# Patient Record
Sex: MALE | Race: BLACK OR AFRICAN AMERICAN | NOT HISPANIC OR LATINO | Employment: UNEMPLOYED | URBAN - NONMETROPOLITAN AREA
[De-identification: names, ages, dates, MRNs, and addresses within clinical notes are randomized per-mention and may not be internally consistent; named-entity substitution may affect disease eponyms.]

---

## 2020-03-12 PROBLEM — R45.851 SUICIDAL IDEATION: Status: ACTIVE | Noted: 2020-03-12

## 2020-03-12 PROBLEM — Z13.9 ENCOUNTER FOR MEDICAL SCREENING EXAMINATION: Status: ACTIVE | Noted: 2020-03-12

## 2020-03-12 PROBLEM — F29 PSYCHOSIS: Status: ACTIVE | Noted: 2020-03-12

## 2020-03-18 PROBLEM — F29 PSYCHOSIS: Status: RESOLVED | Noted: 2020-03-12 | Resolved: 2020-03-18

## 2020-03-18 PROBLEM — R45.851 SUICIDAL IDEATION: Status: RESOLVED | Noted: 2020-03-12 | Resolved: 2020-03-18

## 2020-06-15 PROBLEM — Z13.9 ENCOUNTER FOR MEDICAL SCREENING EXAMINATION: Status: RESOLVED | Noted: 2020-03-12 | Resolved: 2020-06-15

## 2020-09-05 ENCOUNTER — HOSPITAL ENCOUNTER (EMERGENCY)
Facility: HOSPITAL | Age: 20
Discharge: HOME | End: 2020-09-05
Attending: EMERGENCY MEDICINE
Payer: MEDICAID

## 2020-09-05 VITALS
HEART RATE: 53 BPM | WEIGHT: 166 LBS | TEMPERATURE: 98 F | BODY MASS INDEX: 20.64 KG/M2 | OXYGEN SATURATION: 100 % | DIASTOLIC BLOOD PRESSURE: 64 MMHG | RESPIRATION RATE: 14 BRPM | HEIGHT: 75 IN | SYSTOLIC BLOOD PRESSURE: 113 MMHG

## 2020-09-05 DIAGNOSIS — Z71.1 CONCERN ABOUT STD IN MALE WITHOUT DIAGNOSIS: Primary | ICD-10-CM

## 2020-09-05 PROCEDURE — 99283 EMERGENCY DEPT VISIT LOW MDM: CPT

## 2020-09-05 PROCEDURE — 87491 CHLMYD TRACH DNA AMP PROBE: CPT

## 2020-09-05 NOTE — ED PROVIDER NOTES
Encounter Date: 9/5/2020       History     Chief Complaint   Patient presents with    Exposure to STD     denies exposure but wants to be tested for std to be on safe side.     20-year-old male who comes to the ER today patient is a has filed smelling odor coming from his anus, patient denies any known contact with anyone with the STD.  He does admit to having anal sex.  Denies any drainage pain or burning on urination no nausea no vomiting.        Review of patient's allergies indicates:  No Known Allergies  History reviewed. No pertinent past medical history.  History reviewed. No pertinent surgical history.  History reviewed. No pertinent family history.  Social History     Tobacco Use    Smoking status: Never Smoker   Substance Use Topics    Alcohol use: Not on file    Drug use: Not on file     Review of Systems   Constitutional: Negative for fever.   HENT: Negative for sore throat.    Respiratory: Negative for shortness of breath.    Cardiovascular: Negative for chest pain.   Gastrointestinal: Negative for nausea.   Genitourinary: Negative for difficulty urinating, discharge, dysuria and genital sores.   Musculoskeletal: Negative for back pain.   Skin: Negative for rash.   Neurological: Negative for weakness.   Hematological: Does not bruise/bleed easily.       Physical Exam     Initial Vitals [09/05/20 1506]   BP Pulse Resp Temp SpO2   113/64 (!) 53 14 98.1 °F (36.7 °C) 100 %      MAP       --         Physical Exam    Constitutional: Vital signs are normal. He appears well-developed and well-nourished.   HENT:   Head: Normocephalic and atraumatic.   Right Ear: Hearing and external ear normal.   Left Ear: Hearing and external ear normal.   Nose: Nose normal.   Mouth/Throat: Uvula is midline, oropharynx is clear and moist and mucous membranes are normal.   Eyes: Conjunctivae, EOM and lids are normal. Pupils are equal, round, and reactive to light.   Neck: Trachea normal. Neck supple.   Cardiovascular: Normal  rate, regular rhythm, normal heart sounds and normal pulses.   Pulmonary/Chest: Effort normal and breath sounds normal.   Abdominal: Soft. Bowel sounds are normal.   Genitourinary:    Testes and penis normal.      Genitourinary Comments: Perianal area without discharge, trauma, bleeding, or signs of hemorrhoids.     Neurological: He is alert.         ED Course   Procedures  Labs Reviewed - No data to display       Imaging Results    None          Medical Decision Making:   Differential Diagnosis:   Anal Tear  STD    Clinical Tests:   Lab Tests: Ordered                                 Clinical Impression:       ICD-10-CM ICD-9-CM   1. Concern about STD in male without diagnosis  Z71.1 V65.5                                Yogesh Wasserman III, NP  09/05/20 1634

## 2020-10-06 LAB
C TRACH DNA SPEC QL NAA+PROBE: NOT DETECTED
N GONORRHOEA DNA SPEC QL NAA+PROBE: NOT DETECTED

## 2020-10-31 ENCOUNTER — HOSPITAL ENCOUNTER (EMERGENCY)
Facility: HOSPITAL | Age: 20
Discharge: HOME | End: 2020-10-31
Attending: EMERGENCY MEDICINE
Payer: MEDICAID

## 2020-10-31 VITALS
OXYGEN SATURATION: 100 % | DIASTOLIC BLOOD PRESSURE: 73 MMHG | SYSTOLIC BLOOD PRESSURE: 118 MMHG | TEMPERATURE: 98 F | RESPIRATION RATE: 17 BRPM | HEART RATE: 51 BPM

## 2020-10-31 DIAGNOSIS — S93.503A: ICD-10-CM

## 2020-10-31 PROCEDURE — 99283 EMERGENCY DEPT VISIT LOW MDM: CPT | Mod: 25

## 2020-10-31 RX ORDER — IBUPROFEN 600 MG/1
600 TABLET ORAL EVERY 8 HOURS
Qty: 20 TABLET | Refills: 0 | Status: SHIPPED | OUTPATIENT
Start: 2020-10-31 | End: 2020-11-05

## 2020-10-31 NOTE — DISCHARGE INSTRUCTIONS
Rest, ice for 20 min every 2 hr while awake for the next 48 hr.  Take ibuprofen every 8 hr with food for 5 days for anti inflammation.   hard sole shoe and crutches for rest as needed.  Follow-up with podiatrist if not resolving this week.

## 2020-10-31 NOTE — Clinical Note
"Bishop Nixsam Soria was seen and treated in our emergency department on 10/31/2020.  He may return to work on 10/31/2020.       If you have any questions or concerns, please don't hesitate to call.      Chrissy GRIMM    "

## 2020-10-31 NOTE — ED PROVIDER NOTES
Encounter Date: 10/31/2020       History     Chief Complaint   Patient presents with    Toe Pain     Pt c/o pain to base of right great toe.  Pt states @ a dance class and had pain after.     20-year-old male presents complaining of right MTP pain and swelling that started 2 days ago.  He noticed the pain after finishing a dance class.  Was hip popped dancing.  Worse yesterday and again today.  No known specific injury.  No erythema warmth or fever. Denies any known arthritis or STI.  Denies any renal dysfunction or history of gout.        Review of patient's allergies indicates:  No Known Allergies  No past medical history on file.  No past surgical history on file.  No family history on file.  Social History     Tobacco Use    Smoking status: Never Smoker   Substance Use Topics    Alcohol use: Not on file    Drug use: Not on file     Review of Systems   Constitutional: Negative for fever.   HENT: Negative for sore throat.    Respiratory: Negative for shortness of breath.    Cardiovascular: Negative for chest pain.   Gastrointestinal: Negative for nausea.   Genitourinary: Negative for dysuria.   Musculoskeletal: Positive for arthralgias. Negative for back pain.   Skin: Negative for rash.   Neurological: Negative for weakness.   Hematological: Does not bruise/bleed easily.       Physical Exam     Initial Vitals [10/31/20 0848]   BP Pulse Resp Temp SpO2   114/67 60 17 98.1 °F (36.7 °C) 97 %      MAP       --         Physical Exam    Nursing note and vitals reviewed.  Constitutional: He appears well-developed and well-nourished.   HENT:   Head: Atraumatic.   Neck: No JVD present.   Cardiovascular: Normal rate, regular rhythm and normal heart sounds.   Pulmonary/Chest: Breath sounds normal.   Musculoskeletal:      Comments: Right MTP mild swelling no erythema joint is tender.  Normal cap refill.  Normal sensation.  Midfoot and ankle and plantar surface of the foot is normal.   Lymphadenopathy:     He has no  cervical adenopathy.   Neurological: He is alert and oriented to person, place, and time.         ED Course   Procedures  Labs Reviewed - No data to display       Imaging Results          X-Ray Foot Complete Right (In process)               X-Rays:   Independently Interpreted Readings:   Other Readings:  X-ray of the right foot shows no bony abnormalities.    Patient states having difficulty bearing weight.  No walking boots available.  Will place in postop shoe and crutches.  Anti-inflammatories around the clock for 5 days.  Follow-up with podiatry next week if not improved.                              Clinical Impression:       ICD-10-CM ICD-9-CM   1. Sprain of great toe  S93.503A 845.19                          ED Disposition Condition    Discharge Stable        ED Prescriptions     Medication Sig Dispense Start Date End Date Auth. Provider    ibuprofen (ADVIL,MOTRIN) 600 MG tablet Take 1 tablet (600 mg total) by mouth every 8 (eight) hours. for 5 days 20 tablet 10/31/2020 11/5/2020 Elder Cadena MD        Follow-up Information     Follow up With Specialties Details Why Contact Info Additional Information    Ruddy Lawson DPM Podiatry Schedule an appointment as soon as possible for a visit  With podiatry for recheck this week. [address]  [address]  [place] [address] [phone]       [facility] Emergency Medicine  As needed, If symptoms worsen and erythema or concern for infection develops [address] [place] [address] [id number] [phone] Floor [address]                                       Elder Cadena MD  10/31/20 7159

## 2020-12-03 ENCOUNTER — HOSPITAL ENCOUNTER (INPATIENT)
Facility: HOSPITAL | Age: 20
LOS: 8 days | Discharge: HOME | DRG: 885 | End: 2020-12-11
Attending: EMERGENCY MEDICINE | Admitting: EMERGENCY MEDICINE
Payer: MEDICAID

## 2020-12-03 DIAGNOSIS — F23 ACUTE PSYCHOSIS: Primary | ICD-10-CM

## 2020-12-03 LAB
ALBUMIN SERPL BCP-MCNC: 4.3 G/DL (ref 3.5–5.2)
ALP SERPL-CCNC: 66 U/L (ref 55–135)
ALT SERPL W/O P-5'-P-CCNC: 22 U/L (ref 10–44)
AMPHET+METHAMPHET UR QL: NEGATIVE
ANION GAP SERPL CALC-SCNC: 8 MMOL/L (ref 8–16)
AST SERPL-CCNC: 17 U/L (ref 10–40)
BACTERIA #/AREA URNS HPF: NEGATIVE /HPF
BARBITURATES UR QL SCN>200 NG/ML: NEGATIVE
BASOPHILS # BLD AUTO: 0.01 K/UL (ref 0–0.2)
BASOPHILS NFR BLD: 0.1 % (ref 0–1.9)
BENZODIAZ UR QL SCN>200 NG/ML: NEGATIVE
BILIRUB SERPL-MCNC: 0.8 MG/DL (ref 0.1–1)
BILIRUB UR QL STRIP: NEGATIVE
BUN SERPL-MCNC: 22 MG/DL (ref 6–20)
BZE UR QL SCN: NEGATIVE
CALCIUM SERPL-MCNC: 9 MG/DL (ref 8.7–10.5)
CANNABINOIDS UR QL SCN: ABNORMAL
CHLORIDE SERPL-SCNC: 109 MMOL/L (ref 95–110)
CLARITY UR: CLEAR
CO2 SERPL-SCNC: 25 MMOL/L (ref 23–29)
COLOR UR: YELLOW
CREAT SERPL-MCNC: 1.3 MG/DL (ref 0.5–1.4)
CREAT UR-MCNC: 526 MG/DL (ref 23–375)
CTP QC/QA: YES
DIFFERENTIAL METHOD: ABNORMAL
EOSINOPHIL # BLD AUTO: 0 K/UL (ref 0–0.5)
EOSINOPHIL NFR BLD: 0 % (ref 0–8)
ERYTHROCYTE [DISTWIDTH] IN BLOOD BY AUTOMATED COUNT: 12 % (ref 11.5–14.5)
EST. GFR  (AFRICAN AMERICAN): >60 ML/MIN/1.73 M^2
EST. GFR  (NON AFRICAN AMERICAN): >60 ML/MIN/1.73 M^2
ETHANOL SERPL-MCNC: <3 MG/DL
GLUCOSE SERPL-MCNC: 106 MG/DL (ref 70–110)
GLUCOSE UR QL STRIP: NEGATIVE
HCT VFR BLD AUTO: 38.5 % (ref 40–54)
HGB BLD-MCNC: 13 G/DL (ref 14–18)
HGB UR QL STRIP: NEGATIVE
HYALINE CASTS #/AREA URNS LPF: 16 /LPF
IMM GRANULOCYTES # BLD AUTO: 0.01 K/UL (ref 0–0.04)
IMM GRANULOCYTES NFR BLD AUTO: 0.1 % (ref 0–0.5)
KETONES UR QL STRIP: ABNORMAL
LEUKOCYTE ESTERASE UR QL STRIP: ABNORMAL
LYMPHOCYTES # BLD AUTO: 1 K/UL (ref 1–4.8)
LYMPHOCYTES NFR BLD: 14.7 % (ref 18–48)
MCH RBC QN AUTO: 30.8 PG (ref 27–31)
MCHC RBC AUTO-ENTMCNC: 33.8 G/DL (ref 32–36)
MCV RBC AUTO: 91 FL (ref 82–98)
METHADONE UR QL SCN>300 NG/ML: NEGATIVE
MICROSCOPIC COMMENT: ABNORMAL
MONOCYTES # BLD AUTO: 0.6 K/UL (ref 0.3–1)
MONOCYTES NFR BLD: 7.8 % (ref 4–15)
NEUTROPHILS # BLD AUTO: 5.5 K/UL (ref 1.8–7.7)
NEUTROPHILS NFR BLD: 77.3 % (ref 38–73)
NITRITE UR QL STRIP: NEGATIVE
NRBC BLD-RTO: 0 /100 WBC
OPIATES UR QL SCN: NEGATIVE
PCP UR QL SCN>25 NG/ML: NEGATIVE
PH UR STRIP: 8 [PH] (ref 5–8)
PLATELET # BLD AUTO: 255 K/UL (ref 150–350)
PMV BLD AUTO: 9.9 FL (ref 9.2–12.9)
POTASSIUM SERPL-SCNC: 3.5 MMOL/L (ref 3.5–5.1)
PROT SERPL-MCNC: 7.8 G/DL (ref 6–8.4)
PROT UR QL STRIP: ABNORMAL
RBC # BLD AUTO: 4.22 M/UL (ref 4.6–6.2)
RBC #/AREA URNS HPF: 7 /HPF (ref 0–4)
SARS-COV-2 RDRP RESP QL NAA+PROBE: NEGATIVE
SODIUM SERPL-SCNC: 142 MMOL/L (ref 136–145)
SP GR UR STRIP: >=1.03 (ref 1–1.03)
SQUAMOUS #/AREA URNS HPF: 2 /HPF
TOXICOLOGY INFORMATION: ABNORMAL
URN SPEC COLLECT METH UR: ABNORMAL
UROBILINOGEN UR STRIP-ACNC: ABNORMAL EU/DL
WBC # BLD AUTO: 7.07 K/UL (ref 3.9–12.7)
WBC #/AREA URNS HPF: 3 /HPF (ref 0–5)

## 2020-12-03 PROCEDURE — 81000 URINALYSIS NONAUTO W/SCOPE: CPT | Mod: 59

## 2020-12-03 PROCEDURE — 80320 DRUG SCREEN QUANTALCOHOLS: CPT

## 2020-12-03 PROCEDURE — 25000003 PHARM REV CODE 250: Performed by: EMERGENCY MEDICINE

## 2020-12-03 PROCEDURE — 36415 COLL VENOUS BLD VENIPUNCTURE: CPT

## 2020-12-03 PROCEDURE — U0002 COVID-19 LAB TEST NON-CDC: HCPCS | Performed by: EMERGENCY MEDICINE

## 2020-12-03 PROCEDURE — 85025 COMPLETE CBC W/AUTO DIFF WBC: CPT

## 2020-12-03 PROCEDURE — 99285 EMERGENCY DEPT VISIT HI MDM: CPT

## 2020-12-03 PROCEDURE — 80307 DRUG TEST PRSMV CHEM ANLYZR: CPT

## 2020-12-03 PROCEDURE — 11400000 HC PSYCH PRIVATE ROOM

## 2020-12-03 PROCEDURE — S0166 INJ OLANZAPINE 2.5MG: HCPCS | Performed by: EMERGENCY MEDICINE

## 2020-12-03 PROCEDURE — 80053 COMPREHEN METABOLIC PANEL: CPT

## 2020-12-03 RX ORDER — OLANZAPINE 10 MG/2ML
10 INJECTION, POWDER, FOR SOLUTION INTRAMUSCULAR ONCE AS NEEDED
Status: DISCONTINUED | OUTPATIENT
Start: 2020-12-03 | End: 2020-12-03

## 2020-12-03 RX ORDER — OLANZAPINE 10 MG/2ML
10 INJECTION, POWDER, FOR SOLUTION INTRAMUSCULAR ONCE
Status: COMPLETED | OUTPATIENT
Start: 2020-12-03 | End: 2020-12-03

## 2020-12-03 RX ADMIN — OLANZAPINE 10 MG: 10 INJECTION, POWDER, FOR SOLUTION INTRAMUSCULAR at 07:12

## 2020-12-04 PROBLEM — F12.10 MARIJUANA ABUSE: Status: ACTIVE | Noted: 2020-12-04

## 2020-12-04 LAB
ESTIMATED AVG GLUCOSE: 103 MG/DL (ref 68–131)
HBA1C MFR BLD HPLC: 5.2 % (ref 4–5.6)

## 2020-12-04 PROCEDURE — 90792 PSYCH DIAG EVAL W/MED SRVCS: CPT | Mod: AF,HB,, | Performed by: PSYCHIATRY & NEUROLOGY

## 2020-12-04 PROCEDURE — 90792 PR PSYCHIATRIC DIAGNOSTIC EVALUATION W/MEDICAL SERVICES: ICD-10-PCS | Mod: AF,HB,, | Performed by: PSYCHIATRY & NEUROLOGY

## 2020-12-04 PROCEDURE — 36415 COLL VENOUS BLD VENIPUNCTURE: CPT

## 2020-12-04 PROCEDURE — 11400000 HC PSYCH PRIVATE ROOM

## 2020-12-04 PROCEDURE — 25000003 PHARM REV CODE 250: Performed by: PSYCHIATRY & NEUROLOGY

## 2020-12-04 PROCEDURE — 83036 HEMOGLOBIN GLYCOSYLATED A1C: CPT

## 2020-12-04 RX ORDER — MAG HYDROX/ALUMINUM HYD/SIMETH 200-200-20
30 SUSPENSION, ORAL (FINAL DOSE FORM) ORAL EVERY 6 HOURS PRN
Status: DISCONTINUED | OUTPATIENT
Start: 2020-12-04 | End: 2020-12-11 | Stop reason: HOSPADM

## 2020-12-04 RX ORDER — FOLIC ACID 1 MG/1
1 TABLET ORAL DAILY
Status: DISCONTINUED | OUTPATIENT
Start: 2020-12-04 | End: 2020-12-11 | Stop reason: HOSPADM

## 2020-12-04 RX ORDER — DIPHENHYDRAMINE HCL 50 MG
50 CAPSULE ORAL NIGHTLY PRN
Status: DISCONTINUED | OUTPATIENT
Start: 2020-12-04 | End: 2020-12-06

## 2020-12-04 RX ORDER — HALOPERIDOL 5 MG/ML
5 INJECTION INTRAMUSCULAR EVERY 4 HOURS PRN
Status: DISCONTINUED | OUTPATIENT
Start: 2020-12-04 | End: 2020-12-11 | Stop reason: HOSPADM

## 2020-12-04 RX ORDER — ACETAMINOPHEN 325 MG/1
650 TABLET ORAL EVERY 6 HOURS PRN
Status: DISCONTINUED | OUTPATIENT
Start: 2020-12-04 | End: 2020-12-11 | Stop reason: HOSPADM

## 2020-12-04 RX ORDER — DIPHENHYDRAMINE HCL 50 MG
50 CAPSULE ORAL EVERY 4 HOURS PRN
Status: DISCONTINUED | OUTPATIENT
Start: 2020-12-04 | End: 2020-12-11 | Stop reason: HOSPADM

## 2020-12-04 RX ORDER — LORAZEPAM 1 MG/1
2 TABLET ORAL EVERY 4 HOURS PRN
Status: DISCONTINUED | OUTPATIENT
Start: 2020-12-04 | End: 2020-12-11 | Stop reason: HOSPADM

## 2020-12-04 RX ORDER — HALOPERIDOL 5 MG/1
5 TABLET ORAL EVERY 4 HOURS PRN
Status: DISCONTINUED | OUTPATIENT
Start: 2020-12-04 | End: 2020-12-11 | Stop reason: HOSPADM

## 2020-12-04 RX ORDER — DIPHENHYDRAMINE HYDROCHLORIDE 50 MG/ML
50 INJECTION INTRAMUSCULAR; INTRAVENOUS EVERY 4 HOURS PRN
Status: DISCONTINUED | OUTPATIENT
Start: 2020-12-04 | End: 2020-12-11 | Stop reason: HOSPADM

## 2020-12-04 RX ORDER — LORAZEPAM 2 MG/ML
2 INJECTION INTRAMUSCULAR EVERY 4 HOURS PRN
Status: DISCONTINUED | OUTPATIENT
Start: 2020-12-04 | End: 2020-12-11 | Stop reason: HOSPADM

## 2020-12-04 RX ORDER — ARIPIPRAZOLE 10 MG/1
10 TABLET ORAL DAILY
Status: DISCONTINUED | OUTPATIENT
Start: 2020-12-04 | End: 2020-12-08

## 2020-12-04 RX ORDER — ADHESIVE BANDAGE
30 BANDAGE TOPICAL DAILY PRN
Status: DISCONTINUED | OUTPATIENT
Start: 2020-12-04 | End: 2020-12-11 | Stop reason: HOSPADM

## 2020-12-04 RX ADMIN — FOLIC ACID 1 MG: 1 TABLET ORAL at 02:12

## 2020-12-04 RX ADMIN — THERA TABS 1 TABLET: TAB at 02:12

## 2020-12-04 RX ADMIN — DIPHENHYDRAMINE HYDROCHLORIDE 50 MG: 50 CAPSULE ORAL at 08:12

## 2020-12-04 RX ADMIN — ARIPIPRAZOLE 10 MG: 10 TABLET ORAL at 02:12

## 2020-12-04 NOTE — SUBJECTIVE & OBJECTIVE
Past Medical History:   Diagnosis Date    Schizophrenia        History reviewed. No pertinent surgical history.    Review of patient's allergies indicates:  No Known Allergies    No current facility-administered medications on file prior to encounter.      Current Outpatient Medications on File Prior to Encounter   Medication Sig    divalproex ER (DEPAKOTE) 500 MG Tb24 Take 1 tablet (500 mg total) by mouth every evening.    paliperidone palmitate (INVEGA SUSTENNA) 156 mg/mL Syrg injection Inject 1 mL (156 mg total) into the muscle every 28 days. Next dose due on 4/13/20     Family History     None        Tobacco Use    Smoking status: Never Smoker   Substance and Sexual Activity    Alcohol use: Not on file    Drug use: Yes     Types: Marijuana     Comment: or synthetic    Sexual activity: Yes     Review of Systems   Constitutional: Negative for chills and fever.   HENT: Negative for rhinorrhea, sneezing and sore throat.    Eyes: Negative for pain and visual disturbance.   Respiratory: Negative for cough and shortness of breath.    Cardiovascular: Negative for chest pain.   Gastrointestinal: Negative for abdominal pain, constipation and diarrhea.   Endocrine: Negative for cold intolerance and heat intolerance.   Genitourinary: Negative for difficulty urinating.   Musculoskeletal: Negative for arthralgias and joint swelling.   Skin: Negative for rash.   Allergic/Immunologic: Negative for environmental allergies.   Neurological: Negative for dizziness, syncope and weakness.   Hematological: Does not bruise/bleed easily.   Psychiatric/Behavioral: Positive for behavioral problems. Negative for dysphoric mood. The patient is not nervous/anxious.      Objective:     Vital Signs (Most Recent):  Temp: 97.9 °F (36.6 °C) (12/03/20 2023)  Pulse: (!) 57 (12/03/20 2023)  Resp: 16 (12/03/20 2023)  BP: 105/73 (12/03/20 2023)  SpO2: 98 % (12/03/20 2023) Vital Signs (24h Range):  Temp:  [97.6 °F (36.4 °C)-98.5 °F (36.9 °C)]  97.9 °F (36.6 °C)  Pulse:  [] 57  Resp:  [16-20] 16  SpO2:  [96 %-100 %] 98 %  BP: (105-139)/(57-73) 105/73     Weight: 75.3 kg (166 lb 0.1 oz)  Body mass index is 20.75 kg/m².    Physical Exam  Vitals signs and nursing note reviewed.   Constitutional:       Appearance: Normal appearance.   HENT:      Head: Normocephalic and atraumatic.      Nose: Nose normal.   Eyes:      Extraocular Movements: Extraocular movements intact.      Pupils: Pupils are equal, round, and reactive to light.   Neck:      Musculoskeletal: Normal range of motion and neck supple.   Cardiovascular:      Rate and Rhythm: Normal rate and regular rhythm.      Heart sounds: No murmur. No friction rub. No gallop.    Pulmonary:      Effort: Pulmonary effort is normal.      Breath sounds: Normal breath sounds.   Abdominal:      General: Abdomen is flat. Bowel sounds are normal.      Palpations: Abdomen is soft.   Musculoskeletal:         General: No swelling or deformity.   Skin:     General: Skin is warm and dry.      Capillary Refill: Capillary refill takes less than 2 seconds.   Neurological:      General: No focal deficit present.      Mental Status: He is alert and oriented to person, place, and time.   Psychiatric:         Mood and Affect: Mood normal.         Behavior: Behavior normal.           CRANIAL NERVES     CN III, IV, VI   Pupils are equal, round, and reactive to light.       Significant Labs: All pertinent labs within the past 24 hours have been reviewed.    Significant Imaging: I have reviewed and interpreted all pertinent imaging results/findings within the past 24 hours.

## 2020-12-04 NOTE — H&P
[facility] Hospital Medicine  History & Physical    Patient Name: Bishop Soria  MRN: [id number]  Admission Date: 12/3/2020  Attending Physician: Yonathan Garrett Jr., MD   Primary Care Provider: Jose Martin Selby MD         Patient information was obtained from ER records.     Subjective:     Principal Problem:<principal problem not specified>    Chief Complaint:   Chief Complaint   Patient presents with    Psychiatric Evaluation     Pt to ER via [facility], states pt having sudden outburts @ home with bizarre behavior.        HPI:   Chief Complaint   Patient presents with    Psychiatric Evaluation       Pt to ER via [facility], states pt having sudden outburts @ home with bizarre behavior.     This is a 20 year old white male brought by EMS due to bizarre behavior, EMS reports possible synthetic use.  History of schizophrenia, supposed to be on Depakote and Invega, but has been off his medications for a while per mother         Past Medical History:   Diagnosis Date    Schizophrenia        History reviewed. No pertinent surgical history.    Review of patient's allergies indicates:  No Known Allergies    No current facility-administered medications on file prior to encounter.      Current Outpatient Medications on File Prior to Encounter   Medication Sig    divalproex ER (DEPAKOTE) 500 MG Tb24 Take 1 tablet (500 mg total) by mouth every evening.    paliperidone palmitate (INVEGA SUSTENNA) 156 mg/mL Syrg injection Inject 1 mL (156 mg total) into the muscle every 28 days. Next dose due on 4/13/20     Family History     None        Tobacco Use    Smoking status: Never Smoker   Substance and Sexual Activity    Alcohol use: Not on file    Drug use: Yes     Types: Marijuana     Comment: or synthetic    Sexual activity: Yes     Review of Systems   Constitutional: Negative for chills and fever.   HENT: Negative for rhinorrhea, sneezing and sore throat.    Eyes: Negative for pain and visual  disturbance.   Respiratory: Negative for cough and shortness of breath.    Cardiovascular: Negative for chest pain.   Gastrointestinal: Negative for abdominal pain, constipation and diarrhea.   Endocrine: Negative for cold intolerance and heat intolerance.   Genitourinary: Negative for difficulty urinating.   Musculoskeletal: Negative for arthralgias and joint swelling.   Skin: Negative for rash.   Allergic/Immunologic: Negative for environmental allergies.   Neurological: Negative for dizziness, syncope and weakness.   Hematological: Does not bruise/bleed easily.   Psychiatric/Behavioral: Positive for behavioral problems. Negative for dysphoric mood. The patient is not nervous/anxious.      Objective:     Vital Signs (Most Recent):  Temp: 97.9 °F (36.6 °C) (12/03/20 2023)  Pulse: (!) 57 (12/03/20 2023)  Resp: 16 (12/03/20 2023)  BP: 105/73 (12/03/20 2023)  SpO2: 98 % (12/03/20 2023) Vital Signs (24h Range):  Temp:  [97.6 °F (36.4 °C)-98.5 °F (36.9 °C)] 97.9 °F (36.6 °C)  Pulse:  [] 57  Resp:  [16-20] 16  SpO2:  [96 %-100 %] 98 %  BP: (105-139)/(57-73) 105/73     Weight: 75.3 kg (166 lb 0.1 oz)  Body mass index is 20.75 kg/m².    Physical Exam  Vitals signs and nursing note reviewed.   Constitutional:       Appearance: Normal appearance.   HENT:      Head: Normocephalic and atraumatic.      Nose: Nose normal.   Eyes:      Extraocular Movements: Extraocular movements intact.      Pupils: Pupils are equal, round, and reactive to light.   Neck:      Musculoskeletal: Normal range of motion and neck supple.   Cardiovascular:      Rate and Rhythm: Normal rate and regular rhythm.      Heart sounds: No murmur. No friction rub. No gallop.    Pulmonary:      Effort: Pulmonary effort is normal.      Breath sounds: Normal breath sounds.   Abdominal:      General: Abdomen is flat. Bowel sounds are normal.      Palpations: Abdomen is soft.   Musculoskeletal:         General: No swelling or deformity.   Skin:     General:  Skin is warm and dry.      Capillary Refill: Capillary refill takes less than 2 seconds.   Neurological:      General: No focal deficit present.      Mental Status: He is alert and oriented to person, place, and time.   Psychiatric:         Mood and Affect: Mood normal.         Behavior: Behavior normal.           CRANIAL NERVES     CN III, IV, VI   Pupils are equal, round, and reactive to light.       Significant Labs: All pertinent labs within the past 24 hours have been reviewed.    Significant Imaging: I have reviewed and interpreted all pertinent imaging results/findings within the past 24 hours.    Assessment/Plan:     Marijuana abuse  Patient will need counseling moving forward regarding hazards of such use.      Acute psychosis  To be admitted to our inpatient psychiatric unit for further evaluation and management.    Potentially related to synthetic use.         VTE Risk Mitigation (From admission, onward)    None             Regis King Jr, MD  Department of Hospital Medicine   [facility]

## 2020-12-04 NOTE — PLAN OF CARE
PATIENT IS EATING SUPPER MEAL AT HIS TIME. APPETITE GOOD.  PATIENT IS DEPRESSED AND COOPERATIVE.  ATTENDED AND PARTICIPATED IN GROUPS OFFERED TODAY  INTERACTED WITH SELECTED PEERS  DENIES S/I, H/I. DENIES A/V HALLUCINATIONS  DR. SALAS VISITED PER TELEMED WITH NEW ORDERS AND FIRST DOSES ADMINISTERED.  CLOSE OBSERVATIONS CONTINUED

## 2020-12-04 NOTE — PSYCH
"[facility]                                                                     Initial Psychiatric Evaluation      12/4/2020 12:00 PM   [facility] Initial Psychiatric Evaluation      12/4/2020 12:00 PM   B[facility] Soria   2000   [id number]           Date of Admission: 12/3/2020  5:36 PM    Current Legal Status:Physician's Emergency Certificate (PEC)    Chief Complaint/Reason for consult: "let the anger get the best of me"     SUBJECTIVE:   History of Present Illness:   Chief Complaint   Patient presents with    Psychiatric Evaluation       Pt to ER via [facility], states pt having sudden outburts @ home with bizarre behavior.      This is a 20 year old white male brought by EMS due to bizarre behavior, EMS reports possible synthetic use.  History of schizophrenia, supposed to be on Depakote and Invega, but has been off his medications for a while per mother     nsg note:  21 YO BM CAME TO [facility]   FOR BIZARRE BEHAVIOR AND OUTBURST. MOM STATES HE IS NON COMPLIANT WITH DEPAKOTE AND INVEGA. PT ASKED THIS WAY WHEN HE IS OFF HIS MEDS. HX SCHIZO.  PT GOT IM ZYPREXA IN ED. COOPERATES. UDS + THC, ETOH <3. COVID 19 NEG.           met with pt, discussed with staff, reviewed chart. Pt was calm and cooperative. He states he and mother had an argument and "she lost it" and called EMS. He does admit to chronic anger issues and depression going back to death of his father in 4th grade. He reports that mood "fluctuates" and sleep and appetite and energy are low. Denies any real problems. Denies SI/HI/AH/VH. Somewhat grandiose.pt exhibited thought blocking. States he has not been in outpt tx but has been in inpt tx. He states he has been on sustenna in past but doesn't take bc he did not like the se's. He states will not take in future. He was poor historian, and minimized issues leading to his admission.          Collateral: mother    Review of Systems   Constitutional: Negative for chills, diaphoresis, fever, " malaise/fatigue and weight loss.   HENT: Negative for congestion, ear discharge, ear pain, hearing loss, nosebleeds, sinus pain, sore throat and tinnitus.    Eyes: Negative for blurred vision, double vision, photophobia, pain, discharge and redness.   Respiratory: Negative for cough, hemoptysis, sputum production, shortness of breath, wheezing and stridor.    Cardiovascular: Negative for chest pain, palpitations, orthopnea, claudication, leg swelling and PND.   Gastrointestinal: Negative for abdominal pain, blood in stool, constipation, diarrhea, heartburn, melena, nausea and vomiting.   Genitourinary: Negative for dysuria, flank pain, frequency, hematuria and urgency.   Musculoskeletal: Negative for back pain, falls, joint pain, myalgias and neck pain.   Skin: Negative for itching and rash.   Neurological: Negative for dizziness, tingling, tremors, sensory change, speech change, focal weakness, seizures, loss of consciousness, weakness and headaches.   Endo/Heme/Allergies: Negative for environmental allergies and polydipsia. Does not bruise/bleed easily.   Psychiatric/Behavioral: Positive for hallucinations. Negative for depression, memory loss, substance abuse and suicidal ideas. The patient is not nervous/anxious and does not have insomnia.          Pain: 0/10    Past Psychiatric History:   Previous Psychiatric Hospitalizations: YES:      Previous Medication Trials: YES: invega sustena depakote zoloft History of psychotherapy:  NO per pt  Previous Suicide Attempts: NO  History of Violence:  NO  History of physical/sexual abuse: NO  Outpatient psychiatrist (current & past): NO per pt    Substance Abuse History:   Tobacco: NO  Alcohol: NO  Illicit Substances: YES: MJ     Detox/Rehab: NO      Past Medical/Surgical History:   Past Medical History:   Diagnosis Date    Schizophrenia      History reviewed. No pertinent surgical history.      Current Medications:   MEDICATIONS: See list below      Allergies:   Review of  patient's allergies indicates:  No Known Allergies      Neurological History:   Seizures: NO  Head trauma: NO    Family Psychiatric History: No  Social History:  Developmental/Childhood:Achieved all developmental milestones timely  *Education:some college  Employment Status/Finances:employed   Relationship Status/Sexual Orientation: Single:    Children: 0  Housing Status: Home    history:  NO  Access to gun: NO  Latter-day:Non-practicing  Recreational activities:Time with family      Legal History:   Past Charges/Incarcerations:  No      OBJECTIVE:       Vitals   Vitals:    12/04/20 0752   BP: 114/67   Pulse: (!) 55   Resp: 20   Temp: 97.8 °F (36.6 °C)        Labs/Imaging/Studies:   Recent Results (from the past 48 hour(s))   CBC auto differential    Collection Time: 12/03/20  6:33 PM   Result Value Ref Range    WBC 7.07 3.90 - 12.70 K/uL    RBC 4.22 (L) 4.60 - 6.20 M/uL    Hemoglobin 13.0 (L) 14.0 - 18.0 g/dL    Hematocrit 38.5 (L) 40.0 - 54.0 %    MCV 91 82 - 98 fL    MCH 30.8 27.0 - 31.0 pg    MCHC 33.8 32.0 - 36.0 g/dL    RDW 12.0 11.5 - 14.5 %    Platelets 255 150 - 350 K/uL    MPV 9.9 9.2 - 12.9 fL    Immature Granulocytes 0.1 0.0 - 0.5 %    Gran # (ANC) 5.5 1.8 - 7.7 K/uL    Immature Grans (Abs) 0.01 0.00 - 0.04 K/uL    Lymph # 1.0 1.0 - 4.8 K/uL    Mono # 0.6 0.3 - 1.0 K/uL    Eos # 0.0 0.0 - 0.5 K/uL    Baso # 0.01 0.00 - 0.20 K/uL    nRBC 0 0 /100 WBC    Gran % 77.3 (H) 38.0 - 73.0 %    Lymph % 14.7 (L) 18.0 - 48.0 %    Mono % 7.8 4.0 - 15.0 %    Eosinophil % 0.0 0.0 - 8.0 %    Basophil % 0.1 0.0 - 1.9 %    Differential Method Automated    Comprehensive metabolic panel    Collection Time: 12/03/20  6:33 PM   Result Value Ref Range    Sodium 142 136 - 145 mmol/L    Potassium 3.5 3.5 - 5.1 mmol/L    Chloride 109 95 - 110 mmol/L    CO2 25 23 - 29 mmol/L    Glucose 106 70 - 110 mg/dL    BUN 22 (H) 6 - 20 mg/dL    Creatinine 1.3 0.5 - 1.4 mg/dL    Calcium 9.0 8.7 - 10.5 mg/dL    Total Protein 7.8 6.0 -  8.4 g/dL    Albumin 4.3 3.5 - 5.2 g/dL    Total Bilirubin 0.8 0.1 - 1.0 mg/dL    Alkaline Phosphatase 66 55 - 135 U/L    AST 17 10 - 40 U/L    ALT 22 10 - 44 U/L    Anion Gap 8 8 - 16 mmol/L    eGFR if African American >60.0 >60 mL/min/1.73 m^2    eGFR if non African American >60.0 >60 mL/min/1.73 m^2   Ethanol    Collection Time: 12/03/20  6:33 PM   Result Value Ref Range    Alcohol, Serum <3 <10 mg/dL   Drug screen panel, emergency    Collection Time: 12/03/20  6:58 PM   Result Value Ref Range    Benzodiazepines Negative     Methadone metabolites Negative     Cocaine (Metab.) Negative     Opiate Scrn, Ur Negative     Barbiturate Screen, Ur Negative     Amphetamine Screen, Ur Negative     THC Presumptive Positive     Phencyclidine Negative     Creatinine, Urine 526.0 (H) 23.0 - 375.0 mg/dL    Toxicology Information SEE COMMENT    Urinalysis, Reflex to Urine Culture Urine, Clean Catch    Collection Time: 12/03/20  6:58 PM    Specimen: Urine, Clean Catch   Result Value Ref Range    Specimen UA Urine, Clean Catch     Color, UA Yellow Yellow, Straw, Ingrid    Appearance, UA Clear Clear    pH, UA 8.0 5.0 - 8.0    Specific Gravity, UA >=1.030 (A) 1.005 - 1.030    Protein, UA 1+ (A) Negative    Glucose, UA Negative Negative    Ketones, UA 2+ (A) Negative    Bilirubin (UA) Negative Negative    Occult Blood UA Negative Negative    Nitrite, UA Negative Negative    Urobilinogen, UA 2.0-3.0 (A) <2.0 EU/dL    Leukocytes, UA 1+ (A) Negative   Urinalysis Microscopic    Collection Time: 12/03/20  6:58 PM   Result Value Ref Range    RBC, UA 7 (H) 0 - 4 /hpf    WBC, UA 3 0 - 5 /hpf    Bacteria Negative None-Occ /hpf    Squam Epithel, UA 2 /hpf    Hyaline Casts, UA 16 (A) 0-1/lpf /lpf    Microscopic Comment SEE COMMENT    POCT COVID-19 Rapid Screening    Collection Time: 12/03/20  7:23 PM   Result Value Ref Range    POC Rapid COVID Negative Negative     Acceptable Yes       No results found for: PHENYTOIN, PHENOBARB,  "VALPROATE, CBMZ      Musculoskeletal Exam:  Abnormal Involuntary Movements: NO  Gait: normal  Strength: Greater than antigravity (3+/5) in all extremities   Muscle tone: No impairment   Station: Grossly normal       General/Constitutional Exam:  Appearance: no effort/attention to appearance    Strengths AND Liabilities  Strength: Patient is intelligent., Strength: Patient is physically healthy., Liability: Patient is impulsive., Liability: Patient lacks coping skills.    Psychiatric Mental Status Exam:  Arousal: alert  Sensorium/Orientation: oriented to grossly intact  Behavior/Cooperation: normal, cooperative   Speech: normal tone, normal rate, normal pitch, normal volume  Language: grossly intact  Mood: " ok "   Affect: blunted  Thought Process: blocked  Thought Content: no si/hi  Auditory hallucinations: NO  Visual hallucinations: NO  Paranoia: NO  Delusions:  NO  Suicidal ideation: NO  Homicidal ideation: NO  Attention/Concentration:  intact  Memory:    Recent: [facility] Recent Memory: WNL   Remote: [facility] Remote Memory: WNL , past events, as relates history  Fund of Knowledge: Aware of current events and Vocabulary appropriate    Abstract reasoning: proverbs were concrete, similarities were concrete  Intelligence: [facility] Intelligence: Average, based on history, based on vocabulary, syntax, grammar and content  Insight: {[facility] insight: Fair per diagnosis  Judgment: [facility] Judgement: Fair, per patient's behavior/history of present illness         ASSESSMENT/PLAN:   Diagnosis:  Unspecified schizophrenia spectrum and other psychotic disorder    Patient Active Problem List    Diagnosis Date Noted    Marijuana abuse 12/04/2020    Acute psychosis 12/03/2020          ASSESSMENT AND TREATMENT PLAN:    Medical decision making/Formulation:  Disc risks, benefits, se's to meds of abilify and  Will be started at 10mg po qam with possible use of maintena in future.        LAB ORDERS       ENCOURAGE CURRAN MILIEU    CONTINUE INPATIENT " HOSPITALIZATION FOR STABILIZATION ON MEDICATION     PROGNOSIS: GUARDED    ESTIMATED LENGTH OF STAY: 4-7 DAYS    TIME SPENT WITH PATIENT:  45 MINUTES 1015-11    More than 50% of that time spent on chart review, formulation of healthcare plan, examination and discussion with patient and healthcare team.     Willian Avery MD

## 2020-12-04 NOTE — ED NOTES
NEUROLOGICAL:   Patient is awake , alert  and oriented x 4 . Pupils are PERRL. Gait is steady.   Moves all extremities without difficulty.   Patient reports no neuro complaints..  GCS 15    HEENT:   Head appears normocephalic  and symmetric .   Eyes appear WNL to both eyes. Patient reports no complaints  to both eyes .   Ears appear WNL. Patient reports no complaints  to both ears.   Nares appear patent . Patient reports no nose complaints .  Mouth appears moist, pink and teeth intact. Patient reports no mouth complaints.   Throat appears pink and moist . Patient reports no throat complaints.    CARDIOVASCULAR:   S1 and S2 present, no murmurs, gallops, or rubs, rate regular  and pulses palpable (2+)    On palpation no edema noted , noted to none.   Patient reports no CV complaints.  .   Patient vitals are WNL.    RESPIRATORY:   Airway Clear, Open, and Patent.  Respirations are even and unlabored.   Breath sounds clear  to all lung fields.   Patient reports no respiratory complaints.     GASTROINTESTINAL:   Abdomen is soft  and non-tender x 4 quadrants. Bowel sounds are normoactive to all quadrants .   Patient reports no GI complaints .     GENITOURINARY:   Patient reports no  complaints.     MUSCULOSKELETAL:   full range of motion to all extremities, no swelling noted , no tenderness noted and no weakness noted.   Patient reports no musculoskeletal complaints     SKIN:   Skin appears warm , dry , good turgor, color normal for race and intact. Patient reports no skin complaints.

## 2020-12-04 NOTE — ED NOTES
STEPHANIE ASSESSMENT:     Patient mood: Happy, Sad and Crying  Patient affect: Incongruent  Appearance: Well Kept  and Dressed Appropriately  Behavior: Calm, Cooperative and Distrustful  Suicidal Ideations: Denies  Homicidal Ideations: Denies  Self Mutilation: Denies  Hallucinations: None  Substance abuse: Pt reports Denies    Security notified of psych patient.   Patient in paper scrubs.   Security at bedside.  Belonging list completed.   Room hazards removed.

## 2020-12-04 NOTE — ED PROVIDER NOTES
Encounter Date: 12/3/2020       History     Chief Complaint   Patient presents with    Psychiatric Evaluation     Pt to ER via [facility], states pt having sudden outburts @ home with bizarre behavior.     This is a 20 year old white male brought by EMS due to bizarre behavior, EMS reports possible synthetic use.  History of schizophrenia, supposed to be on Depakote and Invega, but has been off his medications for a while per mother        Review of patient's allergies indicates:  No Known Allergies  Past Medical History:   Diagnosis Date    Schizophrenia      History reviewed. No pertinent surgical history.  History reviewed. No pertinent family history.  Social History     Tobacco Use    Smoking status: Never Smoker   Substance Use Topics    Alcohol use: Not on file    Drug use: Yes     Types: Marijuana     Comment: or synthetic     Review of Systems   Unable to perform ROS: Psychiatric disorder   All other systems reviewed and are negative.      Physical Exam     Initial Vitals   BP Pulse Resp Temp SpO2   12/03/20 1801 12/03/20 1801 12/03/20 1801 12/03/20 1945 12/03/20 1801   (!) 116/57 108 18 98.5 °F (36.9 °C) 96 %      MAP       --                Physical Exam    Nursing note and vitals reviewed.  Constitutional: He appears well-developed and well-nourished. He is not diaphoretic. No distress.   HENT:   Head: Normocephalic and atraumatic.   Eyes: Conjunctivae and EOM are normal. Pupils are equal, round, and reactive to light. Right eye exhibits no discharge. Left eye exhibits no discharge. No scleral icterus.   Neck: Normal range of motion. Neck supple. No JVD present.   Cardiovascular: Normal rate, regular rhythm, normal heart sounds and intact distal pulses.   No murmur heard.  Pulmonary/Chest: Breath sounds normal. No stridor. No respiratory distress. He has no wheezes. He has no rhonchi. He has no rales. He exhibits no tenderness.   Abdominal: Soft. Bowel sounds are normal. He exhibits no distension and no  mass. There is no abdominal tenderness. There is no rebound and no guarding.   Musculoskeletal: Normal range of motion. No tenderness or edema.   Neurological: He is alert and oriented to person, place, and time. He has normal strength and normal reflexes. GCS score is 15. GCS eye subscore is 4. GCS verbal subscore is 5. GCS motor subscore is 6.   Skin: Skin is warm and dry. Capillary refill takes less than 2 seconds.   Psychiatric:   Patient not speaking, staring at me, will answer question         ED Course   Procedures  Labs Reviewed   CBC W/ AUTO DIFFERENTIAL - Abnormal; Notable for the following components:       Result Value    RBC 4.22 (*)     Hemoglobin 13.0 (*)     Hematocrit 38.5 (*)     Gran % 77.3 (*)     Lymph % 14.7 (*)     All other components within normal limits   COMPREHENSIVE METABOLIC PANEL - Abnormal; Notable for the following components:    BUN 22 (*)     All other components within normal limits   DRUG SCREEN PANEL, URINE EMERGENCY - Abnormal; Notable for the following components:    Creatinine, Urine 526.0 (*)     All other components within normal limits    Narrative:     Specimen Source->Urine   URINALYSIS, REFLEX TO URINE CULTURE - Abnormal; Notable for the following components:    Specific Gravity, UA >=1.030 (*)     Protein, UA 1+ (*)     Ketones, UA 2+ (*)     Urobilinogen, UA 2.0-3.0 (*)     Leukocytes, UA 1+ (*)     All other components within normal limits    Narrative:     Specimen Source->Urine   URINALYSIS MICROSCOPIC - Abnormal; Notable for the following components:    RBC, UA 7 (*)     Hyaline Casts, UA 16 (*)     All other components within normal limits    Narrative:     Specimen Source->Urine   ALCOHOL,MEDICAL (ETHANOL)   SARS-COV-2 RDRP GENE    Narrative:     This test utilizes isothermal nucleic acid amplification   technology to detect the SARS-CoV-2 RdRp nucleic acid segment.   The analytical sensitivity (limit of detection) is 125 genome   equivalents/mL.   A POSITIVE  result implies infection with the SARS-CoV-2 virus;   the patient is presumed to be contagious.     A NEGATIVE result means that SARS-CoV-2 nucleic acids are not   present above the limit of detection. A NEGATIVE result should be   treated as presumptive. It does not rule out the possibility of   COVID-19 and should not be the sole basis for treatment decisions.   If COVID-19 is strongly suspected based on clinical and exposure   history, re-testing using an alternate molecular assay should be   considered.   This test is only for use under the Food and Drug   Administration s Emergency Use Authorization (EUA).   Commercial kits are provided by [employer].   Performance characteristics of the EUA have been independently   verified by [facility] Department of   Pathology and Laboratory Medicine.   _________________________________________________________________   The authorized Fact Sheet for Healthcare Providers and the authorized Fact   Sheet for Patients of the ID NOW COVID-19 are available on the FDA   website:     https://www.fda.gov/media/047045/download  https://www.fda.gov/media/777222/download              Imaging Results    None          Medical Decision Making:   Differential Diagnosis:   Psychiatric disorder, substance abuse                   ED Course as of Dec 03 1948   Thu Dec 03, 2020   1945 Discussed with Dr. Garrett.  Will admit for further treatment    [SD]      ED Course User Index  [SD] Fan Chao MD            Clinical Impression:     ICD-10-CM ICD-9-CM   1. Acute psychosis  F23 298.9                          ED Disposition Condition    Admit                             Fan Chao MD  12/03/20 1930       Fan Chao MD  12/03/20 1945       Fan Chao MD  12/03/20 1945       Fan Chao MD  12/03/20 1948

## 2020-12-04 NOTE — PLAN OF CARE
21 YO BM CAME [facility]   FOR BIZARRE BEHAVIOR AND OUTBURST. MOM STATES HE IS NON COMPLIANT WITH DEPAKOTE AND INVEGA. PT ASKED THIS WAY WHEN HE IS OFF HIS MEDS. HX SCHIZO.  PT GOT IM ZYPREXA IN ED. COOPERATES. UDS + THC, ETOH <3. COVID 19 NEG.

## 2020-12-04 NOTE — ASSESSMENT & PLAN NOTE
To be admitted to our inpatient psychiatric unit for further evaluation and management.    Potentially related to synthetic use.

## 2020-12-04 NOTE — HPI
Chief Complaint   Patient presents with    Psychiatric Evaluation       Pt to ER via [facility], states pt having sudden outburts @ home with bizarre behavior.     This is a 20 year old white male brought by EMS due to bizarre behavior, EMS reports possible synthetic use.  History of schizophrenia, supposed to be on Depakote and Invega, but has been off his medications for a while per mother

## 2020-12-05 LAB
CHOLEST SERPL-MCNC: 141 MG/DL (ref 120–199)
CHOLEST/HDLC SERPL: 2.6 {RATIO} (ref 2–5)
HDLC SERPL-MCNC: 55 MG/DL (ref 40–75)
HDLC SERPL: 39 % (ref 20–50)
LDLC SERPL CALC-MCNC: 76 MG/DL (ref 63–159)
NONHDLC SERPL-MCNC: 86 MG/DL
TRIGL SERPL-MCNC: 50 MG/DL (ref 30–150)

## 2020-12-05 PROCEDURE — 11400000 HC PSYCH PRIVATE ROOM

## 2020-12-05 PROCEDURE — 99232 PR SUBSEQUENT HOSPITAL CARE,LEVL II: ICD-10-PCS | Mod: AF,HB,, | Performed by: PSYCHIATRY & NEUROLOGY

## 2020-12-05 PROCEDURE — 25000003 PHARM REV CODE 250: Performed by: PSYCHIATRY & NEUROLOGY

## 2020-12-05 PROCEDURE — 36415 COLL VENOUS BLD VENIPUNCTURE: CPT

## 2020-12-05 PROCEDURE — 80061 LIPID PANEL: CPT

## 2020-12-05 PROCEDURE — 99232 SBSQ HOSP IP/OBS MODERATE 35: CPT | Mod: AF,HB,, | Performed by: PSYCHIATRY & NEUROLOGY

## 2020-12-05 RX ADMIN — THERA TABS 1 TABLET: TAB at 08:12

## 2020-12-05 RX ADMIN — DIPHENHYDRAMINE HYDROCHLORIDE 50 MG: 50 CAPSULE ORAL at 08:12

## 2020-12-05 RX ADMIN — FOLIC ACID 1 MG: 1 TABLET ORAL at 08:12

## 2020-12-05 RX ADMIN — ARIPIPRAZOLE 10 MG: 10 TABLET ORAL at 08:12

## 2020-12-05 NOTE — PLAN OF CARE
PATIENT IS VISITING WITH UNCLE IN DINING AREA AT THIS TIME. TOLERATING WELL. PATIENT TOOK SHOWER TODAY AND ATTENDED AND PARTICIPATED IN GROUP AND ACTIVITY. MEDICATION COMPLIANT. NO PRNS ADMINISTERED. DR. PALACIO VISITED PER TELEMED. NO CHANGES NOTED. PATIENT IS  CALM AND COOPERATIVE. PATIENT ENJOYS MUSIC, SINGING AND DANCE. DENIES S/I,H/I. DENIES A/V HALLUCINATIONS. NO NEGATIVE BEHAVIORS. CLOSE OBSERVATIONS CONTINUED.

## 2020-12-05 NOTE — NURSING
PT INTERACTIVE WITH [employer].  INTERACTIVE WITH STAFF WHEN PROMPTED.  ENDORSES BECOMING INCREASINGLY DEPRESSED OF LATE.   LAUGHING AND TALKING WITH A PEER AS THEY ARE ENGAGED IN COLORING AND WATCHING TV.  PRN BENADRYL 50MG GIVEN FOR C/O INSOMNIA.  WILL CONTINUE CLOSE OBS.-REGULO KESSLER LPN

## 2020-12-05 NOTE — PROGRESS NOTES
"[facility]  Progress Note  Psychiatry    Admit Date: 12/3/2020   LOS: 2 days     Start Time: 0925  Stop Time: 0941    LEGAL STATUS: CEC    SUBJECTIVE:     Interim Events:     Met with pt, discussed with staff, reviewed chart. Pt was calm and cooperative. Pt reports that he is feeling good. Feels like the medication is helping, but feels that dancing and singing is more helpful for him. Reports that he is taking the medication because this is what his mom wants and he wants her to be helpful. Reports that the doctor he spoke with on yesterday told him he does not really need the medication. Denies medication side effects. Reports that the reason he is scared of medication is that he had a reaction to invega in the past when he drank alcohol while taking it. Reports he is tolerating the Abilify without issue. Not interested in CARRASCO at this time. Denies KESHIA or AVH. Denies paranoia. No physical complaints.     Pt is sleeping well. Reports appetite is normal. No physical complaints today. Denies side effects from medication. Denies SI/HI/AVH.     Per Nursing:  nsg note:  PT INTERACTIVE WITH [employer].  INTERACTIVE WITH STAFF WHEN PROMPTED.  ENDORSES BECOMING INCREASINGLY DEPRESSED OF LATE.   LAUGHING AND TALKING WITH A PEER AS THEY ARE ENGAGED IN COLORING AND WATCHING TV.  PRN BENADRYL 50MG GIVEN FOR C/O INSOMNIA.  WILL CONTINUE CLOSE OBS.-REGULO KESSLER LPN       HPI:   met with pt, discussed with staff, reviewed chart. Pt was calm and cooperative. He states he and mother had an argument and "she lost it" and called EMS. He does admit to chronic anger issues and depression going back to death of his father in 4th grade. He reports that mood "fluctuates" and sleep and appetite and energy are low. Denies any real problems. Denies SI/HI/AH/VH. Somewhat grandiose.pt exhibited thought blocking. States he has not been in outpt tx but has been in inpt tx. He states he has been on sustenna in past but " doesn't take bc he did not like the se's. He states will not take in future. He was poor historian, and minimized issues leading to his admission.      REVIEW OF SYSTEMS  Review of Systems   Constitutional: Negative for chills and fever.   HENT: Negative for congestion, ear pain, nosebleeds and sore throat.    Eyes: Negative for blurred vision and pain.   Respiratory: Negative for cough, hemoptysis, sputum production and shortness of breath.    Cardiovascular: Negative for chest pain and palpitations.   Gastrointestinal: Negative for abdominal pain, constipation, diarrhea, heartburn and nausea.   Genitourinary: Negative for flank pain and hematuria.   Musculoskeletal: Negative for back pain, falls, myalgias and neck pain.   Skin: Negative for rash.   Neurological: Negative for dizziness, speech change, seizures, loss of consciousness, weakness and headaches.   Psychiatric/Behavioral: Negative for depression, hallucinations, memory loss and suicidal ideas. The patient is not nervous/anxious and does not have insomnia.          Scheduled Meds:   ARIPiprazole  10 mg Oral Daily    folic acid  1 mg Oral Daily    multivitamin  1 tablet Oral Daily     PRN Meds:acetaminophen, aluminum-magnesium hydroxide-simethicone, haloperidoL **AND** diphenhydrAMINE **AND** LORazepam **AND** haloperidol lactate **AND** diphenhydrAMINE **AND** lorazepam, diphenhydrAMINE, magnesium hydroxide 400 mg/5 ml      Review of patient's allergies indicates:  No Known Allergies          OBJECTIVE:     Vital Signs (Most Recent)  Temp: 97.9 °F (36.6 °C) (12/05/20 0814)  Pulse: 68 (12/05/20 0814)  Resp: 18 (12/05/20 0814)  BP: 126/81 (12/05/20 0814)  Weight: 75.3 kg (166 lb 0.1 oz) (12/03/20 2008)  BMI (Calculated): 20.7 (12/03/20 2008)      General/Constituitional Exam:  Appearance: Disheveled and Hospital garb    Musculoskeletal Exam:  Abnormal Involuntary Movements: no  Gait: normal    Psychiatric Mental Status Exam:  Arousal:  "alert  Sensorium/Orientation: oriented to grossly intact  Behavior/Cooperation: friendly and cooperative   Speech: pressured, spontaneous  Language: grossly intact  Mood: " good "   Affect: expansive  Thought Process: loose associations  Thought Content:   Auditory hallucinations: NO  Visual hallucinations: NO  Paranoia: NO  Delusions: NO  Suicidal ideation: NO  Homicidal ideation: NO  Attention/Concentration:  intact  Memory:    Recent: [facility] Recent Memory: WNL , 3 out of 3 in 3 minutes  Remote: [facility] Remote Memory: WNL , past events, as relates history  Fund of Knowledge: Intact and Vocabulary appropriate    Abstract reasoning: proverbs were abstract, similarities were abstract  Intelligence: [facility] Intelligence: Average, based on history, based on vocabulary, syntax, grammar and content  Insight: {[facility] insight: Poor, understanding severity of illness/history of present illness  Judgment: [facility] Judgement: Poor, per patient's behavior/history of present illness      LABS/IMAGING  Recent Results (from the past 48 hour(s))   CBC auto differential    Collection Time: 12/03/20  6:33 PM   Result Value Ref Range    WBC 7.07 3.90 - 12.70 K/uL    RBC 4.22 (L) 4.60 - 6.20 M/uL    Hemoglobin 13.0 (L) 14.0 - 18.0 g/dL    Hematocrit 38.5 (L) 40.0 - 54.0 %    MCV 91 82 - 98 fL    MCH 30.8 27.0 - 31.0 pg    MCHC 33.8 32.0 - 36.0 g/dL    RDW 12.0 11.5 - 14.5 %    Platelets 255 150 - 350 K/uL    MPV 9.9 9.2 - 12.9 fL    Immature Granulocytes 0.1 0.0 - 0.5 %    Gran # (ANC) 5.5 1.8 - 7.7 K/uL    Immature Grans (Abs) 0.01 0.00 - 0.04 K/uL    Lymph # 1.0 1.0 - 4.8 K/uL    Mono # 0.6 0.3 - 1.0 K/uL    Eos # 0.0 0.0 - 0.5 K/uL    Baso # 0.01 0.00 - 0.20 K/uL    nRBC 0 0 /100 WBC    Gran % 77.3 (H) 38.0 - 73.0 %    Lymph % 14.7 (L) 18.0 - 48.0 %    Mono % 7.8 4.0 - 15.0 %    Eosinophil % 0.0 0.0 - 8.0 %    Basophil % 0.1 0.0 - 1.9 %    Differential Method Automated    Comprehensive metabolic panel    Collection Time: 12/03/20  6:33 PM "   Result Value Ref Range    Sodium 142 136 - 145 mmol/L    Potassium 3.5 3.5 - 5.1 mmol/L    Chloride 109 95 - 110 mmol/L    CO2 25 23 - 29 mmol/L    Glucose 106 70 - 110 mg/dL    BUN 22 (H) 6 - 20 mg/dL    Creatinine 1.3 0.5 - 1.4 mg/dL    Calcium 9.0 8.7 - 10.5 mg/dL    Total Protein 7.8 6.0 - 8.4 g/dL    Albumin 4.3 3.5 - 5.2 g/dL    Total Bilirubin 0.8 0.1 - 1.0 mg/dL    Alkaline Phosphatase 66 55 - 135 U/L    AST 17 10 - 40 U/L    ALT 22 10 - 44 U/L    Anion Gap 8 8 - 16 mmol/L    eGFR if African American >60.0 >60 mL/min/1.73 m^2    eGFR if non African American >60.0 >60 mL/min/1.73 m^2   Ethanol    Collection Time: 12/03/20  6:33 PM   Result Value Ref Range    Alcohol, Serum <3 <10 mg/dL   Drug screen panel, emergency    Collection Time: 12/03/20  6:58 PM   Result Value Ref Range    Benzodiazepines Negative     Methadone metabolites Negative     Cocaine (Metab.) Negative     Opiate Scrn, Ur Negative     Barbiturate Screen, Ur Negative     Amphetamine Screen, Ur Negative     THC Presumptive Positive     Phencyclidine Negative     Creatinine, Urine 526.0 (H) 23.0 - 375.0 mg/dL    Toxicology Information SEE COMMENT    Urinalysis, Reflex to Urine Culture Urine, Clean Catch    Collection Time: 12/03/20  6:58 PM    Specimen: Urine, Clean Catch   Result Value Ref Range    Specimen UA Urine, Clean Catch     Color, UA Yellow Yellow, Straw, Ingrid    Appearance, UA Clear Clear    pH, UA 8.0 5.0 - 8.0    Specific Gravity, UA >=1.030 (A) 1.005 - 1.030    Protein, UA 1+ (A) Negative    Glucose, UA Negative Negative    Ketones, UA 2+ (A) Negative    Bilirubin (UA) Negative Negative    Occult Blood UA Negative Negative    Nitrite, UA Negative Negative    Urobilinogen, UA 2.0-3.0 (A) <2.0 EU/dL    Leukocytes, UA 1+ (A) Negative   Urinalysis Microscopic    Collection Time: 12/03/20  6:58 PM   Result Value Ref Range    RBC, UA 7 (H) 0 - 4 /hpf    WBC, UA 3 0 - 5 /hpf    Bacteria Negative None-Occ /hpf    Squam Epithel, UA 2  /hpf    Hyaline Casts, UA 16 (A) 0-1/lpf /lpf    Microscopic Comment SEE COMMENT    POCT COVID-19 Rapid Screening    Collection Time: 12/03/20  7:23 PM   Result Value Ref Range    POC Rapid COVID Negative Negative     Acceptable Yes    Hemoglobin A1c    Collection Time: 12/04/20 12:20 PM   Result Value Ref Range    Hemoglobin A1C 5.2 4.0 - 5.6 %    Estimated Avg Glucose 103 68 - 131 mg/dL   Lipid panel    Collection Time: 12/05/20  6:29 AM   Result Value Ref Range    Cholesterol 141 120 - 199 mg/dL    Triglycerides 50 30 - 150 mg/dL    HDL 55 40 - 75 mg/dL    LDL Cholesterol 76.0 63.0 - 159.0 mg/dL    HDL/Cholesterol Ratio 39.0 20.0 - 50.0 %    Total Cholesterol/HDL Ratio 2.6 2.0 - 5.0    Non-HDL Cholesterol 86 mg/dL      No results found for: PHENYTOIN, PHENOBARB, VALPROATE, CBMZ        ASSESSMENT/PLAN:     Patient is showing Patient is showing no improvement on the Unit to date    Diagnosis:  SCHIZOPHRENIA AND OTHER PSYCHOTIC DISORDERS; Psychotic Disorder NOS      Patient Active Problem List    Diagnosis Date Noted    Marijuana abuse 12/04/2020    Acute psychosis 12/03/2020          ASSESSMENT AND TREATMENT PLAN:  - Continue Abilify 10mg PO daily. Conside titrating tomorrow  - Social Work will obtain follow up appointments for patient.     HEALTH SCREENING  Hemoglobin A1c  Lipid Panel    ENCOURAGE CURRAN MILIEU      Need for continued hospitalization:  continue inpatient psychiatric hospitalization for further stabilization    Target Disposition:  home    ESTIMATED 3-5 DAYS TO DISCHARGE    PROGNOSIS: GUARDED    More than 50% of that time spent on chart review, formulation of healthcare plan, examination and discussion with patient and healthcare team.          Blas Hardwick MD      This note may have been partially created with Dragon natural speaking word recognition program.  There may be word recognition mistakes that are occasionally missed on review.  Please interpret accordingly.

## 2020-12-06 PROCEDURE — 25000003 PHARM REV CODE 250: Performed by: PSYCHIATRY & NEUROLOGY

## 2020-12-06 PROCEDURE — 99232 PR SUBSEQUENT HOSPITAL CARE,LEVL II: ICD-10-PCS | Mod: AF,HB,, | Performed by: PSYCHIATRY & NEUROLOGY

## 2020-12-06 PROCEDURE — 11400000 HC PSYCH PRIVATE ROOM

## 2020-12-06 PROCEDURE — 99232 SBSQ HOSP IP/OBS MODERATE 35: CPT | Mod: AF,HB,, | Performed by: PSYCHIATRY & NEUROLOGY

## 2020-12-06 RX ORDER — HYDROXYZINE PAMOATE 25 MG/1
25 CAPSULE ORAL EVERY 6 HOURS PRN
Status: DISCONTINUED | OUTPATIENT
Start: 2020-12-06 | End: 2020-12-11 | Stop reason: HOSPADM

## 2020-12-06 RX ORDER — HYDROXYZINE PAMOATE 50 MG/1
50 CAPSULE ORAL NIGHTLY PRN
Status: DISCONTINUED | OUTPATIENT
Start: 2020-12-06 | End: 2020-12-11 | Stop reason: HOSPADM

## 2020-12-06 RX ADMIN — THERA TABS 1 TABLET: TAB at 08:12

## 2020-12-06 RX ADMIN — FOLIC ACID 1 MG: 1 TABLET ORAL at 08:12

## 2020-12-06 RX ADMIN — DIPHENHYDRAMINE HYDROCHLORIDE 50 MG: 50 CAPSULE ORAL at 08:12

## 2020-12-06 RX ADMIN — ARIPIPRAZOLE 10 MG: 10 TABLET ORAL at 08:12

## 2020-12-06 NOTE — PROGRESS NOTES
"[facility]  Progress Note  Psychiatry    Admit Date: 12/3/2020   LOS: 3 days     Start Time: 0930  Stop Time: 0945    LEGAL STATUS: CEC    SUBJECTIVE:     Interim Events:     Met with pt, discussed with staff, reviewed chart. Pt was calm and cooperative. Pt reports that he is feeling good this morning. Pt says that he took medication to sleep last night and found this helpful. States that overall he is feeling better which he attributes this to the medication. States that he has not noticed any side effects from his medication. Has been going to groups and finds them to be helpful. Compliant with medication. Finds his current dose helpful and is not interested in increasing the dose at this time. Pt has noticed that he has been feeling tense/anxious and is asking for help with this.     Pt is sleeping well. Reports appetite is normal. No physical complaints today. Denies side effects from medication. Denies SI/HI/AVH.     Per Nursing:  nsg note:  PT INTERACTIVE WITH [employer].  INTERACTIVE WITH STAFF WHEN PROMPTED.  ENDORSES BECOMING INCREASINGLY DEPRESSED OF LATE.   LAUGHING AND TALKING WITH A PEER AS THEY ARE ENGAGED IN COLORING AND WATCHING TV.  PRN BENADRYL 50MG GIVEN FOR C/O INSOMNIA.  WILL CONTINUE CLOSE OBS.-REGULO KESSLER LPN       HPI:   met with pt, discussed with staff, reviewed chart. Pt was calm and cooperative. He states he and mother had an argument and "she lost it" and called EMS. He does admit to chronic anger issues and depression going back to death of his father in 4th grade. He reports that mood "fluctuates" and sleep and appetite and energy are low. Denies any real problems. Denies SI/HI/AH/VH. Somewhat grandiose.pt exhibited thought blocking. States he has not been in outpt tx but has been in inpt tx. He states he has been on sustenna in past but doesn't take bc he did not like the se's. He states will not take in future. He was poor historian, and minimized issues " leading to his admission.      REVIEW OF SYSTEMS  Review of Systems   Constitutional: Negative for chills and fever.   HENT: Negative for congestion, ear pain, nosebleeds and sore throat.    Eyes: Negative for blurred vision and pain.   Respiratory: Negative for cough, hemoptysis, sputum production and shortness of breath.    Cardiovascular: Negative for chest pain and palpitations.   Gastrointestinal: Negative for abdominal pain, constipation, diarrhea, heartburn and nausea.   Genitourinary: Negative for flank pain and hematuria.   Musculoskeletal: Negative for back pain, falls, myalgias and neck pain.   Skin: Negative for rash.   Neurological: Negative for dizziness, speech change, seizures, loss of consciousness, weakness and headaches.   Psychiatric/Behavioral: Negative for depression, hallucinations, memory loss and suicidal ideas. The patient is not nervous/anxious and does not have insomnia.          Scheduled Meds:   ARIPiprazole  10 mg Oral Daily    folic acid  1 mg Oral Daily    multivitamin  1 tablet Oral Daily     PRN Meds:acetaminophen, aluminum-magnesium hydroxide-simethicone, haloperidoL **AND** diphenhydrAMINE **AND** LORazepam **AND** haloperidol lactate **AND** diphenhydrAMINE **AND** lorazepam, diphenhydrAMINE, magnesium hydroxide 400 mg/5 ml      Review of patient's allergies indicates:  No Known Allergies          OBJECTIVE:     Vital Signs (Most Recent)  Temp: 97.7 °F (36.5 °C) (12/06/20 0747)  Pulse: 74 (12/06/20 0747)  Resp: 20 (12/06/20 0747)  BP: 127/77 (12/06/20 0747)  Weight: 75.3 kg (166 lb 0.1 oz) (12/03/20 2008)  BMI (Calculated): 20.7 (12/03/20 2008)      General/Constituitional Exam:  Appearance: casually dressed    Musculoskeletal Exam:  Abnormal Involuntary Movements: no  Gait: normal    Psychiatric Mental Status Exam:  Arousal: alert  Sensorium/Orientation: oriented to grossly intact  Behavior/Cooperation: friendly and cooperative   Speech: normal tone, normal rate, normal  "pitch, normal volume, spontaneous  Language: grossly intact  Mood: " good "   Affect: expansive  Thought Process: loose associations  Thought Content:   Auditory hallucinations: NO  Visual hallucinations: NO  Paranoia: NO  Delusions: NO  Suicidal ideation: NO  Homicidal ideation: NO  Attention/Concentration:  intact  Memory:    Recent: [facility] Recent Memory: WNL , 3 out of 3 in 3 minutes  Remote: [facility] Remote Memory: WNL , past events, as relates history  Fund of Knowledge: Intact and Vocabulary appropriate    Abstract reasoning: proverbs were abstract, similarities were abstract  Intelligence: [facility] Intelligence: Average, based on history, based on vocabulary, syntax, grammar and content  Insight: {[facility] insight: Poor, understanding severity of illness/history of present illness  Judgment: [facility] Judgement: Poor, per patient's behavior/history of present illness      LABS/IMAGING  Recent Results (from the past 48 hour(s))   Hemoglobin A1c    Collection Time: 12/04/20 12:20 PM   Result Value Ref Range    Hemoglobin A1C 5.2 4.0 - 5.6 %    Estimated Avg Glucose 103 68 - 131 mg/dL   Lipid panel    Collection Time: 12/05/20  6:29 AM   Result Value Ref Range    Cholesterol 141 120 - 199 mg/dL    Triglycerides 50 30 - 150 mg/dL    HDL 55 40 - 75 mg/dL    LDL Cholesterol 76.0 63.0 - 159.0 mg/dL    HDL/Cholesterol Ratio 39.0 20.0 - 50.0 %    Total Cholesterol/HDL Ratio 2.6 2.0 - 5.0    Non-HDL Cholesterol 86 mg/dL      No results found for: PHENYTOIN, PHENOBARB, VALPROATE, CBMZ        ASSESSMENT/PLAN:     Patient is showing Patient is showing no improvement on the Unit to date    Diagnosis:  SCHIZOPHRENIA AND OTHER PSYCHOTIC DISORDERS; Psychotic Disorder NOS      Patient Active Problem List    Diagnosis Date Noted    Marijuana abuse 12/04/2020    Acute psychosis 12/03/2020          ASSESSMENT AND TREATMENT PLAN:  - Continue Abilify 10mg PO daily. Conside titrating tomorrow  - Vistaril 25mg PO q6h PRN  - Social Work will obtain " follow up appointments for patient.     HEALTH SCREENING  Hemoglobin A1c  Lipid Panel    ENCOURAGE [facility]RAN MILIEU      Need for continued hospitalization:  continue inpatient psychiatric hospitalization for further stabilization    Target Disposition:  home    ESTIMATED 3-5 DAYS TO DISCHARGE    PROGNOSIS: GUARDED    More than 50% of that time spent on chart review, formulation of healthcare plan, examination and discussion with patient and healthcare team.          Blas Hardwick MD      This note may have been partially created with Dragon natural speaking word recognition program.  There may be word recognition mistakes that are occasionally missed on review.  Please interpret accordingly.

## 2020-12-06 NOTE — NURSING
Pt. Attended and participated in wrap up group. He stated that he needs to do the following three things to take care of himself in his life:  1.) He stated that he needs to talk to people better.  2.) Better eating habits.  3.) Works towards a routine life.

## 2020-12-06 NOTE — PLAN OF CARE
"Pt in activity room at present interacting with other peers.  Pt accepting meds without difficulty. No side effects noted.  Pt interacting appropriately with staff and peers.  Pt denies si, hi or a v hallucinations.  Pt eating 100 percent of meals and snacks.  New orders noted today for as needed vistaril for anxiety.  Dr rowell asked pt if he thinks he can tolerate a increase in his abilify at this time but pt declined at this time stating he does not want to feel "overdosed" on meds like he did when he was on invega.  Instructed pt to let staff know if he feels like he is having side effects from his meds.  Verbalized understanding.  Will cont to monitor for changes in status.  "

## 2020-12-07 PROCEDURE — 99232 SBSQ HOSP IP/OBS MODERATE 35: CPT | Mod: AF,HB,, | Performed by: PSYCHIATRY & NEUROLOGY

## 2020-12-07 PROCEDURE — 25000003 PHARM REV CODE 250: Performed by: PSYCHIATRY & NEUROLOGY

## 2020-12-07 PROCEDURE — 11400000 HC PSYCH PRIVATE ROOM

## 2020-12-07 PROCEDURE — 99232 PR SUBSEQUENT HOSPITAL CARE,LEVL II: ICD-10-PCS | Mod: AF,HB,, | Performed by: PSYCHIATRY & NEUROLOGY

## 2020-12-07 RX ADMIN — HYDROXYZINE PAMOATE 50 MG: 25 CAPSULE ORAL at 08:12

## 2020-12-07 RX ADMIN — THERA TABS 1 TABLET: TAB at 08:12

## 2020-12-07 RX ADMIN — FOLIC ACID 1 MG: 1 TABLET ORAL at 08:12

## 2020-12-07 RX ADMIN — ARIPIPRAZOLE 10 MG: 10 TABLET ORAL at 08:12

## 2020-12-07 NOTE — PROGRESS NOTES
"[facility]  Progress Note  Psychiatry    Admit Date: 12/3/2020   LOS: 4 days     Start Time: 0930  Stop Time: 0945    LEGAL STATUS: CEC    SUBJECTIVE:     Interim Events:     Met with pt, discussed with staff, reviewed chart. Pt was calm and cooperative. He states the weekend went well and staff agrees. Yesterday, pt reported some ongoing anxiety and mild agitation and Dr Hardwick discussed increasing abilify with pt, but pt preferred to wait and today states he feels better. Pt reports that he is feeling "good" this morning, and his affect was appropriate.  States that overall he is feeling better which he attributes this to the medication. States that he has not noticed any side effects from his medication. Has been going to groups and finds them to be helpful.     Pt is sleeping well, with prn benadryl. Reports appetite is normal. No physical complaints today. Denies side effects from medication. Denies SI/HI/AVH.     Per Nursing:  PT HAS BEEN MED COMPLIANT.  TOLERATING ALL MEALS AND SNACKS.  INTERACTIVE WITH PEERS AND STAFF.  DENIES S/HI, OR A/VH.  C/O INSOMNIA.  PRN BENADRYL 50MG GIVEN PO.  MEDICATION EFFECTIVE.  PARTICIPATED IN A DANCE ROUTINE WITH HIS PEERS FOR THE EVENING SHIFT.  CLOSE OBS ONGOING.-REGULO KESSLER LPN            HPI:   met with pt, discussed with staff, reviewed chart. Pt was calm and cooperative. He states he and mother had an argument and "she lost it" and called EMS. He does admit to chronic anger issues and depression going back to death of his father in 4th grade. He reports that mood "fluctuates" and sleep and appetite and energy are low. Denies any real problems. Denies SI/HI/AH/VH. Somewhat grandiose.pt exhibited thought blocking. States he has not been in outpt tx but has been in inpt tx. He states he has been on sustenna in past but doesn't take bc he did not like the se's. He states will not take in future. He was poor historian, and minimized issues leading to " his admission.      REVIEW OF SYSTEMS  Review of Systems   Constitutional: Negative for chills and fever.   HENT: Negative for congestion, ear pain, nosebleeds and sore throat.    Eyes: Negative for blurred vision and pain.   Respiratory: Negative for cough, hemoptysis, sputum production and shortness of breath.    Cardiovascular: Negative for chest pain and palpitations.   Gastrointestinal: Negative for abdominal pain, constipation, diarrhea, heartburn and nausea.   Genitourinary: Negative for flank pain and hematuria.   Musculoskeletal: Negative for back pain, falls, myalgias and neck pain.   Skin: Negative for rash.   Neurological: Negative for dizziness, speech change, seizures, loss of consciousness, weakness and headaches.   Psychiatric/Behavioral: Negative for depression, hallucinations, memory loss and suicidal ideas. The patient is not nervous/anxious and does not have insomnia.          Scheduled Meds:   ARIPiprazole  10 mg Oral Daily    folic acid  1 mg Oral Daily    multivitamin  1 tablet Oral Daily     PRN Meds:acetaminophen, aluminum-magnesium hydroxide-simethicone, haloperidoL **AND** diphenhydrAMINE **AND** LORazepam **AND** haloperidol lactate **AND** diphenhydrAMINE **AND** lorazepam, hydrOXYzine pamoate, hydrOXYzine pamoate, magnesium hydroxide 400 mg/5 ml      Review of patient's allergies indicates:  No Known Allergies          OBJECTIVE:     Vital Signs (Most Recent)  Temp: 98.3 °F (36.8 °C) (12/07/20 0759)  Pulse: 98 (12/07/20 0759)  Resp: 20 (12/07/20 0759)  BP: 133/71 (12/07/20 0759)  Weight: 75.3 kg (166 lb 0.1 oz) (12/03/20 2008)  BMI (Calculated): 20.7 (12/03/20 2008)      General/Constituitional Exam:  Appearance: casually dressed    Musculoskeletal Exam:  Abnormal Involuntary Movements: no  Gait: normal    Psychiatric Mental Status Exam:  Arousal: alert  Sensorium/Orientation: oriented to grossly intact  Behavior/Cooperation: friendly and cooperative   Speech: normal tone, normal  "rate, normal pitch, normal volume, spontaneous  Language: grossly intact  Mood: " good "   Affect: expansive  Thought Process: loose associations  Thought Content:   Auditory hallucinations: NO  Visual hallucinations: NO  Paranoia: NO  Delusions: NO  Suicidal ideation: NO  Homicidal ideation: NO  Attention/Concentration:  intact  Memory:    Recent: [facility] Recent Memory: WNL , 3 out of 3 in 3 minutes  Remote: [facility] Remote Memory: WNL , past events, as relates history  Fund of Knowledge: Intact and Vocabulary appropriate    Abstract reasoning: proverbs were abstract, similarities were abstract  Intelligence: [facility] Intelligence: Average, based on history, based on vocabulary, syntax, grammar and content  Insight: {[facility] insight: Poor, understanding severity of illness/history of present illness  Judgment: [facility] Judgement: Poor, per patient's behavior/history of present illness      LABS/IMAGING  No results found for this or any previous visit (from the past 48 hour(s)).   No results found for: PHENYTOIN, PHENOBARB, VALPROATE, CBMZ        ASSESSMENT/PLAN:     Patient is showing moderate improvement on the unit.    Diagnosis:  SCHIZOPHRENIA AND OTHER PSYCHOTIC DISORDERS; Psychotic Disorder NOS      Patient Active Problem List    Diagnosis Date Noted    Marijuana abuse 12/04/2020    Acute psychosis 12/03/2020          ASSESSMENT AND TREATMENT PLAN:  - Continue Abilify 10mg PO daily. Conside titrating as needed.  - Vistaril 25mg PO q6h PRN  - Social Work will obtain follow up appointments for patient.     HEALTH SCREENING  Hemoglobin A1c  Lipid Panel    ENCOURAGE [facility]IE[facility]      Need for continued hospitalization:  continue inpatient psychiatric hospitalization for further stabilization    Target Disposition:  home    ESTIMATED 3-5 DAYS TO DISCHARGE    PROGNOSIS: GUARDED    More than 50% of that time spent on chart review, formulation of healthcare plan, examination and discussion with patient and healthcare team.        "   Willian Avery MD      This note may have been partially created with Dragon natural speaking word recognition program.  There may be word recognition mistakes that are occasionally missed on review.  Please interpret accordingly.

## 2020-12-07 NOTE — NURSING
PT HAS BEEN MED COMPLIANT.  TOLERATING ALL MEALS AND SNACKS.  INTERACTIVE WITH PEERS AND STAFF.  DENIES S/HI, OR A/VH.  C/O INSOMNIA.  PRN BENADRYL 50MG GIVEN PO.  MEDICATION EFFECTIVE.  PARTICIPATED IN A DANCE ROUTINE WITH HIS PEERS FOR THE EVENING SHIFT.  CLOSE OBS ONGOING.-REGULO KESSLER LPN

## 2020-12-07 NOTE — PLAN OF CARE
PRAKASH contacted pt's mom for collateral information.   Pt's mom stated that pt has been acting bizarre which led up to his hospital stay.  Mom reported pt smokes marijuana and feels that some behavior may be induced by this.  Mom stated pt is not violent and is very bright/talented.  Pt is working at [employer].  Pt's father  when he was young which has been a challenge for Bishop.  Mom also reported she feels pt's sexuality has been challenging for him.  Mom stated that pt coming to terms with this has been difficult.  Mom does feel that pt sounds much better when they speak and feels pt is nearing his baseline.  Mom will visit tonight and f/up with PRAKASH tomorrow to discuss their visit.

## 2020-12-07 NOTE — PSYCH
Behavioral Health Unit  Psychosocial History and Assessment  Progress Note      Patient Name: Bishop Soria YOB: 2000 SW: Karma PINONJac Pack, [facility] Date: 2020    Chief Complaint: interpersonal relationships and communication    Consent:     Did the patient consent for an interview with the ? Yes    Did the patient consent for the  to contact family/friend/caregiver?   Yes, Mom.  Nabil Soria    Did the patient give consent for the  to inform family/friend/caregiver of his/her whereabouts or to discuss discharge planning? Yes    Source of Information: Face to face with patient    Is information obtained from interviews considered reliable?   yes    Reason for Admission:     Active Hospital Problems    Diagnosis  POA    *Acute psychosis [F23]  Yes    Marijuana abuse [F12.10]  Unknown      Resolved Hospital Problems   No resolved problems to display.       History of Present Illness - (Patient Perception):   Argument with mom, patient stated he left home, and police were called since he refused to go with his mom.     Present biopsychosocial functioning: Pt currently taking a break from college.  Staying with mom.  Working at [employer].  Indep with ADLs.  Mom provides transportation.     Past biopsychosocial functioning: Pt reported being less focused in the past.  Unable to complete tasks.     Family and Marital/Relationship History:     Significant Other/Partner Relationships:  No significant other.     Family Relationships:  Strong bond with mom.  Father  when pt was young.  Siblings are older and close to pt as well.     Childhood History:     Where was patient raised? [place]    Who raised the patient? Parents      How does patient describe their childhood? Great      Who is patient's primary support person? Mom    Culture and Judaism:     Judaism: Restoration    How strong of a role does Lutheran and spirituality play in patient's life? Very  strong relationship with God.    Faith or spiritual concerns regarding treatment: n/a    History of Abuse:   History of Abuse: Denies      Outcome: n/a    Psychiatric and Medical History:     History of psychiatric illness or treatment:  2020, 2019  (depression)    Medical history:   Past Medical History:   Diagnosis Date    Schizophrenia        Substance Abuse History:     Alcohol - (Patient Perspective): Denies  Social History     Substance and Sexual Activity   Alcohol Use None           Drugs - (Patient Perspective):  THC only  Social History     Substance and Sexual Activity   Drug Use Yes    Types: Marijuana    Comment: or synthetic           Education:     Currently Enrolled? Attended college in 2016, but stopped for a break.     Special Education? Yes    Interested in Completing Education/GED: N/A    Employment and Financial:     Currently employed? Employed: Current Occupation: [employer]    Source of Income:  salary    Able to afford basic needs (food, shelter, utilities)? Yes    Who manages finances/personal affairs? self      Service:     [place]? No    Combat Service? No     Community Resources:     Describe present use of community resources: N/A    Identify previously used community resources   (Include previous mental health treatment - outpatient and inpatient): Outpatient mental health at [facility] [place].  Pt was receiving counseling.     Environmental:     Current living situation:Lives with family    Social Evaluation:     Patient Assets: work skills, communication skills.    Patient Limitations: impulse control    High risk psychosocial issues that may impact discharge planning:   n/a    Recommendations:     Anticipated discharge plan:   home    High risk issues requiring early treatment planning and immediate intervention: n/a    Community resources needed for discharge planning:  aftercare treatment sources    Anticipated social work role(s) in treatment and discharge planning:  psycho social assessment, family meeting, MI planning, treatment team.

## 2020-12-07 NOTE — PLAN OF CARE
POC reviewed this shift and is ongoing. Patient withdrawn, depressed, anxious, showing pressured speech, and manic.  Denies Suicidal Ideation, Homicidal Ideation, Auditory Hallucinations, Visual Hallucinations, Tactile Hallucinations, and Gustatory Hallucinations. Poor sleep, interacts well with peers,NAD.       Regis Yeboah RN

## 2020-12-08 PROCEDURE — 11400000 HC PSYCH PRIVATE ROOM

## 2020-12-08 PROCEDURE — 25000003 PHARM REV CODE 250: Performed by: PSYCHIATRY & NEUROLOGY

## 2020-12-08 RX ADMIN — ARIPIPRAZOLE 10 MG: 10 TABLET ORAL at 08:12

## 2020-12-08 RX ADMIN — THERA TABS 1 TABLET: TAB at 08:12

## 2020-12-08 RX ADMIN — DIPHENHYDRAMINE HYDROCHLORIDE 50 MG: 50 CAPSULE ORAL at 08:12

## 2020-12-08 RX ADMIN — FOLIC ACID 1 MG: 1 TABLET ORAL at 08:12

## 2020-12-08 NOTE — PSYCH
Community Group  Short Term Goals: Mood Management  Patient Response: Maintain good mood throughout the day.

## 2020-12-08 NOTE — NURSING
Received a call from pts mother Zenaida, # [phone]. Reports that her son stated that he hates her. Home meds: Trazadone 50mg at bedtime, Norvasc 5mg daily and celexa 10mg daily.

## 2020-12-08 NOTE — PLAN OF CARE
Mood labile,thoughts loose at time,pt.is having difficulty defining death,have been displaced causing this pt. To struggle ,becomes very oppositional with his surviving parent,discussed ways to displace his stressors,remain med.compliant,is denying h/s ideations,no a/v hallucinations,being monitored as per protocol.

## 2020-12-08 NOTE — PLAN OF CARE
POC REVIEWED THIS SHIFT. PT COOPERATIVE AND AMB ON UNIT TOOK. MEDS WITH NO ADVERSE REACTIONS. WILL CONTINUE TO MONITOR FOR CANGES.

## 2020-12-09 PROCEDURE — 25000003 PHARM REV CODE 250: Performed by: PSYCHIATRY & NEUROLOGY

## 2020-12-09 PROCEDURE — 11400000 HC PSYCH PRIVATE ROOM

## 2020-12-09 PROCEDURE — 99232 SBSQ HOSP IP/OBS MODERATE 35: CPT | Mod: AF,HB,, | Performed by: PSYCHIATRY & NEUROLOGY

## 2020-12-09 PROCEDURE — 99232 PR SUBSEQUENT HOSPITAL CARE,LEVL II: ICD-10-PCS | Mod: AF,HB,, | Performed by: PSYCHIATRY & NEUROLOGY

## 2020-12-09 RX ADMIN — DIPHENHYDRAMINE HYDROCHLORIDE 50 MG: 50 CAPSULE ORAL at 08:12

## 2020-12-09 RX ADMIN — THERA TABS 1 TABLET: TAB at 08:12

## 2020-12-09 RX ADMIN — HYDROXYZINE PAMOATE 50 MG: 25 CAPSULE ORAL at 08:12

## 2020-12-09 RX ADMIN — FOLIC ACID 1 MG: 1 TABLET ORAL at 08:12

## 2020-12-09 RX ADMIN — ARIPIPRAZOLE 15 MG: 5 TABLET ORAL at 08:12

## 2020-12-09 NOTE — PROGRESS NOTES
Progress Note  Psychiatry    Admit Date: 12/3/2020   LOS: 6 days     SUBJECTIVE:   Progress note from 12/08:  Patient is a 20-year-old  male who had been originally brought into the hospital for bizarre behavior which had been possibly complicated by synthetic marijuana usage as well as medication noncompliance.  Prior to speaking with the patient nursing staff reports no behavior issues but there was some mention of resentment towards various family members secondary to various reasons and this included ongoing issues with the patient's mother.  From the onset of today's interview the patient appeared to be well spoken and intelligent but Dr. Garrett felt that he was minimizing his symptomatology in order to gain discharge.  When asked direct questions about previous psychosis the patient is reluctant to answer but does go on to admit that he has been experiencing hallucinations of the auditory type since childhood shortly after the death of his father in the 4th grade.  He also goes on to admit that his psychosis can be worsened by his mood but is often independent.  He also goes on to mention that the auditory hallucinations often occur outside of his head does for the suggesting the possibility that they are valid in nature.  It is also important to note that the patient does tend to be manipulative as evident by his frequent acting to be discharged because of positive conversations he has been having with his mother but then was later reported that at 4:00 p.m. the patient told his mother that he hated her.  Due to the patient's symptomatology as well as Dr. Garrett's concerns that he may be minimizing the severity of his psychosis he will have Abilify increased to 15 mg on tomorrow 12/09/2020 and then later 20 mg on 12/10/2020.    nsg note:  Patient alert and oriented. Cooperative and polite. Able to make needs known.Engages well with peers. He ate a snack. Denies SI/HI. Denies A/V  "hallucinations. Reports some anxiety and request benadryl for sleep. Compliant with medications. Vstaril offered for anxiety, he refused stating benadryl works better for him. Benadryl 50 mg given and effective. NAD. Close observation continued.    Interim note:  Met with pt, discussed with staff, reviewed chart. Pt was calm and cooperative in session. Reviewed above with progress note with staff, who report pt was tearful when discussing his AH with Dr. Garrett, and appeared distraught over these hallucinations. Today, pt denies AH/VH. States never has had them and then states he has only had voices "inside my head, I think it is just me thinking." Considering level of emotion described yesterday, it does appear that pt is minimizing his symptoms. Pt is on a titrating dose of abilify with an increase this am and one tomorrow. He reports that his mood is "pretty good" and affect was appropriate. Reports fair sleep and appetite. C/w meds w/o se's.       OBJECTIVE:     Mental Status Exam:  Appearance: Casually dressed,   Behavior:  cooperative and manipulative             Psychomotor: Within Normal Limits                 Speech:  normal rate, rhythm and volume,   Mood:  Pretty good,   Affect:  appropriate,   Thought Process:  goal directed,   Associations: easy derailment,   Thought Content:  Denies auditory hallucinations        Memory:  Intact,   Attention Span and Concentration:  Normal,   Fund of Knowledge:  Aware of current events,   Estimate of Intelligence:  Above average,   Language: no abnormalities,   Insight/Judgement:  Poor,   Cognition:  grossly intact,   Orientation:  person, place and time    Vital Signs (Most Recent)  Temp: 97.7 °F (36.5 °C) (12/09/20 0800)  Pulse: 66 (12/09/20 0800)  Resp: 18 (12/09/20 0800)  BP: 129/62 (12/09/20 0800)  Weight: 75.3 kg (166 lb 0.1 oz) (12/03/20 2008)  BMI (Calculated): 20.7 (12/03/20 2008)    Scheduled Meds:   ARIPiprazole  15 mg Oral Daily    folic acid  1 mg Oral " Daily    multivitamin  1 tablet Oral Daily     PRN Meds:acetaminophen, aluminum-magnesium hydroxide-simethicone, haloperidoL **AND** diphenhydrAMINE **AND** LORazepam **AND** haloperidol lactate **AND** diphenhydrAMINE **AND** lorazepam, hydrOXYzine pamoate, hydrOXYzine pamoate, magnesium hydroxide 400 mg/5 ml  Psychotherapeutics (From admission, onward)    Start     Stop Route Frequency Ordered    12/09/20 0900  ARIPiprazole tablet 15 mg      -- Oral Daily 12/08/20 1349    12/04/20 1258  haloperidoL tablet 5 mg  (Med - Acute  Behavioral Management)      -- Oral Every 4 hours PRN 12/04/20 1159    12/04/20 1258  LORazepam tablet 2 mg  (Med - Acute  Behavioral Management)      -- Oral Every 4 hours PRN 12/04/20 1159    12/04/20 1258  haloperidol lactate injection 5 mg  (Med - Acute  Behavioral Management)      -- IM Every 4 hours PRN 12/04/20 1159    12/04/20 1258  lorazepam injection 2 mg  (Med - Acute  Behavioral Management)      -- IM Every 4 hours PRN 12/04/20 1159          Review of patient's allergies indicates:  No Known Allergies      Laboratory:   No results found for this or any previous visit (from the past 24 hour(s)).      ASSESSMENT/PLAN:     Patient is a 20 y.o. old, male, admitted with principal problem of:         Diagnosis:    Axis I: SCHIZOPHRENIA AND OTHER PSYCHOTIC DISORDERS; Schziophrenia: Paranoid Type    Axis II: PERSONLITY DISORDERS: Personality Disorder NOS (F60.9)   Axis III: Past Medical History:   Diagnosis Date    Schizophrenia       Axis IV: other psychosocial or environmental problems   Axis V: 31-40 Some impairment in reality testing or communication (e.g. speech is at times illogical, obscure, or irrelevant) OR major impairment in several areas, such as work or school, family relations, judgment, thinking or mood (e.g. depressed man avoids friends, neglects family, and is unable to work; child frequently beats up younger children, is defiant at home, and is failing in school)      Cont planned abilify titration.       Patient is showing mild improvement     Need for continued hospitalization:  continue inpatient psychiatric hospitalization for further stabilization    Target Disposition:  home      Complexity Level:  Moderate 20min

## 2020-12-09 NOTE — PROGRESS NOTES
Progress Note  Psychiatry    Admit Date: 12/3/2020   LOS: 5 days     SUBJECTIVE:   Patient is a 20-year-old  male who had been originally brought into the hospital for bizarre behavior which had been possibly complicated by synthetic marijuana usage as well as medication noncompliance.  Prior to speaking with the patient nursing staff reports no behavior issues but there was some mention of resentment towards various family members secondary to various reasons and this included ongoing issues with the patient's mother.  From the onset of today's interview the patient appeared to be well spoken and intelligent but Dr. Garrett felt that he was minimizing his symptomatology in order to gain discharge.  When asked direct questions about previous psychosis the patient is reluctant to answer but does go on to admit that he has been experiencing hallucinations of the auditory type since childhood shortly after the death of his father in the 4th grade.  He also goes on to admit that his psychosis can be worsened by his mood but is often independent.  He also goes on to mention that the auditory hallucinations often occur outside of his head does for the suggesting the possibility that they are valid in nature.  It is also important to note that the patient does tend to be manipulative as evident by his frequent acting to be discharged because of positive conversations he has been having with his mother but then was later reported that at 4:00 p.m. the patient told his mother that he hated her.  Due to the patient's symptomatology as well as Dr. Garrett's concerns that he may be minimizing the severity of his psychosis he will have Abilify increased to 15 mg on tomorrow 12/09/2020 and then later 20 mg on 12/10/2020.          OBJECTIVE:     Mental Status Exam:  Appearance: Casually dressed,   Behavior:  cooperative and manipulative             Psychomotor: Within Normal Limits                 Speech:  normal  rate, rhythm and volume,   Mood:  anxious and sad,   Affect:  labile,   Thought Process:  goal directed,   Associations: easy derailment,   Thought Content:  auditory hallucinations        Memory:  Intact,   Attention Span and Concentration:  Normal,   Fund of Knowledge:  Aware of current events,   Estimate of Intelligence:  Above average,   Language: no abnormalities,   Insight/Judgement:  Poor,   Cognition:  grossly intact,   Orientation:  person, place and time    Vital Signs (Most Recent)  Temp: 98.2 °F (36.8 °C) (12/08/20 1913)  Pulse: 91 (12/08/20 1913)  Resp: 16 (12/08/20 1913)  BP: 120/72 (12/08/20 1913)  Weight: 75.3 kg (166 lb 0.1 oz) (12/03/20 2008)  BMI (Calculated): 20.7 (12/03/20 2008)    Scheduled Meds:   [START ON 12/9/2020] ARIPiprazole  15 mg Oral Daily    folic acid  1 mg Oral Daily    multivitamin  1 tablet Oral Daily     PRN Meds:acetaminophen, aluminum-magnesium hydroxide-simethicone, haloperidoL **AND** diphenhydrAMINE **AND** LORazepam **AND** haloperidol lactate **AND** diphenhydrAMINE **AND** lorazepam, hydrOXYzine pamoate, hydrOXYzine pamoate, magnesium hydroxide 400 mg/5 ml  Psychotherapeutics (From admission, onward)    Start     Stop Route Frequency Ordered    12/09/20 0900  ARIPiprazole tablet 15 mg      -- Oral Daily 12/08/20 1349    12/04/20 1258  haloperidoL tablet 5 mg  (Med - Acute  Behavioral Management)      -- Oral Every 4 hours PRN 12/04/20 1159    12/04/20 1258  LORazepam tablet 2 mg  (Med - Acute  Behavioral Management)      -- Oral Every 4 hours PRN 12/04/20 1159    12/04/20 1258  haloperidol lactate injection 5 mg  (Med - Acute  Behavioral Management)      -- IM Every 4 hours PRN 12/04/20 1159    12/04/20 1258  lorazepam injection 2 mg  (Med - Acute  Behavioral Management)      -- IM Every 4 hours PRN 12/04/20 1159          Review of patient's allergies indicates:  No Known Allergies      Laboratory:   No results found for this or any previous visit (from the past 24  hour(s)).      ASSESSMENT/PLAN:     Patient is a 20 y.o. old, male, admitted with principal problem of:         Diagnosis:    Axis I: SCHIZOPHRENIA AND OTHER PSYCHOTIC DISORDERS; Schziophrenia: Paranoid Type    Axis II: PERSONLITY DISORDERS: Personality Disorder NOS (F60.9)   Axis III: Past Medical History:   Diagnosis Date    Schizophrenia       Axis IV: other psychosocial or environmental problems   Axis V: 31-40 Some impairment in reality testing or communication (e.g. speech is at times illogical, obscure, or irrelevant) OR major impairment in several areas, such as work or school, family relations, judgment, thinking or mood (e.g. depressed man avoids friends, neglects family, and is unable to work; child frequently beats up younger children, is defiant at home, and is failing in school)            Patient is showing mild improvement     Need for continued hospitalization:  continue inpatient psychiatric hospitalization for further stabilization    Target Disposition:  home      Complexity Level:  Moderate

## 2020-12-09 NOTE — PSYCH
Community Group  Short Term Goals: Depression  Patient Response: Work through the cob webs of depression.

## 2020-12-09 NOTE — PLAN OF CARE
PATIENT IS ALERT, COOPERATIVE, AND FRIENDLY. PATIENT STATED FEELING LESS DEPRESSED AND ANXIOUS ON DOSAGE INCREASE OF ABILIFY. DR. SALAS VISITED PER TELEMED. NO CHANGES NOTED. DENIES S/I, H/I. DENIES A/V HALLUCINATIONS. INTERACTS WELL WITH [employer]. NO NEGATIVE BEHAVIORS. CLOSE OBSERVATIONS CONTINUED.

## 2020-12-10 PROBLEM — F23 ACUTE PSYCHOSIS: Status: RESOLVED | Noted: 2020-12-03 | Resolved: 2020-12-10

## 2020-12-10 PROBLEM — F12.10 MARIJUANA ABUSE: Status: RESOLVED | Noted: 2020-12-04 | Resolved: 2020-12-10

## 2020-12-10 PROCEDURE — 25000003 PHARM REV CODE 250: Performed by: PSYCHIATRY & NEUROLOGY

## 2020-12-10 PROCEDURE — 11400000 HC PSYCH PRIVATE ROOM

## 2020-12-10 RX ORDER — ARIPIPRAZOLE 15 MG/1
15 TABLET ORAL DAILY
Qty: 30 TABLET | Refills: 0 | Status: ON HOLD | OUTPATIENT
Start: 2020-12-11 | End: 2021-02-18 | Stop reason: HOSPADM

## 2020-12-10 RX ADMIN — HYDROXYZINE PAMOATE 50 MG: 25 CAPSULE ORAL at 09:12

## 2020-12-10 RX ADMIN — DIPHENHYDRAMINE HYDROCHLORIDE 50 MG: 50 CAPSULE ORAL at 09:12

## 2020-12-10 RX ADMIN — THERA TABS 1 TABLET: TAB at 08:12

## 2020-12-10 RX ADMIN — ARIPIPRAZOLE 15 MG: 5 TABLET ORAL at 08:12

## 2020-12-10 RX ADMIN — FOLIC ACID 1 MG: 1 TABLET ORAL at 08:12

## 2020-12-10 NOTE — PROGRESS NOTES
Progress Note  Psychiatry    Admit Date: 12/3/2020   LOS: 7 days     SUBJECTIVE:   Patient is a 20-year-old  male who originally was brought to the hospital for acute stabilization observation secondary to bizarre behavior which could have been complicated by questionable synthetic marijuana usage.  With meeting with the patient just as during our 1st encounter he does tend to minimize many of his symptoms but nonetheless is adamantly denying any suicidal or homicidal ideations while also denying any current active hallucinations or psychosis.  Based on reports given by nursing staff as well as the patient's mother it appears that he is at or approaching his baseline; therefore, will be a likely candidate for discharge on tomorrow 12/11/2020.          OBJECTIVE:     Mental Status Exam:  Appearance: Casually dressed,   Behavior:  cooperative             Psychomotor: Within Normal Limits                 Speech:  normal rate, rhythm and volume,   Mood:   euthymic   Affect:   full  Thought Process:  goal directed,   Associations: easy derailment,   Thought Content:  auditory hallucinations        Memory:  Intact,   Attention Span and Concentration:  Normal,   Fund of Knowledge:  Aware of current events,   Estimate of Intelligence:  Above average,   Language: no abnormalities,   Insight/Judgement:   fair   Cognition:  grossly intact,   Orientation:  person, place and time    Vital Signs (Most Recent)  Temp: 97.6 °F (36.4 °C) (12/10/20 0716)  Pulse: 81 (12/10/20 0716)  Resp: 18 (12/10/20 0716)  BP: 122/72 (12/10/20 0716)  Weight: 75.3 kg (166 lb 0.1 oz) (12/03/20 2008)  BMI (Calculated): 20.7 (12/03/20 2008)    Scheduled Meds:   ARIPiprazole  15 mg Oral Daily    folic acid  1 mg Oral Daily    multivitamin  1 tablet Oral Daily     PRN Meds:acetaminophen, aluminum-magnesium hydroxide-simethicone, haloperidoL **AND** diphenhydrAMINE **AND** LORazepam **AND** haloperidol lactate **AND** diphenhydrAMINE  **AND** lorazepam, hydrOXYzine pamoate, hydrOXYzine pamoate, magnesium hydroxide 400 mg/5 ml  Psychotherapeutics (From admission, onward)    Start     Stop Route Frequency Ordered    12/09/20 0900  ARIPiprazole tablet 15 mg      -- Oral Daily 12/08/20 1349    12/04/20 1258  haloperidoL tablet 5 mg  (Med - Acute  Behavioral Management)      -- Oral Every 4 hours PRN 12/04/20 1159    12/04/20 1258  LORazepam tablet 2 mg  (Med - Acute  Behavioral Management)      -- Oral Every 4 hours PRN 12/04/20 1159    12/04/20 1258  haloperidol lactate injection 5 mg  (Med - Acute  Behavioral Management)      -- IM Every 4 hours PRN 12/04/20 1159    12/04/20 1258  lorazepam injection 2 mg  (Med - Acute  Behavioral Management)      -- IM Every 4 hours PRN 12/04/20 1159          Review of patient's allergies indicates:  No Known Allergies      Laboratory:   No results found for this or any previous visit (from the past 24 hour(s)).      ASSESSMENT/PLAN:     Patient is a 20 y.o. old, male, admitted with principal problem of:         Diagnosis:    Axis I: MOOD DISORDERS; Major Depressive Disorder, Recurrent: Unspecified (F33.9); rule out schizophrenia, paranoid type; rule out schizoaffective disorder   Axis II: PERSONLITY DISORDERS: Personality Disorder NOS (F60.9)   Axis III: Past Medical History:   Diagnosis Date    Schizophrenia       Axis IV: other psychosocial or environmental problems   Axis V: 61-70 Some mild symptoms (e.g. depressed mood and mild insomnia) OR some difficulty in social, occupational, or school functioning (e.g. occasional truancy, or theft within the household), but generally functioning pretty well, has some meaningful interpersonal relationships            Patient is showing significant improvement    Need for continued hospitalization:  Patient will be scheduled for discharge tomorrow on 12/11/2020    Target Disposition:  home      Complexity Level:  Low

## 2020-12-10 NOTE — PLAN OF CARE
PATIENT IS CALM AND COOPERATIVE. ENGAGES IN ACTIVITIES WITH [employer]. JUST FINISHED A CARD GAME. SITTING IN THE DAY ROOM AT THIS TIME WATCHING TV. PATIENT LOOKING FORWARD TO DISCHARGE HOME TOMORROW. DR. RIVERO VISITED PER TELEMED WITH ORDER OF DISCHARGE 12-. NO BIZARRE BEHAVIOR OR AGITATION. DENIES S/I. H/I. DENIES A/V HALLUCINATIONS. CLOSE OBSERVATIONS CONTINUED.

## 2020-12-10 NOTE — PLAN OF CARE
Patient compliant with medication administration.  Patient slept through the night  with no concerns.  Continued monitoring to assure patient's safety and health.

## 2020-12-10 NOTE — PLAN OF CARE
SW spoke with pt's mom (Nabil) regarding planned dc tomorrow.  Pt's mom voiced understanding and agreement to dc plan.  Mom expressed that she feel is back to baseline and ready for dc.

## 2020-12-10 NOTE — DISCHARGE SUMMARY
Discharge Summary  PSYCHIATRY        Admit Date: 12/3/2020    Discharge Date and Time:  12/11/20 9:00 AM    Discharge Attending Physician: Yonathan Garrett Jr., MD     History of Present Illness:  The patient is a 20-year-old  male who was originally brought to the hospital for acute stabilization observation secondary to bizarre and possibly psychotic symptoms secondary to synthetic marijuana usage.      Diagnoses:  Axis I: Patient Active Problem List   Diagnosis    Acute psychosis    Marijuana abuse      Axis II: PERSONLITY DISORDERS: Personality Disorder NOS (F60.9)   Axis III:    Axis IV: other psychosocial or environmental problems   Axis V: 61-70 Some mild symptoms (e.g. depressed mood and mild insomnia) OR some difficulty in social, occupational, or school functioning (e.g. occasional truancy, or theft within the household), but generally functioning pretty well, has some meaningful interpersonal relationships       Discharged Condition: Stable/Improved    Hospital Course: Pt was admitted and started on medications.    Disposition: discharged to home on 12/10/2020    Activity: As tolerated    F/U: With [facility]    Patient Instructions:   Current Discharge Medication List      CONTINUE these medications which have NOT CHANGED    Details   divalproex ER (DEPAKOTE) 500 MG Tb24 Take 1 tablet (500 mg total) by mouth every evening.  Qty: 30 tablet, Refills: 0      paliperidone palmitate (INVEGA SUSTENNA) 156 mg/mL Syrg injection Inject 1 mL (156 mg total) into the muscle every 28 days. Next dose due on 4/13/20  Qty: 1 mL, Refills: 0             No discharge procedures on file.    Total time spent discharging patient: 30+minutes

## 2020-12-11 VITALS
RESPIRATION RATE: 20 BRPM | OXYGEN SATURATION: 98 % | DIASTOLIC BLOOD PRESSURE: 81 MMHG | WEIGHT: 166 LBS | TEMPERATURE: 98 F | SYSTOLIC BLOOD PRESSURE: 132 MMHG | HEART RATE: 66 BPM | BODY MASS INDEX: 20.64 KG/M2 | HEIGHT: 75 IN

## 2020-12-11 PROCEDURE — 25000003 PHARM REV CODE 250: Performed by: PSYCHIATRY & NEUROLOGY

## 2020-12-11 RX ADMIN — FOLIC ACID 1 MG: 1 TABLET ORAL at 08:12

## 2020-12-11 RX ADMIN — ARIPIPRAZOLE 15 MG: 5 TABLET ORAL at 08:12

## 2020-12-11 RX ADMIN — THERA TABS 1 TABLET: TAB at 08:12

## 2020-12-11 NOTE — PLAN OF CARE
Is alert and orientated,does not endorse h/s ideations nor a/v halloucinations,awaiting possible d/c ,have maintained medication,able to sleep 6-8hrts uninterupted ,discussed possible d/c,and the pros and cons,favorable would be to cont. Aftercare with [facility] when d/c,to cont.his mental health wellness.

## 2020-12-11 NOTE — NURSING
Patient d/nasra to home in stable condition,denies h/s ideations ,no a/v hallucinations,did review d/c plans,[facility],personal belongings returned,acknowledged understanding to d/c instructions,escorted per staff in stable condition,without incident.

## 2020-12-11 NOTE — NURSING
PT HAD BEEN SOCIALIZING WITH PEERS AND STAFF.  ENGAGED IN PLAYING CARDS, WATCHING TV, AND LAUGHING AND TALKING.  PRN BENADRYL 50MG AND VISTARIL 50MG GIVEN PO FOR SLEEP.  EXPRESSED THAT HE IS LOOKING FORWARD TO D/C HOME.  WILL CONTINUE CLOSE OBS.-REGULO KESSLER LPN

## 2020-12-27 ENCOUNTER — HOSPITAL ENCOUNTER (EMERGENCY)
Facility: HOSPITAL | Age: 20
Discharge: HOME | End: 2020-12-27
Attending: EMERGENCY MEDICINE
Payer: MEDICAID

## 2020-12-27 VITALS
HEIGHT: 75 IN | HEART RATE: 85 BPM | BODY MASS INDEX: 20.64 KG/M2 | RESPIRATION RATE: 18 BRPM | OXYGEN SATURATION: 98 % | TEMPERATURE: 98 F | DIASTOLIC BLOOD PRESSURE: 80 MMHG | SYSTOLIC BLOOD PRESSURE: 138 MMHG | WEIGHT: 166 LBS

## 2020-12-27 DIAGNOSIS — N50.811 PAIN IN RIGHT TESTICLE: Primary | ICD-10-CM

## 2020-12-27 PROCEDURE — 25000003 PHARM REV CODE 250: Performed by: EMERGENCY MEDICINE

## 2020-12-27 PROCEDURE — 99283 EMERGENCY DEPT VISIT LOW MDM: CPT

## 2020-12-27 RX ORDER — IBUPROFEN 600 MG/1
600 TABLET ORAL
Status: COMPLETED | OUTPATIENT
Start: 2020-12-27 | End: 2020-12-27

## 2020-12-27 RX ADMIN — IBUPROFEN 600 MG: 600 TABLET, FILM COATED ORAL at 01:12

## 2020-12-27 NOTE — ED PROVIDER NOTES
"Encounter Date: 12/27/2020       History     Chief Complaint   Patient presents with    Groin Swelling     pt c/o right testicular pain, onset yesterday     HPI     20-year-old male with past medical history schizophrenia presents with right testicular pain.  Patient reports he has had intermittent testicular pain for over a month since he tucked" his genitals for a fashion show, and reports a long history of crossing his legs very tightly.  He reports the pain is not always, denies any swelling, currently denies any pain.  He reports no discharge, no pain with urination, denies any further complaints.    Review of patient's allergies indicates:  No Known Allergies  Past Medical History:   Diagnosis Date    Schizophrenia      History reviewed. No pertinent surgical history.  History reviewed. No pertinent family history.  Social History     Tobacco Use    Smoking status: Never Smoker   Substance Use Topics    Alcohol use: Not on file    Drug use: Yes     Types: Marijuana     Comment: or synthetic     Review of Systems   Constitutional: Negative.    HENT: Negative.    Eyes: Negative.    Respiratory: Negative.    Cardiovascular: Negative.    Gastrointestinal: Negative.    Genitourinary: Positive for testicular pain.   Musculoskeletal: Negative.    Skin: Negative.    Neurological: Negative.        Physical Exam     Initial Vitals [12/27/20 1242]   BP Pulse Resp Temp SpO2   (!) 143/82 88 12 97.6 °F (36.4 °C) 98 %      MAP       --         Physical Exam    Nursing note and vitals reviewed.  Constitutional: He appears well-developed and well-nourished. He is not diaphoretic. No distress.   HENT:   Head: Normocephalic and atraumatic.   Nose: Nose normal.   Mouth/Throat: No oropharyngeal exudate.   Eyes: EOM are normal. Pupils are equal, round, and reactive to light.   Neck: Normal range of motion. Neck supple. No tracheal deviation present. No JVD present.   Cardiovascular: Normal rate, regular rhythm and normal " heart sounds.   No murmur heard.  Pulmonary/Chest: Breath sounds normal. No respiratory distress. He has no wheezes. He has no rhonchi. He has no rales.   Abdominal: Soft. Bowel sounds are normal. He exhibits no distension. There is no abdominal tenderness. There is no rebound and no guarding.   Genitourinary:    Genitourinary Comments:  exam unremarkable, testicular exam unremarkable, no inguinal hernia noted, no discharge noted, no overlying skin changes noted, normal lie of testicles, no pain with elevation, no tenderness at all, normal cremasteric reflex     Musculoskeletal: Normal range of motion. No tenderness or edema.   Neurological: He is alert and oriented to person, place, and time. He has normal strength. No cranial nerve deficit.   Skin: Skin is warm and dry. Capillary refill takes less than 2 seconds. No rash noted. No erythema.         ED Course   Procedures  Labs Reviewed - No data to display       Imaging Results    None                         MDM:    20-year-old male with past medical history schizophrenia presents with right testicular pain.  Physical exam showing well-appearing male, conversing with ease,  exam unremarkable, testicular exam unremarkable, no inguinal hernia noted, no discharge noted, no overlying skin changes noted, normal lie of testicles, no pain with elevation, no tenderness at all, normal cremasteric reflex.  At this point time based on physical exam evaluation do not suspect testicular torsion or torsion/de-torsion, epididymitis, orchitis, STD/UTI, inguinal hernia, or any further medical or surgical emergency.  Discussed with patient follow-up with Urology, diagnosis today, and ongoing management.  Return precautions discussed, all questions answered, patient discharged home improved stable.                      Clinical Impression:     ICD-10-CM ICD-9-CM   1. Pain in right testicle  N50.811 608.9                          ED Disposition Condition    Discharge Stable         ED Prescriptions     None        Follow-up Information     Follow up With Specialties Details Why Contact Info Additional Information    Chi Egan Jr., MD Urology Schedule an appointment as soon as possible for a visit   [address] [place] [address]  [phone]       [facility] Department Emergency Medicine Go to  If symptoms worsen [address] [place] [id number]  [phone] Floor 1                                       Ren Luo MD  12/28/20 0742

## 2020-12-27 NOTE — ED NOTES
NEUROLOGICAL:   Patient is awake , alert  and oriented x 4 . Pupils are PERRL. Gait is steady.   Moves all extremities without difficulty.   Patient reports no neuro complaints..  GCS 15    HEENT:   Head appears normocephalic  and symmetric .   Eyes appear WNL to both eye(s). Patient reports no complaints  to both eye(s) .   Ears appear WNL. Patient reports no complaints  to both ear(s).   Nares appear patent . Patient reports no nose complaints .  Mouth appears moist, pink and teeth intact. Patient reports no mouth complaints.   Throat appears pink and moist . Patient reports no throat complaints.    CARDIOVASCULAR:   S1 and S2 present, no murmurs, gallops, or rubs, rate regular  and pulses palpable (2+)    On palpation no edema noted , noted to none.   Patient reports no CV complaints.  .   Patient vitals are WNL.    RESPIRATORY:   Airway Clear, Open, and Patent.  Respirations are even and unlabored.   Breath sounds clear  to all lung fields.   Patient reports no respiratory complaints.     GASTROINTESTINAL:   Abdomen is soft  and non-tender x 4 quadrants. Bowel sounds are normoactive to all quadrants .   Patient reports no GI complaints .     GENITOURINARY:   Patient denies urinary symptoms. Patient reports right testicle pain. Assessment deferred to Dr. Luo.    MUSCULOSKELETAL:   full range of motion to all extremities, no swelling noted , no tenderness noted and no weakness noted.   Patient reports no musculoskeletal complaints     SKIN:   Skin appears warm , dry , good turgor, color normal for race and intact. Patient reports no skin complaints.

## 2021-02-07 ENCOUNTER — HOSPITAL ENCOUNTER (INPATIENT)
Facility: HOSPITAL | Age: 21
LOS: 11 days | Discharge: HOME | DRG: 885 | End: 2021-02-18
Attending: EMERGENCY MEDICINE | Admitting: EMERGENCY MEDICINE
Payer: MEDICAID

## 2021-02-07 DIAGNOSIS — F20.89 OTHER SCHIZOPHRENIA: Primary | ICD-10-CM

## 2021-02-07 LAB
ALBUMIN SERPL BCP-MCNC: 4.1 G/DL (ref 3.5–5.2)
ALP SERPL-CCNC: 69 U/L (ref 55–135)
ALT SERPL W/O P-5'-P-CCNC: 24 U/L (ref 10–44)
AMPHET+METHAMPHET UR QL: NEGATIVE
ANION GAP SERPL CALC-SCNC: 4 MMOL/L (ref 8–16)
APAP SERPL-MCNC: <10 UG/ML (ref 10–20)
AST SERPL-CCNC: 15 U/L (ref 10–40)
BACTERIA #/AREA URNS HPF: NEGATIVE /HPF
BARBITURATES UR QL SCN>200 NG/ML: NEGATIVE
BASOPHILS # BLD AUTO: 0.01 K/UL (ref 0–0.2)
BASOPHILS NFR BLD: 0.2 % (ref 0–1.9)
BENZODIAZ UR QL SCN>200 NG/ML: NEGATIVE
BILIRUB SERPL-MCNC: 0.6 MG/DL (ref 0.1–1)
BILIRUB UR QL STRIP: NEGATIVE
BUN SERPL-MCNC: 11 MG/DL (ref 6–20)
BZE UR QL SCN: NEGATIVE
CALCIUM SERPL-MCNC: 8.8 MG/DL (ref 8.7–10.5)
CANNABINOIDS UR QL SCN: NORMAL
CHLORIDE SERPL-SCNC: 109 MMOL/L (ref 95–110)
CLARITY UR: CLEAR
CO2 SERPL-SCNC: 27 MMOL/L (ref 23–29)
COLOR UR: YELLOW
CREAT SERPL-MCNC: 1.1 MG/DL (ref 0.5–1.4)
CREAT UR-MCNC: 295 MG/DL (ref 23–375)
CTP QC/QA: YES
DIFFERENTIAL METHOD: NORMAL
EOSINOPHIL # BLD AUTO: 0.1 K/UL (ref 0–0.5)
EOSINOPHIL NFR BLD: 1.1 % (ref 0–8)
ERYTHROCYTE [DISTWIDTH] IN BLOOD BY AUTOMATED COUNT: 11.9 % (ref 11.5–14.5)
EST. GFR  (AFRICAN AMERICAN): >60 ML/MIN/1.73 M^2
EST. GFR  (NON AFRICAN AMERICAN): >60 ML/MIN/1.73 M^2
ETHANOL SERPL-MCNC: <3 MG/DL
GLUCOSE SERPL-MCNC: 90 MG/DL (ref 70–110)
GLUCOSE UR QL STRIP: NEGATIVE
HCT VFR BLD AUTO: 44.5 % (ref 40–54)
HGB BLD-MCNC: 14.3 G/DL (ref 14–18)
HGB UR QL STRIP: NEGATIVE
HYALINE CASTS #/AREA URNS LPF: 7 /LPF
IMM GRANULOCYTES # BLD AUTO: 0.01 K/UL (ref 0–0.04)
IMM GRANULOCYTES NFR BLD AUTO: 0.2 % (ref 0–0.5)
KETONES UR QL STRIP: ABNORMAL
LEUKOCYTE ESTERASE UR QL STRIP: NEGATIVE
LYMPHOCYTES # BLD AUTO: 1.7 K/UL (ref 1–4.8)
LYMPHOCYTES NFR BLD: 32.8 % (ref 18–48)
MCH RBC QN AUTO: 30.6 PG (ref 27–31)
MCHC RBC AUTO-ENTMCNC: 32.1 G/DL (ref 32–36)
MCV RBC AUTO: 95 FL (ref 82–98)
METHADONE UR QL SCN>300 NG/ML: NEGATIVE
MICROSCOPIC COMMENT: ABNORMAL
MONOCYTES # BLD AUTO: 0.4 K/UL (ref 0.3–1)
MONOCYTES NFR BLD: 7.6 % (ref 4–15)
NEUTROPHILS # BLD AUTO: 3 K/UL (ref 1.8–7.7)
NEUTROPHILS NFR BLD: 58.1 % (ref 38–73)
NITRITE UR QL STRIP: NEGATIVE
NRBC BLD-RTO: 0 /100 WBC
OPIATES UR QL SCN: NEGATIVE
PCP UR QL SCN>25 NG/ML: NEGATIVE
PH UR STRIP: 7 [PH] (ref 5–8)
PLATELET # BLD AUTO: 261 K/UL (ref 150–350)
PMV BLD AUTO: 9.7 FL (ref 9.2–12.9)
POTASSIUM SERPL-SCNC: 4.1 MMOL/L (ref 3.5–5.1)
PROT SERPL-MCNC: 7.9 G/DL (ref 6–8.4)
PROT UR QL STRIP: ABNORMAL
RBC # BLD AUTO: 4.67 M/UL (ref 4.6–6.2)
RBC #/AREA URNS HPF: 1 /HPF (ref 0–4)
SARS-COV-2 RDRP RESP QL NAA+PROBE: NEGATIVE
SODIUM SERPL-SCNC: 140 MMOL/L (ref 136–145)
SP GR UR STRIP: 1.02 (ref 1–1.03)
SQUAMOUS #/AREA URNS HPF: 1 /HPF
TOXICOLOGY INFORMATION: NORMAL
TSH SERPL DL<=0.005 MIU/L-ACNC: 1.27 UIU/ML (ref 0.4–4)
URN SPEC COLLECT METH UR: ABNORMAL
UROBILINOGEN UR STRIP-ACNC: 1 EU/DL
WBC # BLD AUTO: 5.24 K/UL (ref 3.9–12.7)
WBC #/AREA URNS HPF: 1 /HPF (ref 0–5)

## 2021-02-07 PROCEDURE — 11400000 HC PSYCH PRIVATE ROOM

## 2021-02-07 PROCEDURE — 99285 EMERGENCY DEPT VISIT HI MDM: CPT

## 2021-02-07 PROCEDURE — 80053 COMPREHEN METABOLIC PANEL: CPT

## 2021-02-07 PROCEDURE — 85025 COMPLETE CBC W/AUTO DIFF WBC: CPT

## 2021-02-07 PROCEDURE — 81000 URINALYSIS NONAUTO W/SCOPE: CPT | Mod: 59

## 2021-02-07 PROCEDURE — U0002 COVID-19 LAB TEST NON-CDC: HCPCS | Performed by: EMERGENCY MEDICINE

## 2021-02-07 PROCEDURE — 80143 DRUG ASSAY ACETAMINOPHEN: CPT

## 2021-02-07 PROCEDURE — 36415 COLL VENOUS BLD VENIPUNCTURE: CPT

## 2021-02-07 PROCEDURE — 84443 ASSAY THYROID STIM HORMONE: CPT

## 2021-02-07 PROCEDURE — 80307 DRUG TEST PRSMV CHEM ANLYZR: CPT

## 2021-02-07 PROCEDURE — 82077 ASSAY SPEC XCP UR&BREATH IA: CPT

## 2021-02-07 RX ORDER — LORAZEPAM 2 MG/ML
2 INJECTION INTRAMUSCULAR EVERY 4 HOURS PRN
Status: DISCONTINUED | OUTPATIENT
Start: 2021-02-07 | End: 2021-02-18 | Stop reason: HOSPADM

## 2021-02-07 RX ORDER — ESCITALOPRAM OXALATE 10 MG/1
10 TABLET ORAL DAILY
COMMUNITY
End: 2021-07-27 | Stop reason: SDUPTHER

## 2021-02-07 RX ORDER — OXCARBAZEPINE 300 MG/1
150 TABLET, FILM COATED ORAL 2 TIMES DAILY
Status: ON HOLD | COMMUNITY
End: 2021-02-18 | Stop reason: HOSPADM

## 2021-02-07 RX ORDER — TRAZODONE HYDROCHLORIDE 50 MG/1
50 TABLET ORAL NIGHTLY PRN
Status: DISCONTINUED | OUTPATIENT
Start: 2021-02-07 | End: 2021-02-16

## 2021-02-07 RX ORDER — IBUPROFEN 200 MG
16 TABLET ORAL
Status: DISCONTINUED | OUTPATIENT
Start: 2021-02-07 | End: 2021-02-18 | Stop reason: HOSPADM

## 2021-02-07 RX ORDER — DIPHENHYDRAMINE HYDROCHLORIDE 50 MG/ML
50 INJECTION INTRAMUSCULAR; INTRAVENOUS EVERY 4 HOURS PRN
Status: DISCONTINUED | OUTPATIENT
Start: 2021-02-07 | End: 2021-02-18 | Stop reason: HOSPADM

## 2021-02-07 RX ORDER — HALOPERIDOL 5 MG/1
5 TABLET ORAL EVERY 4 HOURS PRN
Status: DISCONTINUED | OUTPATIENT
Start: 2021-02-07 | End: 2021-02-18 | Stop reason: HOSPADM

## 2021-02-07 RX ORDER — GLUCAGON 1 MG
1 KIT INJECTION
Status: DISCONTINUED | OUTPATIENT
Start: 2021-02-07 | End: 2021-02-18 | Stop reason: HOSPADM

## 2021-02-07 RX ORDER — HALOPERIDOL 5 MG/ML
5 INJECTION INTRAMUSCULAR EVERY 4 HOURS PRN
Status: DISCONTINUED | OUTPATIENT
Start: 2021-02-07 | End: 2021-02-18 | Stop reason: HOSPADM

## 2021-02-07 RX ORDER — LORAZEPAM 1 MG/1
2 TABLET ORAL EVERY 4 HOURS PRN
Status: DISCONTINUED | OUTPATIENT
Start: 2021-02-07 | End: 2021-02-18 | Stop reason: HOSPADM

## 2021-02-07 RX ORDER — IBUPROFEN 200 MG
24 TABLET ORAL
Status: DISCONTINUED | OUTPATIENT
Start: 2021-02-07 | End: 2021-02-18 | Stop reason: HOSPADM

## 2021-02-07 RX ORDER — DIPHENHYDRAMINE HCL 50 MG
50 CAPSULE ORAL EVERY 4 HOURS PRN
Status: DISCONTINUED | OUTPATIENT
Start: 2021-02-07 | End: 2021-02-18 | Stop reason: HOSPADM

## 2021-02-08 PROCEDURE — 90792 PR PSYCHIATRIC DIAGNOSTIC EVALUATION W/MEDICAL SERVICES: ICD-10-PCS | Mod: AF,HB,, | Performed by: PSYCHIATRY & NEUROLOGY

## 2021-02-08 PROCEDURE — 90792 PSYCH DIAG EVAL W/MED SRVCS: CPT | Mod: AF,HB,, | Performed by: PSYCHIATRY & NEUROLOGY

## 2021-02-08 PROCEDURE — 25000003 PHARM REV CODE 250: Performed by: INTERNAL MEDICINE

## 2021-02-08 PROCEDURE — 11400000 HC PSYCH PRIVATE ROOM

## 2021-02-08 PROCEDURE — 25000003 PHARM REV CODE 250: Performed by: EMERGENCY MEDICINE

## 2021-02-08 RX ORDER — OXCARBAZEPINE 150 MG/1
150 TABLET, FILM COATED ORAL 2 TIMES DAILY
Status: DISCONTINUED | OUTPATIENT
Start: 2021-02-08 | End: 2021-02-13

## 2021-02-08 RX ORDER — ESCITALOPRAM OXALATE 10 MG/1
10 TABLET ORAL DAILY
Status: DISCONTINUED | OUTPATIENT
Start: 2021-02-08 | End: 2021-02-18 | Stop reason: HOSPADM

## 2021-02-08 RX ADMIN — TRAZODONE HYDROCHLORIDE 50 MG: 50 TABLET ORAL at 08:02

## 2021-02-08 RX ADMIN — ESCITALOPRAM OXALATE 10 MG: 10 TABLET ORAL at 08:02

## 2021-02-08 RX ADMIN — OXCARBAZEPINE 150 MG: 150 TABLET, FILM COATED ORAL at 08:02

## 2021-02-08 RX ADMIN — ARIPIPRAZOLE 15 MG: 10 TABLET ORAL at 08:02

## 2021-02-09 PROCEDURE — 25000003 PHARM REV CODE 250: Performed by: EMERGENCY MEDICINE

## 2021-02-09 PROCEDURE — 25000003 PHARM REV CODE 250: Performed by: INTERNAL MEDICINE

## 2021-02-09 PROCEDURE — 11400000 HC PSYCH PRIVATE ROOM

## 2021-02-09 RX ORDER — ARIPIPRAZOLE 10 MG/1
20 TABLET ORAL DAILY
Status: DISCONTINUED | OUTPATIENT
Start: 2021-02-10 | End: 2021-02-18 | Stop reason: HOSPADM

## 2021-02-09 RX ADMIN — OXCARBAZEPINE 150 MG: 150 TABLET, FILM COATED ORAL at 08:02

## 2021-02-09 RX ADMIN — TRAZODONE HYDROCHLORIDE 50 MG: 50 TABLET ORAL at 07:02

## 2021-02-10 PROCEDURE — 99232 PR SUBSEQUENT HOSPITAL CARE,LEVL II: ICD-10-PCS | Mod: AF,HB,, | Performed by: PSYCHIATRY & NEUROLOGY

## 2021-02-10 PROCEDURE — 25000003 PHARM REV CODE 250: Performed by: EMERGENCY MEDICINE

## 2021-02-10 PROCEDURE — 25000003 PHARM REV CODE 250: Performed by: PSYCHIATRY & NEUROLOGY

## 2021-02-10 PROCEDURE — 99232 SBSQ HOSP IP/OBS MODERATE 35: CPT | Mod: AF,HB,, | Performed by: PSYCHIATRY & NEUROLOGY

## 2021-02-10 PROCEDURE — 11400000 HC PSYCH PRIVATE ROOM

## 2021-02-10 PROCEDURE — 25000003 PHARM REV CODE 250: Performed by: INTERNAL MEDICINE

## 2021-02-10 RX ADMIN — ESCITALOPRAM OXALATE 10 MG: 10 TABLET ORAL at 08:02

## 2021-02-10 RX ADMIN — TRAZODONE HYDROCHLORIDE 50 MG: 50 TABLET ORAL at 08:02

## 2021-02-10 RX ADMIN — ARIPIPRAZOLE 20 MG: 10 TABLET ORAL at 08:02

## 2021-02-10 RX ADMIN — OXCARBAZEPINE 150 MG: 150 TABLET, FILM COATED ORAL at 08:02

## 2021-02-11 PROCEDURE — 25000003 PHARM REV CODE 250: Performed by: EMERGENCY MEDICINE

## 2021-02-11 PROCEDURE — 25000003 PHARM REV CODE 250: Performed by: PSYCHIATRY & NEUROLOGY

## 2021-02-11 PROCEDURE — 11400000 HC PSYCH PRIVATE ROOM

## 2021-02-11 PROCEDURE — 25000003 PHARM REV CODE 250: Performed by: INTERNAL MEDICINE

## 2021-02-11 RX ADMIN — ESCITALOPRAM OXALATE 10 MG: 10 TABLET ORAL at 08:02

## 2021-02-11 RX ADMIN — OXCARBAZEPINE 150 MG: 150 TABLET, FILM COATED ORAL at 08:02

## 2021-02-11 RX ADMIN — TRAZODONE HYDROCHLORIDE 50 MG: 50 TABLET ORAL at 08:02

## 2021-02-11 RX ADMIN — ARIPIPRAZOLE 20 MG: 10 TABLET ORAL at 08:02

## 2021-02-12 PROCEDURE — 25000003 PHARM REV CODE 250: Performed by: PSYCHIATRY & NEUROLOGY

## 2021-02-12 PROCEDURE — 11400000 HC PSYCH PRIVATE ROOM

## 2021-02-12 PROCEDURE — 25000003 PHARM REV CODE 250: Performed by: INTERNAL MEDICINE

## 2021-02-12 PROCEDURE — 99232 PR SUBSEQUENT HOSPITAL CARE,LEVL II: ICD-10-PCS | Mod: AF,HB,, | Performed by: PSYCHIATRY & NEUROLOGY

## 2021-02-12 PROCEDURE — 25000003 PHARM REV CODE 250: Performed by: EMERGENCY MEDICINE

## 2021-02-12 PROCEDURE — 99232 SBSQ HOSP IP/OBS MODERATE 35: CPT | Mod: AF,HB,, | Performed by: PSYCHIATRY & NEUROLOGY

## 2021-02-12 RX ADMIN — ESCITALOPRAM OXALATE 10 MG: 10 TABLET ORAL at 08:02

## 2021-02-12 RX ADMIN — OXCARBAZEPINE 150 MG: 150 TABLET, FILM COATED ORAL at 08:02

## 2021-02-12 RX ADMIN — TRAZODONE HYDROCHLORIDE 50 MG: 50 TABLET ORAL at 08:02

## 2021-02-12 RX ADMIN — ARIPIPRAZOLE 20 MG: 10 TABLET ORAL at 08:02

## 2021-02-13 PROCEDURE — 25000003 PHARM REV CODE 250: Performed by: INTERNAL MEDICINE

## 2021-02-13 PROCEDURE — 25000003 PHARM REV CODE 250: Performed by: EMERGENCY MEDICINE

## 2021-02-13 PROCEDURE — 25000003 PHARM REV CODE 250: Performed by: PSYCHIATRY & NEUROLOGY

## 2021-02-13 PROCEDURE — 11400000 HC PSYCH PRIVATE ROOM

## 2021-02-13 RX ORDER — ACETAMINOPHEN 325 MG/1
650 TABLET ORAL EVERY 4 HOURS PRN
Status: DISCONTINUED | OUTPATIENT
Start: 2021-02-13 | End: 2021-02-13

## 2021-02-13 RX ORDER — ACETAMINOPHEN 325 MG/1
650 TABLET ORAL EVERY 6 HOURS PRN
Status: DISCONTINUED | OUTPATIENT
Start: 2021-02-13 | End: 2021-02-18 | Stop reason: HOSPADM

## 2021-02-13 RX ADMIN — ARIPIPRAZOLE 20 MG: 10 TABLET ORAL at 08:02

## 2021-02-13 RX ADMIN — TRAZODONE HYDROCHLORIDE 50 MG: 50 TABLET ORAL at 09:02

## 2021-02-13 RX ADMIN — ACETAMINOPHEN 650 MG: 325 TABLET ORAL at 05:02

## 2021-02-13 RX ADMIN — ESCITALOPRAM OXALATE 10 MG: 10 TABLET ORAL at 08:02

## 2021-02-13 RX ADMIN — OXCARBAZEPINE 150 MG: 150 TABLET, FILM COATED ORAL at 08:02

## 2021-02-14 PROCEDURE — 25000003 PHARM REV CODE 250: Performed by: EMERGENCY MEDICINE

## 2021-02-14 PROCEDURE — 11400000 HC PSYCH PRIVATE ROOM

## 2021-02-14 PROCEDURE — 25000003 PHARM REV CODE 250: Performed by: INTERNAL MEDICINE

## 2021-02-14 PROCEDURE — 25000003 PHARM REV CODE 250: Performed by: PSYCHIATRY & NEUROLOGY

## 2021-02-14 RX ORDER — AMOXICILLIN AND CLAVULANATE POTASSIUM 875; 125 MG/1; MG/1
1 TABLET, FILM COATED ORAL EVERY 12 HOURS
Status: DISCONTINUED | OUTPATIENT
Start: 2021-02-14 | End: 2021-02-18 | Stop reason: HOSPADM

## 2021-02-14 RX ORDER — MUPIROCIN 20 MG/G
OINTMENT TOPICAL 2 TIMES DAILY
Status: DISCONTINUED | OUTPATIENT
Start: 2021-02-14 | End: 2021-02-18 | Stop reason: HOSPADM

## 2021-02-14 RX ORDER — IBUPROFEN 400 MG/1
800 TABLET ORAL 3 TIMES DAILY
Status: DISCONTINUED | OUTPATIENT
Start: 2021-02-14 | End: 2021-02-18 | Stop reason: HOSPADM

## 2021-02-14 RX ADMIN — ARIPIPRAZOLE 20 MG: 10 TABLET ORAL at 08:02

## 2021-02-14 RX ADMIN — IBUPROFEN 800 MG: 400 TABLET, FILM COATED ORAL at 08:02

## 2021-02-14 RX ADMIN — IBUPROFEN 800 MG: 400 TABLET, FILM COATED ORAL at 11:02

## 2021-02-14 RX ADMIN — AMOXICILLIN AND CLAVULANATE POTASSIUM 1 TABLET: 875; 125 TABLET, FILM COATED ORAL at 08:02

## 2021-02-14 RX ADMIN — ESCITALOPRAM OXALATE 10 MG: 10 TABLET ORAL at 08:02

## 2021-02-14 RX ADMIN — TRAZODONE HYDROCHLORIDE 50 MG: 50 TABLET ORAL at 08:02

## 2021-02-14 RX ADMIN — MUPIROCIN: 20 OINTMENT TOPICAL at 05:02

## 2021-02-14 RX ADMIN — IBUPROFEN 800 MG: 400 TABLET, FILM COATED ORAL at 03:02

## 2021-02-15 PROCEDURE — 25000003 PHARM REV CODE 250: Performed by: EMERGENCY MEDICINE

## 2021-02-15 PROCEDURE — 25000003 PHARM REV CODE 250: Performed by: INTERNAL MEDICINE

## 2021-02-15 PROCEDURE — 99232 SBSQ HOSP IP/OBS MODERATE 35: CPT | Mod: AF,HB,, | Performed by: PSYCHIATRY & NEUROLOGY

## 2021-02-15 PROCEDURE — 25000003 PHARM REV CODE 250: Performed by: PSYCHIATRY & NEUROLOGY

## 2021-02-15 PROCEDURE — 11400000 HC PSYCH PRIVATE ROOM

## 2021-02-15 PROCEDURE — 99232 PR SUBSEQUENT HOSPITAL CARE,LEVL II: ICD-10-PCS | Mod: AF,HB,, | Performed by: PSYCHIATRY & NEUROLOGY

## 2021-02-15 RX ADMIN — AMOXICILLIN AND CLAVULANATE POTASSIUM 1 TABLET: 875; 125 TABLET, FILM COATED ORAL at 08:02

## 2021-02-15 RX ADMIN — TRAZODONE HYDROCHLORIDE 50 MG: 50 TABLET ORAL at 08:02

## 2021-02-15 RX ADMIN — MUPIROCIN 1 TUBE: 20 OINTMENT TOPICAL at 08:02

## 2021-02-15 RX ADMIN — IBUPROFEN 800 MG: 400 TABLET, FILM COATED ORAL at 03:02

## 2021-02-15 RX ADMIN — MUPIROCIN: 20 OINTMENT TOPICAL at 09:02

## 2021-02-15 RX ADMIN — IBUPROFEN 800 MG: 400 TABLET, FILM COATED ORAL at 08:02

## 2021-02-15 RX ADMIN — ARIPIPRAZOLE 20 MG: 10 TABLET ORAL at 08:02

## 2021-02-15 RX ADMIN — ESCITALOPRAM OXALATE 10 MG: 10 TABLET ORAL at 08:02

## 2021-02-16 PROCEDURE — 25000003 PHARM REV CODE 250: Performed by: EMERGENCY MEDICINE

## 2021-02-16 PROCEDURE — 25000003 PHARM REV CODE 250: Performed by: PSYCHIATRY & NEUROLOGY

## 2021-02-16 PROCEDURE — 25000003 PHARM REV CODE 250: Performed by: INTERNAL MEDICINE

## 2021-02-16 PROCEDURE — 11400000 HC PSYCH PRIVATE ROOM

## 2021-02-16 RX ORDER — QUETIAPINE FUMARATE 50 MG/1
50 TABLET, FILM COATED ORAL NIGHTLY PRN
Status: DISCONTINUED | OUTPATIENT
Start: 2021-02-16 | End: 2021-02-18 | Stop reason: HOSPADM

## 2021-02-16 RX ADMIN — ESCITALOPRAM OXALATE 10 MG: 10 TABLET ORAL at 08:02

## 2021-02-16 RX ADMIN — ARIPIPRAZOLE 20 MG: 10 TABLET ORAL at 08:02

## 2021-02-16 RX ADMIN — IBUPROFEN 800 MG: 400 TABLET, FILM COATED ORAL at 08:02

## 2021-02-16 RX ADMIN — AMOXICILLIN AND CLAVULANATE POTASSIUM 1 TABLET: 875; 125 TABLET, FILM COATED ORAL at 08:02

## 2021-02-16 RX ADMIN — QUETIAPINE FUMARATE 50 MG: 50 TABLET ORAL at 08:02

## 2021-02-16 RX ADMIN — MUPIROCIN: 20 OINTMENT TOPICAL at 11:02

## 2021-02-17 PROCEDURE — 99232 PR SUBSEQUENT HOSPITAL CARE,LEVL II: ICD-10-PCS | Mod: AF,HB,, | Performed by: PSYCHIATRY & NEUROLOGY

## 2021-02-17 PROCEDURE — 11400000 HC PSYCH PRIVATE ROOM

## 2021-02-17 PROCEDURE — 99232 SBSQ HOSP IP/OBS MODERATE 35: CPT | Mod: AF,HB,, | Performed by: PSYCHIATRY & NEUROLOGY

## 2021-02-17 PROCEDURE — 25000003 PHARM REV CODE 250: Performed by: EMERGENCY MEDICINE

## 2021-02-17 PROCEDURE — 25000003 PHARM REV CODE 250: Performed by: PSYCHIATRY & NEUROLOGY

## 2021-02-17 PROCEDURE — 25000003 PHARM REV CODE 250: Performed by: INTERNAL MEDICINE

## 2021-02-17 RX ADMIN — IBUPROFEN 800 MG: 400 TABLET, FILM COATED ORAL at 08:02

## 2021-02-17 RX ADMIN — QUETIAPINE FUMARATE 50 MG: 50 TABLET ORAL at 08:02

## 2021-02-17 RX ADMIN — ARIPIPRAZOLE 20 MG: 10 TABLET ORAL at 08:02

## 2021-02-17 RX ADMIN — ESCITALOPRAM OXALATE 10 MG: 10 TABLET ORAL at 08:02

## 2021-02-17 RX ADMIN — AMOXICILLIN AND CLAVULANATE POTASSIUM 1 TABLET: 875; 125 TABLET, FILM COATED ORAL at 08:02

## 2021-02-17 RX ADMIN — IBUPROFEN 800 MG: 400 TABLET, FILM COATED ORAL at 02:02

## 2021-02-17 RX ADMIN — MUPIROCIN: 20 OINTMENT TOPICAL at 08:02

## 2021-02-18 VITALS
SYSTOLIC BLOOD PRESSURE: 107 MMHG | DIASTOLIC BLOOD PRESSURE: 66 MMHG | RESPIRATION RATE: 18 BRPM | OXYGEN SATURATION: 98 % | HEIGHT: 75 IN | BODY MASS INDEX: 19.89 KG/M2 | WEIGHT: 160 LBS | TEMPERATURE: 98 F | HEART RATE: 61 BPM

## 2021-02-18 PROCEDURE — 25000003 PHARM REV CODE 250: Performed by: PSYCHIATRY & NEUROLOGY

## 2021-02-18 PROCEDURE — 25000003 PHARM REV CODE 250: Performed by: INTERNAL MEDICINE

## 2021-02-18 PROCEDURE — 25000003 PHARM REV CODE 250: Performed by: EMERGENCY MEDICINE

## 2021-02-18 RX ORDER — ARIPIPRAZOLE 20 MG/1
20 TABLET ORAL DAILY
Qty: 30 TABLET | Refills: 0 | Status: ON HOLD | OUTPATIENT
Start: 2021-02-19 | End: 2021-05-28 | Stop reason: HOSPADM

## 2021-02-18 RX ADMIN — AMOXICILLIN AND CLAVULANATE POTASSIUM 1 TABLET: 875; 125 TABLET, FILM COATED ORAL at 08:02

## 2021-02-18 RX ADMIN — IBUPROFEN 800 MG: 400 TABLET, FILM COATED ORAL at 08:02

## 2021-02-18 RX ADMIN — MUPIROCIN: 20 OINTMENT TOPICAL at 11:02

## 2021-02-18 RX ADMIN — ARIPIPRAZOLE 20 MG: 10 TABLET ORAL at 08:02

## 2021-02-18 RX ADMIN — ESCITALOPRAM OXALATE 10 MG: 10 TABLET ORAL at 08:02

## 2021-05-22 ENCOUNTER — HOSPITAL ENCOUNTER (INPATIENT)
Facility: HOSPITAL | Age: 21
LOS: 6 days | Discharge: HOME | DRG: 885 | End: 2021-05-28
Attending: EMERGENCY MEDICINE | Admitting: EMERGENCY MEDICINE
Payer: MEDICAID

## 2021-05-22 DIAGNOSIS — F23 ACUTE PSYCHOSIS: Primary | ICD-10-CM

## 2021-05-22 LAB
ALBUMIN SERPL BCP-MCNC: 4.8 G/DL (ref 3.5–5.2)
ALP SERPL-CCNC: 75 U/L (ref 55–135)
ALT SERPL W/O P-5'-P-CCNC: 31 U/L (ref 10–44)
AMPHET+METHAMPHET UR QL: NEGATIVE
ANION GAP SERPL CALC-SCNC: 14 MMOL/L (ref 8–16)
APAP SERPL-MCNC: <10 UG/ML (ref 10–20)
AST SERPL-CCNC: 49 U/L (ref 10–40)
BACTERIA #/AREA URNS HPF: NEGATIVE /HPF
BARBITURATES UR QL SCN>200 NG/ML: NEGATIVE
BASOPHILS # BLD AUTO: 0.01 K/UL (ref 0–0.2)
BASOPHILS NFR BLD: 0.1 % (ref 0–1.9)
BENZODIAZ UR QL SCN>200 NG/ML: NEGATIVE
BILIRUB SERPL-MCNC: 1.7 MG/DL (ref 0.1–1)
BILIRUB UR QL STRIP: ABNORMAL
BUN SERPL-MCNC: 27 MG/DL (ref 6–20)
BZE UR QL SCN: NEGATIVE
CALCIUM SERPL-MCNC: 9.4 MG/DL (ref 8.7–10.5)
CANNABINOIDS UR QL SCN: ABNORMAL
CHLORIDE SERPL-SCNC: 107 MMOL/L (ref 95–110)
CLARITY UR: CLEAR
CO2 SERPL-SCNC: 18 MMOL/L (ref 23–29)
COLOR UR: YELLOW
CREAT SERPL-MCNC: 1.6 MG/DL (ref 0.5–1.4)
CREAT UR-MCNC: 688 MG/DL (ref 23–375)
CTP QC/QA: YES
DIFFERENTIAL METHOD: ABNORMAL
EOSINOPHIL # BLD AUTO: 0 K/UL (ref 0–0.5)
EOSINOPHIL NFR BLD: 0.1 % (ref 0–8)
ERYTHROCYTE [DISTWIDTH] IN BLOOD BY AUTOMATED COUNT: 11.9 % (ref 11.5–14.5)
EST. GFR  (AFRICAN AMERICAN): >60 ML/MIN/1.73 M^2
EST. GFR  (NON AFRICAN AMERICAN): >60 ML/MIN/1.73 M^2
ETHANOL SERPL-MCNC: <3 MG/DL
GLUCOSE SERPL-MCNC: 109 MG/DL (ref 70–110)
GLUCOSE UR QL STRIP: NEGATIVE
HCT VFR BLD AUTO: 40.4 % (ref 40–54)
HGB BLD-MCNC: 13.9 G/DL (ref 14–18)
HGB UR QL STRIP: NEGATIVE
HYALINE CASTS #/AREA URNS LPF: 0 /LPF
IMM GRANULOCYTES # BLD AUTO: 0.01 K/UL (ref 0–0.04)
IMM GRANULOCYTES NFR BLD AUTO: 0.1 % (ref 0–0.5)
KETONES UR QL STRIP: ABNORMAL
LEUKOCYTE ESTERASE UR QL STRIP: NEGATIVE
LYMPHOCYTES # BLD AUTO: 1.7 K/UL (ref 1–4.8)
LYMPHOCYTES NFR BLD: 23.8 % (ref 18–48)
MCH RBC QN AUTO: 30.5 PG (ref 27–31)
MCHC RBC AUTO-ENTMCNC: 34.4 G/DL (ref 32–36)
MCV RBC AUTO: 89 FL (ref 82–98)
METHADONE UR QL SCN>300 NG/ML: NEGATIVE
MICROSCOPIC COMMENT: NORMAL
MONOCYTES # BLD AUTO: 0.7 K/UL (ref 0.3–1)
MONOCYTES NFR BLD: 10.4 % (ref 4–15)
NEUTROPHILS # BLD AUTO: 4.6 K/UL (ref 1.8–7.7)
NEUTROPHILS NFR BLD: 65.5 % (ref 38–73)
NITRITE UR QL STRIP: NEGATIVE
NRBC BLD-RTO: 0 /100 WBC
OPIATES UR QL SCN: NEGATIVE
PCP UR QL SCN>25 NG/ML: NEGATIVE
PH UR STRIP: 6 [PH] (ref 5–8)
PLATELET # BLD AUTO: 260 K/UL (ref 150–450)
PMV BLD AUTO: 10 FL (ref 9.2–12.9)
POTASSIUM SERPL-SCNC: 3.5 MMOL/L (ref 3.5–5.1)
PROT SERPL-MCNC: 8.8 G/DL (ref 6–8.4)
PROT UR QL STRIP: ABNORMAL
RBC # BLD AUTO: 4.55 M/UL (ref 4.6–6.2)
RBC #/AREA URNS HPF: 4 /HPF (ref 0–4)
SARS-COV-2 RDRP RESP QL NAA+PROBE: NEGATIVE
SODIUM SERPL-SCNC: 139 MMOL/L (ref 136–145)
SP GR UR STRIP: >=1.03 (ref 1–1.03)
SQUAMOUS #/AREA URNS HPF: 1 /HPF
TOXICOLOGY INFORMATION: ABNORMAL
TSH SERPL DL<=0.005 MIU/L-ACNC: 0.96 UIU/ML (ref 0.4–4)
URN SPEC COLLECT METH UR: ABNORMAL
UROBILINOGEN UR STRIP-ACNC: 1 EU/DL
WBC # BLD AUTO: 7.02 K/UL (ref 3.9–12.7)
WBC #/AREA URNS HPF: 2 /HPF (ref 0–5)

## 2021-05-22 PROCEDURE — 36415 COLL VENOUS BLD VENIPUNCTURE: CPT | Performed by: EMERGENCY MEDICINE

## 2021-05-22 PROCEDURE — 80053 COMPREHEN METABOLIC PANEL: CPT | Performed by: EMERGENCY MEDICINE

## 2021-05-22 PROCEDURE — 85025 COMPLETE CBC W/AUTO DIFF WBC: CPT | Performed by: EMERGENCY MEDICINE

## 2021-05-22 PROCEDURE — 63600175 PHARM REV CODE 636 W HCPCS: Performed by: EMERGENCY MEDICINE

## 2021-05-22 PROCEDURE — 81000 URINALYSIS NONAUTO W/SCOPE: CPT | Mod: 59 | Performed by: EMERGENCY MEDICINE

## 2021-05-22 PROCEDURE — 11400000 HC PSYCH PRIVATE ROOM

## 2021-05-22 PROCEDURE — U0002 COVID-19 LAB TEST NON-CDC: HCPCS | Performed by: EMERGENCY MEDICINE

## 2021-05-22 PROCEDURE — 84443 ASSAY THYROID STIM HORMONE: CPT | Performed by: EMERGENCY MEDICINE

## 2021-05-22 PROCEDURE — 80143 DRUG ASSAY ACETAMINOPHEN: CPT | Performed by: EMERGENCY MEDICINE

## 2021-05-22 PROCEDURE — 99285 EMERGENCY DEPT VISIT HI MDM: CPT | Mod: 25

## 2021-05-22 PROCEDURE — 96372 THER/PROPH/DIAG INJ SC/IM: CPT

## 2021-05-22 PROCEDURE — 82077 ASSAY SPEC XCP UR&BREATH IA: CPT | Performed by: EMERGENCY MEDICINE

## 2021-05-22 PROCEDURE — 80307 DRUG TEST PRSMV CHEM ANLYZR: CPT | Performed by: EMERGENCY MEDICINE

## 2021-05-22 RX ORDER — FOLIC ACID 1 MG/1
1 TABLET ORAL DAILY
Status: DISCONTINUED | OUTPATIENT
Start: 2021-05-23 | End: 2021-05-28 | Stop reason: HOSPADM

## 2021-05-22 RX ORDER — IBUPROFEN 200 MG
1 TABLET ORAL DAILY PRN
Status: DISCONTINUED | OUTPATIENT
Start: 2021-05-22 | End: 2021-05-28 | Stop reason: HOSPADM

## 2021-05-22 RX ORDER — BENZTROPINE MESYLATE 1 MG/ML
1 INJECTION, SOLUTION INTRAMUSCULAR; INTRAVENOUS EVERY 12 HOURS PRN
Status: DISCONTINUED | OUTPATIENT
Start: 2021-05-22 | End: 2021-05-28 | Stop reason: HOSPADM

## 2021-05-22 RX ORDER — ACETAMINOPHEN 325 MG/1
650 TABLET ORAL EVERY 6 HOURS PRN
Status: DISCONTINUED | OUTPATIENT
Start: 2021-05-22 | End: 2021-05-28 | Stop reason: HOSPADM

## 2021-05-22 RX ORDER — DIPHENHYDRAMINE HYDROCHLORIDE 50 MG/ML
50 INJECTION INTRAMUSCULAR; INTRAVENOUS EVERY 6 HOURS PRN
Status: DISCONTINUED | OUTPATIENT
Start: 2021-05-22 | End: 2021-05-28 | Stop reason: HOSPADM

## 2021-05-22 RX ORDER — HALOPERIDOL 5 MG/ML
5 INJECTION INTRAMUSCULAR EVERY 6 HOURS PRN
Status: DISCONTINUED | OUTPATIENT
Start: 2021-05-22 | End: 2021-05-28 | Stop reason: HOSPADM

## 2021-05-22 RX ORDER — HALOPERIDOL 5 MG/1
5 TABLET ORAL EVERY 6 HOURS PRN
Status: DISCONTINUED | OUTPATIENT
Start: 2021-05-22 | End: 2021-05-28 | Stop reason: HOSPADM

## 2021-05-22 RX ORDER — TRAZODONE HYDROCHLORIDE 50 MG/1
50 TABLET ORAL NIGHTLY PRN
Status: DISCONTINUED | OUTPATIENT
Start: 2021-05-22 | End: 2021-05-28 | Stop reason: HOSPADM

## 2021-05-22 RX ORDER — MAG HYDROX/ALUMINUM HYD/SIMETH 200-200-20
30 SUSPENSION, ORAL (FINAL DOSE FORM) ORAL EVERY 6 HOURS PRN
Status: DISCONTINUED | OUTPATIENT
Start: 2021-05-22 | End: 2021-05-28 | Stop reason: HOSPADM

## 2021-05-22 RX ORDER — LORAZEPAM 1 MG/1
2 TABLET ORAL EVERY 6 HOURS PRN
Status: DISCONTINUED | OUTPATIENT
Start: 2021-05-22 | End: 2021-05-28 | Stop reason: HOSPADM

## 2021-05-22 RX ORDER — IBUPROFEN 800 MG/1
800 TABLET ORAL EVERY 8 HOURS PRN
Status: DISCONTINUED | OUTPATIENT
Start: 2021-05-22 | End: 2021-05-28 | Stop reason: HOSPADM

## 2021-05-22 RX ORDER — DIPHENHYDRAMINE HYDROCHLORIDE 50 MG/ML
50 INJECTION INTRAMUSCULAR; INTRAVENOUS
Status: COMPLETED | OUTPATIENT
Start: 2021-05-22 | End: 2021-05-22

## 2021-05-22 RX ORDER — HALOPERIDOL 5 MG/ML
10 INJECTION INTRAMUSCULAR
Status: COMPLETED | OUTPATIENT
Start: 2021-05-22 | End: 2021-05-22

## 2021-05-22 RX ORDER — ADHESIVE BANDAGE
30 BANDAGE TOPICAL DAILY PRN
Status: DISCONTINUED | OUTPATIENT
Start: 2021-05-22 | End: 2021-05-28 | Stop reason: HOSPADM

## 2021-05-22 RX ORDER — DIPHENHYDRAMINE HCL 50 MG
50 CAPSULE ORAL EVERY 6 HOURS PRN
Status: DISCONTINUED | OUTPATIENT
Start: 2021-05-22 | End: 2021-05-28 | Stop reason: HOSPADM

## 2021-05-22 RX ORDER — LORAZEPAM 2 MG/ML
2 INJECTION INTRAMUSCULAR EVERY 6 HOURS PRN
Status: DISCONTINUED | OUTPATIENT
Start: 2021-05-22 | End: 2021-05-28 | Stop reason: HOSPADM

## 2021-05-22 RX ADMIN — HALOPERIDOL LACTATE 10 MG: 5 INJECTION, SOLUTION INTRAMUSCULAR at 02:05

## 2021-05-22 RX ADMIN — DIPHENHYDRAMINE HYDROCHLORIDE 50 MG: 50 INJECTION INTRAMUSCULAR; INTRAVENOUS at 02:05

## 2021-05-23 LAB
CHOLEST SERPL-MCNC: 175 MG/DL (ref 120–199)
CHOLEST/HDLC SERPL: 4.2 {RATIO} (ref 2–5)
HDLC SERPL-MCNC: 42 MG/DL (ref 40–75)
HDLC SERPL: 24 % (ref 20–50)
LDLC SERPL CALC-MCNC: 118 MG/DL (ref 63–159)
NONHDLC SERPL-MCNC: 133 MG/DL
TRIGL SERPL-MCNC: 75 MG/DL (ref 30–150)

## 2021-05-23 PROCEDURE — 36415 COLL VENOUS BLD VENIPUNCTURE: CPT | Performed by: PSYCHIATRY & NEUROLOGY

## 2021-05-23 PROCEDURE — 80061 LIPID PANEL: CPT | Performed by: PSYCHIATRY & NEUROLOGY

## 2021-05-23 PROCEDURE — 11400000 HC PSYCH PRIVATE ROOM

## 2021-05-23 PROCEDURE — 25000003 PHARM REV CODE 250: Performed by: PSYCHIATRY & NEUROLOGY

## 2021-05-23 RX ORDER — ARIPIPRAZOLE 10 MG/1
20 TABLET ORAL DAILY
Status: DISCONTINUED | OUTPATIENT
Start: 2021-05-23 | End: 2021-05-28 | Stop reason: HOSPADM

## 2021-05-23 RX ADMIN — THERA TABS 1 TABLET: TAB at 08:05

## 2021-05-23 RX ADMIN — FOLIC ACID 1 MG: 1 TABLET ORAL at 08:05

## 2021-05-23 RX ADMIN — TRAZODONE HYDROCHLORIDE 50 MG: 50 TABLET ORAL at 08:05

## 2021-05-23 RX ADMIN — ARIPIPRAZOLE 20 MG: 10 TABLET ORAL at 10:05

## 2021-05-24 PROCEDURE — 25000003 PHARM REV CODE 250: Performed by: PSYCHIATRY & NEUROLOGY

## 2021-05-24 PROCEDURE — 11400000 HC PSYCH PRIVATE ROOM

## 2021-05-24 PROCEDURE — 63600175 PHARM REV CODE 636 W HCPCS: Performed by: PSYCHIATRY & NEUROLOGY

## 2021-05-24 RX ADMIN — ARIPIPRAZOLE LAUROXIL 675 MG: 675 INJECTION, SUSPENSION, EXTENDED RELEASE INTRAMUSCULAR at 10:05

## 2021-05-24 RX ADMIN — THERA TABS 1 TABLET: TAB at 08:05

## 2021-05-24 RX ADMIN — ARIPIPRAZOLE LAUROXIL 882 MG: 882 INJECTION, SUSPENSION, EXTENDED RELEASE INTRAMUSCULAR at 10:05

## 2021-05-24 RX ADMIN — TRAZODONE HYDROCHLORIDE 50 MG: 50 TABLET ORAL at 08:05

## 2021-05-24 RX ADMIN — ARIPIPRAZOLE 20 MG: 10 TABLET ORAL at 08:05

## 2021-05-24 RX ADMIN — FOLIC ACID 1 MG: 1 TABLET ORAL at 08:05

## 2021-05-24 RX ADMIN — ACETAMINOPHEN 650 MG: 325 TABLET ORAL at 08:05

## 2021-05-25 PROCEDURE — 25000003 PHARM REV CODE 250: Performed by: PSYCHIATRY & NEUROLOGY

## 2021-05-25 PROCEDURE — 11400000 HC PSYCH PRIVATE ROOM

## 2021-05-25 RX ADMIN — FOLIC ACID 1 MG: 1 TABLET ORAL at 09:05

## 2021-05-25 RX ADMIN — ARIPIPRAZOLE 20 MG: 10 TABLET ORAL at 09:05

## 2021-05-25 RX ADMIN — ACETAMINOPHEN 650 MG: 325 TABLET ORAL at 08:05

## 2021-05-25 RX ADMIN — THERA TABS 1 TABLET: TAB at 09:05

## 2021-05-25 RX ADMIN — TRAZODONE HYDROCHLORIDE 50 MG: 50 TABLET ORAL at 08:05

## 2021-05-26 PROCEDURE — 25000003 PHARM REV CODE 250: Performed by: PSYCHIATRY & NEUROLOGY

## 2021-05-26 PROCEDURE — 11400000 HC PSYCH PRIVATE ROOM

## 2021-05-26 RX ADMIN — ARIPIPRAZOLE 20 MG: 10 TABLET ORAL at 08:05

## 2021-05-26 RX ADMIN — FOLIC ACID 1 MG: 1 TABLET ORAL at 08:05

## 2021-05-26 RX ADMIN — THERA TABS 1 TABLET: TAB at 08:05

## 2021-05-26 RX ADMIN — TRAZODONE HYDROCHLORIDE 50 MG: 50 TABLET ORAL at 08:05

## 2021-05-27 PROCEDURE — 11400000 HC PSYCH PRIVATE ROOM

## 2021-05-27 PROCEDURE — 25000003 PHARM REV CODE 250: Performed by: PSYCHIATRY & NEUROLOGY

## 2021-05-27 RX ADMIN — THERA TABS 1 TABLET: TAB at 08:05

## 2021-05-27 RX ADMIN — FOLIC ACID 1 MG: 1 TABLET ORAL at 08:05

## 2021-05-27 RX ADMIN — TRAZODONE HYDROCHLORIDE 50 MG: 50 TABLET ORAL at 08:05

## 2021-05-27 RX ADMIN — ARIPIPRAZOLE 20 MG: 10 TABLET ORAL at 08:05

## 2021-05-28 VITALS
HEART RATE: 78 BPM | TEMPERATURE: 98 F | SYSTOLIC BLOOD PRESSURE: 124 MMHG | OXYGEN SATURATION: 95 % | WEIGHT: 149.31 LBS | BODY MASS INDEX: 18.57 KG/M2 | RESPIRATION RATE: 20 BRPM | HEIGHT: 75 IN | DIASTOLIC BLOOD PRESSURE: 62 MMHG

## 2021-05-28 PROBLEM — F23 ACUTE PSYCHOSIS: Status: RESOLVED | Noted: 2021-05-22 | Resolved: 2021-05-28

## 2021-05-28 PROCEDURE — 25000003 PHARM REV CODE 250: Performed by: PSYCHIATRY & NEUROLOGY

## 2021-05-28 RX ORDER — ARIPIPRAZOLE LAUROXIL 882 MG/3.2ML
882 INJECTION, SUSPENSION, EXTENDED RELEASE INTRAMUSCULAR
Qty: 1 SYRINGE | Refills: 0 | Status: SHIPPED | OUTPATIENT
Start: 2021-06-24 | End: 2021-05-28

## 2021-05-28 RX ADMIN — FOLIC ACID 1 MG: 1 TABLET ORAL at 08:05

## 2021-05-28 RX ADMIN — ARIPIPRAZOLE 20 MG: 10 TABLET ORAL at 08:05

## 2021-05-28 RX ADMIN — THERA TABS 1 TABLET: TAB at 08:05

## 2021-06-01 ENCOUNTER — PATIENT MESSAGE (OUTPATIENT)
Dept: ADMINISTRATIVE | Facility: CLINIC | Age: 21
End: 2021-06-01

## 2021-06-01 ENCOUNTER — PATIENT OUTREACH (OUTPATIENT)
Dept: ADMINISTRATIVE | Facility: CLINIC | Age: 21
End: 2021-06-01

## 2021-10-09 ENCOUNTER — HOSPITAL ENCOUNTER (EMERGENCY)
Facility: HOSPITAL | Age: 21
Discharge: HOME | End: 2021-10-09
Attending: EMERGENCY MEDICINE
Payer: MEDICAID

## 2021-10-09 VITALS
RESPIRATION RATE: 18 BRPM | HEART RATE: 68 BPM | HEIGHT: 75 IN | TEMPERATURE: 98 F | SYSTOLIC BLOOD PRESSURE: 101 MMHG | OXYGEN SATURATION: 100 % | WEIGHT: 170.81 LBS | BODY MASS INDEX: 21.24 KG/M2 | DIASTOLIC BLOOD PRESSURE: 59 MMHG

## 2021-10-09 DIAGNOSIS — Z20.822 COVID-19 RULED OUT BY LABORATORY TESTING: Primary | ICD-10-CM

## 2021-10-09 LAB
CTP QC/QA: YES
SARS-COV-2 RDRP RESP QL NAA+PROBE: NEGATIVE

## 2021-10-09 PROCEDURE — U0002 COVID-19 LAB TEST NON-CDC: HCPCS | Performed by: EMERGENCY MEDICINE

## 2021-10-09 PROCEDURE — 99282 EMERGENCY DEPT VISIT SF MDM: CPT

## 2022-02-11 NOTE — DISCHARGE INSTRUCTIONS
CXR nonconcerning for pleural effusion/pulmonary edema, but concerns for CHF, given patient had recent increasing SOB, BNP elevation, and NSTEMI. TTE notable for Grade II left ventricular diastolic dysfunction.    Transthoracic echo (TTE) complete[02/11/22]  Summary  · The estimated PA systolic pressure is 37 mmHg.  · The left ventricle is normal in size with normal systolic function.  · The estimated ejection fraction is 60%.  · Grade II left ventricular diastolic dysfunction.  · Normal right ventricular size with normal right ventricular systolic function.  · Severe left atrial enlargement.  · Moderate right atrial enlargement.  · Mild mitral regurgitation.  · Mild tricuspid regurgitation.  · Normal central venous pressure (3 mmHg).    Recent Labs   Lab 02/10/22  1806 02/11/22  1518   TROPONINI 0.278* 3.829*   *  --        Plan:  - Cardiac diet with Fluid restriction at 1.5L with strict I/Os and daily STANDING weights  - Maintain on telemetry  - Check Electrolytes, keep Mag >2 & K+ >4  - Ambulate as tolerated . PT/OT consulted   Cont.being med compliant and to cont.his mental health wellness.

## 2022-04-18 ENCOUNTER — HOSPITAL ENCOUNTER (EMERGENCY)
Facility: HOSPITAL | Age: 22
Discharge: PSYCHIATRIC OR SUBSTANCE USE FACILITY | End: 2022-04-19
Attending: EMERGENCY MEDICINE
Payer: MEDICAID

## 2022-04-18 DIAGNOSIS — F23 ACUTE PSYCHOSIS: Primary | ICD-10-CM

## 2022-04-18 LAB
ALBUMIN SERPL BCP-MCNC: 4.4 G/DL (ref 3.5–5.2)
ALP SERPL-CCNC: 69 U/L (ref 55–135)
ALT SERPL W/O P-5'-P-CCNC: 33 U/L (ref 10–44)
AMPHET+METHAMPHET UR QL: NEGATIVE
ANION GAP SERPL CALC-SCNC: 11 MMOL/L (ref 8–16)
APAP SERPL-MCNC: <3 UG/ML (ref 10–20)
AST SERPL-CCNC: 41 U/L (ref 10–40)
BARBITURATES UR QL SCN>200 NG/ML: NEGATIVE
BASOPHILS # BLD AUTO: 0.02 K/UL (ref 0–0.2)
BASOPHILS NFR BLD: 0.3 % (ref 0–1.9)
BENZODIAZ UR QL SCN>200 NG/ML: NEGATIVE
BILIRUB SERPL-MCNC: 1.3 MG/DL (ref 0.1–1)
BILIRUB UR QL STRIP: NEGATIVE
BUN SERPL-MCNC: 22 MG/DL (ref 6–20)
BZE UR QL SCN: NEGATIVE
CALCIUM SERPL-MCNC: 9.6 MG/DL (ref 8.7–10.5)
CANNABINOIDS UR QL SCN: ABNORMAL
CHLORIDE SERPL-SCNC: 103 MMOL/L (ref 95–110)
CLARITY UR REFRACT.AUTO: CLEAR
CO2 SERPL-SCNC: 24 MMOL/L (ref 23–29)
COLOR UR AUTO: YELLOW
CREAT SERPL-MCNC: 1.3 MG/DL (ref 0.5–1.4)
CREAT UR-MCNC: 343 MG/DL (ref 23–375)
CTP QC/QA: YES
DIFFERENTIAL METHOD: ABNORMAL
EOSINOPHIL # BLD AUTO: 0.1 K/UL (ref 0–0.5)
EOSINOPHIL NFR BLD: 0.8 % (ref 0–8)
ERYTHROCYTE [DISTWIDTH] IN BLOOD BY AUTOMATED COUNT: 12 % (ref 11.5–14.5)
EST. GFR  (AFRICAN AMERICAN): >60 ML/MIN/1.73 M^2
EST. GFR  (NON AFRICAN AMERICAN): >60 ML/MIN/1.73 M^2
ETHANOL SERPL-MCNC: <10 MG/DL
GLUCOSE SERPL-MCNC: 84 MG/DL (ref 70–110)
GLUCOSE UR QL STRIP: NEGATIVE
HCT VFR BLD AUTO: 41.8 % (ref 40–54)
HGB BLD-MCNC: 13.5 G/DL (ref 14–18)
HGB UR QL STRIP: NEGATIVE
IMM GRANULOCYTES # BLD AUTO: 0.02 K/UL (ref 0–0.04)
IMM GRANULOCYTES NFR BLD AUTO: 0.3 % (ref 0–0.5)
KETONES UR QL STRIP: ABNORMAL
LEUKOCYTE ESTERASE UR QL STRIP: NEGATIVE
LYMPHOCYTES # BLD AUTO: 2.2 K/UL (ref 1–4.8)
LYMPHOCYTES NFR BLD: 33.1 % (ref 18–48)
MCH RBC QN AUTO: 31 PG (ref 27–31)
MCHC RBC AUTO-ENTMCNC: 32.3 G/DL (ref 32–36)
MCV RBC AUTO: 96 FL (ref 82–98)
METHADONE UR QL SCN>300 NG/ML: NEGATIVE
MICROSCOPIC COMMENT: NORMAL
MONOCYTES # BLD AUTO: 0.8 K/UL (ref 0.3–1)
MONOCYTES NFR BLD: 12 % (ref 4–15)
NEUTROPHILS # BLD AUTO: 3.5 K/UL (ref 1.8–7.7)
NEUTROPHILS NFR BLD: 53.5 % (ref 38–73)
NITRITE UR QL STRIP: NEGATIVE
NRBC BLD-RTO: 0 /100 WBC
OPIATES UR QL SCN: NEGATIVE
PCP UR QL SCN>25 NG/ML: NEGATIVE
PH UR STRIP: 6 [PH] (ref 5–8)
PLATELET # BLD AUTO: 249 K/UL (ref 150–450)
PMV BLD AUTO: 10 FL (ref 9.2–12.9)
POTASSIUM SERPL-SCNC: 3.9 MMOL/L (ref 3.5–5.1)
PROT SERPL-MCNC: 8.2 G/DL (ref 6–8.4)
PROT UR QL STRIP: NEGATIVE
RBC # BLD AUTO: 4.35 M/UL (ref 4.6–6.2)
RBC #/AREA URNS AUTO: 1 /HPF (ref 0–4)
SARS-COV-2 RDRP RESP QL NAA+PROBE: NEGATIVE
SODIUM SERPL-SCNC: 138 MMOL/L (ref 136–145)
SP GR UR STRIP: >=1.03 (ref 1–1.03)
SQUAMOUS #/AREA URNS AUTO: 0 /HPF
TOXICOLOGY INFORMATION: ABNORMAL
TSH SERPL DL<=0.005 MIU/L-ACNC: 1.06 UIU/ML (ref 0.4–4)
URN SPEC COLLECT METH UR: ABNORMAL
WBC # BLD AUTO: 6.56 K/UL (ref 3.9–12.7)
WBC #/AREA URNS AUTO: 3 /HPF (ref 0–5)

## 2022-04-18 PROCEDURE — 82077 ASSAY SPEC XCP UR&BREATH IA: CPT | Performed by: EMERGENCY MEDICINE

## 2022-04-18 PROCEDURE — 84443 ASSAY THYROID STIM HORMONE: CPT | Performed by: EMERGENCY MEDICINE

## 2022-04-18 PROCEDURE — 87389 HIV-1 AG W/HIV-1&-2 AB AG IA: CPT | Performed by: EMERGENCY MEDICINE

## 2022-04-18 PROCEDURE — 99285 EMERGENCY DEPT VISIT HI MDM: CPT | Mod: CS,,, | Performed by: EMERGENCY MEDICINE

## 2022-04-18 PROCEDURE — U0002 COVID-19 LAB TEST NON-CDC: HCPCS | Performed by: EMERGENCY MEDICINE

## 2022-04-18 PROCEDURE — 81001 URINALYSIS AUTO W/SCOPE: CPT | Performed by: EMERGENCY MEDICINE

## 2022-04-18 PROCEDURE — 80053 COMPREHEN METABOLIC PANEL: CPT | Performed by: EMERGENCY MEDICINE

## 2022-04-18 PROCEDURE — 85025 COMPLETE CBC W/AUTO DIFF WBC: CPT | Performed by: EMERGENCY MEDICINE

## 2022-04-18 PROCEDURE — 99285 EMERGENCY DEPT VISIT HI MDM: CPT | Mod: 25

## 2022-04-18 PROCEDURE — 80143 DRUG ASSAY ACETAMINOPHEN: CPT | Performed by: EMERGENCY MEDICINE

## 2022-04-18 PROCEDURE — 99285 PR EMERGENCY DEPT VISIT,LEVEL V: ICD-10-PCS | Mod: CS,,, | Performed by: EMERGENCY MEDICINE

## 2022-04-18 PROCEDURE — 80307 DRUG TEST PRSMV CHEM ANLYZR: CPT | Performed by: EMERGENCY MEDICINE

## 2022-04-18 PROCEDURE — 86803 HEPATITIS C AB TEST: CPT | Performed by: EMERGENCY MEDICINE
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
## 2022-04-18 NOTE — ED NOTES
Patient has belongings in closet:    -1 pair of pants  -1 pair of socks  -1 pair of shoes  -1 shirt

## 2022-04-18 NOTE — ED NOTES
"Pt ripped IV out of his arm. Pt is now crying and when asked why stated, "I'm going through a lot with my mom."  " Subjective    Patient is doing well, no complaints  Feels comfortable with discharge, awaiting home arrangements      Objective     I/O's    Intake/Output Summary (Last 24 hours) at 9/2/2021 1114  Last data filed at 9/2/2021 0844  Gross per 24 hour   Intake --   Output 800 ml   Net -800 ml       Last Recorded Vitals  Blood pressure 108/55, pulse 64, temperature 97.7 °F (36.5 °C), temperature source Oral, resp. rate 16, height 5' 10\" (1.778 m), weight 88.6 kg (195 lb 5.2 oz), SpO2 99 %.  Body mass index is 28.03 kg/m².    Physical exam  General: NAD  Head: normocephalic  Eyes- b/l pupils equal in size  Neck: wearing neck collar. +jvd.  Chest: respirations non labored, bs diminished b/l  Cardiac: RRR  Abdomen: soft, not distended, nttp, +BS.  Musculoskeletal: right hip dressing in place. Bandage without drainage. Right hip bruise  Neurologic: a/o x3  Vascular: BLE 1-2+ pitting edema (much better compared to 1 week ago). B/l calves nttp. lue +edema, nttp.  Skin: warm, dry, less pale  Psychiatric: calm    Labs     Recent Results (from the past 24 hour(s))   Prothrombin Time    Collection Time: 09/02/21  3:54 AM   Result Value Ref Range    Prothrombin Time 22.6 (H) 9.7 - 11.8 sec    INR 2.2     Rapid SARS-CoV-2 by PCR    Collection Time: 09/02/21  8:25 AM    Specimen: Nasopharyngeal; Swab   Result Value Ref Range    Rapid SARS-COV-2 by PCR Not Detected Not Detected / Detected / Presumptive Positive / Inhibitors present    Isolation Guidelines      Procedural Comment         Imaging  No results found.     Active Medications  Current Facility-Administered Medications   Medication Dose Route Frequency Provider Last Rate Last Admin   • warfarin (COUMADIN) tablet 3 mg  3 mg Oral Once Ramez Tavarez MD       • docusate sodium (ENEMEEZ MINI) 283 MG rectal enema 1,132 mg  1,132 mg Rectal Once Sunni Rae MD       • acetaminophen (TYLENOL) tablet 1,000 mg  1,000 mg Oral 3 times per day Julianna Gastelum MD   1,000 mg at  09/02/21 0614   • bumetanide (BUMEX) tablet 2 mg  2 mg Oral Daily Julianna Gastelum MD   2 mg at 09/02/21 0822   • traMADol (ULTRAM) tablet 50 mg  50 mg Oral BID PRN Julianna Gastelum MD   50 mg at 09/01/21 1300   • sodium chloride 0.9% infusion   Intravenous Continuous PRN Kade Fontaine CNP       • polyethylene glycol (MIRALAX) packet 17 g  17 g Oral Daily Raymundo Lopez MD   17 g at 09/02/21 0823   • sodium chloride 0.9% infusion   Intravenous Continuous PRN Raymundo Lopez MD       • docusate sodium-sennosides (SENOKOT S) 50-8.6 MG 1 tablet  1 tablet Oral BID Ramez Tavarez MD   1 tablet at 09/02/21 0823   • bisacodyl (DULCOLAX) suppository 10 mg  10 mg Rectal Daily PRN Ramez Tavarez MD       • WARFARIN - PHARMACIST MONITORED Misc   Does not apply See Admin Instructions Ramez Tavarez MD       • ondansetron (ZOFRAN ODT) disintegrating tablet 4 mg  4 mg Oral Q12H PRN Ramez Tavarez MD        Or   • ondansetron (ZOFRAN) injection 4 mg  4 mg Intravenous Q12H PRN Ramez Tavarez MD       • cholecalciferol (VITAMIN D) tablet 100 mcg  100 mcg Oral Daily Ramez Tavarez MD   100 mcg at 09/02/21 0823   • atorvastatin (LIPITOR) tablet 20 mg  20 mg Oral Daily Nell Wilkinson MD   20 mg at 09/02/21 0822   • doxazosin (CARDURA) tablet 2 mg  2 mg Oral Nightly Nell Wilkinson MD   2 mg at 09/01/21 2021   • eplerenone (INSPRA) tablet 25 mg  25 mg Oral Daily Nell Wilkinson MD   25 mg at 09/02/21 0822   • pantoprazole (PROTONIX) EC tablet 40 mg  40 mg Oral Daily Nell Wilkinson MD   40 mg at 09/02/21 0823   • morphine injection 2 mg  2 mg Intravenous Q4H PRN Ramez Tavarez MD   2 mg at 08/18/21 0923     Assessment and Plan  Closed intertrochanteric fracture of right femur (CMS/HCC)  -S/p surgical repair 8/19  -main post op management per ortho team including wound care, activity  -con't pain control- tylenol scheduled, tramadol prn  -pt/ot    Constipation and fecal  impaction-improved  having bm's and tolerating diet without abd pain/n/v  -con't bowel regimen      Acute blood loss anemia- expect due to recent surgery  Chronic anemia expect due to ckd  -hb stable. S/p prbc 8-22-21  -monitor hb closely  -will need further w/u w/ pcp/gi for microcytic anemia     Closed T2 fracture (CMS/HCC)  -No acute orthopedic intervention indicated for spinous process fx per ortho spine team  -con't soft collar per orthospine -Follow-up with Dr. Hill in 2 weeks      Acute kidney injury on stage 3 chronic kidney disease- resolved  -monitor renal fxn, avoid nephrotoxic agents     Atrial fibrillation  -pharm dosing coumadin    Heart failure preserved ejection fraction  Severe mitral regurgitation s/p MitraClip  History of CAD with CABG  Persistent atrial fibrillation  Hypertension- bp controlled  Pulmonary hypertension  -con't statin, eplerenone  -con't low dose bumex 2 mg daily, titrate back to 2 mg po bid as bp can tolerate/when oral intake improves.      Dysphagia- seen by speech, con't modified diet (soft for dentition, chopped and thin liquids)    lue edema- doppler neg for dvt    dispo-home tomorrow morning  Will need to f/u w/ ortho and ortho spine.    Face to Face  Face to face evaluation with Chaz Glaser to determine medical necessity for a semi electric hospital bed. Patient has a history of Closed intertrochanteric fracture of right femur  S/p surgical repair 8/19 and Closed T2 fracture limiting patient's mobility.The patient requires positioning of the body in ways not feasible with an ordinary bed in order to alleviate pain. The patient also requires frequent and immediate changes in body position and therefore a semi-electric bed is medically necessary.    DVT Prophylaxis  Warfarin dosing per pharm. Monitor inr daily.    PCP  MD Nell Hayden MD

## 2022-04-19 VITALS
OXYGEN SATURATION: 98 % | HEIGHT: 75 IN | RESPIRATION RATE: 16 BRPM | BODY MASS INDEX: 19.35 KG/M2 | SYSTOLIC BLOOD PRESSURE: 124 MMHG | WEIGHT: 155.63 LBS | TEMPERATURE: 98 F | HEART RATE: 90 BPM | DIASTOLIC BLOOD PRESSURE: 69 MMHG

## 2022-04-19 LAB
HCV AB SERPL QL IA: NEGATIVE
HIV 1+2 AB+HIV1 P24 AG SERPL QL IA: NEGATIVE

## 2022-04-19 NOTE — ED NOTES
Pt remains in paper scrubs, resting comfortably in stretcher. No signs of distress noted. Pt aware of plan of care. PEC complete and in patient's chart. Pt belongings are removed from room, labeled, and locked away. No unnecessary equipment, cords, or wires left in room. Sitter at bedside recording Q 15 minute checks. Sitter belongings are out of room.

## 2022-04-19 NOTE — ED NOTES
SPD arrived to transport pt. Facesheet, Original PEC, original OPC, and Transfer Form, and pt's belongings bag given to transport personnel. Pt escorted to transport vehicle via wheelchair by ED staff, 2 transport personnel, and [facility] security.

## 2022-04-19 NOTE — ED NOTES
Pt resting quietly on stretch. No acute distress or discomfort reported or observed.  Pt denies restroom needs at this time; is able to reposition self on stretcher. Bed locked in lowest position; side rails up and locked x 2. Pt instructed to alert nurse for assistance and before attempting to get out of bed; verbalizes understanding. Sitter at bedside. Pt denies needs or complaints at this time; will continue to monitor.

## 2022-04-19 NOTE — ED NOTES
Pt remains in paper scrubs, sleeping comfortably in stretcher, rise and fall of chest noted. NAD. Pt aware of plan of care. PEC complete and in patient's chart. Pt belongings are removed from room, labeled, and locked away. No unnecessary equipment, cords, or wires left in room. Sitter at bedside recording Q 15 minute checks. Sitter belongings are out of room.

## 2022-04-19 NOTE — ED NOTES
Assumed care of pt from SIRISHA Shafer. Informed pt has placement at [facility], report has been called, and transfer form is completed and signed. SPD ETA 3:15am.    Pt remains in paper scrubs, sleeping comfortably in stretcher, rise and fall of chest noted. NAD. Pt aware of plan of care. PEC complete and in patient's chart. Pt belongings are removed from room, labeled, and locked away. No unnecessary equipment, cords, or wires left in room. Sitter at bedside recording Q 15 minute checks. Sitter belongings are out of room.

## 2022-05-22 ENCOUNTER — HOSPITAL ENCOUNTER (INPATIENT)
Facility: HOSPITAL | Age: 22
LOS: 9 days | Discharge: HOME | DRG: 885 | End: 2022-05-31
Attending: FAMILY MEDICINE | Admitting: STUDENT IN AN ORGANIZED HEALTH CARE EDUCATION/TRAINING PROGRAM
Payer: MEDICAID

## 2022-05-22 DIAGNOSIS — F29 PSYCHOSIS, UNSPECIFIED PSYCHOSIS TYPE: ICD-10-CM

## 2022-05-22 DIAGNOSIS — F20.9 SCHIZOPHRENIA, UNSPECIFIED TYPE: Primary | ICD-10-CM

## 2022-05-22 LAB
ALBUMIN SERPL BCP-MCNC: 4.1 G/DL (ref 3.5–5.2)
ALP SERPL-CCNC: 69 U/L (ref 55–135)
ALT SERPL W/O P-5'-P-CCNC: 31 U/L (ref 10–44)
AMPHET+METHAMPHET UR QL: NEGATIVE
ANION GAP SERPL CALC-SCNC: 5 MMOL/L (ref 8–16)
APAP SERPL-MCNC: <2 UG/ML (ref 10–20)
AST SERPL-CCNC: 34 U/L (ref 10–40)
BARBITURATES UR QL SCN>200 NG/ML: NEGATIVE
BASOPHILS # BLD AUTO: 0.01 K/UL (ref 0–0.2)
BASOPHILS NFR BLD: 0.2 % (ref 0–1.9)
BENZODIAZ UR QL SCN>200 NG/ML: NEGATIVE
BILIRUB SERPL-MCNC: 0.9 MG/DL (ref 0.1–1)
BILIRUB UR QL STRIP: NEGATIVE
BUN SERPL-MCNC: 16 MG/DL (ref 6–20)
BZE UR QL SCN: NEGATIVE
CALCIUM SERPL-MCNC: 8.6 MG/DL (ref 8.7–10.5)
CANNABINOIDS UR QL SCN: ABNORMAL
CHLORIDE SERPL-SCNC: 110 MMOL/L (ref 95–110)
CLARITY UR: CLEAR
CO2 SERPL-SCNC: 25 MMOL/L (ref 23–29)
COLOR UR: YELLOW
CREAT SERPL-MCNC: 1.4 MG/DL (ref 0.5–1.4)
CREAT UR-MCNC: 336 MG/DL (ref 23–375)
CTP QC/QA: YES
DIFFERENTIAL METHOD: ABNORMAL
EOSINOPHIL # BLD AUTO: 0.1 K/UL (ref 0–0.5)
EOSINOPHIL NFR BLD: 1.9 % (ref 0–8)
ERYTHROCYTE [DISTWIDTH] IN BLOOD BY AUTOMATED COUNT: 12 % (ref 11.5–14.5)
EST. GFR  (AFRICAN AMERICAN): >60 ML/MIN/1.73 M^2
EST. GFR  (NON AFRICAN AMERICAN): >60 ML/MIN/1.73 M^2
ETHANOL SERPL-MCNC: <3 MG/DL
GLUCOSE SERPL-MCNC: 90 MG/DL (ref 70–110)
GLUCOSE UR QL STRIP: NEGATIVE
HCT VFR BLD AUTO: 36.7 % (ref 40–54)
HGB BLD-MCNC: 12.6 G/DL (ref 14–18)
HGB UR QL STRIP: NEGATIVE
IMM GRANULOCYTES # BLD AUTO: 0.01 K/UL (ref 0–0.04)
IMM GRANULOCYTES NFR BLD AUTO: 0.2 % (ref 0–0.5)
KETONES UR QL STRIP: ABNORMAL
LEUKOCYTE ESTERASE UR QL STRIP: NEGATIVE
LYMPHOCYTES # BLD AUTO: 2.4 K/UL (ref 1–4.8)
LYMPHOCYTES NFR BLD: 36.6 % (ref 18–48)
MCH RBC QN AUTO: 31.1 PG (ref 27–31)
MCHC RBC AUTO-ENTMCNC: 34.3 G/DL (ref 32–36)
MCV RBC AUTO: 91 FL (ref 82–98)
METHADONE UR QL SCN>300 NG/ML: NEGATIVE
MONOCYTES # BLD AUTO: 0.7 K/UL (ref 0.3–1)
MONOCYTES NFR BLD: 10.3 % (ref 4–15)
NEUTROPHILS # BLD AUTO: 3.3 K/UL (ref 1.8–7.7)
NEUTROPHILS NFR BLD: 50.8 % (ref 38–73)
NITRITE UR QL STRIP: NEGATIVE
NRBC BLD-RTO: 0 /100 WBC
OPIATES UR QL SCN: NEGATIVE
PCP UR QL SCN>25 NG/ML: NEGATIVE
PH UR STRIP: 6 [PH] (ref 5–8)
PLATELET # BLD AUTO: 250 K/UL (ref 150–450)
PMV BLD AUTO: 9.6 FL (ref 9.2–12.9)
POTASSIUM SERPL-SCNC: 3.8 MMOL/L (ref 3.5–5.1)
PROT SERPL-MCNC: 8.4 G/DL (ref 6–8.4)
PROT UR QL STRIP: NEGATIVE
RBC # BLD AUTO: 4.05 M/UL (ref 4.6–6.2)
SARS-COV-2 RDRP RESP QL NAA+PROBE: NEGATIVE
SODIUM SERPL-SCNC: 140 MMOL/L (ref 136–145)
SP GR UR STRIP: 1.02 (ref 1–1.03)
TOXICOLOGY INFORMATION: ABNORMAL
TSH SERPL DL<=0.005 MIU/L-ACNC: 1.39 UIU/ML (ref 0.4–4)
URN SPEC COLLECT METH UR: ABNORMAL
UROBILINOGEN UR STRIP-ACNC: 1 EU/DL
WBC # BLD AUTO: 6.42 K/UL (ref 3.9–12.7)

## 2022-05-22 PROCEDURE — 36415 COLL VENOUS BLD VENIPUNCTURE: CPT | Performed by: FAMILY MEDICINE

## 2022-05-22 PROCEDURE — 25000003 PHARM REV CODE 250: Performed by: FAMILY MEDICINE

## 2022-05-22 PROCEDURE — 99291 CRITICAL CARE FIRST HOUR: CPT | Mod: 25

## 2022-05-22 PROCEDURE — U0002 COVID-19 LAB TEST NON-CDC: HCPCS | Performed by: FAMILY MEDICINE

## 2022-05-22 PROCEDURE — 80307 DRUG TEST PRSMV CHEM ANLYZR: CPT | Performed by: FAMILY MEDICINE

## 2022-05-22 PROCEDURE — 83036 HEMOGLOBIN GLYCOSYLATED A1C: CPT | Performed by: STUDENT IN AN ORGANIZED HEALTH CARE EDUCATION/TRAINING PROGRAM

## 2022-05-22 PROCEDURE — 11400000 HC PSYCH PRIVATE ROOM

## 2022-05-22 PROCEDURE — 84443 ASSAY THYROID STIM HORMONE: CPT | Performed by: FAMILY MEDICINE

## 2022-05-22 PROCEDURE — 85025 COMPLETE CBC W/AUTO DIFF WBC: CPT | Performed by: FAMILY MEDICINE

## 2022-05-22 PROCEDURE — 80053 COMPREHEN METABOLIC PANEL: CPT | Performed by: FAMILY MEDICINE

## 2022-05-22 PROCEDURE — 82077 ASSAY SPEC XCP UR&BREATH IA: CPT | Performed by: FAMILY MEDICINE

## 2022-05-22 PROCEDURE — 80143 DRUG ASSAY ACETAMINOPHEN: CPT | Performed by: FAMILY MEDICINE

## 2022-05-22 PROCEDURE — 81003 URINALYSIS AUTO W/O SCOPE: CPT | Mod: 59 | Performed by: FAMILY MEDICINE

## 2022-05-22 RX ORDER — LITHIUM CARBONATE 300 MG
300 TABLET ORAL 2 TIMES DAILY
COMMUNITY
Start: 2022-04-26 | End: 2022-05-31

## 2022-05-22 RX ORDER — QUETIAPINE FUMARATE 100 MG/1
100 TABLET, FILM COATED ORAL ONCE
Status: DISCONTINUED | OUTPATIENT
Start: 2022-05-22 | End: 2022-05-22

## 2022-05-22 RX ORDER — TALC
6 POWDER (GRAM) TOPICAL NIGHTLY PRN
Status: DISCONTINUED | OUTPATIENT
Start: 2022-05-23 | End: 2022-05-23

## 2022-05-22 RX ORDER — OLANZAPINE 10 MG/1
10 TABLET, ORALLY DISINTEGRATING ORAL NIGHTLY
Status: DISCONTINUED | OUTPATIENT
Start: 2022-05-22 | End: 2022-05-23

## 2022-05-22 RX ORDER — ACETAMINOPHEN 325 MG/1
650 TABLET ORAL EVERY 8 HOURS PRN
Status: DISCONTINUED | OUTPATIENT
Start: 2022-05-23 | End: 2022-05-23

## 2022-05-22 RX ORDER — ONDANSETRON 4 MG/1
8 TABLET, ORALLY DISINTEGRATING ORAL EVERY 8 HOURS PRN
Status: DISCONTINUED | OUTPATIENT
Start: 2022-05-23 | End: 2022-05-23

## 2022-05-22 RX ADMIN — OLANZAPINE 10 MG: 10 TABLET, ORALLY DISINTEGRATING ORAL at 10:05

## 2022-05-23 PROBLEM — F20.9 SCHIZOPHRENIA: Status: ACTIVE | Noted: 2021-02-07

## 2022-05-23 LAB
ESTIMATED AVG GLUCOSE: 103 MG/DL (ref 68–131)
HBA1C MFR BLD: 5.2 % (ref 4–5.6)

## 2022-05-23 PROCEDURE — 11400000 HC PSYCH PRIVATE ROOM

## 2022-05-23 PROCEDURE — 99223 1ST HOSP IP/OBS HIGH 75: CPT | Mod: AF,HB,, | Performed by: PSYCHIATRY & NEUROLOGY

## 2022-05-23 PROCEDURE — 25000003 PHARM REV CODE 250: Performed by: INTERNAL MEDICINE

## 2022-05-23 PROCEDURE — 25000003 PHARM REV CODE 250: Performed by: STUDENT IN AN ORGANIZED HEALTH CARE EDUCATION/TRAINING PROGRAM

## 2022-05-23 PROCEDURE — 99223 PR INITIAL HOSPITAL CARE,LEVL III: ICD-10-PCS | Mod: AF,HB,, | Performed by: PSYCHIATRY & NEUROLOGY

## 2022-05-23 PROCEDURE — 25000003 PHARM REV CODE 250: Performed by: PSYCHIATRY & NEUROLOGY

## 2022-05-23 PROCEDURE — 90833 PSYTX W PT W E/M 30 MIN: CPT | Mod: AF,HB,, | Performed by: PSYCHIATRY & NEUROLOGY

## 2022-05-23 PROCEDURE — 90833 PR PSYCHOTHERAPY W/PATIENT W/E&M, 30 MIN (ADD ON): ICD-10-PCS | Mod: AF,HB,, | Performed by: PSYCHIATRY & NEUROLOGY

## 2022-05-23 PROCEDURE — 36415 COLL VENOUS BLD VENIPUNCTURE: CPT | Performed by: STUDENT IN AN ORGANIZED HEALTH CARE EDUCATION/TRAINING PROGRAM

## 2022-05-23 RX ORDER — DOCUSATE SODIUM 100 MG/1
100 CAPSULE, LIQUID FILLED ORAL DAILY PRN
Status: DISCONTINUED | OUTPATIENT
Start: 2022-05-23 | End: 2022-05-31 | Stop reason: HOSPADM

## 2022-05-23 RX ORDER — HYDROXYZINE PAMOATE 50 MG/1
50 CAPSULE ORAL NIGHTLY PRN
Status: DISCONTINUED | OUTPATIENT
Start: 2022-05-23 | End: 2022-05-31 | Stop reason: HOSPADM

## 2022-05-23 RX ORDER — FOLIC ACID 1 MG/1
1 TABLET ORAL DAILY
Status: DISCONTINUED | OUTPATIENT
Start: 2022-05-23 | End: 2022-05-31 | Stop reason: HOSPADM

## 2022-05-23 RX ORDER — LOPERAMIDE HYDROCHLORIDE 2 MG/1
2 CAPSULE ORAL 4 TIMES DAILY PRN
Status: DISCONTINUED | OUTPATIENT
Start: 2022-05-23 | End: 2022-05-31 | Stop reason: HOSPADM

## 2022-05-23 RX ORDER — IBUPROFEN 200 MG
1 TABLET ORAL DAILY PRN
Status: DISCONTINUED | OUTPATIENT
Start: 2022-05-23 | End: 2022-05-31 | Stop reason: HOSPADM

## 2022-05-23 RX ORDER — ADHESIVE BANDAGE
30 BANDAGE TOPICAL DAILY PRN
Status: DISCONTINUED | OUTPATIENT
Start: 2022-05-23 | End: 2022-05-23

## 2022-05-23 RX ORDER — ARIPIPRAZOLE 5 MG/1
5 TABLET ORAL DAILY
Status: DISCONTINUED | OUTPATIENT
Start: 2022-05-23 | End: 2022-05-24

## 2022-05-23 RX ORDER — OLANZAPINE 10 MG/2ML
10 INJECTION, POWDER, FOR SOLUTION INTRAMUSCULAR EVERY 8 HOURS PRN
Status: DISCONTINUED | OUTPATIENT
Start: 2022-05-23 | End: 2022-05-25

## 2022-05-23 RX ORDER — MAG HYDROX/ALUMINUM HYD/SIMETH 200-200-20
30 SUSPENSION, ORAL (FINAL DOSE FORM) ORAL EVERY 6 HOURS PRN
Status: DISCONTINUED | OUTPATIENT
Start: 2022-05-23 | End: 2022-05-31 | Stop reason: HOSPADM

## 2022-05-23 RX ORDER — DOCUSATE SODIUM 100 MG/1
100 CAPSULE, LIQUID FILLED ORAL DAILY
Status: DISCONTINUED | OUTPATIENT
Start: 2022-05-23 | End: 2022-05-23

## 2022-05-23 RX ORDER — ACETAMINOPHEN 325 MG/1
650 TABLET ORAL EVERY 6 HOURS PRN
Status: DISCONTINUED | OUTPATIENT
Start: 2022-05-23 | End: 2022-05-31 | Stop reason: HOSPADM

## 2022-05-23 RX ORDER — OLANZAPINE 10 MG/1
10 TABLET ORAL EVERY 8 HOURS PRN
Status: DISCONTINUED | OUTPATIENT
Start: 2022-05-23 | End: 2022-05-25

## 2022-05-23 RX ADMIN — OLANZAPINE 10 MG: 10 TABLET, FILM COATED ORAL at 08:05

## 2022-05-23 RX ADMIN — ARIPIPRAZOLE 5 MG: 5 TABLET ORAL at 10:05

## 2022-05-23 RX ADMIN — FOLIC ACID 1 MG: 1 TABLET ORAL at 09:05

## 2022-05-23 RX ADMIN — THERA TABS 1 TABLET: TAB at 09:05

## 2022-05-23 NOTE — PLAN OF CARE
POC reviewed this shift and is on going. Patient is anxious, pacing, manic, and paranoid. Endorses Auditory Hallucinations and Visual Hallucinations. Denies Suicidal Ideation, Homicidal Ideation, Tactile Hallucinations, Gustatory Hallucinations, and Delusions. Patient is reacting to internal stimuli witness by staff. Patient was yelling and jumping on the chairs in the activity room. Patient was talking out of his mind during treatment. Patient was running in the halls. Patient was touching other peers this morning and had to be re-directed several times. Patient did take his medication when asked. Dr Cagle ordered abilify 5mg daily. The medication has seemed to calm him down a bit. Pt continues to be medication compliant and staff will continue to monitor pt closely while on the unit.

## 2022-05-23 NOTE — HPI
Chief Complaint   Patient presents with    Mental Health Problem       Per ems pt family called 911 because pt is having another psyche episode, family told ems he is hearing voices, suicidal and homicidal and smoked mojo today         Mental Health Problem  The primary symptoms include bizarre behavior, delusions, disorganized thinking, dysphoric mood, suicidal ideas and suicidal threats. The primary symptoms do not include aggression, agitation, depressed mood, disorganized speech, homicidal ideas, negative symptoms, paranoia, self-injury, somatic symptoms or suicide attempt. The current episode started just prior to arrival. This is a new problem.   The degree of incapacity that he is experiencing as a consequence of his illness is severe. Additional symptoms of the illness include euphoric mood and inflated self-esteem. Additional symptoms of the illness do not include anhedonia, insomnia, hypersomnia, appetite change, unexpected weight change, fatigue, agitation, psychomotor retardation, feelings of worthlessness, attention impairment, increased goal-directed activity, flight of ideas, decreased need for sleep, distractible, poor judgment, visual change, headaches, abdominal pain or seizures. He admits to suicidal ideas. He does not have a plan to attempt suicide. He contemplates harming himself. He has not already injured self. He does not contemplate injuring another person. He has not already  injured another person. Risk factors that are present for mental illness include a history of mental illness.

## 2022-05-23 NOTE — H&P
PSYCHIATRY INPATIENT ADMISSION NOTE - H & P      5/23/2022 8:33 AM   Bishop Soria   2000   [id number]         DATE OF ADMISSION: 5/22/2022  9:53 PM    SITE: [facility]    CURRENT LEGAL STATUS: PEC and/or CEC      HISTORY    CHIEF COMPLAINT   Bishop Soria is a 21 y.o. male with a past psychiatric history of psychosis and substance use currently admitted to the inpatient unit with the following chief complaint: psychosis- pt did not answer    HPI   The patient was seen and examined. The chart was reviewed.    The patient presented to the ER on 5/22/2022 with complaints of psychosis. Per staff notes:  -Per ems pt family called 911 because pt is having another psyche episode, family told ems he is hearing voices, suicidal and homicidal and smoked mojo today  -The primary symptoms include bizarre behavior, delusions, disorganized thinking, dysphoric mood, suicidal ideas and suicidal threats. The primary symptoms do not include aggression, agitation, depressed mood, disorganized speech, homicidal ideas, negative symptoms, paranoia, self-injury, somatic symptoms or suicide attempt. The current episode started just prior to arrival. This is a new problem.   The degree of incapacity that he is experiencing as a consequence of his illness is severe. Additional symptoms of the illness include euphoric mood and inflated self-esteem. Additional symptoms of the illness do not include anhedonia, insomnia, hypersomnia, appetite change, unexpected weight change, fatigue, agitation, psychomotor retardation, feelings of worthlessness, attention impairment, increased goal-directed activity, flight of ideas, decreased need for sleep, distractible, poor judgment, visual change, headaches, abdominal pain or seizures. He admits to suicidal ideas. He does not have a plan to attempt suicide. He contemplates harming himself. He has not already injured self. He does not contemplate injuring another person. He has not already   injured another person. Risk factors that are present for mental illness include a history of mental illness.   -21-year old male with history of Schizophrenia and psychiatric hospitalizations arrived by EMS after family called 911.  As per family patient was having auditory hallucinations, suicidal and homicidal ideations, and also smoked mojo.  Patient very bizarre and expressing to nursing staff that he is God.  Patient given Zyorexa 10mg, PO in ED.  Patient PEC'D.  Upon arrival, patient had no orientation to person, place, or situation. Denied pain. Patient extremely euphoric and just laughing at everything. Unable and reluctant to answer most questions.    Pt last hospitalized in 5/2021 for psychosis/mood disturbance. He was stabilized on Aristada and lexapro during a 6 day stay.    The patient was medically cleared and admitted to the U.    The patient presents acutely psychosis with some symptoms of carley which occurred in the context of substance use (synthetic cannabinoids). There is some suspicion and evidence for psychotic symptoms occurring independent of substance use.    He was very disorganized and only limitedly able to participate with the interview.       Symptoms of Depression: diminished mood - No, loss of interest/anhedonia - No;  recurrent - No, >14 days - No, diminished energy - No, change in sleep - No, change in appetite - No, diminished concentration or cognition or indecisiveness - No, PMA/R -  No, excessive guilt or hopelessness or worthlessness - No, suicidal ideations - No    Changes in Sleep: trouble with initiation- Yes, maintenance, - Yes early morning awakening with inability to return to sleep - No, hypersomnolence - No    Suicidal- active/passive ideations - No, organized plans, future intentions - No    Homicidal ideations: active/passive ideations - No, organized plans, future intentions - No    Symptoms of psychosis: hallucinations - Yes, delusions - Yes, disorganized speech  "- Yes, disorganized behavior or abnormal motor behavior - Yes, or negative symptoms (diminshed emotional expression, avolition, anhedonia, alogia, asociality) - No, active phase symptoms >1 month - unknown, continuous signs of illness > 6 months - uknown, since onset of illness decreased level of functioning present - Yes    Symptoms of carley or hypomania: elevated, expansive, or irritable mood with increased energy or activity - Yes; > 4 days - unknown,  >7 days - unknown; with inflated self-esteem or grandiosity - No, decreased need for sleep - Yes, increased rate of speech - Yes, FOI or racing thoughts - Yes, distractibility - Yes, increased goal directed activity or PMA - Yes, risky/disinhibited behavior - Yes    Symptoms of KANU: excessive anxiety/worry/fear, more days than not, about numerous issues - Yes, ongoing for >6 months - unknown, difficult to control - Yes, with restlessness - Yes, fatigue - No, poor concentration - Yes, irritability - Yes, muscle tension - Yes, sleep disturbance - Yes; causes functionally impairing distress - Yes    Symptoms of Panic Disorder: recurrent panic attacks (palpitations/heart racing, sweating, shakiness, dyspnea, choking, chest pain/discomfort, Gi symptoms, dizzy/lightheadedness, hot/col flashes, paresthesias, derealization, fear of losing control or fear of dying or fear of "going crazy") - No, precipitated - No, un-precipitated - No, source of worry and/or behavioral changes secondary for 1 month or longer- No, agoraphobia - No    Symptoms of PTSD: h/o trauma exposure - No; re-experiencing/intrusive symptoms - No, avoidant behavior - No, 2 or more negative alterations in cognition or mood - No, 2 or more hyperarousal symptoms - No; with dissociative symptoms - No, ongoing for 1 or more  months - No    Symptoms of OCD: obsessions (recurrent thoughts/urges/images; intrusive and/or unwanted; uses other thoughts/actions to suppress) - No; compulsions (repetitive behaviors " used to lower distress/anxiety/obsessions) - No, time-consuming (over 1 hour per day) or cause significant distress/impairment - - No    Symptoms of Anorexia: restriction of caloric intake leading to significantly low body weight - No, intense fear of gaining weight or persistent behavior that interferes with weight gain even thought at a significantly low weight - No, disturbance in the way in which one's body weight or shape is experienced, undue influence of body weight or shape on self evaluation, or persistent lack of recognition of the seriousness of the current low body weight - No    Symptoms of Bulimia: recurrent episodes of binge eating (definitely larger amount  than what others would eat and lack of a sense of control over eating during episode) - No, recurrent inappropriate compensatory behaviors in order to prevent weight gain (fasting, medications, exercise, vomiting) - No, binges and compensatory behaviors both occur on average at least once a week for 3 months - No, self evaluations is unduly influenced by body shape/weight- - No    Symptoms of Binge eating: recurrent episodes of binge eating (definitely larger amount than what others would eat and lack of a sense of control over eating during episode) - No, 3 or more of following (eating much more rapidly, eating until uncomfortably full, large amounts when not hungry, eating alone because of embarrassed by how much,  feeling disgusted with oneself, depressed or very guilty afterward) - No, distress regarding binges - No, binges occur on average at least once a week for 3 months - No      Substance/s:  Taken in larger amounts or over longer periods than intended: No,  Persistent desire or unsuccessful attempts to cut down or stop: No,  Great deal of time spent seeking, using or recovering from: Yes,  Craving or strong desire to use: No,  Recurrent use despite failure to meet major role obligation: Yes,  Continued use despite persistent or recurrent  social/interparsonal issues due to use: Yes,  Important social/work/recreational activities given up due to use: Yes,  Recurrent use in physically hazardous situations: No,  Continued use despite knowledge of persistent physical or psychological problem: Yes,  Tolerance (either increased need or diminished effect): No,      Psychotherapy:  · Target symptoms: mood disorder, psychosis  · Why chosen therapy is appropriate versus another modality: relevant to diagnosis, patient responds to this modality, evidence based practice  · Outcome monitoring methods: self-report, observation  · Therapeutic intervention type: insight oriented psychotherapy, behavior modifying psychotherapy, supportive psychotherapy, interactive psychotherapy  · Topics discussed/themes: building skills sets for symptom management, symptom recognition, substance abuse  · The patient's response to the intervention is reluctant. The patient's progress toward treatment goals is limited.   · Duration of intervention: 16 minutes.      PAST PSYCHIATRIC HISTORY  Previous Psychiatric Hospitalizations: Yes, several, twice in 2021  Previous SI/HI: Yes,  Previous Suicide Attempts: No,   Previous Medication Trials: Yes,  Psychiatric Care (current & past): No, non-compliant  History of Psychotherapy: No,  History of Violence: possibly,  History of sexual/physical abuse: No,    PAST MEDICAL & SURGICAL HISTORY   Past Medical History:   Diagnosis Date    History of psychiatric hospitalization     Hx of psychiatric care     Psychiatric problem     Schizophrenia      No past surgical history on file.      CURRENT PSYCH MEDICATION REGIMEN   zyprexa ?  Current Medication side effects:  uncertain  Current Medication compliance:  uncertain    Previous psych meds trials  Yes, many, pt unable to name    Home Meds:   Prior to Admission medications    Medication Sig Start Date End Date Taking? Authorizing Provider   lithium (LITHOTAB) 300 mg tablet Take 300 mg by mouth  "2 (two) times daily. 4/26/22  Yes Historical Provider   ARISTADA 882 mg/3.2 mL injection INJECT 3.2 MLS (882 MG TOTAL) INTO THE MUSCLE EVERY 28 DAYS. 5/28/21   Jl Aguilar NP   EScitalopram oxalate (LEXAPRO) 10 MG tablet TAKE 1 TABLET BY MOUTH EVERY DAY 7/27/21   Jl Aguilar NP         OTC Meds: none    Scheduled Meds:    folic acid  1 mg Oral Daily    multivitamin  1 tablet Oral Daily      PRN Meds: acetaminophen, aluminum-magnesium hydroxide-simethicone, docusate sodium, hydrOXYzine pamoate, loperamide, nicotine, OLANZapine **AND** OLANZapine   Psychotherapeutics (From admission, onward)            Start     Stop Route Frequency Ordered    05/23/22 0217  OLANZapine injection 10 mg  (Olanzapine)        Question:  Is the patient competent?  Answer:  Yes   "And" Linked Group Details    -- IM Every 8 hours PRN 05/23/22 0118    05/23/22 0216  OLANZapine tablet 10 mg  (Olanzapine)        Question:  Is the patient competent?  Answer:  Yes   "And" Linked Group Details    -- Oral Every 8 hours PRN 05/23/22 0118          ALLERGIES   Review of patient's allergies indicates:  No Known Allergies    NEUROLOGIC HISTORY  Seizures: No  Head trauma: No    SOCIAL HISTORY:  Developmental/Childhood:Achieved all developmental milestones timely  Education:High School Diploma  Employment Status/Finances:unknown   Relationship Status/Sexual Orientation: unknown  Children: unknown  Housing Status: unknown    history:  NO  Access to Firearms: NO;  Locked up? n/a  Taoism:unknown  Recreational activities:unknown    SUBSTANCE ABUSE HISTORY   Recreational Drugs: marijuana   Use of Alcohol: denied  Rehab History:unknown   Tobacco Use:yes    LEGAL HISTORY:   Past charges/incarcerations: unknown   Pending charges:unknown     FAMILY PSYCHIATRIC HISTORY   No family history on file.    unknown       ROS  General ROS: negative  Ophthalmic ROS: negative  ENT ROS: negative  Allergy and Immunology ROS: negative  Hematological and " Lymphatic ROS: negative  Endocrine ROS: negative  Respiratory ROS: no cough, shortness of breath, or wheezing  Cardiovascular ROS: no chest pain or dyspnea on exertion  Gastrointestinal ROS: no abdominal pain, change in bowel habits, or black or bloody stools  Genito-Urinary ROS: no dysuria, trouble voiding, or hematuria  Musculoskeletal ROS: negative  Neurological ROS: no TIA or stroke symptoms  Dermatological ROS: negative      EXAMINATION    PHYSICAL EXAM  Reviewed note/exam by Dr. Goldman from 5/22/22 at 10:09 PM    VITALS   Vitals:    05/23/22 0807   BP: 124/77   Pulse: 82   Resp: 20   Temp: 97.4 °F (36.3 °C)        Body mass index is 20.62 kg/m².        PAIN  0/10  Subjective report of pain matches objective signs and symptoms: Yes    LABORATORY DATA   Recent Results (from the past 72 hour(s))   CBC auto differential    Collection Time: 05/22/22 10:02 PM   Result Value Ref Range    WBC 6.42 3.90 - 12.70 K/uL    RBC 4.05 (L) 4.60 - 6.20 M/uL    Hemoglobin 12.6 (L) 14.0 - 18.0 g/dL    Hematocrit 36.7 (L) 40.0 - 54.0 %    MCV 91 82 - 98 fL    MCH 31.1 (H) 27.0 - 31.0 pg    MCHC 34.3 32.0 - 36.0 g/dL    RDW 12.0 11.5 - 14.5 %    Platelets 250 150 - 450 K/uL    MPV 9.6 9.2 - 12.9 fL    Immature Granulocytes 0.2 0.0 - 0.5 %    Gran # (ANC) 3.3 1.8 - 7.7 K/uL    Immature Grans (Abs) 0.01 0.00 - 0.04 K/uL    Lymph # 2.4 1.0 - 4.8 K/uL    Mono # 0.7 0.3 - 1.0 K/uL    Eos # 0.1 0.0 - 0.5 K/uL    Baso # 0.01 0.00 - 0.20 K/uL    nRBC 0 0 /100 WBC    Gran % 50.8 38.0 - 73.0 %    Lymph % 36.6 18.0 - 48.0 %    Mono % 10.3 4.0 - 15.0 %    Eosinophil % 1.9 0.0 - 8.0 %    Basophil % 0.2 0.0 - 1.9 %    Differential Method Automated    Comprehensive metabolic panel    Collection Time: 05/22/22 10:02 PM   Result Value Ref Range    Sodium 140 136 - 145 mmol/L    Potassium 3.8 3.5 - 5.1 mmol/L    Chloride 110 95 - 110 mmol/L    CO2 25 23 - 29 mmol/L    Glucose 90 70 - 110 mg/dL    BUN 16 6 - 20 mg/dL    Creatinine 1.4 0.5 - 1.4  mg/dL    Calcium 8.6 (L) 8.7 - 10.5 mg/dL    Total Protein 8.4 6.0 - 8.4 g/dL    Albumin 4.1 3.5 - 5.2 g/dL    Total Bilirubin 0.9 0.1 - 1.0 mg/dL    Alkaline Phosphatase 69 55 - 135 U/L    AST 34 10 - 40 U/L    ALT 31 10 - 44 U/L    Anion Gap 5 (L) 8 - 16 mmol/L    eGFR if African American >60.0 >60 mL/min/1.73 m^2    eGFR if non African American >60.0 >60 mL/min/1.73 m^2   TSH    Collection Time: 05/22/22 10:02 PM   Result Value Ref Range    TSH 1.390 0.400 - 4.000 uIU/mL   Ethanol    Collection Time: 05/22/22 10:02 PM   Result Value Ref Range    Alcohol, Serum <3 <10 mg/dL   Acetaminophen level    Collection Time: 05/22/22 10:02 PM   Result Value Ref Range    Acetaminophen (Tylenol), Serum <2.0 (L) 10.0 - 20.0 ug/mL   Hemoglobin A1c    Collection Time: 05/22/22 10:02 PM   Result Value Ref Range    Hemoglobin A1C 5.2 4.0 - 5.6 %    Estimated Avg Glucose 103 68 - 131 mg/dL   Urinalysis, Reflex to Urine Culture Urine, Clean Catch    Collection Time: 05/22/22 10:11 PM    Specimen: Urine, Clean Catch   Result Value Ref Range    Specimen UA Urine, Clean Catch     Color, UA Yellow Yellow, Straw, Ingrid    Appearance, UA Clear Clear    pH, UA 6.0 5.0 - 8.0    Specific Gravity, UA 1.020 1.005 - 1.030    Protein, UA Negative Negative    Glucose, UA Negative Negative    Ketones, UA Trace (A) Negative    Bilirubin (UA) Negative Negative    Occult Blood UA Negative Negative    Nitrite, UA Negative Negative    Urobilinogen, UA 1.0 <2.0 EU/dL    Leukocytes, UA Negative Negative   Drug screen panel, emergency    Collection Time: 05/22/22 10:11 PM   Result Value Ref Range    Benzodiazepines Negative Negative    Methadone metabolites Negative Negative    Cocaine (Metab.) Negative Negative    Opiate Scrn, Ur Negative Negative    Barbiturate Screen, Ur Negative Negative    Amphetamine Screen, Ur Negative Negative    THC Presumptive Positive (A) Negative    Phencyclidine Negative Negative    Creatinine, Urine 336.0 23.0 - 375.0 mg/dL     Toxicology Information SEE COMMENT    POCT COVID-19 Rapid Screening    Collection Time: 05/22/22 11:04 PM   Result Value Ref Range    POC Rapid COVID Negative Negative     Acceptable Yes       No results found for: PHENYTOIN, PHENOBARB, VALPROATE, CBMZ        CONSTITUTIONAL  General Appearance: unremarkable, age appropriate, well nourished, disheveled    MUSCULOSKELETAL  Muscle Strength and Tone:no dyskinesia, no tremor, no tic  Abnormal Involuntary Movements: No  Gait and Station: non-ataxic    PSYCHIATRIC   Level of Consciousness: awake and alert   Orientation: unable to assess  Grooming: Disheveled and Hospital garb  Psychomotor Behavior: psychomotor agitation, restless and fidgety , eye contact normal  Speech: variable r/t/v/s  Language: grossly intact, able to name, able to repeat  Mood: labile  Affect: Labile  Thought Process: derailed  Associations: loose   Thought Content: +delusions, denies SI and denies HI  Perceptions: +AH and denies  VH  Memory: Remote impaired and Recent impaired  Attention:Decreased and Easily distracted  Fund of Knowledge: unable to assess  Estimate if Intelligence:  Unable to determine based on work/education history, vocabulary and mental status exam  Insight: poor awareness of illness  Judgment: impaired due to psychosis      PSYCHOSOCIAL    PSYCHOSOCIAL STRESSORS   unknown    FUNCTIONING RELATIONSHIPS   good support system    STRENGTHS AND LIABILITIES   Strength: Patient is physically healthy., Strength: Patient has positive support network., Liability: Patient is unstable., Liability: Patient lacks coping skills.    Is the patient aware of the biomedical complications associated with substance abuse and mental illness? yes    Does the patient have an Advance Directive for Mental Health treatment? no  (If yes, inform patient to bring copy.)        MEDICAL DECISION MAKING        ASSESSMENT       Schizophrenia, chronic with acute exacerbation  Mood Disorder  NOS  R/o SAD, bipolar type; s/o SIMD, SIPD  Unspecified Anxiety Disorder    Psychosocial stressors    Cannabis abuse        PROBLEM LIST AND MANAGEMENT PLANS    Psychosis: pt counseled  -start re-trial of Abilify at 5 mg po q day    Mood: pt counseled  -abilify as above  -consider trial of depakote    Anxiety: pt counseled  -vistaril prn    Psychosocial stressors: pt counseled  -SW consulted to assist with resources    Cannabis abuse: pt counseled      PRESCRIPTION DRUG MANAGEMENT  Compliance: yes  Side Effects: no  Regimen Adjustments: see above    Discussed diagnosis, risks and benefits of proposed treatment vs alternative treatments vs no treatment, potential side effects of these treatments and the inherent unpredictability of treatment. The patient expresses understanding of the above and displays the capacity to agree with this treatment given said understanding. Patient also agrees that, currently, the benefits outweigh the risks and would like to pursue/continue treatment at this time.      DIAGNOSTIC TESTING  Labs reviewed with patient; follow up pending labs    Disposition:  -Will attempt to obtain outside psychiatric records if available  -SW to assist with aftercare planning and collateral  -Once stable discharge home with outpatient follow up care and/or rehab  -Continue inpatient treatment under a PEC and/or CEC for danger to self/ danger to others/grave disability as evident by significant psychotic thought disorder, aggressive neuroleptic titration, hallucinations, danger to self, danger to others, aggressive behavior and gravely disabled        Frantz Cagle MD  Psychiatry

## 2022-05-23 NOTE — PLAN OF CARE
"  Problem: Adult Behavioral Health Plan of Care  Goal: Optimized Coping Skills in Response to Life Stressors  Intervention: Promote Effective Coping Strategies  Flowsheets (Taken 5/23/2022 1134)  Supportive Measures:   active listening utilized   verbalization of feelings encouraged   self-reflection promoted    Behavior: Pt was RIS and stating he was hearing voices.   Pt was speaking tangentially and at one point was following another pt around the room trying to touch them and had to be escorted out of the room by charge nurse.             Intervention: In this CBT-focused group LMSW facilitated group discussion on coping skills. Each patient was asked to identify and discuss healthy coping skills to help them meet their treatment goals.            Response: Pt appeared to be in active psychosis. When asked to give his name at the beginning of group he stated "raquel Ferro" and then stated he was talking to his boyfriend.  When as ked to share about skills that he uses he stated "why did I jump in front of that car? I was told to."  He was noted clapping and belching multiple times times.  At one point he started singing [employer] music.           Plan: Continue to encourage pt to participate in process groups to verbalize feelings and develop healthy coping skills.                   "

## 2022-05-23 NOTE — H&P
[facility] Hospital Medicine  History & Physical    Patient Name: Bishop Soria  MRN: [id number]  Patient Class: IP- Psych  Admission Date: 5/22/2022  Attending Physician: Cristhian Grijalva III, MD   Primary Care Provider: Jose Martin Selby MD         Patient information was obtained from ER records.     Subjective:     Principal Problem:Schizophrenia    Chief Complaint:   Chief Complaint   Patient presents with    Mental Health Problem     Per ems pt family called 911 because pt is having another psyche episode, family told ems he is hearing voices, suicidal and homicidal and smoked mojo today        HPI:   Chief Complaint   Patient presents with    Mental Health Problem       Per ems pt family called 911 because pt is having another psyche episode, family told ems he is hearing voices, suicidal and homicidal and smoked mojo today         Mental Health Problem  The primary symptoms include bizarre behavior, delusions, disorganized thinking, dysphoric mood, suicidal ideas and suicidal threats. The primary symptoms do not include aggression, agitation, depressed mood, disorganized speech, homicidal ideas, negative symptoms, paranoia, self-injury, somatic symptoms or suicide attempt. The current episode started just prior to arrival. This is a new problem.   The degree of incapacity that he is experiencing as a consequence of his illness is severe. Additional symptoms of the illness include euphoric mood and inflated self-esteem. Additional symptoms of the illness do not include anhedonia, insomnia, hypersomnia, appetite change, unexpected weight change, fatigue, agitation, psychomotor retardation, feelings of worthlessness, attention impairment, increased goal-directed activity, flight of ideas, decreased need for sleep, distractible, poor judgment, visual change, headaches, abdominal pain or seizures. He admits to suicidal ideas. He does not have a plan to attempt suicide. He contemplates  harming himself. He has not already injured self. He does not contemplate injuring another person. He has not already  injured another person. Risk factors that are present for mental illness include a history of mental illness.       Past Medical History:   Diagnosis Date    History of psychiatric hospitalization     Hx of psychiatric care     Psychiatric problem     Schizophrenia        No past surgical history on file.    Review of patient's allergies indicates:  No Known Allergies    No current facility-administered medications on file prior to encounter.     Current Outpatient Medications on File Prior to Encounter   Medication Sig    ARISTADA 882 mg/3.2 mL injection INJECT 3.2 MLS (882 MG TOTAL) INTO THE MUSCLE EVERY 28 DAYS.    lithium (LITHOTAB) 300 mg tablet Take 300 mg by mouth 2 (two) times daily.    [DISCONTINUED] EScitalopram oxalate (LEXAPRO) 10 MG tablet TAKE 1 TABLET BY MOUTH EVERY DAY     Family History    None       Tobacco Use    Smoking status: Never Smoker    Smokeless tobacco: Not on file   Substance and Sexual Activity    Alcohol use: Not Currently    Drug use: Yes     Types: Marijuana     Comment: or synthetic    Sexual activity: Yes     Review of Systems   Constitutional:  Negative for fatigue and fever.   HENT:  Negative for congestion, ear pain and sore throat.    Eyes:  Negative for pain and discharge.   Respiratory:  Negative for cough, shortness of breath and wheezing.    Gastrointestinal:  Negative for abdominal pain, constipation, diarrhea, nausea and vomiting.   Endocrine: Negative for cold intolerance and heat intolerance.   Genitourinary:  Negative for difficulty urinating, dysuria and frequency.   Musculoskeletal:  Negative for arthralgias.   Allergic/Immunologic: Negative for environmental allergies.   Neurological:  Negative for dizziness, tremors and seizures.   Psychiatric/Behavioral:  Positive for behavioral problems and dysphoric mood.    All other systems  reviewed and are negative.  Objective:     Vital Signs (Most Recent):  Temp: 97.4 °F (36.3 °C) (05/23/22 0807)  Pulse: 82 (05/23/22 0807)  Resp: 20 (05/23/22 0807)  BP: 124/77 (05/23/22 0807)  SpO2: 100 % (05/23/22 0807) Vital Signs (24h Range):  Temp:  [97.4 °F (36.3 °C)-98.5 °F (36.9 °C)] 97.4 °F (36.3 °C)  Pulse:  [61-82] 82  Resp:  [16-20] 20  SpO2:  [100 %] 100 %  BP: (116-130)/(67-82) 124/77     Weight: 74.8 kg (165 lb)  Body mass index is 20.62 kg/m².    Physical Exam  Vitals and nursing note reviewed.   Constitutional:       Appearance: Normal appearance.   HENT:      Head: Normocephalic and atraumatic.      Nose: Nose normal.      Mouth/Throat:      Mouth: Mucous membranes are moist.      Pharynx: Oropharynx is clear.   Eyes:      Extraocular Movements: Extraocular movements intact.      Conjunctiva/sclera: Conjunctivae normal.      Pupils: Pupils are equal, round, and reactive to light.   Cardiovascular:      Rate and Rhythm: Normal rate and regular rhythm.      Pulses: Normal pulses.      Heart sounds: Normal heart sounds.   Pulmonary:      Effort: Pulmonary effort is normal.      Breath sounds: Normal breath sounds.   Abdominal:      General: Abdomen is flat. Bowel sounds are normal.      Palpations: Abdomen is soft.   Musculoskeletal:         General: Normal range of motion.      Cervical back: Normal range of motion and neck supple.   Skin:     General: Skin is warm and dry.      Capillary Refill: Capillary refill takes less than 2 seconds.      Comments: No rashes on limited skin exam.   Neurological:      General: No focal deficit present.      Mental Status: He is alert and oriented to person, place, and time.      Cranial Nerves: No cranial nerve deficit.      Comments: I Olfactory:  Sense of smell intact    II Optic:  Pupils equal round react to light.  Vision intact.    III, IV, VI, Ocular motor, Trochlear, Abducens:  Extraocular movements intact    V Trigeminal:  Facial sensation intact facial  sensation intact,, muscles of mastication intact muscles of mastication intact, corneal reflex intact, corneal reflex intact    VII Facial:  Muscles of facial expression intact     VIII Vestibular cochlear: Hearing intact vestibular cochlear: Hearing intact    IX Glossopharyngeal:  Gag reflex intact.  Tasting intact.     X Vagus:  Gag reflex intact.    XI Spinal Accessory:  Shoulder shrug intact.  Head rotation intact.    XII Hypoglossal:  Tongue movements intact.     Psychiatric:      Comments: Patient appears depressed.  SI and delusional.          CRANIAL NERVES     CN III, IV, VI   Pupils are equal, round, and reactive to light.     Significant Labs: All pertinent labs within the past 24 hours have been reviewed.    Significant Imaging: I have reviewed all pertinent imaging results/findings within the past 24 hours.    Assessment/Plan:     * Schizophrenia  To be admitted to our inpatient psychiatric unit for further evaluation and management.        Marijuana abuse  Strongly recommend cessation.         VTE Risk Mitigation (From admission, onward)         Ordered     IP VTE LOW RISK PATIENT  Once         05/22/22 4083                   Regis King Jr, MD  Department of Hospital Medicine   [facility]

## 2022-05-23 NOTE — SUBJECTIVE & OBJECTIVE
Past Medical History:   Diagnosis Date    History of psychiatric hospitalization     Hx of psychiatric care     Psychiatric problem     Schizophrenia        No past surgical history on file.    Review of patient's allergies indicates:  No Known Allergies    No current facility-administered medications on file prior to encounter.     Current Outpatient Medications on File Prior to Encounter   Medication Sig    ARISTADA 882 mg/3.2 mL injection INJECT 3.2 MLS (882 MG TOTAL) INTO THE MUSCLE EVERY 28 DAYS.    lithium (LITHOTAB) 300 mg tablet Take 300 mg by mouth 2 (two) times daily.    [DISCONTINUED] EScitalopram oxalate (LEXAPRO) 10 MG tablet TAKE 1 TABLET BY MOUTH EVERY DAY     Family History    None       Tobacco Use    Smoking status: Never Smoker    Smokeless tobacco: Not on file   Substance and Sexual Activity    Alcohol use: Not Currently    Drug use: Yes     Types: Marijuana     Comment: or synthetic    Sexual activity: Yes     Review of Systems   Constitutional:  Negative for fatigue and fever.   HENT:  Negative for congestion, ear pain and sore throat.    Eyes:  Negative for pain and discharge.   Respiratory:  Negative for cough, shortness of breath and wheezing.    Gastrointestinal:  Negative for abdominal pain, constipation, diarrhea, nausea and vomiting.   Endocrine: Negative for cold intolerance and heat intolerance.   Genitourinary:  Negative for difficulty urinating, dysuria and frequency.   Musculoskeletal:  Negative for arthralgias.   Allergic/Immunologic: Negative for environmental allergies.   Neurological:  Negative for dizziness, tremors and seizures.   Psychiatric/Behavioral:  Positive for behavioral problems and dysphoric mood.    All other systems reviewed and are negative.  Objective:     Vital Signs (Most Recent):  Temp: 97.4 °F (36.3 °C) (05/23/22 0807)  Pulse: 82 (05/23/22 0807)  Resp: 20 (05/23/22 0807)  BP: 124/77 (05/23/22 0807)  SpO2: 100 % (05/23/22 0807) Vital Signs (24h Range):  Temp:   [97.4 °F (36.3 °C)-98.5 °F (36.9 °C)] 97.4 °F (36.3 °C)  Pulse:  [61-82] 82  Resp:  [16-20] 20  SpO2:  [100 %] 100 %  BP: (116-130)/(67-82) 124/77     Weight: 74.8 kg (165 lb)  Body mass index is 20.62 kg/m².    Physical Exam  Vitals and nursing note reviewed.   Constitutional:       Appearance: Normal appearance.   HENT:      Head: Normocephalic and atraumatic.      Nose: Nose normal.      Mouth/Throat:      Mouth: Mucous membranes are moist.      Pharynx: Oropharynx is clear.   Eyes:      Extraocular Movements: Extraocular movements intact.      Conjunctiva/sclera: Conjunctivae normal.      Pupils: Pupils are equal, round, and reactive to light.   Cardiovascular:      Rate and Rhythm: Normal rate and regular rhythm.      Pulses: Normal pulses.      Heart sounds: Normal heart sounds.   Pulmonary:      Effort: Pulmonary effort is normal.      Breath sounds: Normal breath sounds.   Abdominal:      General: Abdomen is flat. Bowel sounds are normal.      Palpations: Abdomen is soft.   Musculoskeletal:         General: Normal range of motion.      Cervical back: Normal range of motion and neck supple.   Skin:     General: Skin is warm and dry.      Capillary Refill: Capillary refill takes less than 2 seconds.      Comments: No rashes on limited skin exam.   Neurological:      General: No focal deficit present.      Mental Status: He is alert and oriented to person, place, and time.      Cranial Nerves: No cranial nerve deficit.      Comments: I Olfactory:  Sense of smell intact    II Optic:  Pupils equal round react to light.  Vision intact.    III, IV, VI, Ocular motor, Trochlear, Abducens:  Extraocular movements intact    V Trigeminal:  Facial sensation intact facial sensation intact,, muscles of mastication intact muscles of mastication intact, corneal reflex intact, corneal reflex intact    VII Facial:  Muscles of facial expression intact     VIII Vestibular cochlear: Hearing intact vestibular cochlear: Hearing  intact    IX Glossopharyngeal:  Gag reflex intact.  Tasting intact.     X Vagus:  Gag reflex intact.    XI Spinal Accessory:  Shoulder shrug intact.  Head rotation intact.    XII Hypoglossal:  Tongue movements intact.     Psychiatric:      Comments: Patient appears depressed.  SI and delusional.          CRANIAL NERVES     CN III, IV, VI   Pupils are equal, round, and reactive to light.     Significant Labs: All pertinent labs within the past 24 hours have been reviewed.    Significant Imaging: I have reviewed all pertinent imaging results/findings within the past 24 hours.

## 2022-05-23 NOTE — ED PROVIDER NOTES
Encounter Date: 5/22/2022       History     Chief Complaint   Patient presents with    Mental Health Problem     Per ems pt family called 911 because pt is having another psyche episode, family told ems he is hearing voices, suicidal and homicidal and smoked mojo today       Mental Health Problem  The primary symptoms include bizarre behavior, delusions, disorganized thinking, dysphoric mood, suicidal ideas and suicidal threats. The primary symptoms do not include aggression, agitation, depressed mood, disorganized speech, homicidal ideas, negative symptoms, paranoia, self-injury, somatic symptoms or suicide attempt. The current episode started just prior to arrival. This is a new problem.   The degree of incapacity that he is experiencing as a consequence of his illness is severe. Additional symptoms of the illness include euphoric mood and inflated self-esteem. Additional symptoms of the illness do not include anhedonia, insomnia, hypersomnia, appetite change, unexpected weight change, fatigue, agitation, psychomotor retardation, feelings of worthlessness, attention impairment, increased goal-directed activity, flight of ideas, decreased need for sleep, distractible, poor judgment, visual change, headaches, abdominal pain or seizures. He admits to suicidal ideas. He does not have a plan to attempt suicide. He contemplates harming himself. He has not already injured self. He does not contemplate injuring another person. He has not already  injured another person. Risk factors that are present for mental illness include a history of mental illness.     Review of patient's allergies indicates:  No Known Allergies  Past Medical History:   Diagnosis Date    History of psychiatric hospitalization     Hx of psychiatric care     Psychiatric problem     Schizophrenia      No past surgical history on file.  No family history on file.  Social History     Tobacco Use    Smoking status: Never Smoker   Substance Use  Topics    Alcohol use: Not Currently    Drug use: Yes     Types: Marijuana     Comment: or synthetic     Review of Systems   Constitutional: Negative for appetite change, fatigue and unexpected weight change.   Gastrointestinal: Negative for abdominal pain.   Neurological: Negative for seizures and headaches.   Psychiatric/Behavioral: Positive for dysphoric mood and suicidal ideas. Negative for agitation, homicidal ideas, paranoia and self-injury. The patient does not have insomnia.    All other systems reviewed and are negative.      Physical Exam     Initial Vitals [05/22/22 2154]   BP Pulse Resp Temp SpO2   130/82 65 18 98.5 °F (36.9 °C) 100 %      MAP       --         Physical Exam    Nursing note and vitals reviewed.  Constitutional: Vital signs are normal. He appears well-developed and well-nourished.   HENT:   Head: Normocephalic and atraumatic.   Eyes: Conjunctivae, EOM and lids are normal. Pupils are equal, round, and reactive to light. Lids are everted and swept, no foreign bodies found.   Neck: Trachea normal and phonation normal. Neck supple.   Normal range of motion.   Full passive range of motion without pain.     Cardiovascular: Normal rate, regular rhythm, normal heart sounds, intact distal pulses and normal pulses.   Pulmonary/Chest: Breath sounds normal. No respiratory distress. He has no wheezes. He has no rhonchi. He has no rales. He exhibits no tenderness.   Abdominal: Abdomen is soft. Bowel sounds are normal. He exhibits no distension. There is no abdominal tenderness.   Musculoskeletal:      Cervical back: Full passive range of motion without pain, normal range of motion and neck supple.     Neurological: He is alert. He has normal strength. He displays normal reflexes. No cranial nerve deficit or sensory deficit. GCS score is 15. GCS eye subscore is 4. GCS verbal subscore is 5. GCS motor subscore is 6.   Skin: Capillary refill takes less than 2 seconds.   Psychiatric: His mood appears not  anxious. His affect is not angry, not blunt, not labile and not inappropriate. His speech is not rapid and/or pressured, not delayed, not tangential and not slurred. He is not agitated, not aggressive, not hyperactive, not slowed, not withdrawn and not combative. Thought content is delusional. Thought content is not paranoid. Cognition and memory are not impaired. He expresses inappropriate judgment. He does not express impulsivity. He does not exhibit a depressed mood. He expresses suicidal ideation. He expresses no homicidal ideation. He expresses no suicidal plans and no homicidal plans. He is communicative. He exhibits normal recent memory and normal remote memory.   Patient reports he is God on earth.  He reports he wants to kill himself to go to heaven but does not have a plan         ED Course   Critical Care    Date/Time: 5/22/2022 11:09 PM  Performed by: Rolo Goldman Jr., MD  Authorized by: Rolo Goldman Jr., MD   Direct patient critical care time: 5 minutes  Additional history critical care time: 15 minutes  Ordering / reviewing critical care time: 5 minutes  Documentation critical care time: 5 minutes  Consulting other physicians critical care time: 1 minutes  Total critical care time (exclusive of procedural time) : 31 minutes  Critical care was necessary to treat or prevent imminent or life-threatening deterioration of the following conditions: cardiac failure, circulatory failure, metabolic crisis, shock, toxidrome, hepatic failure, renal failure, CNS failure or compromise, respiratory failure, sepsis, endocrine crisis, dehydration and trauma.  Critical care was time spent personally by me on the following activities: discussions with consultants, evaluation of patient's response to treatment, obtaining history from patient or surrogate, ordering and review of laboratory studies, pulse oximetry, discussions with primary provider, ordering and performing treatments and interventions, ordering and  review of radiographic studies and re-evaluation of patient's condition.        Labs Reviewed   CBC W/ AUTO DIFFERENTIAL - Abnormal; Notable for the following components:       Result Value    RBC 4.05 (*)     Hemoglobin 12.6 (*)     Hematocrit 36.7 (*)     MCH 31.1 (*)     All other components within normal limits   COMPREHENSIVE METABOLIC PANEL - Abnormal; Notable for the following components:    Calcium 8.6 (*)     Anion Gap 5 (*)     All other components within normal limits   URINALYSIS, REFLEX TO URINE CULTURE - Abnormal; Notable for the following components:    Ketones, UA Trace (*)     All other components within normal limits    Narrative:     Preferred Collection Type->Urine, Clean Catch  Specimen Source->Urine   DRUG SCREEN PANEL, URINE EMERGENCY - Abnormal; Notable for the following components:    THC Presumptive Positive (*)     All other components within normal limits    Narrative:     Preferred Collection Type->Urine, Clean Catch  Specimen Source->Urine   ACETAMINOPHEN LEVEL - Abnormal; Notable for the following components:    Acetaminophen (Tylenol), Serum <2.0 (*)     All other components within normal limits   TSH   ALCOHOL,MEDICAL (ETHANOL)   SARS-COV-2 RDRP GENE          Imaging Results    None          Medications   olanzapine zydis disintegrating tablet 10 mg (10 mg Oral Given 5/22/22 2238)     Medical Decision Making:   Clinical Tests:   Lab Tests: Ordered and Reviewed  The following lab test(s) were unremarkable: CBC and CMP  ED Management:  Repeat exam.  Patient is calm and cooperative.  He 8 tender in ED without any difficulty.  He is medically cleared and will be transferred to psychiatric unit  Other:   I have discussed this case with another health care provider.       <> Summary of the Discussion: Discussed patient's presentation with .  He accepted admission to psychiatric unit             ED Course as of 05/22/22 2310   Sun May 22, 2022   2241 Marijuana (THC) Metabolite(!):  Presumptive Positive [BM]   2308 Patient is medically cleared for transfer to psychiatric unit [BM]      ED Course User Index  [BM] Rolo Goldman Jr., MD             Clinical Impression:   Final diagnoses:  [F29] Psychosis, unspecified psychosis type (Primary)  [F20.9] Schizophrenia, unspecified type          ED Disposition Condition    Admit               Rolo Goldman Jr., MD  05/22/22 9995

## 2022-05-23 NOTE — NURSING
21-year old male with history of Schizophrenia and psychiatric hospitalizations arrived by EMS after family called 911.  As per family patient was having auditory hallucinations, suicidal and homicidal ideations, and also smoked mojo.  Patient very bizarre and expressing to nursing staff that he is God.  Patient given Zyorexa 10mg, PO in ED.  Patient PEC'D and accepted to [facility] by Dr. Grijalva.  UDS positive for THC.      Upon arrival, patient had no orientation to person, place, or situation. Denied pain. Patient extremely euphoric and just laughing at everything. Unable and reluctant to answer most questions. Given a snack in which he consumed with water with no problems.   Patient wanted to get in bed and had no problems sleeping.  No further concerns.

## 2022-05-24 PROCEDURE — 25000003 PHARM REV CODE 250: Performed by: INTERNAL MEDICINE

## 2022-05-24 PROCEDURE — 99233 SBSQ HOSP IP/OBS HIGH 50: CPT | Mod: AF,HB,, | Performed by: PSYCHIATRY & NEUROLOGY

## 2022-05-24 PROCEDURE — 90833 PSYTX W PT W E/M 30 MIN: CPT | Mod: AF,HB,, | Performed by: PSYCHIATRY & NEUROLOGY

## 2022-05-24 PROCEDURE — 99233 PR SUBSEQUENT HOSPITAL CARE,LEVL III: ICD-10-PCS | Mod: AF,HB,, | Performed by: PSYCHIATRY & NEUROLOGY

## 2022-05-24 PROCEDURE — 90833 PR PSYCHOTHERAPY W/PATIENT W/E&M, 30 MIN (ADD ON): ICD-10-PCS | Mod: AF,HB,, | Performed by: PSYCHIATRY & NEUROLOGY

## 2022-05-24 PROCEDURE — 25000003 PHARM REV CODE 250: Performed by: PSYCHIATRY & NEUROLOGY

## 2022-05-24 PROCEDURE — 11400000 HC PSYCH PRIVATE ROOM

## 2022-05-24 RX ORDER — DIVALPROEX SODIUM 250 MG/1
500 TABLET, DELAYED RELEASE ORAL 2 TIMES DAILY
Status: DISCONTINUED | OUTPATIENT
Start: 2022-05-24 | End: 2022-05-27

## 2022-05-24 RX ADMIN — DIVALPROEX SODIUM 500 MG: 250 TABLET, DELAYED RELEASE ORAL at 09:05

## 2022-05-24 RX ADMIN — THERA TABS 1 TABLET: TAB at 08:05

## 2022-05-24 RX ADMIN — DIVALPROEX SODIUM 500 MG: 250 TABLET, DELAYED RELEASE ORAL at 08:05

## 2022-05-24 RX ADMIN — FOLIC ACID 1 MG: 1 TABLET ORAL at 08:05

## 2022-05-24 RX ADMIN — HYDROXYZINE PAMOATE 50 MG: 50 CAPSULE ORAL at 08:05

## 2022-05-24 RX ADMIN — ARIPIPRAZOLE 5 MG: 5 TABLET ORAL at 08:05

## 2022-05-24 NOTE — PLAN OF CARE
Pt not clinically appropriate for PSA today due to pt still RIS and in active psychosis.  Will re-attempt PSA when pt is more clinically appropriate.

## 2022-05-24 NOTE — PLAN OF CARE
POC reviewed this shift and is on going.  Pt on unit for snacks and meds with no adverse reaction. Pt interacts with peers. Cooperate with staff. He is hyperactive, laughing out loud. Took shower. With drawn to his room. Denies SI/HI, A/V hallucination. Will continue to monitor for changes and safety.

## 2022-05-24 NOTE — PROGRESS NOTES
"PSYCHIATRY DAILY INPATIENT PROGRESS NOTE  SUBSEQUENT HOSPITAL VISIT    ENCOUNTER DATE: 5/24/2022  SITE: [facility]    DATE OF ADMISSION: 5/22/2022  9:53 PM  LENGTH OF STAY: 2 days      CHIEF COMPLAINT   Bishop Soria is a 21 y.o. 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 The chart was reviewed.     Reviewed notes from Rns, MD and SW and labs from the last 24 hours.    The patient's case was discussed with the treatment team/care providers today including Rns, CTRS and SW    Staff reports severe (requiring PRN medications for non redirectable, agitated behavior in order to prevent harm to self or others) behavioral or management issues.     The patient has been compliant with treatment.      Subjective 05/24/2022       Today the patient reports that he is doing good. His though process remains derailed. He discussed how he is "a girl, but not a girl.. I just merged with the right people." He would not answer most basic background questions, or his answers were not associated with the asked questions.    He reported that he plans to take a shot (medication) then go home, drink, and smoke some pot.    He had several bouts of disruptive/agitated behavior associated with psychosis.       The patient denies any side effects to medications.          Psychiatric ROS (observed, reported, or endorsed/denied):  Depressed mood - denies  Interest/pleasure/anhedonia: denies  Guilt/hopelessness/worthlessness - denies  Changes in Sleep - denies  Changes in Appetite - denies  Changes in Concentration - denies  Changes in Energy - denies  PMA/R- denies  Suicidal- active/passive ideations - denies  Homicidal ideations: active/passive ideations - denies    Hallucinations - Continuing  Delusions - Continuing  Disorganized behavior - Continuing  Disorganized speech - Continuing  Negative " symptoms - denies    Elevated mood - variable  Decreased need for sleep - variable  Grandiosity - variable  Racing thoughts - variable  Impulsivity - Continuing  Irritability- variable  Increased energy - variable  Distractibility - Continuing  Increase in goal-directed activity or PMA- Continuing    Symptoms of KANU - variable  Symptoms of Panic Disorder- denies  Symptoms of PTSD - denies        Overall progress: Patient is showing no improvement on the Unit to date        Psychotherapy:  · Target symptoms: mood disorder, psychosis  · Why chosen therapy is appropriate versus another modality: relevant to diagnosis, patient responds to this modality, evidence based practice  · Outcome monitoring methods: self-report, observation  · Therapeutic intervention type: insight oriented psychotherapy, behavior modifying psychotherapy, supportive psychotherapy, interactive psychotherapy  · Topics discussed/themes: building skills sets for symptom management, symptom recognition  · The patient's response to the intervention is reluctant. The patient's progress toward treatment goals is limited, poor.   · Duration of intervention: 16 minutes.        Medical ROS  General ROS: negative  Ophthalmic ROS: negative  ENT ROS: negative  Allergy and Immunology ROS: negative  Hematological and Lymphatic ROS: negative  Endocrine ROS: negative  Respiratory ROS: no cough, shortness of breath, or wheezing  Cardiovascular ROS: no chest pain or dyspnea on exertion  Gastrointestinal ROS: no abdominal pain, change in bowel habits, or black or bloody stools  Genito-Urinary ROS: no dysuria, trouble voiding, or hematuria  Musculoskeletal ROS: negative  Neurological ROS: no TIA or stroke symptoms  Dermatological ROS: negative    PAST MEDICAL HISTORY   Past Medical History:   Diagnosis Date    History of psychiatric hospitalization     Hx of psychiatric care     Psychiatric problem     Schizophrenia            PSYCHOTROPIC MEDICATIONS   Scheduled  "Meds:   ARIPiprazole  5 mg Oral Daily    folic acid  1 mg Oral Daily    multivitamin  1 tablet Oral Daily     Continuous Infusions:  PRN Meds:.acetaminophen, aluminum-magnesium hydroxide-simethicone, docusate sodium, hydrOXYzine pamoate, loperamide, nicotine, OLANZapine **AND** OLANZapine        EXAMINATION    VITALS   Vitals:    05/23/22 0009 05/23/22 0807 05/23/22 1913 05/24/22 0730   BP: 116/67 124/77 131/77 124/69   BP Location: Left arm Left arm Left arm Left arm   Patient Position: Sitting Sitting Sitting Sitting   Pulse: 61 82 (!) 55 (!) 50   Resp: 16 20 20 20   Temp: 97.5 °F (36.4 °C) 97.4 °F (36.3 °C) 97.6 °F (36.4 °C) 98.1 °F (36.7 °C)   TempSrc: Oral Oral Oral Oral   SpO2: 100% 100% 99% 99%   Weight: 74.8 kg (165 lb)      Height: 6' 3" (1.905 m)          Body mass index is 20.62 kg/m².      CONSTITUTIONAL  General Appearance: unremarkable, age appropriate, well nourished,normal weight     MUSCULOSKELETAL  Muscle Strength and Tone:no dyskinesia, no tremor, no tic  Abnormal Involuntary Movements: None  Gait and Station: non-ataxic     PSYCHIATRIC   Level of Consciousness: awake and alert   Orientation: person, place, time, and situation  Grooming: less Disheveled and Hospital garb  Psychomotor Behavior: less psychomotor agitation, restless and fidgety , eye contact normal  Speech: variable r/t/v/s  Language: grossly intact, able to name, able to repeat  Mood: labile, "great"  Affect: Labile, anxious  Thought Process: derailed  Associations: loose   Thought Content: +delusions, denies SI and denies HI  Perceptions: +AH and denies  VH  Memory: Remote impaired and Recent impaired  Attention:Decreased and Easily distracted  Fund of Knowledge: age and education and apporiate  Estimate if Intelligence:  Unable to determine based on work/education history, vocabulary and mental status exam  Insight: poor awareness of illness  Judgment: impaired due to psychosis- poor    DIAGNOSTIC TESTING   Laboratory " Results  No results found for this or any previous visit (from the past 24 hour(s)).          MEDICAL DECISION MAKING      ASSESSMENT:   Schizophrenia, chronic with acute exacerbation  Mood Disorder NOS  R/o SAD, bipolar type; s/o SIMD, SIPD  Unspecified Anxiety Disorder     Psychosocial stressors     Cannabis abuse           PROBLEM LIST AND MANAGEMENT PLANS     Psychosis: pt counseled  -started re-trial of Abilify at 5 mg po q day- increase to 7 mg po q day  *pt was possibly given a CARRASCO about  1 month ago- will seek records and resume*     Mood: pt counseled  -abilify as above  -start trial of depakote 500 mg po BID- check level in 4 days     Anxiety: pt counseled  -vistaril prn     Psychosocial stressors: pt counseled  -SW consulted and assisting with resources     Cannabis abuse: pt counseled       PRESCRIPTION DRUG MANAGEMENT  Compliance: yes  Side Effects: no  Regimen Adjustments: see above     Discussed diagnosis, risks and benefits of proposed treatment vs alternative treatments vs no treatment, potential side effects of these treatments and the inherent unpredictability of treatment. The patient expresses understanding of the above and displays the capacity to agree with this treatment given said understanding. Patient also agrees that, currently, the benefits outweigh the risks and would like to pursue/continue treatment at this time.        DIAGNOSTIC TESTING  Labs reviewed with patient; follow up pending labs     Disposition:  -Will attempt to obtain outside psychiatric records if available  -SW to assist with aftercare planning and collateral  -Once stable discharge home with outpatient follow up care and/or rehab  -Continue inpatient treatment under a PEC and/or CEC for danger to self/ danger to others/grave disability as evident by significant psychotic thought disorder, aggressive neuroleptic titration, hallucinations, danger to self, danger to others, aggressive behavior and gravely  disabled     DISCHARGE PLANNING  Expected Disposition Plan: Home or Self Care      NEED FOR CONTINUED HOSPITALIZATION  Psychiatric illness continues to pose a potential threat to life or bodily function, of self or others, thereby requiring the need for continued inpatient psychiatric hospitalization: Yes, due to: significant psychotic thought disorder, aggressive neuroleptic titration, aggressive behavior and gravely disabled, as evidenced by:  Ongoing concerns with grave disability with patient unable to perform basic feeding, hygiene and dressing activities without significant constant support. and Ongoing concerns with perceptual aberrancy and paranoid persecutory delusions leading to potential harm of self or others.    Protective inpatient pyschiatric hospitalization required while a safe disposition plan is enacted: Yes    Patient stabilized and ready for discharge from inpatient psychiatric unit: No        STAFF:   Frantz Cagle MD  Psychiatry

## 2022-05-24 NOTE — PLAN OF CARE
POC reviewed this shift and is on going. Patient is anxious, irritable, pacing, showing pressured speech, manic, and paranoid. Endorses Auditory Hallucinations, Visual Hallucinations, and Delusions. Denies Suicidal Ideation, Homicidal Ideation, Tactile Hallucinations, and Gustatory Hallucinations. Continues to RIS as evidenced by talking to himself. Less intrusive today. Pt continues to be medication compliant and staff will continue to monitor pt closely while on the unit.

## 2022-05-24 NOTE — PLAN OF CARE
"  Problem: Adult Behavioral Health Plan of Care  Goal: Optimized Coping Skills in Response to Life Stressors  Intervention: Promote Effective Coping Strategies  Flowsheets (Taken 2022 1043)  Supportive Measures:   active listening utilized   self-reflection promoted   verbalization of feelings encouraged    Behavior: Pt was alert and cooperative, and was participating when called upon.  He was seated and still the entire group.  Pt was noted to still be RIS, frequently talking to people who were not there and speaking tangentially at times.       Intervention: In this CBT-focused process group LMSW facilitated a group discussion on motivation.  Each patient was asked to identify and discuss who or motivates them, and how that motivation can help them meet their treatment goals.        Response: Pt shared that he is feeling "fine" today and did not answer why he was feeling this way.  When asked what motivates him he said "God, no my daddy."  When asked why his daddy he said "because he  and I'm going to die and I'm going to talk to him."  He said that no one understands that but him.  He also shared "I learned that I never want to kill myself," but could not articulate why.  He also stated "if Raul Murphy's mom doesn't message me back I don't know what I'm going to do."       Plan: Continue to encourage pt to participate in process groups to verbalize feelings and develop healthy coping skills.                   "

## 2022-05-24 NOTE — PLAN OF CARE
"Collateral Contact: Nabil Soria, pt's mother, [phone]    SW called pt's mother, Nabil.  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)    Around [facility] he was acting strange, teammate went to apartment and he was smoking mojo - mom had committed in [place], had injection while there which was the last one he had    Pt was out of hospital for a month and starting smoking mojo again and it triggered him again - he was acting strange, not eating, not sleeping for 4 days, walking all around at night, saying crazy things, talking in 3rd person    This past Sunday he wanted to mom's grandson because he want him to find him marijuana; Sunday evening he started "talking to me crazy."  Mom called EMS - he went voluntarily and didn't fight it    Fascinated with Raul Murphy (scott), and says that is his name sometimes; his favorite thing to call people is "bitch," pt's mother said to redirect him to say it's not appropriate     Pt was doing good in his own apartment, bought a car, got $3,000 tax refund and mom told him to put it aside for his rent, but he texted mom saying the money was gone he sent the money to Raul Murphy    Pt has diagnosis of schizophrenia and bipolar disorder since 2019; Paternal grandmother is schizophrenic, 2 aunts and 2 cousins are schizophrenic    [place]     Pt's mother states he needs CARRASCO because he won't take his medication    No other drugs besides mojo or marijuana; no alcohol    No legal issues    Pt will always says "my mom doesn't can't accept that I'm fernandez" but she says he is the one who can't accept it    Pt will go live with mother upon d/c    Baseline:  He likes to have fun, loves to sing and dance, everyone in his class loved, all RIS is gone                      "

## 2022-05-25 PROCEDURE — 99233 PR SUBSEQUENT HOSPITAL CARE,LEVL III: ICD-10-PCS | Mod: AF,HB,, | Performed by: PSYCHIATRY & NEUROLOGY

## 2022-05-25 PROCEDURE — 99233 SBSQ HOSP IP/OBS HIGH 50: CPT | Mod: AF,HB,, | Performed by: PSYCHIATRY & NEUROLOGY

## 2022-05-25 PROCEDURE — 25000003 PHARM REV CODE 250: Performed by: INTERNAL MEDICINE

## 2022-05-25 PROCEDURE — 25000003 PHARM REV CODE 250: Performed by: PSYCHIATRY & NEUROLOGY

## 2022-05-25 PROCEDURE — 90833 PR PSYCHOTHERAPY W/PATIENT W/E&M, 30 MIN (ADD ON): ICD-10-PCS | Mod: AF,HB,, | Performed by: PSYCHIATRY & NEUROLOGY

## 2022-05-25 PROCEDURE — 11400000 HC PSYCH PRIVATE ROOM

## 2022-05-25 PROCEDURE — 90833 PSYTX W PT W E/M 30 MIN: CPT | Mod: AF,HB,, | Performed by: PSYCHIATRY & NEUROLOGY

## 2022-05-25 RX ORDER — LORAZEPAM 2 MG/ML
2 INJECTION INTRAMUSCULAR EVERY 4 HOURS PRN
Status: DISCONTINUED | OUTPATIENT
Start: 2022-05-25 | End: 2022-05-31 | Stop reason: HOSPADM

## 2022-05-25 RX ORDER — ARIPIPRAZOLE 10 MG/1
10 TABLET ORAL DAILY
Status: DISCONTINUED | OUTPATIENT
Start: 2022-05-26 | End: 2022-05-30

## 2022-05-25 RX ORDER — HALOPERIDOL 5 MG/1
5 TABLET ORAL EVERY 4 HOURS PRN
Status: DISCONTINUED | OUTPATIENT
Start: 2022-05-25 | End: 2022-05-31 | Stop reason: HOSPADM

## 2022-05-25 RX ORDER — LORAZEPAM 1 MG/1
2 TABLET ORAL EVERY 4 HOURS PRN
Status: DISCONTINUED | OUTPATIENT
Start: 2022-05-25 | End: 2022-05-31 | Stop reason: HOSPADM

## 2022-05-25 RX ORDER — DIPHENHYDRAMINE HCL 50 MG
50 CAPSULE ORAL EVERY 4 HOURS PRN
Status: DISCONTINUED | OUTPATIENT
Start: 2022-05-25 | End: 2022-05-31 | Stop reason: HOSPADM

## 2022-05-25 RX ORDER — DIPHENHYDRAMINE HYDROCHLORIDE 50 MG/ML
50 INJECTION INTRAMUSCULAR; INTRAVENOUS EVERY 4 HOURS PRN
Status: DISCONTINUED | OUTPATIENT
Start: 2022-05-25 | End: 2022-05-31 | Stop reason: HOSPADM

## 2022-05-25 RX ORDER — HALOPERIDOL 5 MG/ML
5 INJECTION INTRAMUSCULAR EVERY 4 HOURS PRN
Status: DISCONTINUED | OUTPATIENT
Start: 2022-05-25 | End: 2022-05-31 | Stop reason: HOSPADM

## 2022-05-25 RX ADMIN — FOLIC ACID 1 MG: 1 TABLET ORAL at 08:05

## 2022-05-25 RX ADMIN — ARIPIPRAZOLE 7 MG: 5 TABLET ORAL at 08:05

## 2022-05-25 RX ADMIN — OLANZAPINE 10 MG: 10 TABLET, FILM COATED ORAL at 10:05

## 2022-05-25 RX ADMIN — LORAZEPAM 2 MG: 1 TABLET ORAL at 08:05

## 2022-05-25 RX ADMIN — THERA TABS 1 TABLET: TAB at 08:05

## 2022-05-25 RX ADMIN — HYDROXYZINE PAMOATE 50 MG: 50 CAPSULE ORAL at 08:05

## 2022-05-25 RX ADMIN — DIVALPROEX SODIUM 500 MG: 250 TABLET, DELAYED RELEASE ORAL at 08:05

## 2022-05-25 RX ADMIN — DIPHENHYDRAMINE HCL 50 MG: 50 CAPSULE ORAL at 08:05

## 2022-05-25 RX ADMIN — HALOPERIDOL 5 MG: 5 TABLET ORAL at 08:05

## 2022-05-25 NOTE — PLAN OF CARE
Patient anxious, pacing at intervals, engaging with staff and peers, dancing at intervals in the marcial. Appetite good and medication compliant without side effects noted. Patient endorses A/H and observed RIS. Denies depression, S/HI, VH. Dr. Cagle visited per telemed with new orders  to increase Abilify 10 mg po daily. Approximately 1050 patient becoming agitated began banging on his head with his hands and  banging on stomach forcefully with his hands, clapping hands forcefully and continuously , running in and out of dayroom, delusional, argumentative, appears angry. PRN Zyprexa 10 mg po given and monitored and was effective an hour later. Dr. Cagle was notified with new order to D/C Zyprexa and new orders for Haldol, Ativan,and Benadryl po or IM q 4 hrs PRN. Patient took a nap after lunch and woke up happy and pleasant. No further  agitation. Patient just finished supper meal and was playing hang man on activity board with selected peer. Continue to monitor.

## 2022-05-25 NOTE — PLAN OF CARE
POC reviewed this shift and is on going.  Pt cooperative with staff and interacts with peers. Pt took shower this p.m. and was compliant with meds. More calmer this shift. Responding to internal stimuli. Able to make needs known. No ss of distress. Will continue to monitor for changes and safety.

## 2022-05-25 NOTE — PLAN OF CARE
"  Problem: Adult Behavioral Health Plan of Care  Goal: Optimized Coping Skills in Response to Life Stressors  Intervention: Promote Effective Coping Strategies  Flowsheets (Taken 5/25/2022 1109)  Supportive Measures:   active listening utilized   verbalization of feelings encouraged   self-reflection promoted   self-responsibility promoted     Behavior: Pt was noted RIS throughout group, and noted talking in 3rd person, laughing at inappropriate times, and getting up and dancing.  He volunteered to share several times but his remarks were tangential in nature.      Intervention: In this CBT-focused group LMSW facilitated a group discussion on self-control.  Each patient was asked to share areas in their life where they need to use more self-control, and how they could do so in order to help meet their treatment goals.       Response: Pt shared he is feeling like "Reji Contreras" today.  He stated "I'm behind the scenes with voices," I can read tarot cards, I can read books, I can read songs, I can read music, I can read anything," I can grow into an adult and and adults after that and adult after that..." He also said "I don't like Raul Murphy."  At one point he did state "I just need the medicine for the voice" and "I want to do better for my mom and my house but I'll still smoke weed because it calms me down."      Plan: Continue to encourage pt to participate in process groups to verbalize feelings and develop healthy coping skills.                   "

## 2022-05-25 NOTE — PLAN OF CARE
"Behavioral Health Unit  Psychosocial History and Assessment  Progress Note      Patient Name: Bishop Soria YOB: 2000 SW: Kelly Ren [facility] Date: 5/25/2022    Chief Complaint: psychosis    Consent:     Did the patient consent for an interview with the ? Pt still in active psychosis and unable to interview at present    Did the patient consent for the  to contact family/friend/caregiver?   Yes  Name: Nabil Soria, Relationship: mother, and Contact: [phone]    Did the patient give consent for the  to inform family/friend/caregiver of his/her whereabouts or to discuss discharge planning? Yes    Source of Information: Telephone interview with family/friend/caregiver and Chart review    Is information obtained from interviews considered reliable?   yes    Reason for Admission:     Active Hospital Problems    Diagnosis  POA    *Schizophrenia [F20.9]  Yes    Marijuana abuse [F12.10]  Yes      Resolved Hospital Problems   No resolved problems to display.       History of Present Illness - (Patient Perception):   Patient is unable to participate in giving data pertinent to treatment and discharge - pt is still in active psychosis    History of Present Illness - (Perception of Others): Pt has been "acting strange, not eating, not sleeping, saying crazy things, talking crazy, talking in 3rd person," pt has been smoking mojo according to pt's mother    Present biopsychosocial functioning: Pt is a 22 y/o male who presented to ED for psychosis with some symptoms of carley which occurred in the context of substance abuse (mojo), SI, and HI.  Pt continues to exhibit bizarre behavior, delusions, RIS, disorganized thinking, euphoric mood, and inflated self-esteem.  Pt does not reportedly use alcohol.  Pt is positive for THC upon admission and reportedly uses marijuana frequently.  Pt currently lives in an apartment in [place] and works [employer] and is a member of " "[employer].  Pt's mother is a support to him.       Past biopsychosocial functioning: Pt has history of schizophrenia, marijuana abuse, and multiple psychiatric hospitalizations    Family and Marital/Relationship History:     Significant Other/Partner Relationships:  Single:      Family Relationships: Intact      Childhood History:     Where was patient raised? [place]    Who raised the patient? Mother       How does patient describe their childhood? "great"      Who is patient's primary support person? Mother      Culture and Quaker:     Quaker: Gnosticist    How strong of a role does Episcopal and spirituality play in patient's life? Per chart review pt has previously stated he has a strong relationship with God    Adventism or spiritual concerns regarding treatment: not applicable     History of Abuse:   History of Abuse: Denies      Outcome:     Psychiatric and Medical History:     History of psychiatric illness or treatment: prior inpatient treatment and currently under psychiatric care    Medical history:   Past Medical History:   Diagnosis Date    History of psychiatric hospitalization     Hx of psychiatric care     Psychiatric problem     Schizophrenia        Substance Abuse History:     Alcohol - (Patient Perspective):   Social History     Substance and Sexual Activity   Alcohol Use Not Currently       Alcohol - (Collateral Perspective): none according to pt's mother    Drugs - (Patient Perspective):   Social History     Substance and Sexual Activity   Drug Use Yes    Types: Marijuana    Comment: or synthetic       Drugs - (Collateral Perspective): Pt smokes mojo according to pt's mother    Additional Comments: Pt's mother states pt get psychotic when he smokes mojo    Education:     Currently Enrolled? No - completed some college    Special Education? No    Interested in Completing Education/GED: n/a    Employment and Financial:     Currently employed? Employed: Current " Occupation: [employer]    Source of Income: salary    Able to afford basic needs (food, shelter, utilities)? Yes    Who manages finances/personal affairs? self      Service:     Boles? no    Combat Service? No     Community Resources:     Describe present use of community resources: ACT team - [place]     Identify previously used community resources   (Include previous mental health treatment - outpatient and inpatient): ACT team; [facility]    Environmental:     Current living situation:Lives alone    Social Evaluation:     Patient Assets: Supportive family/friends, Work skills and Physical health    Patient Limitations: mental illness, substance abuse    High risk psychosocial issues that may impact discharge planning:   None noted    Recommendations:     Anticipated discharge plan:   Home with mother    High risk issues requiring early treatment planning and immediate intervention: psychosis, substance abuse    Community resources needed for discharge planning:  aftercare treatment sources    Anticipated social work role(s) in treatment and discharge planning: LPC/LMSW will facilitate process groups to assist pt develop healthy coping skills; CM will assist in d/c planning for follow-up resources
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
## 2022-05-25 NOTE — PLAN OF CARE
Pt is still not clinically appropriate for full PSA due to continued psychosis.  Will reassess pt's clinical appropriateness tomorrow for PSA.

## 2022-05-26 PROCEDURE — 99233 PR SUBSEQUENT HOSPITAL CARE,LEVL III: ICD-10-PCS | Mod: AF,HB,, | Performed by: PSYCHIATRY & NEUROLOGY

## 2022-05-26 PROCEDURE — 25000003 PHARM REV CODE 250: Performed by: PSYCHIATRY & NEUROLOGY

## 2022-05-26 PROCEDURE — 11400000 HC PSYCH PRIVATE ROOM

## 2022-05-26 PROCEDURE — 63600175 PHARM REV CODE 636 W HCPCS: Performed by: PSYCHIATRY & NEUROLOGY

## 2022-05-26 PROCEDURE — 90833 PSYTX W PT W E/M 30 MIN: CPT | Mod: AF,HB,, | Performed by: PSYCHIATRY & NEUROLOGY

## 2022-05-26 PROCEDURE — 90833 PR PSYCHOTHERAPY W/PATIENT W/E&M, 30 MIN (ADD ON): ICD-10-PCS | Mod: AF,HB,, | Performed by: PSYCHIATRY & NEUROLOGY

## 2022-05-26 PROCEDURE — 99233 SBSQ HOSP IP/OBS HIGH 50: CPT | Mod: AF,HB,, | Performed by: PSYCHIATRY & NEUROLOGY

## 2022-05-26 PROCEDURE — 25000003 PHARM REV CODE 250: Performed by: INTERNAL MEDICINE

## 2022-05-26 RX ADMIN — ARIPIPRAZOLE 10 MG: 10 TABLET ORAL at 08:05

## 2022-05-26 RX ADMIN — DIVALPROEX SODIUM 500 MG: 250 TABLET, DELAYED RELEASE ORAL at 08:05

## 2022-05-26 RX ADMIN — PALIPERIDONE PALMITATE 234 MG: 234 INJECTION INTRAMUSCULAR at 10:05

## 2022-05-26 RX ADMIN — HALOPERIDOL 5 MG: 5 TABLET ORAL at 08:05

## 2022-05-26 RX ADMIN — LORAZEPAM 2 MG: 1 TABLET ORAL at 08:05

## 2022-05-26 RX ADMIN — THERA TABS 1 TABLET: TAB at 08:05

## 2022-05-26 RX ADMIN — DIPHENHYDRAMINE HCL 50 MG: 50 CAPSULE ORAL at 08:05

## 2022-05-26 RX ADMIN — FOLIC ACID 1 MG: 1 TABLET ORAL at 08:05

## 2022-05-26 NOTE — PLAN OF CARE
"  Problem: Adult Behavioral Health Plan of Care  Goal: Optimized Coping Skills in Response to Life Stressors  Intervention: Promote Effective Coping Strategies  Flowsheets (Taken 5/26/2022 1147)  Supportive Measures:   active listening utilized   verbalization of feelings encouraged   self-reflection promoted   self-responsibility promoted    Behavior: Pt was alert, cooperative, pleasant and much more focused than in previous days.  He gave appropriate answers when asked to share and did not appear to be RIS.       Intervention: In this CBT-focused group LMSW facilitated discussion on the importance of boundaries and different types of boundaries.  Each patient was asked to discuss what boundaries they need to better establish in order to meet their treatment goals.        Response: Pt shared he is feeling "pretty good today" because "the voices are clearing up and I just got my shot."  He shared that he is starting to learn his triggers and learning to speak his mind.  He stated "I need to get my shot, take my meds, and smoke weed and I'll be okay."  Risks of smoking weed were discussed but pt still states he wants to smoke it when he gets home because it helps him calm down.  Pt shared he struggles with emotional boundaries and letting others get to him, but he shared he can work on this by staying true to himself.  He also shared that when he had his last shot, his mother gave him wine in an attempt to get him not to smoke weed, and he said it had an adverse affect and he won't drink alcohol again, but wants to smoke weed.       Plan: Continue to encourage pt to participate in process groups to verbalize feelings and develop healthy coping skills.                   "

## 2022-05-26 NOTE — NURSING
"While writer was preparing pt's medications for administration, pt spontaneously endorsed, "I am a goddess.  Do you believe me?  I believe in myself.  An' I'm getting drunk as fuck when I get outta here."  He endorsed AH which he describes as, "low key" when he was asked if he could describe them.  PRN Vistaril 50mg given po.  Also given PRN Lorazepam 2mg/Benadryl 50mg/Haldol 5mg po.  Pt took his medication without incident.  He then walked away from the medicine window.  Will maintain close observation.  "
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
## 2022-05-26 NOTE — PLAN OF CARE
Patient having a better day today, no agitation, or bizarre behaviors. Patient thoughts are more clearer able to answer some questions appropriately however delusions continue, and patient endorses that auditory hallucinations not as bad today. Patient did attend group session with , and attended nursing group. Dr. Cagle visited this am per telemed with new order to administer Invega Sustenna 234 mg IM x 1 today, Valproic Acid on 5- early morning draw, Double portions at mealtime per patient request. Patient remains medication complaint without side effects noted. Invega Sustenna IM administered as ordered. Patient is in his room at this time napping and resting comfortably. Close observations continued and safe environment maintained.

## 2022-05-26 NOTE — PROGRESS NOTES
"PSYCHIATRY DAILY INPATIENT PROGRESS NOTE  SUBSEQUENT HOSPITAL VISIT    ENCOUNTER DATE: 5/26/2022  SITE: 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 The chart was reviewed.     Reviewed notes from Rns, MD and SW and labs from the last 24 hours.    The patient's case was discussed with the treatment team/care providers today including Rns, CTRS and SW    Staff reports severe (requiring PRN medications for non redirectable, agitated behavior in order to prevent harm to self or others) behavioral or management issues.     The patient has been compliant with treatment.      Subjective 05/26/2022       Yesterday the patient reported that he is doing good. "I feel like a messenger of God.. I'm trying to tell people what to do and no one will listen." He reports that a women is putting her thoughts in his mind to harm him. He needs team up with Raul Murphy. His thought process remains derailed but is somewhat more linear. He discussed how he is "a love goddess." He answered most  Questions today and his answers were more appropriate    Today, the patient reports that he is tired and "may not act the way you want." HE continues with FOI and a derailed thought process. However, he is still making incremental improvements. He discussed that people are using Shinto on him. He reports that he plans to use weed post-discharge and then make music with Raul Murphy. He denied any HI towards his mother.     He reports the desire to resume his CARRASCO- it was confirmed that he received Invega CARRASCO 234 mg IM on 4/24/22.       He had several bouts of disruptive/agitated behavior associated with psychosis which required prns for management.    The patient denies any side " effects to medications.          Psychiatric ROS (observed, reported, or endorsed/denied):  Depressed mood - denies  Interest/pleasure/anhedonia: denies  Guilt/hopelessness/worthlessness - denies  Changes in Sleep - denies  Changes in Appetite - denies  Changes in Concentration - denies  Changes in Energy - denies  PMA/R- denies  Suicidal- active/passive ideations - denies  Homicidal ideations: active/passive ideations - denies    Hallucinations - Continuing  Delusions - Continuing  Disorganized behavior - Continuing  Disorganized speech - Continuing  Negative symptoms - denies    Elevated mood - variable  Decreased need for sleep - variable  Grandiosity - variable  Racing thoughts - variable  Impulsivity - Continuing  Irritability- variable  Increased energy - variable  Distractibility - Continuing  Increase in goal-directed activity or PMA- Continuing    Symptoms of KANU - variable  Symptoms of Panic Disorder- denies  Symptoms of PTSD - denies        Overall progress: Patient is showing mild improvement         Psychotherapy:  · Target symptoms: mood disorder, psychosis  · Why chosen therapy is appropriate versus another modality: relevant to diagnosis, patient responds to this modality, evidence based practice  · Outcome monitoring methods: self-report, observation  · Therapeutic intervention type: insight oriented psychotherapy, behavior modifying psychotherapy, supportive psychotherapy, interactive psychotherapy  · Topics discussed/themes: building skills sets for symptom management, symptom recognition  · The patient's response to the intervention is accepting. The patient's progress toward treatment goals is limited, poor.   · Duration of intervention: 16 minutes.        Medical ROS  General ROS: negative  Ophthalmic ROS: negative  ENT ROS: negative  Allergy and Immunology ROS: negative  Hematological and Lymphatic ROS: negative  Endocrine ROS: negative  Respiratory ROS: no cough, shortness of breath, or  wheezing  Cardiovascular ROS: no chest pain or dyspnea on exertion  Gastrointestinal ROS: no abdominal pain, change in bowel habits, or black or bloody stools  Genito-Urinary ROS: no dysuria, trouble voiding, or hematuria  Musculoskeletal ROS: negative  Neurological ROS: no TIA or stroke symptoms  Dermatological ROS: negative    PAST MEDICAL HISTORY   Past Medical History:   Diagnosis Date    History of psychiatric hospitalization     Hx of psychiatric care     Psychiatric problem     Schizophrenia            PSYCHOTROPIC MEDICATIONS   Scheduled Meds:   ARIPiprazole  10 mg Oral Daily    divalproex  500 mg Oral BID    folic acid  1 mg Oral Daily    multivitamin  1 tablet Oral Daily     Continuous Infusions:  PRN Meds:.acetaminophen, aluminum-magnesium hydroxide-simethicone, haloperidoL **AND** diphenhydrAMINE **AND** LORazepam **AND** haloperidol lactate **AND** diphenhydrAMINE **AND** lorazepam, docusate sodium, hydrOXYzine pamoate, loperamide, nicotine        EXAMINATION    VITALS   Vitals:    05/24/22 2000 05/25/22 0749 05/25/22 1927 05/26/22 0756   BP: 137/76 129/68 106/64 112/76   BP Location: Right arm   Left arm   Patient Position: Sitting  Sitting Sitting   Pulse: 77 (!) 59 90 72   Resp: 20 20 20 18   Temp: 98.2 °F (36.8 °C) 97.9 °F (36.6 °C) 97.7 °F (36.5 °C) 96.6 °F (35.9 °C)   TempSrc: Oral  Oral Oral   SpO2: 98% 100% 96% 100%   Weight:       Height:           Body mass index is 20.62 kg/m².      CONSTITUTIONAL  General Appearance: unremarkable, age appropriate, well nourished,normal weight     MUSCULOSKELETAL  Muscle Strength and Tone:no dyskinesia, no tremor, no tic  Abnormal Involuntary Movements: None  Gait and Station: non-ataxic     PSYCHIATRIC   Level of Consciousness: awake and alert   Orientation: person, place, time, and situation  Grooming: less Disheveled and Hospital garb  Psychomotor Behavior: less psychomotor agitation, less restless and fidgety , eye contact normal  Speech: variable  "r/t/v/s  Language: grossly intact, able to name, able to repeat  Mood: labile, "good"  Affect: Labile, anxious  Thought Process: derailed but somewhat more linear  Associations: less loose   Thought Content: +delusions, denies SI and denies HI  Perceptions: +AH and denies  VH  Memory: Remote impaired and Recent impaired  Attention:Decreased and Easily distracted  Fund of Knowledge: age and education and apporiate  Estimate if Intelligence:  Unable to determine based on work/education history, vocabulary and mental status exam  Insight: poor awareness of illness  Judgment: impaired due to psychosis- poor    DIAGNOSTIC TESTING   Laboratory Results  No results found for this or any previous visit (from the past 24 hour(s)).          MEDICAL DECISION MAKING      ASSESSMENT:   Schizophrenia, chronic with acute exacerbation  Mood Disorder NOS  R/o SAD, bipolar type; s/o SIMD, SIPD  Unspecified Anxiety Disorder     Psychosocial stressors     Cannabis abuse           PROBLEM LIST AND MANAGEMENT PLANS     Psychosis: pt counseled  -started re-trial of Abilify at 5 mg po q day- increase to 7 mg po q day today- increase to 10 mg po q day starting today  *verified that the patient was  given a CARRASCO (Invega 234 mg IM on 4/24/22)     Mood: pt counseled  -abilify as above  -start trial of depakote 500 mg po BID- check level tomorrow     Anxiety: pt counseled  -vistaril prn     Psychosocial stressors: pt counseled  -SW consulted and assisting with resources     Cannabis abuse: pt counseled       PRESCRIPTION DRUG MANAGEMENT  Compliance: yes  Side Effects: no  Regimen Adjustments: see above     Discussed diagnosis, risks and benefits of proposed treatment vs alternative treatments vs no treatment, potential side effects of these treatments and the inherent unpredictability of treatment. The patient expresses understanding of the above and displays the capacity to agree with this treatment given said understanding. Patient also agrees " that, currently, the benefits outweigh the risks and would like to pursue/continue treatment at this time.        DIAGNOSTIC TESTING  Labs reviewed with patient; follow up pending labs     Disposition:  -Will attempt to obtain outside psychiatric records if available  -SW to assist with aftercare planning and collateral  -Once stable discharge home with outpatient follow up care and/or rehab  -Continue inpatient treatment under a PEC and/or CEC for danger to self/ danger to others/grave disability as evident by significant psychotic thought disorder, aggressive neuroleptic titration, hallucinations, danger to self, danger to others, aggressive behavior and gravely disabled     DISCHARGE PLANNING  Expected Disposition Plan: Home or Self Care      NEED FOR CONTINUED HOSPITALIZATION  Psychiatric illness continues to pose a potential threat to life or bodily function, of self or others, thereby requiring the need for continued inpatient psychiatric hospitalization: Yes, due to: significant psychotic thought disorder, aggressive neuroleptic titration, aggressive behavior and gravely disabled, as evidenced by:  Ongoing concerns with grave disability with patient unable to perform basic feeding, hygiene and dressing activities without significant constant support. and Ongoing concerns with perceptual aberrancy and paranoid persecutory delusions leading to potential harm of self or others.    Protective inpatient pyschiatric hospitalization required while a safe disposition plan is enacted: Yes    Patient stabilized and ready for discharge from inpatient psychiatric unit: No        STAFF:   Frantz Cagle MD  Psychiatry

## 2022-05-27 LAB — VALPROATE SERPL-MCNC: 66 UG/ML (ref 50–100)

## 2022-05-27 PROCEDURE — 11400000 HC PSYCH PRIVATE ROOM

## 2022-05-27 PROCEDURE — 99233 PR SUBSEQUENT HOSPITAL CARE,LEVL III: ICD-10-PCS | Mod: AF,HB,, | Performed by: PSYCHIATRY & NEUROLOGY

## 2022-05-27 PROCEDURE — 90833 PR PSYCHOTHERAPY W/PATIENT W/E&M, 30 MIN (ADD ON): ICD-10-PCS | Mod: AF,HB,, | Performed by: PSYCHIATRY & NEUROLOGY

## 2022-05-27 PROCEDURE — 25000003 PHARM REV CODE 250: Performed by: INTERNAL MEDICINE

## 2022-05-27 PROCEDURE — 90833 PSYTX W PT W E/M 30 MIN: CPT | Mod: AF,HB,, | Performed by: PSYCHIATRY & NEUROLOGY

## 2022-05-27 PROCEDURE — 25000003 PHARM REV CODE 250: Performed by: PSYCHIATRY & NEUROLOGY

## 2022-05-27 PROCEDURE — 36415 COLL VENOUS BLD VENIPUNCTURE: CPT | Performed by: PSYCHIATRY & NEUROLOGY

## 2022-05-27 PROCEDURE — 80164 ASSAY DIPROPYLACETIC ACD TOT: CPT | Performed by: PSYCHIATRY & NEUROLOGY

## 2022-05-27 PROCEDURE — 99233 SBSQ HOSP IP/OBS HIGH 50: CPT | Mod: AF,HB,, | Performed by: PSYCHIATRY & NEUROLOGY

## 2022-05-27 RX ORDER — DIVALPROEX SODIUM 250 MG/1
750 TABLET, DELAYED RELEASE ORAL 2 TIMES DAILY
Status: DISCONTINUED | OUTPATIENT
Start: 2022-05-27 | End: 2022-05-31 | Stop reason: HOSPADM

## 2022-05-27 RX ADMIN — FOLIC ACID 1 MG: 1 TABLET ORAL at 08:05

## 2022-05-27 RX ADMIN — DIVALPROEX SODIUM 500 MG: 250 TABLET, DELAYED RELEASE ORAL at 08:05

## 2022-05-27 RX ADMIN — ARIPIPRAZOLE 10 MG: 10 TABLET ORAL at 08:05

## 2022-05-27 RX ADMIN — THERA TABS 1 TABLET: TAB at 08:05

## 2022-05-27 RX ADMIN — DIVALPROEX SODIUM 750 MG: 250 TABLET, DELAYED RELEASE ORAL at 08:05

## 2022-05-27 RX ADMIN — HYDROXYZINE PAMOATE 50 MG: 50 CAPSULE ORAL at 08:05

## 2022-05-27 NOTE — PLAN OF CARE
POC reviewed this shift and is on going. Patient is withdrawn, depressed, anxious, manic, and paranoid. Endorses Auditory Hallucinations, Visual Hallucinations, and Delusions. Denies Suicidal Ideation, Homicidal Ideation, Tactile Hallucinations, and Gustatory Hallucinations. Up in dining area watching TV. Observed RIS,on going. Minimal interaction with peers. Pt continues to be medication compliant and staff will continue to monitor pt closely while on the unit.

## 2022-05-27 NOTE — PLAN OF CARE
Pt still not clinically appropriate for completion of PSA.  Will re-attempt PSA once pt is more clinically appropriate.

## 2022-05-27 NOTE — PSYCH
Community Group  Time: 2649-2517 AM  Short Term Goal:  Hallucination  Patient Response: Manage voices by adopting coping skills such as listening to music, reading are simply ignoring the voices.

## 2022-05-27 NOTE — PLAN OF CARE
POC reviewed.    Pt socializes with select peer.  Will engage with staff when prompted.  Endorses AH.  Denies VH, S/HI.  PRN Lorazepam 2mg/Benadryl 50mg/Haldol 5mg given po.  Effectiveness noted.  Tolerating meals/snacks.  Close observation ongoing.

## 2022-05-27 NOTE — PROGRESS NOTES
"PSYCHIATRY DAILY INPATIENT PROGRESS NOTE  SUBSEQUENT HOSPITAL VISIT    ENCOUNTER DATE: 5/27/2022  SITE: 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 The chart was reviewed.     Reviewed notes from Rns, MD and SW and labs from the last 24 hours.    The patient's case was discussed with the treatment team/care providers today including Rns, CTRS and SW    Staff reports severe (requiring PRN medications for non redirectable, agitated behavior in order to prevent harm to self or others) behavioral or management issues.     The patient has been compliant with treatment.      Subjective 05/27/2022       Today, the patient reports that he is "good today.. the voices helped me remember things.. they helped him read other people's thoughts.  - HE continues with FOI and a derailed thought process. Kinza remains severe and fx/bx impairing  - he is making slow, incremental improvements.     He [id number] the desire to resume his CARRASCO- it was confirmed that he received Invega CARRASCO 234 mg IM on 5/26/22 without incident.     He had several bouts of disruptive/agitated behavior associated with psychosis which again required prns for management.    The patient denies any side effects to medications.      Psychiatric ROS (observed, reported, or endorsed/denied):  Depressed mood - denies  Interest/pleasure/anhedonia: denies  Guilt/hopelessness/worthlessness - denies  Changes in Sleep - denies  Changes in Appetite - denies  Changes in Concentration - denies  Changes in Energy - denies  PMA/R- denies  Suicidal- active/passive ideations - denies  Homicidal ideations: active/passive ideations - denies    Hallucinations - Continuing  Delusions - Continuing  Disorganized behavior " - improving steadily  Disorganized speech - improving steadily  Negative symptoms - denies    Elevated mood - variable  Decreased need for sleep - variable  Grandiosity - variable  Racing thoughts - variable  Impulsivity - Continuing  Irritability- variable  Increased energy - variable  Distractibility - Continuing  Increase in goal-directed activity or PMA- Continuing    Symptoms of KANU - variable  Symptoms of Panic Disorder- denies  Symptoms of PTSD - denies        Overall progress: Patient is showing mild improvement         Psychotherapy:  · Target symptoms: mood disorder, psychosis  · Why chosen therapy is appropriate versus another modality: relevant to diagnosis, patient responds to this modality, evidence based practice  · Outcome monitoring methods: self-report, observation  · Therapeutic intervention type: insight oriented psychotherapy, behavior modifying psychotherapy, supportive psychotherapy, interactive psychotherapy  · Topics discussed/themes: building skills sets for symptom management, symptom recognition  · The patient's response to the intervention is accepting. The patient's progress toward treatment goals is limited, poor.   · Duration of intervention: 16 minutes.        Medical ROS  General ROS: negative  Ophthalmic ROS: negative  ENT ROS: negative  Allergy and Immunology ROS: negative  Hematological and Lymphatic ROS: negative  Endocrine ROS: negative  Respiratory ROS: no cough, shortness of breath, or wheezing  Cardiovascular ROS: no chest pain or dyspnea on exertion  Gastrointestinal ROS: no abdominal pain, change in bowel habits, or black or bloody stools  Genito-Urinary ROS: no dysuria, trouble voiding, or hematuria  Musculoskeletal ROS: negative  Neurological ROS: no TIA or stroke symptoms  Dermatological ROS: negative    PAST MEDICAL HISTORY   Past Medical History:   Diagnosis Date    History of psychiatric hospitalization     Hx of psychiatric care     Psychiatric problem      "Schizophrenia     Suicide attempt            PSYCHOTROPIC MEDICATIONS   Scheduled Meds:   ARIPiprazole  10 mg Oral Daily    divalproex  500 mg Oral BID    folic acid  1 mg Oral Daily    multivitamin  1 tablet Oral Daily     Continuous Infusions:  PRN Meds:.acetaminophen, aluminum-magnesium hydroxide-simethicone, haloperidoL **AND** diphenhydrAMINE **AND** LORazepam **AND** haloperidol lactate **AND** diphenhydrAMINE **AND** lorazepam, docusate sodium, hydrOXYzine pamoate, loperamide, nicotine        EXAMINATION    VITALS   Vitals:    05/25/22 1927 05/26/22 0756 05/26/22 1921 05/27/22 0745   BP: 106/64 112/76 108/63 (!) 101/58   BP Location:  Left arm Left arm Right arm   Patient Position: Sitting Sitting Sitting Lying   Pulse: 90 72 91 61   Resp: 20 18 16 20   Temp: 97.7 °F (36.5 °C) 96.6 °F (35.9 °C) 98.3 °F (36.8 °C) 97 °F (36.1 °C)   TempSrc: Oral Oral Oral Oral   SpO2: 96% 100% 98% 100%   Weight:       Height:           Body mass index is 20.62 kg/m².      CONSTITUTIONAL  General Appearance: unremarkable, age appropriate, well nourished,normal weight     MUSCULOSKELETAL  Muscle Strength and Tone:no dyskinesia, no tremor, no tic  Abnormal Involuntary Movements: None  Gait and Station: non-ataxic     PSYCHIATRIC   Level of Consciousness: awake and alert   Orientation: person, place, time, and situation  Grooming: less Disheveled and Hospital garb  Psychomotor Behavior: less psychomotor agitation, less restless and fidgety , eye contact normal  Speech: variable r/t/v/s  Language: grossly intact, able to name, able to repeat  Mood: labile, "good"  Affect: Labile, anxious  Thought Process: derailed but somewhat more linear  Associations: less loose   Thought Content: +delusions, denies SI and denies HI  Perceptions: +AH and denies  VH  Memory: Remote impaired and Recent impaired  Attention:Decreased and Easily distracted  Fund of Knowledge: age and education and apporiate  Estimate if Intelligence:  Unable to " determine based on work/education history, vocabulary and mental status exam  Insight: poor awareness of illness  Judgment: impaired due to psychosis- poor    DIAGNOSTIC TESTING   Laboratory Results  Recent Results (from the past 24 hour(s))   Valproic Acid    Collection Time: 05/27/22  5:54 AM   Result Value Ref Range    Valproic Acid Level 66 50 - 100 ug/mL             MEDICAL DECISION MAKING      ASSESSMENT:   Schizophrenia, chronic with acute exacerbation  Mood Disorder NOS  R/o SAD, bipolar type; s/o SIMD, SIPD  Unspecified Anxiety Disorder     Psychosocial stressors     Cannabis abuse           PROBLEM LIST AND MANAGEMENT PLANS     Psychosis: pt counseled  -started re-trial of Abilify at 5 mg po q day- increase to 7 mg po q day today- increase to 10 mg po q day; will plan to taper off as CARRASCO reaches therapeutic effect  -verified that the patient was  given a CARRASCO (Invega 234 mg IM on 4/24/22)   Gave Invega CARRASCO 234 mg IM x 1 on 5/26/22; continue q monthly     Mood: pt counseled  -abilify as above  -started trial of depakote 500 mg po BID-  level 66- increase to 750 mg po BID; check level Monday AM     Anxiety: pt counseled  -vistaril prn     Psychosocial stressors: pt counseled  -SW consulted and assisting with resources     Cannabis abuse: pt counseled       PRESCRIPTION DRUG MANAGEMENT  Compliance: yes  Side Effects: no  Regimen Adjustments: see above     Discussed diagnosis, risks and benefits of proposed treatment vs alternative treatments vs no treatment, potential side effects of these treatments and the inherent unpredictability of treatment. The patient expresses understanding of the above and displays the capacity to agree with this treatment given said understanding. Patient also agrees that, currently, the benefits outweigh the risks and would like to pursue/continue treatment at this time.        DIAGNOSTIC TESTING  Labs reviewed with patient; follow up pending labs     Disposition:  -Will attempt to  obtain outside psychiatric records if available  -SW to assist with aftercare planning and collateral  -Once stable discharge home with outpatient follow up care and/or rehab  -Continue inpatient treatment under a PEC and/or CEC for danger to self/ danger to others/grave disability as evident by significant psychotic thought disorder, aggressive neuroleptic titration, hallucinations, danger to self, danger to others, aggressive behavior and gravely disabled     DISCHARGE PLANNING  Expected Disposition Plan: Home or Self Care      NEED FOR CONTINUED HOSPITALIZATION  Psychiatric illness continues to pose a potential threat to life or bodily function, of self or others, thereby requiring the need for continued inpatient psychiatric hospitalization: Yes, due to: significant psychotic thought disorder, aggressive neuroleptic titration, aggressive behavior and gravely disabled, as evidenced by:  Ongoing concerns with grave disability with patient unable to perform basic feeding, hygiene and dressing activities without significant constant support. and Ongoing concerns with perceptual aberrancy and paranoid persecutory delusions leading to potential harm of self or others.    Protective inpatient pyschiatric hospitalization required while a safe disposition plan is enacted: Yes    Patient stabilized and ready for discharge from inpatient psychiatric unit: No        STAFF:   Frantz Cagle MD  Psychiatry

## 2022-05-27 NOTE — PLAN OF CARE
"  Problem: Adult Behavioral Health Plan of Care  Goal: Optimized Coping Skills in Response to Life Stressors  Intervention: Promote Effective Coping Strategies  Flowsheets (Taken 5/27/2022 1204)  Supportive Measures:   active listening utilized   verbalization of feelings encouraged   self-reflection promoted   self-responsibility promoted    Behavior: Pt sat in back of room with meliza over his head and seemed more withdrawn than in previous days.  He shared when it was his turn.     Intervention: In this CBT-focused process group LMSW facilitated a group discussion on different types of communication, including assertive, non-assertive, and aggressive.  Each patient was asked to identify their primary way of communicating and discuss ways in which that type of communication is/isn't helpful, and ways to communicate more appropriately, if applicable.      Response: Pt shared he is feeling "much better" today because "my medication has calmed the voices down and I'm getting used to my own thoughts again."  He shared that he uses assertive communication and gave the example of telling his mom she needs mental help and needs to go to a hospital because she is a narcissist.  Pt was resistant to talk about more appropriate ways to communicate with her.  Pt is still upset with his mom and says that she doesn't allow him to express his feelings or understand his coping skills and that she just thinks he is crazy.     Plan: Continue to encourage pt to participate in process groups to verbalize feelings and develop healthy coping skills.                   "

## 2022-05-28 PROCEDURE — 99232 PR SUBSEQUENT HOSPITAL CARE,LEVL II: ICD-10-PCS | Mod: AF,HB,, | Performed by: PSYCHIATRY & NEUROLOGY

## 2022-05-28 PROCEDURE — 11400000 HC PSYCH PRIVATE ROOM

## 2022-05-28 PROCEDURE — 25000003 PHARM REV CODE 250: Performed by: INTERNAL MEDICINE

## 2022-05-28 PROCEDURE — 25000003 PHARM REV CODE 250: Performed by: PSYCHIATRY & NEUROLOGY

## 2022-05-28 PROCEDURE — 99232 SBSQ HOSP IP/OBS MODERATE 35: CPT | Mod: AF,HB,, | Performed by: PSYCHIATRY & NEUROLOGY

## 2022-05-28 RX ADMIN — THERA TABS 1 TABLET: TAB at 08:05

## 2022-05-28 RX ADMIN — ARIPIPRAZOLE 10 MG: 10 TABLET ORAL at 08:05

## 2022-05-28 RX ADMIN — DIVALPROEX SODIUM 750 MG: 250 TABLET, DELAYED RELEASE ORAL at 08:05

## 2022-05-28 RX ADMIN — HYDROXYZINE PAMOATE 50 MG: 50 CAPSULE ORAL at 08:05

## 2022-05-28 RX ADMIN — FOLIC ACID 1 MG: 1 TABLET ORAL at 08:05

## 2022-05-28 NOTE — PLAN OF CARE
POC reviewed this shift and is on going. Patient is depressed, anxious, showing pressured speech, and labile.  Denies Suicidal Ideation, Homicidal Ideation, Auditory Hallucinations, Visual Hallucinations, Tactile Hallucinations, Gustatory Hallucinations, and Delusions. States he feels much better now. Pt continues to be medication compliant and staff will continue to monitor pt closely while on the unit.

## 2022-05-28 NOTE — PLAN OF CARE
POC reviewed this shift and is on going.  Pt cooperative with staff and limited interaction with peers. Pt continues to dance in hallway to his room and is easily redirected. Pt took meds without adverse reaction. Will continue to monitor for changes and safety.

## 2022-05-28 NOTE — PROGRESS NOTES
"PSYCHIATRY DAILY INPATIENT PROGRESS NOTE  SUBSEQUENT HOSPITAL VISIT    ENCOUNTER DATE: 5/28/2022  SITE: 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 The chart was reviewed.     Reviewed notes from Rns, MD and SW and labs from the last 24 hours.    The patient's case was discussed with the treatment team/care providers today including Rns    Staff reports no behavioral or management issues.     The patient has been compliant with treatment.      Subjective 05/28/2022    Met with pt, discussed with staff, reviewed chart.  Today, the patient reports that his mood is "pretty good" and affect was appropriate. He states he feels "a lot better" today and is doing better on the unit. He has had no major issues on the unit in over 24 hours. He denies AH for "about 2 days now" and denies VH/SI/HI.  - he is making slow, incremental improvements. He reports good sleep and appetite. He is eager to cont his invega sustenna.  Staff report yesterday he was observed RIS and has had FOI at times.    The patient denies any side effects to medications.      Psychiatric ROS (observed, reported, or endorsed/denied):  Depressed mood - denies  Interest/pleasure/anhedonia: denies  Guilt/hopelessness/worthlessness - denies  Changes in Sleep - denies  Changes in Appetite - denies  Changes in Concentration - denies  Changes in Energy - denies  PMA/R- denies  Suicidal- active/passive ideations - denies  Homicidal ideations: active/passive ideations - denies    Hallucinations - decreasing steadily  Delusions - decreasing steadily  Disorganized " behavior - improving steadily  Disorganized speech - improving steadily  Negative symptoms - denies    Elevated mood - variable  Decreased need for sleep - variable  Grandiosity - variable  Racing thoughts - variable  Impulsivity - Continuing  Irritability- variable  Increased energy - variable  Distractibility - Continuing  Increase in goal-directed activity or PMA- Continuing    Symptoms of KANU - variable  Symptoms of Panic Disorder- denies  Symptoms of PTSD - denies        Overall progress: Patient is showing mild improvement         Medical ROS  General ROS: negative  Ophthalmic ROS: negative  ENT ROS: negative  Allergy and Immunology ROS: negative  Hematological and Lymphatic ROS: negative  Endocrine ROS: negative  Respiratory ROS: no cough, shortness of breath, or wheezing  Cardiovascular ROS: no chest pain or dyspnea on exertion  Gastrointestinal ROS: no abdominal pain, change in bowel habits, or black or bloody stools  Genito-Urinary ROS: no dysuria, trouble voiding, or hematuria  Musculoskeletal ROS: negative  Neurological ROS: no TIA or stroke symptoms  Dermatological ROS: negative    PAST MEDICAL HISTORY   Past Medical History:   Diagnosis Date    History of psychiatric hospitalization     Hx of psychiatric care     Psychiatric problem     Schizophrenia     Suicide attempt            PSYCHOTROPIC MEDICATIONS   Scheduled Meds:   ARIPiprazole  10 mg Oral Daily    divalproex  750 mg Oral BID    folic acid  1 mg Oral Daily    multivitamin  1 tablet Oral Daily     Continuous Infusions:  PRN Meds:.acetaminophen, aluminum-magnesium hydroxide-simethicone, haloperidoL **AND** diphenhydrAMINE **AND** LORazepam **AND** haloperidol lactate **AND** diphenhydrAMINE **AND** lorazepam, docusate sodium, hydrOXYzine pamoate, loperamide, nicotine        EXAMINATION    VITALS   Vitals:    05/27/22 0745 05/27/22 1904 05/28/22 0737 05/28/22 1700   BP: (!) 101/58 (!) 101/59 (!) 125/56    BP Location: Right arm Left arm  "Left arm    Patient Position: Lying Sitting Sitting    Pulse: 61 95 86    Resp: 20 16 20    Temp: 97 °F (36.1 °C) 97.8 °F (36.6 °C) 98.2 °F (36.8 °C)    TempSrc: Oral Oral Oral    SpO2: 100% 96% 99%    Weight:    72.1 kg (159 lb)   Height:           Body mass index is 19.87 kg/m².      CONSTITUTIONAL  General Appearance: unremarkable, age appropriate, well nourished,normal weight     MUSCULOSKELETAL  Muscle Strength and Tone:no dyskinesia, no tremor, no tic  Abnormal Involuntary Movements: None  Gait and Station: non-ataxic     PSYCHIATRIC   Level of Consciousness: awake and alert   Orientation: person, place, time, and situation  Grooming: less Disheveled and Hospital garb  Psychomotor Behavior: WNL, eye contact normal  Speech: normal rate, volume, tone  Language: grossly intact, able to name, able to repeat  Mood:  "pretty good"  Affect: appropriate  Thought Process: concrete  Associations: less loose   Thought Content: denies SI and denies HI  Perceptions: +AH per staff yesterday, pt denies, and denies  VH  Memory: Remote impaired and Recent impaired  Attention:Decreased and Easily distracted  Fund of Knowledge: age and education and apporiate  Estimate if Intelligence:  average based on vocabulary and communication skills  Insight: poor awareness of illness  Judgment: impaired due to psychosis- poor    DIAGNOSTIC TESTING   Laboratory Results  No results found for this or any previous visit (from the past 24 hour(s)).          MEDICAL DECISION MAKING      ASSESSMENT:   Schizophrenia, chronic with acute exacerbation  Mood Disorder NOS  R/o SAD, bipolar type; s/o SIMD, SIPD  Unspecified Anxiety Disorder     Psychosocial stressors     Cannabis abuse           PROBLEM LIST AND MANAGEMENT PLANS     Psychosis: pt counseled  Cont abilify 10 mg po qam, will plan to taper off as CARRASCO reaches therapeutic effect  -verified that the patient was  given a CARRASCO (Invega 234 mg IM on 4/24/22)   Gave Invega CARRASCO 234 mg IM x 1 on " 5/26/22; continue q monthly     Mood: pt counseled  -abilify as above  -started trial of depakote 500 mg po BID-  level 66- increase to 750 mg po BID; check level Monday AM     Anxiety: pt counseled  -vistaril prn     Psychosocial stressors: pt counseled  -SW consulted and assisting with resources     Cannabis abuse: pt counseled       PRESCRIPTION DRUG MANAGEMENT  Compliance: yes  Side Effects: no  Regimen Adjustments: see above     Discussed diagnosis, risks and benefits of proposed treatment vs alternative treatments vs no treatment, potential side effects of these treatments and the inherent unpredictability of treatment. The patient expresses understanding of the above and displays the capacity to agree with this treatment given said understanding. Patient also agrees that, currently, the benefits outweigh the risks and would like to pursue/continue treatment at this time.        DIAGNOSTIC TESTING  Labs reviewed with patient; follow up pending labs     Disposition:  -Will attempt to obtain outside psychiatric records if available  -SW to assist with aftercare planning and collateral  -Once stable discharge home with outpatient follow up care and/or rehab  -Continue inpatient treatment under a PEC and/or CEC for danger to self/ danger to others/grave disability as evident by significant psychotic thought disorder, aggressive neuroleptic titration, hallucinations, danger to self, danger to others, aggressive behavior and gravely disabled     DISCHARGE PLANNING  Expected Disposition Plan: Home or Self Care      NEED FOR CONTINUED HOSPITALIZATION  Psychiatric illness continues to pose a potential threat to life or bodily function, of self or others, thereby requiring the need for continued inpatient psychiatric hospitalization: Yes, due to: significant psychotic thought disorder, aggressive neuroleptic titration, aggressive behavior and gravely disabled, as evidenced by:  Ongoing concerns with grave disability  with patient unable to perform basic feeding, hygiene and dressing activities without significant constant support. and Ongoing concerns with perceptual aberrancy and paranoid persecutory delusions leading to potential harm of self or others.    Protective inpatient pyschiatric hospitalization required while a safe disposition plan is enacted: Yes    Patient stabilized and ready for discharge from inpatient psychiatric unit: No  Time 25min      STAFF:   Willian Avery MD  Psychiatry

## 2022-05-29 PROCEDURE — 99231 SBSQ HOSP IP/OBS SF/LOW 25: CPT | Mod: AF,HB,, | Performed by: PSYCHIATRY & NEUROLOGY

## 2022-05-29 PROCEDURE — 11400000 HC PSYCH PRIVATE ROOM

## 2022-05-29 PROCEDURE — 25000003 PHARM REV CODE 250: Performed by: PSYCHIATRY & NEUROLOGY

## 2022-05-29 PROCEDURE — 25000003 PHARM REV CODE 250: Performed by: INTERNAL MEDICINE

## 2022-05-29 PROCEDURE — 99231 PR SUBSEQUENT HOSPITAL CARE,LEVL I: ICD-10-PCS | Mod: AF,HB,, | Performed by: PSYCHIATRY & NEUROLOGY

## 2022-05-29 RX ADMIN — DIVALPROEX SODIUM 750 MG: 250 TABLET, DELAYED RELEASE ORAL at 08:05

## 2022-05-29 RX ADMIN — ARIPIPRAZOLE 10 MG: 10 TABLET ORAL at 08:05

## 2022-05-29 RX ADMIN — THERA TABS 1 TABLET: TAB at 08:05

## 2022-05-29 RX ADMIN — FOLIC ACID 1 MG: 1 TABLET ORAL at 08:05

## 2022-05-29 RX ADMIN — HYDROXYZINE PAMOATE 50 MG: 50 CAPSULE ORAL at 08:05

## 2022-05-29 NOTE — PLAN OF CARE
POC reviewed this shift and is on going  Pt interacts with peers at a minimal and is cooperative with staff. Denies SI/HI, A/V hallucinations. Pt continues to respond to internal stimuli. Took meds with out adverse reactions. Will continue to monitor for changes and safety.

## 2022-05-29 NOTE — PLAN OF CARE
POC reviewed this shift and is on going. Patient is depressed, calm, cooperative, anxious, pacing, and paranoid. Denies Suicidal Ideation, Homicidal Ideation, Auditory Hallucinations, Visual Hallucinations, Tactile Hallucinations, Gustatory Hallucinations, and Delusions. Patient continues to be intrusive and at the nurses station a lot during the day. Patient has been hyper-verbal especially during treatment team.  Pt continues to be medication compliant and staff will continue to monitor pt closely while on the unit.

## 2022-05-30 LAB — VALPROATE SERPL-MCNC: 90 UG/ML (ref 50–100)

## 2022-05-30 PROCEDURE — 99233 SBSQ HOSP IP/OBS HIGH 50: CPT | Mod: AF,HB,, | Performed by: STUDENT IN AN ORGANIZED HEALTH CARE EDUCATION/TRAINING PROGRAM

## 2022-05-30 PROCEDURE — 25000003 PHARM REV CODE 250: Performed by: PSYCHIATRY & NEUROLOGY

## 2022-05-30 PROCEDURE — 90833 PSYTX W PT W E/M 30 MIN: CPT | Mod: AF,HB,, | Performed by: STUDENT IN AN ORGANIZED HEALTH CARE EDUCATION/TRAINING PROGRAM

## 2022-05-30 PROCEDURE — 90833 PR PSYCHOTHERAPY W/PATIENT W/E&M, 30 MIN (ADD ON): ICD-10-PCS | Mod: AF,HB,, | Performed by: STUDENT IN AN ORGANIZED HEALTH CARE EDUCATION/TRAINING PROGRAM

## 2022-05-30 PROCEDURE — 25000003 PHARM REV CODE 250: Performed by: INTERNAL MEDICINE

## 2022-05-30 PROCEDURE — 80164 ASSAY DIPROPYLACETIC ACD TOT: CPT | Performed by: PSYCHIATRY & NEUROLOGY

## 2022-05-30 PROCEDURE — 36415 COLL VENOUS BLD VENIPUNCTURE: CPT | Performed by: PSYCHIATRY & NEUROLOGY

## 2022-05-30 PROCEDURE — 99233 PR SUBSEQUENT HOSPITAL CARE,LEVL III: ICD-10-PCS | Mod: AF,HB,, | Performed by: STUDENT IN AN ORGANIZED HEALTH CARE EDUCATION/TRAINING PROGRAM

## 2022-05-30 PROCEDURE — 11400000 HC PSYCH PRIVATE ROOM

## 2022-05-30 RX ADMIN — DIVALPROEX SODIUM 750 MG: 250 TABLET, DELAYED RELEASE ORAL at 09:05

## 2022-05-30 RX ADMIN — HYDROXYZINE PAMOATE 50 MG: 50 CAPSULE ORAL at 09:05

## 2022-05-30 RX ADMIN — THERA TABS 1 TABLET: TAB at 08:05

## 2022-05-30 RX ADMIN — DIVALPROEX SODIUM 750 MG: 250 TABLET, DELAYED RELEASE ORAL at 08:05

## 2022-05-30 RX ADMIN — FOLIC ACID 1 MG: 1 TABLET ORAL at 08:05

## 2022-05-30 RX ADMIN — ARIPIPRAZOLE 10 MG: 10 TABLET ORAL at 08:05

## 2022-05-30 NOTE — PROGRESS NOTES
"PSYCHIATRY DAILY INPATIENT PROGRESS NOTE  SUBSEQUENT HOSPITAL VISIT    ENCOUNTER DATE: 5/29/2022  SITE: 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 The chart was reviewed.     Reviewed notes from Rns and labs from the last 24 hours.    The patient's case was discussed with the treatment team/care providers today including Rns    Staff reports no behavioral or management issues.     The patient has been compliant with treatment.      Subjective 05/29/2022    Met with pt, discussed with staff, reviewed chart.  Today, the patient reports that his mood is "good" and affect was appropriate. He states he feels "better" today and is doing better on the unit. Staff reports he is still intrusive at times and is still RIS. He denies AH for "about 2 days now" and denies VH/SI/HI.  - he is making slow, incremental improvements. He reports good sleep and appetite. He is eager to cont his invega sustenna.  Staff report yesterday he was observed RIS and has had FOI at times.    The patient denies any side effects to medications.      Psychiatric ROS (observed, reported, or endorsed/denied):  Depressed mood - denies  Interest/pleasure/anhedonia: denies  Guilt/hopelessness/worthlessness - denies  Changes in Sleep - denies  Changes in Appetite - denies  Changes in Concentration - denies  Changes in Energy - denies  PMA/R- denies  Suicidal- active/passive ideations - denies  Homicidal ideations: active/passive ideations - denies    Hallucinations - decreasing steadily  Delusions - decreasing steadily  Disorganized behavior - " improving steadily  Disorganized speech - improving steadily  Negative symptoms - denies    Elevated mood - variable  Decreased need for sleep - variable  Grandiosity - variable  Racing thoughts - variable  Impulsivity - Continuing  Irritability- variable  Increased energy - variable  Distractibility - Continuing  Increase in goal-directed activity or PMA- Continuing    Symptoms of KANU - variable  Symptoms of Panic Disorder- denies  Symptoms of PTSD - denies        Overall progress: Patient is showing mild improvement         Medical ROS  General ROS: negative  Ophthalmic ROS: negative  ENT ROS: negative  Allergy and Immunology ROS: negative  Hematological and Lymphatic ROS: negative  Endocrine ROS: negative  Respiratory ROS: no cough, shortness of breath, or wheezing  Cardiovascular ROS: no chest pain or dyspnea on exertion  Gastrointestinal ROS: no abdominal pain, change in bowel habits, or black or bloody stools  Genito-Urinary ROS: no dysuria, trouble voiding, or hematuria  Musculoskeletal ROS: negative  Neurological ROS: no TIA or stroke symptoms  Dermatological ROS: negative    PAST MEDICAL HISTORY   Past Medical History:   Diagnosis Date    History of psychiatric hospitalization     Hx of psychiatric care     Psychiatric problem     Schizophrenia     Suicide attempt            PSYCHOTROPIC MEDICATIONS   Scheduled Meds:   ARIPiprazole  10 mg Oral Daily    divalproex  750 mg Oral BID    folic acid  1 mg Oral Daily    multivitamin  1 tablet Oral Daily     Continuous Infusions:  PRN Meds:.acetaminophen, aluminum-magnesium hydroxide-simethicone, haloperidoL **AND** diphenhydrAMINE **AND** LORazepam **AND** haloperidol lactate **AND** diphenhydrAMINE **AND** lorazepam, docusate sodium, hydrOXYzine pamoate, loperamide, nicotine        EXAMINATION    VITALS   Vitals:    05/28/22 1700 05/28/22 1905 05/29/22 0724 05/29/22 1902   BP:  109/61 105/72 104/63   BP Location:  Left arm Left arm Left arm   Patient  "Position:  Sitting Sitting Sitting   Pulse:  94 64 88   Resp:  20 20 16   Temp:  98.7 °F (37.1 °C) 98.3 °F (36.8 °C) 98.1 °F (36.7 °C)   TempSrc:  Oral Oral Oral   SpO2:  97% 98% 98%   Weight: 72.1 kg (159 lb)      Height:           Body mass index is 19.87 kg/m².      CONSTITUTIONAL  General Appearance: unremarkable, age appropriate, well nourished,normal weight     MUSCULOSKELETAL  Muscle Strength and Tone:no dyskinesia, no tremor, no tic  Abnormal Involuntary Movements: None  Gait and Station: non-ataxic     PSYCHIATRIC   Level of Consciousness: awake and alert   Orientation: person, place, time, and situation  Grooming: less Disheveled and Hospital garb  Psychomotor Behavior: WNL, eye contact normal  Speech: normal rate, volume, tone  Language: grossly intact, able to name, able to repeat  Mood:  "good"  Affect: appropriate  Thought Process: concrete  Associations: less loose   Thought Content: denies SI and denies HI  Perceptions: +AH per staff yesterday, pt denies, and denies  VH  Memory: Remote impaired and Recent impaired  Attention:Decreased and Easily distracted  Fund of Knowledge: age and education and apporiate  Estimate if Intelligence:  average based on vocabulary and communication skills  Insight: poor awareness of illness  Judgment: impaired due to psychosis- poor    DIAGNOSTIC TESTING   Laboratory Results  No results found for this or any previous visit (from the past 24 hour(s)).          MEDICAL DECISION MAKING      ASSESSMENT:   Schizophrenia, chronic with acute exacerbation  Mood Disorder NOS  R/o SAD, bipolar type; s/o SIMD, SIPD  Unspecified Anxiety Disorder     Psychosocial stressors     Cannabis abuse           PROBLEM LIST AND MANAGEMENT PLANS     Psychosis: pt counseled  Cont abilify 10 mg po qam, will plan to taper off as CARRASCO reaches therapeutic effect  -verified that the patient was  given a CARRASCO (Invega 234 mg IM on 4/24/22)   Gave Invega CARRASCO 234 mg IM x 1 on 5/26/22; continue q " monthly     Mood: pt counseled  -abilify as above  -started trial of depakote 500 mg po BID-  level 66- increase to 750 mg po BID; check level Monday AM     Anxiety: pt counseled  -vistaril prn     Psychosocial stressors: pt counseled  -SW consulted and assisting with resources     Cannabis abuse: pt counseled       PRESCRIPTION DRUG MANAGEMENT  Compliance: yes  Side Effects: no  Regimen Adjustments: see above     Discussed diagnosis, risks and benefits of proposed treatment vs alternative treatments vs no treatment, potential side effects of these treatments and the inherent unpredictability of treatment. The patient expresses understanding of the above and displays the capacity to agree with this treatment given said understanding. Patient also agrees that, currently, the benefits outweigh the risks and would like to pursue/continue treatment at this time.        DIAGNOSTIC TESTING  Labs reviewed with patient; follow up pending labs     Disposition:  -Will attempt to obtain outside psychiatric records if available  -SW to assist with aftercare planning and collateral  -Once stable discharge home with outpatient follow up care and/or rehab  -Continue inpatient treatment under a PEC and/or CEC for danger to self/ danger to others/grave disability as evident by significant psychotic thought disorder, aggressive neuroleptic titration, hallucinations, danger to self, danger to others, aggressive behavior and gravely disabled     DISCHARGE PLANNING  Expected Disposition Plan: Home or Self Care      NEED FOR CONTINUED HOSPITALIZATION  Psychiatric illness continues to pose a potential threat to life or bodily function, of self or others, thereby requiring the need for continued inpatient psychiatric hospitalization: Yes, due to: significant psychotic thought disorder, aggressive neuroleptic titration, aggressive behavior and gravely disabled, as evidenced by:  Ongoing concerns with grave disability with patient unable to  perform basic feeding, hygiene and dressing activities without significant constant support. and Ongoing concerns with perceptual aberrancy and paranoid persecutory delusions leading to potential harm of self or others.    Protective inpatient pyschiatric hospitalization required while a safe disposition plan is enacted: Yes    Patient stabilized and ready for discharge from inpatient psychiatric unit: No  Time 15min      STAFF:   Willian Avery MD  Psychiatry

## 2022-05-30 NOTE — PLAN OF CARE
Problem: Adult Behavioral Health Plan of Care  Goal: Optimized Coping Skills in Response to Life Stressors  Intervention: Promote Effective Coping Strategies  Flowsheets (Taken 5/30/2022 1034)  Supportive Measures:   active listening utilized   self-reflection promoted   self-responsibility promoted   verbalization of feelings encouraged    Behavior: Pt was calm and cooperative and actively engaged in group process.  Pt was alert, oriented, and not noted to be RIS.       Intervention: In this CBT-focused group LMSW facilitated discussion on triggers and problems those triggers are contributing to.  Each patient was asked to identify and discuss their triggers (emotional, people, places, things, thoughts, activities, situations) and the problem these triggers create.  Each patient was then asked to discuss ways to either avoid or reduce their exposure to the trigger, or ways in which to face the trigger head on.       Response: Pt shared one of his biggest triggers is when he is misunderstood, which results in tempter tantrums.  He shared he can learn to prepare himself for this trigger and choose to use a different tone of voice or explain things in a different way.      Plan: Continue to encourage pt to participate in process groups to verbalize feelings and develop healthy coping skills.

## 2022-05-30 NOTE — PLAN OF CARE
"Patient is alert and oriented, calm, pleasant, cooperative, and focused on discharge. Stayed out of room all day, interacting with staff and peers, and  attended all group sessions. Appetite is good and medication compliant. Patient endorses "I am feeling great and ready to leave". Denies S/HI, denies A/VH, no observations of RIS. No negative behaviors noted. Dr. Grijalva visited per telemed with no new orders. Patient is candidate for discharge tomorrow. Patient is sitting in the dining room at this time. Close observations continued and safe environment maintained.  "

## 2022-05-30 NOTE — PROGRESS NOTES
"PSYCHIATRY DAILY INPATIENT PROGRESS NOTE  SUBSEQUENT HOSPITAL VISIT      The patient location is: [facility]    Visit type: audiovisual    Face to Face time with patient: 30  40 minutes of total time spent on the encounter, which includes face to face time and non-face to face time preparing to see the patient (eg, review of tests), Obtaining and/or reviewing separately obtained history, Documenting clinical information in the electronic or other health record, Independently interpreting results (not separately reported) and communicating results to the patient/family/caregiver, or Care coordination (not separately reported).     Each patient to whom he or she provides medical services by telemedicine is:  (1) informed of the relationship between the physician and patient and the respective role of any other health care provider with respect to management of the patient; and (2) notified that he or she may decline to receive medical services by telemedicine and may withdraw from such care at any time.        ENCOUNTER DATE: 5/30/2022  SITE: [facility]                                                                   Pt agreeable with Audiovisual telehealth visit  Provider location [facility]      DATE OF ADMISSION: 5/22/2022  9:53 PM  LENGTH OF STAY: 8 days      CHIEF COMPLAINT   Bishop Soria is a 21 y.o. 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 The chart was reviewed.     Reviewed notes from Rns and SW and labs from the last 24 hours.    The patient's case was discussed with the treatment team/care providers today including Rns    Staff reports no behavioral or management issues.     The patient has been compliant with treatment.          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 for vacation to clear my " "mind."    Regarding MJ usage, "it's the environment that I'm in... me and my mom fell out, she makes me feel like I want to smoke, I've been getting off weed and smoking more cigarettes, I'm weaning myself off, I only smoke 2 blunts a week." Reports "I don't want to use it as a crutch... I want to get better for my future... I'll wind up back in the hospital... I don't want to smoke weed... I know I can last without it."    The patient denies any side effects to medications.          Psychiatric ROS (observed, reported, or endorsed/denied):  Depressed mood - denies  Interest/pleasure/anhedonia: denies  Guilt/hopelessness/worthlessness - denies  Changes in Sleep - denies  Changes in Appetite - denies  Changes in Concentration - denies  Changes in Energy - denies  PMA/R- denies  Suicidal- active/passive ideations - denies  Homicidal ideations: active/passive ideations - denies    Hallucinations - decreasing steadily  Delusions - decreasing steadily  Disorganized behavior - improving steadily  Disorganized speech - improving steadily  Negative symptoms - denies    Elevated mood - less  Decreased need for sleep - less  Grandiosity - fluctuating  Racing thoughts - less  Impulsivity - less  Irritability- less  Increased energy - fluctuating  Distractibility - less  Increase in goal-directed activity or PMA- less    Symptoms of KANU - less  Symptoms of Panic Disorder- denies  Symptoms of PTSD - denies        Psychotherapy:  · Target symptoms: substance abuse, mood disorder, psychosis  · Why chosen therapy is appropriate versus another modality: relevant to diagnosis  · Outcome monitoring methods: self-report  · Therapeutic intervention type: supportive psychotherapy  · Topics discussed/themes: building skills sets for symptom management, symptom recognition, substance abuse  · The patient's response to the intervention is accepting. The patient's progress toward treatment goals is fair.   · Duration of intervention: 16 " minutes.          Overall progress: Patient is showing mild improvement         Medical ROS  General ROS: negative  Ophthalmic ROS: negative  ENT ROS: negative  Allergy and Immunology ROS: negative  Hematological and Lymphatic ROS: negative  Endocrine ROS: negative  Respiratory ROS: no cough, shortness of breath, or wheezing  Cardiovascular ROS: no chest pain or dyspnea on exertion  Gastrointestinal ROS: no abdominal pain, change in bowel habits, or black or bloody stools  Genito-Urinary ROS: no dysuria, trouble voiding, or hematuria  Musculoskeletal ROS: negative  Neurological ROS: no TIA or stroke symptoms  Dermatological ROS: negative        PAST MEDICAL HISTORY   Past Medical History:   Diagnosis Date    History of psychiatric hospitalization     Hx of psychiatric care     Psychiatric problem     Schizophrenia     Suicide attempt            PSYCHOTROPIC MEDICATIONS   Scheduled Meds:   ARIPiprazole  10 mg Oral Daily    divalproex  750 mg Oral BID    folic acid  1 mg Oral Daily    multivitamin  1 tablet Oral Daily     Continuous Infusions:  PRN Meds:.acetaminophen, aluminum-magnesium hydroxide-simethicone, haloperidoL **AND** diphenhydrAMINE **AND** LORazepam **AND** haloperidol lactate **AND** diphenhydrAMINE **AND** lorazepam, docusate sodium, hydrOXYzine pamoate, loperamide, nicotine        EXAMINATION    VITALS   Vitals:    05/28/22 1905 05/29/22 0724 05/29/22 1902 05/30/22 0800   BP: 109/61 105/72 104/63 118/64   BP Location: Left arm Left arm Left arm Right arm   Patient Position: Sitting Sitting Sitting Sitting   Pulse: 94 64 88 60   Resp: 20 20 16 18   Temp: 98.7 °F (37.1 °C) 98.3 °F (36.8 °C) 98.1 °F (36.7 °C) 97 °F (36.1 °C)   TempSrc: Oral Oral Oral Oral   SpO2: 97% 98% 98% 99%   Weight:       Height:           Body mass index is 19.87 kg/m².        CONSTITUTIONAL  General Appearance: unremarkable, age appropriate, well nourished, normal weight     MUSCULOSKELETAL  Muscle Strength and Tone: no  "dyskinesia, no tremor, no tic  Abnormal Involuntary Movements: None  Gait and Station: non-ataxic     PSYCHIATRIC   Level of Consciousness: awake and alert   Orientation: person, place, time, and situation  Grooming: well groomed, casual dress  Psychomotor Behavior: WNL, eye contact normal  Speech: normal rate, volume, tone  Language: grossly intact, able to name, able to repeat  Mood:  "good"  Affect: appropriate   Thought Process: linear    Associations: less loose    Thought Content: denies SI and denies HI  Perceptions: less AH, no VH  Memory: Remote impaired and Recent impaired   Attention: intact   Fund of Knowledge: appropriate for age and education  Estimate if Intelligence:  average based on vocabulary and communication skills  Insight: has awareness of illness  Judgment: appropriate to circumstance        DIAGNOSTIC TESTING   Laboratory Results  Recent Results (from the past 24 hour(s))   Valproic Acid    Collection Time: 05/30/22  6:43 AM   Result Value Ref Range    Valproic Acid Level 90 50 - 100 ug/mL           MEDICAL DECISION MAKING        ASSESSMENT:   Schizophrenia, chronic with acute exacerbation, severe  Mood Disorder NOS  R/o SAD, bipolar type; s/o SIMD, SIPD  Unspecified Anxiety Disorder     Psychosocial stressors     Cannabis abuse           PROBLEM LIST AND MANAGEMENT PLANS         Psychosis: pt counseled  - Cont abilify 10 mg po qam, will plan to taper off as CARRASCO reaches therapeutic effect  -stop tomorrow  - verified that the patient was  given a CARRASCO (Invega 234 mg IM on 4/24/22)   Gave Invega CARRASCO 234 mg IM x 1 on 5/26/22; continue q monthly       Mood: pt counseled  - abilify as above  - started trial of depakote 500 mg po BID-  level 66  -increase to 750 mg po BID; recheck level level 90        Anxiety: pt counseled  -vistaril prn       Psychosocial stressors: pt counseled  -SW consulted and assisting with resources       Cannabis abuse: pt counseled           PRESCRIPTION DRUG " MANAGEMENT  Compliance: yes  Side Effects: no  Regimen Adjustments: see above         Discussed diagnosis, risks and benefits of proposed treatment vs alternative treatments vs no treatment, potential side effects of these treatments and the inherent unpredictability of treatment. The patient expresses understanding of the above and displays the capacity to agree with this treatment given said understanding. Patient also agrees that, currently, the benefits outweigh the risks and would like to pursue/continue treatment at this time.        DIAGNOSTIC TESTING  Labs reviewed with patient; follow up pending labs     Disposition:  -Will attempt to obtain outside psychiatric records if available  -SW to assist with aftercare planning and collateral  -Once stable discharge home with outpatient follow up care and/or rehab  -Continue inpatient treatment under a PEC and/or CEC for danger to self/ danger to others/grave disability as evident by significant psychotic thought disorder, aggressive neuroleptic titration, hallucinations, danger to self, danger to others, aggressive behavior and gravely disabled       DISCHARGE PLANNING  Expected Disposition Plan: Home or Self Care      NEED FOR CONTINUED HOSPITALIZATION  Psychiatric illness continues to pose a potential threat to life or bodily function, of self or others, thereby requiring the need for continued inpatient psychiatric hospitalization: Yes, due to: significant psychotic thought disorder, aggressive neuroleptic titration, aggressive behavior and gravely disabled, as evidenced by:  Ongoing concerns with grave disability with patient unable to perform basic feeding, hygiene and dressing activities without significant constant support. and Ongoing concerns with perceptual aberrancy and paranoid persecutory delusions leading to potential harm of self or others.    Protective inpatient pyschiatric hospitalization required while a safe disposition plan is enacted:  Yes    Patient stabilized and ready for discharge from inpatient psychiatric unit: No        STAFF:   Cristhian Grijalva III, MD  Psychiatry

## 2022-05-30 NOTE — PLAN OF CARE
SAFETY PLAN   05/30/22 0829   Step 1: Warning Signs   Warning Sign 1 being misunderstood by others   Warning Sign 2 not being in communication with loved ones   Warning Sign 3 not knowing who to call   Step 2: Internal coping strategies - Things I can do to take my mind off my problems without contacting another person:   Coping Strategy 1 meditate   Coping Strategy 2 dance   Coping Strategy 3 smoke cigarettes   Step 3: People and social settings that provide distraction:   1. Name Sukhiedilson Soria       Phone [phone]   2. Name SORAYA Yang   3. Place outside   4. Place park    Step 4: People whom I can ask for help:   1. Person Irvin Morel       Phone [phone]   2. Person Brooke Hardwick   3. Person Nabil Soria       Phone [phone]   Step 5: Professionals or agencies I can contact during a crisis:   1. Clinician Name [facility]       Phone [phone]   3. Suicide Prevention Lifeline: 1-650-085-TALK (1688) Suicide Prevention Lifeline: 8-461-022-TALK (9988)   Step 6: Making the environment safe:   Safe environment 1 keep a clean and drama free environment   Safe environment 2 no knives

## 2022-05-31 VITALS
SYSTOLIC BLOOD PRESSURE: 106 MMHG | DIASTOLIC BLOOD PRESSURE: 62 MMHG | OXYGEN SATURATION: 99 % | RESPIRATION RATE: 18 BRPM | BODY MASS INDEX: 19.77 KG/M2 | HEIGHT: 75 IN | TEMPERATURE: 98 F | WEIGHT: 159 LBS | HEART RATE: 60 BPM

## 2022-05-31 PROCEDURE — 25000003 PHARM REV CODE 250: Performed by: INTERNAL MEDICINE

## 2022-05-31 PROCEDURE — 25000003 PHARM REV CODE 250: Performed by: PSYCHIATRY & NEUROLOGY

## 2022-05-31 PROCEDURE — 90833 PR PSYCHOTHERAPY W/PATIENT W/E&M, 30 MIN (ADD ON): ICD-10-PCS | Mod: AF,HB,, | Performed by: STUDENT IN AN ORGANIZED HEALTH CARE EDUCATION/TRAINING PROGRAM

## 2022-05-31 PROCEDURE — 99239 PR HOSPITAL DISCHARGE DAY,>30 MIN: ICD-10-PCS | Mod: AF,HB,, | Performed by: STUDENT IN AN ORGANIZED HEALTH CARE EDUCATION/TRAINING PROGRAM

## 2022-05-31 PROCEDURE — 99239 HOSP IP/OBS DSCHRG MGMT >30: CPT | Mod: AF,HB,, | Performed by: STUDENT IN AN ORGANIZED HEALTH CARE EDUCATION/TRAINING PROGRAM

## 2022-05-31 PROCEDURE — 90833 PSYTX W PT W E/M 30 MIN: CPT | Mod: AF,HB,, | Performed by: STUDENT IN AN ORGANIZED HEALTH CARE EDUCATION/TRAINING PROGRAM

## 2022-05-31 RX ORDER — DIVALPROEX SODIUM 250 MG/1
750 TABLET, DELAYED RELEASE ORAL 2 TIMES DAILY
Qty: 180 TABLET | Refills: 0 | Status: ON HOLD | OUTPATIENT
Start: 2022-05-31 | End: 2022-12-22 | Stop reason: HOSPADM

## 2022-05-31 RX ADMIN — FOLIC ACID 1 MG: 1 TABLET ORAL at 08:05

## 2022-05-31 RX ADMIN — THERA TABS 1 TABLET: TAB at 08:05

## 2022-05-31 RX ADMIN — DIVALPROEX SODIUM 750 MG: 250 TABLET, DELAYED RELEASE ORAL at 08:05

## 2022-05-31 NOTE — PLAN OF CARE
"  Problem: Adult Behavioral Health Plan of Care  Goal: Optimized Coping Skills in Response to Life Stressors  Intervention: Promote Effective Coping Strategies  Flowsheets (Taken 5/31/2022 1136)  Supportive Measures:   active listening utilized   goal-setting facilitated   self-reflection promoted   verbalization of feelings encouraged   self-responsibility promoted    Behavior: Pt was calm and cooperative and actively engaged in group process.            Intervention: In this CBT-focused group, patients discussed the importance of setting goals and each patient was asked to identify specific goals to aid in their future treatment.           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  He shared he can ask his mom and his cousins to help hold him accountable, and also can make fun containers for his meds.            Plan: Continue to encourage pt to participate in process groups to verbalize feelings and develop healthy coping skills.                 "

## 2022-05-31 NOTE — DISCHARGE INSTRUCTIONS
Discharge instructions and AVS reviewed with patient. Education given on Schizophrenia and Marjuana Abuse with discharge paperwork.

## 2022-05-31 NOTE — PROGRESS NOTES
Psychotherapy:  · Target symptoms: substance abuse, psychosis  · Why chosen therapy is appropriate versus another modality: relevant to diagnosis  · Outcome monitoring methods: self-report  · Therapeutic intervention type: supportive psychotherapy  · Topics discussed/themes: motivational for medication compliance, safety plan, building skills sets for symptom management, symptom recognition, substance abuse  · The patient's response to the intervention is accepting. The patient's progress toward treatment goals is good.   · Duration of intervention: 16 minutes.

## 2022-05-31 NOTE — DISCHARGE SUMMARY
Discharge Summary  Psychiatry    Admit Date: 5/22/2022    Discharge Date and Time:  05/31/2022 9:40 AM    Attending Physician: Cristhian Grijalva III, MD     Discharge Provider: Cristhian Grijalva III    Reason for Admission:  CHIEF COMPLAINT   Bishop Soria is a 21 y.o. male with a past psychiatric history of psychosis and substance use currently admitted to the inpatient unit with the following chief complaint: psychosis- pt did not answer    HPI   The patient was seen and examined. The chart was reviewed.     The patient presented to the ER on 5/22/2022 with complaints of psychosis. Per staff notes:  -Per ems pt family called 911 because pt is having another psyche episode, family told ems he is hearing voices, suicidal and homicidal and smoked mojo today  -The primary symptoms include bizarre behavior, delusions, disorganized thinking, dysphoric mood, suicidal ideas and suicidal threats. The primary symptoms do not include aggression, agitation, depressed mood, disorganized speech, homicidal ideas, negative symptoms, paranoia, self-injury, somatic symptoms or suicide attempt. The current episode started just prior to arrival. This is a new problem.   The degree of incapacity that he is experiencing as a consequence of his illness is severe. Additional symptoms of the illness include euphoric mood and inflated self-esteem. Additional symptoms of the illness do not include anhedonia, insomnia, hypersomnia, appetite change, unexpected weight change, fatigue, agitation, psychomotor retardation, feelings of worthlessness, attention impairment, increased goal-directed activity, flight of ideas, decreased need for sleep, distractible, poor judgment, visual change, headaches, abdominal pain or seizures. He admits to suicidal ideas. He does not have a plan to attempt suicide. He contemplates harming himself. He has not already injured self. He does not contemplate injuring another person. He has not already  injured another  person. Risk factors that are present for mental illness include a history of mental illness.   -21-year old male with history of Schizophrenia and psychiatric hospitalizations arrived by EMS after family called 911.  As per family patient was having auditory hallucinations, suicidal and homicidal ideations, and also smoked mojo.  Patient very bizarre and expressing to nursing staff that he is God.  Patient given Zyorexa 10mg, PO in ED.  Patient PEC'D.  Upon arrival, patient had no orientation to person, place, or situation. Denied pain. Patient extremely euphoric and just laughing at everything. Unable and reluctant to answer most questions.     Pt last hospitalized in 5/2021 for psychosis/mood disturbance. He was stabilized on Aristada and lexapro during a 6 day stay.     The patient was medically cleared and admitted to the [facility].     The patient presents acutely psychosis with some symptoms of carley which occurred in the context of substance use (synthetic cannabinoids). There is some suspicion and evidence for psychotic symptoms occurring independent of substance use.     He was very disorganized and only limitedly able to participate with the interview.        Procedures Performed: * No surgery found *    Hospital Course:    Patient was admitted to the inpatient psychiatry unit after being medically cleared in the ED. Chart and labs were reviewed. The patient was stabilized as follows:         Psychosis: pt counseled  - Cont abilify 10 mg po qam, will plan to taper off as CARRASCO reaches therapeutic effect  -stop tomorrow  - verified that the patient was  given a CARRASCO (Invega 234 mg IM on 4/24/22)              Gave Invega CARRASCO 234 mg IM x 1 on 5/26/22; continue q monthly        Mood: pt counseled  - abilify as above  - started trial of depakote 500 mg po BID-  level 66  -increase to 750 mg po BID; recheck level level 90         Anxiety: pt counseled  -vistaril prn        Psychosocial stressors: pt counseled  -PRAKASH  consulted and assisting with resources        Cannabis abuse: pt counseled           During hospitalization, the patient was encouraged to go to both groups and individual counseling. Patient was monitored for any side effects. A meeting was held with multidisciplinary team prior to discharge and pt's diagnosis, current medications, and follow up were discussed. The patient has been compliant with treatment and can adequately attend to activities of daily living in an independent manner. The patient denies any side effects. The patient denies SI, HI, plan or intent for self harm or harm to others. The patient is no longer a danger to self or others nor gravely disabled disabled. Patient discharged  in stable condition with scheduled outpatient follow up.      Discussed diagnosis, risks and benefits of proposed treatment vs alternative treatments vs no treatment, and potential side effects of these treatments.  The patient expresses understanding of the above and displays the capacity to agree with this treatment given said understanding.  Patient also agrees that, currently, the benefits outweigh the risks and would like to pursue treatment at this time.      Discharge MSE: stated age, casually dressed, well groomed.  No psychomotor agitation or retardation.  No abnormal involuntary movements.  Gait normal.  Speech normal, conversational.  Language fluent English. Mood fine.  Affect normal range, pleasant, euthymic.  Thought process linear.  Associations intact.  Denies suicidal or homicidal ideation.  Denies auditory hallucinations, paranoid ideation, ideas of reference.  Memory intact.  Attention intact.  Fund of knowledge intact.  Insight intact.  Judgment intact.  Alert and oriented to person, place, time.      Tobacco Usage:  Is patient a smoker? No  Does patient want prescription for Tobacco Cessation? No  Does patient want counseling for Tobacco Cessation? No    If patient would like to quit, then over the  counter nicotine patch could be used. The patient could also follow up with his PCP or psychiatric provider for other alternatives.     Final Diagnoses:    Principal Problem:Schizophrenia, chronic with acute exacerbation, severe   Secondary Diagnoses:     Mood Disorder NOS  R/o SAD, bipolar type; s/o SIMD, SIPD  Unspecified Anxiety Disorder     Psychosocial stressors     Cannabis abuse    Labs:  Admission on 05/22/2022   Component Date Value Ref Range Status    WBC 05/22/2022 6.42  3.90 - 12.70 K/uL Final    RBC 05/22/2022 4.05 (A) 4.60 - 6.20 M/uL Final    Hemoglobin 05/22/2022 12.6 (A) 14.0 - 18.0 g/dL Final    Hematocrit 05/22/2022 36.7 (A) 40.0 - 54.0 % Final    MCV 05/22/2022 91  82 - 98 fL Final    MCH 05/22/2022 31.1 (A) 27.0 - 31.0 pg Final    MCHC 05/22/2022 34.3  32.0 - 36.0 g/dL Final    RDW 05/22/2022 12.0  11.5 - 14.5 % Final    Platelets 05/22/2022 250  150 - 450 K/uL Final    MPV 05/22/2022 9.6  9.2 - 12.9 fL Final    Immature Granulocytes 05/22/2022 0.2  0.0 - 0.5 % Final    Gran # (ANC) 05/22/2022 3.3  1.8 - 7.7 K/uL Final    Immature Grans (Abs) 05/22/2022 0.01  0.00 - 0.04 K/uL Final    Lymph # 05/22/2022 2.4  1.0 - 4.8 K/uL Final    Mono # 05/22/2022 0.7  0.3 - 1.0 K/uL Final    Eos # 05/22/2022 0.1  0.0 - 0.5 K/uL Final    Baso # 05/22/2022 0.01  0.00 - 0.20 K/uL Final    nRBC 05/22/2022 0  0 /100 WBC Final    Gran % 05/22/2022 50.8  38.0 - 73.0 % Final    Lymph % 05/22/2022 36.6  18.0 - 48.0 % Final    Mono % 05/22/2022 10.3  4.0 - 15.0 % Final    Eosinophil % 05/22/2022 1.9  0.0 - 8.0 % Final    Basophil % 05/22/2022 0.2  0.0 - 1.9 % Final    Differential Method 05/22/2022 Automated   Final    Sodium 05/22/2022 140  136 - 145 mmol/L Final    Potassium 05/22/2022 3.8  3.5 - 5.1 mmol/L Final    Chloride 05/22/2022 110  95 - 110 mmol/L Final    CO2 05/22/2022 25  23 - 29 mmol/L Final    Glucose 05/22/2022 90  70 - 110 mg/dL Final    BUN 05/22/2022 16  6 - 20 mg/dL  Final    Creatinine 05/22/2022 1.4  0.5 - 1.4 mg/dL Final    Calcium 05/22/2022 8.6 (A) 8.7 - 10.5 mg/dL Final    Total Protein 05/22/2022 8.4  6.0 - 8.4 g/dL Final    Albumin 05/22/2022 4.1  3.5 - 5.2 g/dL Final    Total Bilirubin 05/22/2022 0.9  0.1 - 1.0 mg/dL Final    Alkaline Phosphatase 05/22/2022 69  55 - 135 U/L Final    AST 05/22/2022 34  10 - 40 U/L Final    ALT 05/22/2022 31  10 - 44 U/L Final    Anion Gap 05/22/2022 5 (A) 8 - 16 mmol/L Final    eGFR if African American 05/22/2022 >60.0  >60 mL/min/1.73 m^2 Final    eGFR if non African American 05/22/2022 >60.0  >60 mL/min/1.73 m^2 Final    TSH 05/22/2022 1.390  0.400 - 4.000 uIU/mL Final    Specimen UA 05/22/2022 Urine, Clean Catch   Final    Color, UA 05/22/2022 Yellow  Yellow, Straw, Ingrid Final    Appearance, UA 05/22/2022 Clear  Clear Final    pH, UA 05/22/2022 6.0  5.0 - 8.0 Final    Specific Gravity, UA 05/22/2022 1.020  1.005 - 1.030 Final    Protein, UA 05/22/2022 Negative  Negative Final    Glucose, UA 05/22/2022 Negative  Negative Final    Ketones, UA 05/22/2022 Trace (A) Negative Final    Bilirubin (UA) 05/22/2022 Negative  Negative Final    Occult Blood UA 05/22/2022 Negative  Negative Final    Nitrite, UA 05/22/2022 Negative  Negative Final    Urobilinogen, UA 05/22/2022 1.0  <2.0 EU/dL Final    Leukocytes, UA 05/22/2022 Negative  Negative Final    Benzodiazepines 05/22/2022 Negative  Negative Final    Methadone metabolites 05/22/2022 Negative  Negative Final    Cocaine (Metab.) 05/22/2022 Negative  Negative Final    Opiate Scrn, Ur 05/22/2022 Negative  Negative Final    Barbiturate Screen, Ur 05/22/2022 Negative  Negative Final    Amphetamine Screen, Ur 05/22/2022 Negative  Negative Final    THC 05/22/2022 Presumptive Positive (A) Negative Final    Phencyclidine 05/22/2022 Negative  Negative Final    Creatinine, Urine 05/22/2022 336.0  23.0 - 375.0 mg/dL Final    Toxicology Information 05/22/2022 SEE  COMMENT   Final    Alcohol, Serum 05/22/2022 <3  <10 mg/dL Final    Acetaminophen (Tylenol), Serum 05/22/2022 <2.0 (A) 10.0 - 20.0 ug/mL Final    POC Rapid COVID 05/22/2022 Negative  Negative Final     Acceptable 05/22/2022 Yes   Final    Hemoglobin A1C 05/22/2022 5.2  4.0 - 5.6 % Final    Estimated Avg Glucose 05/22/2022 103  68 - 131 mg/dL Final    Valproic Acid Level 05/27/2022 66  50 - 100 ug/mL Final    Valproic Acid Level 05/30/2022 90  50 - 100 ug/mL Final         Discharged Condition: stable and improved; not currently a danger to self/others or gravely disabled    Disposition: Home or Self Care    Is patient being discharged on multiple neuroleptics? No    Follow Up/Patient Instructions:     · Take all medications as prescribed.  · Attend all psychiatric and medical follow up appointments.   · Abstain from all drugs and alcohol.  · Call the crisis line at: [phone] for help in a crisis and emergent situations or call 911 and Return to ED for any acute worsening of your condition including suicidal or homicidal ideations      Discharge Procedure Orders   Diet Adult Regular     Notify your health care provider if you experience any of the following:  temperature >100.4     Notify your health care provider if you experience any of the following:  persistent nausea and vomiting or diarrhea     Notify your health care provider if you experience any of the following:   Order Comments: Suicidal thoughts, homicidal thoughts, or any other changes in mental status  If you would like immediate help/crisis counseling, please call [phone] (TALK). Through this toll-free phone number for a network of crisis centers across the country. These centers staff their lines with people who are trained to listen and offer support to people in emotional crisis. If you are in an emergency, please call 911.     Notify your health care provider if you experience any of the following:  increased  confusion or weakness     Notify your health care provider if you experience any of the following:  persistent dizziness, light-headedness, or visual disturbances     Activity as tolerated           Follow up apt: ACT      Medications:  Reconciled Home Medications:      Medication List      START taking these medications    divalproex 250 MG EC tablet  Commonly known as: DEPAKOTE  Take 3 tablets (750 mg total) by mouth 2 (two) times a day.        STOP taking these medications    ARISTADA 882 mg/3.2 mL injection  Generic drug: ARIPiprazole lauroxil     EScitalopram oxalate 10 MG tablet  Commonly known as: LEXAPRO     lithium 300 mg tablet  Commonly known as: LITHOTAB           Gave Invega CARRASCO 234 mg IM x 1 on 5/26/22; continue q monthly        Diet: regular     Activity as tolerated    Total time spent discharging patient: 34 minutes    Cristhian Grijalva III, MD  Psychiatry

## 2022-05-31 NOTE — PLAN OF CARE
"Patient up early this am, alert and oriented, smiling, interacting with peers, and focused on discharge. Accepting meals double portions as requested with good appetite and is compliant with medication without side effects noted. Patient is looking forward to " opening a new chapter in his life". Denies depression, denies anxiety,denies hallucinations and ideations. No negative behaviors. Dr. Grijalva visited with new order for discharge today. Patient arranged transportation home with mother. Discharge instructions and AVS reviewed with patient. Information on RX to bring to local pharmacy, and follow up mental health appointment. Patient verbalized understanding. Education information provided. Discharge paperwork given to patient . Patient left unit ambulatory with personal belongings accompanied by staff member downstairs to meet mother. Left no distress noted.  "

## 2022-05-31 NOTE — PLAN OF CARE
Pt. Denied any c/o auditory or visual hallucinations at this time. Pt. Also denied any c/o suicidal or homicidal ideations. Pt. Attended and participated in group therapy, he verbalized three ways to better take care of themselves:  1.) Meditate more.  2.) to be more creative.  3.) Be more socialable.   Encouraged Pt. To verbalize feelings.

## 2022-11-07 ENCOUNTER — HOSPITAL ENCOUNTER (EMERGENCY)
Facility: HOSPITAL | Age: 22
Discharge: HOME | End: 2022-11-07
Attending: EMERGENCY MEDICINE
Payer: MEDICAID

## 2022-11-07 VITALS
RESPIRATION RATE: 16 BRPM | SYSTOLIC BLOOD PRESSURE: 132 MMHG | BODY MASS INDEX: 22.38 KG/M2 | OXYGEN SATURATION: 98 % | DIASTOLIC BLOOD PRESSURE: 56 MMHG | HEIGHT: 75 IN | WEIGHT: 180 LBS | HEART RATE: 89 BPM | TEMPERATURE: 98 F

## 2022-11-07 DIAGNOSIS — J02.9 VIRAL PHARYNGITIS: Primary | ICD-10-CM

## 2022-11-07 DIAGNOSIS — B34.9 VIRAL SYNDROME: ICD-10-CM

## 2022-11-07 LAB
CTP QC/QA: YES
CTP QC/QA: YES
POC MOLECULAR INFLUENZA A AGN: NEGATIVE
POC MOLECULAR INFLUENZA B AGN: NEGATIVE
SARS-COV-2 RDRP RESP QL NAA+PROBE: NEGATIVE

## 2022-11-07 PROCEDURE — 99282 EMERGENCY DEPT VISIT SF MDM: CPT

## 2022-11-07 PROCEDURE — 87635 SARS-COV-2 COVID-19 AMP PRB: CPT

## 2022-11-07 PROCEDURE — 87502 INFLUENZA DNA AMP PROBE: CPT

## 2022-11-07 NOTE — ED PROVIDER NOTES
Encounter Date: 11/7/2022       History     Chief Complaint   Patient presents with    Sore Throat     Sore throat, body aches, chills x 2 days.      This note is dictated on M*Modal word recognition program.  There are word recognition mistakes and grammatical errors that are occasionally missed on review.     Bishop Soria is a 22 y.o. male presents to er with c/o of sore throat, chills, body aches sine yesterday. Pt rates overall pain 1/10. Pt denies cough at this time.     The history is provided by the patient.   Review of patient's allergies indicates:  No Known Allergies  Past Medical History:   Diagnosis Date    History of psychiatric hospitalization     Hx of psychiatric care     Psychiatric problem     Schizophrenia     Suicide attempt      No past surgical history on file.  No family history on file.  Social History     Tobacco Use    Smoking status: Light Smoker     Types: Vaping with nicotine    Smokeless tobacco: Never   Substance Use Topics    Alcohol use: Not Currently     Comment: pt used to 2 beers occassinally    Drug use: Yes     Types: Marijuana     Comment: Pt denies smoking anything synthetic     Review of Systems   Constitutional:  Positive for chills and fatigue.   HENT:  Positive for sore throat.    Eyes: Negative.    Respiratory: Negative.     Cardiovascular: Negative.    Gastrointestinal: Negative.    Endocrine: Negative.    Genitourinary: Negative.    Musculoskeletal:  Positive for myalgias.   Skin: Negative.    Allergic/Immunologic: Negative.    Hematological: Negative.    Psychiatric/Behavioral: Negative.       Physical Exam     Initial Vitals [11/07/22 1603]   BP Pulse Resp Temp SpO2   (!) 132/56 89 16 97.8 °F (36.6 °C) 98 %      MAP       --         Physical Exam    HENT:   Right Ear: External ear normal.   Left Ear: External ear normal.   Oropharynx and tonsils are slightly erythemic no exudate noted   Eyes: EOM are normal. Pupils are equal, round, and reactive to light. Right eye  exhibits no discharge. Left eye exhibits no discharge. No scleral icterus.   Neck: Neck supple.   Normal range of motion.  Cardiovascular:  Normal rate and regular rhythm.     Exam reveals no gallop and no friction rub.       No murmur heard.  Pulmonary/Chest: Breath sounds normal. No respiratory distress. He has no wheezes. He has no rhonchi. He has no rales.   Abdominal: Abdomen is soft.   Musculoskeletal:         General: No tenderness or edema. Normal range of motion.      Cervical back: Normal range of motion and neck supple.     Neurological: He is alert and oriented to person, place, and time. He displays normal reflexes. No cranial nerve deficit or sensory deficit. GCS score is 15. GCS eye subscore is 4. GCS verbal subscore is 5. GCS motor subscore is 6.   Skin: Skin is warm. Capillary refill takes less than 2 seconds. No rash noted. No erythema. No pallor.   Psychiatric: He has a normal mood and affect. His behavior is normal. Judgment and thought content normal.       ED Course   Procedures  Labs Reviewed   POCT INFLUENZA A/B MOLECULAR   SARS-COV-2 RDRP GENE    Narrative:     This test utilizes isothermal nucleic acid amplification   technology to detect the SARS-CoV-2 RdRp nucleic acid segment.   The analytical sensitivity (limit of detection) is 125 genome   equivalents/mL.   A POSITIVE result implies infection with the SARS-CoV-2 virus;   the patient is presumed to be contagious.     A NEGATIVE result means that SARS-CoV-2 nucleic acids are not   present above the limit of detection. A NEGATIVE result should be   treated as presumptive. It does not rule out the possibility of   COVID-19 and should not be the sole basis for treatment decisions.   If COVID-19 is strongly suspected based on clinical and exposure   history, re-testing using an alternate molecular assay should be   considered.   This test is only for use under the Food and Drug   Administration s Emergency Use Authorization (EUA).    Commercial kits are provided by [employer].   Performance characteristics of the EUA have been independently   verified by [facility] Department of   Pathology and Laboratory Medicine.   _________________________________________________________________   The authorized Fact Sheet for Healthcare Providers and the authorized Fact   Sheet for Patients of the ID NOW COVID-19 are available on the FDA   website:     https://www.fda.gov/media/747076/download  https://www.fda.gov/media/450889/download                  Imaging Results    None          Medications - No data to display  Medical Decision Making:   Differential Diagnosis:   Strep throat, mono, viral upper respiratory infection, influenza, COVID-19  Clinical Tests:   Lab Tests: Ordered and Reviewed  ED Management:  Patient tested negative for COVID-19 and influenza today in ER.  Patient instructed to use Tylenol & Motrin at home to control sore throat, fevers, and body aches  Patient stable at time of discharge acute distress.  The patient acknowledges that close follow up with medical provider is required. Instructed to follow up with PCP within 2 days. Patient was given specific return precautions. The patient agrees to comply with all instruction and directions given in the ER.                          Clinical Impression:   Final diagnoses:  [J02.9] Viral pharyngitis (Primary)  [B34.9] Viral syndrome      ED Disposition Condition    Discharge Stable          ED Prescriptions    None       Follow-up Information       Follow up With Specialties Details Why Contact Info    Jose Martin Selby MD Family [address] [address]  [phone]               Jason Hager NP  11/07/22 8726

## 2022-11-07 NOTE — Clinical Note
"Bishop"Mauri Soria was seen and treated in our emergency department on 11/7/2022.  He may return to work on 11/09/2022.       If you have any questions or concerns, please don't hesitate to call.      Jason Hager NP"

## 2022-12-04 ENCOUNTER — HOSPITAL ENCOUNTER (EMERGENCY)
Facility: HOSPITAL | Age: 22
Discharge: HOME | End: 2022-12-04
Attending: EMERGENCY MEDICINE
Payer: MEDICAID

## 2022-12-04 VITALS
RESPIRATION RATE: 18 BRPM | WEIGHT: 181 LBS | DIASTOLIC BLOOD PRESSURE: 56 MMHG | HEART RATE: 74 BPM | TEMPERATURE: 98 F | SYSTOLIC BLOOD PRESSURE: 113 MMHG | HEIGHT: 75 IN | OXYGEN SATURATION: 98 % | BODY MASS INDEX: 22.5 KG/M2

## 2022-12-04 DIAGNOSIS — J02.0 STREP PHARYNGITIS: Primary | ICD-10-CM

## 2022-12-04 LAB — GROUP A STREP, MOLECULAR: NEGATIVE

## 2022-12-04 PROCEDURE — 87651 STREP A DNA AMP PROBE: CPT | Performed by: NURSE PRACTITIONER

## 2022-12-04 PROCEDURE — 99283 EMERGENCY DEPT VISIT LOW MDM: CPT

## 2022-12-04 RX ORDER — AMOXICILLIN AND CLAVULANATE POTASSIUM 875; 125 MG/1; MG/1
1 TABLET, FILM COATED ORAL 2 TIMES DAILY
Qty: 14 TABLET | Refills: 0 | Status: ON HOLD | OUTPATIENT
Start: 2022-12-04 | End: 2022-12-22 | Stop reason: HOSPADM

## 2022-12-05 NOTE — ED PROVIDER NOTES
"Encounter Date: 12/4/2022       History     Chief Complaint   Patient presents with    Sore Throat     Pt stated that for the past week he has felt that he has a "mass in his throat" accompanied with nausea/vomiting. Denied cough / cold symptoms.      22 year old male with complaints of sore throat x 7 days. Denies fever, cough, runny nose, ear pain, nausea.  States he vomits after he eats at times.    Review of patient's allergies indicates:  No Known Allergies  Past Medical History:   Diagnosis Date    History of psychiatric hospitalization     Hx of psychiatric care     Psychiatric problem     Schizophrenia     Suicide attempt      No past surgical history on file.  No family history on file.  Social History     Tobacco Use    Smoking status: Light Smoker     Types: Vaping with nicotine    Smokeless tobacco: Never   Substance Use Topics    Alcohol use: Not Currently     Comment: pt used to 2 beers occassinally    Drug use: Yes     Types: Marijuana     Comment: Pt denies smoking anything synthetic     Review of Systems   Constitutional:  Negative for fever.   HENT:  Positive for sore throat.    Respiratory:  Negative for shortness of breath.    Cardiovascular:  Negative for chest pain.   Gastrointestinal:  Negative for nausea.   Genitourinary:  Negative for dysuria.   Musculoskeletal:  Negative for back pain.   Skin:  Negative for rash.   Neurological:  Negative for weakness.   Hematological:  Does not bruise/bleed easily.     Physical Exam     Initial Vitals [12/04/22 1829]   BP Pulse Resp Temp SpO2   (!) 113/56 74 18 98 °F (36.7 °C) 98 %      MAP       --         Physical Exam    Constitutional: Vital signs are normal. He appears well-developed and well-nourished.   HENT:   Head: Normocephalic and atraumatic.   Right Ear: Hearing and external ear normal.   Left Ear: Hearing and external ear normal.   Nose: Nose normal.   Mouth/Throat: Uvula is midline and mucous membranes are normal. Posterior oropharyngeal " edema and posterior oropharyngeal erythema present. No oropharyngeal exudate.   Eyes: Conjunctivae, EOM and lids are normal. Pupils are equal, round, and reactive to light.   Neck: Trachea normal. Neck supple.   Cardiovascular:  Normal rate, regular rhythm, normal heart sounds and normal pulses.           Pulmonary/Chest: Effort normal and breath sounds normal.   Musculoskeletal:      Cervical back: Neck supple.     Neurological: He is alert.       ED Course   Procedures  Labs Reviewed   GROUP A STREP, MOLECULAR          Imaging Results    None          Medications - No data to display  Medical Decision Making:   Differential Diagnosis:   Strep pharyngitis, viral pharyngitis,   ED Management:  After history and physical exam discussed with patient that I believe he has a strep throat. Will DC home with antinibtics                        Clinical Impression:   Final diagnoses:  [J02.0] Strep pharyngitis (Primary)      ED Disposition Condition    Discharge Stable          ED Prescriptions       Medication Sig Dispense Start Date End Date Auth. Provider    amoxicillin-clavulanate 875-125mg (AUGMENTIN) 875-125 mg per tablet Take 1 tablet by mouth 2 (two) times daily. 14 tablet 12/4/2022 -- Yogesh Wasserman III, NP          Follow-up Information       Follow up With Specialties Details Why Contact Info    Jose Martni Selby MD Family Medicine In 3 days If symptoms worsen [address] [facility] [address]  [phone]               Yogesh Wasserman III, NP  12/04/22 4703

## 2022-12-17 ENCOUNTER — HOSPITAL ENCOUNTER (INPATIENT)
Facility: HOSPITAL | Age: 22
LOS: 6 days | Discharge: HOME | DRG: 885 | End: 2022-12-23
Attending: EMERGENCY MEDICINE | Admitting: EMERGENCY MEDICINE
Payer: MEDICAID

## 2022-12-17 DIAGNOSIS — F23 ACUTE PSYCHOSIS: Primary | ICD-10-CM

## 2022-12-17 LAB
ALBUMIN SERPL BCP-MCNC: 4.4 G/DL (ref 3.5–5.2)
ALP SERPL-CCNC: 69 U/L (ref 55–135)
ALT SERPL W/O P-5'-P-CCNC: 33 U/L (ref 10–44)
AMPHET+METHAMPHET UR QL: NEGATIVE
ANION GAP SERPL CALC-SCNC: 10 MMOL/L (ref 8–16)
APAP SERPL-MCNC: <2 UG/ML (ref 10–20)
AST SERPL-CCNC: 17 U/L (ref 10–40)
BACTERIA #/AREA URNS HPF: ABNORMAL /HPF
BARBITURATES UR QL SCN>200 NG/ML: NEGATIVE
BASOPHILS # BLD AUTO: 0.02 K/UL (ref 0–0.2)
BASOPHILS NFR BLD: 0.2 % (ref 0–1.9)
BENZODIAZ UR QL SCN>200 NG/ML: NEGATIVE
BILIRUB SERPL-MCNC: 1.3 MG/DL (ref 0.1–1)
BILIRUB UR QL STRIP: ABNORMAL
BUN SERPL-MCNC: 18 MG/DL (ref 6–20)
BZE UR QL SCN: NEGATIVE
CALCIUM SERPL-MCNC: 8.6 MG/DL (ref 8.7–10.5)
CANNABINOIDS UR QL SCN: ABNORMAL
CHLORIDE SERPL-SCNC: 107 MMOL/L (ref 95–110)
CHOLEST SERPL-MCNC: 187 MG/DL (ref 120–199)
CHOLEST/HDLC SERPL: 2.5 {RATIO} (ref 2–5)
CLARITY UR: CLEAR
CO2 SERPL-SCNC: 22 MMOL/L (ref 23–29)
COLOR UR: YELLOW
CREAT SERPL-MCNC: 1.3 MG/DL (ref 0.5–1.4)
CREAT UR-MCNC: 626 MG/DL (ref 23–375)
CTP QC/QA: YES
DIFFERENTIAL METHOD: ABNORMAL
EOSINOPHIL # BLD AUTO: 0.1 K/UL (ref 0–0.5)
EOSINOPHIL NFR BLD: 0.9 % (ref 0–8)
ERYTHROCYTE [DISTWIDTH] IN BLOOD BY AUTOMATED COUNT: 13.3 % (ref 11.5–14.5)
EST. GFR  (NO RACE VARIABLE): >60 ML/MIN/1.73 M^2
ESTIMATED AVG GLUCOSE: 94 MG/DL (ref 68–131)
ETHANOL SERPL-MCNC: <3 MG/DL
GLUCOSE SERPL-MCNC: 109 MG/DL (ref 70–110)
GLUCOSE UR QL STRIP: NEGATIVE
HBA1C MFR BLD: 4.9 % (ref 4–5.6)
HCT VFR BLD AUTO: 41.9 % (ref 40–54)
HDLC SERPL-MCNC: 74 MG/DL (ref 40–75)
HDLC SERPL: 39.6 % (ref 20–50)
HGB BLD-MCNC: 14.4 G/DL (ref 14–18)
HGB UR QL STRIP: NEGATIVE
HYALINE CASTS #/AREA URNS LPF: 0 /LPF
IMM GRANULOCYTES # BLD AUTO: 0.02 K/UL (ref 0–0.04)
IMM GRANULOCYTES NFR BLD AUTO: 0.2 % (ref 0–0.5)
KETONES UR QL STRIP: ABNORMAL
LDLC SERPL CALC-MCNC: 97 MG/DL (ref 63–159)
LEUKOCYTE ESTERASE UR QL STRIP: ABNORMAL
LYMPHOCYTES # BLD AUTO: 2.2 K/UL (ref 1–4.8)
LYMPHOCYTES NFR BLD: 21.7 % (ref 18–48)
MCH RBC QN AUTO: 31.5 PG (ref 27–31)
MCHC RBC AUTO-ENTMCNC: 34.4 G/DL (ref 32–36)
MCV RBC AUTO: 92 FL (ref 82–98)
METHADONE UR QL SCN>300 NG/ML: NEGATIVE
MICROSCOPIC COMMENT: ABNORMAL
MONOCYTES # BLD AUTO: 1.3 K/UL (ref 0.3–1)
MONOCYTES NFR BLD: 13.1 % (ref 4–15)
NEUTROPHILS # BLD AUTO: 6.4 K/UL (ref 1.8–7.7)
NEUTROPHILS NFR BLD: 63.9 % (ref 38–73)
NITRITE UR QL STRIP: NEGATIVE
NONHDLC SERPL-MCNC: 113 MG/DL
NRBC BLD-RTO: 0 /100 WBC
OPIATES UR QL SCN: NEGATIVE
PCP UR QL SCN>25 NG/ML: NEGATIVE
PH UR STRIP: 6 [PH] (ref 5–8)
PLATELET # BLD AUTO: 375 K/UL (ref 150–450)
PMV BLD AUTO: 9.1 FL (ref 9.2–12.9)
POTASSIUM SERPL-SCNC: 3 MMOL/L (ref 3.5–5.1)
PROT SERPL-MCNC: 8.6 G/DL (ref 6–8.4)
PROT UR QL STRIP: ABNORMAL
RBC # BLD AUTO: 4.57 M/UL (ref 4.6–6.2)
RBC #/AREA URNS HPF: 3 /HPF (ref 0–4)
SARS-COV-2 RDRP RESP QL NAA+PROBE: NEGATIVE
SODIUM SERPL-SCNC: 139 MMOL/L (ref 136–145)
SP GR UR STRIP: >=1.03 (ref 1–1.03)
SQUAMOUS #/AREA URNS HPF: 4 /HPF
TOXICOLOGY INFORMATION: ABNORMAL
TRIGL SERPL-MCNC: 80 MG/DL (ref 30–150)
TSH SERPL DL<=0.005 MIU/L-ACNC: 0.72 UIU/ML (ref 0.4–4)
URN SPEC COLLECT METH UR: ABNORMAL
UROBILINOGEN UR STRIP-ACNC: 1 EU/DL
VALPROATE SERPL-MCNC: <3 UG/ML (ref 50–100)
WBC # BLD AUTO: 10.05 K/UL (ref 3.9–12.7)
WBC #/AREA URNS HPF: 6 /HPF (ref 0–5)

## 2022-12-17 PROCEDURE — 96372 THER/PROPH/DIAG INJ SC/IM: CPT | Performed by: EMERGENCY MEDICINE

## 2022-12-17 PROCEDURE — 84443 ASSAY THYROID STIM HORMONE: CPT | Performed by: EMERGENCY MEDICINE

## 2022-12-17 PROCEDURE — 80164 ASSAY DIPROPYLACETIC ACD TOT: CPT | Performed by: EMERGENCY MEDICINE

## 2022-12-17 PROCEDURE — 80307 DRUG TEST PRSMV CHEM ANLYZR: CPT | Performed by: EMERGENCY MEDICINE

## 2022-12-17 PROCEDURE — 87635 SARS-COV-2 COVID-19 AMP PRB: CPT | Performed by: EMERGENCY MEDICINE

## 2022-12-17 PROCEDURE — 80061 LIPID PANEL: CPT | Performed by: STUDENT IN AN ORGANIZED HEALTH CARE EDUCATION/TRAINING PROGRAM

## 2022-12-17 PROCEDURE — 85025 COMPLETE CBC W/AUTO DIFF WBC: CPT | Performed by: EMERGENCY MEDICINE

## 2022-12-17 PROCEDURE — 99285 EMERGENCY DEPT VISIT HI MDM: CPT | Mod: 25

## 2022-12-17 PROCEDURE — 80053 COMPREHEN METABOLIC PANEL: CPT | Performed by: EMERGENCY MEDICINE

## 2022-12-17 PROCEDURE — 36415 COLL VENOUS BLD VENIPUNCTURE: CPT | Performed by: STUDENT IN AN ORGANIZED HEALTH CARE EDUCATION/TRAINING PROGRAM

## 2022-12-17 PROCEDURE — 11400000 HC PSYCH PRIVATE ROOM

## 2022-12-17 PROCEDURE — 81000 URINALYSIS NONAUTO W/SCOPE: CPT | Performed by: EMERGENCY MEDICINE

## 2022-12-17 PROCEDURE — 36415 COLL VENOUS BLD VENIPUNCTURE: CPT | Performed by: EMERGENCY MEDICINE

## 2022-12-17 PROCEDURE — 83036 HEMOGLOBIN GLYCOSYLATED A1C: CPT | Performed by: STUDENT IN AN ORGANIZED HEALTH CARE EDUCATION/TRAINING PROGRAM

## 2022-12-17 PROCEDURE — 80143 DRUG ASSAY ACETAMINOPHEN: CPT | Performed by: EMERGENCY MEDICINE

## 2022-12-17 PROCEDURE — 63600175 PHARM REV CODE 636 W HCPCS: Performed by: EMERGENCY MEDICINE

## 2022-12-17 PROCEDURE — 82077 ASSAY SPEC XCP UR&BREATH IA: CPT | Performed by: EMERGENCY MEDICINE

## 2022-12-17 RX ORDER — DIPHENHYDRAMINE HYDROCHLORIDE 50 MG/ML
50 INJECTION INTRAMUSCULAR; INTRAVENOUS
Status: COMPLETED | OUTPATIENT
Start: 2022-12-17 | End: 2022-12-17

## 2022-12-17 RX ORDER — OLANZAPINE 10 MG/1
10 TABLET ORAL EVERY 4 HOURS PRN
Status: DISCONTINUED | OUTPATIENT
Start: 2022-12-17 | End: 2022-12-23 | Stop reason: HOSPADM

## 2022-12-17 RX ORDER — OLANZAPINE 10 MG/2ML
10 INJECTION, POWDER, FOR SOLUTION INTRAMUSCULAR EVERY 8 HOURS PRN
Status: DISCONTINUED | OUTPATIENT
Start: 2022-12-17 | End: 2022-12-23 | Stop reason: HOSPADM

## 2022-12-17 RX ORDER — IBUPROFEN 200 MG
1 TABLET ORAL DAILY PRN
Status: DISCONTINUED | OUTPATIENT
Start: 2022-12-17 | End: 2022-12-23 | Stop reason: HOSPADM

## 2022-12-17 RX ORDER — MAG HYDROX/ALUMINUM HYD/SIMETH 200-200-20
30 SUSPENSION, ORAL (FINAL DOSE FORM) ORAL EVERY 6 HOURS PRN
Status: DISCONTINUED | OUTPATIENT
Start: 2022-12-17 | End: 2022-12-23 | Stop reason: HOSPADM

## 2022-12-17 RX ORDER — LOPERAMIDE HYDROCHLORIDE 2 MG/1
4 CAPSULE ORAL 4 TIMES DAILY PRN
Status: DISCONTINUED | OUTPATIENT
Start: 2022-12-17 | End: 2022-12-23 | Stop reason: HOSPADM

## 2022-12-17 RX ORDER — HALOPERIDOL 5 MG/ML
10 INJECTION INTRAMUSCULAR
Status: COMPLETED | OUTPATIENT
Start: 2022-12-17 | End: 2022-12-17

## 2022-12-17 RX ORDER — ACETAMINOPHEN 325 MG/1
650 TABLET ORAL EVERY 6 HOURS PRN
Status: DISCONTINUED | OUTPATIENT
Start: 2022-12-17 | End: 2022-12-23 | Stop reason: HOSPADM

## 2022-12-17 RX ORDER — FOLIC ACID 1 MG/1
1 TABLET ORAL DAILY
Status: DISCONTINUED | OUTPATIENT
Start: 2022-12-18 | End: 2022-12-23 | Stop reason: HOSPADM

## 2022-12-17 RX ORDER — HYDROXYZINE PAMOATE 50 MG/1
50 CAPSULE ORAL EVERY 8 HOURS PRN
Status: DISCONTINUED | OUTPATIENT
Start: 2022-12-17 | End: 2022-12-23 | Stop reason: HOSPADM

## 2022-12-17 RX ORDER — DOCUSATE SODIUM 100 MG/1
100 CAPSULE, LIQUID FILLED ORAL DAILY PRN
Status: DISCONTINUED | OUTPATIENT
Start: 2022-12-17 | End: 2022-12-23 | Stop reason: HOSPADM

## 2022-12-17 RX ADMIN — HALOPERIDOL LACTATE 10 MG: 5 INJECTION, SOLUTION INTRAMUSCULAR at 05:12

## 2022-12-17 RX ADMIN — DIPHENHYDRAMINE HYDROCHLORIDE 50 MG: 50 INJECTION, SOLUTION INTRAMUSCULAR; INTRAVENOUS at 05:12

## 2022-12-17 NOTE — ED PROVIDER NOTES
Encounter Date: 12/17/2022       History     Chief Complaint   Patient presents with    Mental Health Problem     EMS reports pt from home with c/o not taking his Psych Meds for 2 months per his Mother. Upon arrival pt was talking to God.     22-year-old male with history of schizophrenia, suicide attempt in the past presents to the emergency department with hallucinations.  History of Mojo abuse as well.  Patient appears to be 15 to internal stimuli at present.  He has not been taking his medicines for 2 months, having hallucinations, family reports he is homicidal but patient denies SI and HI.  History of multiple psychiatric admissions in the past    Review of patient's allergies indicates:  No Known Allergies  Past Medical History:   Diagnosis Date    History of psychiatric hospitalization     Hx of psychiatric care     Psychiatric problem     Schizophrenia     Suicide attempt      No past surgical history on file.  No family history on file.  Social History     Tobacco Use    Smoking status: Light Smoker     Types: Vaping with nicotine    Smokeless tobacco: Never   Substance Use Topics    Alcohol use: Not Currently     Comment: pt used to 2 beers occassinally    Drug use: Yes     Types: Marijuana     Comment: Pt denies smoking anything synthetic     Review of Systems   Constitutional:  Negative for fever.   HENT:  Negative for sore throat.    Respiratory:  Negative for shortness of breath.    Cardiovascular:  Negative for chest pain.   Gastrointestinal:  Negative for nausea.   Genitourinary:  Negative for dysuria.   Musculoskeletal:  Negative for back pain.   Skin:  Negative for rash.   Neurological:  Negative for weakness.   Hematological:  Does not bruise/bleed easily.   Psychiatric/Behavioral:  Positive for hallucinations. Negative for suicidal ideas.    All other systems reviewed and are negative.    Physical Exam     Initial Vitals [12/17/22 1648]   BP Pulse Resp Temp SpO2   138/76 92 18 98.4 °F (36.9 °C)  100 %      MAP       --         Physical Exam    Nursing note and vitals reviewed.  Constitutional: He appears well-developed and well-nourished. He is not diaphoretic. No distress.   HENT:   Head: Normocephalic and atraumatic.   Eyes: Conjunctivae and EOM are normal. Pupils are equal, round, and reactive to light. Right eye exhibits no discharge. Left eye exhibits no discharge. No scleral icterus.   Neck: Neck supple. No JVD present.   Normal range of motion.  Cardiovascular:  Normal rate, regular rhythm, normal heart sounds and intact distal pulses.           No murmur heard.  Pulmonary/Chest: Breath sounds normal. No stridor. No respiratory distress. He has no wheezes. He has no rhonchi. He has no rales. He exhibits no tenderness.   Abdominal: Abdomen is soft. Bowel sounds are normal. He exhibits no distension and no mass. There is no abdominal tenderness. There is no rebound and no guarding.   Musculoskeletal:         General: No tenderness or edema. Normal range of motion.      Cervical back: Normal range of motion and neck supple.     Neurological: He is alert and oriented to person, place, and time. He has normal strength. GCS score is 15. GCS eye subscore is 4. GCS verbal subscore is 5. GCS motor subscore is 6.   Skin: Skin is warm and dry. Capillary refill takes less than 2 seconds.   Psychiatric: His affect is inappropriate. His speech is delayed. He is slowed and actively hallucinating. Thought content is paranoid. He expresses impulsivity and inappropriate judgment. He expresses no homicidal and no suicidal ideation. He expresses no suicidal plans and no homicidal plans. He is inattentive.       ED Course   Critical Care    Date/Time: 12/17/2022 5:47 PM  Performed by: Fan Chao MD  Authorized by: Cristhian Grijalva III, MD   Direct patient critical care time: 10 minutes  Additional history critical care time: 10 minutes  Ordering / reviewing critical care time: 10 minutes  Documentation critical care  time: 10 minutes  Consulting other physicians critical care time: 10 minutes  Consult with family critical care time: 10 minutes  Total critical care time (exclusive of procedural time) : 60 minutes  Critical care was necessary to treat or prevent imminent or life-threatening deterioration of the following conditions: Acute psychosis with need for Haldol and Benadryl.  Critical care was time spent personally by me on the following activities: review of old charts, re-evaluation of patient's condition, pulse oximetry, ordering and review of radiographic studies, ordering and review of laboratory studies, ordering and performing treatments and interventions, obtaining history from patient or surrogate, examination of patient, evaluation of patient's response to treatment, discussions with consultants and development of treatment plan with patient or surrogate.      Labs Reviewed   CBC W/ AUTO DIFFERENTIAL - Abnormal; Notable for the following components:       Result Value    RBC 4.57 (*)     MCH 31.5 (*)     MPV 9.1 (*)     Mono # 1.3 (*)     All other components within normal limits   URINALYSIS, REFLEX TO URINE CULTURE - Abnormal; Notable for the following components:    Specific Gravity, UA >=1.030 (*)     Protein, UA 1+ (*)     Ketones, UA 2+ (*)     Bilirubin (UA) 1+ (*)     Leukocytes, UA Trace (*)     All other components within normal limits    Narrative:     Preferred Collection Type->Urine, Clean Catch  Specimen Source->Urine   DRUG SCREEN PANEL, URINE EMERGENCY - Abnormal; Notable for the following components:    THC Presumptive Positive (*)     Creatinine, Urine 626.0 (*)     All other components within normal limits    Narrative:     Preferred Collection Type->Urine, Clean Catch  Specimen Source->Urine   URINALYSIS MICROSCOPIC - Abnormal; Notable for the following components:    WBC, UA 6 (*)     All other components within normal limits    Narrative:     Preferred Collection Type->Urine, Clean  Catch  Specimen Source->Urine   COMPREHENSIVE METABOLIC PANEL   TSH   ALCOHOL,MEDICAL (ETHANOL)   ACETAMINOPHEN LEVEL   VALPROIC ACID   SARS-COV-2 RDRP GENE          Imaging Results    None          Medications   haloperidol lactate injection 10 mg (10 mg Intramuscular Given 12/17/22 1707)   diphenhydrAMINE injection 50 mg (50 mg Intramuscular Given 12/17/22 1707)     Medical Decision Making:   Differential Diagnosis:   Acute psychosis, acute exacerbation of schizophrenia           ED Course as of 12/17/22 1748   Sat Dec 17, 2022   1721 Discussed case with  - accepts for admission to the acute psychiatric unit [SD]   1728 Marijuana (THC) Metabolite(!): Presumptive Positive [SD]      ED Course User Index  [SD] Fan Chao MD                 Clinical Impression:   Final diagnoses:  [F23] Acute psychosis (Primary)        ED Disposition Condition    Admit Stable                Fan Chao MD  12/17/22 2095

## 2022-12-17 NOTE — ED NOTES
Nabil Robinson called into ER to check on her son, she reports that her son has refused his monthly injection for the past 2 months and has been in steadily decline. I updated Nabil that pt is PEC'd and will be admitted to our [facility]. Mom's # is [phone]

## 2022-12-17 NOTE — ED NOTES
NEUROLOGICAL:   Patient is awake , alert , and oriented x 4 . Pupils are PERRL. Gait is steady.   Moves all extremities without difficulty.   Patient reports no neuro complaints..  GCS 15 Pt has not had his Mental Health injections for 2 months now and has been refusing them. Pt has hx of Schizophrenia.     HEENT:   Head appears normocephalic  and symmetric .   Eyes appear WNL to both eyes. Patient reports no complaints  to both eyes .   Ears appear WNL. Patient reports no complaints  to both ears.   Nares appear patent . Patient reports no nose complaints .  Mouth appears moist, pink, and teeth intact. Patient reports no mouth complaints.   Throat appears pink and moist . Patient reports no throat complaints.    CARDIOVASCULAR:   On palpation no edema noted , noted to none.   Patient reports no CV complaints.  .   Patient vitals are WNL.    RESPIRATORY:   Airway Clear, Open, and Patent.  Respirations are even and unlabored.   Breath sounds clear  to all lung fields.   Patient reports no respiratory complaints.     GASTROINTESTINAL:   Abdomen is soft  and non-tender x 4 quadrants.    Patient reports no GI complaints .     GENITOURINARY:   Patient reports no  complaints.     MUSCULOSKELETAL:   full range of motion to all extremities, no swelling noted , no tenderness noted, and no weakness noted.   Patient reports no musculoskeletal complaints     SKIN:   Skin appears warm , dry , good turgor, color normal for race, and intact. Patient reports no skin complaints.

## 2022-12-18 LAB
CHOLEST SERPL-MCNC: 165 MG/DL (ref 120–199)
CHOLEST/HDLC SERPL: 2.3 {RATIO} (ref 2–5)
ESTIMATED AVG GLUCOSE: 91 MG/DL (ref 68–131)
HBA1C MFR BLD: 4.8 % (ref 4–5.6)
HDLC SERPL-MCNC: 71 MG/DL (ref 40–75)
HDLC SERPL: 43 % (ref 20–50)
LDLC SERPL CALC-MCNC: 79 MG/DL (ref 63–159)
NONHDLC SERPL-MCNC: 94 MG/DL
TRIGL SERPL-MCNC: 75 MG/DL (ref 30–150)

## 2022-12-18 PROCEDURE — 80061 LIPID PANEL: CPT | Performed by: STUDENT IN AN ORGANIZED HEALTH CARE EDUCATION/TRAINING PROGRAM

## 2022-12-18 PROCEDURE — 99223 1ST HOSP IP/OBS HIGH 75: CPT | Mod: AF,HB,, | Performed by: STUDENT IN AN ORGANIZED HEALTH CARE EDUCATION/TRAINING PROGRAM

## 2022-12-18 PROCEDURE — 99223 PR INITIAL HOSPITAL CARE,LEVL III: ICD-10-PCS | Mod: AF,HB,, | Performed by: STUDENT IN AN ORGANIZED HEALTH CARE EDUCATION/TRAINING PROGRAM

## 2022-12-18 PROCEDURE — 99232 SBSQ HOSP IP/OBS MODERATE 35: CPT | Mod: ,,, | Performed by: STUDENT IN AN ORGANIZED HEALTH CARE EDUCATION/TRAINING PROGRAM

## 2022-12-18 PROCEDURE — 11400000 HC PSYCH PRIVATE ROOM

## 2022-12-18 PROCEDURE — 25000003 PHARM REV CODE 250: Performed by: STUDENT IN AN ORGANIZED HEALTH CARE EDUCATION/TRAINING PROGRAM

## 2022-12-18 PROCEDURE — 99232 PR SUBSEQUENT HOSPITAL CARE,LEVL II: ICD-10-PCS | Mod: ,,, | Performed by: STUDENT IN AN ORGANIZED HEALTH CARE EDUCATION/TRAINING PROGRAM

## 2022-12-18 PROCEDURE — 36415 COLL VENOUS BLD VENIPUNCTURE: CPT | Performed by: STUDENT IN AN ORGANIZED HEALTH CARE EDUCATION/TRAINING PROGRAM

## 2022-12-18 PROCEDURE — 83036 HEMOGLOBIN GLYCOSYLATED A1C: CPT | Performed by: STUDENT IN AN ORGANIZED HEALTH CARE EDUCATION/TRAINING PROGRAM

## 2022-12-18 RX ORDER — ARIPIPRAZOLE 10 MG/1
10 TABLET ORAL DAILY
Status: DISCONTINUED | OUTPATIENT
Start: 2022-12-18 | End: 2022-12-19

## 2022-12-18 RX ADMIN — ARIPIPRAZOLE 10 MG: 10 TABLET ORAL at 03:12

## 2022-12-18 RX ADMIN — FOLIC ACID 1 MG: 1 TABLET ORAL at 08:12

## 2022-12-18 RX ADMIN — HYDROXYZINE PAMOATE 50 MG: 50 CAPSULE ORAL at 08:12

## 2022-12-18 RX ADMIN — THERA TABS 1 TABLET: TAB at 08:12

## 2022-12-18 NOTE — NURSING
22-year-old male with history of schizophrenia, suicide attempt in the past presents to the emergency department with hallucinations.  History of Mojo abuse as well.  Patient appears to be responding to internal stimuli in ED.  He has not been taking his medicines for 2 months, having hallucinations, family reports he is homicidal but patient denies SI and HI.  History of multiple psychiatric admissions in the past.  Patient positive for THC.  As per report,patient RIS upon arrival to [facility] and fixated with God.  Patient spent a significant amount of time on his knees in room.  No signs of injury.

## 2022-12-18 NOTE — PLAN OF CARE
Problem: Adult Behavioral Health Plan of Care  Goal: Plan of Care Review  Outcome: Ongoing, Progressing     Problem: Cognitive Impairment (Anxiety Signs/Symptoms)  Goal: Optimized Cognitive Function (Anxiety Signs/Symptoms)  Outcome: Ongoing, Progressing     Problem: Mood Impairment (Anxiety Signs/Symptoms)  Goal: Improved Mood Symptoms (Anxiety Signs/Symptoms)  Outcome: Ongoing, Progressing

## 2022-12-18 NOTE — H&P
"PSYCHIATRY INPATIENT ADMISSION NOTE - H & P      12/18/2022 3:07 PM   Bishop Soria   2000   [id number]         DATE OF ADMISSION: 12/17/2022  4:44 PM    SITE: [facility]    CURRENT LEGAL STATUS: PEC and/or CEC      HISTORY    CHIEF COMPLAINT   Bishop Soria is a 22 y.o. male with a past psychiatric history of  psychosis, mood disorder, anxiety, substance abuse  currently admitted to the inpatient unit with the following chief complaint:  psychosis    HPI   The patient was seen and examined. The chart was reviewed.    The patient presented to the ER on 12/17/2022 .    The patient was medically cleared and admitted to the BHU.      Per ED MD:  EMS reports pt from home with c/o not taking his Psych Meds for 2 months per his Mother. Upon arrival pt was talking to God.      22-year-old male with history of schizophrenia, suicide attempt in the past presents to the emergency department with hallucinations.  History of Mojo abuse as well.  Patient appears to be 15 to internal stimuli at present.  He has not been taking his medicines for 2 months, having hallucinations, family reports he is homicidal but patient denies SI and HI.  History of multiple psychiatric admissions in the past    Per RN:  Nabil Robinson called into ER to check on her son, she reports that her son has refused his monthly injection for the past 2 months and has been in steadily decline. I updated Nabil that pt is PEC'd and will be admitted to our BHU. Mom's # is [phone]          Per RN:  22-year-old male with history of schizophrenia, suicide attempt in the past presents to the emergency department with hallucinations.  History of Mojo abuse as well.  Patient appears to be responding to internal stimuli in ED.       Psychiatric interview:  "I been thinking about my twin flame, Raul Murphy, I'm a special person... my mom kicked me out and I went for a amy ride so she called the ." He admits to not taking his medications, "it " "wasn't affecting me the right way, my mom gave me a glass of wine, and ever since the then my left hemisphere of my brain is shot."        Symptoms of Depression: diminished mood - No, loss of interest/anhedonia - No;  recurrent - No, >14 days - No, diminished energy - No, change in sleep - No, change in appetite - No, diminished concentration or cognition or indecisiveness - No, PMA/R -  No, excessive guilt or hopelessness or worthlessness - No, suicidal ideations - No    Changes in Sleep: trouble with initiation- No, maintenance, - No early morning awakening with inability to return to sleep - No, hypersomnolence - No    Suicidal- active/passive ideations - No, organized plans, future intentions - No    Homicidal ideations: active/passive ideations - No, organized plans, future intentions - No    Symptoms of psychosis: hallucinations - Yes, delusions - Yes, disorganized speech - No, disorganized behavior or abnormal motor behavior - No, or negative symptoms (diminshed emotional expression, avolition, anhedonia, alogia, asociality) - No, active phase symptoms >1 month - No, continuous signs of illness > 6 months - No, since onset of illness decreased level of functioning present - No    Symptoms of carley or hypomania: elevated, expansive, or irritable mood with increased energy or activity - Yes; > 4 days - Yes,  >7 days - Yes; with inflated self-esteem or grandiosity - Yes, decreased need for sleep - No, increased rate of speech - No, FOI or racing thoughts - No, distractibility - Yes, increased goal directed activity or PMA - Yes, risky/disinhibited behavior - Yes    Symptoms of KANU: excessive anxiety/worry/fear, more days than not, about numerous issues - No, ongoing for >6 months - No, difficult to control - No, with restlessness - No, fatigue - No, poor concentration - No, irritability - No, muscle tension - No, sleep disturbance - No; causes functionally impairing distress - No    Symptoms of Panic Disorder: " "recurrent panic attacks (palpitations/heart racing, sweating, shakiness, dyspnea, choking, chest pain/discomfort, Gi symptoms, dizzy/lightheadedness, hot/col flashes, paresthesias, derealization, fear of losing control or fear of dying or fear of "going crazy") - No, precipitated - No, un-precipitated - No, source of worry and/or behavioral changes secondary for 1 month or longer- No, agoraphobia - No    Symptoms of PTSD: h/o trauma exposure - No; re-experiencing/intrusive symptoms - No, avoidant behavior - No, 2 or more negative alterations in cognition or mood - No, 2 or more hyperarousal symptoms - No; with dissociative symptoms - No, ongoing for 1 or more  months - No    Symptoms of OCD: obsessions (recurrent thoughts/urges/images; intrusive and/or unwanted; uses other thoughts/actions to suppress) - No; compulsions (repetitive behaviors used to lower distress/anxiety/obsessions) - No, time-consuming (over 1 hour per day) or cause significant distress/impairment - - No    Symptoms of Anorexia: restriction of caloric intake leading to significantly low body weight - No, intense fear of gaining weight or persistent behavior that interferes with weight gain even thought at a significantly low weight - No, disturbance in the way in which one's body weight or shape is experienced, undue influence of body weight or shape on self evaluation, or persistent lack of recognition of the seriousness of the current low body weight - No    Symptoms of Bulimia: recurrent episodes of binge eating (definitely larger amount  than what others would eat and lack of a sense of control over eating during episode) - No, recurrent inappropriate compensatory behaviors in order to prevent weight gain (fasting, medications, exercise, vomiting) - No, binges and compensatory behaviors both occur on average at least once a week for 3 months - No, self evaluations is unduly influenced by body shape/weight- - No    Symptoms of Binge eating: " recurrent episodes of binge eating (definitely larger amount than what others would eat and lack of a sense of control over eating during episode) - No, 3 or more of following (eating much more rapidly, eating until uncomfortably full, large amounts when not hungry, eating alone because of embarrassed by how much,  feeling disgusted with oneself, depressed or very guilty afterward) - No, distress regarding binges - No, binges occur on average at least once a week for 3 months - No      PAST PSYCHIATRIC HISTORY  Previous Psychiatric Hospitalizations: Yes  Previous SI/HI: Yes,  Previous Suicide Attempts: Yes,   Previous Medication Trials: Yes,  Psychiatric Care (current & past): Yes,  History of Psychotherapy: Yes,  History of Violence: Yes,  History of sexual/physical abuse: No,    PAST MEDICAL & SURGICAL HISTORY   Past Medical History:   Diagnosis Date    History of psychiatric hospitalization     Hx of psychiatric care     Psychiatric problem     Schizophrenia     Suicide attempt      No past surgical history on file.      Home Meds:   Prior to Admission medications    Medication Sig Start Date End Date Taking? Authorizing Provider   amoxicillin-clavulanate 875-125mg (AUGMENTIN) 875-125 mg per tablet Take 1 tablet by mouth 2 (two) times daily. 12/4/22   Yogesh Wasserman III, NP   divalproex (DEPAKOTE) 250 MG EC tablet Take 3 tablets (750 mg total) by mouth 2 (two) times a day. 5/31/22 5/31/23  Cristhian Grijalva III, MD   EScitalopram oxalate (LEXAPRO) 10 MG tablet TAKE 1 TABLET BY MOUTH EVERY DAY 7/27/21 5/23/22  Jl Aguilar NP   lithium (LITHOTAB) 300 mg tablet Take 300 mg by mouth 2 (two) times daily. 4/26/22 5/31/22  Historical Provider         Scheduled Meds:    folic acid  1 mg Oral Daily    multivitamin  1 tablet Oral Daily      PRN Meds: acetaminophen, aluminum-magnesium hydroxide-simethicone, docusate sodium, hydrOXYzine pamoate, loperamide, nicotine, OLANZapine **AND** OLANZapine   Psychotherapeutics (From  "admission, onward)      Start     Stop Route Frequency Ordered    12/17/22 2259  OLANZapine injection 10 mg  (Olanzapine PRN (</= 64 yo))        Question:  Is the patient competent?  Answer:  Yes   See Hyperspace for full Linked Orders Report.    -- IM Every 8 hours PRN 12/17/22 2200 12/17/22 2259  OLANZapine tablet 10 mg  (Olanzapine PRN (</= 64 yo))        See Hyperspace for full Linked Orders Report.    -- Oral Every 4 hours PRN 12/17/22 2200            ALLERGIES   Review of patient's allergies indicates:  No Known Allergies    NEUROLOGIC HISTORY  Seizures: No  Head trauma: No    SOCIAL HISTORY:  Developmental/Childhood:Achieved all developmental milestones timely  Education: some college  Employment Status/Finances:Employed   Relationship Status/Sexual Orientation: single  Children: 0  Housing Status: Home    history:  NO   Access to Firearms: NO ;  Locked up? n/a  Hindu:Actively participates in organized Jain  Recreational activities:Exercise    SUBSTANCE ABUSE HISTORY   Recreational Drugs: marijuana   Use of Alcohol: occasional, social use  Rehab History:no   Tobacco Use:no    LEGAL HISTORY:   Past charges/incarcerations: NO  Pending charges:NO    FAMILY PSYCHIATRIC HISTORY   Depression - "dad's side"      ROS  Review of Systems   Constitutional:  Negative for chills and fever.   HENT:  Negative for hearing loss.    Eyes:  Negative for blurred vision and double vision.   Respiratory:  Negative for shortness of breath.    Cardiovascular:  Negative for chest pain and palpitations.   Gastrointestinal:  Negative for constipation, diarrhea, nausea and vomiting.   Genitourinary:  Negative for dysuria.   Musculoskeletal:  Negative for back pain and neck pain.   Skin:  Negative for rash.   Neurological:  Negative for dizziness and headaches.   Endo/Heme/Allergies:  Negative for environmental allergies.       EXAMINATION    PHYSICAL EXAM  Reviewed note/exam by Dr. Fan Chao MD  12/17/22 " 1748    VITALS   Vitals:    12/18/22 0800   BP: 126/79   Pulse: 75   Resp: 20   Temp: 97.9 °F (36.6 °C)        Body mass index is 20.37 kg/m².        PAIN  0/10  Subjective report of pain matches objective signs and symptoms: Yes    LABORATORY DATA   Recent Results (from the past 72 hour(s))   Urinalysis, Reflex to Urine Culture Urine, Clean Catch    Collection Time: 12/17/22  4:55 PM    Specimen: Urine, Clean Catch   Result Value Ref Range    Specimen UA Urine, Clean Catch     Color, UA Yellow Yellow, Straw, Ingrid    Appearance, UA Clear Clear    pH, UA 6.0 5.0 - 8.0    Specific Gravity, UA >=1.030 (A) 1.005 - 1.030    Protein, UA 1+ (A) Negative    Glucose, UA Negative Negative    Ketones, UA 2+ (A) Negative    Bilirubin (UA) 1+ (A) Negative    Occult Blood UA Negative Negative    Nitrite, UA Negative Negative    Urobilinogen, UA 1.0 <2.0 EU/dL    Leukocytes, UA Trace (A) Negative   Drug screen panel, emergency    Collection Time: 12/17/22  4:55 PM   Result Value Ref Range    Benzodiazepines Negative Negative    Methadone metabolites Negative Negative    Cocaine (Metab.) Negative Negative    Opiate Scrn, Ur Negative Negative    Barbiturate Screen, Ur Negative Negative    Amphetamine Screen, Ur Negative Negative    THC Presumptive Positive (A) Negative    Phencyclidine Negative Negative    Creatinine, Urine 626.0 (H) 23.0 - 375.0 mg/dL    Toxicology Information SEE COMMENT    Urinalysis Microscopic    Collection Time: 12/17/22  4:55 PM   Result Value Ref Range    RBC, UA 3 0 - 4 /hpf    WBC, UA 6 (H) 0 - 5 /hpf    Bacteria Rare None-Occ /hpf    Squam Epithel, UA 4 /hpf    Hyaline Casts, UA 0 0-1/lpf /lpf    Microscopic Comment SEE COMMENT    POCT COVID-19 Rapid Screening    Collection Time: 12/17/22  5:08 PM   Result Value Ref Range    POC Rapid COVID Negative Negative     Acceptable Yes    CBC auto differential    Collection Time: 12/17/22  5:16 PM   Result Value Ref Range    WBC 10.05 3.90 - 12.70  K/uL    RBC 4.57 (L) 4.60 - 6.20 M/uL    Hemoglobin 14.4 14.0 - 18.0 g/dL    Hematocrit 41.9 40.0 - 54.0 %    MCV 92 82 - 98 fL    MCH 31.5 (H) 27.0 - 31.0 pg    MCHC 34.4 32.0 - 36.0 g/dL    RDW 13.3 11.5 - 14.5 %    Platelets 375 150 - 450 K/uL    MPV 9.1 (L) 9.2 - 12.9 fL    Immature Granulocytes 0.2 0.0 - 0.5 %    Gran # (ANC) 6.4 1.8 - 7.7 K/uL    Immature Grans (Abs) 0.02 0.00 - 0.04 K/uL    Lymph # 2.2 1.0 - 4.8 K/uL    Mono # 1.3 (H) 0.3 - 1.0 K/uL    Eos # 0.1 0.0 - 0.5 K/uL    Baso # 0.02 0.00 - 0.20 K/uL    nRBC 0 0 /100 WBC    Gran % 63.9 38.0 - 73.0 %    Lymph % 21.7 18.0 - 48.0 %    Mono % 13.1 4.0 - 15.0 %    Eosinophil % 0.9 0.0 - 8.0 %    Basophil % 0.2 0.0 - 1.9 %    Differential Method Automated    Comprehensive metabolic panel    Collection Time: 12/17/22  5:16 PM   Result Value Ref Range    Sodium 139 136 - 145 mmol/L    Potassium 3.0 (L) 3.5 - 5.1 mmol/L    Chloride 107 95 - 110 mmol/L    CO2 22 (L) 23 - 29 mmol/L    Glucose 109 70 - 110 mg/dL    BUN 18 6 - 20 mg/dL    Creatinine 1.3 0.5 - 1.4 mg/dL    Calcium 8.6 (L) 8.7 - 10.5 mg/dL    Total Protein 8.6 (H) 6.0 - 8.4 g/dL    Albumin 4.4 3.5 - 5.2 g/dL    Total Bilirubin 1.3 (H) 0.1 - 1.0 mg/dL    Alkaline Phosphatase 69 55 - 135 U/L    AST 17 10 - 40 U/L    ALT 33 10 - 44 U/L    Anion Gap 10 8 - 16 mmol/L    eGFR >60.0 >60 mL/min/1.73 m^2   TSH    Collection Time: 12/17/22  5:16 PM   Result Value Ref Range    TSH 0.716 0.400 - 4.000 uIU/mL   Ethanol    Collection Time: 12/17/22  5:16 PM   Result Value Ref Range    Alcohol, Serum <3 <10 mg/dL   Acetaminophen level    Collection Time: 12/17/22  5:16 PM   Result Value Ref Range    Acetaminophen (Tylenol), Serum <2.0 (L) 10.0 - 20.0 ug/mL   Valproic Acid    Collection Time: 12/17/22  5:16 PM   Result Value Ref Range    Valproic Acid Level <3 (L) 50 - 100 ug/mL   Hemoglobin A1C    Collection Time: 12/17/22  5:16 PM   Result Value Ref Range    Hemoglobin A1C 4.9 4.0 - 5.6 %    Estimated Avg Glucose  94 68 - 131 mg/dL   Lipid Panel    Collection Time: 12/17/22  5:16 PM   Result Value Ref Range    Cholesterol 187 120 - 199 mg/dL    Triglycerides 80 30 - 150 mg/dL    HDL 74 40 - 75 mg/dL    LDL Cholesterol 97.0 63.0 - 159.0 mg/dL    HDL/Cholesterol Ratio 39.6 20.0 - 50.0 %    Total Cholesterol/HDL Ratio 2.5 2.0 - 5.0    Non-HDL Cholesterol 113 mg/dL   Hemoglobin A1c    Collection Time: 12/18/22  5:27 AM   Result Value Ref Range    Hemoglobin A1C 4.8 4.0 - 5.6 %    Estimated Avg Glucose 91 68 - 131 mg/dL   Lipid panel    Collection Time: 12/18/22  5:28 AM   Result Value Ref Range    Cholesterol 165 120 - 199 mg/dL    Triglycerides 75 30 - 150 mg/dL    HDL 71 40 - 75 mg/dL    LDL Cholesterol 79.0 63.0 - 159.0 mg/dL    HDL/Cholesterol Ratio 43.0 20.0 - 50.0 %    Total Cholesterol/HDL Ratio 2.3 2.0 - 5.0    Non-HDL Cholesterol 94 mg/dL      Lab Results   Component Value Date    VALPROATE <3 (L) 12/17/2022           CONSTITUTIONAL  General Appearance: unremarkable, age appropriate    MUSCULOSKELETAL  Muscle Strength and Tone:no tremor, no tic  Abnormal Involuntary Movements: No  Gait and Station: non-ataxic    PSYCHIATRIC   Level of Consciousness: awake and alert   Orientation: person, place, and situation  Grooming: Hospital garb  Psychomotor Behavior: normal, cooperative  Speech: normal tone, normal rate, normal pitch, normal volume  Language: grossly intact  Mood: great  Affect: Intense  Thought Process: circumstantial  Associations: intact   Thought Content: +delusions, denies SI, and denies HI  Perceptions: +observed RIS   Memory: Able to recall past events, Remote intact, and Recent intact  Attention:Attends to interview without distraction  Fund of Knowledge: Aware of current events and Vocabulary appropriate   Estimate if Intelligence:  Average based on work/education history, vocabulary and mental status exam  Insight: has awareness of illness  Judgment: behavior is adequate to  circumstances      PSYCHOSOCIAL    PSYCHOSOCIAL STRESSORS   occupational    FUNCTIONING RELATIONSHIPS   good support system    STRENGTHS AND LIABILITIES   Strength: Patient accepts guidance/feedback, Strength: Patient is expressive/articulate., Liability: Patient is impulsive., Liability: Patient lacks coping skills.    Is the patient aware of the biomedical complications associated with substance abuse and mental illness? yes    Does the patient have an Advance Directive for Mental Health treatment? no  (If yes, inform patient to bring copy.)        MEDICAL DECISION MAKING        ASSESSMENT       Bipolar disorder, type I, MRE manic, severe with psychotic features  Cannabis abuse  Unspecified anxiety disorder  Psychosocial stressors        PROBLEM LIST AND MANAGEMENT PLANS      Bipolar disorder, type I, MRE manic, severe with psychotic features  - start abilify 10 mg PO qd  - pt counseled  - follow up with outpatient mental health providers after discharge for medication management and psychotherapy      Cannabis abuse  - SW consulted for assistance with additional resources     Unspecified anxiety disorder  - start hydroxyzine 50 mg PO q 6 hours PRN      Psychosocial stressors  - pt counseled  - SW consulted for assistance with additional resources     Hypokalemia  - FM consulted  - monitor/recheck tomorrow, replete as needed        PRESCRIPTION DRUG MANAGEMENT  Compliance: no  Side Effects: unknown  Regimen Adjustments: see above    Discussed diagnosis, risks and benefits of proposed treatment vs alternative treatments vs no treatment, potential side effects of these treatments and the inherent unpredictability of treatment. The patient expresses understanding of the above and displays the capacity to agree with this treatment given said understanding. Patient also agrees that, currently, the benefits outweigh the risks and would like to pursue/continue treatment at this time.    Any medications being used  off-label were discussed with the patient inclusive of the evidence base for the use of the medications and consent was obtained for the off-label use of the medication.         DIAGNOSTIC TESTING  Labs reviewed with patient; follow up pending labs    Disposition:  -Will attempt to obtain outside psychiatric records if available  -SW to assist with aftercare planning and collateral  -Once stable discharge home with outpatient follow up care and/or rehab  -Continue inpatient treatment under a PEC and/or CEC for danger to self/ danger to others/grave disability as evident by gravely disabled        Cristhian Grijalva III, MD  Psychiatry

## 2022-12-18 NOTE — NURSING
"Pt in activity room at present eating supper but has spent most of the day in his bedroom resting.  Pt out of room for meals and snacks.  Pt did take a shower today without prompting.  Pt continues to be delusional, psychotic, stating he he here because of  his "twin flame" Raul Murphy.  When asked to elaborate, pt just smiles and state "never mind."  Pt noted to be talking to himself at times.  Denies si, hi or a v hallucinations.  Gravely disabled.  Pt eating 100 percent of meals and snacks.  Has been noncompliant with his meds.  States his meds don't work the same since his mom gave him a glass of wine and it messed up his cerebral cortex.  Pt in to see Dr Grijalva for psych eval with new orders noted.  See emar.  Pt instructed to call for any needs or concerns at all.  Verbalized understanding.  Will monitor for safety.   "

## 2022-12-19 LAB
ALBUMIN SERPL BCP-MCNC: 3.9 G/DL (ref 3.5–5.2)
ALP SERPL-CCNC: 67 U/L (ref 55–135)
ALT SERPL W/O P-5'-P-CCNC: 33 U/L (ref 10–44)
ANION GAP SERPL CALC-SCNC: 6 MMOL/L (ref 8–16)
AST SERPL-CCNC: 34 U/L (ref 10–40)
BILIRUB SERPL-MCNC: 0.9 MG/DL (ref 0.1–1)
BUN SERPL-MCNC: 17 MG/DL (ref 6–20)
CALCIUM SERPL-MCNC: 8.5 MG/DL (ref 8.7–10.5)
CHLORIDE SERPL-SCNC: 107 MMOL/L (ref 95–110)
CO2 SERPL-SCNC: 26 MMOL/L (ref 23–29)
CREAT SERPL-MCNC: 1.3 MG/DL (ref 0.5–1.4)
EST. GFR  (NO RACE VARIABLE): >60 ML/MIN/1.73 M^2
GLUCOSE SERPL-MCNC: 82 MG/DL (ref 70–110)
POTASSIUM SERPL-SCNC: 3.7 MMOL/L (ref 3.5–5.1)
PROT SERPL-MCNC: 7.8 G/DL (ref 6–8.4)
SODIUM SERPL-SCNC: 139 MMOL/L (ref 136–145)

## 2022-12-19 PROCEDURE — 25000003 PHARM REV CODE 250: Performed by: STUDENT IN AN ORGANIZED HEALTH CARE EDUCATION/TRAINING PROGRAM

## 2022-12-19 PROCEDURE — 36415 COLL VENOUS BLD VENIPUNCTURE: CPT | Performed by: STUDENT IN AN ORGANIZED HEALTH CARE EDUCATION/TRAINING PROGRAM

## 2022-12-19 PROCEDURE — 90833 PR PSYCHOTHERAPY W/PATIENT W/E&M, 30 MIN (ADD ON): ICD-10-PCS | Mod: SA,HB,, | Performed by: PSYCHIATRY & NEUROLOGY

## 2022-12-19 PROCEDURE — 11400000 HC PSYCH PRIVATE ROOM

## 2022-12-19 PROCEDURE — 99232 PR SUBSEQUENT HOSPITAL CARE,LEVL II: ICD-10-PCS | Mod: SA,HB,, | Performed by: PSYCHIATRY & NEUROLOGY

## 2022-12-19 PROCEDURE — 90833 PSYTX W PT W E/M 30 MIN: CPT | Mod: SA,HB,, | Performed by: PSYCHIATRY & NEUROLOGY

## 2022-12-19 PROCEDURE — 99232 SBSQ HOSP IP/OBS MODERATE 35: CPT | Mod: SA,HB,, | Performed by: PSYCHIATRY & NEUROLOGY

## 2022-12-19 PROCEDURE — 25000003 PHARM REV CODE 250: Performed by: EMERGENCY MEDICINE

## 2022-12-19 PROCEDURE — 25000003 PHARM REV CODE 250: Performed by: INTERNAL MEDICINE

## 2022-12-19 PROCEDURE — 80053 COMPREHEN METABOLIC PANEL: CPT | Performed by: STUDENT IN AN ORGANIZED HEALTH CARE EDUCATION/TRAINING PROGRAM

## 2022-12-19 RX ORDER — DIVALPROEX SODIUM 250 MG/1
750 TABLET, DELAYED RELEASE ORAL 2 TIMES DAILY
Status: DISCONTINUED | OUTPATIENT
Start: 2022-12-19 | End: 2022-12-20

## 2022-12-19 RX ORDER — TALC
6 POWDER (GRAM) TOPICAL NIGHTLY PRN
Status: DISCONTINUED | OUTPATIENT
Start: 2022-12-19 | End: 2022-12-23 | Stop reason: HOSPADM

## 2022-12-19 RX ORDER — ACETAMINOPHEN 325 MG/1
650 TABLET ORAL EVERY 8 HOURS PRN
Status: DISCONTINUED | OUTPATIENT
Start: 2022-12-19 | End: 2022-12-23 | Stop reason: HOSPADM

## 2022-12-19 RX ORDER — ONDANSETRON 4 MG/1
8 TABLET, ORALLY DISINTEGRATING ORAL EVERY 8 HOURS PRN
Status: DISCONTINUED | OUTPATIENT
Start: 2022-12-19 | End: 2022-12-23 | Stop reason: HOSPADM

## 2022-12-19 RX ORDER — FAMOTIDINE 20 MG/1
20 TABLET, FILM COATED ORAL 2 TIMES DAILY
Status: DISCONTINUED | OUTPATIENT
Start: 2022-12-19 | End: 2022-12-23 | Stop reason: HOSPADM

## 2022-12-19 RX ADMIN — FOLIC ACID 1 MG: 1 TABLET ORAL at 09:12

## 2022-12-19 RX ADMIN — DIVALPROEX SODIUM 750 MG: 250 TABLET, DELAYED RELEASE ORAL at 01:12

## 2022-12-19 RX ADMIN — DIVALPROEX SODIUM 750 MG: 250 TABLET, DELAYED RELEASE ORAL at 08:12

## 2022-12-19 RX ADMIN — HYDROXYZINE PAMOATE 50 MG: 50 CAPSULE ORAL at 08:12

## 2022-12-19 RX ADMIN — ARIPIPRAZOLE 10 MG: 10 TABLET ORAL at 09:12

## 2022-12-19 RX ADMIN — THERA TABS 1 TABLET: TAB at 09:12

## 2022-12-19 RX ADMIN — FAMOTIDINE 20 MG: 20 TABLET ORAL at 08:12

## 2022-12-19 NOTE — PLAN OF CARE
Collateral:   Nabil Soria, mother, [phone]    Collateral Perception of Problem:   Within the past 2 weeks he started acting strange not sleeping not eating, staying up all night  Didn't come home Friday night, said he was going catch a plane to meet massiel kowalski, eventually returned home on Saturday Saturday, he was making all kind of noises and beating on things in the bathroom, ask mom did she hear all the noise she said yea I heard you he said that wasn't me, he came towards me like he was about to attack me I ran outside and called the police    Previous Psych History/Hospitalizations:   2 previous hospitalization within this past year     Suicide Attempts (how/severity):  None     How long has pt had problems (childhood dx?):  Dx with schizophrenia and bipolar since January 2019     Impulse issues:  Yes when non compliant with medication     History of violence:   None     Drug Use:  Smokes marijuana, daily     Alcohol Use:  Occasionally     Legal Issues:  None     Other Pertinent Info:   He works at [employer]   Has stopped taking medication within the past 2 months   He was with the [facility] but he stopped them from coming, its been about 2 months since he has had their service  Was on the shot and pills, he never took pills that he was given       Baseline:  Friendly, manner able, goes to work, hang out with friends  Talks to himself all the time     Discharge Plan:   Return home, Return to work

## 2022-12-19 NOTE — CONSULTS
History & Physical      SUBJECTIVE:     History of Present Illness:  Patient is a 22 y.o. male presents with  schizophrenia and manic depression . Admitted to [facility].    No past medical history other than psych. No complaints today.    PTA Medications   Medication Sig    amoxicillin-clavulanate 875-125mg (AUGMENTIN) 875-125 mg per tablet Take 1 tablet by mouth 2 (two) times daily.    divalproex (DEPAKOTE) 250 MG EC tablet Take 3 tablets (750 mg total) by mouth 2 (two) times a day.     Review of patient's allergies indicates:  No Known Allergies    Past Medical History:   Diagnosis Date    History of psychiatric hospitalization     Hx of psychiatric care     Psychiatric problem     Schizophrenia     Suicide attempt      No past surgical history on file.  No family history on file.  Social History     Tobacco Use    Smoking status: Light Smoker     Types: Vaping with nicotine    Smokeless tobacco: Never   Substance Use Topics    Alcohol use: Not Currently     Comment: pt used to 2 beers occassinally    Drug use: Yes     Types: Marijuana     Comment: Pt denies smoking anything synthetic        Review of Systems:  Constitutional: no fever or chills  Respiratory: no cough or shorness of breath  Cardiovascular: no chest pain or palpitations  Gastrointestinal: no nausea or vomiting, no abdominal pain or change in bowel habits  Musculoskeletal: no arthralgias or myalgias    OBJECTIVE:     Vital Signs (Most Recent)  Temp: 97.9 °F (36.6 °C) (12/18/22 1941)  Pulse: 91 (12/18/22 1941)  Resp: 16 (12/18/22 1941)  BP: 115/75 (12/18/22 1941)  SpO2: 95 % (12/18/22 1941)    Physical Exam:  General: well developed, well nourished  Lungs:  clear to auscultation bilaterally and normal respiratory effort  Cardiovascular: Heart: regular rate and rhythm, S1, S2 normal, no murmur, click, rub or gallop. Chest Wall: no tenderness. Extremities: no cyanosis or edema, or clubbing. Pulses: 2+ and symmetric.  Abdomen/Rectal: Abdomen: soft, non-tender  non-distented; bowel sounds normal; no masses,  no organomegaly. Rectal: not examined  Skin: Skin color, texture, turgor normal. No rashes or lesions  Musculoskeletal:normal gait  Psych:   Neuro: Cranial nerves:  CN II Visual fields full to confrontation.   CN III, IV, VI Pupils are equal, round, and reactive to light.  CN III: no palsy  Nystagmus: none   Diplopia: none  Ophthalmoparesis: none  CN V Facial sensation intact.   CN VII Facial expression full, symmetric.   CN VIII normal.   CN IX normal.   CN X normal.   CN XI normal.   CN XII normal.      Laboratory  CBC:   Recent Labs   Lab 12/17/22  1716   WBC 10.05   RBC 4.57*   HGB 14.4   HCT 41.9      MCV 92   MCH 31.5*   MCHC 34.4     CMP:   Recent Labs   Lab 12/17/22  1716      CALCIUM 8.6*   ALBUMIN 4.4   PROT 8.6*      K 3.0*   CO2 22*      BUN 18   CREATININE 1.3   ALKPHOS 69   ALT 33   AST 17   BILITOT 1.3*     Recent Labs   Lab 12/17/22  1655   COLORU Yellow   SPECGRAV >=1.030*   PHUR 6.0   PROTEINUA 1+*   BACTERIA Rare   NITRITE Negative   LEUKOCYTESUR Trace*   UROBILINOGEN 1.0   HYALINECASTS 0     TSH   Date Value Ref Range Status   12/17/2022 0.716 0.400 - 4.000 uIU/mL Final     Comment:     ATTENTION: The use of Biotin Supplements may interfere with this   assay.       No results found for this or any previous visit.  Alcohol, Serum   Date Value Ref Range Status   12/17/2022 <3 <10 mg/dL Final     Acetaminophen (Tylenol), Serum   Date Value Ref Range Status   12/17/2022 <2.0 (L) 10.0 - 20.0 ug/mL Final     Comment:     Toxic Levels:  Adults (4 hr post-ingestion).........>150 ug/mL  Adults (12 hr post-ingestion)........>40 ug/mL  Peds (2 hr post-ingestion, liquid)...>225 ug/mL       No results found for this or any previous visit.  Results for orders placed or performed during the hospital encounter of 12/17/22   Drug screen panel, emergency   Result Value Ref Range    Benzodiazepines Negative Negative    Methadone metabolites  Negative Negative    Cocaine (Metab.) Negative Negative    Opiate Scrn, Ur Negative Negative    Barbiturate Screen, Ur Negative Negative    Amphetamine Screen, Ur Negative Negative    THC Presumptive Positive (A) Negative    Phencyclidine Negative Negative    Creatinine, Urine 626.0 (H) 23.0 - 375.0 mg/dL    Toxicology Information SEE COMMENT      No results found for: PREGTESTUR    ASSESSMENT/PLAN:     There are no hospital problems to display for this patient.      Plan: Further orders for psych

## 2022-12-19 NOTE — HPI
Chief Complaint   Patient presents with    Mental Health Problem       EMS reports pt from home with c/o not taking his Psych Meds for 2 months per his Mother. Upon arrival pt was talking to God.      22-year-old male with history of schizophrenia, suicide attempt in the past presents to the emergency department with hallucinations.  History of Mojo abuse as well.  Patient appears to be 13 to internal stimuli at present.  He has not been taking his medicines for 2 months, having hallucinations, family reports he is homicidal but patient denies SI and HI.  History of multiple psychiatric admissions in the past

## 2022-12-19 NOTE — SUBJECTIVE & OBJECTIVE
Past Medical History:   Diagnosis Date    History of psychiatric hospitalization     Hx of psychiatric care     Psychiatric problem     Schizophrenia     Suicide attempt        No past surgical history on file.    Review of patient's allergies indicates:  No Known Allergies    No current facility-administered medications on file prior to encounter.     Current Outpatient Medications on File Prior to Encounter   Medication Sig    amoxicillin-clavulanate 875-125mg (AUGMENTIN) 875-125 mg per tablet Take 1 tablet by mouth 2 (two) times daily.    divalproex (DEPAKOTE) 250 MG EC tablet Take 3 tablets (750 mg total) by mouth 2 (two) times a day.    [DISCONTINUED] EScitalopram oxalate (LEXAPRO) 10 MG tablet TAKE 1 TABLET BY MOUTH EVERY DAY    [DISCONTINUED] lithium (LITHOTAB) 300 mg tablet Take 300 mg by mouth 2 (two) times daily.     Family History    None       Tobacco Use    Smoking status: Light Smoker     Types: Vaping with nicotine    Smokeless tobacco: Never   Substance and Sexual Activity    Alcohol use: Not Currently     Comment: pt used to 2 beers occassinally    Drug use: Yes     Types: Marijuana     Comment: Pt denies smoking anything synthetic    Sexual activity: Not Currently     Review of Systems   Constitutional:  Negative for fatigue and fever.   HENT:  Negative for congestion, ear pain and sore throat.    Eyes:  Negative for pain and discharge.   Respiratory:  Negative for cough, shortness of breath and wheezing.    Gastrointestinal:  Negative for abdominal pain, constipation, diarrhea, nausea and vomiting.   Endocrine: Negative for cold intolerance and heat intolerance.   Genitourinary:  Negative for difficulty urinating, dysuria and frequency.   Musculoskeletal:  Negative for arthralgias.   Allergic/Immunologic: Negative for environmental allergies.   Neurological:  Negative for dizziness, tremors and seizures.   Psychiatric/Behavioral:  Positive for confusion, hallucinations and sleep disturbance.  Negative for agitation.    All other systems reviewed and are negative.  Objective:     Vital Signs (Most Recent):  Temp: 98.7 °F (37.1 °C) (12/19/22 0731)  Pulse: 89 (12/19/22 0731)  Resp: 20 (12/19/22 0731)  BP: 126/68 (12/19/22 0731)  SpO2: 99 % (12/19/22 0731) Vital Signs (24h Range):  Temp:  [97.9 °F (36.6 °C)-98.7 °F (37.1 °C)] 98.7 °F (37.1 °C)  Pulse:  [89-91] 89  Resp:  [16-20] 20  SpO2:  [95 %-99 %] 99 %  BP: (115-126)/(68-75) 126/68     Weight: 73.9 kg (163 lb)  Body mass index is 20.37 kg/m².    Physical Exam  Vitals and nursing note reviewed.   Constitutional:       Appearance: Normal appearance.   HENT:      Head: Normocephalic and atraumatic.      Nose: Nose normal.      Mouth/Throat:      Mouth: Mucous membranes are moist.      Pharynx: Oropharynx is clear.   Eyes:      Extraocular Movements: Extraocular movements intact.      Conjunctiva/sclera: Conjunctivae normal.      Pupils: Pupils are equal, round, and reactive to light.   Cardiovascular:      Rate and Rhythm: Normal rate and regular rhythm.      Pulses: Normal pulses.      Heart sounds: Normal heart sounds.   Pulmonary:      Effort: Pulmonary effort is normal.      Breath sounds: Normal breath sounds.   Abdominal:      General: Abdomen is flat. Bowel sounds are normal.      Palpations: Abdomen is soft.   Musculoskeletal:         General: Normal range of motion.      Cervical back: Normal range of motion and neck supple.   Skin:     General: Skin is warm and dry.      Capillary Refill: Capillary refill takes less than 2 seconds.      Comments: No rashes on limited skin exam.   Neurological:      General: No focal deficit present.      Mental Status: He is alert.      Cranial Nerves: No cranial nerve deficit.      Comments: I Olfactory:  Sense of smell intact    II Optic:  Pupils equal round react to light.  Vision intact.    III, IV, VI, Ocular motor, Trochlear, Abducens:  Extraocular movements intact    V Trigeminal:  Facial sensation intact  facial sensation intact,, muscles of mastication intact muscles of mastication intact, corneal reflex intact, corneal reflex intact    VII Facial:  Muscles of facial expression intact     VIII Vestibular cochlear: Hearing intact vestibular cochlear: Hearing intact    IX Glossopharyngeal:  Gag reflex intact.  Tasting intact.     X Vagus:  Gag reflex intact.    XI Spinal Accessory:  Shoulder shrug intact.  Head rotation intact.    XII Hypoglossal:  Tongue movements intact.     Psychiatric:      Comments: Delayed, paranoid, inappropriate judgement.  Hallucination.          CRANIAL NERVES     CN III, IV, VI   Pupils are equal, round, and reactive to light.     Significant Labs: All pertinent labs within the past 24 hours have been reviewed.    Significant Imaging: I have reviewed all pertinent imaging results/findings within the past 24 hours.

## 2022-12-19 NOTE — PLAN OF CARE
POC reviewed this shift and is on going. Patient is withdrawn, depressed, calm, cooperative, quiet, and sleeping. Endorses Auditory Hallucinations and Visual Hallucinations. Denies Suicidal Ideation, Homicidal Ideation, Tactile Hallucinations, Gustatory Hallucinations, and Delusions. Patient is isolating in his room. Patient did participate in the morning group session conducted today. Pt continues to be medication compliant and staff will continue to monitor pt closely while on the unit.

## 2022-12-19 NOTE — PLAN OF CARE
Problem: Adult Behavioral Health Plan of Care  Goal: Plan of Care Review  Outcome: Ongoing, Progressing  Goal: Patient-Specific Goal (Individualization)  Outcome: Ongoing, Progressing  Goal: Adheres to Safety Considerations for Self and Others  Outcome: Ongoing, Progressing  Goal: Absence of New-Onset Illness or Injury  Outcome: Ongoing, Progressing  Goal: Optimized Coping Skills in Response to Life Stressors  Outcome: Ongoing, Progressing  Goal: Develops/Participates in Therapeutic [place] to Support Successful Transition  Outcome: Ongoing, Progressing  Goal: Rounds/Family Conference  Outcome: Ongoing, Progressing     Problem: Activity and Energy Impairment (Anxiety Signs/Symptoms)  Goal: Optimized Energy Level (Anxiety Signs/Symptoms)  Outcome: Ongoing, Progressing     Problem: Cognitive Impairment (Anxiety Signs/Symptoms)  Goal: Optimized Cognitive Function (Anxiety Signs/Symptoms)  Outcome: Ongoing, Progressing     Problem: Mood Impairment (Anxiety Signs/Symptoms)  Goal: Improved Mood Symptoms (Anxiety Signs/Symptoms)  Outcome: Ongoing, Progressing     Problem: Sleep Impairment (Anxiety Signs/Symptoms)  Goal: Improved Sleep (Anxiety Signs/Symptoms)  Outcome: Ongoing, Progressing     Problem: Social, Occupational or Functional Impairment (Anxiety Signs/Symptoms)  Goal: Enhanced Social, Occupational or Functional Skills (Anxiety Signs/Symptoms)  Outcome: Ongoing, Progressing     Problem: Somatic Disturbance (Anxiety Signs/Symptoms)  Goal: Improved Somatic Symptoms (Anxiety Signs/Symptoms)  Outcome: Ongoing, Progressing     Problem: Activity and Energy Impairment (Excessive Substance Use)  Goal: Optimized Energy Level (Excessive Substance Use)  Outcome: Ongoing, Progressing     Problem: Behavior Regulation Impairment (Excessive Substance Use)  Goal: Improved Behavioral Control (Excessive Substance Use)  Outcome: Ongoing, Progressing     Problem: Decreased Participation and Engagement (Excessive Substance  Use)  Goal: Increased Participation and Engagement (Excessive Substance Use)  Outcome: Ongoing, Progressing     Problem: Physiologic Impairment (Excessive Substance Use)  Goal: Improved Physiologic Symptoms (Excessive Substance Use)  Outcome: Ongoing, Progressing     Problem: Social, Occupational or Functional Impairment (Excessive Substance Use)  Goal: Enhanced Social, Occupational or Functional Skills (Excessive Substance Use)  Outcome: Ongoing, Progressing     Problem: Behavior Regulation Impairment (Disruptive Behavior)  Goal: Improved Impulse and Aggression Control (Disruptive Behavior)  Outcome: Ongoing, Progressing     Problem: Mood Impairment (Disruptive Behavior)  Goal: Improved Mood Symptoms (Disruptive Behavior)  Outcome: Ongoing, Progressing     Problem: Sleep Disturbance (Disruptive Behavior)  Goal: Improved Sleep (Disruptive Behavior)  Outcome: Ongoing, Progressing     Problem: Social, Occupational or Functional Impairment (Disruptive Behavior)  Goal: Enhanced Social, Occupational or Functional Skills (Disruptive Behavior)  Outcome: Ongoing, Progressing     Problem: Behavior Regulation Impairment (Psychotic Signs/Symptoms)  Goal: Improved Behavioral Control (Psychotic Signs/Symptoms)  Outcome: Ongoing, Progressing     Problem: Cognitive Impairment (Psychotic Signs/Symptoms)  Goal: Optimal Cognitive Function (Psychotic Signs/Symptoms)  Outcome: Ongoing, Progressing     Problem: Decreased Participation and Engagement (Psychotic Signs/Symptoms)  Goal: Increased Participation and Engagement (Psychotic Signs/Symptoms)  Outcome: Ongoing, Progressing     Problem: Mood Impairment (Psychotic Signs/Symptoms)  Goal: Improved Mood Symptoms (Psychotic Signs/Symptoms)  Outcome: Ongoing, Progressing     Problem: Psychomotor Impairment (Psychotic Signs/Symptoms)  Goal: Improved Psychomotor Symptoms (Psychotic Signs/Symptoms)  Outcome: Ongoing, Progressing     Problem: Sensory Perception Impairment (Psychotic  Signs/Symptoms)  Goal: Decreased Sensory Symptoms (Psychotic Signs/Symptoms)  Outcome: Ongoing, Progressing     Problem: Sleep Disturbance (Psychotic Signs/Symptoms)  Goal: Improved Sleep (Psychotic Signs/Symptoms)  Outcome: Ongoing, Progressing     Problem: Social, Occupational or Functional Impairment (Psychotic Signs/Symptoms)  Goal: Enhanced Social, Occupational or Functional Skills (Psychotic Signs/Symptoms)  Outcome: Ongoing, Progressing     Problem: Violence Risk or Actual  Goal: Anger and Impulse Control  Outcome: Ongoing, Progressing

## 2022-12-19 NOTE — PROGRESS NOTES
"PSYCHIATRY DAILY INPATIENT PROGRESS NOTE  SUBSEQUENT HOSPITAL VISIT    ENCOUNTER DATE: 12/19/2022  SITE: [facility]    DATE OF ADMISSION: 12/17/2022  4:44 PM  LENGTH OF STAY: 2 days    CHIEF COMPLAINT      Bishop Soria is a 22 y.o. male with a past psychiatric history of  psychosis, mood disorder, anxiety, substance abuse  currently admitted to the inpatient unit with the following chief complaint:  psychosis    The patient was seen and examined. The chart was reviewed.     Reviewed notes from Rns and MD and labs from the last 24 hours.    The patient's case was discussed with the treatment team/care providers today including Rns and MD    Staff reports no behavioral or management issues.     The patient has been compliant with treatment.    Subjective 12/19/2022     Today the patient reports reason for hospitalization is "I've been off my medicine for a couple of weeks." Reports that he was discharged from [facility] due to inability to make meetings secondary to him starting a job. Reports that home medication included depakote, which he has not taken in over two weeks due to [facility] not providing refills. He does endorse auditory hallucinations prior to arrival as well as not eating or sleeping for over three days.     During today's assessment, patient is appropriate, speech wnl, denies auditory/visual hallucinations. Patient started on abilify over the weekend which he is tolerating well thus far. Discussed prospective CARRASCO and patient is amenable, states, "I think that would be best for me."    The patient denies any side effects to medications.    Psychiatric ROS (observed, reported, or endorsed/denied):  Depressed mood - No  Interest/pleasure/anhedonia: No  Guilt/hopelessness/worthlessness - No  Changes in Sleep - No  Changes in Appetite - No  Changes in Concentration - No  Changes in Energy - No  PMA/R- No  Suicidal- active/passive ideations - No  Homicidal ideations: active/passive ideations - " less    Hallucinations - less  Delusions - less  Disorganized behavior - No  Disorganized speech - No  Negative symptoms - No    Elevated mood - less  Decreased need for sleep - less  Grandiosity - No  Racing thoughts - less  Impulsivity - less  Irritability- less  Increased energy - less  Distractibility - less  Increase in goal-directed activity or PMA- No    Symptoms of KANU - less  Symptoms of Panic Disorder- No  Symptoms of PTSD - No    Overall progress: Patient is showing mild improvement     Psychotherapy:  Target symptoms: mood disorder  Why chosen therapy is appropriate versus another modality: relevant to diagnosis, evidence based practice  Outcome monitoring methods: self-report, observation  Therapeutic intervention type: insight oriented psychotherapy, supportive psychotherapy  Topics discussed/themes: building skills sets for symptom management, symptom recognition  The patient's response to the intervention is accepting. The patient's progress toward treatment goals is fair.   Duration of intervention: 16 minutes.    Medical ROS  Review of Systems   Constitutional: Negative.    HENT: Negative.     Eyes: Negative.    Respiratory: Negative.     Cardiovascular: Negative.    Gastrointestinal: Negative.    Genitourinary: Negative.    Musculoskeletal: Negative.    Skin: Negative.    Neurological: Negative.    Endo/Heme/Allergies: Negative.    Psychiatric/Behavioral:          As noted     PAST MEDICAL HISTORY   Past Medical History:   Diagnosis Date    History of psychiatric hospitalization     Hx of psychiatric care     Psychiatric problem     Schizophrenia     Suicide attempt        PSYCHOTROPIC MEDICATIONS   Scheduled Meds:   ARIPiprazole  10 mg Oral Daily    folic acid  1 mg Oral Daily    multivitamin  1 tablet Oral Daily     Continuous Infusions:  PRN Meds:.acetaminophen, aluminum-magnesium hydroxide-simethicone, docusate sodium, hydrOXYzine pamoate, loperamide, nicotine, OLANZapine **AND**  "OLANZapine    EXAMINATION    VITALS   Vitals:    12/17/22 1757 12/18/22 0800 12/18/22 1941 12/19/22 0731   BP: 130/72 126/79 115/75 126/68   BP Location: Right arm Right arm Right arm Right arm   Patient Position: Sitting Sitting Sitting Sitting   Pulse: 88 75 91 89   Resp: 18 20 16 20   Temp: 98.2 °F (36.8 °C) 97.9 °F (36.6 °C) 97.9 °F (36.6 °C) 98.7 °F (37.1 °C)   TempSrc: Oral Oral Oral Oral   SpO2: 100% 97% 95% 99%   Weight:       Height:           Body mass index is 20.37 kg/m².    CONSTITUTIONAL  General Appearance: unremarkable, age appropriate    MUSCULOSKELETAL  Muscle Strength and Tone:no tremor, no tic  Abnormal Involuntary Movements: No  Gait and Station: non-ataxic    PSYCHIATRIC   Level of Consciousness: awake, alert , and oriented  Orientation: person, place, time, and situation  Grooming: Casually dressed and Well groomed  Psychomotor Behavior: normal, cooperative, friendly and cooperative  Speech: normal tone, normal rate, normal pitch, normal volume  Language: grossly intact  Mood: "Good"  Affect: Consistent with mood  Thought Process: linear, logical  Associations: intact   Thought Content: DENIES suicidal ideation, DENIES homicidal ideation, and no delusions  Perceptions: denies hallucinations  Memory: Able to recall past events, Remote intact, and Recent intact  Attention:Attends to interview without distraction  Fund of Knowledge: Aware of current events and Vocabulary appropriate   Estimate if Intelligence:  Average based on work/education history, vocabulary and mental status exam  Insight: limited awareness of illness  Judgment: behavior is adequate to circumstances    DIAGNOSTIC TESTING   Laboratory Results  Recent Results (from the past 24 hour(s))   Comprehensive Metabolic Panel    Collection Time: 12/19/22  5:55 AM   Result Value Ref Range    Sodium 139 136 - 145 mmol/L    Potassium 3.7 3.5 - 5.1 mmol/L    Chloride 107 95 - 110 mmol/L    CO2 26 23 - 29 mmol/L    Glucose 82 70 - 110 mg/dL "    BUN 17 6 - 20 mg/dL    Creatinine 1.3 0.5 - 1.4 mg/dL    Calcium 8.5 (L) 8.7 - 10.5 mg/dL    Total Protein 7.8 6.0 - 8.4 g/dL    Albumin 3.9 3.5 - 5.2 g/dL    Total Bilirubin 0.9 0.1 - 1.0 mg/dL    Alkaline Phosphatase 67 55 - 135 U/L    AST 34 10 - 40 U/L    ALT 33 10 - 44 U/L    Anion Gap 6 (L) 8 - 16 mmol/L    eGFR >60.0 >60 mL/min/1.73 m^2       MEDICAL DECISION MAKING          ASSESSMENT         Bipolar disorder, type I, MRE manic, severe with psychotic features  Cannabis abuse  Unspecified anxiety disorder  Psychosocial stressors           PROBLEM LIST AND MANAGEMENT PLANS        Bipolar disorder, type I, MRE manic, severe with psychotic features  - start abilify 10 mg PO qd-Increase to 15 mg/day tomorrow, plan for CARRASCO  - pt counseled  - follow up with outpatient mental health providers after discharge for medication management and psychotherapy        Cannabis abuse  - SW consulted for assistance with additional resources      Unspecified anxiety disorder  - Continue hydroxyzine 50 mg PO q 6 hours PRN        Psychosocial stressors  - pt counseled  - SW consulted for assistance with additional resources     Hypokalemia  - FM consulted  - monitor/recheck tomorrow, replete as needed-Level 3.7 on 12/19     Discussed diagnosis, risks and benefits of proposed treatment vs alternative treatments vs no treatment, potential side effects of these treatments and the inherent unpredictability of treatment. The patient expresses understanding of the above and displays the capacity to agree with this treatment given said understanding. Patient also agrees that, currently, the benefits outweigh the risks and would like to pursue/continue treatment at this time.    DISCHARGE PLANNING  Expected Disposition Plan: Home or Self Care    NEED FOR CONTINUED HOSPITALIZATION  Psychiatric illness continues to pose a potential threat to life or bodily function, of self or others, thereby requiring the need for continued inpatient  psychiatric hospitalization: Yes, due to: significant psychotic thought disorder, as evidenced by:  Ongoing concerns with perceptual aberrancy and paranoid persecutory delusions leading to potential harm of self or others.    Protective inpatient pyschiatric hospitalization required while a safe disposition plan is enacted: Yes    Patient stabilized and ready for discharge from inpatient psychiatric unit: No      STAFF:   Jl Aguilar NP  Psychiatry

## 2022-12-19 NOTE — H&P
[facility] Hospital Medicine  History & Physical    Patient Name: Bishop Soria  MRN: [id number]  Patient Class: IP- Psych  Admission Date: 12/17/2022  Attending Physician: Cristhian Grijalva III, MD   Primary Care Provider: Jose Martin Selby MD         Patient information was obtained from ER records.     Subjective:     Principal Problem:<principal problem not specified>    Chief Complaint:   Chief Complaint   Patient presents with    Mental Health Problem     EMS reports pt from home with c/o not taking his Psych Meds for 2 months per his Mother. Upon arrival pt was talking to God.        HPI:   Chief Complaint   Patient presents with    Mental Health Problem       EMS reports pt from home with c/o not taking his Psych Meds for 2 months per his Mother. Upon arrival pt was talking to God.      22-year-old male with history of schizophrenia, suicide attempt in the past presents to the emergency department with hallucinations.  History of Mojo abuse as well.  Patient appears to be 15 to internal stimuli at present.  He has not been taking his medicines for 2 months, having hallucinations, family reports he is homicidal but patient denies SI and HI.  History of multiple psychiatric admissions in the past         Past Medical History:   Diagnosis Date    History of psychiatric hospitalization     Hx of psychiatric care     Psychiatric problem     Schizophrenia     Suicide attempt        No past surgical history on file.    Review of patient's allergies indicates:  No Known Allergies    No current facility-administered medications on file prior to encounter.     Current Outpatient Medications on File Prior to Encounter   Medication Sig    amoxicillin-clavulanate 875-125mg (AUGMENTIN) 875-125 mg per tablet Take 1 tablet by mouth 2 (two) times daily.    divalproex (DEPAKOTE) 250 MG EC tablet Take 3 tablets (750 mg total) by mouth 2 (two) times a day.    [DISCONTINUED] EScitalopram  oxalate (LEXAPRO) 10 MG tablet TAKE 1 TABLET BY MOUTH EVERY DAY    [DISCONTINUED] lithium (LITHOTAB) 300 mg tablet Take 300 mg by mouth 2 (two) times daily.     Family History    None       Tobacco Use    Smoking status: Light Smoker     Types: Vaping with nicotine    Smokeless tobacco: Never   Substance and Sexual Activity    Alcohol use: Not Currently     Comment: pt used to 2 beers occassinally    Drug use: Yes     Types: Marijuana     Comment: Pt denies smoking anything synthetic    Sexual activity: Not Currently     Review of Systems   Constitutional:  Negative for fatigue and fever.   HENT:  Negative for congestion, ear pain and sore throat.    Eyes:  Negative for pain and discharge.   Respiratory:  Negative for cough, shortness of breath and wheezing.    Gastrointestinal:  Negative for abdominal pain, constipation, diarrhea, nausea and vomiting.   Endocrine: Negative for cold intolerance and heat intolerance.   Genitourinary:  Negative for difficulty urinating, dysuria and frequency.   Musculoskeletal:  Negative for arthralgias.   Allergic/Immunologic: Negative for environmental allergies.   Neurological:  Negative for dizziness, tremors and seizures.   Psychiatric/Behavioral:  Positive for confusion, hallucinations and sleep disturbance. Negative for agitation.    All other systems reviewed and are negative.  Objective:     Vital Signs (Most Recent):  Temp: 98.7 °F (37.1 °C) (12/19/22 0731)  Pulse: 89 (12/19/22 0731)  Resp: 20 (12/19/22 0731)  BP: 126/68 (12/19/22 0731)  SpO2: 99 % (12/19/22 0731) Vital Signs (24h Range):  Temp:  [97.9 °F (36.6 °C)-98.7 °F (37.1 °C)] 98.7 °F (37.1 °C)  Pulse:  [89-91] 89  Resp:  [16-20] 20  SpO2:  [95 %-99 %] 99 %  BP: (115-126)/(68-75) 126/68     Weight: 73.9 kg (163 lb)  Body mass index is 20.37 kg/m².    Physical Exam  Vitals and nursing note reviewed.   Constitutional:       Appearance: Normal appearance.   HENT:      Head: Normocephalic and atraumatic.      Nose:  Nose normal.      Mouth/Throat:      Mouth: Mucous membranes are moist.      Pharynx: Oropharynx is clear.   Eyes:      Extraocular Movements: Extraocular movements intact.      Conjunctiva/sclera: Conjunctivae normal.      Pupils: Pupils are equal, round, and reactive to light.   Cardiovascular:      Rate and Rhythm: Normal rate and regular rhythm.      Pulses: Normal pulses.      Heart sounds: Normal heart sounds.   Pulmonary:      Effort: Pulmonary effort is normal.      Breath sounds: Normal breath sounds.   Abdominal:      General: Abdomen is flat. Bowel sounds are normal.      Palpations: Abdomen is soft.   Musculoskeletal:         General: Normal range of motion.      Cervical back: Normal range of motion and neck supple.   Skin:     General: Skin is warm and dry.      Capillary Refill: Capillary refill takes less than 2 seconds.      Comments: No rashes on limited skin exam.   Neurological:      General: No focal deficit present.      Mental Status: He is alert.      Cranial Nerves: No cranial nerve deficit.      Comments: I Olfactory:  Sense of smell intact    II Optic:  Pupils equal round react to light.  Vision intact.    III, IV, VI, Ocular motor, Trochlear, Abducens:  Extraocular movements intact    V Trigeminal:  Facial sensation intact facial sensation intact,, muscles of mastication intact muscles of mastication intact, corneal reflex intact, corneal reflex intact    VII Facial:  Muscles of facial expression intact     VIII Vestibular cochlear: Hearing intact vestibular cochlear: Hearing intact    IX Glossopharyngeal:  Gag reflex intact.  Tasting intact.     X Vagus:  Gag reflex intact.    XI Spinal Accessory:  Shoulder shrug intact.  Head rotation intact.    XII Hypoglossal:  Tongue movements intact.     Psychiatric:      Comments: Delayed, paranoid, inappropriate judgement.  Hallucination.          CRANIAL NERVES     CN III, IV, VI   Pupils are equal, round, and reactive to light.     Significant  Labs: All pertinent labs within the past 24 hours have been reviewed.    Significant Imaging: I have reviewed all pertinent imaging results/findings within the past 24 hours.    Assessment/Plan:     Schizophrenia  To be admitted to our inpatient psychiatric unit for further evaluation and management.        Marijuana abuse  Cessation strongly advised.         VTE Risk Mitigation (From admission, onward)    None             Regis King Jr, MD  Department of Hospital Medicine   [facility] ([facility])

## 2022-12-19 NOTE — NURSING
Pt. A/A.  No C/O pain.  Denies SI/HI/AVH currently.  Does have some anxiety and depression  Flat affect.  Medications compliant and follows instructions.  Will continue to observe.

## 2022-12-20 PROCEDURE — 11400000 HC PSYCH PRIVATE ROOM

## 2022-12-20 PROCEDURE — 99232 PR SUBSEQUENT HOSPITAL CARE,LEVL II: ICD-10-PCS | Mod: SA,HB,, | Performed by: PSYCHIATRY & NEUROLOGY

## 2022-12-20 PROCEDURE — 99232 SBSQ HOSP IP/OBS MODERATE 35: CPT | Mod: SA,HB,, | Performed by: PSYCHIATRY & NEUROLOGY

## 2022-12-20 PROCEDURE — 25000003 PHARM REV CODE 250: Performed by: INTERNAL MEDICINE

## 2022-12-20 PROCEDURE — 25000003 PHARM REV CODE 250: Performed by: EMERGENCY MEDICINE

## 2022-12-20 PROCEDURE — 90833 PR PSYCHOTHERAPY W/PATIENT W/E&M, 30 MIN (ADD ON): ICD-10-PCS | Mod: SA,HB,, | Performed by: PSYCHIATRY & NEUROLOGY

## 2022-12-20 PROCEDURE — 90833 PSYTX W PT W E/M 30 MIN: CPT | Mod: SA,HB,, | Performed by: PSYCHIATRY & NEUROLOGY

## 2022-12-20 RX ORDER — ARIPIPRAZOLE 10 MG/1
30 TABLET ORAL ONCE
Status: COMPLETED | OUTPATIENT
Start: 2022-12-21 | End: 2022-12-21

## 2022-12-20 RX ADMIN — Medication 6 MG: at 08:12

## 2022-12-20 RX ADMIN — FAMOTIDINE 20 MG: 20 TABLET ORAL at 08:12

## 2022-12-20 RX ADMIN — FOLIC ACID 1 MG: 1 TABLET ORAL at 08:12

## 2022-12-20 RX ADMIN — HYDROXYZINE PAMOATE 50 MG: 50 CAPSULE ORAL at 11:12

## 2022-12-20 RX ADMIN — DIVALPROEX SODIUM 750 MG: 250 TABLET, DELAYED RELEASE ORAL at 08:12

## 2022-12-20 RX ADMIN — THERA TABS 1 TABLET: TAB at 08:12

## 2022-12-20 RX ADMIN — ARIPIPRAZOLE 15 MG: 10 TABLET ORAL at 08:12

## 2022-12-20 NOTE — PLAN OF CARE
POC reviewed this shift and is on going. Patient is withdrawn, depressed, cooperative, anxious, sleeping, and showing pressured speech.does not Endorse Suicidal Ideation and Homicidal Ideation. Denies a/v hallucinations, Pt continues to be medication care as per treatment plan.

## 2022-12-20 NOTE — PROGRESS NOTES
"PSYCHIATRY DAILY INPATIENT PROGRESS NOTE  SUBSEQUENT HOSPITAL VISIT    ENCOUNTER DATE: 12/20/2022  SITE: [facility]    DATE OF ADMISSION: 12/17/2022  4:44 PM  LENGTH OF STAY: 3 days    CHIEF COMPLAINT      Bishop Soria is a 22 y.o. male with a past psychiatric history of  psychosis, mood disorder, anxiety, substance abuse  currently admitted to the inpatient unit with the following chief complaint:  psychosis     The patient was seen and examined. The chart was reviewed.      Reviewed notes from Rns and MD and labs from the last 24 hours.     The patient's case was discussed with the treatment team/care providers today including Rns and MD     Staff reports no behavioral or management issues.      The patient has been compliant with treatment.    Subjective 12/20/2022    Patient reports mood is "pretty good." States that he has been reading printouts about bipolar disorder and feels "this describes me pretty well." Patient is mildly hyperverbal but able to be redirected. He denies any current auditory or visual hallucinations.     Expressed some concern that depakote was re-started due to past side effects of weight gain and sedation. NP checked orders, it appears this medication was ordered in the emergency department and started yesterday. As patient states he does not plan to take this medication outpatient, will discontinue. He remains amenable to CARRASCO. Discussed options, and patient would like aristada 1064 if available.     Psychiatric ROS (observed, reported, or endorsed/denied):  Depressed mood - No  Interest/pleasure/anhedonia: No  Guilt/hopelessness/worthlessness - No  Changes in Sleep - No  Changes in Appetite - No  Changes in Concentration - No  Changes in Energy - No  PMA/R- No  Suicidal- active/passive ideations - No  Homicidal ideations: active/passive ideations - less     Hallucinations - less  Delusions - less  Disorganized behavior - No  Disorganized speech - No  Negative symptoms - No   "   Elevated mood - less  Decreased need for sleep - less  Grandiosity - No  Racing thoughts - less  Impulsivity - less  Irritability- less  Increased energy - less  Distractibility - less  Increase in goal-directed activity or PMA- No     Symptoms of KANU - less  Symptoms of Panic Disorder- No  Symptoms of PTSD - No     Overall progress: Patient is showing mild improvement      Psychotherapy:  Target symptoms: mood disorder, cannabis abuse  Why chosen therapy is appropriate versus another modality: relevant to diagnosis, evidence based practice  Outcome monitoring methods: self-report, observation  Therapeutic intervention type: insight oriented psychotherapy, supportive psychotherapy  Topics discussed/themes: building skills sets for symptom management, symptom recognition  The patient's response to the intervention is accepting. The patient's progress toward treatment goals is fair.   Duration of intervention: 16 minutes.     Medical ROS  Review of Systems   Constitutional: Negative.    HENT: Negative.     Eyes: Negative.    Respiratory: Negative.     Cardiovascular: Negative.    Gastrointestinal: Negative.    Genitourinary: Negative.    Musculoskeletal: Negative.    Skin: Negative.    Neurological: Negative.    Endo/Heme/Allergies: Negative.    Psychiatric/Behavioral:          As noted     PAST MEDICAL HISTORY   Past Medical History:   Diagnosis Date    History of psychiatric hospitalization     Hx of psychiatric care     Psychiatric problem     Schizophrenia     Suicide attempt        PSYCHOTROPIC MEDICATIONS   Scheduled Meds:   ARIPiprazole  15 mg Oral Daily    divalproex  750 mg Oral BID    famotidine  20 mg Oral BID    folic acid  1 mg Oral Daily    multivitamin  1 tablet Oral Daily     Continuous Infusions:  PRN Meds:.acetaminophen, acetaminophen, aluminum-magnesium hydroxide-simethicone, docusate sodium, hydrOXYzine pamoate, loperamide, melatonin, nicotine, OLANZapine **AND** OLANZapine,  "ondansetron    EXAMINATION    VITALS   Vitals:    12/18/22 1941 12/19/22 0731 12/19/22 1913 12/20/22 0743   BP: 115/75 126/68 116/74 126/72   BP Location: Right arm Right arm Left arm Right arm   Patient Position: Sitting Sitting Sitting Sitting   Pulse: 91 89 66 84   Resp: 16 20 16 20   Temp: 97.9 °F (36.6 °C) 98.7 °F (37.1 °C) 99.2 °F (37.3 °C) 98 °F (36.7 °C)   TempSrc: Oral Oral Oral Oral   SpO2: 95% 99% 96% 97%   Weight:       Height:           Body mass index is 20.37 kg/m².    CONSTITUTIONAL  General Appearance: unremarkable, age appropriate     MUSCULOSKELETAL  Muscle Strength and Tone:no tremor, no tic  Abnormal Involuntary Movements: No  Gait and Station: non-ataxic     PSYCHIATRIC   Level of Consciousness: awake, alert , and oriented  Orientation: person, place, time, and situation  Grooming: Casually dressed and Well groomed  Psychomotor Behavior: normal, cooperative, friendly and cooperative  Speech: normal tone, normal rate, normal pitch, normal volume  Language: grossly intact  Mood: "Good"  Affect: Consistent with mood  Thought Process: linear, logical  Associations: intact   Thought Content: DENIES suicidal ideation, DENIES homicidal ideation, and no delusions  Perceptions: denies hallucinations  Memory: Able to recall past events, Remote intact, and Recent intact  Attention:Attends to interview without distraction  Fund of Knowledge: Aware of current events and Vocabulary appropriate   Estimate if Intelligence:  Average based on work/education history, vocabulary and mental status exam  Insight: limited awareness of illness  Judgment: behavior is adequate to circumstances     DIAGNOSTIC TESTING   Laboratory Results  Recent Results         Recent Results (from the past 24 hour(s))   Comprehensive Metabolic Panel     Collection Time: 12/19/22  5:55 AM   Result Value Ref Range     Sodium 139 136 - 145 mmol/L     Potassium 3.7 3.5 - 5.1 mmol/L     Chloride 107 95 - 110 mmol/L     CO2 26 23 - 29 mmol/L "     Glucose 82 70 - 110 mg/dL     BUN 17 6 - 20 mg/dL     Creatinine 1.3 0.5 - 1.4 mg/dL     Calcium 8.5 (L) 8.7 - 10.5 mg/dL     Total Protein 7.8 6.0 - 8.4 g/dL     Albumin 3.9 3.5 - 5.2 g/dL     Total Bilirubin 0.9 0.1 - 1.0 mg/dL     Alkaline Phosphatase 67 55 - 135 U/L     AST 34 10 - 40 U/L     ALT 33 10 - 44 U/L     Anion Gap 6 (L) 8 - 16 mmol/L     eGFR >60.0 >60 mL/min/1.73 m^2            MEDICAL DECISION MAKING           ASSESSMENT         Bipolar disorder, type I, MRE manic, severe with psychotic features  Cannabis abuse  Unspecified anxiety disorder  Psychosocial stressors           PROBLEM LIST AND MANAGEMENT PLANS        Bipolar disorder, type I, MRE manic, severe with psychotic features  - start abilify 10 mg PO qd-Increase to 15 mg/day tomorrow, plan for CARRASCO  -Continue abilify 15 mg PO/day. Plan to administer aristada intio and bimonthly 1064 dose with single dose of aristada 30 mg PO tomorrow.   - pt counseled  - follow up with outpatient mental health providers after discharge for medication management and psychotherapy        Cannabis abuse  - SW consulted for assistance with additional resources      Unspecified anxiety disorder  - Continue hydroxyzine 50 mg PO q 6 hours PRN        Psychosocial stressors  - pt counseled  - SW consulted for assistance with additional resources     Hypokalemia  - FM consulted  - monitor/recheck tomorrow, replete as needed-Level 3.7 on 12/19     Discussed diagnosis, risks and benefits of proposed treatment vs alternative treatments vs no treatment, potential side effects of these treatments and the inherent unpredictability of treatment. The patient expresses understanding of the above and displays the capacity to agree with this treatment given said understanding. Patient also agrees that, currently, the benefits outweigh the risks and would like to pursue/continue treatment at this time.     DISCHARGE PLANNING  Expected Disposition Plan: Home or Self Care     NEED  FOR CONTINUED HOSPITALIZATION  Psychiatric illness continues to pose a potential threat to life or bodily function, of self or others, thereby requiring the need for continued inpatient psychiatric hospitalization: Yes, due to: significant psychotic thought disorder, as evidenced by:  Ongoing concerns with perceptual aberrancy and paranoid persecutory delusions leading to potential harm of self or others.     Protective inpatient pyschiatric hospitalization required while a safe disposition plan is enacted: Yes     Patient stabilized and ready for discharge from inpatient psychiatric unit: No      STAFF:   Jl Aguilar NP  Psychiatry
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
## 2022-12-20 NOTE — PLAN OF CARE
Problem: Adult Behavioral Health Plan of Care  Goal: Optimized Coping Skills in Response to Life Stressors  Intervention: Promote Effective Coping Strategies  Flowsheets (Taken 12/19/2022 1230)  Supportive Measures:   active listening utilized   self-responsibility promoted   verbalization of feelings encouraged   self-reflection promoted     Behavior: pt was engaged and cooperative     Intervention: In this CBT-focused process group CSW facilitated a group discussion on different types of communication, including assertive, non-assertive, and aggressive.  Each patient was asked to identify their primary way of communicating and discuss ways in which that type of communication is/isn't helpful, and ways to communicate more appropriately, if applicable.      Response: pt identified self as non assertive and assertive. Pt shared that he is wise, calm, but can be a pushover at times. Pt shared he will lose friends if he is really honest at times he can be aggressive and rude but he tends to consider others feelings too much.     Plan: Continue to encourage pt to participate in process groups to verbalize feelings and develop healthy coping skills.

## 2022-12-20 NOTE — PLAN OF CARE
Pt. Is awake, alert and oriented x 4. Pt. Denied any c/o auditory or visual hallucinations at this time. Pt. Also denied any c/o suicidal or homicidal ideations. He took his medications without diff. Encouraged Pt. To verbalize his feelings. Will cont. To monitor Pt.

## 2022-12-21 PROCEDURE — 90833 PR PSYCHOTHERAPY W/PATIENT W/E&M, 30 MIN (ADD ON): ICD-10-PCS | Mod: SA,HB,, | Performed by: PSYCHIATRY & NEUROLOGY

## 2022-12-21 PROCEDURE — 99232 PR SUBSEQUENT HOSPITAL CARE,LEVL II: ICD-10-PCS | Mod: SA,HB,, | Performed by: PSYCHIATRY & NEUROLOGY

## 2022-12-21 PROCEDURE — 11400000 HC PSYCH PRIVATE ROOM

## 2022-12-21 PROCEDURE — 25000003 PHARM REV CODE 250: Performed by: INTERNAL MEDICINE

## 2022-12-21 PROCEDURE — 99232 SBSQ HOSP IP/OBS MODERATE 35: CPT | Mod: SA,HB,, | Performed by: PSYCHIATRY & NEUROLOGY

## 2022-12-21 PROCEDURE — 25000003 PHARM REV CODE 250: Performed by: EMERGENCY MEDICINE

## 2022-12-21 PROCEDURE — 25000003 PHARM REV CODE 250: Performed by: PSYCHIATRY & NEUROLOGY

## 2022-12-21 PROCEDURE — 63600175 PHARM REV CODE 636 W HCPCS: Performed by: PSYCHIATRY & NEUROLOGY

## 2022-12-21 PROCEDURE — 90833 PSYTX W PT W E/M 30 MIN: CPT | Mod: SA,HB,, | Performed by: PSYCHIATRY & NEUROLOGY

## 2022-12-21 RX ADMIN — ARIPIPRAZOLE LAUROXIL 675 MG: 675 INJECTION, SUSPENSION, EXTENDED RELEASE INTRAMUSCULAR at 11:12

## 2022-12-21 RX ADMIN — ACETAMINOPHEN 650 MG: 325 TABLET ORAL at 06:12

## 2022-12-21 RX ADMIN — THERA TABS 1 TABLET: TAB at 09:12

## 2022-12-21 RX ADMIN — ARIPIPRAZOLE LAUROXIL 1064 MG: 1064 INJECTION, SUSPENSION, EXTENDED RELEASE INTRAMUSCULAR at 11:12

## 2022-12-21 RX ADMIN — FAMOTIDINE 20 MG: 20 TABLET ORAL at 09:12

## 2022-12-21 RX ADMIN — FOLIC ACID 1 MG: 1 TABLET ORAL at 09:12

## 2022-12-21 RX ADMIN — FAMOTIDINE 20 MG: 20 TABLET ORAL at 08:12

## 2022-12-21 RX ADMIN — ARIPIPRAZOLE 30 MG: 10 TABLET ORAL at 09:12

## 2022-12-21 NOTE — PLAN OF CARE
Patient is participating in activity group with music at this time and noted smiling, laughing, and dancing. Appetite is good for meals and snacks and is medication compliant. Patient received Aristada Injection 1,064 mg IM x 1, and Aristada Injection 675 mg IM x1 today as ordered (See Emar). Jl Aguilar NP visited this am with no new orders noted. Patient calm and having a good day. Denies S/HI, denies A/VH. No negative behaviors noted. Close observations continued and safe environment maintained.

## 2022-12-21 NOTE — PLAN OF CARE
12/21/22 1245   Step 1: Warning Signs   Warning Sign 1 not being able to fully express self   Warning Sign 2 friends being unavailable when needed   Warning Sign 3 being talked about   Step 2: Internal coping strategies - Things I can do to take my mind off my problems without contacting another person:   Coping Strategy 1 singing   Coping Strategy 2 dancing   Coping Strategy 3 listening to music   Step 3: People and social settings that provide distraction:   1. Name Cande Callejas   2. Name Angel Morel   3. Place [employer]   4. Place [employer]    Step 4: People whom I can ask for help:   1. Person Cande Callejas   2. Person Ingrid Hardwick   3. Person Raul Ward   Step 5: Professionals or agencies I can contact during a crisis:   1. Clinician Name [facility]       Phone [phone]   3. Suicide Prevention Lifeline: 4-041-770-TALK (9032) Suicide Prevention Lifeline 90202218189   4. [facility] Emergency Service       911   Step 6: Making the environment safe:   Safe environment 1 stay away from harmful objects   Safe environment 2 pragigi

## 2022-12-21 NOTE — PROGRESS NOTES
"PSYCHIATRY DAILY INPATIENT PROGRESS NOTE  SUBSEQUENT HOSPITAL VISIT    ENCOUNTER DATE: 12/21/2022  SITE: [facility]    DATE OF ADMISSION: 12/17/2022  4:44 PM  LENGTH OF STAY: 4 days    CHIEF COMPLAINT      Bishop Soria is a 22 y.o. male with a past psychiatric history of  psychosis, mood disorder, anxiety, substance abuse  currently admitted to the inpatient unit with the following chief complaint:  psychosis     The patient was seen and examined. The chart was reviewed.      Reviewed notes from Rns and MD and labs from the last 24 hours.     The patient's case was discussed with the treatment team/care providers today including Rns and MD     Staff reports no behavioral or management issues.      The patient has been compliant with treatment.    Subjective 12/21/2022     Patient calm, pleasant during today's assessment. Continues to report good sleep, denies SI and HI, denies auditory/visual hallucination. Patient reports speaking to his mother on the phone yesterday adding that the interaction was "pretty good."    During assessment, patient asked several questions about the use of psilocybin for psychiatric conditions. Patient counseled, advised that data remains limited and that current legal status renders  impossible. Advised against use. Pt verbalized understanding.     Psychiatric ROS (observed, reported, or endorsed/denied):  Depressed mood - No  Interest/pleasure/anhedonia: No  Guilt/hopelessness/worthlessness - No  Changes in Sleep - No  Changes in Appetite - No  Changes in Concentration - No  Changes in Energy - No  PMA/R- No  Suicidal- active/passive ideations - No  Homicidal ideations: active/passive ideations - less     Hallucinations - less  Delusions - less  Disorganized behavior - No  Disorganized speech - No  Negative symptoms - No     Elevated mood - less  Decreased need for sleep - less  Grandiosity - No  Racing thoughts - less  Impulsivity - less  Irritability- " less  Increased energy - less  Distractibility - less  Increase in goal-directed activity or PMA- No     Symptoms of KANU - less  Symptoms of Panic Disorder- No  Symptoms of PTSD - No     Overall progress: Patient is showing mild improvement      Psychotherapy:  Target symptoms: mood disorder, cannabis abuse  Why chosen therapy is appropriate versus another modality: relevant to diagnosis, evidence based practice  Outcome monitoring methods: self-report, observation  Therapeutic intervention type: insight oriented psychotherapy, supportive psychotherapy  Topics discussed/themes: building skills sets for symptom management, symptom recognition  The patient's response to the intervention is accepting. The patient's progress toward treatment goals is fair.   Duration of intervention: 16 minutes.     Medical ROS  Review of Systems   Constitutional: Negative.    HENT: Negative.     Eyes: Negative.    Respiratory: Negative.     Cardiovascular: Negative.    Gastrointestinal: Negative.    Genitourinary: Negative.    Musculoskeletal: Negative.    Skin: Negative.    Neurological: Negative.    Endo/Heme/Allergies: Negative.    Psychiatric/Behavioral:          As noted     PAST MEDICAL HISTORY   Past Medical History:   Diagnosis Date    History of psychiatric hospitalization     Hx of psychiatric care     Psychiatric problem     Schizophrenia     Suicide attempt        PSYCHOTROPIC MEDICATIONS   Scheduled Meds:   ARIPiprazole lauroxil  1,064 mg Intramuscular 1 time in Clinic/HOD    ARIPiprazole lauroxil,submicr.  675 mg Intramuscular Once    famotidine  20 mg Oral BID    folic acid  1 mg Oral Daily    multivitamin  1 tablet Oral Daily     Continuous Infusions:  PRN Meds:.acetaminophen, acetaminophen, aluminum-magnesium hydroxide-simethicone, docusate sodium, hydrOXYzine pamoate, loperamide, melatonin, nicotine, OLANZapine **AND** OLANZapine, ondansetron    EXAMINATION    VITALS   Vitals:    12/19/22 1913 12/20/22 0743 12/20/22  "1926 12/21/22 0747   BP: 116/74 126/72 118/67 124/66   BP Location: Left arm Right arm Left arm Left arm   Patient Position: Sitting Sitting Sitting Sitting   Pulse: 66 84 79 66   Resp: 16 20 18 16   Temp: 99.2 °F (37.3 °C) 98 °F (36.7 °C) 98.7 °F (37.1 °C) 98.6 °F (37 °C)   TempSrc: Oral Oral Oral Oral   SpO2: 96% 97% 98% 97%   Weight:       Height:           Body mass index is 20.37 kg/m².    CONSTITUTIONAL  General Appearance: unremarkable, age appropriate     MUSCULOSKELETAL  Muscle Strength and Tone:no tremor, no tic  Abnormal Involuntary Movements: No  Gait and Station: non-ataxic     PSYCHIATRIC   Level of Consciousness: awake, alert , and oriented  Orientation: person, place, time, and situation  Grooming: Casually dressed and Well groomed  Psychomotor Behavior: normal, cooperative, friendly and cooperative  Speech: normal tone, normal rate, normal pitch, normal volume  Language: grossly intact  Mood: "Good"  Affect: Consistent with mood  Thought Process: linear, logical  Associations: intact   Thought Content: DENIES suicidal ideation, DENIES homicidal ideation, and no delusions  Perceptions: denies hallucinations  Memory: Able to recall past events, Remote intact, and Recent intact  Attention:Attends to interview without distraction  Fund of Knowledge: Aware of current events and Vocabulary appropriate   Estimate if Intelligence:  Average based on work/education history, vocabulary and mental status exam  Insight: limited awareness of illness  Judgment: behavior is adequate to circumstances    DIAGNOSTIC TESTING   Laboratory Results  No results found for this or any previous visit (from the past 24 hour(s)).    MEDICAL DECISION MAKING      ASSESSMENT         Bipolar disorder, type I, MRE manic, severe with psychotic features  Cannabis abuse  Unspecified anxiety disorder  Psychosocial stressors           PROBLEM LIST AND MANAGEMENT PLANS        Bipolar disorder, type I, MRE manic, severe with psychotic " features  - start abilify 10 mg PO qd-Increase to 15 mg/day tomorrow, plan for CARRASCO  -Continue abilify 15 mg PO/day. Plan to administer aristada intio and bimonthly 1064 dose with single dose of aristada 30 mg PO tomorrow. -Administer aristada injections as noted, administer single dose of abilify 30 mg then DC po abilify.   - pt counseled  - follow up with outpatient mental health providers after discharge for medication management and psychotherapy        Cannabis abuse  - SW consulted for assistance with additional resources      Unspecified anxiety disorder  - Continue hydroxyzine 50 mg PO q 6 hours PRN        Psychosocial stressors  - pt counseled  - SW consulted for assistance with additional resources     Hypokalemia  - FM consulted  - monitor/recheck tomorrow, replete as needed-Level 3.7 on 12/19     Discussed diagnosis, risks and benefits of proposed treatment vs alternative treatments vs no treatment, potential side effects of these treatments and the inherent unpredictability of treatment. The patient expresses understanding of the above and displays the capacity to agree with this treatment given said understanding. Patient also agrees that, currently, the benefits outweigh the risks and would like to pursue/continue treatment at this time.     DISCHARGE PLANNING  Expected Disposition Plan: Home or Self Care     NEED FOR CONTINUED HOSPITALIZATION  Psychiatric illness continues to pose a potential threat to life or bodily function, of self or others, thereby requiring the need for continued inpatient psychiatric hospitalization: Yes, due to: significant psychotic thought disorder, as evidenced by:  Ongoing concerns with perceptual aberrancy and paranoid persecutory delusions leading to potential harm of self or others.     Protective inpatient pyschiatric hospitalization required while a safe disposition plan is enacted: Yes     Patient stabilized and ready for discharge from inpatient psychiatric unit:  No         STAFF:   Jl Aguilar NP  Psychiatry

## 2022-12-21 NOTE — PLAN OF CARE
POC reviewed this shift and is on going. Patient is withdrawn, depressed, anxious, pacing, labile, and paranoid. Endorses Homicidal Ideation, Auditory Hallucinations, Visual Hallucinations, and Delusions. Denies Suicidal Ideation, Tactile Hallucinations, and Gustatory Hallucinations. Vistaril effective. Resting quietly in bed with eyes closed,resp even and unlabored,NAD. Pt continues to be medication compliant and staff will continue to monitor pt closely while on the unit.

## 2022-12-22 PROCEDURE — 25000003 PHARM REV CODE 250: Performed by: EMERGENCY MEDICINE

## 2022-12-22 PROCEDURE — 99232 SBSQ HOSP IP/OBS MODERATE 35: CPT | Mod: SA,HB,, | Performed by: PSYCHIATRY & NEUROLOGY

## 2022-12-22 PROCEDURE — 99232 PR SUBSEQUENT HOSPITAL CARE,LEVL II: ICD-10-PCS | Mod: SA,HB,, | Performed by: PSYCHIATRY & NEUROLOGY

## 2022-12-22 PROCEDURE — 11400000 HC PSYCH PRIVATE ROOM

## 2022-12-22 PROCEDURE — 25000003 PHARM REV CODE 250: Performed by: INTERNAL MEDICINE

## 2022-12-22 PROCEDURE — 90833 PSYTX W PT W E/M 30 MIN: CPT | Mod: SA,HB,, | Performed by: PSYCHIATRY & NEUROLOGY

## 2022-12-22 PROCEDURE — 90833 PR PSYCHOTHERAPY W/PATIENT W/E&M, 30 MIN (ADD ON): ICD-10-PCS | Mod: SA,HB,, | Performed by: PSYCHIATRY & NEUROLOGY

## 2022-12-22 RX ORDER — IBUPROFEN 200 MG
1 TABLET ORAL DAILY PRN
Qty: 28 PATCH | Refills: 0 | Status: ON HOLD | OUTPATIENT
Start: 2022-12-22 | End: 2023-01-09 | Stop reason: HOSPADM

## 2022-12-22 RX ADMIN — FOLIC ACID 1 MG: 1 TABLET ORAL at 08:12

## 2022-12-22 RX ADMIN — FAMOTIDINE 20 MG: 20 TABLET ORAL at 08:12

## 2022-12-22 RX ADMIN — THERA TABS 1 TABLET: TAB at 08:12

## 2022-12-22 RX ADMIN — ACETAMINOPHEN 650 MG: 325 TABLET ORAL at 10:12

## 2022-12-22 RX ADMIN — FAMOTIDINE 20 MG: 20 TABLET ORAL at 09:12

## 2022-12-22 RX ADMIN — ACETAMINOPHEN 650 MG: 325 TABLET ORAL at 12:12

## 2022-12-22 NOTE — PLAN OF CARE
Problem: Adult Behavioral Health Plan of Care  Goal: Plan of Care Review  Outcome: Ongoing, Progressing  Goal: Patient-Specific Goal (Individualization)  Outcome: Ongoing, Progressing  Goal: Adheres to Safety Considerations for Self and Others  Outcome: Ongoing, Progressing  Goal: Absence of New-Onset Illness or Injury  Outcome: Ongoing, Progressing  Goal: Optimized Coping Skills in Response to Life Stressors  Outcome: Ongoing, Progressing  Goal: Develops/Participates in Therapeutic [place] to Support Successful Transition  Outcome: Ongoing, Progressing  Goal: Rounds/Family Conference  Outcome: Ongoing, Progressing     Problem: Activity and Energy Impairment (Anxiety Signs/Symptoms)  Goal: Optimized Energy Level (Anxiety Signs/Symptoms)  Outcome: Ongoing, Progressing     Problem: Cognitive Impairment (Anxiety Signs/Symptoms)  Goal: Optimized Cognitive Function (Anxiety Signs/Symptoms)  Outcome: Ongoing, Progressing     Problem: Mood Impairment (Anxiety Signs/Symptoms)  Goal: Improved Mood Symptoms (Anxiety Signs/Symptoms)  Outcome: Ongoing, Progressing     Problem: Sleep Impairment (Anxiety Signs/Symptoms)  Goal: Improved Sleep (Anxiety Signs/Symptoms)  Outcome: Ongoing, Progressing     Problem: Somatic Disturbance (Anxiety Signs/Symptoms)  Goal: Improved Somatic Symptoms (Anxiety Signs/Symptoms)  Outcome: Ongoing, Progressing     Problem: Activity and Energy Impairment (Excessive Substance Use)  Goal: Optimized Energy Level (Excessive Substance Use)  Outcome: Ongoing, Progressing     Problem: Behavior Regulation Impairment (Excessive Substance Use)  Goal: Improved Behavioral Control (Excessive Substance Use)  Outcome: Ongoing, Progressing     Problem: Decreased Participation and Engagement (Excessive Substance Use)  Goal: Increased Participation and Engagement (Excessive Substance Use)  Outcome: Ongoing, Progressing     Problem: Physiologic Impairment (Excessive Substance Use)  Goal: Improved Physiologic  Symptoms (Excessive Substance Use)  Outcome: Ongoing, Progressing     Problem: Social, Occupational or Functional Impairment (Excessive Substance Use)  Goal: Enhanced Social, Occupational or Functional Skills (Excessive Substance Use)  Outcome: Ongoing, Progressing     Problem: Behavior Regulation Impairment (Disruptive Behavior)  Goal: Improved Impulse and Aggression Control (Disruptive Behavior)  Outcome: Ongoing, Progressing     Problem: Mood Impairment (Disruptive Behavior)  Goal: Improved Mood Symptoms (Disruptive Behavior)  Outcome: Ongoing, Progressing     Problem: Sleep Disturbance (Disruptive Behavior)  Goal: Improved Sleep (Disruptive Behavior)  Outcome: Ongoing, Progressing     Problem: Social, Occupational or Functional Impairment (Disruptive Behavior)  Goal: Enhanced Social, Occupational or Functional Skills (Disruptive Behavior)  Outcome: Ongoing, Progressing     Problem: Behavior Regulation Impairment (Psychotic Signs/Symptoms)  Goal: Improved Behavioral Control (Psychotic Signs/Symptoms)  Outcome: Ongoing, Progressing     Problem: Cognitive Impairment (Psychotic Signs/Symptoms)  Goal: Optimal Cognitive Function (Psychotic Signs/Symptoms)  Outcome: Ongoing, Progressing     Problem: Decreased Participation and Engagement (Psychotic Signs/Symptoms)  Goal: Increased Participation and Engagement (Psychotic Signs/Symptoms)  Outcome: Ongoing, Progressing     Problem: Mood Impairment (Psychotic Signs/Symptoms)  Goal: Improved Mood Symptoms (Psychotic Signs/Symptoms)  Outcome: Ongoing, Progressing     Problem: Psychomotor Impairment (Psychotic Signs/Symptoms)  Goal: Improved Psychomotor Symptoms (Psychotic Signs/Symptoms)  Outcome: Ongoing, Progressing     Problem: Sensory Perception Impairment (Psychotic Signs/Symptoms)  Goal: Decreased Sensory Symptoms (Psychotic Signs/Symptoms)  Outcome: Ongoing, Progressing     Problem: Sleep Disturbance (Psychotic Signs/Symptoms)  Goal: Improved Sleep (Psychotic  Signs/Symptoms)  Outcome: Ongoing, Progressing     Problem: Social, Occupational or Functional Impairment (Psychotic Signs/Symptoms)  Goal: Enhanced Social, Occupational or Functional Skills (Psychotic Signs/Symptoms)  Outcome: Ongoing, Progressing     Problem: Violence Risk or Actual  Goal: Anger and Impulse Control  Outcome: Ongoing, Progressing

## 2022-12-22 NOTE — NURSING
Pt. A/A.  No C/O pain.  Denies SI/HI/AVH currently.  Does have some anxiety and depression.  Flat affect.  Compliant with medications and follows instructions.  Will continue to observe.

## 2022-12-22 NOTE — PROGRESS NOTES
"Discharge Summary  Psychiatry    Admit Date: 12/17/2022    Discharge Date and Time: 12/23/22    Attending Physician: Cristhian Grijalva III, MD     Discharge Provider: Frantz Cagle MD    Reason for Admission:  Manic episode    History of Present Illness:     Patient presented to ED on 12/17/22  Patient was medically cleared and transferred to [facility].     Per ED MD:   Mental Health Problem       EMS reports pt from home with c/o not taking his Psych Meds for 2 months per his Mother. Upon arrival pt was talking to God.      22-year-old male with history of schizophrenia, suicide attempt in the past presents to the emergency department with hallucinations.  History of Mojo abuse as well.  Patient appears to be 13 to internal stimuli at present.  He has not been taking his medicines for 2 months, having hallucinations, family reports he is homicidal but patient denies SI and HI.  History of multiple psychiatric admissions in the past       Psychiatric interview:  "I been thinking about my twin flame, Raul Murphy, I'm a special person... my mom kicked me out and I went for a amy ride so she called the ." He admits to not taking his medications, "it wasn't affecting me the right way, my mom gave me a glass of wine, and ever since the then my left hemisphere of my brain is shot."           Symptoms of Depression: diminished mood - No, loss of interest/anhedonia - No;  recurrent - No, >14 days - No, diminished energy - No, change in sleep - No, change in appetite - No, diminished concentration or cognition or indecisiveness - No, PMA/R -  No, excessive guilt or hopelessness or worthlessness - No, suicidal ideations - No     Changes in Sleep: trouble with initiation- No, maintenance, - No early morning awakening with inability to return to sleep - No, hypersomnolence - No     Suicidal- active/passive ideations - No, organized plans, future intentions - No     Homicidal ideations: active/passive ideations - No, organized " "plans, future intentions - No     Symptoms of psychosis: hallucinations - Yes, delusions - Yes, disorganized speech - No, disorganized behavior or abnormal motor behavior - No, or negative symptoms (diminshed emotional expression, avolition, anhedonia, alogia, asociality) - No, active phase symptoms >1 month - No, continuous signs of illness > 6 months - No, since onset of illness decreased level of functioning present - No     Symptoms of carley or hypomania: elevated, expansive, or irritable mood with increased energy or activity - Yes; > 4 days - Yes,  >7 days - Yes; with inflated self-esteem or grandiosity - Yes, decreased need for sleep - No, increased rate of speech - No, FOI or racing thoughts - No, distractibility - Yes, increased goal directed activity or PMA - Yes, risky/disinhibited behavior - Yes     Symptoms of KANU: excessive anxiety/worry/fear, more days than not, about numerous issues - No, ongoing for >6 months - No, difficult to control - No, with restlessness - No, fatigue - No, poor concentration - No, irritability - No, muscle tension - No, sleep disturbance - No; causes functionally impairing distress - No     Symptoms of Panic Disorder: recurrent panic attacks (palpitations/heart racing, sweating, shakiness, dyspnea, choking, chest pain/discomfort, Gi symptoms, dizzy/lightheadedness, hot/col flashes, paresthesias, derealization, fear of losing control or fear of dying or fear of "going crazy") - No, precipitated - No, un-precipitated - No, source of worry and/or behavioral changes secondary for 1 month or longer- No, agoraphobia - No     Symptoms of PTSD: h/o trauma exposure - No; re-experiencing/intrusive symptoms - No, avoidant behavior - No, 2 or more negative alterations in cognition or mood - No, 2 or more hyperarousal symptoms - No; with dissociative symptoms - No, ongoing for 1 or more  months - No     Symptoms of OCD: obsessions (recurrent thoughts/urges/images; intrusive and/or " unwanted; uses other thoughts/actions to suppress) - No; compulsions (repetitive behaviors used to lower distress/anxiety/obsessions) - No, time-consuming (over 1 hour per day) or cause significant distress/impairment - - No     Symptoms of Anorexia: restriction of caloric intake leading to significantly low body weight - No, intense fear of gaining weight or persistent behavior that interferes with weight gain even thought at a significantly low weight - No, disturbance in the way in which one's body weight or shape is experienced, undue influence of body weight or shape on self evaluation, or persistent lack of recognition of the seriousness of the current low body weight - No     Symptoms of Bulimia: recurrent episodes of binge eating (definitely larger amount  than what others would eat and lack of a sense of control over eating during episode) - No, recurrent inappropriate compensatory behaviors in order to prevent weight gain (fasting, medications, exercise, vomiting) - No, binges and compensatory behaviors both occur on average at least once a week for 3 months - No, self evaluations is unduly influenced by body shape/weight- - No     Symptoms of Binge eating: recurrent episodes of binge eating (definitely larger amount than what others would eat and lack of a sense of control over eating during episode) - No, 3 or more of following (eating much more rapidly, eating until uncomfortably full, large amounts when not hungry, eating alone because of embarrassed by how much,  feeling disgusted with oneself, depressed or very guilty afterward) - No, distress regarding binges - No, binges occur on average at least once a week for 3 months - No    Procedures Performed: * No surgery found *    Hospital Course   Patient was admitted to the inpatient psychiatry unit after being medically cleared in the ED. Chart and labs were reviewed. The patient was stabilized as follows:    Bipolar disorder, type I, MRE manic, severe  with psychotic features    - start abilify 10 mg PO qd-Increase to 15 mg/day tomorrow, plan for CARRASCO  -Continue abilify 15 mg PO/day. Plan to administer aristada intio and bimonthly 1064 dose with single dose of aristada 30 mg PO tomorrow. -Administer aristada injections as noted, administer single dose of abilify 30 mg then DC po abilify.   - pt counseled  - follow up with outpatient mental health providers after discharge for medication management and psychotherapy  -Next aristada 1064 IM due on/around February 20, 2023        Cannabis abuse  - SW consulted for assistance with additional resources      Unspecified anxiety disorder  - Continue hydroxyzine 50 mg PO q 6 hours PRN        Psychosocial stressors  - pt counseled  - SW consulted for assistance with additional resources     Hypokalemia  - FM consulted  - monitor/recheck tomorrow, replete as needed-Level 3.7 on 12/19    During hospitalization, the patient was encouraged to go to both groups and individual counseling. Patient was monitored for any side effects. A meeting was held with multidisciplinary team prior to discharge and pt's diagnosis, current medications, and follow up were discussed. The patient has been compliant with treatment and can adequately attend to activities of daily living in an independent manner. The patient denies any side effects. The patient denies SI, HI, plan or intent for self harm or harm to others. The patient is no longer a danger to self or others nor gravely disabled disabled. Patient discharged  in stable condition with scheduled outpatient follow up.     Psychiatric ROS (observed, reported, or endorsed/denied):  Depressed mood - Denies  Interest/pleasure/anhedonia:Denies  Guilt/hopelessness/worthlessness - Denies  Changes in Sleep - Denies  Changes in Appetite - Denies  Changes in Concentration - improved  Changes in Energy - improved  PMA/R- improved  Suicidal- active/passive ideations - improved/resolved  Homicidal  ideations: active/passive ideations - No     Hallucinations - No  Delusions - No  Disorganized behavior - No  Disorganized speech - No  Negative symptoms - No     Elevated mood - No  Decreased need for sleep - No  Grandiosity - No  Racing thoughts - No  Impulsivity - No  Irritability- Yes  Increased energy - No  Distractibility - No  Increase in goal-directed activity or PMA- No     Symptoms of KANU - No  Symptoms of Panic Disorder- No  Symptoms of PTSD - No    MSE: stated age, casually dressed, well groomed.  No psychomotor agitation or retardation.  No abnormal involuntary movements.  Gait normal.  Speech normal, conversational.  Language fluent English. Mood fine.  Affect normal range, pleasant, euthymic.  Thought process linear.  Associations intact.  Denies suicidal or homicidal ideation.  Denies auditory hallucinations, paranoid ideation, ideas of reference.  Memory intact.  Attention intact.  Fund of knowledge intact.  Insight intact.  Judgment intact.  Alert and oriented to person, place, time.      Tobacco Usage:  Is patient a smoker? Yes  Does patient want prescription for Tobacco Cessation? Yes  Does patient want counseling for Tobacco Cessation? Yes    If patient would like to quit, then over the counter nicotine patch could be used. The patient could also follow up with his PCP or psychiatric provider for other alternatives.     Tobacco Use Treatment Practical Counseling     Were the following discussed with the patient:     Recognizing danger situations:               A. Alcohol use during the first month after quitting - Yes              B. Being around smoke and/or smokers or time/situations when the patient routinely smoked - Yes              C. Triggers and/or roadblocks are the same as danger situations - Yes     2.   Developing coping skills              A. Learning new ways to manage stress - Yes              B. Exercising and/or relaxation breathing - Yes              C. Changing routines -  Yes              D. Distraction techniques to prevent tobacco use - Yes     3.   Basic information about quitting:              A. Benefits of quitting tobacco - Yes              B. How to quit techniques - Yes              C. Available resources to support quitting - Yes       Final Diagnoses:    Principal Problem: Bipolar disorder, type I, MRE manic, severe with psychotic features   Secondary Diagnoses:   Cannabis abuse  Unspecified anxiety disorder  Hypokalemia    Labs:  Admission on 12/17/2022   Component Date Value Ref Range Status    WBC 12/17/2022 10.05  3.90 - 12.70 K/uL Final    RBC 12/17/2022 4.57 (L)  4.60 - 6.20 M/uL Final    Hemoglobin 12/17/2022 14.4  14.0 - 18.0 g/dL Final    Hematocrit 12/17/2022 41.9  40.0 - 54.0 % Final    MCV 12/17/2022 92  82 - 98 fL Final    MCH 12/17/2022 31.5 (H)  27.0 - 31.0 pg Final    MCHC 12/17/2022 34.4  32.0 - 36.0 g/dL Final    RDW 12/17/2022 13.3  11.5 - 14.5 % Final    Platelets 12/17/2022 375  150 - 450 K/uL Final    MPV 12/17/2022 9.1 (L)  9.2 - 12.9 fL Final    Immature Granulocytes 12/17/2022 0.2  0.0 - 0.5 % Final    Gran # (ANC) 12/17/2022 6.4  1.8 - 7.7 K/uL Final    Immature Grans (Abs) 12/17/2022 0.02  0.00 - 0.04 K/uL Final    Lymph # 12/17/2022 2.2  1.0 - 4.8 K/uL Final    Mono # 12/17/2022 1.3 (H)  0.3 - 1.0 K/uL Final    Eos # 12/17/2022 0.1  0.0 - 0.5 K/uL Final    Baso # 12/17/2022 0.02  0.00 - 0.20 K/uL Final    nRBC 12/17/2022 0  0 /100 WBC Final    Gran % 12/17/2022 63.9  38.0 - 73.0 % Final    Lymph % 12/17/2022 21.7  18.0 - 48.0 % Final    Mono % 12/17/2022 13.1  4.0 - 15.0 % Final    Eosinophil % 12/17/2022 0.9  0.0 - 8.0 % Final    Basophil % 12/17/2022 0.2  0.0 - 1.9 % Final    Differential Method 12/17/2022 Automated   Final    Sodium 12/17/2022 139  136 - 145 mmol/L Final    Potassium 12/17/2022 3.0 (L)  3.5 - 5.1 mmol/L Final    Chloride 12/17/2022 107  95 - 110 mmol/L Final    CO2 12/17/2022 22 (L)  23 - 29 mmol/L Final    Glucose 12/17/2022  109  70 - 110 mg/dL Final    BUN 12/17/2022 18  6 - 20 mg/dL Final    Creatinine 12/17/2022 1.3  0.5 - 1.4 mg/dL Final    Calcium 12/17/2022 8.6 (L)  8.7 - 10.5 mg/dL Final    Total Protein 12/17/2022 8.6 (H)  6.0 - 8.4 g/dL Final    Albumin 12/17/2022 4.4  3.5 - 5.2 g/dL Final    Total Bilirubin 12/17/2022 1.3 (H)  0.1 - 1.0 mg/dL Final    Alkaline Phosphatase 12/17/2022 69  55 - 135 U/L Final    AST 12/17/2022 17  10 - 40 U/L Final    ALT 12/17/2022 33  10 - 44 U/L Final    Anion Gap 12/17/2022 10  8 - 16 mmol/L Final    eGFR 12/17/2022 >60.0  >60 mL/min/1.73 m^2 Final    TSH 12/17/2022 0.716  0.400 - 4.000 uIU/mL Final    Specimen UA 12/17/2022 Urine, Clean Catch   Final    Color, UA 12/17/2022 Yellow  Yellow, Straw, Ingrid Final    Appearance, UA 12/17/2022 Clear  Clear Final    pH, UA 12/17/2022 6.0  5.0 - 8.0 Final    Specific Gravity, UA 12/17/2022 >=1.030 (A)  1.005 - 1.030 Final    Protein, UA 12/17/2022 1+ (A)  Negative Final    Glucose, UA 12/17/2022 Negative  Negative Final    Ketones, UA 12/17/2022 2+ (A)  Negative Final    Bilirubin (UA) 12/17/2022 1+ (A)  Negative Final    Occult Blood UA 12/17/2022 Negative  Negative Final    Nitrite, UA 12/17/2022 Negative  Negative Final    Urobilinogen, UA 12/17/2022 1.0  <2.0 EU/dL Final    Leukocytes, UA 12/17/2022 Trace (A)  Negative Final    Benzodiazepines 12/17/2022 Negative  Negative Final    Methadone metabolites 12/17/2022 Negative  Negative Final    Cocaine (Metab.) 12/17/2022 Negative  Negative Final    Opiate Scrn, Ur 12/17/2022 Negative  Negative Final    Barbiturate Screen, Ur 12/17/2022 Negative  Negative Final    Amphetamine Screen, Ur 12/17/2022 Negative  Negative Final    THC 12/17/2022 Presumptive Positive (A)  Negative Final    Phencyclidine 12/17/2022 Negative  Negative Final    Creatinine, Urine 12/17/2022 626.0 (H)  23.0 - 375.0 mg/dL Final    Toxicology Information 12/17/2022 SEE COMMENT   Final    Alcohol, Serum 12/17/2022 <3  <10 mg/dL  Final    Acetaminophen (Tylenol), Serum 12/17/2022 <2.0 (L)  10.0 - 20.0 ug/mL Final    POC Rapid COVID 12/17/2022 Negative  Negative Final     Acceptable 12/17/2022 Yes   Final    Valproic Acid Level 12/17/2022 <3 (L)  50 - 100 ug/mL Final    RBC, UA 12/17/2022 3  0 - 4 /hpf Final    WBC, UA 12/17/2022 6 (H)  0 - 5 /hpf Final    Bacteria 12/17/2022 Rare  None-Occ /hpf Final    Squam Epithel, UA 12/17/2022 4  /hpf Final    Hyaline Casts, UA 12/17/2022 0  0-1/lpf /lpf Final    Microscopic Comment 12/17/2022 SEE COMMENT   Final    Hemoglobin A1C 12/17/2022 4.9  4.0 - 5.6 % Final    Estimated Avg Glucose 12/17/2022 94  68 - 131 mg/dL Final    Cholesterol 12/17/2022 187  120 - 199 mg/dL Final    Triglycerides 12/17/2022 80  30 - 150 mg/dL Final    HDL 12/17/2022 74  40 - 75 mg/dL Final    LDL Cholesterol 12/17/2022 97.0  63.0 - 159.0 mg/dL Final    HDL/Cholesterol Ratio 12/17/2022 39.6  20.0 - 50.0 % Final    Total Cholesterol/HDL Ratio 12/17/2022 2.5  2.0 - 5.0 Final    Non-HDL Cholesterol 12/17/2022 113  mg/dL Final    Cholesterol 12/18/2022 165  120 - 199 mg/dL Final    Triglycerides 12/18/2022 75  30 - 150 mg/dL Final    HDL 12/18/2022 71  40 - 75 mg/dL Final    LDL Cholesterol 12/18/2022 79.0  63.0 - 159.0 mg/dL Final    HDL/Cholesterol Ratio 12/18/2022 43.0  20.0 - 50.0 % Final    Total Cholesterol/HDL Ratio 12/18/2022 2.3  2.0 - 5.0 Final    Non-HDL Cholesterol 12/18/2022 94  mg/dL Final    Hemoglobin A1C 12/18/2022 4.8  4.0 - 5.6 % Final    Estimated Avg Glucose 12/18/2022 91  68 - 131 mg/dL Final    Sodium 12/19/2022 139  136 - 145 mmol/L Final    Potassium 12/19/2022 3.7  3.5 - 5.1 mmol/L Final    Chloride 12/19/2022 107  95 - 110 mmol/L Final    CO2 12/19/2022 26  23 - 29 mmol/L Final    Glucose 12/19/2022 82  70 - 110 mg/dL Final    BUN 12/19/2022 17  6 - 20 mg/dL Final    Creatinine 12/19/2022 1.3  0.5 - 1.4 mg/dL Final    Calcium 12/19/2022 8.5 (L)  8.7 - 10.5 mg/dL Final    Total Protein  12/19/2022 7.8  6.0 - 8.4 g/dL Final    Albumin 12/19/2022 3.9  3.5 - 5.2 g/dL Final    Total Bilirubin 12/19/2022 0.9  0.1 - 1.0 mg/dL Final    Alkaline Phosphatase 12/19/2022 67  55 - 135 U/L Final    AST 12/19/2022 34  10 - 40 U/L Final    ALT 12/19/2022 33  10 - 44 U/L Final    Anion Gap 12/19/2022 6 (L)  8 - 16 mmol/L Final    eGFR 12/19/2022 >60.0  >60 mL/min/1.73 m^2 Final   Admission on 12/04/2022, Discharged on 12/04/2022   Component Date Value Ref Range Status    Group A Strep, Molecular 12/04/2022 Negative  Negative Final         Discharged Condition: stable and improved; not currently a danger to self/others or gravely disabled    Disposition: Home or Self Care    Is patient being discharged on multiple neuroleptics? No    Follow Up/Patient Instructions:     Medications:  Reconciled Home Medications:      Medication List        START taking these medications      nicotine 14 mg/24 hr  Commonly known as: NICODERM CQ  Place 1 patch onto the skin daily as needed.            STOP taking these medications      amoxicillin-clavulanate 875-125mg 875-125 mg per tablet  Commonly known as: AUGMENTIN     divalproex 250 MG EC tablet  Commonly known as: DEPAKOTE            No discharge procedures on file.        Diet: regular      Activity as tolerated    Total time spent discharging patient: 32 minutes    Frantz Cagle MD  Psychiatry

## 2022-12-22 NOTE — PROGRESS NOTES
"PSYCHIATRY DAILY INPATIENT PROGRESS NOTE  SUBSEQUENT HOSPITAL VISIT    ENCOUNTER DATE: 12/22/2022  SITE: [facility]    DATE OF ADMISSION: 12/17/2022  4:44 PM  LENGTH OF STAY: 5 days    CHIEF COMPLAINT      Bishop Soria is a 22 y.o. male with a past psychiatric history of  psychosis, mood disorder, anxiety, substance abuse  currently admitted to the inpatient unit with the following chief complaint:  psychosis     The patient was seen and examined. The chart was reviewed.      Reviewed notes from Rns and MD and labs from the last 24 hours.     The patient's case was discussed with the treatment team/care providers today including Rns and MD     Staff reports no behavioral or management issues.      The patient has been compliant with treatment.    Subjective 12/22/2022    Patient reports good mood again today, affect is appropriate. Continues to deny auditory/visual hallucinations. Denies SI and HI. Patient received Aristada injections yesterday. Aside from injection site soreness, patient denies side effects. Reports sleep was "ok", though he did wake up several times throughout the night.     Patient likely a candidate for discharge tomorrow. When asked what steps could be taken to prevent future hospitalizations, patient responded "Basically just making sure I make my appointments and getting my shot."     Psychiatric ROS (observed, reported, or endorsed/denied):  Depressed mood - No  Interest/pleasure/anhedonia: No  Guilt/hopelessness/worthlessness - No  Changes in Sleep - No  Changes in Appetite - No  Changes in Concentration - No  Changes in Energy - No  PMA/R- No  Suicidal- active/passive ideations - No  Homicidal ideations: active/passive ideations - less     Hallucinations - less  Delusions - less  Disorganized behavior - No  Disorganized speech - No  Negative symptoms - No     Elevated mood - less  Decreased need for sleep - less  Grandiosity - No  Racing thoughts - less  Impulsivity - " less  Irritability- less  Increased energy - less  Distractibility - less  Increase in goal-directed activity or PMA- No     Symptoms of KANU - less  Symptoms of Panic Disorder- No  Symptoms of PTSD - No     Overall progress: Patient is showing mild improvement      Psychotherapy:  Target symptoms: mood disorder, cannabis abuse  Why chosen therapy is appropriate versus another modality: relevant to diagnosis, evidence based practice  Outcome monitoring methods: self-report, observation  Therapeutic intervention type: insight oriented psychotherapy, supportive psychotherapy  Topics discussed/themes: building skills sets for symptom management, symptom recognition  The patient's response to the intervention is accepting. The patient's progress toward treatment goals is fair.   Duration of intervention: 16 minutes.     Medical ROS  Review of Systems   Constitutional: Negative.    HENT: Negative.     Eyes: Negative.    Respiratory: Negative.     Cardiovascular: Negative.    Gastrointestinal: Negative.    Genitourinary: Negative.    Musculoskeletal: Negative.    Skin: Negative.    Neurological: Negative.    Endo/Heme/Allergies: Negative.    Psychiatric/Behavioral:          As noted     PAST MEDICAL HISTORY   Past Medical History:   Diagnosis Date    History of psychiatric hospitalization     Hx of psychiatric care     Psychiatric problem     Schizophrenia     Suicide attempt        PSYCHOTROPIC MEDICATIONS   Scheduled Meds:   famotidine  20 mg Oral BID    folic acid  1 mg Oral Daily    multivitamin  1 tablet Oral Daily     Continuous Infusions:  PRN Meds:.acetaminophen, acetaminophen, aluminum-magnesium hydroxide-simethicone, docusate sodium, hydrOXYzine pamoate, loperamide, melatonin, nicotine, OLANZapine **AND** OLANZapine, ondansetron    EXAMINATION    VITALS   Vitals:    12/20/22 1926 12/21/22 0747 12/21/22 1932 12/22/22 0754   BP: 118/67 124/66 117/60 125/64   BP Location: Left arm Left arm Right arm Right arm  "  Patient Position: Sitting Sitting Sitting Sitting   Pulse: 79 66 79 83   Resp: 18 16 16 18   Temp: 98.7 °F (37.1 °C) 98.6 °F (37 °C) 98 °F (36.7 °C) 98.4 °F (36.9 °C)   TempSrc: Oral Oral Oral Oral   SpO2: 98% 97% 98% 98%   Weight:       Height:           Body mass index is 20.37 kg/m².       CONSTITUTIONAL  General Appearance: unremarkable, age appropriate     MUSCULOSKELETAL  Muscle Strength and Tone:no tremor, no tic  Abnormal Involuntary Movements: No  Gait and Station: non-ataxic     PSYCHIATRIC   Level of Consciousness: awake, alert , and oriented  Orientation: person, place, time, and situation  Grooming: Casually dressed and Well groomed  Psychomotor Behavior: normal, cooperative, friendly and cooperative  Speech: normal tone, normal rate, normal pitch, normal volume  Language: grossly intact  Mood: "Good"  Affect: Consistent with mood  Thought Process: linear, logical  Associations: intact   Thought Content: DENIES suicidal ideation, DENIES homicidal ideation, and no delusions  Perceptions: denies hallucinations  Memory: Able to recall past events, Remote intact, and Recent intact  Attention:Attends to interview without distraction  Fund of Knowledge: Aware of current events and Vocabulary appropriate   Estimate if Intelligence:  Average based on work/education history, vocabulary and mental status exam  Insight: limited awareness of illness  Judgment: behavior is adequate to circumstances    DIAGNOSTIC TESTING   Laboratory Results  No results found for this or any previous visit (from the past 24 hour(s)).       MEDICAL DECISION MAKING      ASSESSMENT         Bipolar disorder, type I, MRE manic, severe with psychotic features  Cannabis abuse  Unspecified anxiety disorder  Psychosocial stressors           PROBLEM LIST AND MANAGEMENT PLANS        Bipolar disorder, type I, MRE manic, severe with psychotic features  - start abilify 10 mg PO qd-Increase to 15 mg/day tomorrow, plan for CARRASCO  -Continue abilify " 15 mg PO/day. Plan to administer aristada intio and bimonthly 1064 dose with single dose of aristada 30 mg PO tomorrow. --Received Aristada initio and maintenance (1064 mg) IM yesterday with no major adverse effects. Also received single 30 mg dose of PO abilify per aristada dosing instructions.   -Daily dose of abilify PO (15 mg) discontinued   - pt counseled  - follow up with outpatient mental health providers after discharge for medication management and psychotherapy        Cannabis abuse  - SW consulted for assistance with additional resources      Unspecified anxiety disorder  - Continue hydroxyzine 50 mg PO q 6 hours PRN        Psychosocial stressors  - pt counseled  - SW consulted for assistance with additional resources     Hypokalemia  - FM consulted  - monitor/recheck tomorrow, replete as needed-Level 3.7 on 12/19     Discussed diagnosis, risks and benefits of proposed treatment vs alternative treatments vs no treatment, potential side effects of these treatments and the inherent unpredictability of treatment. The patient expresses understanding of the above and displays the capacity to agree with this treatment given said understanding. Patient also agrees that, currently, the benefits outweigh the risks and would like to pursue/continue treatment at this time.     DISCHARGE PLANNING  Expected Disposition Plan: Home or Self Care     NEED FOR CONTINUED HOSPITALIZATION  Psychiatric illness continues to pose a potential threat to life or bodily function, of self or others, thereby requiring the need for continued inpatient psychiatric hospitalization: Yes, due to: significant psychotic thought disorder, as evidenced by:  Ongoing concerns with perceptual aberrancy and paranoid persecutory delusions leading to potential harm of self or others.     Protective inpatient pyschiatric hospitalization required while a safe disposition plan is enacted: Yes     Patient stabilized and ready for discharge from  inpatient psychiatric unit: No         STAFF:   Jl Aguilar NP  Psychiatry

## 2022-12-22 NOTE — PLAN OF CARE
Patient is eating supper meal at this time. Alert and oriented, friendly, cooperative, and focused on discharge. Appetite is good and is medication compliant without side effects noted. Denies depression, anxiety, endorses sleeping well. Denies S/HI, denies A/VH. No delusions present. No negative behaviors noted. Jl Aguilar NP visited with order to discharge home tomorrow. Continue to monitor.

## 2022-12-23 VITALS
RESPIRATION RATE: 18 BRPM | DIASTOLIC BLOOD PRESSURE: 63 MMHG | HEART RATE: 76 BPM | SYSTOLIC BLOOD PRESSURE: 127 MMHG | HEIGHT: 75 IN | BODY MASS INDEX: 20.27 KG/M2 | OXYGEN SATURATION: 98 % | WEIGHT: 163 LBS | TEMPERATURE: 98 F

## 2022-12-23 PROCEDURE — 99239 PR HOSPITAL DISCHARGE DAY,>30 MIN: ICD-10-PCS | Mod: AF,HB,, | Performed by: PSYCHIATRY & NEUROLOGY

## 2022-12-23 PROCEDURE — 25000003 PHARM REV CODE 250: Performed by: EMERGENCY MEDICINE

## 2022-12-23 PROCEDURE — 99239 HOSP IP/OBS DSCHRG MGMT >30: CPT | Mod: AF,HB,, | Performed by: PSYCHIATRY & NEUROLOGY

## 2022-12-23 PROCEDURE — 25000003 PHARM REV CODE 250: Performed by: INTERNAL MEDICINE

## 2022-12-23 PROCEDURE — 90833 PR PSYCHOTHERAPY W/PATIENT W/E&M, 30 MIN (ADD ON): ICD-10-PCS | Mod: AF,HB,, | Performed by: PSYCHIATRY & NEUROLOGY

## 2022-12-23 PROCEDURE — 90833 PSYTX W PT W E/M 30 MIN: CPT | Mod: AF,HB,, | Performed by: PSYCHIATRY & NEUROLOGY

## 2022-12-23 RX ADMIN — FOLIC ACID 1 MG: 1 TABLET ORAL at 08:12

## 2022-12-23 RX ADMIN — FAMOTIDINE 20 MG: 20 TABLET ORAL at 08:12

## 2022-12-23 RX ADMIN — THERA TABS 1 TABLET: TAB at 08:12

## 2022-12-23 NOTE — DISCHARGE INSTRUCTIONS
Pt meets discharge criteria. AVS given and explained to patient. AVS faxed to follow up provider upon discharge. Outpatient smoking cession set up. Addiction counseling will be discussed at follow up appointment. (Smoking/ Substance abuse medication ordered for patient upon discharge.) Pt stable upon discharge.

## 2022-12-23 NOTE — DISCHARGE SUMMARY
"Discharge Summary  Psychiatry    Admit Date: 12/17/2022    Discharge Date and Time:  12/23/2022 8:47 AM    Attending Physician: Cristhian Grijalva III, MD     Discharge Provider: Frantz Cagle MD    Reason for Admission:  psychosis    History of Present Illness:   The patient presented to the ER on 12/17/2022 .     The patient was medically cleared and admitted to the [facility].        Per ED MD:  EMS reports pt from home with c/o not taking his Psych Meds for 2 months per his Mother. Upon arrival pt was talking to God.      22-year-old male with history of schizophrenia, suicide attempt in the past presents to the emergency department with hallucinations.  History of Mojo abuse as well.  Patient appears to be 15 to internal stimuli at present.  He has not been taking his medicines for 2 months, having hallucinations, family reports he is homicidal but patient denies SI and HI.  History of multiple psychiatric admissions in the past     Per RN:  Nabil Robinson called into ER to check on her son, she reports that her son has refused his monthly injection for the past 2 months and has been in steadily decline. I updated Nabil that pt is PEC'd and will be admitted to our U. Mom's # is [phone]            Per RN:  22-year-old male with history of schizophrenia, suicide attempt in the past presents to the emergency department with hallucinations.  History of Mojo abuse as well.  Patient appears to be responding to internal stimuli in ED.         Psychiatric interview:  "I been thinking about my twin flame, Raul Murphy, I'm a special person... my mom kicked me out and I went for a amy ride so she called the ." He admits to not taking his medications, "it wasn't affecting me the right way, my mom gave me a glass of wine, and ever since the then my left hemisphere of my brain is shot."           Symptoms of Depression: diminished mood - No, loss of interest/anhedonia - No;  recurrent - No, >14 days - No, " diminished energy - No, change in sleep - No, change in appetite - No, diminished concentration or cognition or indecisiveness - No, PMA/R -  No, excessive guilt or hopelessness or worthlessness - No, suicidal ideations - No     Changes in Sleep: trouble with initiation- No, maintenance, - No early morning awakening with inability to return to sleep - No, hypersomnolence - No     Suicidal- active/passive ideations - No, organized plans, future intentions - No     Homicidal ideations: active/passive ideations - No, organized plans, future intentions - No     Symptoms of psychosis: hallucinations - Yes, delusions - Yes, disorganized speech - No, disorganized behavior or abnormal motor behavior - No, or negative symptoms (diminshed emotional expression, avolition, anhedonia, alogia, asociality) - No, active phase symptoms >1 month - No, continuous signs of illness > 6 months - No, since onset of illness decreased level of functioning present - No     Symptoms of carley or hypomania: elevated, expansive, or irritable mood with increased energy or activity - Yes; > 4 days - Yes,  >7 days - Yes; with inflated self-esteem or grandiosity - Yes, decreased need for sleep - No, increased rate of speech - No, FOI or racing thoughts - No, distractibility - Yes, increased goal directed activity or PMA - Yes, risky/disinhibited behavior - Yes     Symptoms of KANU: excessive anxiety/worry/fear, more days than not, about numerous issues - No, ongoing for >6 months - No, difficult to control - No, with restlessness - No, fatigue - No, poor concentration - No, irritability - No, muscle tension - No, sleep disturbance - No; causes functionally impairing distress - No     Symptoms of Panic Disorder: recurrent panic attacks (palpitations/heart racing, sweating, shakiness, dyspnea, choking, chest pain/discomfort, Gi symptoms, dizzy/lightheadedness, hot/col flashes, paresthesias, derealization, fear of losing control or fear of dying or fear  "of "going crazy") - No, precipitated - No, un-precipitated - No, source of worry and/or behavioral changes secondary for 1 month or longer- No, agoraphobia - No     Symptoms of PTSD: h/o trauma exposure - No; re-experiencing/intrusive symptoms - No, avoidant behavior - No, 2 or more negative alterations in cognition or mood - No, 2 or more hyperarousal symptoms - No; with dissociative symptoms - No, ongoing for 1 or more  months - No     Symptoms of OCD: obsessions (recurrent thoughts/urges/images; intrusive and/or unwanted; uses other thoughts/actions to suppress) - No; compulsions (repetitive behaviors used to lower distress/anxiety/obsessions) - No, time-consuming (over 1 hour per day) or cause significant distress/impairment - - No     Symptoms of Anorexia: restriction of caloric intake leading to significantly low body weight - No, intense fear of gaining weight or persistent behavior that interferes with weight gain even thought at a significantly low weight - No, disturbance in the way in which one's body weight or shape is experienced, undue influence of body weight or shape on self evaluation, or persistent lack of recognition of the seriousness of the current low body weight - No     Symptoms of Bulimia: recurrent episodes of binge eating (definitely larger amount  than what others would eat and lack of a sense of control over eating during episode) - No, recurrent inappropriate compensatory behaviors in order to prevent weight gain (fasting, medications, exercise, vomiting) - No, binges and compensatory behaviors both occur on average at least once a week for 3 months - No, self evaluations is unduly influenced by body shape/weight- - No     Symptoms of Binge eating: recurrent episodes of binge eating (definitely larger amount than what others would eat and lack of a sense of control over eating during episode) - No, 3 or more of following (eating much more rapidly, eating until uncomfortably full, large " amounts when not hungry, eating alone because of embarrassed by how much,  feeling disgusted with oneself, depressed or very guilty afterward) - No, distress regarding binges - No, binges occur on average at least once a week for 3 months - No      Procedures Performed: * No surgery found *    Hospital Course:    Patient was admitted to the inpatient psychiatry unit after being medically cleared in the ED. Chart and labs were reviewed. The patient was stabilized as follows:      Bipolar disorder, type I, MRE manic, severe with psychotic features     - start abilify 10 mg PO qd-Increase to 15 mg/day - then stopped  - Plan to administer aristada intio and bimonthly 1064 dose with single dose of aristada 30 mg PO tomorrow.   -Administer aristada injections as noted  - pt counseled  - follow up with outpatient mental health providers after discharge for medication management and psychotherapy  -Next aristada 1064 IM due on/around February 20, 2023        Cannabis abuse  - SW consulted for assistance with additional resources      Unspecified anxiety disorder  - Continue hydroxyzine 50 mg PO q 6 hours PRN        Psychosocial stressors  - pt counseled  - SW consulted for assistance with additional resources     Hypokalemia  -  consulted  - monitor/recheck tomorrow, replete as needed-Level 3.7 on 12/19        During hospitalization, the patient was encouraged to go to both groups and individual counseling. Patient was monitored for any side effects. A meeting was held with multidisciplinary team prior to discharge and pt's diagnosis, current medications, and follow up were discussed. The patient has been compliant with treatment and can adequately attend to activities of daily living in an independent manner. The patient denies any side effects. The patient denies SI, HI, plan or intent for self harm or harm to others. The patient is no longer a danger to self or others nor gravely disabled disabled. Patient discharged   in stable condition with scheduled outpatient follow up.    The patient reports improved symptoms as documented below. The patient is requesting discharge. The patient is currently stable for discharge home and is able/willing to attend outpatient care. The patient is hopeful, future oriented and goal directed. The patient readily discusses both short and long term goals. The patient can identify positive coping skills and social support.     AIMs score was 0    Psychiatric ROS (observed, reported, or endorsed/denied):  Depressed mood - Denies  Interest/pleasure/anhedonia:Denies  Guilt/hopelessness/worthlessness - Denies  Changes in Sleep - Denies  Changes in Appetite - Denies  Changes in Concentration - improved  Changes in Energy - improved  PMA/R- improved  Suicidal- active/passive ideations - improved/resolved  Homicidal ideations: active/passive ideations - No     Hallucinations - No  Delusions - No  Disorganized behavior - No  Disorganized speech - No  Negative symptoms - No     Elevated mood - No  Decreased need for sleep - No  Grandiosity - No  Racing thoughts - No  Impulsivity - No  Irritability- Yes  Increased energy - No  Distractibility - No  Increase in goal-directed activity or PMA- No     Symptoms of KANU - No  Symptoms of Panic Disorder- No  Symptoms of PTSD - No      Discussed diagnosis, risks and benefits of proposed treatment vs alternative treatments vs no treatment, and potential side effects of these treatments.  The patient expresses understanding of the above and displays the capacity to agree with this treatment given said understanding.  Patient also agrees that, currently, the benefits outweigh the risks and would like to pursue treatment at this time.      Discharge MSE: stated age, casually dressed, well groomed.  No psychomotor agitation or retardation.  No abnormal involuntary movements.  Gait normal.  Speech normal, conversational.  Language fluent English. Mood fine.  Affect normal  range, pleasant, euthymic.  Thought process linear.  Associations intact.  Denies suicidal or homicidal ideation.  Denies auditory hallucinations, paranoid ideation, ideas of reference.  Memory intact.  Attention intact.  Fund of knowledge intact.  Insight intact.  Judgment intact.  Alert and oriented to person, place, time.      Tobacco Usage:  Is patient a smoker? Yes  Does patient want prescription for Tobacco Cessation? No  Does patient want counseling for Tobacco Cessation? No    If patient would like to quit, then over the counter nicotine patch could be used. The patient could also follow up with his PCP or psychiatric provider for other alternatives.     Tobacco Use Treatment Practical Counseling     Were the following discussed with the patient:     Recognizing danger situations:               A. Alcohol use during the first month after quitting - Yes              B. Being around smoke and/or smokers or time/situations when the patient routinely smoked - Yes              C. Triggers and/or roadblocks are the same as danger situations - Yes     2.   Developing coping skills              A. Learning new ways to manage stress - Yes              B. Exercising and/or relaxation breathing - Yes              C. Changing routines - Yes              D. Distraction techniques to prevent tobacco use - Yes     3.   Basic information about quitting:              A. Benefits of quitting tobacco - Yes              B. How to quit techniques - Yes              C. Available resources to support quitting - Yes        Final Diagnoses:               Principal Problem: Bipolar disorder, type I, MRE manic, severe with psychotic features              Secondary Diagnoses:   Cannabis abuse  Unspecified anxiety disorder  Hypokalemia    Labs:  Admission on 12/17/2022   Component Date Value Ref Range Status    WBC 12/17/2022 10.05  3.90 - 12.70 K/uL Final    RBC 12/17/2022 4.57 (L)  4.60 - 6.20 M/uL Final    Hemoglobin 12/17/2022 14.4   14.0 - 18.0 g/dL Final    Hematocrit 12/17/2022 41.9  40.0 - 54.0 % Final    MCV 12/17/2022 92  82 - 98 fL Final    MCH 12/17/2022 31.5 (H)  27.0 - 31.0 pg Final    MCHC 12/17/2022 34.4  32.0 - 36.0 g/dL Final    RDW 12/17/2022 13.3  11.5 - 14.5 % Final    Platelets 12/17/2022 375  150 - 450 K/uL Final    MPV 12/17/2022 9.1 (L)  9.2 - 12.9 fL Final    Immature Granulocytes 12/17/2022 0.2  0.0 - 0.5 % Final    Gran # (ANC) 12/17/2022 6.4  1.8 - 7.7 K/uL Final    Immature Grans (Abs) 12/17/2022 0.02  0.00 - 0.04 K/uL Final    Lymph # 12/17/2022 2.2  1.0 - 4.8 K/uL Final    Mono # 12/17/2022 1.3 (H)  0.3 - 1.0 K/uL Final    Eos # 12/17/2022 0.1  0.0 - 0.5 K/uL Final    Baso # 12/17/2022 0.02  0.00 - 0.20 K/uL Final    nRBC 12/17/2022 0  0 /100 WBC Final    Gran % 12/17/2022 63.9  38.0 - 73.0 % Final    Lymph % 12/17/2022 21.7  18.0 - 48.0 % Final    Mono % 12/17/2022 13.1  4.0 - 15.0 % Final    Eosinophil % 12/17/2022 0.9  0.0 - 8.0 % Final    Basophil % 12/17/2022 0.2  0.0 - 1.9 % Final    Differential Method 12/17/2022 Automated   Final    Sodium 12/17/2022 139  136 - 145 mmol/L Final    Potassium 12/17/2022 3.0 (L)  3.5 - 5.1 mmol/L Final    Chloride 12/17/2022 107  95 - 110 mmol/L Final    CO2 12/17/2022 22 (L)  23 - 29 mmol/L Final    Glucose 12/17/2022 109  70 - 110 mg/dL Final    BUN 12/17/2022 18  6 - 20 mg/dL Final    Creatinine 12/17/2022 1.3  0.5 - 1.4 mg/dL Final    Calcium 12/17/2022 8.6 (L)  8.7 - 10.5 mg/dL Final    Total Protein 12/17/2022 8.6 (H)  6.0 - 8.4 g/dL Final    Albumin 12/17/2022 4.4  3.5 - 5.2 g/dL Final    Total Bilirubin 12/17/2022 1.3 (H)  0.1 - 1.0 mg/dL Final    Alkaline Phosphatase 12/17/2022 69  55 - 135 U/L Final    AST 12/17/2022 17  10 - 40 U/L Final    ALT 12/17/2022 33  10 - 44 U/L Final    Anion Gap 12/17/2022 10  8 - 16 mmol/L Final    eGFR 12/17/2022 >60.0  >60 mL/min/1.73 m^2 Final    TSH 12/17/2022 0.716  0.400 - 4.000 uIU/mL Final    Specimen UA 12/17/2022 Urine, Clean  Catch   Final    Color, UA 12/17/2022 Yellow  Yellow, Straw, Ingrid Final    Appearance, UA 12/17/2022 Clear  Clear Final    pH, UA 12/17/2022 6.0  5.0 - 8.0 Final    Specific Gravity, UA 12/17/2022 >=1.030 (A)  1.005 - 1.030 Final    Protein, UA 12/17/2022 1+ (A)  Negative Final    Glucose, UA 12/17/2022 Negative  Negative Final    Ketones, UA 12/17/2022 2+ (A)  Negative Final    Bilirubin (UA) 12/17/2022 1+ (A)  Negative Final    Occult Blood UA 12/17/2022 Negative  Negative Final    Nitrite, UA 12/17/2022 Negative  Negative Final    Urobilinogen, UA 12/17/2022 1.0  <2.0 EU/dL Final    Leukocytes, UA 12/17/2022 Trace (A)  Negative Final    Benzodiazepines 12/17/2022 Negative  Negative Final    Methadone metabolites 12/17/2022 Negative  Negative Final    Cocaine (Metab.) 12/17/2022 Negative  Negative Final    Opiate Scrn, Ur 12/17/2022 Negative  Negative Final    Barbiturate Screen, Ur 12/17/2022 Negative  Negative Final    Amphetamine Screen, Ur 12/17/2022 Negative  Negative Final    THC 12/17/2022 Presumptive Positive (A)  Negative Final    Phencyclidine 12/17/2022 Negative  Negative Final    Creatinine, Urine 12/17/2022 626.0 (H)  23.0 - 375.0 mg/dL Final    Toxicology Information 12/17/2022 SEE COMMENT   Final    Alcohol, Serum 12/17/2022 <3  <10 mg/dL Final    Acetaminophen (Tylenol), Serum 12/17/2022 <2.0 (L)  10.0 - 20.0 ug/mL Final    POC Rapid COVID 12/17/2022 Negative  Negative Final     Acceptable 12/17/2022 Yes   Final    Valproic Acid Level 12/17/2022 <3 (L)  50 - 100 ug/mL Final    RBC, UA 12/17/2022 3  0 - 4 /hpf Final    WBC, UA 12/17/2022 6 (H)  0 - 5 /hpf Final    Bacteria 12/17/2022 Rare  None-Occ /hpf Final    Squam Epithel, UA 12/17/2022 4  /hpf Final    Hyaline Casts, UA 12/17/2022 0  0-1/lpf /lpf Final    Microscopic Comment 12/17/2022 SEE COMMENT   Final    Hemoglobin A1C 12/17/2022 4.9  4.0 - 5.6 % Final    Estimated Avg Glucose 12/17/2022 94  68 - 131 mg/dL Final     Cholesterol 12/17/2022 187  120 - 199 mg/dL Final    Triglycerides 12/17/2022 80  30 - 150 mg/dL Final    HDL 12/17/2022 74  40 - 75 mg/dL Final    LDL Cholesterol 12/17/2022 97.0  63.0 - 159.0 mg/dL Final    HDL/Cholesterol Ratio 12/17/2022 39.6  20.0 - 50.0 % Final    Total Cholesterol/HDL Ratio 12/17/2022 2.5  2.0 - 5.0 Final    Non-HDL Cholesterol 12/17/2022 113  mg/dL Final    Cholesterol 12/18/2022 165  120 - 199 mg/dL Final    Triglycerides 12/18/2022 75  30 - 150 mg/dL Final    HDL 12/18/2022 71  40 - 75 mg/dL Final    LDL Cholesterol 12/18/2022 79.0  63.0 - 159.0 mg/dL Final    HDL/Cholesterol Ratio 12/18/2022 43.0  20.0 - 50.0 % Final    Total Cholesterol/HDL Ratio 12/18/2022 2.3  2.0 - 5.0 Final    Non-HDL Cholesterol 12/18/2022 94  mg/dL Final    Hemoglobin A1C 12/18/2022 4.8  4.0 - 5.6 % Final    Estimated Avg Glucose 12/18/2022 91  68 - 131 mg/dL Final    Sodium 12/19/2022 139  136 - 145 mmol/L Final    Potassium 12/19/2022 3.7  3.5 - 5.1 mmol/L Final    Chloride 12/19/2022 107  95 - 110 mmol/L Final    CO2 12/19/2022 26  23 - 29 mmol/L Final    Glucose 12/19/2022 82  70 - 110 mg/dL Final    BUN 12/19/2022 17  6 - 20 mg/dL Final    Creatinine 12/19/2022 1.3  0.5 - 1.4 mg/dL Final    Calcium 12/19/2022 8.5 (L)  8.7 - 10.5 mg/dL Final    Total Protein 12/19/2022 7.8  6.0 - 8.4 g/dL Final    Albumin 12/19/2022 3.9  3.5 - 5.2 g/dL Final    Total Bilirubin 12/19/2022 0.9  0.1 - 1.0 mg/dL Final    Alkaline Phosphatase 12/19/2022 67  55 - 135 U/L Final    AST 12/19/2022 34  10 - 40 U/L Final    ALT 12/19/2022 33  10 - 44 U/L Final    Anion Gap 12/19/2022 6 (L)  8 - 16 mmol/L Final    eGFR 12/19/2022 >60.0  >60 mL/min/1.73 m^2 Final   Admission on 12/04/2022, Discharged on 12/04/2022   Component Date Value Ref Range Status    Group A Strep, Molecular 12/04/2022 Negative  Negative Final         Discharged Condition: stable and improved; not currently a danger to self/others or gravely disabled    Disposition:  Home or Self Care    Is patient being discharged on multiple neuroleptics? No    Follow Up/Patient Instructions:     Take all medications as prescribed.  Attend all psychiatric and medical follow up appointments.   Abstain from all drugs and alcohol.  Call the crisis line at: [phone] for help in a crisis and emergent situations or call 911 and Return to ED for any acute worsening of your condition including suicidal or homicidal ideations      No discharge procedures on file.   Follow-up Information       [facility] Follow up on 12/29/2022.    Why: In person appointment at 9:00am for, mental health follow up, addiction screening and follow up, smoking cession appointment  Contact information:  [facility] [id number]  [phone]                             Follow up apt: as above      Medications:  Reconciled Home Medications:      Medication List        START taking these medications      nicotine 14 mg/24 hr  Commonly known as: NICODERM CQ  Place 1 patch onto the skin daily as needed.            STOP taking these medications      amoxicillin-clavulanate 875-125mg 875-125 mg per tablet  Commonly known as: AUGMENTIN     divalproex 250 MG EC tablet  Commonly known as: DEPAKOTE                Diet: regular     Activity as tolerated    Total time spent discharging patient: 35 minutes    Frantz Cagle MD  Psychiatry

## 2022-12-23 NOTE — NURSING
Pt. A/A.  No C/O Pain.  Denies SI/HI/AVH currently.  Does have some Anxiety.  Flat affect.  Pt. Sitting in dinning room watching T.V. and talking to peers.  Compliant with medications and follows instructions.  Will continue to observe.

## 2022-12-23 NOTE — PLAN OF CARE
Problem: Adult Behavioral Health Plan of Care  Goal: Optimized Coping Skills in Response to Life Stressors  Intervention: Promote Effective Coping Strategies  Flowsheets (Taken 12/21/2022 1130)  Supportive Measures:   active listening utilized   self-responsibility promoted   verbalization of feelings encouraged   self-reflection promoted   relaxation techniques promoted   problem-solving facilitated   self-care encouraged       Behavior: pt was engaged and cooperative during group      Intervention: n this CBT-focused processing group CSW facilitated group process discussion on stressors (home, work/school, social and other) and how to deal with those stressors.  Each pt was asked to share the major stressors in the life and how they can work to cope with those stressors.          Response: pt identified stressors as not being able to fully express himself/identity, not being able to talk about things (Congregational, spiritual, science), feeling of being overwhelmed with career. Pt identified ways to cope with stressors as mediate before work, listen to positive music on break, being consistent with building career, avoid discussing certain topics.       Plan: Continue to encourage pt to participate in process groups to verbalize feelings and develop healthy coping skills.                     7am/clears

## 2022-12-23 NOTE — PROGRESS NOTES
Psychotherapy:  Target symptoms: mood disorder, cannabis abuse  Why chosen therapy is appropriate versus another modality: relevant to diagnosis, evidence based practice  Outcome monitoring methods: self-report, observation  Therapeutic intervention type: insight oriented psychotherapy, supportive psychotherapy  Topics discussed/themes: building skills sets for symptom management, symptom recognition  -safety planning and wrap up session  The patient's response to the intervention is accepting. The patient's progress toward treatment goals is good.   Duration of intervention: 16 minutes.  Frantz Cagle MD  Psychiatry

## 2022-12-23 NOTE — PLAN OF CARE
Problem: Adult Behavioral Health Plan of Care  Goal: Plan of Care Review  Outcome: Ongoing, Progressing  Goal: Patient-Specific Goal (Individualization)  Outcome: Ongoing, Progressing  Goal: Adheres to Safety Considerations for Self and Others  Outcome: Ongoing, Progressing  Goal: Absence of New-Onset Illness or Injury  Outcome: Ongoing, Progressing  Goal: Optimized Coping Skills in Response to Life Stressors  Outcome: Ongoing, Progressing  Goal: Develops/Participates in Therapeutic [place] to Support Successful Transition  Outcome: Ongoing, Progressing  Goal: Rounds/Family Conference  Outcome: Ongoing, Progressing     Problem: Activity and Energy Impairment (Anxiety Signs/Symptoms)  Goal: Optimized Energy Level (Anxiety Signs/Symptoms)  Outcome: Ongoing, Progressing     Problem: Cognitive Impairment (Anxiety Signs/Symptoms)  Goal: Optimized Cognitive Function (Anxiety Signs/Symptoms)  Outcome: Ongoing, Progressing     Problem: Mood Impairment (Anxiety Signs/Symptoms)  Goal: Improved Mood Symptoms (Anxiety Signs/Symptoms)  Outcome: Ongoing, Progressing     Problem: Sleep Impairment (Anxiety Signs/Symptoms)  Goal: Improved Sleep (Anxiety Signs/Symptoms)  Outcome: Ongoing, Progressing     Problem: Social, Occupational or Functional Impairment (Anxiety Signs/Symptoms)  Goal: Enhanced Social, Occupational or Functional Skills (Anxiety Signs/Symptoms)  Outcome: Ongoing, Progressing     Problem: Somatic Disturbance (Anxiety Signs/Symptoms)  Goal: Improved Somatic Symptoms (Anxiety Signs/Symptoms)  Outcome: Ongoing, Progressing     Problem: Activity and Energy Impairment (Excessive Substance Use)  Goal: Optimized Energy Level (Excessive Substance Use)  Outcome: Ongoing, Progressing     Problem: Behavior Regulation Impairment (Excessive Substance Use)  Goal: Improved Behavioral Control (Excessive Substance Use)  Outcome: Ongoing, Progressing     Problem: Decreased Participation and Engagement (Excessive Substance  Use)  Goal: Increased Participation and Engagement (Excessive Substance Use)  Outcome: Ongoing, Progressing     Problem: Physiologic Impairment (Excessive Substance Use)  Goal: Improved Physiologic Symptoms (Excessive Substance Use)  Outcome: Ongoing, Progressing     Problem: Social, Occupational or Functional Impairment (Excessive Substance Use)  Goal: Enhanced Social, Occupational or Functional Skills (Excessive Substance Use)  Outcome: Ongoing, Progressing     Problem: Behavior Regulation Impairment (Disruptive Behavior)  Goal: Improved Impulse and Aggression Control (Disruptive Behavior)  Outcome: Ongoing, Progressing     Problem: Mood Impairment (Disruptive Behavior)  Goal: Improved Mood Symptoms (Disruptive Behavior)  Outcome: Ongoing, Progressing     Problem: Sleep Disturbance (Disruptive Behavior)  Goal: Improved Sleep (Disruptive Behavior)  Outcome: Ongoing, Progressing     Problem: Social, Occupational or Functional Impairment (Disruptive Behavior)  Goal: Enhanced Social, Occupational or Functional Skills (Disruptive Behavior)  Outcome: Ongoing, Progressing     Problem: Behavior Regulation Impairment (Psychotic Signs/Symptoms)  Goal: Improved Behavioral Control (Psychotic Signs/Symptoms)  Outcome: Ongoing, Progressing     Problem: Cognitive Impairment (Psychotic Signs/Symptoms)  Goal: Optimal Cognitive Function (Psychotic Signs/Symptoms)  Outcome: Ongoing, Progressing     Problem: Decreased Participation and Engagement (Psychotic Signs/Symptoms)  Goal: Increased Participation and Engagement (Psychotic Signs/Symptoms)  Outcome: Ongoing, Progressing     Problem: Mood Impairment (Psychotic Signs/Symptoms)  Goal: Improved Mood Symptoms (Psychotic Signs/Symptoms)  Outcome: Ongoing, Progressing     Problem: Psychomotor Impairment (Psychotic Signs/Symptoms)  Goal: Improved Psychomotor Symptoms (Psychotic Signs/Symptoms)  Outcome: Ongoing, Progressing     Problem: Sensory Perception Impairment (Psychotic  Signs/Symptoms)  Goal: Decreased Sensory Symptoms (Psychotic Signs/Symptoms)  Outcome: Ongoing, Progressing     Problem: Sleep Disturbance (Psychotic Signs/Symptoms)  Goal: Improved Sleep (Psychotic Signs/Symptoms)  Outcome: Ongoing, Progressing     Problem: Social, Occupational or Functional Impairment (Psychotic Signs/Symptoms)  Goal: Enhanced Social, Occupational or Functional Skills (Psychotic Signs/Symptoms)  Outcome: Ongoing, Progressing     Problem: Violence Risk or Actual  Goal: Anger and Impulse Control  Outcome: Ongoing, Progressing

## 2022-12-29 ENCOUNTER — HOSPITAL ENCOUNTER (EMERGENCY)
Facility: HOSPITAL | Age: 22
Discharge: HOME | End: 2022-12-29
Attending: EMERGENCY MEDICINE
Payer: MEDICAID

## 2022-12-29 VITALS
HEART RATE: 110 BPM | BODY MASS INDEX: 20.17 KG/M2 | SYSTOLIC BLOOD PRESSURE: 150 MMHG | TEMPERATURE: 98 F | DIASTOLIC BLOOD PRESSURE: 76 MMHG | RESPIRATION RATE: 18 BRPM | HEIGHT: 75 IN | WEIGHT: 162.19 LBS | OXYGEN SATURATION: 99 %

## 2022-12-29 DIAGNOSIS — F31.9 BIPOLAR 1 DISORDER: Primary | ICD-10-CM

## 2022-12-29 PROCEDURE — 99281 EMR DPT VST MAYX REQ PHY/QHP: CPT
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
## 2022-12-31 ENCOUNTER — HOSPITAL ENCOUNTER (INPATIENT)
Facility: HOSPITAL | Age: 22
LOS: 9 days | Discharge: HOME | DRG: 885 | End: 2023-01-09
Attending: EMERGENCY MEDICINE | Admitting: PSYCHIATRY & NEUROLOGY
Payer: MEDICAID

## 2022-12-31 DIAGNOSIS — F19.10 SUBSTANCE ABUSE: Primary | ICD-10-CM

## 2022-12-31 DIAGNOSIS — F25.0 SCHIZOAFFECTIVE DISORDER, BIPOLAR TYPE: ICD-10-CM

## 2022-12-31 DIAGNOSIS — F29 PSYCHOSIS: ICD-10-CM

## 2022-12-31 LAB
ALBUMIN SERPL BCP-MCNC: 4.3 G/DL (ref 3.5–5.2)
ALP SERPL-CCNC: 65 U/L (ref 55–135)
ALT SERPL W/O P-5'-P-CCNC: 25 U/L (ref 10–44)
AMPHET+METHAMPHET UR QL: NEGATIVE
ANION GAP SERPL CALC-SCNC: 6 MMOL/L (ref 8–16)
APAP SERPL-MCNC: <2 UG/ML (ref 10–20)
AST SERPL-CCNC: 17 U/L (ref 10–40)
BARBITURATES UR QL SCN>200 NG/ML: NEGATIVE
BASOPHILS # BLD AUTO: 0.02 K/UL (ref 0–0.2)
BASOPHILS NFR BLD: 0.3 % (ref 0–1.9)
BENZODIAZ UR QL SCN>200 NG/ML: NEGATIVE
BILIRUB SERPL-MCNC: 0.6 MG/DL (ref 0.1–1)
BILIRUB UR QL STRIP: NEGATIVE
BUN SERPL-MCNC: 17 MG/DL (ref 6–20)
BZE UR QL SCN: NEGATIVE
CALCIUM SERPL-MCNC: 8.5 MG/DL (ref 8.7–10.5)
CANNABINOIDS UR QL SCN: ABNORMAL
CHLORIDE SERPL-SCNC: 106 MMOL/L (ref 95–110)
CLARITY UR: CLEAR
CO2 SERPL-SCNC: 26 MMOL/L (ref 23–29)
COLOR UR: YELLOW
CREAT SERPL-MCNC: 1.4 MG/DL (ref 0.5–1.4)
CREAT UR-MCNC: 231 MG/DL (ref 23–375)
DIFFERENTIAL METHOD: ABNORMAL
EOSINOPHIL # BLD AUTO: 0.1 K/UL (ref 0–0.5)
EOSINOPHIL NFR BLD: 1.5 % (ref 0–8)
ERYTHROCYTE [DISTWIDTH] IN BLOOD BY AUTOMATED COUNT: 12.4 % (ref 11.5–14.5)
EST. GFR  (NO RACE VARIABLE): >60 ML/MIN/1.73 M^2
ETHANOL SERPL-MCNC: <3 MG/DL
GLUCOSE SERPL-MCNC: 105 MG/DL (ref 70–110)
GLUCOSE UR QL STRIP: NEGATIVE
HCT VFR BLD AUTO: 40.4 % (ref 40–54)
HGB BLD-MCNC: 13.8 G/DL (ref 14–18)
HGB UR QL STRIP: NEGATIVE
IMM GRANULOCYTES # BLD AUTO: 0.02 K/UL (ref 0–0.04)
IMM GRANULOCYTES NFR BLD AUTO: 0.3 % (ref 0–0.5)
KETONES UR QL STRIP: ABNORMAL
LEUKOCYTE ESTERASE UR QL STRIP: NEGATIVE
LYMPHOCYTES # BLD AUTO: 2.4 K/UL (ref 1–4.8)
LYMPHOCYTES NFR BLD: 30.5 % (ref 18–48)
MCH RBC QN AUTO: 31.5 PG (ref 27–31)
MCHC RBC AUTO-ENTMCNC: 34.2 G/DL (ref 32–36)
MCV RBC AUTO: 92 FL (ref 82–98)
METHADONE UR QL SCN>300 NG/ML: NEGATIVE
MONOCYTES # BLD AUTO: 1.1 K/UL (ref 0.3–1)
MONOCYTES NFR BLD: 13.9 % (ref 4–15)
NEUTROPHILS # BLD AUTO: 4.3 K/UL (ref 1.8–7.7)
NEUTROPHILS NFR BLD: 53.5 % (ref 38–73)
NITRITE UR QL STRIP: NEGATIVE
NRBC BLD-RTO: 0 /100 WBC
OPIATES UR QL SCN: NEGATIVE
PCP UR QL SCN>25 NG/ML: NEGATIVE
PH UR STRIP: 7 [PH] (ref 5–8)
PLATELET # BLD AUTO: 378 K/UL (ref 150–450)
PMV BLD AUTO: 8.8 FL (ref 9.2–12.9)
POTASSIUM SERPL-SCNC: 3.8 MMOL/L (ref 3.5–5.1)
PROT SERPL-MCNC: 8.2 G/DL (ref 6–8.4)
PROT UR QL STRIP: NEGATIVE
RBC # BLD AUTO: 4.38 M/UL (ref 4.6–6.2)
SODIUM SERPL-SCNC: 138 MMOL/L (ref 136–145)
SP GR UR STRIP: 1.01 (ref 1–1.03)
TOXICOLOGY INFORMATION: ABNORMAL
TSH SERPL DL<=0.005 MIU/L-ACNC: 1.25 UIU/ML (ref 0.4–4)
URN SPEC COLLECT METH UR: ABNORMAL
UROBILINOGEN UR STRIP-ACNC: ABNORMAL EU/DL
WBC # BLD AUTO: 7.99 K/UL (ref 3.9–12.7)

## 2022-12-31 PROCEDURE — 25000003 PHARM REV CODE 250: Performed by: PSYCHIATRY & NEUROLOGY

## 2022-12-31 PROCEDURE — 84443 ASSAY THYROID STIM HORMONE: CPT | Performed by: EMERGENCY MEDICINE

## 2022-12-31 PROCEDURE — 83036 HEMOGLOBIN GLYCOSYLATED A1C: CPT | Performed by: PSYCHIATRY & NEUROLOGY

## 2022-12-31 PROCEDURE — 99285 EMERGENCY DEPT VISIT HI MDM: CPT

## 2022-12-31 PROCEDURE — 36415 COLL VENOUS BLD VENIPUNCTURE: CPT | Performed by: EMERGENCY MEDICINE

## 2022-12-31 PROCEDURE — 11400000 HC PSYCH PRIVATE ROOM

## 2022-12-31 PROCEDURE — 85025 COMPLETE CBC W/AUTO DIFF WBC: CPT | Performed by: EMERGENCY MEDICINE

## 2022-12-31 PROCEDURE — 80053 COMPREHEN METABOLIC PANEL: CPT | Performed by: EMERGENCY MEDICINE

## 2022-12-31 PROCEDURE — 80307 DRUG TEST PRSMV CHEM ANLYZR: CPT | Performed by: EMERGENCY MEDICINE

## 2022-12-31 PROCEDURE — 82077 ASSAY SPEC XCP UR&BREATH IA: CPT | Performed by: EMERGENCY MEDICINE

## 2022-12-31 PROCEDURE — 81003 URINALYSIS AUTO W/O SCOPE: CPT | Mod: 59 | Performed by: EMERGENCY MEDICINE

## 2022-12-31 PROCEDURE — 80143 DRUG ASSAY ACETAMINOPHEN: CPT | Performed by: EMERGENCY MEDICINE

## 2022-12-31 RX ORDER — LORAZEPAM 2 MG/ML
2 INJECTION INTRAMUSCULAR EVERY 6 HOURS PRN
Status: DISCONTINUED | OUTPATIENT
Start: 2022-12-31 | End: 2023-01-09 | Stop reason: HOSPADM

## 2022-12-31 RX ORDER — DIPHENHYDRAMINE HYDROCHLORIDE 50 MG/ML
50 INJECTION INTRAMUSCULAR; INTRAVENOUS EVERY 6 HOURS PRN
Status: DISCONTINUED | OUTPATIENT
Start: 2022-12-31 | End: 2023-01-09 | Stop reason: HOSPADM

## 2022-12-31 RX ORDER — IBUPROFEN 400 MG/1
400 TABLET ORAL EVERY 6 HOURS PRN
Status: DISCONTINUED | OUTPATIENT
Start: 2022-12-31 | End: 2023-01-09 | Stop reason: HOSPADM

## 2022-12-31 RX ORDER — HYDROXYZINE PAMOATE 25 MG/1
25 CAPSULE ORAL EVERY 6 HOURS PRN
Status: DISCONTINUED | OUTPATIENT
Start: 2022-12-31 | End: 2023-01-09 | Stop reason: HOSPADM

## 2022-12-31 RX ORDER — HALOPERIDOL 5 MG/1
5 TABLET ORAL EVERY 6 HOURS PRN
Status: DISCONTINUED | OUTPATIENT
Start: 2022-12-31 | End: 2023-01-09 | Stop reason: HOSPADM

## 2022-12-31 RX ORDER — FOLIC ACID 1 MG/1
1 TABLET ORAL DAILY
Status: DISCONTINUED | OUTPATIENT
Start: 2023-01-01 | End: 2023-01-09 | Stop reason: HOSPADM

## 2022-12-31 RX ORDER — LORAZEPAM 1 MG/1
2 TABLET ORAL EVERY 6 HOURS PRN
Status: DISCONTINUED | OUTPATIENT
Start: 2022-12-31 | End: 2023-01-09 | Stop reason: HOSPADM

## 2022-12-31 RX ORDER — ONDANSETRON 4 MG/1
4 TABLET, ORALLY DISINTEGRATING ORAL EVERY 8 HOURS PRN
Status: DISCONTINUED | OUTPATIENT
Start: 2022-12-31 | End: 2023-01-09 | Stop reason: HOSPADM

## 2022-12-31 RX ORDER — HALOPERIDOL 5 MG/ML
5 INJECTION INTRAMUSCULAR EVERY 6 HOURS PRN
Status: DISCONTINUED | OUTPATIENT
Start: 2022-12-31 | End: 2023-01-09 | Stop reason: HOSPADM

## 2022-12-31 RX ORDER — PROMETHAZINE HYDROCHLORIDE 12.5 MG/1
25 TABLET ORAL EVERY 6 HOURS PRN
Status: DISCONTINUED | OUTPATIENT
Start: 2022-12-31 | End: 2023-01-09 | Stop reason: HOSPADM

## 2022-12-31 RX ORDER — IBUPROFEN 200 MG
1 TABLET ORAL DAILY PRN
Status: DISCONTINUED | OUTPATIENT
Start: 2022-12-31 | End: 2023-01-09 | Stop reason: HOSPADM

## 2022-12-31 RX ORDER — DIPHENHYDRAMINE HCL 50 MG
50 CAPSULE ORAL EVERY 6 HOURS PRN
Status: DISCONTINUED | OUTPATIENT
Start: 2022-12-31 | End: 2023-01-09 | Stop reason: HOSPADM

## 2022-12-31 RX ADMIN — HYDROXYZINE PAMOATE 25 MG: 25 CAPSULE ORAL at 11:12

## 2022-12-31 RX ADMIN — HALOPERIDOL 5 MG: 5 TABLET ORAL at 11:12

## 2022-12-31 NOTE — NURSING
Patient arrived to unit with ED staff and security. PEC and OPC in hand. Patient is calm. Paranoid, not wanting to sign all admission paperwork at this time. Reports + visual hallucinations. Denies suicidal ideation. Denies homicidal ideation. Dressed in blue paper scrubs. No contraband found. Belongings taken for inventory. Patient is illogical, poverty of content, paranoid and bizarre behavior. Patient reports he just wants to go to sleep. VSS. Dr Hardwick aware of patient admission to floor. Assisted patient to room 737.
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
## 2022-12-31 NOTE — Clinical Note
Diagnosis: Psychosis [580134]   Admitting Provider:: SAURAV PALACIO [[id number]]   Future Attending Provider: SAURAV PALACIO [[id number]]   Reason for IP Medical Treatment  (Clinical interventions that can only be accomplished in the IP setting? ) :: psychosis   Estimated Length of Stay:: 5+ midnights   I certify that Inpatient services for greater than or equal to 2 midnights are medically necessary:: Yes   Plans for Post-Acute care--if anticipated (pick the single best option):: A. No post acute care anticipated at this time   Special Needs:: No Special Needs [1]

## 2023-01-01 LAB
ESTIMATED AVG GLUCOSE: 94 MG/DL (ref 68–131)
HBA1C MFR BLD: 4.9 % (ref 4–5.6)

## 2023-01-01 PROCEDURE — 99223 1ST HOSP IP/OBS HIGH 75: CPT | Mod: AF,HB,, | Performed by: PSYCHIATRY & NEUROLOGY

## 2023-01-01 PROCEDURE — 11400000 HC PSYCH PRIVATE ROOM

## 2023-01-01 PROCEDURE — 25000003 PHARM REV CODE 250: Performed by: PSYCHIATRY & NEUROLOGY

## 2023-01-01 PROCEDURE — 36415 COLL VENOUS BLD VENIPUNCTURE: CPT | Performed by: PSYCHIATRY & NEUROLOGY

## 2023-01-01 PROCEDURE — 99223 PR INITIAL HOSPITAL CARE,LEVL III: ICD-10-PCS | Mod: AF,HB,, | Performed by: PSYCHIATRY & NEUROLOGY

## 2023-01-01 RX ORDER — ARIPIPRAZOLE 5 MG/1
5 TABLET ORAL DAILY
Status: DISCONTINUED | OUTPATIENT
Start: 2023-01-01 | End: 2023-01-04

## 2023-01-01 RX ADMIN — HYDROXYZINE PAMOATE 25 MG: 25 CAPSULE ORAL at 08:01

## 2023-01-01 RX ADMIN — FOLIC ACID 1 MG: 1 TABLET ORAL at 08:01

## 2023-01-01 RX ADMIN — ARIPIPRAZOLE 5 MG: 5 TABLET ORAL at 10:01

## 2023-01-01 RX ADMIN — THERA TABS 1 TABLET: TAB at 08:01

## 2023-01-01 RX ADMIN — HALOPERIDOL 5 MG: 5 TABLET ORAL at 08:01

## 2023-01-01 NOTE — NURSING
Pt. A/A.  No C/O pain.  Admits he has visual hallucinations.  Does have anxiety and depression.  Pt. Walks at a increased pace.  Speech pressured.  Flat affect.  Somewhat paranoid.  Can be redirected.  Will continue to observe.

## 2023-01-01 NOTE — H&P
"[facility]                                                                       Initial Psychiatric Evaluation      1/1/2023 9:18 AM   Bishop Soria Initial Psychiatric Evaluation      1/1/2023 9:18 AM   Bishop Soria   2000   [id number]         Date of Admission: 12/31/2022  1:59 PM    Current Legal Status: [facility]    Chief Complaint: "I have been trying to find myself"     SUBJECTIVE:     Per ER Note:          Chief Complaint   Patient presents with    Psychiatric Evaluation       Pt sent with MCPD with OPC written by mother.  It states he is schizophrenic and hearing voices.  He has been talking to himself and hit mother.  He left Thursday to [place] and didn't know where he was.  Pt has been here yesterday and 12/29 also and discharged.  Pt denies having SI and HI; reports was gifted weed and smoked some today.  Also reports took is schizophrenia shot 3-4 days ago.         1400   Bishop Soria is a 22 y.o. male with PMHX of schizophrenia, bipolar who presents to the emergency department C/O psychiatric disease.     Patient arrives under OPC filed by his mother for bizarre behavior.  OPC states that the patient has not been himself and that he wants to hurt his mother.        Patient has been emergency department for the past 2 days.  In ED today patient is acting bizarrely.  Reports he used shrooms.  Yesterday patient reported he smoked marijuana. Denies SI.  States he is here today because he was just trying to express myself.  He states that him and his mom have been fussin on a spiritual level      Per Chart Review:  Pt with multiple recent IP hospitalizations. Most recently was admitted to Dr. Grijalva team at [facility] from 12/17/22 - 12/23/22 for psychosis. He was diagnosed with Bipolar disorder, type I, MRE manic, severe with psychotic features. He was discharged on Aristada 1064mg IM, next dose due February 20, 2023.    Per Initial Interview:  Bishop Soria is a 22 y.o. male with " "past psychiatric history of Bipolar 1 d/o, psychosis.    Pt reports he has been trying to find himself, what he wants to be in life, and what he wants to do. Reports he has been travelling, exploring his options trying to discover new things. Reports he drove to [place] the other day to get a wig (pt is wearing a wig). "I just came in for the next three days so I can get my life together." Pt reports he does not feel that he needs additional medication. Later states he "could try a mood stabilizer but I don't really feel any effects when I take it." Reports he has been on depakote before.     Although pt is tangential, animated, and reportedly "entertaining himself" on the unit, speech is not pressured. He is talking to himself, though he denies AVH.     Pt reports he has been sleeping "pretty good." "I woke up frantic looking for stuff." Claims he is getting 8 hours per night of sleep presently. States "I just feel uncomfortable right now. I just want to go to my Auntie house." Reports this is where he plans to go upon discharge.     Pt reports he hears spirits and sees apparitions but denies AVH. States he tells us this every time he comes here "but I just get put on the shot again." "You can telepathically talk to people now on the spirit line."          Psych ROS:   Symptoms of Depression: diminished mood - No, loss of interest/anhedonia - No;  recurrent - No, >14 days - No, diminished energy - No, change in sleep - No, change in appetite - No, diminished concentration or cognition or indecisiveness - No, PMA/R -  No, excessive guilt or hopelessness or worthlessness - No, suicidal ideations - No     Changes in Sleep: trouble with initiation- No, maintenance, - No early morning awakening with inability to return to sleep - No, hypersomnolence - No     Suicidal- active/passive ideations - No, organized plans, future intentions - No     Homicidal ideations: active/passive ideations - No, organized plans, future " "intentions - No     Symptoms of psychosis: hallucinations - Yes, delusions - Yes, disorganized speech - No, disorganized behavior or abnormal motor behavior - No, or negative symptoms (diminshed emotional expression, avolition, anhedonia, alogia, asociality) - No, active phase symptoms >1 month - No, continuous signs of illness > 6 months - No, since onset of illness decreased level of functioning present - No     Symptoms of carley or hypomania: elevated, expansive, or irritable mood with increased energy or activity - Yes; > 4 days - Yes,  >7 days - Yes; with inflated self-esteem or grandiosity - Yes, decreased need for sleep - No, increased rate of speech - No, FOI or racing thoughts - No, distractibility - Yes, increased goal directed activity or PMA - Yes, risky/disinhibited behavior - Yes     Symptoms of KANU: excessive anxiety/worry/fear, more days than not, about numerous issues - No, ongoing for >6 months - No, difficult to control - No, with restlessness - No, fatigue - No, poor concentration - No, irritability - No, muscle tension - No, sleep disturbance - No; causes functionally impairing distress - No     Symptoms of Panic Disorder: recurrent panic attacks (palpitations/heart racing, sweating, shakiness, dyspnea, choking, chest pain/discomfort, Gi symptoms, dizzy/lightheadedness, hot/col flashes, paresthesias, derealization, fear of losing control or fear of dying or fear of "going crazy") - No, precipitated - No, un-precipitated - No, source of worry and/or behavioral changes secondary for 1 month or longer- No, agoraphobia - No     Symptoms of PTSD: h/o trauma exposure - No; re-experiencing/intrusive symptoms - No, avoidant behavior - No, 2 or more negative alterations in cognition or mood - No, 2 or more hyperarousal symptoms - No; with dissociative symptoms - No, ongoing for 1 or more  months - No     Symptoms of OCD: obsessions (recurrent thoughts/urges/images; intrusive and/or unwanted; uses other " thoughts/actions to suppress) - No; compulsions (repetitive behaviors used to lower distress/anxiety/obsessions) - No, time-consuming (over 1 hour per day) or cause significant distress/impairment - - No     Symptoms of Anorexia: restriction of caloric intake leading to significantly low body weight - No, intense fear of gaining weight or persistent behavior that interferes with weight gain even thought at a significantly low weight - No, disturbance in the way in which one's body weight or shape is experienced, undue influence of body weight or shape on self evaluation, or persistent lack of recognition of the seriousness of the current low body weight - No     Symptoms of Bulimia: recurrent episodes of binge eating (definitely larger amount  than what others would eat and lack of a sense of control over eating during episode) - No, recurrent inappropriate compensatory behaviors in order to prevent weight gain (fasting, medications, exercise, vomiting) - No, binges and compensatory behaviors both occur on average at least once a week for 3 months - No, self evaluations is unduly influenced by body shape/weight- - No     Symptoms of Binge eating: recurrent episodes of binge eating (definitely larger amount than what others would eat and lack of a sense of control over eating during episode) - No, 3 or more of following (eating much more rapidly, eating until uncomfortably full, large amounts when not hungry, eating alone because of embarrassed by how much,  feeling disgusted with oneself, depressed or very guilty afterward) - No, distress regarding binges - No, binges occur on average at least once a week for 3 months - No        Collateral: Pending    Review of Systems   Constitutional:  Negative for chills and fever.   HENT:  Negative for congestion, hearing loss, sore throat and tinnitus.    Eyes:  Negative for blurred vision, double vision, photophobia and pain.   Respiratory:  Negative for cough, hemoptysis,  "sputum production, shortness of breath and wheezing.    Cardiovascular:  Negative for chest pain, palpitations and leg swelling.   Gastrointestinal:  Negative for abdominal pain, blood in stool, constipation, diarrhea, nausea and vomiting.   Genitourinary:  Negative for dysuria, frequency, hematuria and urgency.   Musculoskeletal:  Negative for back pain, joint pain and myalgias.   Skin:  Negative for rash.   Neurological:  Negative for dizziness, tremors, seizures, weakness and headaches.     Past Psychiatric History:   Previous Psychiatric Hospitalizations: Yes  Previous SI/HI: Yes,  Previous Suicide Attempts: Yes,   Previous Medication Trials: Yes,  Psychiatric Care (current & past): Yes,  History of Psychotherapy: Yes,  History of Violence: Yes,  History of sexual/physical abuse: No,    Substance Abuse History:   Recreational Drugs: marijuana   Use of Alcohol: occasional, social use  Rehab History:no   Tobacco Use:no    Neurological History:   Seizures: No  Head trauma: No    Family Psychiatric History: Depression - "dad's side"    Social History:  Developmental/Childhood:Achieved all developmental milestones timely  Education: some college  Employment Status/Finances:Employed   Relationship Status/Sexual Orientation: single  Children: 0  Housing Status: Home    history:  NO   Access to Firearms: NO ;  Locked up? n/a  Episcopal:Actively participates in organized Restorationism  Recreational activities:Exercise    Legal History:   Past charges/incarcerations: NO  Pending charges:NO      Past Medical/Surgical History:   Past Medical History:   Diagnosis Date    History of psychiatric hospitalization     Hx of psychiatric care     Psychiatric problem     Schizophrenia     Suicide attempt      No past surgical history on file.      Current Medications:   MEDICATIONS: See list below      Allergies:   Review of patient's allergies indicates:  No Known Allergies      OBJECTIVE:       Vitals   Vitals:    01/01/23 0804 "   BP: 123/72   Pulse: 85   Resp: 20   Temp: 98 °F (36.7 °C)        Labs/Imaging/Studies:   Recent Results (from the past 48 hour(s))   Urinalysis, Reflex to Urine Culture Urine, Clean Catch    Collection Time: 12/31/22  2:15 PM    Specimen: Urine   Result Value Ref Range    Specimen UA Urine, Unspecified     Color, UA Yellow Yellow, Straw, Ingrid    Appearance, UA Clear Clear    pH, UA 7.0 5.0 - 8.0    Specific Gravity, UA 1.015 1.005 - 1.030    Protein, UA Negative Negative    Glucose, UA Negative Negative    Ketones, UA Trace (A) Negative    Bilirubin (UA) Negative Negative    Occult Blood UA Negative Negative    Nitrite, UA Negative Negative    Urobilinogen, UA 2.0-3.0 (A) <2.0 EU/dL    Leukocytes, UA Negative Negative   Drug screen panel, emergency    Collection Time: 12/31/22  2:15 PM   Result Value Ref Range    Benzodiazepines Negative Negative    Methadone metabolites Negative Negative    Cocaine (Metab.) Negative Negative    Opiate Scrn, Ur Negative Negative    Barbiturate Screen, Ur Negative Negative    Amphetamine Screen, Ur Negative Negative    THC Presumptive Positive (A) Negative    Phencyclidine Negative Negative    Creatinine, Urine 231.0 23.0 - 375.0 mg/dL    Toxicology Information SEE COMMENT    CBC auto differential    Collection Time: 12/31/22  2:18 PM   Result Value Ref Range    WBC 7.99 3.90 - 12.70 K/uL    RBC 4.38 (L) 4.60 - 6.20 M/uL    Hemoglobin 13.8 (L) 14.0 - 18.0 g/dL    Hematocrit 40.4 40.0 - 54.0 %    MCV 92 82 - 98 fL    MCH 31.5 (H) 27.0 - 31.0 pg    MCHC 34.2 32.0 - 36.0 g/dL    RDW 12.4 11.5 - 14.5 %    Platelets 378 150 - 450 K/uL    MPV 8.8 (L) 9.2 - 12.9 fL    Immature Granulocytes 0.3 0.0 - 0.5 %    Gran # (ANC) 4.3 1.8 - 7.7 K/uL    Immature Grans (Abs) 0.02 0.00 - 0.04 K/uL    Lymph # 2.4 1.0 - 4.8 K/uL    Mono # 1.1 (H) 0.3 - 1.0 K/uL    Eos # 0.1 0.0 - 0.5 K/uL    Baso # 0.02 0.00 - 0.20 K/uL    nRBC 0 0 /100 WBC    Gran % 53.5 38.0 - 73.0 %    Lymph % 30.5 18.0 - 48.0 %     Mono % 13.9 4.0 - 15.0 %    Eosinophil % 1.5 0.0 - 8.0 %    Basophil % 0.3 0.0 - 1.9 %    Differential Method Automated    Comprehensive metabolic panel    Collection Time: 12/31/22  2:18 PM   Result Value Ref Range    Sodium 138 136 - 145 mmol/L    Potassium 3.8 3.5 - 5.1 mmol/L    Chloride 106 95 - 110 mmol/L    CO2 26 23 - 29 mmol/L    Glucose 105 70 - 110 mg/dL    BUN 17 6 - 20 mg/dL    Creatinine 1.4 0.5 - 1.4 mg/dL    Calcium 8.5 (L) 8.7 - 10.5 mg/dL    Total Protein 8.2 6.0 - 8.4 g/dL    Albumin 4.3 3.5 - 5.2 g/dL    Total Bilirubin 0.6 0.1 - 1.0 mg/dL    Alkaline Phosphatase 65 55 - 135 U/L    AST 17 10 - 40 U/L    ALT 25 10 - 44 U/L    Anion Gap 6 (L) 8 - 16 mmol/L    eGFR >60.0 >60 mL/min/1.73 m^2   TSH    Collection Time: 12/31/22  2:18 PM   Result Value Ref Range    TSH 1.250 0.400 - 4.000 uIU/mL   Ethanol    Collection Time: 12/31/22  2:18 PM   Result Value Ref Range    Alcohol, Serum <3 <10 mg/dL   Acetaminophen level    Collection Time: 12/31/22  2:18 PM   Result Value Ref Range    Acetaminophen (Tylenol), Serum <2.0 (L) 10.0 - 20.0 ug/mL   Hemoglobin A1c    Collection Time: 12/31/22  2:30 PM   Result Value Ref Range    Hemoglobin A1C 4.9 4.0 - 5.6 %    Estimated Avg Glucose 94 68 - 131 mg/dL      Lab Results   Component Value Date    VALPROATE <3 (L) 12/17/2022         Musculoskeletal Exam:  Abnormal Involuntary Movements: NO  Gait: normal  Strength: Greater than antigravity (3+/5) in all extremities   Muscle tone: No impairment   Station: Grossly normal       General/Constitutional Exam:  Appearance: overdressed, elaborate    Strengths AND Liabilities  Strength: Patient accepts guidance/feedback, Strength: Patient has positive support network., Liability: Patient is defensive.    Psychiatric Mental Status Exam:  Arousal: alert  Sensorium/Orientation: oriented to grossly intact, person, place  Behavior/Cooperation: normal, cooperative   Speech: normal tone, normal rate, normal pitch, normal  "volume  Language: grossly intact  Mood: " im great "   Affect: expansive  Thought Process: tangential  Thought Content:   Auditory hallucinations: YES: RIS throughout interview     Visual hallucinations: NO  Paranoia: NO     Delusions:  NO  Suicidal ideation: NO  Homicidal ideation: NO  Attention/Concentration:  unable to spell "WORLD" backwards  Memory:    Recent: [facility] Recent Memory: WNL , 2 out of 3 in 3 minutes  Remote: [facility] Remote Memory: WNL , past events, as relates history  Fund of Knowledge: Intact   Abstract reasoning: proverbs were abstract, similarities were abstract  Intelligence: [facility] Intelligence: Average, based on history, based on vocabulary, syntax, grammar and content  Insight: {[facility] insight: Poor, understanding severity of illness/history of present illness  Judgment: [facility] Judgement: Fair, per patient's behavior/history of present illness         ASSESSMENT/PLAN:   Diagnosis:  Bipolar 1 D/o MRE Manic with psychotic features   R/o SAD    THC abuse      Patient Active Problem List    Diagnosis Date Noted    Schizophrenia 02/07/2021    Marijuana abuse 12/04/2020          ASSESSMENT AND TREATMENT PLAN:    Medical decision making/Formulation:  Bipolar disorder, type I, MRE manic, severe with psychotic features  - Aristada 1064mg due Feb 20, 2023  - Start Abilify 5mg PO daily   - pt counseled  - follow up with outpatient mental health providers after discharge for medication management and psychotherapy        Cannabis abuse  - SW consulted for assistance with additional resources      Unspecified anxiety disorder  - start hydroxyzine 50 mg PO q 6 hours PRN        Psychosocial stressors  - pt counseled  - SW consulted for assistance with additional resources     Pt was informed of all the side effects, alternatives, risks and benefits of taking this medicine.  Pt made an informed decision to take these medications.  Pt was able to weigh the risks and benefits and stated that the benefits out weigh the " risks at this time.     - Will have pt sign EVANGELISTA to obtain outside medical records, if possible    - Social work will obtain collateral and work towards discharge plan including follow up care.    LAB ORDERS  A1c  Lipid     ENCOURAGE CURRAN MILIEU    CONTINUE INPATIENT HOSPITALIZATION FOR STABILIZATION ON MEDICATION     PROGNOSIS: GUARDED    ESTIMATED LENGTH OF STAY: 4-7 DAYS        TOTAL TIME SPENT: 70 minutes     More than 50% of that time spent on chart review, formulation of healthcare plan, examination and discussion with patient and healthcare team.     Blas Hardwick MD

## 2023-01-01 NOTE — HPI
22-year-old male history of bipolar presents for psychiatric evaluation.  Patient said that he smoked pot prior coming in and feels like he needs blood.  Patient denies any suicidal ideation, homicidal ideation, auditory hallucination.  Patient was evaluated earlier today but was ultimately discharged due to no concerns for needs admission for psychiatric evaluation

## 2023-01-01 NOTE — H&P
[facility] ([facility]) Hospital Medicine  History & Physical    Patient Name: Bishop Soria  MRN: [id number]  Patient Class: IP- Psych  Admission Date: 12/31/2022  Attending Physician: Vinh Hardwick MD   Primary Care Provider: Jose Martin Selby MD         Patient information was obtained from ER records.     Subjective:     Principal Problem:Schizophrenia    Chief Complaint:   Chief Complaint   Patient presents with    Psychiatric Evaluation     Pt sent with MCPD with OPC written by mother.  It states he is schizophrenic and hearing voices.  He has been talking to himself and hit mother.  He left Thursday to [place] and didn't know where he was.  Pt has been here yesterday and 12/29 also and discharged.  Pt denies having SI and HI; reports was gifted weed and smoked some today.  Also reports took is schizophrenia shot 3-4 days ago.        HPI: 22-year-old male history of bipolar presents for psychiatric evaluation.  Patient said that he smoked pot prior coming in and feels like he needs blood.  Patient denies any suicidal ideation, homicidal ideation, auditory hallucination.  Patient was evaluated earlier today but was ultimately discharged due to no concerns for needs admission for psychiatric evaluation         Past Medical History:   Diagnosis Date    History of psychiatric hospitalization     Hx of psychiatric care     Psychiatric problem     Schizophrenia     Suicide attempt        No past surgical history on file.    Review of patient's allergies indicates:  No Known Allergies    No current facility-administered medications on file prior to encounter.     Current Outpatient Medications on File Prior to Encounter   Medication Sig    aripiprazole (ABILIFY ORAL) Take by mouth.    INVEGA SUSTENNA 234 mg/1.5 mL Syrg injection Inject into the muscle.    nicotine (NICODERM CQ) 14 mg/24 hr Place 1 patch onto the skin daily as needed.    [DISCONTINUED] EScitalopram oxalate (LEXAPRO)  10 MG tablet TAKE 1 TABLET BY MOUTH EVERY DAY    [DISCONTINUED] lithium (LITHOTAB) 300 mg tablet Take 300 mg by mouth 2 (two) times daily.     Family History       Family history is unknown by patient.          Tobacco Use    Smoking status: Some Days     Types: Vaping with nicotine     Start date: 12/20/2021     Passive exposure: Past    Smokeless tobacco: Never   Substance and Sexual Activity    Alcohol use: Yes     Comment: pt reports drinking wine and tequila, two-four times out of the month    Drug use: Yes     Types: Marijuana     Comment: Pt denies smoking anything synthetic    Sexual activity: Not Currently     Partners: Male     Review of Systems   Unable to perform ROS: Mental status change   Objective:     Vital Signs (Most Recent):  Temp: 98 °F (36.7 °C) (01/01/23 0804)  Pulse: 85 (01/01/23 0804)  Resp: 20 (01/01/23 0804)  BP: 123/72 (01/01/23 0804)  SpO2: 99 % (01/01/23 0804) Vital Signs (24h Range):  Temp:  [97 °F (36.1 °C)-98 °F (36.7 °C)] 98 °F (36.7 °C)  Pulse:  [] 85  Resp:  [18-20] 20  SpO2:  [97 %-99 %] 99 %  BP: (123-129)/(72-79) 123/72     Weight: 74.1 kg (163 lb 6.4 oz)  Body mass index is 20.42 kg/m².    Physical Exam  Constitutional:       Appearance: Normal appearance.   HENT:      Head: Normocephalic and atraumatic.      Nose: Nose normal.   Eyes:      Extraocular Movements: Extraocular movements intact.      Pupils: Pupils are equal, round, and reactive to light.   Cardiovascular:      Rate and Rhythm: Normal rate and regular rhythm.      Pulses: Normal pulses.      Heart sounds: Normal heart sounds.   Pulmonary:      Effort: Pulmonary effort is normal.      Breath sounds: Normal breath sounds.   Abdominal:      General: Abdomen is flat.      Palpations: Abdomen is soft.   Musculoskeletal:         General: Normal range of motion.      Cervical back: Normal range of motion.   Skin:     General: Skin is warm and dry.   Neurological:      General: No focal deficit present.       Mental Status: He is alert and oriented to person, place, and time.   Psychiatric:         Attention and Perception: He is inattentive.         Mood and Affect: Mood is anxious.         Thought Content: Thought content is delusional.         CRANIAL NERVES     CN III, IV, VI   Pupils are equal, round, and reactive to light.     Significant Labs: All pertinent labs within the past 24 hours have been reviewed.    Significant Imaging: I have reviewed all pertinent imaging results/findings within the past 24 hours.    Assessment/Plan:     * Schizophrenia  Will be admitted for inpatient mental health care.         VTE Risk Mitigation (From admission, onward)    None             En Avery DO  Department of Hospital Medicine   [facility] ([facility])

## 2023-01-01 NOTE — NURSING
"Patient out in dining room to eat supper. Patient reported that he was having a hard time expressing what he needs. He then stated "Can I have blood to drink?" He then proceeded to start sucking on his arm and talking to self in dining room area. Redirected patient and he remained calm and cooperative at this time. Continuing to monitor.   "

## 2023-01-01 NOTE — PLAN OF CARE
Problem: Violence Risk or Actual  Goal: Anger and Impulse Control  Outcome: Ongoing, Progressing     Problem: Adult Inpatient Plan of Care  Goal: Plan of Care Review  Outcome: Ongoing, Progressing  Goal: Patient-Specific Goal (Individualized)  Outcome: Ongoing, Progressing  Goal: Absence of Hospital-Acquired Illness or Injury  Outcome: Ongoing, Progressing  Goal: Optimal Comfort and Wellbeing  Outcome: Ongoing, Progressing  Goal: Readiness for Transition of Care  Outcome: Ongoing, Progressing     Problem: Adult Behavioral Health Plan of Care  Goal: Plan of Care Review  Outcome: Ongoing, Progressing  Goal: Patient-Specific Goal (Individualization)  Outcome: Ongoing, Progressing  Goal: Adheres to Safety Considerations for Self and Others  Outcome: Ongoing, Progressing  Goal: Absence of New-Onset Illness or Injury  Outcome: Ongoing, Progressing  Goal: Optimized Coping Skills in Response to Life Stressors  Outcome: Ongoing, Progressing  Goal: Develops/Participates in Therapeutic [place] to Support Successful Transition  Outcome: Ongoing, Progressing  Goal: Rounds/Family Conference  Outcome: Ongoing, Progressing

## 2023-01-01 NOTE — SUBJECTIVE & OBJECTIVE
Past Medical History:   Diagnosis Date    History of psychiatric hospitalization     Hx of psychiatric care     Psychiatric problem     Schizophrenia     Suicide attempt        No past surgical history on file.    Review of patient's allergies indicates:  No Known Allergies    No current facility-administered medications on file prior to encounter.     Current Outpatient Medications on File Prior to Encounter   Medication Sig    aripiprazole (ABILIFY ORAL) Take by mouth.    INVEGA SUSTENNA 234 mg/1.5 mL Syrg injection Inject into the muscle.    nicotine (NICODERM CQ) 14 mg/24 hr Place 1 patch onto the skin daily as needed.    [DISCONTINUED] EScitalopram oxalate (LEXAPRO) 10 MG tablet TAKE 1 TABLET BY MOUTH EVERY DAY    [DISCONTINUED] lithium (LITHOTAB) 300 mg tablet Take 300 mg by mouth 2 (two) times daily.     Family History       Family history is unknown by patient.          Tobacco Use    Smoking status: Some Days     Types: Vaping with nicotine     Start date: 12/20/2021     Passive exposure: Past    Smokeless tobacco: Never   Substance and Sexual Activity    Alcohol use: Yes     Comment: pt reports drinking wine and tequila, two-four times out of the month    Drug use: Yes     Types: Marijuana     Comment: Pt denies smoking anything synthetic    Sexual activity: Not Currently     Partners: Male     Review of Systems   Unable to perform ROS: Mental status change   Objective:     Vital Signs (Most Recent):  Temp: 98 °F (36.7 °C) (01/01/23 0804)  Pulse: 85 (01/01/23 0804)  Resp: 20 (01/01/23 0804)  BP: 123/72 (01/01/23 0804)  SpO2: 99 % (01/01/23 0804) Vital Signs (24h Range):  Temp:  [97 °F (36.1 °C)-98 °F (36.7 °C)] 98 °F (36.7 °C)  Pulse:  [] 85  Resp:  [18-20] 20  SpO2:  [97 %-99 %] 99 %  BP: (123-129)/(72-79) 123/72     Weight: 74.1 kg (163 lb 6.4 oz)  Body mass index is 20.42 kg/m².    Physical Exam  Constitutional:       Appearance: Normal appearance.   HENT:      Head: Normocephalic and atraumatic.       Nose: Nose normal.   Eyes:      Extraocular Movements: Extraocular movements intact.      Pupils: Pupils are equal, round, and reactive to light.   Cardiovascular:      Rate and Rhythm: Normal rate and regular rhythm.      Pulses: Normal pulses.      Heart sounds: Normal heart sounds.   Pulmonary:      Effort: Pulmonary effort is normal.      Breath sounds: Normal breath sounds.   Abdominal:      General: Abdomen is flat.      Palpations: Abdomen is soft.   Musculoskeletal:         General: Normal range of motion.      Cervical back: Normal range of motion.   Skin:     General: Skin is warm and dry.   Neurological:      General: No focal deficit present.      Mental Status: He is alert and oriented to person, place, and time.   Psychiatric:         Attention and Perception: He is inattentive.         Mood and Affect: Mood is anxious.         Thought Content: Thought content is delusional.         CRANIAL NERVES     CN III, IV, VI   Pupils are equal, round, and reactive to light.     Significant Labs: All pertinent labs within the past 24 hours have been reviewed.    Significant Imaging: I have reviewed all pertinent imaging results/findings within the past 24 hours.

## 2023-01-02 PROCEDURE — 90833 PSYTX W PT W E/M 30 MIN: CPT | Mod: AF,HB,, | Performed by: PSYCHIATRY & NEUROLOGY

## 2023-01-02 PROCEDURE — 11400000 HC PSYCH PRIVATE ROOM

## 2023-01-02 PROCEDURE — 99233 SBSQ HOSP IP/OBS HIGH 50: CPT | Mod: AF,HB,, | Performed by: PSYCHIATRY & NEUROLOGY

## 2023-01-02 PROCEDURE — 99233 PR SUBSEQUENT HOSPITAL CARE,LEVL III: ICD-10-PCS | Mod: AF,HB,, | Performed by: PSYCHIATRY & NEUROLOGY

## 2023-01-02 PROCEDURE — 90833 PR PSYCHOTHERAPY W/PATIENT W/E&M, 30 MIN (ADD ON): ICD-10-PCS | Mod: AF,HB,, | Performed by: PSYCHIATRY & NEUROLOGY

## 2023-01-02 PROCEDURE — 25000003 PHARM REV CODE 250: Performed by: PSYCHIATRY & NEUROLOGY

## 2023-01-02 RX ADMIN — ARIPIPRAZOLE 5 MG: 5 TABLET ORAL at 08:01

## 2023-01-02 RX ADMIN — FOLIC ACID 1 MG: 1 TABLET ORAL at 08:01

## 2023-01-02 RX ADMIN — HYDROXYZINE PAMOATE 25 MG: 25 CAPSULE ORAL at 08:01

## 2023-01-02 RX ADMIN — THERA TABS 1 TABLET: TAB at 08:01

## 2023-01-02 NOTE — PLAN OF CARE
"POC reviewed this shift and is on going. Patient is withdrawn, depressed, calm, cooperative, quiet, and sleeping. Denies Suicidal Ideation, Homicidal Ideation, Auditory Hallucinations, Visual Hallucinations, Tactile Hallucinations, Gustatory Hallucinations, and Delusions. Patient isolates in his room at times. Patient out for meals, snacks, and making phone calls. Patient identifies being in the hospital as his "safe place". Patient not acting bizarre. Pt continues to be medication compliant and staff will continue to monitor pt closely while on the unit.    "

## 2023-01-02 NOTE — PLAN OF CARE
Problem: Violence Risk or Actual  Goal: Anger and Impulse Control  Outcome: Ongoing, Progressing     Problem: Adult Inpatient Plan of Care  Goal: Plan of Care Review  Outcome: Ongoing, Progressing  Goal: Patient-Specific Goal (Individualized)  Outcome: Ongoing, Progressing  Goal: Absence of Hospital-Acquired Illness or Injury  Outcome: Ongoing, Progressing  Goal: Optimal Comfort and Wellbeing  Outcome: Ongoing, Progressing  Goal: Readiness for Transition of Care  Outcome: Ongoing, Progressing     Problem: Adult Behavioral Health Plan of Care  Goal: Plan of Care Review  Outcome: Ongoing, Progressing  Goal: Patient-Specific Goal (Individualization)  Outcome: Ongoing, Progressing  Goal: Adheres to Safety Considerations for Self and Others  Outcome: Ongoing, Progressing  Goal: Absence of New-Onset Illness or Injury  Outcome: Ongoing, Progressing  Goal: Optimized Coping Skills in Response to Life Stressors  Outcome: Ongoing, Progressing  Goal: Develops/Participates in Therapeutic [place] to Support Successful Transition  Outcome: Ongoing, Progressing  Goal: Rounds/Family Conference  Outcome: Ongoing, Progressing

## 2023-01-02 NOTE — PROGRESS NOTES
"PSYCHIATRY DAILY INPATIENT PROGRESS NOTE  SUBSEQUENT HOSPITAL VISIT    ENCOUNTER DATE: 1/2/2023  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 The chart was reviewed.     Reviewed notes from Rns, MD, and LPN and labs from the last 24 hours.    The patient's case was discussed with the treatment team/care providers today including Rns    Staff reports no behavioral or management issues.     The patient has been compliant with treatment.      Subjective 01/02/2023       Today the patient reports that he is here to "be in my safe space.. I need some time before I go into the world with my new personality or persona."   -the patient was derailed in thoughts; psychosis persist and remains severely impairing and gravely disabling.   -the patient was focused on going home  -the patient identifies as having no specific gender then later stated that he wants to identity as a feminine male      The patient denies any side effects to medications.          Psychiatric ROS (observed, reported, or endorsed/denied):  Depressed mood - No  Interest/pleasure/anhedonia: No  Guilt/hopelessness/worthlessness - No  Changes in Sleep - No  Changes in Appetite - No  Changes in Concentration - No  Changes in Energy - No  PMA/R- No  Suicidal- active/passive ideations - No  Homicidal ideations: active/passive ideations - No    Hallucinations - decreasing slowly  Delusions - Continuing  Disorganized behavior - Continuing  Disorganized speech - Continuing  Negative symptoms - No    Elevated mood - No  Decreased need for sleep - No  Grandiosity - No  Racing thoughts - No  Impulsivity - No  Irritability- No  Increased energy - No  Distractibility - " No  Increase in goal-directed activity or PMA- No    Symptoms of KANU - No  Symptoms of Panic Disorder- No  Symptoms of PTSD - No        Overall progress: Patient is showing no improvement on the Unit to date        Psychotherapy:  Target symptoms: mood disorder, psychosis  Why chosen therapy is appropriate versus another modality: relevant to diagnosis, patient responds to this modality, evidence based practice  Outcome monitoring methods: self-report, observation  Therapeutic intervention type: insight oriented psychotherapy, behavior modifying psychotherapy, supportive psychotherapy, interactive psychotherapy  Topics discussed/themes: building skills sets for symptom management, symptom recognition  The patient's response to the intervention is accepting. The patient's progress toward treatment goals is limited, poor.   Duration of intervention: 16 minutes.        Medical ROS  General ROS: negative  Ophthalmic ROS: negative  ENT ROS: negative  Allergy and Immunology ROS: negative  Hematological and Lymphatic ROS: negative  Endocrine ROS: negative  Respiratory ROS: no cough, shortness of breath, or wheezing  Cardiovascular ROS: no chest pain or dyspnea on exertion  Gastrointestinal ROS: no abdominal pain, change in bowel habits, or black or bloody stools  Genito-Urinary ROS: no dysuria, trouble voiding, or hematuria  Musculoskeletal ROS: negative  Neurological ROS: no TIA or stroke symptoms  Dermatological ROS: negative        PAST MEDICAL HISTORY   Past Medical History:   Diagnosis Date    History of psychiatric hospitalization     Hx of psychiatric care     Psychiatric problem     Schizophrenia     Suicide attempt            PSYCHOTROPIC MEDICATIONS   Scheduled Meds:   ARIPiprazole  5 mg Oral Daily    folic acid  1 mg Oral Daily    multivitamin  1 tablet Oral Daily     Continuous Infusions:  PRN Meds:.haloperidoL **AND** diphenhydrAMINE **AND** LORazepam **AND** haloperidol lactate **AND** diphenhydrAMINE **AND**  lorazepam, hydrOXYzine pamoate, ibuprofen, nicotine, ondansetron, promethazine        EXAMINATION    VITALS   Vitals:    12/31/22 1912 01/01/23 0804 01/01/23 2005 01/02/23 0759   BP: 129/79 123/72 116/62 130/74   BP Location: Left arm Right arm  Left arm   Patient Position: Sitting Sitting  Sitting   Pulse: 110 85 70 80   Resp: 18 20 20 20   Temp: 97 °F (36.1 °C) 98 °F (36.7 °C) 98.6 °F (37 °C) 98.5 °F (36.9 °C)   TempSrc: Oral Oral  Oral   SpO2: 97% 99% 98% 100%   Weight:       Height:           Body mass index is 20.42 kg/m².        CONSTITUTIONAL  General Appearance: unremarkable, age appropriate, normal weight, well nourished; waering a wig    MUSCULOSKELETAL  Muscle Strength and Tone:no dyskinesia, no tremor, no tic  Abnormal Involuntary Movements: No  Gait and Station: non-ataxic    PSYCHIATRIC   Level of Consciousness: awake and alert   Orientation: person, place, time, and situation  Grooming: Casually dressed and Well groomed  Psychomotor Behavior: normal, cooperative, friendly and cooperative, eye contact normal  Speech: normal tone, normal pitch, normal volume, spontaneous, rapid  Language: grossly intact, able to name, able to repeat  Mood: anxious and neutral  Affect: Anxious and Consistent with mood  Thought Process: circumstantial and tangential  Associations: intact   Thought Content: +delusions, denies SI, and denies HI  Perceptions: denies AH and denies  VH  Memory: Able to recall past events, Remote intact, and Recent intact  Attention:Decreased  Fund of Knowledge: Aware of current events and Vocabulary appropriate   Estimate if Intelligence:  Average based on work/education history, vocabulary and mental status exam  Insight: intact, has awareness of illness- fair  Judgment: behavior is adequate to circumstances- fair        DIAGNOSTIC TESTING   Laboratory Results  No results found for this or any previous visit (from the past 24 hour(s)).          MEDICAL DECISION MAKING      ASSESSMENT:    Bipolar 1 D/o MRE Manic with psychotic features              R/o SAD  Unspecified Anxiety Disorder     Psychosocial stressors     THC abuse        PROBLEM LIST AND MANAGEMENT PLANS    Bipolar disorder, type I, MRE manic, severe with psychotic features  - Aristada 1064mg due Feb 20, 2023  - Started/continue Abilify 5mg PO daily   - pt counseled  - follow up with outpatient mental health providers after discharge for medication management and psychotherapy     Cannabis abuse  - SW consulted for assistance with additional resources      Unspecified anxiety disorder  - started/conitnue hydroxyzine 50 mg PO q 6 hours PRN        Psychosocial stressors  - pt counseled  - SW consulted for assistance with additional resources                Discussed diagnosis, risks and benefits of proposed treatment vs alternative treatments vs no treatment, potential side effects of these treatments and the inherent unpredictability of treatment. The patient expresses understanding of the above and displays the capacity to agree with this treatment given said understanding. Patient also agrees that, currently, the benefits outweigh the risks and would like to pursue/continue treatment at this time.    Any medications being used off-label were discussed with the patient inclusive of the evidence base for the use of the medications and consent was obtained for the off-label use of the medication.       DISCHARGE PLANNING  Expected Disposition Plan: Home or Self Care      NEED FOR CONTINUED HOSPITALIZATION  Psychiatric illness continues to pose a potential threat to life or bodily function, of self or others, thereby requiring the need for continued inpatient psychiatric hospitalization: Yes, due to: significant psychotic thought disorder and gravely disabled, as evidenced by:  Ongoing concerns with grave disability with patient unable to perform basic feeding, hygiene and dressing activities without significant constant support. and  Ongoing concerns with perceptual aberrancy and paranoid persecutory delusions leading to potential harm of self or others.    Protective inpatient pyschiatric hospitalization required while a safe disposition plan is enacted: Yes    Patient stabilized and ready for discharge from inpatient psychiatric unit: No        STAFF:   Frantz Cagle MD  Psychiatry

## 2023-01-02 NOTE — NURSING
Pt. A/A.  Sitting in dinning room talking to peer.  Denies SI/HI/AVH currently but has been seen by staff responding to internal stimuli.  Flat affect.  Does have depression and anxiety.  Talking on phone at time.  Compliant with medications and take redirection.  Seem paranoid at time.  Will continue to observe.

## 2023-01-02 NOTE — PLAN OF CARE
Patient delusions and paranoia improving compared to yesterday. Patient is calmer, less anxious, observed RIS (talking to self). Patient has been wearing his long hair wig today, styling in front of mirror and looking at self of his shadows while passing windows, requesting hair to be braided. Patient ambulating on halls tip toeing, and swaying hips at intervals with dancing movements of hands and arms in the air. Patient had several telephone conversations today and tolerating well. Patient has been cooperative. Dr. Raul Avery visited this am medical H&P completed. Dr. Hardwick visited this am with new orders noted. Close observations continued and safe environment maintained.

## 2023-01-03 PROCEDURE — 25000003 PHARM REV CODE 250: Performed by: PSYCHIATRY & NEUROLOGY

## 2023-01-03 PROCEDURE — 90833 PR PSYCHOTHERAPY W/PATIENT W/E&M, 30 MIN (ADD ON): ICD-10-PCS | Mod: SA,HB,, | Performed by: PSYCHIATRY & NEUROLOGY

## 2023-01-03 PROCEDURE — 90833 PSYTX W PT W E/M 30 MIN: CPT | Mod: SA,HB,, | Performed by: PSYCHIATRY & NEUROLOGY

## 2023-01-03 PROCEDURE — 99232 SBSQ HOSP IP/OBS MODERATE 35: CPT | Mod: SA,HB,, | Performed by: PSYCHIATRY & NEUROLOGY

## 2023-01-03 PROCEDURE — 11400000 HC PSYCH PRIVATE ROOM

## 2023-01-03 PROCEDURE — 99232 PR SUBSEQUENT HOSPITAL CARE,LEVL II: ICD-10-PCS | Mod: SA,HB,, | Performed by: PSYCHIATRY & NEUROLOGY

## 2023-01-03 RX ORDER — DIVALPROEX SODIUM 500 MG/1
1500 TABLET, FILM COATED, EXTENDED RELEASE ORAL NIGHTLY
Status: DISCONTINUED | OUTPATIENT
Start: 2023-01-03 | End: 2023-01-08

## 2023-01-03 RX ADMIN — FOLIC ACID 1 MG: 1 TABLET ORAL at 08:01

## 2023-01-03 RX ADMIN — ARIPIPRAZOLE 5 MG: 5 TABLET ORAL at 08:01

## 2023-01-03 RX ADMIN — HYDROXYZINE PAMOATE 25 MG: 25 CAPSULE ORAL at 08:01

## 2023-01-03 RX ADMIN — HYDROXYZINE PAMOATE 25 MG: 25 CAPSULE ORAL at 12:01

## 2023-01-03 RX ADMIN — DIVALPROEX SODIUM 1500 MG: 500 TABLET, EXTENDED RELEASE ORAL at 08:01

## 2023-01-03 RX ADMIN — THERA TABS 1 TABLET: TAB at 08:01
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
## 2023-01-03 NOTE — NURSING
Patient medicated with 25mg  po of vistaril for anxiety @1216 pm,@1600hrs awake with no further complaints.medicated effective.

## 2023-01-03 NOTE — PLAN OF CARE
POC reviewed this shift and is ongoing.  Pt cooperative with staff. Withdrawn into his room. Took shower and went to bed. Got up later to comb his hair.  Denies SI, A/V hallucination. Will continue to monitor for changes and safety.

## 2023-01-03 NOTE — PROGRESS NOTES
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 The chart was reviewed.     Reviewed notes from Rns, MD, and LPN and labs from the last 24 hours.    The patient's case was discussed with the treatment team/care providers today including Rns    Staff reports no behavioral or management issues.     The patient has been compliant with treatment.      Subjective 01/03/2023       Patient is calm, pleasant. Continues to exhibit tangentiality and some gradiosity. He is unable to give a clear account as to what led him to return to the hospital. ER note indicates that patient was placed under OPC, patient denies this-initially states he "brought himself here", but also states he was brought by his uncle, and later by his cousin. 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     Initially, patient denied any recent cannabis use. When informed that UDS was positive for THC, patient states "Oh yeah, I guess I did."    He is amenable to trial of ER depakote.       The patient denies any side effects to medications.          Psychiatric ROS (observed, reported, or " endorsed/denied):  Depressed mood - Fluctuating  Interest/pleasure/anhedonia: No  Guilt/hopelessness/worthlessness - No  Changes in Sleep - No  Changes in Appetite - No  Changes in Concentration - No  Changes in Energy - No  PMA/R- No  Suicidal- active/passive ideations - No  Homicidal ideations: active/passive ideations - No    Hallucinations - decreasing slowly  Delusions - Continuing  Disorganized behavior - Continuing  Disorganized speech - Continuing  Negative symptoms - No    Elevated mood - Fluctuating  Decreased need for sleep - No  Grandiosity - Yes  Racing thoughts - Yes  Impulsivity - No  Irritability- No  Increased energy - No  Distractibility - No  Increase in goal-directed activity or PMA- No    Symptoms of KANU - Fluctuating  Symptoms of Panic Disorder- No  Symptoms of PTSD - No        Overall progress: Patient is showing no improvement on the Unit to date        Psychotherapy:  Target symptoms: mood disorder, psychosis  Why chosen therapy is appropriate versus another modality: relevant to diagnosis, patient responds to this modality, evidence based practice  Outcome monitoring methods: self-report, observation  Therapeutic intervention type: insight oriented psychotherapy, behavior modifying psychotherapy, supportive psychotherapy, interactive psychotherapy  Topics discussed/themes: building skills sets for symptom management, symptom recognition  The patient's response to the intervention is accepting. The patient's progress toward treatment goals is limited.   Duration of intervention: 16 minutes.        Medical ROS  General ROS: negative  Ophthalmic ROS: negative  ENT ROS: negative  Allergy and Immunology ROS: negative  Hematological and Lymphatic ROS: negative  Endocrine ROS: negative  Respiratory ROS: no cough, shortness of breath, or wheezing  Cardiovascular ROS: no chest pain or dyspnea on exertion  Gastrointestinal ROS: no abdominal pain, change in bowel habits, or black or bloody  "stools  Genito-Urinary ROS: no dysuria, trouble voiding, or hematuria  Musculoskeletal ROS: negative  Neurological ROS: no TIA or stroke symptoms  Dermatological ROS: negative        PAST MEDICAL HISTORY   Past Medical History:   Diagnosis Date    History of psychiatric hospitalization     Hx of psychiatric care     Psychiatric problem     Schizophrenia     Suicide attempt            PSYCHOTROPIC MEDICATIONS   Scheduled Meds:   ARIPiprazole  5 mg Oral Daily    folic acid  1 mg Oral Daily    multivitamin  1 tablet Oral Daily     Continuous Infusions:  PRN Meds:.haloperidoL **AND** diphenhydrAMINE **AND** LORazepam **AND** haloperidol lactate **AND** diphenhydrAMINE **AND** lorazepam, hydrOXYzine pamoate, ibuprofen, nicotine, ondansetron, promethazine        EXAMINATION    VITALS   Vitals:    01/01/23 2005 01/02/23 0759 01/02/23 1919 01/03/23 0732   BP: 116/62 130/74 121/74 112/73   BP Location:  Left arm Left arm Left arm   Patient Position:  Sitting Sitting Sitting   Pulse: 70 80 81 73   Resp: 20 20 18 20   Temp: 98.6 °F (37 °C) 98.5 °F (36.9 °C) 98.5 °F (36.9 °C) 98.7 °F (37.1 °C)   TempSrc:  Oral Oral Oral   SpO2: 98% 100% 99% 97%   Weight:       Height:           Body mass index is 20.42 kg/m².        CONSTITUTIONAL  General Appearance: unremarkable, age appropriate, normal weight, well nourished; waering a wig    MUSCULOSKELETAL  Muscle Strength and Tone:no dyskinesia, no tremor, no tic  Abnormal Involuntary Movements: No  Gait and Station: non-ataxic    PSYCHIATRIC   Level of Consciousness: awake and alert   Orientation: person, place, time, and situation  Grooming: Casually dressed and Well groomed  Psychomotor Behavior: normal, cooperative, friendly and cooperative, eye contact normal  Speech: normal tone, normal pitch, normal volume, spontaneous, rapid  Language: grossly intact, able to name, able to repeat  Mood: "Fine"  Affect: Consistent with mood and Congruent with thought  Thought Process: " circumstantial and tangential  Associations: intact   Thought Content: +delusions, denies SI, and denies HI  Perceptions: denies AH and denies  VH  Memory: Able to recall past events, Remote intact, and Recent intact  Attention:Decreased  Fund of Knowledge: Aware of current events and Vocabulary appropriate   Estimate if Intelligence:  Average based on work/education history, vocabulary and mental status exam  Insight: intact, has awareness of illness- fair  Judgment: behavior is adequate to circumstances- fair        DIAGNOSTIC TESTING   Laboratory Results  No results found for this or any previous visit (from the past 24 hour(s)).        MEDICAL DECISION MAKING      ASSESSMENT:   Bipolar 1 D/o MRE Manic with psychotic features              R/o SAD  Unspecified Anxiety Disorder     Psychosocial stressors     THC abuse        PROBLEM LIST AND MANAGEMENT PLANS    Bipolar disorder, type I, MRE manic, severe with psychotic features  - Aristada 1064mg due Feb 20, 2023  - Continue Abilify 5mg PO daily   - pt counseled  - follow up with outpatient mental health providers after discharge for medication management and psychotherapy  -Initiate Depakote ER 1500 mg qhs  -Check level 1/7/23     Cannabis abuse  - SW consulted for assistance with additional resources      Unspecified anxiety disorder  - started/conitnue hydroxyzine 50 mg PO q 6 hours PRN        Psychosocial stressors  - pt counseled  - SW consulted for assistance with additional resources      Discussed diagnosis, risks and benefits of proposed treatment vs alternative treatments vs no treatment, potential side effects of these treatments and the inherent unpredictability of treatment. The patient expresses understanding of the above and displays the capacity to agree with this treatment given said understanding. Patient also agrees that, currently, the benefits outweigh the risks and would like to pursue/continue treatment at this time.    Any medications being  used off-label were discussed with the patient inclusive of the evidence base for the use of the medications and consent was obtained for the off-label use of the medication.       DISCHARGE PLANNING  Expected Disposition Plan: Home or Self Care      NEED FOR CONTINUED HOSPITALIZATION  Psychiatric illness continues to pose a potential threat to life or bodily function, of self or others, thereby requiring the need for continued inpatient psychiatric hospitalization: Yes, due to: significant psychotic thought disorder and gravely disabled, as evidenced by:  Ongoing concerns with grave disability with patient unable to perform basic feeding, hygiene and dressing activities without significant constant support. and Ongoing concerns with perceptual aberrancy and paranoid persecutory delusions leading to potential harm of self or others.    Protective inpatient pyschiatric hospitalization required while a safe disposition plan is enacted: Yes    Patient stabilized and ready for discharge from inpatient psychiatric unit: No        STAFF:   Jl Aguilar NP  Psychiatry
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
## 2023-01-03 NOTE — PLAN OF CARE
POC reviewed this shift and is on going. Patient is withdrawn, depressed, quiet, pacing, and showing pressured speech.does not  Endorse Suicidal Ideation, Homicidal Ideation, and Visual Hallucinations.however does respond to internal stimuli as evidence by lip and head movement, Pt continues to be medication compliant and staff will continue to monitor pt closely while on the unit. Care as per treatment plan.

## 2023-01-04 PROBLEM — F25.0 SCHIZOAFFECTIVE DISORDER, BIPOLAR TYPE: Status: ACTIVE | Noted: 2021-02-07

## 2023-01-04 PROCEDURE — 25000003 PHARM REV CODE 250: Performed by: PSYCHIATRY & NEUROLOGY

## 2023-01-04 PROCEDURE — 90833 PR PSYCHOTHERAPY W/PATIENT W/E&M, 30 MIN (ADD ON): ICD-10-PCS | Mod: SA,HB,, | Performed by: PSYCHIATRY & NEUROLOGY

## 2023-01-04 PROCEDURE — 90833 PSYTX W PT W E/M 30 MIN: CPT | Mod: SA,HB,, | Performed by: PSYCHIATRY & NEUROLOGY

## 2023-01-04 PROCEDURE — 11400000 HC PSYCH PRIVATE ROOM

## 2023-01-04 PROCEDURE — 99232 SBSQ HOSP IP/OBS MODERATE 35: CPT | Mod: SA,HB,, | Performed by: PSYCHIATRY & NEUROLOGY

## 2023-01-04 PROCEDURE — 99232 PR SUBSEQUENT HOSPITAL CARE,LEVL II: ICD-10-PCS | Mod: SA,HB,, | Performed by: PSYCHIATRY & NEUROLOGY

## 2023-01-04 RX ADMIN — HYDROXYZINE PAMOATE 25 MG: 25 CAPSULE ORAL at 08:01

## 2023-01-04 RX ADMIN — THERA TABS 1 TABLET: TAB at 08:01

## 2023-01-04 RX ADMIN — FOLIC ACID 1 MG: 1 TABLET ORAL at 08:01

## 2023-01-04 RX ADMIN — DIVALPROEX SODIUM 1500 MG: 500 TABLET, EXTENDED RELEASE ORAL at 08:01

## 2023-01-04 RX ADMIN — ARIPIPRAZOLE 5 MG: 5 TABLET ORAL at 08:01

## 2023-01-04 NOTE — PROGRESS NOTES
"PSYCHIATRY DAILY INPATIENT PROGRESS NOTE  SUBSEQUENT HOSPITAL VISIT    ENCOUNTER DATE: 1/4/2023  SITE: [facility] 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 The chart was reviewed.     Reviewed notes from Rns, MD, and LPN and labs from the last 24 hours.    The patient's case was discussed with the treatment team/care providers today including Rns    Staff reports no behavioral or management issues.     The patient has been compliant with treatment.      Subjective 01/04/2023       Patient remains calm, somewhat blunted affect today. Reports sleep was "good", denies any sedation thus far since initiating depakote.     Patient makes multiple bizarre statements during assessment. He reports that he is "a psychic" and that he "knows my ex wants to get back with me even though we've been broken up for 6 years, which is weighing on my mind. Because I have no use for him." Also states that reason for hospitalization is "I came here to find myself. I feel like I get peace and clarity when I'm here," but later accuses "evil forces" and "bad energy from people who don't want what I want" as being the reason.    Staff reports that patient has been observed in his room talking to self, apparently responding to internal stimuli.       The patient denies any side effects to medications.          Psychiatric ROS (observed, reported, or endorsed/denied):  Depressed mood - Fluctuating  Interest/pleasure/anhedonia: No  Guilt/hopelessness/worthlessness - No  Changes in Sleep - No  Changes in Appetite - No  Changes in Concentration - No  Changes in Energy - No  PMA/R- No  Suicidal- active/passive ideations - No  Homicidal ideations: active/passive ideations " - No    Hallucinations - decreasing slowly  Delusions - Continuing  Disorganized behavior - Continuing  Disorganized speech - Continuing  Negative symptoms - Yes    Elevated mood - Fluctuating  Decreased need for sleep - No  Grandiosity - Yes  Racing thoughts - Yes  Impulsivity - No  Irritability- No  Increased energy - No  Distractibility - No  Increase in goal-directed activity or PMA- No    Symptoms of KANU - Fluctuating  Symptoms of Panic Disorder- No  Symptoms of PTSD - No        Overall progress: Patient is showing no improvement on the Unit to date        Psychotherapy:  Target symptoms: mood disorder, psychosis  Why chosen therapy is appropriate versus another modality: relevant to diagnosis, patient responds to this modality, evidence based practice  Outcome monitoring methods: self-report, observation  Therapeutic intervention type: insight oriented psychotherapy, behavior modifying psychotherapy, supportive psychotherapy, interactive psychotherapy  Topics discussed/themes: building skills sets for symptom management, symptom recognition  The patient's response to the intervention is accepting. The patient's progress toward treatment goals is limited.   Duration of intervention: 16 minutes.        Medical ROS  General ROS: negative  Ophthalmic ROS: negative  ENT ROS: negative  Allergy and Immunology ROS: negative  Hematological and Lymphatic ROS: negative  Endocrine ROS: negative  Respiratory ROS: no cough, shortness of breath, or wheezing  Cardiovascular ROS: no chest pain or dyspnea on exertion  Gastrointestinal ROS: no abdominal pain, change in bowel habits, or black or bloody stools  Genito-Urinary ROS: no dysuria, trouble voiding, or hematuria  Musculoskeletal ROS: negative  Neurological ROS: no TIA or stroke symptoms  Dermatological ROS: negative        PAST MEDICAL HISTORY   Past Medical History:   Diagnosis Date    History of psychiatric hospitalization     Hx of psychiatric care     Psychiatric  "problem     Schizophrenia     Suicide attempt            PSYCHOTROPIC MEDICATIONS   Scheduled Meds:   ARIPiprazole  5 mg Oral Daily    divalproex  1,500 mg Oral QHS    folic acid  1 mg Oral Daily    multivitamin  1 tablet Oral Daily     Continuous Infusions:  PRN Meds:.haloperidoL **AND** diphenhydrAMINE **AND** LORazepam **AND** haloperidol lactate **AND** diphenhydrAMINE **AND** lorazepam, hydrOXYzine pamoate, ibuprofen, nicotine, ondansetron, promethazine        EXAMINATION    VITALS   Vitals:    01/02/23 1919 01/03/23 0732 01/03/23 1924 01/04/23 0755   BP: 121/74 112/73 111/67 123/64   BP Location: Left arm Left arm Left arm Right arm   Patient Position: Sitting Sitting Sitting Sitting   Pulse: 81 73 76 86   Resp: 18 20 20 18   Temp: 98.5 °F (36.9 °C) 98.7 °F (37.1 °C) 98.8 °F (37.1 °C) 98.4 °F (36.9 °C)   TempSrc: Oral Oral Oral Oral   SpO2: 99% 97% 98% 98%   Weight:       Height:           Body mass index is 20.42 kg/m².        CONSTITUTIONAL  General Appearance: unremarkable, age appropriate, normal weight, well nourished; waering a wig    MUSCULOSKELETAL  Muscle Strength and Tone:no dyskinesia, no tremor, no tic  Abnormal Involuntary Movements: No  Gait and Station: non-ataxic    PSYCHIATRIC   Level of Consciousness: awake and alert   Orientation: person, place, time, and situation  Grooming: Casually dressed and Well groomed  Psychomotor Behavior: normal, cooperative, friendly and cooperative, eye contact normal  Speech: slowed, soft, monotone  Language: grossly intact, able to name, able to repeat  Mood: "Fine"  Affect: Flat  Thought Process: circumstantial and tangential  Associations: intact   Thought Content: +delusions, denies SI, and denies HI  Perceptions: denies AH and denies  VH  Memory: Able to recall past events, Remote intact, and Recent intact  Attention:Decreased  Fund of Knowledge: Aware of current events and Vocabulary appropriate   Estimate if Intelligence:  Average based on work/education " history, vocabulary and mental status exam  Insight: intact, has awareness of illness- fair  Judgment: behavior is adequate to circumstances- fair        DIAGNOSTIC TESTING   Laboratory Results  No results found for this or any previous visit (from the past 24 hour(s)).        MEDICAL DECISION MAKING      ASSESSMENT:   Bipolar 1 D/o MRE Manic with psychotic features              R/o SAD  Unspecified Anxiety Disorder     Psychosocial stressors     THC abuse        PROBLEM LIST AND MANAGEMENT PLANS    Bipolar disorder, type I, MRE manic, severe with psychotic features  - Aristada 1064mg due Feb 20, 2023  - Continue Abilify 5mg PO daily   -Increase abilify to 7 mg PO daily tomorrow  - pt counseled  - follow up with outpatient mental health providers after discharge for medication management and psychotherapy  -Continue Depakote ER 1500 mg qhs  -Check level 1/7/23     Cannabis abuse  - SW consulted for assistance with additional resources      Unspecified anxiety disorder  - started/conitnue hydroxyzine 50 mg PO q 6 hours PRN        Psychosocial stressors  - pt counseled  - SW consulted for assistance with additional resources      Discussed diagnosis, risks and benefits of proposed treatment vs alternative treatments vs no treatment, potential side effects of these treatments and the inherent unpredictability of treatment. The patient expresses understanding of the above and displays the capacity to agree with this treatment given said understanding. Patient also agrees that, currently, the benefits outweigh the risks and would like to pursue/continue treatment at this time.    Any medications being used off-label were discussed with the patient inclusive of the evidence base for the use of the medications and consent was obtained for the off-label use of the medication.       DISCHARGE PLANNING  Expected Disposition Plan: Home or Self Care      NEED FOR CONTINUED HOSPITALIZATION  Psychiatric illness continues to pose  a potential threat to life or bodily function, of self or others, thereby requiring the need for continued inpatient psychiatric hospitalization: Yes, due to: significant psychotic thought disorder and gravely disabled, as evidenced by:  Ongoing concerns with grave disability with patient unable to perform basic feeding, hygiene and dressing activities without significant constant support. and Ongoing concerns with perceptual aberrancy and paranoid persecutory delusions leading to potential harm of self or others.    Protective inpatient pyschiatric hospitalization required while a safe disposition plan is enacted: Yes    Patient stabilized and ready for discharge from inpatient psychiatric unit: No        STAFF:   Jl Aguilar NP  Psychiatry

## 2023-01-04 NOTE — PLAN OF CARE
Patient depressed, anxious, still making delusional statements, observed RIS in his room. Patient requesting to make multiple phone calls during the day with flight of ideas and comments on his future plans. Patient is calm and cooperative. No negative behaviors. Appetite is improving, sleep improving, and is medication complaint. Patient did c/o anxiety and given PRN Vistaril 25 mg po as ordered and was noted effective. Jl Aguilar NP visited this am with new order to increase Aripiprazole 7 mg po daily. Close observations continued and safe environment maintained.

## 2023-01-04 NOTE — PLAN OF CARE
POC reviewed this shift and is ongoing.  Pt cooperative with staff and withdrawn to his room and comes out for snacks, vs, and meds. No negative behaviors this shift. Denies SI, A/V hallucinations.Will continue to monitor for changes and safety.

## 2023-01-05 PROCEDURE — 99232 SBSQ HOSP IP/OBS MODERATE 35: CPT | Mod: SA,HB,, | Performed by: PSYCHIATRY & NEUROLOGY

## 2023-01-05 PROCEDURE — 90833 PR PSYCHOTHERAPY W/PATIENT W/E&M, 30 MIN (ADD ON): ICD-10-PCS | Mod: SA,HB,, | Performed by: PSYCHIATRY & NEUROLOGY

## 2023-01-05 PROCEDURE — 25000003 PHARM REV CODE 250: Performed by: PSYCHIATRY & NEUROLOGY

## 2023-01-05 PROCEDURE — 11400000 HC PSYCH PRIVATE ROOM

## 2023-01-05 PROCEDURE — 90833 PSYTX W PT W E/M 30 MIN: CPT | Mod: SA,HB,, | Performed by: PSYCHIATRY & NEUROLOGY

## 2023-01-05 PROCEDURE — 99232 PR SUBSEQUENT HOSPITAL CARE,LEVL II: ICD-10-PCS | Mod: SA,HB,, | Performed by: PSYCHIATRY & NEUROLOGY

## 2023-01-05 RX ORDER — ARIPIPRAZOLE 10 MG/1
10 TABLET ORAL DAILY
Status: DISCONTINUED | OUTPATIENT
Start: 2023-01-06 | End: 2023-01-09 | Stop reason: HOSPADM

## 2023-01-05 RX ADMIN — FOLIC ACID 1 MG: 1 TABLET ORAL at 08:01

## 2023-01-05 RX ADMIN — DIVALPROEX SODIUM 1500 MG: 500 TABLET, EXTENDED RELEASE ORAL at 08:01

## 2023-01-05 RX ADMIN — THERA TABS 1 TABLET: TAB at 08:01

## 2023-01-05 RX ADMIN — ARIPIPRAZOLE 7 MG: 2 TABLET ORAL at 08:01

## 2023-01-05 RX ADMIN — HYDROXYZINE PAMOATE 25 MG: 25 CAPSULE ORAL at 08:01

## 2023-01-05 RX ADMIN — IBUPROFEN 400 MG: 400 TABLET, FILM COATED ORAL at 03:01

## 2023-01-05 NOTE — PLAN OF CARE
Problem: Adult Behavioral Health Plan of Care  Goal: Optimized Coping Skills in Response to Life Stressors  Intervention: Promote Effective Coping Strategies  Flowsheets (Taken 1/4/2023 1200)  Supportive Measures:   active listening utilized   self-reflection promoted   self-responsibility promoted   verbalization of feelings encouraged       Behavior: Pt was engaged and cooperative during group       Intervention: In this CBT-focused process group CSW facilitated a group discussion on the cycle of depression, anxiety, or suicidal ideations.  Each patient was asked to identify and discuss stressors, thoughts, feelings, physical symptoms, behavioral responses.            Response: pt identified stressors as being judged, worrying about how others think or say about me, being overwhelmed with thoughts. Pt identified he cries a lot more attempts to cope with cycles by deep breathing and walking away.           Plan: Continue to encourage pt to participate in process groups to verbalize feelings and develop healthy coping skills.
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
## 2023-01-05 NOTE — PLAN OF CARE
Goals ongoing.  Patient compliant with mediation.  Patient remains calm and no bizarre request from staff received.  Patient slept through the night.

## 2023-01-06 PROCEDURE — 25000003 PHARM REV CODE 250: Performed by: PSYCHIATRY & NEUROLOGY

## 2023-01-06 PROCEDURE — 90833 PR PSYCHOTHERAPY W/PATIENT W/E&M, 30 MIN (ADD ON): ICD-10-PCS | Mod: SA,HB,, | Performed by: PSYCHIATRY & NEUROLOGY

## 2023-01-06 PROCEDURE — 99232 SBSQ HOSP IP/OBS MODERATE 35: CPT | Mod: SA,HB,, | Performed by: PSYCHIATRY & NEUROLOGY

## 2023-01-06 PROCEDURE — 11400000 HC PSYCH PRIVATE ROOM

## 2023-01-06 PROCEDURE — 99232 PR SUBSEQUENT HOSPITAL CARE,LEVL II: ICD-10-PCS | Mod: SA,HB,, | Performed by: PSYCHIATRY & NEUROLOGY

## 2023-01-06 PROCEDURE — 90833 PSYTX W PT W E/M 30 MIN: CPT | Mod: SA,HB,, | Performed by: PSYCHIATRY & NEUROLOGY

## 2023-01-06 RX ADMIN — ARIPIPRAZOLE 10 MG: 10 TABLET ORAL at 08:01

## 2023-01-06 RX ADMIN — HYDROXYZINE PAMOATE 25 MG: 25 CAPSULE ORAL at 08:01

## 2023-01-06 RX ADMIN — DIVALPROEX SODIUM 1500 MG: 500 TABLET, EXTENDED RELEASE ORAL at 08:01

## 2023-01-06 RX ADMIN — IBUPROFEN 400 MG: 400 TABLET, FILM COATED ORAL at 12:01

## 2023-01-06 RX ADMIN — THERA TABS 1 TABLET: TAB at 08:01

## 2023-01-06 RX ADMIN — HYDROXYZINE PAMOATE 25 MG: 25 CAPSULE ORAL at 09:01

## 2023-01-06 RX ADMIN — FOLIC ACID 1 MG: 1 TABLET ORAL at 08:01

## 2023-01-06 NOTE — PLAN OF CARE
Pt. Is awake, alert and oriented x 4. Pt. Denied any c/o suicidal or homicidal ideations. Pt. Also denied any c/o auditory or visual hallucinations. Pt. Took medications without diff. Pt. C/o his stomach being upset when eating food. Soup was provided. Will cont. To monitor Pt.

## 2023-01-06 NOTE — PLAN OF CARE
POC reviewed this shift and is on going. Patient is calm, cooperative, quiet, and sleeping. Denies Suicidal Ideation, Homicidal Ideation, Auditory Hallucinations, Visual Hallucinations, Tactile Hallucinations, Gustatory Hallucinations, and Delusions. Patient had participated in the morning group that was conducted today on the unit. Patient was communicating with his peers and staff. Patient was in and out of his room all day. Pt continues to be medication compliant and staff will continue to monitor pt closely while on the unit.

## 2023-01-06 NOTE — PLAN OF CARE
Problem: Adult Behavioral Health Plan of Care  Goal: Optimized Coping Skills in Response to Life Stressors  Intervention: Promote Effective Coping Strategies  Flowsheets (Taken 1/5/2023 1230)  Supportive Measures:   active listening utilized   goal-setting facilitated   verbalization of feelings encouraged   self-reflection promoted       Behavior: pt was cooperative but withdrawn and appeared to be distracted during group     Intervention: In this CBT-focused group, CSW facilited group on goals.Patients were asked to discussed the importance of setting goals and each patient was asked to identify specific goals to aid in their future treatment      Response: pt identified foals as working on communication, working people skills, and be aware of his medications. Pt identified negative feedback as barrier to accomplishing goal.       Plan: Continue to encourage pt to participate in process groups to verbalize feelings and develop healthy coping skills.

## 2023-01-06 NOTE — PROGRESS NOTES
"PSYCHIATRY DAILY INPATIENT PROGRESS NOTE  SUBSEQUENT HOSPITAL VISIT    ENCOUNTER DATE: 1/6/2023  SITE: 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 The chart was reviewed.     Reviewed notes from Rns, MD, and LPN and labs from the last 24 hours.    The patient's case was discussed with the treatment team/care providers today including Rns    Staff reports no behavioral or management issues.     The patient has been compliant with treatment.      Subjective 01/06/2023       Patient calm and pleasant during assessment. He continues to deny auditory and visual hallucinations, however multiple staff members report patient has been observed talking to self/responding to internal stimuli as recently as yesterday. Continues to report good sleep.     Discharge plan remains unclear at this time. Will be checking depakote levels tomorrow and likely engaging in family session with patient's mother and  staff early next week.  Patient is amenable to this plan.     The patient denies any side effects to medications.          Psychiatric ROS (observed, reported, or endorsed/denied):  Depressed mood - Less  Interest/pleasure/anhedonia: No  Guilt/hopelessness/worthlessness - No  Changes in Sleep - No  Changes in Appetite - No  Changes in Concentration - No  Changes in Energy - No  PMA/R- No  Suicidal- active/passive ideations - No  Homicidal ideations: active/passive ideations - No    Hallucinations -Fluctuating- Staff reports pt recently observed responding to internal stimuli  Delusions - fluctuating  Disorganized behavior - fluctuating  Disorganized speech - less  Negative symptoms - Improving somewhat    Elevated mood - " Fluctuating  Decreased need for sleep - No  Grandiosity - Yes  Racing thoughts - Yes  Impulsivity - No  Irritability- No  Increased energy - No  Distractibility - No  Increase in goal-directed activity or PMA- No    Symptoms of KANU - Fluctuating  Symptoms of Panic Disorder- No  Symptoms of PTSD - No        Overall progress: Patient is showing mild improvement         Psychotherapy:  Target symptoms: mood disorder, psychosis  Why chosen therapy is appropriate versus another modality: relevant to diagnosis, patient responds to this modality, evidence based practice  Outcome monitoring methods: self-report, observation  Therapeutic intervention type: insight oriented psychotherapy, behavior modifying psychotherapy, supportive psychotherapy, interactive psychotherapy  Topics discussed/themes: building skills sets for symptom management, symptom recognition  The patient's response to the intervention is accepting. The patient's progress toward treatment goals is limited.   Duration of intervention: 16 minutes.        Medical ROS  General ROS: negative  Ophthalmic ROS: negative  ENT ROS: negative  Allergy and Immunology ROS: negative  Hematological and Lymphatic ROS: negative  Endocrine ROS: negative  Respiratory ROS: no cough, shortness of breath, or wheezing  Cardiovascular ROS: no chest pain or dyspnea on exertion  Gastrointestinal ROS: no abdominal pain, change in bowel habits, or black or bloody stools  Genito-Urinary ROS: no dysuria, trouble voiding, or hematuria  Musculoskeletal ROS: negative  Neurological ROS: no TIA or stroke symptoms  Dermatological ROS: negative        PAST MEDICAL HISTORY   Past Medical History:   Diagnosis Date    History of psychiatric hospitalization     Hx of psychiatric care     Psychiatric problem     Schizophrenia     Suicide attempt            PSYCHOTROPIC MEDICATIONS   Scheduled Meds:   ARIPiprazole  10 mg Oral Daily    divalproex  1,500 mg Oral QHS    folic acid  1 mg Oral Daily     "multivitamin  1 tablet Oral Daily     Continuous Infusions:  PRN Meds:.haloperidoL **AND** diphenhydrAMINE **AND** LORazepam **AND** haloperidol lactate **AND** diphenhydrAMINE **AND** lorazepam, hydrOXYzine pamoate, ibuprofen, nicotine, ondansetron, promethazine        EXAMINATION    VITALS   Vitals:    01/04/23 1944 01/05/23 0723 01/05/23 1942 01/06/23 0741   BP: 115/70 115/63 (!) 123/55 114/60   BP Location: Left arm Left arm Left arm Left arm   Patient Position: Sitting Sitting Sitting Sitting   Pulse: 89 87 108 95   Resp: 20 18 20 20   Temp: 98 °F (36.7 °C) 98.9 °F (37.2 °C) 98.8 °F (37.1 °C) 98.9 °F (37.2 °C)   TempSrc: Oral Oral Oral Oral   SpO2: 99% 98% 98% 99%   Weight:       Height:           Body mass index is 20.42 kg/m².        CONSTITUTIONAL  General Appearance: unremarkable, age appropriate, normal weight, well nourished; waering a wig    MUSCULOSKELETAL  Muscle Strength and Tone:no dyskinesia, no tremor, no tic  Abnormal Involuntary Movements: No  Gait and Station: non-ataxic    PSYCHIATRIC   Level of Consciousness: awake, alert , and oriented  Orientation: person, place, time, and situation  Grooming: Casually dressed and Well groomed  Psychomotor Behavior: normal, cooperative, friendly and cooperative, eye contact normal  Speech: normal tone, normal rate, normal pitch  Language: grossly intact, able to name, able to repeat  Mood: "Fine"  Affect: Mood-congruent, appropriate, Brighter today compared to previous assessments.   Thought Process: Linear, goal directed  Associations: intact   Thought Content: Denies SI and HI, no delusional statements made today  Perceptions: denies AH and denies  VH-Observed to be RIS asrecently as 1/5/22  Memory: Able to recall past events, Remote intact, and Recent intact  Attention:Decreased  Fund of Knowledge: Aware of current events and Vocabulary appropriate   Estimate if Intelligence:  Average based on work/education history, vocabulary and mental status " exam  Insight: intact, has awareness of illness- fair  Judgment: behavior is adequate to circumstances- fair        DIAGNOSTIC TESTING   Laboratory Results  No results found for this or any previous visit (from the past 24 hour(s)).        MEDICAL DECISION MAKING      ASSESSMENT:   Bipolar 1 D/o MRE Manic with psychotic features              R/o SAD  Unspecified Anxiety Disorder     Psychosocial stressors     THC abuse        PROBLEM LIST AND MANAGEMENT PLANS    Bipolar disorder, type I, MRE manic, severe with psychotic features  - Aristada 1064mg due Feb 20, 2023  - Continue Abilify 5mg PO daily   -Increase abilify to 7 mg PO daily tomorrow  -Continue abilify 7 mg/day, increase to 10 mg/day tomorrow  -Increase PO abilify to 10 mg/day  - pt counseled  - follow up with outpatient mental health providers after discharge for medication management and psychotherapy  -Continue Depakote ER 1500 mg qhs  -Check level 1/7/23     Cannabis abuse  - SW consulted for assistance with additional resources      Unspecified anxiety disorder  - started/conitnue hydroxyzine 50 mg PO q 6 hours PRN        Psychosocial stressors  - pt counseled  - SW consulted for assistance with additional resources      Discussed diagnosis, risks and benefits of proposed treatment vs alternative treatments vs no treatment, potential side effects of these treatments and the inherent unpredictability of treatment. The patient expresses understanding of the above and displays the capacity to agree with this treatment given said understanding. Patient also agrees that, currently, the benefits outweigh the risks and would like to pursue/continue treatment at this time.    Any medications being used off-label were discussed with the patient inclusive of the evidence base for the use of the medications and consent was obtained for the off-label use of the medication.       DISCHARGE PLANNING  Expected Disposition Plan: Home or Self Care      NEED FOR  CONTINUED HOSPITALIZATION  Psychiatric illness continues to pose a potential threat to life or bodily function, of self or others, thereby requiring the need for continued inpatient psychiatric hospitalization: Yes, due to: significant psychotic thought disorder and gravely disabled, as evidenced by:  Ongoing concerns with grave disability with patient unable to perform basic feeding, hygiene and dressing activities without significant constant support. and Ongoing concerns with perceptual aberrancy and paranoid persecutory delusions leading to potential harm of self or others.    Protective inpatient pyschiatric hospitalization required while a safe disposition plan is enacted: Yes    Patient stabilized and ready for discharge from inpatient psychiatric unit: No        STAFF:   Jl Aguilar NP  Psychiatry

## 2023-01-07 LAB — VALPROATE SERPL-MCNC: 107 UG/ML (ref 50–100)

## 2023-01-07 PROCEDURE — 80164 ASSAY DIPROPYLACETIC ACD TOT: CPT | Performed by: PSYCHIATRY & NEUROLOGY

## 2023-01-07 PROCEDURE — 36415 COLL VENOUS BLD VENIPUNCTURE: CPT | Performed by: PSYCHIATRY & NEUROLOGY

## 2023-01-07 PROCEDURE — 99232 PR SUBSEQUENT HOSPITAL CARE,LEVL II: ICD-10-PCS | Mod: AF,HB,, | Performed by: PSYCHIATRY & NEUROLOGY

## 2023-01-07 PROCEDURE — 25000003 PHARM REV CODE 250: Performed by: PSYCHIATRY & NEUROLOGY

## 2023-01-07 PROCEDURE — 11400000 HC PSYCH PRIVATE ROOM

## 2023-01-07 PROCEDURE — 99232 SBSQ HOSP IP/OBS MODERATE 35: CPT | Mod: AF,HB,, | Performed by: PSYCHIATRY & NEUROLOGY

## 2023-01-07 RX ADMIN — HYDROXYZINE PAMOATE 25 MG: 25 CAPSULE ORAL at 08:01

## 2023-01-07 RX ADMIN — DIVALPROEX SODIUM 1500 MG: 500 TABLET, EXTENDED RELEASE ORAL at 08:01

## 2023-01-07 RX ADMIN — THERA TABS 1 TABLET: TAB at 08:01

## 2023-01-07 RX ADMIN — ARIPIPRAZOLE 10 MG: 10 TABLET ORAL at 08:01

## 2023-01-07 RX ADMIN — FOLIC ACID 1 MG: 1 TABLET ORAL at 08:01

## 2023-01-07 NOTE — PLAN OF CARE
POC reviewed this shift and is on going. Patient is alert and orientated,.does not Endorse Suicidal Ideation and Homicidal Ideation. However have been observed responding to internal stimuli, Pt continues to be medication compliant and staff will continue to monitor pt closely while on the unit. Care as per treatment plan.

## 2023-01-07 NOTE — PROGRESS NOTES
"PSYCHIATRY DAILY INPATIENT PROGRESS NOTE  SUBSEQUENT HOSPITAL VISIT    ENCOUNTER DATE: 1/7/2023  SITE: 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 The chart was reviewed.     Reviewed notes from Rns and NP and labs from the last 24 hours.    The patient's case was discussed with the treatment team/care providers today including Rns    Staff reports no behavioral or management issues.     The patient has been compliant with treatment.      Subjective 01/07/2023    Per yesterday's reports: Patient calm and pleasant during assessment. He continues to deny auditory and visual hallucinations, however multiple staff members report patient has been observed talking to self/responding to internal stimuli as recently as yesterday. Continues to report good sleep.   Discharge plan remains unclear at this time. Will be checking depakote levels tomorrow and likely engaging in family session with patient's mother and  staff early next week.  Patient is amenable to this plan.     Today, the patient reports that he is fine. He continues to deny symptoms, but staff reports continued observations of psychotic behavior,   -he continues to have an uncertain discharge disposition  -he is not at baseline and symptoms persists, remain impairing and continue to require inpatient stabilization.     The patient denies any side effects to medications.          Psychiatric ROS (observed, reported, or endorsed/denied):  Depressed mood - Less  Interest/pleasure/anhedonia: No  Guilt/hopelessness/worthlessness - No  Changes in Sleep - No  Changes in Appetite - No  Changes in Concentration - No  Changes in Energy - No  PMA/R- No  Suicidal- " active/passive ideations - No  Homicidal ideations: active/passive ideations - No    Hallucinations -Fluctuating- Staff reports pt recently observed responding to internal stimuli  Delusions - fluctuating  Disorganized behavior - fluctuating  Disorganized speech - less  Negative symptoms - Improving somewhat    Elevated mood - Fluctuating  Decreased need for sleep - No  Grandiosity - Yes  Racing thoughts - Yes  Impulsivity - No  Irritability- No  Increased energy - No  Distractibility - No  Increase in goal-directed activity or PMA- No    Symptoms of KANU - Fluctuating  Symptoms of Panic Disorder- No  Symptoms of PTSD - No        Overall progress: Patient is showing mild improvement         Psychotherapy:  Target symptoms: mood disorder, psychosis  Why chosen therapy is appropriate versus another modality: relevant to diagnosis, patient responds to this modality, evidence based practice  Outcome monitoring methods: self-report, observation  Therapeutic intervention type: insight oriented psychotherapy, behavior modifying psychotherapy, supportive psychotherapy, interactive psychotherapy  Topics discussed/themes: building skills sets for symptom management, symptom recognition  The patient's response to the intervention is accepting. The patient's progress toward treatment goals is limited.   Duration of intervention: 5 minutes.        Medical ROS  General ROS: negative  Ophthalmic ROS: negative  ENT ROS: negative  Allergy and Immunology ROS: negative  Hematological and Lymphatic ROS: negative  Endocrine ROS: negative  Respiratory ROS: no cough, shortness of breath, or wheezing  Cardiovascular ROS: no chest pain or dyspnea on exertion  Gastrointestinal ROS: no abdominal pain, change in bowel habits, or black or bloody stools  Genito-Urinary ROS: no dysuria, trouble voiding, or hematuria  Musculoskeletal ROS: negative  Neurological ROS: no TIA or stroke symptoms  Dermatological ROS: negative        PAST MEDICAL HISTORY  "  Past Medical History:   Diagnosis Date    History of psychiatric hospitalization     Hx of psychiatric care     Psychiatric problem     Schizophrenia     Suicide attempt            PSYCHOTROPIC MEDICATIONS   Scheduled Meds:   ARIPiprazole  10 mg Oral Daily    divalproex  1,500 mg Oral QHS    folic acid  1 mg Oral Daily    multivitamin  1 tablet Oral Daily     Continuous Infusions:  PRN Meds:.haloperidoL **AND** diphenhydrAMINE **AND** LORazepam **AND** haloperidol lactate **AND** diphenhydrAMINE **AND** lorazepam, hydrOXYzine pamoate, ibuprofen, nicotine, ondansetron, promethazine        EXAMINATION    VITALS   Vitals:    01/05/23 1942 01/06/23 0741 01/06/23 1921 01/07/23 0746   BP: (!) 123/55 114/60 115/62 117/60   BP Location: Left arm Left arm Left arm Left arm   Patient Position: Sitting Sitting Sitting Sitting   Pulse: 108 95 96 89   Resp: 20 20 18 20   Temp: 98.8 °F (37.1 °C) 98.9 °F (37.2 °C) 99.3 °F (37.4 °C) 98.4 °F (36.9 °C)   TempSrc: Oral Oral Oral Oral   SpO2: 98% 99% 97% 97%   Weight:       Height:           Body mass index is 20.42 kg/m².        CONSTITUTIONAL  General Appearance: unremarkable, age appropriate, normal weight, well nourished; waering a wig    MUSCULOSKELETAL  Muscle Strength and Tone:no dyskinesia, no tremor, no tic  Abnormal Involuntary Movements: No  Gait and Station: non-ataxic    PSYCHIATRIC   Level of Consciousness: awake, alert , and oriented  Orientation: person, place, time, and situation  Grooming: Casually dressed and Well groomed  Psychomotor Behavior: normal, cooperative, friendly and cooperative, eye contact normal  Speech: normal tone, normal rate, normal pitch  Language: grossly intact, able to name, able to repeat  Mood: "Fine"  Affect: Mood-congruent, appropriate, Brighter today compared to previous assessments.   Thought Process: Linear, goal directed  Associations: intact   Thought Content: Denies SI and HI, no delusional statements made today  Perceptions: denies " AH and denies  VH-Observed to be RIS asrecently as 1/5/22  Memory: Able to recall past events, Remote intact, and Recent intact  Attention:Decreased  Fund of Knowledge: Aware of current events and Vocabulary appropriate   Estimate if Intelligence:  Average based on work/education history, vocabulary and mental status exam  Insight: intact, has awareness of illness- fair  Judgment: behavior is adequate to circumstances- fair        DIAGNOSTIC TESTING   Laboratory Results  No results found for this or any previous visit (from the past 24 hour(s)).        MEDICAL DECISION MAKING      ASSESSMENT:   Bipolar 1 D/o MRE Manic with psychotic features              R/o SAD  Unspecified Anxiety Disorder     Psychosocial stressors     THC abuse        PROBLEM LIST AND MANAGEMENT PLANS    Bipolar disorder, type I, MRE manic, severe with psychotic features  - Aristada 1064mg due Feb 20, 2023  - Continue Abilify 5mg PO daily   -Increase abilify to 7 mg PO daily tomorrow  -Continue abilify 7 mg/day, increase to 10 mg/day-continue PO abilify to 10 mg/day  - pt counseled  - follow up with outpatient mental health providers after discharge for medication management and psychotherapy  -Continue Depakote ER 1500 mg qhs  -Check level 1/7/23     Cannabis abuse  - SW consulted for assistance with additional resources      Unspecified anxiety disorder  - started/conitnue hydroxyzine 50 mg PO q 6 hours PRN        Psychosocial stressors  - pt counseled  - SW consulted for assistance with additional resources      Discussed diagnosis, risks and benefits of proposed treatment vs alternative treatments vs no treatment, potential side effects of these treatments and the inherent unpredictability of treatment. The patient expresses understanding of the above and displays the capacity to agree with this treatment given said understanding. Patient also agrees that, currently, the benefits outweigh the risks and would like to pursue/continue treatment  at this time.    Any medications being used off-label were discussed with the patient inclusive of the evidence base for the use of the medications and consent was obtained for the off-label use of the medication.       DISCHARGE PLANNING  Expected Disposition Plan: Home or Self Care      NEED FOR CONTINUED HOSPITALIZATION  Psychiatric illness continues to pose a potential threat to life or bodily function, of self or others, thereby requiring the need for continued inpatient psychiatric hospitalization: Yes, due to: significant psychotic thought disorder and gravely disabled, as evidenced by:  Ongoing concerns with grave disability with patient unable to perform basic feeding, hygiene and dressing activities without significant constant support. and Ongoing concerns with perceptual aberrancy and paranoid persecutory delusions leading to potential harm of self or others.    Protective inpatient pyschiatric hospitalization required while a safe disposition plan is enacted: Yes    Patient stabilized and ready for discharge from inpatient psychiatric unit: No        STAFF:   Frantz Cagle MD  Psychiatry

## 2023-01-08 PROCEDURE — 25000003 PHARM REV CODE 250: Performed by: PSYCHIATRY & NEUROLOGY

## 2023-01-08 PROCEDURE — 11400000 HC PSYCH PRIVATE ROOM

## 2023-01-08 PROCEDURE — 99232 PR SUBSEQUENT HOSPITAL CARE,LEVL II: ICD-10-PCS | Mod: AF,HB,, | Performed by: PSYCHIATRY & NEUROLOGY

## 2023-01-08 PROCEDURE — 99232 SBSQ HOSP IP/OBS MODERATE 35: CPT | Mod: AF,HB,, | Performed by: PSYCHIATRY & NEUROLOGY

## 2023-01-08 RX ADMIN — DIVALPROEX SODIUM 1250 MG: 500 TABLET, EXTENDED RELEASE ORAL at 08:01

## 2023-01-08 RX ADMIN — FOLIC ACID 1 MG: 1 TABLET ORAL at 08:01

## 2023-01-08 RX ADMIN — ARIPIPRAZOLE 10 MG: 10 TABLET ORAL at 08:01

## 2023-01-08 RX ADMIN — HYDROXYZINE PAMOATE 25 MG: 25 CAPSULE ORAL at 08:01

## 2023-01-08 RX ADMIN — THERA TABS 1 TABLET: TAB at 08:01

## 2023-01-08 NOTE — PLAN OF CARE
POC reviewed this shift and is on going. Patient is withdrawn, depressed, quiet, and sleeping.does not  EndorseSuicidal Ideation, Homicidal Ideation, Auditory Hallucinations, and Visual Hallucinations. Patient Denies auditory hallucinations,have been observed responding to internal stimuli, Pt continues to be medication compliant and staff will continue to monitor pt closely while on the unit. Provide care as per treatment plan.

## 2023-01-08 NOTE — PLAN OF CARE
POC reviewed and ongoing. Pt states he is feeling better. Denies SI, HI, AVH this shift. Rested well with Vistaril 25 mg po prn. Will continue to monitor for safety and any changes.

## 2023-01-08 NOTE — PROGRESS NOTES
"PSYCHIATRY DAILY INPATIENT PROGRESS NOTE  SUBSEQUENT HOSPITAL VISIT    ENCOUNTER DATE: 1/8/2023  SITE: 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 The chart was reviewed.     Reviewed notes from Rns and LPN and labs from the last 24 hours.    The patient's case was discussed with the treatment team/care providers today including Rns    Staff reports no behavioral or management issues.     The patient has been compliant with treatment.      Subjective 01/08/2023    Today, the patient reports that he is fine. He continues to deny symptoms; staff reports less observed of psychotic behavior,   -he has a better discharge disposition  -he is approaching his baseline and symptoms are improving; symptoms are less impairing  -he will be stable for discharge home tomorrow and able to transition to outpatient care.     The patient denies any side effects to medications.          Psychiatric ROS (observed, reported, or endorsed/denied):  Depressed mood - improved  Interest/pleasure/anhedonia: No  Guilt/hopelessness/worthlessness - No  Changes in Sleep - No  Changes in Appetite - No  Changes in Concentration - No  Changes in Energy - No  PMA/R- No  Suicidal- active/passive ideations - No  Homicidal ideations: active/passive ideations - No    Hallucinations -improving  Delusions - improving steadily  Disorganized behavior - improving steadily  Disorganized speech - improving steadily  Negative symptoms - Improving    Elevated mood - improving  Decreased need for sleep - No  Grandiosity - improving  Racing thoughts - improving  Impulsivity - No  Irritability- No  Increased energy - No  Distractibility - No  Increase in goal-directed activity or " PMA- No    Symptoms of KANU - improving  Symptoms of Panic Disorder- No  Symptoms of PTSD - No        Overall progress: Patient is showing significant improvement        Psychotherapy:  Target symptoms: mood disorder, psychosis  Why chosen therapy is appropriate versus another modality: relevant to diagnosis, patient responds to this modality, evidence based practice  Outcome monitoring methods: self-report, observation  Therapeutic intervention type: insight oriented psychotherapy, behavior modifying psychotherapy, supportive psychotherapy, interactive psychotherapy  Topics discussed/themes: building skills sets for symptom management, symptom recognition  The patient's response to the intervention is accepting. The patient's progress toward treatment goals is limited.   Duration of intervention: 5 minutes.        Medical ROS  General ROS: negative  Ophthalmic ROS: negative  ENT ROS: negative  Allergy and Immunology ROS: negative  Hematological and Lymphatic ROS: negative  Endocrine ROS: negative  Respiratory ROS: no cough, shortness of breath, or wheezing  Cardiovascular ROS: no chest pain or dyspnea on exertion  Gastrointestinal ROS: no abdominal pain, change in bowel habits, or black or bloody stools  Genito-Urinary ROS: no dysuria, trouble voiding, or hematuria  Musculoskeletal ROS: negative  Neurological ROS: no TIA or stroke symptoms  Dermatological ROS: negative        PAST MEDICAL HISTORY   Past Medical History:   Diagnosis Date    History of psychiatric hospitalization     Hx of psychiatric care     Psychiatric problem     Schizophrenia     Suicide attempt            PSYCHOTROPIC MEDICATIONS   Scheduled Meds:   ARIPiprazole  10 mg Oral Daily    divalproex  1,500 mg Oral QHS    folic acid  1 mg Oral Daily    multivitamin  1 tablet Oral Daily     Continuous Infusions:  PRN Meds:.haloperidoL **AND** diphenhydrAMINE **AND** LORazepam **AND** haloperidol lactate **AND** diphenhydrAMINE **AND** lorazepam,  "hydrOXYzine pamoate, ibuprofen, nicotine, ondansetron, promethazine        EXAMINATION    VITALS   Vitals:    01/06/23 1921 01/07/23 0746 01/07/23 1909 01/08/23 0731   BP: 115/62 117/60 114/72 114/67   BP Location: Left arm Left arm Left arm Left arm   Patient Position: Sitting Sitting Sitting Sitting   Pulse: 96 89 77 89   Resp: 18 20 16 20   Temp: 99.3 °F (37.4 °C) 98.4 °F (36.9 °C) 97.3 °F (36.3 °C) 99.2 °F (37.3 °C)   TempSrc: Oral Oral Oral Oral   SpO2: 97% 97% 97% 98%   Weight:       Height:           Body mass index is 20.42 kg/m².        CONSTITUTIONAL  General Appearance: unremarkable, age appropriate, normal weight, well nourished; waering a wig    MUSCULOSKELETAL  Muscle Strength and Tone:no dyskinesia, no tremor, no tic  Abnormal Involuntary Movements: No  Gait and Station: non-ataxic    PSYCHIATRIC   Level of Consciousness: awake, alert , and oriented  Orientation: person, place, time, and situation  Grooming: Casually dressed and Well groomed  Psychomotor Behavior: normal, cooperative, friendly and cooperative, eye contact normal  Speech: normal tone, normal rate, normal pitch  Language: grossly intact, able to name, able to repeat  Mood: "Fine"  Affect: Mood-congruent, appropriate, Brighter today compared to previous assessments.   Thought Process: Linear, goal directed; improving  Associations: intact   Thought Content: Denies SI and HI, no delusional statements made today  Perceptions: denies AH and denies  VH; denied delusions  Memory: Able to recall past events, Remote intact, and Recent intact  Attention: improved  Fund of Knowledge: Aware of current events and Vocabulary appropriate   Estimate if Intelligence:  Average based on work/education history, vocabulary and mental status exam  Insight: intact, has awareness of illness- fair  Judgment: behavior is adequate to circumstances- fair        DIAGNOSTIC TESTING   Laboratory Results  Recent Results (from the past 24 hour(s))   Valproic Acid    " Collection Time: 01/07/23  9:26 AM   Result Value Ref Range    Valproic Acid Level 107 (H) 50 - 100 ug/mL           MEDICAL DECISION MAKING      ASSESSMENT:   Bipolar 1 D/o MRE Manic with psychotic features              R/o SAD  Unspecified Anxiety Disorder     Psychosocial stressors     THC abuse        PROBLEM LIST AND MANAGEMENT PLANS    Bipolar disorder, type I, MRE manic, severe with psychotic features  - Aristada 1064mg due Feb 20, 2023  - Continue Abilify 5mg PO daily   -Increase abilify to 7 mg PO daily tomorrow  -Continue abilify 7 mg/day, increase to 10 mg/day-continue PO abilify to 10 mg/day  - pt counseled  - follow up with outpatient mental health providers after discharge for medication management and psychotherapy  -Continue Depakote ER 1500 mg qhs (level was 107)- decrease to 1250 mg po q HS (check level in 1 week as an outpatient)     Cannabis abuse  - SW consulted for assistance with additional resources      Unspecified anxiety disorder  - started/conitnue hydroxyzine 50 mg PO q 6 hours PRN        Psychosocial stressors  - pt counseled  - SW consulted for assistance with additional resources      Discussed diagnosis, risks and benefits of proposed treatment vs alternative treatments vs no treatment, potential side effects of these treatments and the inherent unpredictability of treatment. The patient expresses understanding of the above and displays the capacity to agree with this treatment given said understanding. Patient also agrees that, currently, the benefits outweigh the risks and would like to pursue/continue treatment at this time.    Any medications being used off-label were discussed with the patient inclusive of the evidence base for the use of the medications and consent was obtained for the off-label use of the medication.       DISCHARGE PLANNING  Expected Disposition Plan: Home or Self Care- discharge home tomorrow      NEED FOR CONTINUED HOSPITALIZATION  Psychiatric illness  continues to pose a potential threat to life or bodily function, of self or others, thereby requiring the need for continued inpatient psychiatric hospitalization: Yes, due to: significant psychotic thought disorder and gravely disabled, as evidenced by:  Ongoing concerns with grave disability with patient unable to perform basic feeding, hygiene and dressing activities without significant constant support. and Ongoing concerns with perceptual aberrancy and paranoid persecutory delusions leading to potential harm of self or others.    Protective inpatient pyschiatric hospitalization required while a safe disposition plan is enacted: Yes    Patient stabilized and ready for discharge from inpatient psychiatric unit: No        STAFF:   Frantz Cagle MD  Psychiatry

## 2023-01-09 VITALS
DIASTOLIC BLOOD PRESSURE: 63 MMHG | TEMPERATURE: 98 F | HEIGHT: 75 IN | BODY MASS INDEX: 20.31 KG/M2 | WEIGHT: 163.38 LBS | OXYGEN SATURATION: 99 % | SYSTOLIC BLOOD PRESSURE: 117 MMHG | RESPIRATION RATE: 18 BRPM | HEART RATE: 78 BPM

## 2023-01-09 PROCEDURE — 25000003 PHARM REV CODE 250: Performed by: PSYCHIATRY & NEUROLOGY

## 2023-01-09 PROCEDURE — 99239 HOSP IP/OBS DSCHRG MGMT >30: CPT | Mod: AF,HB,, | Performed by: PSYCHIATRY & NEUROLOGY

## 2023-01-09 PROCEDURE — 90833 PSYTX W PT W E/M 30 MIN: CPT | Mod: AF,HB,, | Performed by: PSYCHIATRY & NEUROLOGY

## 2023-01-09 PROCEDURE — 90833 PR PSYCHOTHERAPY W/PATIENT W/E&M, 30 MIN (ADD ON): ICD-10-PCS | Mod: AF,HB,, | Performed by: PSYCHIATRY & NEUROLOGY

## 2023-01-09 PROCEDURE — 99239 PR HOSPITAL DISCHARGE DAY,>30 MIN: ICD-10-PCS | Mod: AF,HB,, | Performed by: PSYCHIATRY & NEUROLOGY

## 2023-01-09 RX ORDER — DIVALPROEX SODIUM 250 MG/1
1250 TABLET, FILM COATED, EXTENDED RELEASE ORAL NIGHTLY
Qty: 150 TABLET | Refills: 0 | Status: ON HOLD | OUTPATIENT
Start: 2023-01-09 | End: 2023-02-22 | Stop reason: SDUPTHER

## 2023-01-09 RX ORDER — HYDROXYZINE PAMOATE 25 MG/1
25 CAPSULE ORAL EVERY 6 HOURS PRN
Qty: 90 CAPSULE | Refills: 0 | Status: ON HOLD | OUTPATIENT
Start: 2023-01-09 | End: 2023-04-06 | Stop reason: HOSPADM

## 2023-01-09 RX ORDER — ARIPIPRAZOLE 10 MG/1
10 TABLET ORAL DAILY
Qty: 30 TABLET | Refills: 1 | Status: ON HOLD | OUTPATIENT
Start: 2023-01-10 | End: 2023-02-22 | Stop reason: HOSPADM

## 2023-01-09 RX ADMIN — FOLIC ACID 1 MG: 1 TABLET ORAL at 08:01

## 2023-01-09 RX ADMIN — ARIPIPRAZOLE 10 MG: 10 TABLET ORAL at 08:01

## 2023-01-09 RX ADMIN — THERA TABS 1 TABLET: TAB at 08:01

## 2023-01-09 NOTE — PLAN OF CARE
Patient alert and oriented, calm, cooperative and focused on discharge. Patient is out in common area smiling and engaging with peers. Appetite is good for meals and is medication compliant without side effects noted. Patient denies depression and anxiety, denies A/VH, denies S/HI. No delusions present. No negative behaviors noted. Dr. Cagle visited per telemed patient meets criteria for discharge with order to discharge home today. Patient will be discharging to his mother's house today and will be returning to work and moving to [facility]. Mother will provide transportation. Patient is packing personal belongings waiting for transportation at this time.  Transportation arrived. Patient given discharge paperwork and left unit ambulatory with personal belongings accompanied by staff member downstairs to meet mother. Left per car no distress noted.

## 2023-01-09 NOTE — PLAN OF CARE
Problem: Adult Behavioral Health Plan of Care  Goal: Optimized Coping Skills in Response to Life Stressors  Intervention: Promote Effective Coping Strategies  Flowsheets (Taken 1/6/2023 1200)  Supportive Measures:   active listening utilized   verbalization of feelings encouraged   self-reflection promoted   problem-solving facilitated       Behavior: pt was withdrawn and responding to internal stimuli during group.       Intervention: In this CBT-focused process group CSW facilitated a group discussion on self-sabotaging behaviors.  Each patient was asked to identify ways in which they may self-sabotage things in their life  and ways in which they can defeat these self-sabotaging behaviors.      Response: Pt identified self sabotage behaviors as being passive aggressive, choosing wrong friends, procrastinating, and being a people pleaser. Pt identified ways to defeat behaviors as be mediate more, be more self sufficient, and plan ahead.           Plan: Continue to encourage pt to participate in process groups to verbalize feelings and develop healthy coping skills.

## 2023-01-09 NOTE — PLAN OF CARE
01/09/23 1036   Step 1: Warning Signs   Warning Sign 1 not having a place to stay   Warning Sign 2 not having a family around me   Warning Sign 3 not taking my medication   Step 2: Internal coping strategies - Things I can do to take my mind off my problems without contacting another person:   Coping Strategy 1 listen to music   Coping Strategy 2 meditate   Coping Strategy 3 take a walk   Step 3: People and social settings that provide distraction:   1. Name Aydin Soria       Phone [phone]   2. Name Ingrid Hardwick       Phone [phone]   3. [place]   [place]. [place]    Step 4: People whom I can ask for help:   1. Person Gautam Morel       Phone [phone]   2. Person Coni Soria       Phone [phone]   3. Person Ashley Soria       Phone [phone]   Step 5: Professionals or agencies I can contact during a crisis:   1. Clinician Name [facility]   3. Suicide Prevention Lifeline: 4-184-575-TALK (5235) Suicide Prevention Lifeline 1-716.211.5461   4. Local Emergency Service       911   Step 6: Making the environment safe:   Safe environment 1 respect house rules   Safe environment 2 stay prayed up

## 2023-01-09 NOTE — PLAN OF CARE
POC reviewed and ongoing. Pt states he is feeling better, Denies SI, HI, AVH. States he is ready for discharge. Discussed plan after discharge, pt plans to find apartment close to where he works. Encouraged follow up care and medication compliance. Verbalized understanding. Staff did not observe patient RIS or bizarre behavior this shift. Vistaril 25 mg po prn for sleep @2025. Obtained mild relief. He does state that he is sleeping good, but wakes up in the middle of the night, he feels the Vistaril could be stronger. Encouraged pt to discuss with MD prior to discharge. Will continue to monitor for safety and any change.

## 2023-01-09 NOTE — DISCHARGE INSTRUCTIONS
Discharge instructions and AVS reviewed with patient. Patient verbalized understanding. Education provided and copy given with discharge paperwork.

## 2023-01-09 NOTE — PLAN OF CARE
Collateral: Completed    Safety Plan: Completed    Anticipated Discharge Date: 1/9/23    Anticipated Discharge Plan: [facility]

## 2023-01-09 NOTE — PROGRESS NOTES
Psychotherapy:  Target symptoms: mood disorder, psychosis  Why chosen therapy is appropriate versus another modality: relevant to diagnosis, patient responds to this modality, evidence based practice  Outcome monitoring methods: self-report, observation  Therapeutic intervention type: insight oriented psychotherapy, behavior modifying psychotherapy, supportive psychotherapy, interactive psychotherapy  Topics discussed/themes: building skills sets for symptom management, symptom recognition  -safety planning and wrap up session  The patient's response to the intervention is accepting. The patient's progress toward treatment goals is fair   Duration of intervention: 16 minutes.  Frantz Cagle MD  Psychiatry
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
## 2023-01-11 ENCOUNTER — PATIENT OUTREACH (OUTPATIENT)
Dept: ADMINISTRATIVE | Facility: CLINIC | Age: 23
End: 2023-01-11
Payer: MEDICAID

## 2023-01-11 DIAGNOSIS — F25.0 SCHIZOAFFECTIVE DISORDER, BIPOLAR TYPE: Primary | ICD-10-CM

## 2023-01-11 NOTE — PROGRESS NOTES
C3 nurse spoke with Bishop Soria for a TCC post hospital discharge follow up call. The patient does not have a scheduled HOSFU appointment with Jose Martin Selby MD within 5-7 days post hospital discharge date 1/9/23. C3 nurse was unable to schedule HOSFU appointment in [facility].    Message sent to PCP staff requesting they contact patient and schedule follow up appointment.

## 2023-01-23 ENCOUNTER — PATIENT OUTREACH (OUTPATIENT)
Dept: ADMINISTRATIVE | Facility: OTHER | Age: 23
End: 2023-01-23
Payer: MEDICAID

## 2023-02-06 ENCOUNTER — HOSPITAL ENCOUNTER (EMERGENCY)
Facility: HOSPITAL | Age: 23
Discharge: HOME | End: 2023-02-06
Attending: EMERGENCY MEDICINE
Payer: MEDICAID

## 2023-02-06 VITALS
BODY MASS INDEX: 20.21 KG/M2 | TEMPERATURE: 98 F | WEIGHT: 162.5 LBS | RESPIRATION RATE: 18 BRPM | HEART RATE: 86 BPM | DIASTOLIC BLOOD PRESSURE: 74 MMHG | HEIGHT: 75 IN | OXYGEN SATURATION: 98 % | SYSTOLIC BLOOD PRESSURE: 122 MMHG

## 2023-02-06 DIAGNOSIS — F25.0 SCHIZOAFFECTIVE DISORDER, BIPOLAR TYPE: Primary | ICD-10-CM

## 2023-02-06 DIAGNOSIS — Z91.148 NONCOMPLIANCE WITH MEDICATIONS: ICD-10-CM

## 2023-02-06 PROCEDURE — 25000003 PHARM REV CODE 250: Performed by: EMERGENCY MEDICINE

## 2023-02-06 PROCEDURE — 99283 EMERGENCY DEPT VISIT LOW MDM: CPT | Mod: 25

## 2023-02-06 RX ORDER — HYDROXYZINE PAMOATE 25 MG/1
25 CAPSULE ORAL
Status: COMPLETED | OUTPATIENT
Start: 2023-02-06 | End: 2023-02-06

## 2023-02-06 RX ORDER — ARIPIPRAZOLE 10 MG/1
10 TABLET ORAL
Status: COMPLETED | OUTPATIENT
Start: 2023-02-06 | End: 2023-02-06

## 2023-02-06 RX ORDER — DIVALPROEX SODIUM 250 MG/1
250 TABLET, DELAYED RELEASE ORAL
Status: COMPLETED | OUTPATIENT
Start: 2023-02-06 | End: 2023-02-06

## 2023-02-06 RX ADMIN — ARIPIPRAZOLE 10 MG: 10 TABLET ORAL at 06:02

## 2023-02-06 RX ADMIN — HYDROXYZINE PAMOATE 25 MG: 25 CAPSULE ORAL at 05:02

## 2023-02-06 RX ADMIN — DIVALPROEX SODIUM 250 MG: 250 TABLET, DELAYED RELEASE ORAL at 05:02

## 2023-02-06 NOTE — ED NOTES
NEUROLOGICAL:   Patient is awake , alert , and oriented x 4 . Pupils are PERRL. Gait is steady.   Moves all extremities without difficulty.   Patient reports no neuro complaints..  [facility] pt here for Psych eval, not taking his Schizophrenia meds, so family called EMS, pt following commands upon arrival and answering questions.    HEENT:   Head appears normocephalic  and symmetric .   Eyes appear WNL to both eyes. Patient reports no complaints  to both eyes .   Ears appear WNL. Patient reports no complaints  to both ears.   Nares appear patent . Patient reports no nose complaints .  Mouth appears moist, pink, and teeth intact. Patient reports no mouth complaints.   Throat appears pink and moist . Patient reports no throat complaints.    CARDIOVASCULAR:    On palpation no edema noted , noted to none.   Patient reports no CV complaints.  .   Patient vitals are WNL.    RESPIRATORY:   Airway Clear, Open, and Patent.  Respirations are even and unlabored.   Breath sounds clear  to all lung fields.   Patient reports no respiratory complaints.     GASTROINTESTINAL:   Abdomen is soft  and non-tender x 4 quadrants. Bowel sounds are normoactive to all quadrants .   Patient reports no GI complaints .     GENITOURINARY:   Patient reports no  complaints.     MUSCULOSKELETAL:   full range of motion to all extremities, no swelling noted , no tenderness noted, and no weakness noted.   Patient reports no musculoskeletal complaints     SKIN:   Skin appears warm , dry , good turgor, color normal for race, and intact. Patient reports no skin complaints.

## 2023-02-07 NOTE — ED PROVIDER NOTES
"Encounter Date: 2/6/2023       History     Chief Complaint   Patient presents with    Psychiatric Evaluation     Per EMS pt from home, who's family called 911 because he was "not acting right" and is not taking his Psych meds. Pt denies HI or SI     23 yo male with schizoaffective d/o here via EMS from home 2/2 "not acting right." Patient is without complaint. He is not taking his psych medications. No SI/HI/AVH. No specific threats or actions reported that are not "right." No fever/chills. No known sick contacts.     Review of patient's allergies indicates:  No Known Allergies  Past Medical History:   Diagnosis Date    History of psychiatric hospitalization     Hx of psychiatric care     Psychiatric problem     Schizophrenia     Suicide attempt      No past surgical history on file.  Family History   Family history unknown: Yes     Social History     Tobacco Use    Smoking status: Some Days     Types: Vaping with nicotine     Start date: 12/20/2021     Passive exposure: Past    Smokeless tobacco: Never   Substance Use Topics    Alcohol use: Yes     Comment: pt reports drinking wine and tequila, two-four times out of the month    Drug use: Yes     Types: Marijuana     Comment: Pt denies smoking anything synthetic     Review of Systems   Constitutional: Negative.    Respiratory: Negative.     Cardiovascular: Negative.    Gastrointestinal: Negative.    Psychiatric/Behavioral:  Negative for behavioral problems, hallucinations, self-injury and suicidal ideas.    All other systems reviewed and are negative.    Physical Exam     Initial Vitals [02/06/23 1725]   BP Pulse Resp Temp SpO2   122/74 86 18 97.6 °F (36.4 °C) 98 %      MAP       --         Physical Exam    Nursing note and vitals reviewed.  Constitutional: He appears well-developed and well-nourished. He is not diaphoretic. No distress.   HENT:   Head: Normocephalic and atraumatic.   Eyes: EOM are normal. Pupils are equal, round, and reactive to light.   Neck: " Neck supple.   Normal range of motion.  Cardiovascular:  Normal rate, regular rhythm and intact distal pulses.           Pulmonary/Chest: Breath sounds normal. No respiratory distress. He has no wheezes. He has no rales.   Abdominal: Abdomen is soft. Bowel sounds are normal. He exhibits no distension. There is no abdominal tenderness. There is no rebound.   Musculoskeletal:         General: No tenderness or edema. Normal range of motion.      Cervical back: Normal range of motion and neck supple.     Neurological: He is alert and oriented to person, place, and time.   Skin: Skin is warm and dry. Capillary refill takes less than 2 seconds.   Psychiatric: He has a normal mood and affect. Thought content normal.       ED Course   Procedures  Labs Reviewed - No data to display       Imaging Results    None          Medications   ARIPiprazole tablet 10 mg (10 mg Oral Given 2/6/23 1803)   divalproex EC tablet 250 mg (250 mg Oral Given 2/6/23 1742)   hydrOXYzine pamoate capsule 25 mg (25 mg Oral Given 2/6/23 1742)     Medical Decision Making:   ED Management:  No indication for PEC. Medications given in ED. Patient counseled on importance of taking medication as directed and following up with mental health.                         Clinical Impression:   Final diagnoses:  [F25.0] Schizoaffective disorder, bipolar type (Primary)  [Z91.14] Noncompliance with medications        ED Disposition Condition    Discharge Stable          ED Prescriptions    None       Follow-up Information       Follow up With Specialties Details Why Contact Info    Jose Martin Selby MD Family Medicine Schedule an appointment as soon as possible for a visit   [address] [place] [id number]  [phone]               Frantz Romero MD  02/07/23 0552

## 2023-02-08 ENCOUNTER — HOSPITAL ENCOUNTER (INPATIENT)
Facility: HOSPITAL | Age: 23
LOS: 14 days | Discharge: HOME | DRG: 885 | End: 2023-02-22
Attending: STUDENT IN AN ORGANIZED HEALTH CARE EDUCATION/TRAINING PROGRAM | Admitting: PSYCHIATRY & NEUROLOGY
Payer: MEDICAID

## 2023-02-08 DIAGNOSIS — F25.0 SCHIZOAFFECTIVE DISORDER, BIPOLAR TYPE: ICD-10-CM

## 2023-02-08 DIAGNOSIS — R46.2 BIZARRE BEHAVIOR: Primary | ICD-10-CM

## 2023-02-08 DIAGNOSIS — R45.851 SUICIDAL IDEATION: ICD-10-CM

## 2023-02-08 LAB
ALBUMIN SERPL BCP-MCNC: 3.9 G/DL (ref 3.5–5.2)
ALP SERPL-CCNC: 62 U/L (ref 55–135)
ALT SERPL W/O P-5'-P-CCNC: 31 U/L (ref 10–44)
AMPHET+METHAMPHET UR QL: NEGATIVE
ANION GAP SERPL CALC-SCNC: 4 MMOL/L (ref 8–16)
APAP SERPL-MCNC: <2 UG/ML (ref 10–20)
AST SERPL-CCNC: 27 U/L (ref 10–40)
BACTERIA #/AREA URNS HPF: NEGATIVE /HPF
BARBITURATES UR QL SCN>200 NG/ML: NEGATIVE
BASOPHILS # BLD AUTO: 0.01 K/UL (ref 0–0.2)
BASOPHILS NFR BLD: 0.2 % (ref 0–1.9)
BENZODIAZ UR QL SCN>200 NG/ML: NEGATIVE
BILIRUB SERPL-MCNC: 0.6 MG/DL (ref 0.1–1)
BILIRUB UR QL STRIP: NEGATIVE
BUN SERPL-MCNC: 17 MG/DL (ref 6–20)
BZE UR QL SCN: NEGATIVE
CALCIUM SERPL-MCNC: 9.3 MG/DL (ref 8.7–10.5)
CANNABINOIDS UR QL SCN: ABNORMAL
CHLORIDE SERPL-SCNC: 109 MMOL/L (ref 95–110)
CLARITY UR: CLEAR
CO2 SERPL-SCNC: 30 MMOL/L (ref 23–29)
COLOR UR: YELLOW
CREAT SERPL-MCNC: 1.5 MG/DL (ref 0.5–1.4)
CREAT UR-MCNC: 615 MG/DL (ref 23–375)
CTP QC/QA: YES
DIFFERENTIAL METHOD: ABNORMAL
EOSINOPHIL # BLD AUTO: 0.1 K/UL (ref 0–0.5)
EOSINOPHIL NFR BLD: 1.6 % (ref 0–8)
ERYTHROCYTE [DISTWIDTH] IN BLOOD BY AUTOMATED COUNT: 12.5 % (ref 11.5–14.5)
EST. GFR  (NO RACE VARIABLE): >60 ML/MIN/1.73 M^2
ETHANOL SERPL-MCNC: <3 MG/DL
GLUCOSE SERPL-MCNC: 68 MG/DL (ref 70–110)
GLUCOSE UR QL STRIP: NEGATIVE
HCT VFR BLD AUTO: 38.2 % (ref 40–54)
HGB BLD-MCNC: 12.8 G/DL (ref 14–18)
HGB UR QL STRIP: NEGATIVE
HYALINE CASTS #/AREA URNS LPF: 0 /LPF
IMM GRANULOCYTES # BLD AUTO: 0.01 K/UL (ref 0–0.04)
IMM GRANULOCYTES NFR BLD AUTO: 0.2 % (ref 0–0.5)
KETONES UR QL STRIP: ABNORMAL
LEUKOCYTE ESTERASE UR QL STRIP: NEGATIVE
LYMPHOCYTES # BLD AUTO: 1.6 K/UL (ref 1–4.8)
LYMPHOCYTES NFR BLD: 27 % (ref 18–48)
MCH RBC QN AUTO: 31.8 PG (ref 27–31)
MCHC RBC AUTO-ENTMCNC: 33.5 G/DL (ref 32–36)
MCV RBC AUTO: 95 FL (ref 82–98)
METHADONE UR QL SCN>300 NG/ML: NEGATIVE
MICROSCOPIC COMMENT: NORMAL
MONOCYTES # BLD AUTO: 0.7 K/UL (ref 0.3–1)
MONOCYTES NFR BLD: 11.4 % (ref 4–15)
NEUTROPHILS # BLD AUTO: 3.5 K/UL (ref 1.8–7.7)
NEUTROPHILS NFR BLD: 59.6 % (ref 38–73)
NITRITE UR QL STRIP: NEGATIVE
NRBC BLD-RTO: 0 /100 WBC
OPIATES UR QL SCN: NEGATIVE
PCP UR QL SCN>25 NG/ML: NEGATIVE
PH UR STRIP: 6 [PH] (ref 5–8)
PLATELET # BLD AUTO: 224 K/UL (ref 150–450)
PMV BLD AUTO: 9.4 FL (ref 9.2–12.9)
POTASSIUM SERPL-SCNC: 4.3 MMOL/L (ref 3.5–5.1)
PROT SERPL-MCNC: 7.6 G/DL (ref 6–8.4)
PROT UR QL STRIP: ABNORMAL
RBC # BLD AUTO: 4.02 M/UL (ref 4.6–6.2)
RBC #/AREA URNS HPF: 2 /HPF (ref 0–4)
SARS-COV-2 RDRP RESP QL NAA+PROBE: NEGATIVE
SODIUM SERPL-SCNC: 143 MMOL/L (ref 136–145)
SP GR UR STRIP: >=1.03 (ref 1–1.03)
SQUAMOUS #/AREA URNS HPF: 2 /HPF
TOXICOLOGY INFORMATION: ABNORMAL
URN SPEC COLLECT METH UR: ABNORMAL
UROBILINOGEN UR STRIP-ACNC: 1 EU/DL
WBC # BLD AUTO: 5.78 K/UL (ref 3.9–12.7)
WBC #/AREA URNS HPF: 3 /HPF (ref 0–5)

## 2023-02-08 PROCEDURE — 80143 DRUG ASSAY ACETAMINOPHEN: CPT | Performed by: STUDENT IN AN ORGANIZED HEALTH CARE EDUCATION/TRAINING PROGRAM

## 2023-02-08 PROCEDURE — 85025 COMPLETE CBC W/AUTO DIFF WBC: CPT | Performed by: STUDENT IN AN ORGANIZED HEALTH CARE EDUCATION/TRAINING PROGRAM

## 2023-02-08 PROCEDURE — 81000 URINALYSIS NONAUTO W/SCOPE: CPT | Mod: 59 | Performed by: STUDENT IN AN ORGANIZED HEALTH CARE EDUCATION/TRAINING PROGRAM

## 2023-02-08 PROCEDURE — 80053 COMPREHEN METABOLIC PANEL: CPT | Performed by: STUDENT IN AN ORGANIZED HEALTH CARE EDUCATION/TRAINING PROGRAM

## 2023-02-08 PROCEDURE — 36415 COLL VENOUS BLD VENIPUNCTURE: CPT | Performed by: STUDENT IN AN ORGANIZED HEALTH CARE EDUCATION/TRAINING PROGRAM

## 2023-02-08 PROCEDURE — 11400000 HC PSYCH PRIVATE ROOM

## 2023-02-08 PROCEDURE — 99285 EMERGENCY DEPT VISIT HI MDM: CPT | Mod: 25

## 2023-02-08 PROCEDURE — 63600175 PHARM REV CODE 636 W HCPCS: Performed by: STUDENT IN AN ORGANIZED HEALTH CARE EDUCATION/TRAINING PROGRAM

## 2023-02-08 PROCEDURE — 82077 ASSAY SPEC XCP UR&BREATH IA: CPT | Performed by: STUDENT IN AN ORGANIZED HEALTH CARE EDUCATION/TRAINING PROGRAM

## 2023-02-08 PROCEDURE — 80307 DRUG TEST PRSMV CHEM ANLYZR: CPT | Performed by: STUDENT IN AN ORGANIZED HEALTH CARE EDUCATION/TRAINING PROGRAM

## 2023-02-08 PROCEDURE — 83036 HEMOGLOBIN GLYCOSYLATED A1C: CPT | Performed by: PSYCHIATRY & NEUROLOGY

## 2023-02-08 PROCEDURE — 96372 THER/PROPH/DIAG INJ SC/IM: CPT | Performed by: STUDENT IN AN ORGANIZED HEALTH CARE EDUCATION/TRAINING PROGRAM

## 2023-02-08 PROCEDURE — 87635 SARS-COV-2 COVID-19 AMP PRB: CPT | Performed by: STUDENT IN AN ORGANIZED HEALTH CARE EDUCATION/TRAINING PROGRAM

## 2023-02-08 RX ORDER — PROMETHAZINE HYDROCHLORIDE 12.5 MG/1
25 TABLET ORAL EVERY 6 HOURS PRN
Status: DISCONTINUED | OUTPATIENT
Start: 2023-02-08 | End: 2023-02-22 | Stop reason: HOSPADM

## 2023-02-08 RX ORDER — DIPHENHYDRAMINE HYDROCHLORIDE 50 MG/ML
50 INJECTION INTRAMUSCULAR; INTRAVENOUS
Status: COMPLETED | OUTPATIENT
Start: 2023-02-08 | End: 2023-02-08

## 2023-02-08 RX ORDER — DOCUSATE SODIUM 100 MG/1
100 CAPSULE, LIQUID FILLED ORAL DAILY PRN
Status: DISCONTINUED | OUTPATIENT
Start: 2023-02-08 | End: 2023-02-22 | Stop reason: HOSPADM

## 2023-02-08 RX ORDER — IBUPROFEN 200 MG
1 TABLET ORAL DAILY PRN
Status: DISCONTINUED | OUTPATIENT
Start: 2023-02-08 | End: 2023-02-22 | Stop reason: HOSPADM

## 2023-02-08 RX ORDER — HYDROXYZINE PAMOATE 50 MG/1
50 CAPSULE ORAL EVERY 6 HOURS PRN
Status: DISCONTINUED | OUTPATIENT
Start: 2023-02-08 | End: 2023-02-22 | Stop reason: HOSPADM

## 2023-02-08 RX ORDER — HALOPERIDOL 5 MG/ML
5 INJECTION INTRAMUSCULAR
Status: COMPLETED | OUTPATIENT
Start: 2023-02-08 | End: 2023-02-08

## 2023-02-08 RX ORDER — LOPERAMIDE HYDROCHLORIDE 2 MG/1
2 CAPSULE ORAL
Status: DISCONTINUED | OUTPATIENT
Start: 2023-02-08 | End: 2023-02-22 | Stop reason: HOSPADM

## 2023-02-08 RX ORDER — ACETAMINOPHEN 325 MG/1
650 TABLET ORAL EVERY 6 HOURS PRN
Status: DISCONTINUED | OUTPATIENT
Start: 2023-02-08 | End: 2023-02-22 | Stop reason: HOSPADM

## 2023-02-08 RX ORDER — MAG HYDROX/ALUMINUM HYD/SIMETH 200-200-20
30 SUSPENSION, ORAL (FINAL DOSE FORM) ORAL EVERY 6 HOURS PRN
Status: DISCONTINUED | OUTPATIENT
Start: 2023-02-08 | End: 2023-02-22 | Stop reason: HOSPADM

## 2023-02-08 RX ORDER — LORAZEPAM 2 MG/ML
2 INJECTION INTRAMUSCULAR
Status: COMPLETED | OUTPATIENT
Start: 2023-02-08 | End: 2023-02-08

## 2023-02-08 RX ORDER — FOLIC ACID 1 MG/1
1 TABLET ORAL DAILY
Status: DISCONTINUED | OUTPATIENT
Start: 2023-02-09 | End: 2023-02-22 | Stop reason: HOSPADM

## 2023-02-08 RX ORDER — OLANZAPINE 10 MG/2ML
10 INJECTION, POWDER, FOR SOLUTION INTRAMUSCULAR EVERY 4 HOURS PRN
Status: DISCONTINUED | OUTPATIENT
Start: 2023-02-08 | End: 2023-02-22 | Stop reason: HOSPADM

## 2023-02-08 RX ORDER — OLANZAPINE 10 MG/1
10 TABLET ORAL EVERY 4 HOURS PRN
Status: DISCONTINUED | OUTPATIENT
Start: 2023-02-08 | End: 2023-02-22 | Stop reason: HOSPADM

## 2023-02-08 RX ORDER — ONDANSETRON 4 MG/1
4 TABLET, ORALLY DISINTEGRATING ORAL EVERY 8 HOURS PRN
Status: DISCONTINUED | OUTPATIENT
Start: 2023-02-08 | End: 2023-02-22 | Stop reason: HOSPADM

## 2023-02-08 RX ADMIN — LORAZEPAM 2 MG: 2 INJECTION INTRAMUSCULAR; INTRAVENOUS at 03:02

## 2023-02-08 RX ADMIN — DIPHENHYDRAMINE HYDROCHLORIDE 50 MG: 50 INJECTION, SOLUTION INTRAMUSCULAR; INTRAVENOUS at 03:02

## 2023-02-08 RX ADMIN — HALOPERIDOL LACTATE 5 MG: 5 INJECTION, SOLUTION INTRAMUSCULAR at 03:02

## 2023-02-08 NOTE — ED PROVIDER NOTES
Encounter Date: 2/8/2023       History     Chief Complaint   Patient presents with    Psychiatric Evaluation     Hx of schizophrenia, got into argument with his mother and was running from police, non complaint with meds      22-year-old male with history of schizoaffective not on medication presents with bizarre behavior.  According to EMS patient got into an argument with his mother and made some threats but proceeded to run away from the .  Patient does admit to having suicidal ideation without plan.  History otherwise limited due to medical condition    Review of patient's allergies indicates:  No Known Allergies  Past Medical History:   Diagnosis Date    History of psychiatric hospitalization     Hx of psychiatric care     Psychiatric problem     Schizophrenia     Suicide attempt      History reviewed. No pertinent surgical history.  Family History   Family history unknown: Yes     Social History     Tobacco Use    Smoking status: Some Days     Types: Vaping with nicotine     Start date: 12/20/2021     Passive exposure: Past    Smokeless tobacco: Never   Substance Use Topics    Alcohol use: Yes     Comment: pt reports drinking wine and tequila, two-four times out of the month    Drug use: Yes     Types: Marijuana     Comment: Pt denies smoking anything synthetic     Review of Systems   Constitutional: Negative.    HENT: Negative.     Respiratory: Negative.     Cardiovascular: Negative.    Gastrointestinal: Negative.    Genitourinary: Negative.    Musculoskeletal: Negative.    Skin: Negative.    Neurological: Negative.    Psychiatric/Behavioral:  Positive for behavioral problems, confusion and suicidal ideas. The patient is hyperactive.    All other systems reviewed and are negative.    Physical Exam     Initial Vitals [02/08/23 1448]   BP Pulse Resp Temp SpO2   114/61 90 16 98.2 °F (36.8 °C) 98 %      MAP       --         Physical Exam    Nursing note and vitals reviewed.  Constitutional: Vital signs are  normal. He appears well-developed and well-nourished.   HENT:   Head: Normocephalic and atraumatic.   Eyes: Conjunctivae and lids are normal.   Neck: Trachea normal. Neck supple.   Cardiovascular:  Normal rate, regular rhythm, normal heart sounds and normal pulses.           Pulmonary/Chest: Breath sounds normal. He has no wheezes. He has no rhonchi.   Abdominal: Abdomen is soft. Bowel sounds are normal. He exhibits no distension. There is no abdominal tenderness. There is no rebound and no guarding.   Musculoskeletal:         General: Normal range of motion.      Cervical back: Neck supple.     Neurological: He is alert and oriented to person, place, and time. He has normal strength. GCS eye subscore is 4. GCS verbal subscore is 5. GCS motor subscore is 6.   Skin: Skin is warm. Capillary refill takes less than 2 seconds.   Psychiatric: His speech is normal. His affect is labile. He is hyperactive. Thought content is paranoid and delusional. Cognition and memory are impaired. He expresses inappropriate judgment. He expresses suicidal ideation.       ED Course   Critical Care    Date/Time: 2/8/2023 4:15 PM  Performed by: Philip Fraser MD  Authorized by: Philip Fraser MD   Other critical care time: 35 minutes  Total critical care time (exclusive of procedural time) : 35 minutes  Critical care time was exclusive of separately billable procedures and treating other patients.      Labs Reviewed   CBC W/ AUTO DIFFERENTIAL - Abnormal; Notable for the following components:       Result Value    RBC 4.02 (*)     Hemoglobin 12.8 (*)     Hematocrit 38.2 (*)     MCH 31.8 (*)     All other components within normal limits   COMPREHENSIVE METABOLIC PANEL - Abnormal; Notable for the following components:    CO2 30 (*)     Glucose 68 (*)     Creatinine 1.5 (*)     Anion Gap 4 (*)     All other components within normal limits   URINALYSIS, REFLEX TO URINE CULTURE - Abnormal; Notable for the following components:    Specific  Gravity, UA >=1.030 (*)     Protein, UA 1+ (*)     Ketones, UA 1+ (*)     All other components within normal limits    Narrative:     Preferred Collection Type->Urine, Clean Catch  Specimen Source->Urine   DRUG SCREEN PANEL, URINE EMERGENCY - Abnormal; Notable for the following components:    THC Presumptive Positive (*)     Creatinine, Urine 615.0 (*)     All other components within normal limits    Narrative:     Preferred Collection Type->Urine, Clean Catch  Specimen Source->Urine   ACETAMINOPHEN LEVEL - Abnormal; Notable for the following components:    Acetaminophen (Tylenol), Serum <2.0 (*)     All other components within normal limits   ALCOHOL,MEDICAL (ETHANOL)   URINALYSIS MICROSCOPIC    Narrative:     Preferred Collection Type->Urine, Clean Catch  Specimen Source->Urine   SARS-COV-2 RDRP GENE          Imaging Results    None          Medications   haloperidol lactate injection 5 mg (5 mg Intramuscular Given 2/8/23 1550)   diphenhydrAMINE injection 50 mg (50 mg Intramuscular Given 2/8/23 1550)   LORazepam injection 2 mg (2 mg Intramuscular Given 2/8/23 1550)     Medical Decision Making:   Initial Assessment:   22-year-old male with history of schizoaffective not on medication presents with bizarre behavior.  Afebrile vitals stable.  Physical noted.  Patient attempted to run away but was able to be captured by police.  Gave patient medication due to concerns for elopement and brought to self.  Labs and imaging noted.  Patient is medically cleared for psych  Clinical Tests:   Lab Tests: Ordered and Reviewed  The following lab test(s) were unremarkable: CBC, CMP and Urinalysis                        Clinical Impression:   Final diagnoses:  [R46.2] Bizarre behavior (Primary)  [R45.851] Suicidal ideation        ED Disposition Condition    Admit Stable                Philip Fraser MD  02/08/23 9910

## 2023-02-08 NOTE — ED NOTES
Patient ran past sitter in doorway, ran through ED doors, security staff able to stop patient at main entrance however he was able to move past security. Patient eloped. [employer] dispatched

## 2023-02-09 LAB
ESTIMATED AVG GLUCOSE: 94 MG/DL (ref 68–131)
HBA1C MFR BLD: 4.9 % (ref 4–5.6)

## 2023-02-09 PROCEDURE — 36415 COLL VENOUS BLD VENIPUNCTURE: CPT | Performed by: PSYCHIATRY & NEUROLOGY

## 2023-02-09 PROCEDURE — 90833 PSYTX W PT W E/M 30 MIN: CPT | Mod: AF,HB,, | Performed by: PSYCHIATRY & NEUROLOGY

## 2023-02-09 PROCEDURE — 90833 PR PSYCHOTHERAPY W/PATIENT W/E&M, 30 MIN (ADD ON): ICD-10-PCS | Mod: AF,HB,, | Performed by: PSYCHIATRY & NEUROLOGY

## 2023-02-09 PROCEDURE — 99223 1ST HOSP IP/OBS HIGH 75: CPT | Mod: AF,HB,, | Performed by: PSYCHIATRY & NEUROLOGY

## 2023-02-09 PROCEDURE — 25000003 PHARM REV CODE 250: Performed by: PSYCHIATRY & NEUROLOGY

## 2023-02-09 PROCEDURE — 99223 PR INITIAL HOSPITAL CARE,LEVL III: ICD-10-PCS | Mod: AF,HB,, | Performed by: PSYCHIATRY & NEUROLOGY

## 2023-02-09 PROCEDURE — 11400000 HC PSYCH PRIVATE ROOM

## 2023-02-09 RX ORDER — TALC
6 POWDER (GRAM) TOPICAL NIGHTLY PRN
Status: DISCONTINUED | OUTPATIENT
Start: 2023-02-09 | End: 2023-02-22 | Stop reason: HOSPADM

## 2023-02-09 RX ORDER — ARIPIPRAZOLE 5 MG/1
5 TABLET ORAL DAILY
Status: DISCONTINUED | OUTPATIENT
Start: 2023-02-09 | End: 2023-02-10

## 2023-02-09 RX ORDER — SODIUM CHLORIDE 0.9 % (FLUSH) 0.9 %
10 SYRINGE (ML) INJECTION
Status: DISCONTINUED | OUTPATIENT
Start: 2023-02-09 | End: 2023-02-22 | Stop reason: HOSPADM

## 2023-02-09 RX ADMIN — FOLIC ACID 1 MG: 1 TABLET ORAL at 10:02

## 2023-02-09 RX ADMIN — THERA TABS 1 TABLET: TAB at 10:02

## 2023-02-09 RX ADMIN — DIVALPROEX SODIUM 1250 MG: 500 TABLET, FILM COATED, EXTENDED RELEASE ORAL at 08:02

## 2023-02-09 NOTE — CONSULTS
RD consulted for new admit. Spoke with RN via phone and RN stated that pt is drowsy and has not eaten much most likely due to medication. Will follow up tomorrow to reassess appetite and PO intake.

## 2023-02-09 NOTE — H&P
PSYCHIATRY INPATIENT ADMISSION NOTE - H & P      2/9/2023 3:07 PM   Bishop Soria   2000   [id number]         DATE OF ADMISSION: 2/8/2023  2:18 PM    SITE: [facility]    CURRENT LEGAL STATUS: PEC and/or [facility]      HISTORY    CHIEF COMPLAINT   Bishop Soria is a 22 y.o. male with a past psychiatric history of  psychosis, mood disorder, anxiety, substance abuse  currently admitted to the inpatient unit with the following chief complaint:  psychosis    HPI   The patient was seen and examined. The chart was reviewed.    The patient presented to the ER on 2/8/2023 . Per staff notes:  -Patient ran past sitter in doorway, ran through ED doors, security staff able to stop patient at main entrance however he was able to move past security. Patient eloped. [facility] dispatched  -Hx of schizophrenia, got into argument with his mother and was running from police, non complaint with meds  -22-year-old male with history of schizoaffective not on medication presents with bizarre behavior.  According to EMS patient got into an argument with his mother and made some threats but proceeded to run away from the .  Patient does admit to having suicidal ideation without plan.  History otherwise limited due to medical condition  -Bishop Soria admitted to [facility] under the care of Frantz Cagle MD with diagnosis of schizoaffective D/O. The patient is PEC'ed. Patient is a 23 yo AAM who presented to the ER per [facility] for reportedly having bizarre behavior and SI's. Ater arrival to ER,pt was PEC'd,then pt eloped and was returned to ER per [facility]. UDS positive for THC. PT sedated per ER physician. Arrived to unit, sedated. Patient withdrawn, depressed, anxious, agitated, irritable, drowsy, sedated, manic, and paranoid. Endorses Suicidal Ideation and Delusions. Denies Homicidal Ideation, Auditory Hallucinations, Visual Hallucinations, Tactile Hallucinations, and Gustatory Hallucinations    The patient was medically cleared and admitted  to the [facility].    The patient has a longstanding h/o Severe bipolar disorder with psychotic features complicated by significant psychosocial stressors and poor tx adherence. He was last treated here about 1 month ago for an acute exacerbation of psychotic carley.    He was stabilized on Depakote and Abilify; he is currently taking a CARRASCO    He reports that he had an argument with his family over wearing wigs. He also reports some severe issues regarding the musician Raul Murphy (a frequent delusion of his), but the details were presented non-coherently.    He was sedated from medications received prn secondary to psychotic agitation- this limited his participation.     Overall, his presentation is consistent with previous  hospitalization.       Symptoms of Depression: diminished mood - Yes, loss of interest/anhedonia - No;  recurrent - No, >14 days - Yes, diminished energy - No, change in sleep - Yes, change in appetite - Yes, diminished concentration or cognition or indecisiveness - Yes, PMA/R -  No, excessive guilt or hopelessness or worthlessness - Yes, suicidal ideations - No    Changes in Sleep: trouble with initiation- Yes, maintenance, - No early morning awakening with inability to return to sleep - No, hypersomnolence - No    Suicidal- active/passive ideations - No, organized plans, future intentions - No  +SI reported in the last 24 hours    Homicidal ideations: active/passive ideations - No, organized plans, future intentions - No    Symptoms of psychosis: hallucinations - Yes, delusions - Yes, disorganized speech - No, disorganized behavior or abnormal motor behavior - No, or negative symptoms (diminshed emotional expression, avolition, anhedonia, alogia, asociality) - No, active phase symptoms >1 month - No, continuous signs of illness > 6 months - No, since onset of illness decreased level of functioning present - No    Symptoms of carley or hypomania: elevated, expansive, or irritable mood with increased  "energy or activity - Yes; > 4 days - Yes,  >7 days - Yes; with inflated self-esteem or grandiosity - Yes, decreased need for sleep - No, increased rate of speech - No, FOI or racing thoughts - No, distractibility - Yes, increased goal directed activity or PMA - Yes, risky/disinhibited behavior - Yes    Symptoms of KANU: excessive anxiety/worry/fear, more days than not, about numerous issues - Yes, ongoing for >6 months - No, difficult to control - No, with restlessness - No, fatigue - No, poor concentration - No, irritability - No, muscle tension - No, sleep disturbance - No; causes functionally impairing distress - No    Symptoms of Panic Disorder: recurrent panic attacks (palpitations/heart racing, sweating, shakiness, dyspnea, choking, chest pain/discomfort, Gi symptoms, dizzy/lightheadedness, hot/col flashes, paresthesias, derealization, fear of losing control or fear of dying or fear of "going crazy") - No, precipitated - No, un-precipitated - No, source of worry and/or behavioral changes secondary for 1 month or longer- No, agoraphobia - No    Symptoms of PTSD: h/o trauma exposure - No; re-experiencing/intrusive symptoms - No, avoidant behavior - No, 2 or more negative alterations in cognition or mood - No, 2 or more hyperarousal symptoms - No; with dissociative symptoms - No, ongoing for 1 or more  months - No    Symptoms of OCD: obsessions (recurrent thoughts/urges/images; intrusive and/or unwanted; uses other thoughts/actions to suppress) - No; compulsions (repetitive behaviors used to lower distress/anxiety/obsessions) - No, time-consuming (over 1 hour per day) or cause significant distress/impairment - - No    Symptoms of Anorexia: restriction of caloric intake leading to significantly low body weight - No, intense fear of gaining weight or persistent behavior that interferes with weight gain even thought at a significantly low weight - No, disturbance in the way in which one's body weight or shape is " experienced, undue influence of body weight or shape on self evaluation, or persistent lack of recognition of the seriousness of the current low body weight - No    Symptoms of Bulimia: recurrent episodes of binge eating (definitely larger amount  than what others would eat and lack of a sense of control over eating during episode) - No, recurrent inappropriate compensatory behaviors in order to prevent weight gain (fasting, medications, exercise, vomiting) - No, binges and compensatory behaviors both occur on average at least once a week for 3 months - No, self evaluations is unduly influenced by body shape/weight- - No    Symptoms of Binge eating: recurrent episodes of binge eating (definitely larger amount than what others would eat and lack of a sense of control over eating during episode) - No, 3 or more of following (eating much more rapidly, eating until uncomfortably full, large amounts when not hungry, eating alone because of embarrassed by how much,  feeling disgusted with oneself, depressed or very guilty afterward) - No, distress regarding binges - No, binges occur on average at least once a week for 3 months - No    PSYCHOTHERAPY ADD-ON +[id number]   16-37 minutes      Time: 16 minutes  Participants: Met with patient    Therapeutic Intervention Type: insight oriented psychotherapy, behavior modifying psychotherapy, supportive psychotherapy, interactive psychotherapy  Why chosen therapy is appropriate versus another modality: relevant to diagnosis, patient responds to this modality, evidence based practice    Target symptoms: mood disorder, psychosis  Primary focus: mood, psychosis, family stressors  Psychotherapeutic techniques: supportive and psycho-educational techniques; setting tx goals    Outcome monitoring methods: self-report, observation    Patient's response to intervention:  The patient's response to intervention is reluctant.    Progress toward goals:  The patient's progress toward goals is  limited.          PAST PSYCHIATRIC HISTORY  Previous Psychiatric Hospitalizations: Yes, numerous; 3 in 2022, last in 12/22-1/23  Previous SI/HI: Yes,  Previous Suicide Attempts: Yes,   Previous Medication Trials: Yes,  Psychiatric Care (current & past): Yes,  History of Psychotherapy: Yes,  History of Violence: Yes,  History of sexual/physical abuse: No,    PAST MEDICAL & SURGICAL HISTORY   Past Medical History:   Diagnosis Date    History of psychiatric hospitalization     Hx of psychiatric care     Psychiatric problem     Schizophrenia     Suicide attempt      History reviewed. No pertinent surgical history.      Home Meds:   Prior to Admission medications    Medication Sig Start Date End Date Taking? Authorizing Provider   ARIPiprazole (ABILIFY) 10 MG Tab Take 1 tablet (10 mg total) by mouth once daily. 1/10/23 1/10/24 Yes Frantz Cagle MD   divalproex (DEPAKOTE ER) 250 MG 24 hr tablet Take 5 tablets (1,250 mg total) by mouth every evening. 1/9/23 1/9/24 Yes Frantz Cagle MD   hydrOXYzine pamoate (VISTARIL) 25 MG Cap Take 1 capsule (25 mg total) by mouth every 6 (six) hours as needed (anxiety and/or insomnia). 1/9/23  Yes Frantz Cagle MD   EScitalopram oxalate (LEXAPRO) 10 MG tablet TAKE 1 TABLET BY MOUTH EVERY DAY 7/27/21 5/23/22  Jl Aguilar NP   lithium (LITHOTAB) 300 mg tablet Take 300 mg by mouth 2 (two) times daily. 4/26/22 5/31/22  Historical Provider       Scheduled Meds:    folic acid  1 mg Oral Daily    multivitamin  1 tablet Oral Daily      PRN Meds: acetaminophen, aluminum-magnesium hydroxide-simethicone, docusate sodium, hydrOXYzine pamoate, loperamide, melatonin, nicotine, OLANZapine **AND** OLANZapine, ondansetron, promethazine, sodium chloride 0.9%   Psychotherapeutics (From admission, onward)      Start     Stop Route Frequency Ordered    02/08/23 1900  OLANZapine tablet 10 mg  (Olanzapine PRN (</= 64 yo))        See Nisreen for full Linked Orders Report.    --  "Oral Every 4 hours PRN 02/08/23 1802 02/08/23 1900  OLANZapine injection 10 mg  (Olanzapine PRN (</= 64 yo))        See Nisreen for full Linked Orders Report.    -- IM Every 4 hours PRN 02/08/23 1802            ALLERGIES   Review of patient's allergies indicates:  No Known Allergies    NEUROLOGIC HISTORY  Seizures: No  Head trauma: No    SOCIAL HISTORY:  Developmental/Childhood:Achieved all developmental milestones timely  Education: some college  Employment Status/Finances:Employed   Relationship Status/Sexual Orientation: single  Children: 0  Housing Status: Home    history:  NO   Access to Firearms: NO ;  Locked up? n/a  Sabianist:Actively participates in organized Congregational  Recreational activities:Exercise    SUBSTANCE ABUSE HISTORY   Recreational Drugs: marijuana   Use of Alcohol: occasional, social use  Rehab History:no   Tobacco Use:no    LEGAL HISTORY:   Past charges/incarcerations: NO  Pending charges:NO    FAMILY PSYCHIATRIC HISTORY   Depression - "dad's side"      ROS  Review of Systems   Constitutional: Negative.  Negative for chills and fever.   HENT: Negative.  Negative for hearing loss.    Eyes: Negative.  Negative for blurred vision and double vision.   Respiratory: Negative.  Negative for shortness of breath.    Cardiovascular: Negative.  Negative for chest pain and palpitations.   Gastrointestinal: Negative.  Negative for constipation, diarrhea, nausea and vomiting.   Genitourinary: Negative.  Negative for dysuria.   Musculoskeletal: Negative.  Negative for back pain and neck pain.   Skin: Negative.  Negative for rash.   Neurological: Negative.  Negative for dizziness and headaches.   Endo/Heme/Allergies: Negative.  Negative for environmental allergies.   Psychiatric/Behavioral:          See HPI       EXAMINATION    PHYSICAL EXAM  Reviewed note/exam by Dr. Fernando MD from 2/9/23 at 7:57 AM      VITALS   Vitals:    02/09/23 0816   BP: (!) 106/55   Pulse: 82   Resp: 18   Temp: 97.8 °F " (36.6 °C)        Body mass index is 20.25 kg/m².        PAIN  0/10  Subjective report of pain matches objective signs and symptoms: Yes    LABORATORY DATA   Recent Results (from the past 72 hour(s))   POCT COVID-19 Rapid Screening    Collection Time: 02/08/23  3:00 PM   Result Value Ref Range    POC Rapid COVID Negative Negative     Acceptable Yes    Urinalysis, Reflex to Urine Culture Urine, Clean Catch    Collection Time: 02/08/23  3:12 PM    Specimen: Urine, Clean Catch   Result Value Ref Range    Specimen UA Urine, Clean Catch     Color, UA Yellow Yellow, Straw, Ingrid    Appearance, UA Clear Clear    pH, UA 6.0 5.0 - 8.0    Specific Gravity, UA >=1.030 (A) 1.005 - 1.030    Protein, UA 1+ (A) Negative    Glucose, UA Negative Negative    Ketones, UA 1+ (A) Negative    Bilirubin (UA) Negative Negative    Occult Blood UA Negative Negative    Nitrite, UA Negative Negative    Urobilinogen, UA 1.0 <2.0 EU/dL    Leukocytes, UA Negative Negative   Drug screen panel, emergency    Collection Time: 02/08/23  3:12 PM   Result Value Ref Range    Benzodiazepines Negative Negative    Methadone metabolites Negative Negative    Cocaine (Metab.) Negative Negative    Opiate Scrn, Ur Negative Negative    Barbiturate Screen, Ur Negative Negative    Amphetamine Screen, Ur Negative Negative    THC Presumptive Positive (A) Negative    Phencyclidine Negative Negative    Creatinine, Urine 615.0 (H) 23.0 - 375.0 mg/dL    Toxicology Information SEE COMMENT    Urinalysis Microscopic    Collection Time: 02/08/23  3:12 PM   Result Value Ref Range    RBC, UA 2 0 - 4 /hpf    WBC, UA 3 0 - 5 /hpf    Bacteria Negative None-Occ /hpf    Squam Epithel, UA 2 /hpf    Hyaline Casts, UA 0 0-1/lpf /lpf    Microscopic Comment SEE COMMENT    Acetaminophen level    Collection Time: 02/08/23  3:23 PM   Result Value Ref Range    Acetaminophen (Tylenol), Serum <2.0 (L) 10.0 - 20.0 ug/mL   CBC auto differential    Collection Time: 02/08/23  3:24  PM   Result Value Ref Range    WBC 5.78 3.90 - 12.70 K/uL    RBC 4.02 (L) 4.60 - 6.20 M/uL    Hemoglobin 12.8 (L) 14.0 - 18.0 g/dL    Hematocrit 38.2 (L) 40.0 - 54.0 %    MCV 95 82 - 98 fL    MCH 31.8 (H) 27.0 - 31.0 pg    MCHC 33.5 32.0 - 36.0 g/dL    RDW 12.5 11.5 - 14.5 %    Platelets 224 150 - 450 K/uL    MPV 9.4 9.2 - 12.9 fL    Immature Granulocytes 0.2 0.0 - 0.5 %    Gran # (ANC) 3.5 1.8 - 7.7 K/uL    Immature Grans (Abs) 0.01 0.00 - 0.04 K/uL    Lymph # 1.6 1.0 - 4.8 K/uL    Mono # 0.7 0.3 - 1.0 K/uL    Eos # 0.1 0.0 - 0.5 K/uL    Baso # 0.01 0.00 - 0.20 K/uL    nRBC 0 0 /100 WBC    Gran % 59.6 38.0 - 73.0 %    Lymph % 27.0 18.0 - 48.0 %    Mono % 11.4 4.0 - 15.0 %    Eosinophil % 1.6 0.0 - 8.0 %    Basophil % 0.2 0.0 - 1.9 %    Differential Method Automated    Comprehensive metabolic panel    Collection Time: 02/08/23  3:24 PM   Result Value Ref Range    Sodium 143 136 - 145 mmol/L    Potassium 4.3 3.5 - 5.1 mmol/L    Chloride 109 95 - 110 mmol/L    CO2 30 (H) 23 - 29 mmol/L    Glucose 68 (L) 70 - 110 mg/dL    BUN 17 6 - 20 mg/dL    Creatinine 1.5 (H) 0.5 - 1.4 mg/dL    Calcium 9.3 8.7 - 10.5 mg/dL    Total Protein 7.6 6.0 - 8.4 g/dL    Albumin 3.9 3.5 - 5.2 g/dL    Total Bilirubin 0.6 0.1 - 1.0 mg/dL    Alkaline Phosphatase 62 55 - 135 U/L    AST 27 10 - 40 U/L    ALT 31 10 - 44 U/L    Anion Gap 4 (L) 8 - 16 mmol/L    eGFR >60.0 >60 mL/min/1.73 m^2   Ethanol    Collection Time: 02/08/23  3:24 PM   Result Value Ref Range    Alcohol, Serum <3 <10 mg/dL   Hemoglobin A1c    Collection Time: 02/08/23  3:24 PM   Result Value Ref Range    Hemoglobin A1C 4.9 4.0 - 5.6 %    Estimated Avg Glucose 94 68 - 131 mg/dL      Lab Results   Component Value Date    VALPROATE 107 (H) 01/07/2023           CONSTITUTIONAL  General Appearance: unremarkable, age appropriate, normal weight, disheveled    MUSCULOSKELETAL  Muscle Strength and Tone:no tremor, no tic  Abnormal Involuntary Movements: None  Gait and Station:  "non-ataxic    PSYCHIATRIC   Level of Consciousness: awake and alert   Orientation: person, place, time and situation  Grooming: Hospital garb, disheveled  Psychomotor Behavior: normal, cooperative; no PMA/R  Speech: normal tone, normal rate, normal pitch, normal volume  Language: grossly intact, able to name, able to repeat  Mood: "fine"  Affect: Intense  Thought Process: circumstantial, racing  Associations: intact   Thought Content: +delusions, denies SI, and denies HI  Perceptions: +observed RIS   Memory: Able to recall past events, Remote intact, and Recent intact  Attention:Attends to interview without distraction  Fund of Knowledge: Aware of current events and Vocabulary appropriate   Estimate if Intelligence:  Average based on work/education history, vocabulary and mental status exam  Insight: intact, limited awareness of illness- poor  Judgment: impaired due to psychosis/carley - poor      PSYCHOSOCIAL    PSYCHOSOCIAL STRESSORS   Occupational and family    FUNCTIONING RELATIONSHIPS   good support system    STRENGTHS AND LIABILITIES   Strength: Patient accepts guidance/feedback, Strength: Patient is expressive/articulate., Liability: Patient is impulsive., Liability: Patient lacks coping skills.    Is the patient aware of the biomedical complications associated with substance abuse and mental illness? yes    Does the patient have an Advance Directive for Mental Health treatment? no  (If yes, inform patient to bring copy.)        MEDICAL DECISION MAKING        ASSESSMENT       Bipolar disorder, type I, MRE manic, severe with psychotic features  Unspecified anxiety disorder  Insomnia secondary to a mental illness    Cannabis abuse    Psychosocial stressors        PROBLEM LIST AND MANAGEMENT PLANS      Bipolar disorder, type I, MRE manic, severe with psychotic features: pt counseled  - Aristada 1064mg due Feb 20, 2023  - resume Abilify at 5 mg PO daily   -resume home Depakote ER at 1250 mg qhs- check level in 4 " days  - follow up with outpatient mental health providers after discharge for medication management and psychotherapy    Unspecified anxiety disorder  - start hydroxyzine 50 mg PO q 6 hours PRN  -depakote off-label as above    Insomnia: pt counseled  -vistaril prn    Cannabis abuse: pt counseled  - SW consulted for assistance with additional resources       Psychosocial stressors  - pt counseled  - SW consulted for assistance with additional resources         PRESCRIPTION DRUG MANAGEMENT  Compliance: no  Side Effects: unknown  Regimen Adjustments: see above    Discussed diagnosis, risks and benefits of proposed treatment vs alternative treatments vs no treatment, potential side effects of these treatments and the inherent unpredictability of treatment. The patient expresses understanding of the above and displays the capacity to agree with this treatment given said understanding. Patient also agrees that, currently, the benefits outweigh the risks and would like to pursue/continue treatment at this time.    Any medications being used off-label were discussed with the patient inclusive of the evidence base for the use of the medications and consent was obtained for the off-label use of the medication.         DIAGNOSTIC TESTING  Labs reviewed with patient; follow up pending labs    Disposition:  -Will attempt to obtain outside psychiatric records if available  -SW to assist with aftercare planning and collateral  -Once stable discharge home with outpatient follow up care and/or rehab  -Continue inpatient treatment under a PEC and/or CEC for danger to self/ danger to others/grave disability as evident by gravely disabled        Frantz Cagle MD  Psychiatry

## 2023-02-09 NOTE — PLAN OF CARE
Bishop Soria admitted to [facility] under the care of Frantz Cagle MD with diagnosis of schizoaffective D/O. The patient is PEC'ed. Patient is a 21 yo AAM who presented to the ER per [facility] for reportedly having bizarre behavior and SI's. Ater arrival to ER,pt was PEC'd,then pt eloped and was returned to ER per [facility]. UDS positive for THC. PT sedated per ER physician. Arrived to unit, sedated, searched and wanded per security and escorted to his room.  Patient withdrawn, depressed, anxious, agitated, irritable, drowsy, sedated, manic, and paranoid. Endorses Suicidal Ideation and Delusions. Denies Homicidal Ideation, Auditory Hallucinations, Visual Hallucinations, Tactile Hallucinations, and Gustatory Hallucinations.    Patient assessed and oriented to room unit and routine. Patient denies pain or discomfort at present and remains injury-free.     Regis Yeboah RN

## 2023-02-09 NOTE — HPI
Chief Complaint   Patient presents with    Psychiatric Evaluation       Hx of schizophrenia, got into argument with his mother and was running from police, non complaint with meds       22-year-old male with history of schizoaffective not on medication presents with bizarre behavior.  According to EMS patient got into an argument with his mother and made some threats but proceeded to run away from the .  Patient does admit to having suicidal ideation without plan.  History otherwise limited due to medical condition.    Patient lethargic this morning after requiring sedation.

## 2023-02-09 NOTE — SUBJECTIVE & OBJECTIVE
Past Medical History:   Diagnosis Date    History of psychiatric hospitalization     Hx of psychiatric care     Psychiatric problem     Schizophrenia     Suicide attempt        History reviewed. No pertinent surgical history.    Review of patient's allergies indicates:  No Known Allergies    No current facility-administered medications on file prior to encounter.     Current Outpatient Medications on File Prior to Encounter   Medication Sig    ARIPiprazole (ABILIFY) 10 MG Tab Take 1 tablet (10 mg total) by mouth once daily. (Patient not taking: Reported on 1/11/2023)    divalproex (DEPAKOTE ER) 250 MG 24 hr tablet Take 5 tablets (1,250 mg total) by mouth every evening.    hydrOXYzine pamoate (VISTARIL) 25 MG Cap Take 1 capsule (25 mg total) by mouth every 6 (six) hours as needed (anxiety and/or insomnia).    [DISCONTINUED] EScitalopram oxalate (LEXAPRO) 10 MG tablet TAKE 1 TABLET BY MOUTH EVERY DAY    [DISCONTINUED] lithium (LITHOTAB) 300 mg tablet Take 300 mg by mouth 2 (two) times daily.     Family History       Family history is unknown by patient.          Tobacco Use    Smoking status: Some Days     Types: Vaping with nicotine     Start date: 12/20/2021     Passive exposure: Past    Smokeless tobacco: Never   Substance and Sexual Activity    Alcohol use: Yes     Comment: pt reports drinking wine and tequila, two-four times out of the month    Drug use: Yes     Types: Marijuana     Comment: Pt denies smoking anything synthetic    Sexual activity: Not Currently     Partners: Male     Review of Systems   Constitutional:  Negative for fatigue and fever.   HENT:  Negative for congestion, ear pain and sore throat.    Eyes:  Negative for pain and discharge.   Respiratory:  Negative for cough, shortness of breath and wheezing.    Gastrointestinal:  Negative for abdominal pain, constipation, diarrhea, nausea and vomiting.   Endocrine: Negative for cold intolerance and heat intolerance.   Genitourinary:  Negative for  difficulty urinating, dysuria and frequency.   Musculoskeletal:  Negative for arthralgias.   Allergic/Immunologic: Negative for environmental allergies.   Neurological:  Negative for dizziness, tremors and seizures.   Psychiatric/Behavioral:  Positive for behavioral problems, dysphoric mood, sleep disturbance and suicidal ideas. The patient is hyperactive.    All other systems reviewed and are negative.  Objective:     Vital Signs (Most Recent):  Temp: 98 °F (36.7 °C) (02/08/23 1800)  Pulse: 67 (02/08/23 1800)  Resp: 18 (02/08/23 1800)  BP: (!) 98/56 (02/08/23 1800)  SpO2: 98 % (02/08/23 1800)   Vital Signs (24h Range):  Temp:  [98 °F (36.7 °C)-98.2 °F (36.8 °C)] 98 °F (36.7 °C)  Pulse:  [67-90] 67  Resp:  [16-18] 18  SpO2:  [98 %] 98 %  BP: ()/(56-61) 98/56     Weight: 73.5 kg (162 lb)  Body mass index is 20.25 kg/m².    Physical Exam  Vitals and nursing note reviewed.   Constitutional:       Appearance: Normal appearance.   HENT:      Head: Normocephalic and atraumatic.      Nose: Nose normal.      Mouth/Throat:      Mouth: Mucous membranes are moist.      Pharynx: Oropharynx is clear.   Eyes:      Extraocular Movements: Extraocular movements intact.      Conjunctiva/sclera: Conjunctivae normal.      Pupils: Pupils are equal, round, and reactive to light.   Cardiovascular:      Rate and Rhythm: Normal rate and regular rhythm.      Pulses: Normal pulses.      Heart sounds: Normal heart sounds.   Pulmonary:      Effort: Pulmonary effort is normal.      Breath sounds: Normal breath sounds.   Abdominal:      General: Abdomen is flat. Bowel sounds are normal.      Palpations: Abdomen is soft.   Musculoskeletal:         General: Normal range of motion.      Cervical back: Normal range of motion and neck supple.   Skin:     General: Skin is warm and dry.      Capillary Refill: Capillary refill takes less than 2 seconds.      Comments: No rashes on limited skin exam.   Neurological:      General: No focal deficit  present.      Mental Status: He is alert and oriented to person, place, and time.      Cranial Nerves: No cranial nerve deficit.      Comments: I Olfactory:  Sense of smell intact    II Optic:  Pupils equal round react to light.  Vision intact.    III, IV, VI, Ocular motor, Trochlear, Abducens:  Extraocular movements intact    V Trigeminal:  Facial sensation intact facial sensation intact,, muscles of mastication intact muscles of mastication intact, corneal reflex intact, corneal reflex intact    VII Facial:  Muscles of facial expression intact     VIII Vestibular cochlear: Hearing intact vestibular cochlear: Hearing intact    IX Glossopharyngeal:  Gag reflex intact.  Tasting intact.     X Vagus:  Gag reflex intact.    XI Spinal Accessory:  Shoulder shrug intact.  Head rotation intact.    XII Hypoglossal:  Tongue movements intact.     Psychiatric:      Comments: Patient appears depressed.  Delusional and labile.  Hyperactive.  SI         CRANIAL NERVES     CN III, IV, VI   Pupils are equal, round, and reactive to light.     Significant Labs: All pertinent labs within the past 24 hours have been reviewed.    Significant Imaging: I have reviewed all pertinent imaging results/findings within the past 24 hours.

## 2023-02-09 NOTE — H&P
[facility] Hospital Medicine  History & Physical    Patient Name: Bishop Soria  MRN: [id number]  Patient Class: IP- Psych  Admission Date: 2/8/2023  Attending Physician: Frantz Cagle MD   Primary Care Provider: Jose Martin Selby MD         Patient information was obtained from ER records.     Subjective:     Principal Problem:<principal problem not specified>    Chief Complaint:   Chief Complaint   Patient presents with    Psychiatric Evaluation     Hx of schizophrenia, got into argument with his mother and was running from police, non complaint with meds         HPI:   Chief Complaint   Patient presents with    Psychiatric Evaluation       Hx of schizophrenia, got into argument with his mother and was running from police, non complaint with meds       22-year-old male with history of schizoaffective not on medication presents with bizarre behavior.  According to EMS patient got into an argument with his mother and made some threats but proceeded to run away from the .  Patient does admit to having suicidal ideation without plan.  History otherwise limited due to medical condition.    Patient lethargic this morning after requiring sedation.          Past Medical History:   Diagnosis Date    History of psychiatric hospitalization     Hx of psychiatric care     Psychiatric problem     Schizophrenia     Suicide attempt        History reviewed. No pertinent surgical history.    Review of patient's allergies indicates:  No Known Allergies    No current facility-administered medications on file prior to encounter.     Current Outpatient Medications on File Prior to Encounter   Medication Sig    ARIPiprazole (ABILIFY) 10 MG Tab Take 1 tablet (10 mg total) by mouth once daily. (Patient not taking: Reported on 1/11/2023)    divalproex (DEPAKOTE ER) 250 MG 24 hr tablet Take 5 tablets (1,250 mg total) by mouth every evening.    hydrOXYzine pamoate (VISTARIL) 25 MG Cap Take 1  capsule (25 mg total) by mouth every 6 (six) hours as needed (anxiety and/or insomnia).    [DISCONTINUED] EScitalopram oxalate (LEXAPRO) 10 MG tablet TAKE 1 TABLET BY MOUTH EVERY DAY    [DISCONTINUED] lithium (LITHOTAB) 300 mg tablet Take 300 mg by mouth 2 (two) times daily.     Family History       Family history is unknown by patient.          Tobacco Use    Smoking status: Some Days     Types: Vaping with nicotine     Start date: 12/20/2021     Passive exposure: Past    Smokeless tobacco: Never   Substance and Sexual Activity    Alcohol use: Yes     Comment: pt reports drinking wine and tequila, two-four times out of the month    Drug use: Yes     Types: Marijuana     Comment: Pt denies smoking anything synthetic    Sexual activity: Not Currently     Partners: Male     Review of Systems   Constitutional:  Negative for fatigue and fever.   HENT:  Negative for congestion, ear pain and sore throat.    Eyes:  Negative for pain and discharge.   Respiratory:  Negative for cough, shortness of breath and wheezing.    Gastrointestinal:  Negative for abdominal pain, constipation, diarrhea, nausea and vomiting.   Endocrine: Negative for cold intolerance and heat intolerance.   Genitourinary:  Negative for difficulty urinating, dysuria and frequency.   Musculoskeletal:  Negative for arthralgias.   Allergic/Immunologic: Negative for environmental allergies.   Neurological:  Negative for dizziness, tremors and seizures.   Psychiatric/Behavioral:  Positive for behavioral problems, dysphoric mood, sleep disturbance and suicidal ideas. The patient is hyperactive.    All other systems reviewed and are negative.  Objective:     Vital Signs (Most Recent):  Temp: 98 °F (36.7 °C) (02/08/23 1800)  Pulse: 67 (02/08/23 1800)  Resp: 18 (02/08/23 1800)  BP: (!) 98/56 (02/08/23 1800)  SpO2: 98 % (02/08/23 1800)   Vital Signs (24h Range):  Temp:  [98 °F (36.7 °C)-98.2 °F (36.8 °C)] 98 °F (36.7 °C)  Pulse:  [67-90] 67  Resp:  [16-18]  18  SpO2:  [98 %] 98 %  BP: ()/(56-61) 98/56     Weight: 73.5 kg (162 lb)  Body mass index is 20.25 kg/m².    Physical Exam  Vitals and nursing note reviewed.   Constitutional:       Appearance: Normal appearance.   HENT:      Head: Normocephalic and atraumatic.      Nose: Nose normal.      Mouth/Throat:      Mouth: Mucous membranes are moist.      Pharynx: Oropharynx is clear.   Eyes:      Extraocular Movements: Extraocular movements intact.      Conjunctiva/sclera: Conjunctivae normal.      Pupils: Pupils are equal, round, and reactive to light.   Cardiovascular:      Rate and Rhythm: Normal rate and regular rhythm.      Pulses: Normal pulses.      Heart sounds: Normal heart sounds.   Pulmonary:      Effort: Pulmonary effort is normal.      Breath sounds: Normal breath sounds.   Abdominal:      General: Abdomen is flat. Bowel sounds are normal.      Palpations: Abdomen is soft.   Musculoskeletal:         General: Normal range of motion.      Cervical back: Normal range of motion and neck supple.   Skin:     General: Skin is warm and dry.      Capillary Refill: Capillary refill takes less than 2 seconds.      Comments: No rashes on limited skin exam.   Neurological:      General: No focal deficit present.      Mental Status: He is alert and oriented to person, place, and time.      Cranial Nerves: No cranial nerve deficit.      Comments: I Olfactory:  Sense of smell intact    II Optic:  Pupils equal round react to light.  Vision intact.    III, IV, VI, Ocular motor, Trochlear, Abducens:  Extraocular movements intact    V Trigeminal:  Facial sensation intact facial sensation intact,, muscles of mastication intact muscles of mastication intact, corneal reflex intact, corneal reflex intact    VII Facial:  Muscles of facial expression intact     VIII Vestibular cochlear: Hearing intact vestibular cochlear: Hearing intact    IX Glossopharyngeal:  Gag reflex intact.  Tasting intact.     X Vagus:  Gag reflex  intact.    XI Spinal Accessory:  Shoulder shrug intact.  Head rotation intact.    XII Hypoglossal:  Tongue movements intact.     Psychiatric:      Comments: Patient appears depressed.  Delusional and labile.  Hyperactive.  SI         CRANIAL NERVES     CN III, IV, VI   Pupils are equal, round, and reactive to light.     Significant Labs: All pertinent labs within the past 24 hours have been reviewed.    Significant Imaging: I have reviewed all pertinent imaging results/findings within the past 24 hours.    Assessment/Plan:     Schizoaffective disorder, bipolar type  To be admitted to our inpatient psychiatric unit for further evaluation and management.        Marijuana abuse  Strongly advised cessation.       Suicidal ideation  To be admitted to our inpatient psychiatric unit for further evaluation and management.            VTE Risk Mitigation (From admission, onward)    None             Regis King Jr, MD  Department of Hospital Medicine   [facility] ([facility])

## 2023-02-09 NOTE — PLAN OF CARE
POC reviewed this shift and is on going. Patient is withdrawn, depressed, calm, cooperative, quiet, anxious, sleeping, and drowsy. Denies Suicidal Ideation, Homicidal Ideation, Auditory Hallucinations, Visual Hallucinations, Tactile Hallucinations, Gustatory Hallucinations, and Delusions. Patient has been isolating to his room most of the day. Patient been sleeping most of the morning. Dr Cagle ordered abilify 5mg daily and depakote 1250mg nightly. Pt continues to be medication compliant and staff will continue to monitor pt closely while on the unit.

## 2023-02-10 PROCEDURE — 90833 PSYTX W PT W E/M 30 MIN: CPT | Mod: AF,HB,, | Performed by: PSYCHIATRY & NEUROLOGY

## 2023-02-10 PROCEDURE — 99233 PR SUBSEQUENT HOSPITAL CARE,LEVL III: ICD-10-PCS | Mod: AF,HB,, | Performed by: PSYCHIATRY & NEUROLOGY

## 2023-02-10 PROCEDURE — 99233 SBSQ HOSP IP/OBS HIGH 50: CPT | Mod: AF,HB,, | Performed by: PSYCHIATRY & NEUROLOGY

## 2023-02-10 PROCEDURE — 11400000 HC PSYCH PRIVATE ROOM

## 2023-02-10 PROCEDURE — 90833 PR PSYCHOTHERAPY W/PATIENT W/E&M, 30 MIN (ADD ON): ICD-10-PCS | Mod: AF,HB,, | Performed by: PSYCHIATRY & NEUROLOGY

## 2023-02-10 PROCEDURE — 25000003 PHARM REV CODE 250: Performed by: PSYCHIATRY & NEUROLOGY

## 2023-02-10 RX ADMIN — DIVALPROEX SODIUM 1250 MG: 500 TABLET, FILM COATED, EXTENDED RELEASE ORAL at 08:02

## 2023-02-10 RX ADMIN — FOLIC ACID 1 MG: 1 TABLET ORAL at 08:02

## 2023-02-10 RX ADMIN — THERA TABS 1 TABLET: TAB at 08:02

## 2023-02-10 RX ADMIN — ARIPIPRAZOLE 5 MG: 5 TABLET ORAL at 08:02

## 2023-02-10 RX ADMIN — Medication 6 MG: at 08:02
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
## 2023-02-10 NOTE — PLAN OF CARE
Pt. Cont. To have auditory hallucinations, he denied any c/o visual hallucinations. Pt. Also denied any c/o suicidal or homicidal ideations. He did take his shower and talk to staff members on this shift. Pt. Took his medications without diff. Will cont. To monitor PT.

## 2023-02-10 NOTE — PLAN OF CARE
Pt was encouraged to attend group but refused. Pt was offered 1:1 but refused. Staff will Continue to encourage pt to participate in process groups to verbalize feelings and develop healthy coping skills.

## 2023-02-10 NOTE — PLAN OF CARE
"Patient dressed in hospital paper scrubs. Patient has a blank stare with intense eye contact. Patient reports " I came here because I told my momma to clean the house because Raul Murphy was coming over. Then, she called the police to bring me here". Patient appears to have an obsession/delusional of having a relationship with Raul Murphy. Patient is illogical with delayed thoughts. The patient displays a delayed response of inappropriate laughter.  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 Patient reports " I do know [place] and I do not have to share that with y'all because I can read people". Patient reports his relationship with his mother is toxic and needs to find another place to live. Patient reports she makes him depressed and causes anxiety. Patient reports on a scale of 1-10 with ten being the highest level of depression. Patient rating depression today a 4/10. On the same scale patient rating anxiety a 7/10. Encourgaed patient to attend groups and activities.  Patient denies SI/HI no self harming behavior displayed, no aggressive behavior displayed towards others. Patient contracted safety with staff and unit.  Denies A/V hallucinations, no interacting with internal stimuli noted. Educated, reviewed  and discussed plan of care and medication regiment with this patient. Patient voiced understanding of all teachings.     "
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
## 2023-02-10 NOTE — PROGRESS NOTES
Followed up on pt this morning. Spoke with RN and RN stated that the pt is eating well and has no dietary issues at this time. RD to sign off. Please consult if any nutrition/dietary issues arise.

## 2023-02-10 NOTE — PROGRESS NOTES
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 The chart was reviewed.     Reviewed notes from Rns, MD, and RD and labs from the last 24 hours.    The patient's case was discussed with the treatment team/care providers today including Rns, CTRS, NP, and SW    Staff reports no behavioral or management issues.     The patient has been compliant with treatment.      Subjective 02/10/2023       Today the patient reports that he is "doing ok"  -he reports that he is here secondary to his "mom thinking I'm crazy." He discussed how is is involved with "Christshanelle" Jeffrey (a famous scott).   -the patient was derailed in thoughts; psychosis persist and remains severely impairing and gravely disabling.   -"I just need to work on my job so y'all can see me.. see me with him."    -the patient identifies as having no specific gender then later stated that he wants to identity as a feminine male    Symptoms of psychosis/carley persist, remain fx/bx impairing and continue to require inpatient stabilization.       The patient denies any side effects to medications.          Psychiatric ROS (observed, reported, or endorsed/denied):  Depressed mood - No  Interest/pleasure/anhedonia: No  Guilt/hopelessness/worthlessness - No  Changes in Sleep - No  Changes in Appetite - No  Changes in Concentration - No  Changes in Energy - No  PMA/R- No  Suicidal- active/passive ideations - No  Homicidal ideations: active/passive ideations - No    Hallucinations - decreasing slowly  Delusions - Continuing  Disorganized behavior - Continuing  Disorganized speech - Continuing  Negative symptoms - " No    Elevated mood - variable  Decreased need for sleep - variable  Grandiosity - variable  Racing thoughts - variable  Impulsivity - No  Irritability- variable  Increased energy - No  Distractibility - variable  Increase in goal-directed activity or PMA- No    Symptoms of KANU - No  Symptoms of Panic Disorder- No  Symptoms of PTSD - No        Overall progress: Patient is showing no improvement on the Unit to date        Psychotherapy:  Target symptoms: mood disorder, psychosis  Why chosen therapy is appropriate versus another modality: relevant to diagnosis, patient responds to this modality, evidence based practice  Outcome monitoring methods: self-report, observation  Therapeutic intervention type: insight oriented psychotherapy, behavior modifying psychotherapy, supportive psychotherapy, interactive psychotherapy  Topics discussed/themes: building skills sets for symptom management, symptom recognition  The patient's response to the intervention is accepting. The patient's progress toward treatment goals is limited, poor.   Duration of intervention: 16 minutes.        Medical ROS  General ROS: negative  Ophthalmic ROS: negative  ENT ROS: negative  Allergy and Immunology ROS: negative  Hematological and Lymphatic ROS: negative  Endocrine ROS: negative  Respiratory ROS: no cough, shortness of breath, or wheezing  Cardiovascular ROS: no chest pain or dyspnea on exertion  Gastrointestinal ROS: no abdominal pain, change in bowel habits, or black or bloody stools  Genito-Urinary ROS: no dysuria, trouble voiding, or hematuria  Musculoskeletal ROS: negative  Neurological ROS: no TIA or stroke symptoms  Dermatological ROS: negative        PAST MEDICAL HISTORY   Past Medical History:   Diagnosis Date    History of psychiatric hospitalization     Hx of psychiatric care     Psychiatric problem     Schizophrenia     Suicide attempt            PSYCHOTROPIC MEDICATIONS   Scheduled Meds:   ARIPiprazole  5 mg Oral Daily     "divalproex ER  1,250 mg Oral QHS    folic acid  1 mg Oral Daily    multivitamin  1 tablet Oral Daily     Continuous Infusions:  PRN Meds:.acetaminophen, aluminum-magnesium hydroxide-simethicone, docusate sodium, hydrOXYzine pamoate, loperamide, melatonin, nicotine, OLANZapine **AND** OLANZapine, ondansetron, promethazine, sodium chloride 0.9%        EXAMINATION    VITALS   Vitals:    02/08/23 1800 02/09/23 0816 02/09/23 1927 02/10/23 0802   BP: (!) 98/56 (!) 106/55 (!) 109/57 125/77   BP Location:  Left arm Left arm Left arm   Patient Position:  Lying Sitting Standing   Pulse: 67 82 64 80   Resp: 18 18 18 20   Temp: 98 °F (36.7 °C) 97.8 °F (36.6 °C) 98 °F (36.7 °C) 97 °F (36.1 °C)   TempSrc: Oral Oral Oral Oral   SpO2: 98% 100% 96% 99%   Weight: 73.5 kg (162 lb)      Height: 6' 3" (1.905 m)          Body mass index is 20.25 kg/m².        CONSTITUTIONAL  General Appearance: unremarkable, age appropriate, normal weight, well nourished; waering a wig    MUSCULOSKELETAL  Muscle Strength and Tone:no dyskinesia, no tremor, no tic  Abnormal Involuntary Movements: No  Gait and Station: non-ataxic    PSYCHIATRIC   Level of Consciousness: awake and alert   Orientation: person, place, time, and situation  Grooming: Casually dressed and Well groomed  Psychomotor Behavior: normal, cooperative, friendly and cooperative, eye contact normal  Speech: normal tone, normal pitch, normal volume, spontaneous, rapid  Language: grossly intact, able to name, able to repeat  Mood: anxious and neutral  Affect: Anxious and Consistent with mood  Thought Process: circumstantial and tangential  Associations: intact   Thought Content: +delusions, denies SI, and denies HI  Perceptions: denies AH and denies  VH  Memory: Able to recall past events, Remote intact, and Recent intact  Attention:Decreased  Fund of Knowledge: Aware of current events and Vocabulary appropriate   Estimate if Intelligence:  Average based on work/education history, vocabulary " and mental status exam  Insight: intact, has awareness of illness- fair  Judgment: behavior is adequate to circumstances- fair        DIAGNOSTIC TESTING   Laboratory Results  No results found for this or any previous visit (from the past 24 hour(s)).          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     Cannabis abuse     Psychosocial stressors    PROBLEM LIST AND MANAGEMENT PLANS    Bipolar disorder, type I, MRE manic, severe with psychotic features: pt counseled  - Aristada 1064mg due Feb 20, 2023  - resume Abilify at 5 mg PO daily- increase to 7 mg po q day   -resume home Depakote ER at 1250 mg qhs- check level in 3 days  - follow up with outpatient mental health providers after discharge for medication management and psychotherapy  -consider adding adjunctive neuroleptic (haldol)    **Pt tried and failed several neuroleptics including risperdal, zyprexa, abilify and others"     Unspecified anxiety disorder  -started/continue hydroxyzine 50 mg PO q 6 hours PRN  -depakote off-label as above     Insomnia: pt counseled  -vistaril prn     Cannabis abuse: pt counseled  - SW consulted for assistance with additional resources      Psychosocial stressors  - pt counseled  - SW consulted for assistance with additional resources            Discussed diagnosis, risks and benefits of proposed treatment vs alternative treatments vs no treatment, potential side effects of these treatments and the inherent unpredictability of treatment. The patient expresses understanding of the above and displays the capacity to agree with this treatment given said understanding. Patient also agrees that, currently, the benefits outweigh the risks and would like to pursue/continue treatment at this time.    Any medications being used off-label were discussed with the patient inclusive of the evidence  base for the use of the medications and consent was obtained for the off-label use of the medication.       DISCHARGE PLANNING  Expected Disposition Plan: Home or Self Care      NEED FOR CONTINUED HOSPITALIZATION  Psychiatric illness continues to pose a potential threat to life or bodily function, of self or others, thereby requiring the need for continued inpatient psychiatric hospitalization: Yes, due to: significant psychotic thought disorder and gravely disabled, as evidenced by:  Ongoing concerns with grave disability with patient unable to perform basic feeding, hygiene and dressing activities without significant constant support. and Ongoing concerns with perceptual aberrancy and paranoid persecutory delusions leading to potential harm of self or others.    Protective inpatient pyschiatric hospitalization required while a safe disposition plan is enacted: Yes    Patient stabilized and ready for discharge from inpatient psychiatric unit: No        STAFF:   Frantz Cagle MD  Psychiatry

## 2023-02-11 PROCEDURE — 25000003 PHARM REV CODE 250: Performed by: PSYCHIATRY & NEUROLOGY

## 2023-02-11 PROCEDURE — 99232 SBSQ HOSP IP/OBS MODERATE 35: CPT | Mod: AF,HB,, | Performed by: STUDENT IN AN ORGANIZED HEALTH CARE EDUCATION/TRAINING PROGRAM

## 2023-02-11 PROCEDURE — 11400000 HC PSYCH PRIVATE ROOM

## 2023-02-11 PROCEDURE — 99232 PR SUBSEQUENT HOSPITAL CARE,LEVL II: ICD-10-PCS | Mod: AF,HB,, | Performed by: STUDENT IN AN ORGANIZED HEALTH CARE EDUCATION/TRAINING PROGRAM

## 2023-02-11 RX ADMIN — FOLIC ACID 1 MG: 1 TABLET ORAL at 08:02

## 2023-02-11 RX ADMIN — THERA TABS 1 TABLET: TAB at 08:02

## 2023-02-11 RX ADMIN — Medication 6 MG: at 08:02

## 2023-02-11 RX ADMIN — ARIPIPRAZOLE 7 MG: 2 TABLET ORAL at 08:02

## 2023-02-11 RX ADMIN — DIVALPROEX SODIUM 1250 MG: 500 TABLET, FILM COATED, EXTENDED RELEASE ORAL at 08:02

## 2023-02-11 NOTE — PROGRESS NOTES
"PSYCHIATRY DAILY INPATIENT PROGRESS NOTE  SUBSEQUENT HOSPITAL VISIT    ENCOUNTER DATE: 2/11/2023  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 The chart was reviewed.     Reviewed notes from Rns and SW and labs from the last 24 hours.    The patient's case was discussed with the treatment team/care providers today including Rns    Staff reports no behavioral or management issues.     The patient has been compliant with treatment.      Subjective 02/11/2023    Continues to report he is in a relationship with celebrity Raul Murphy. Reports he was cursed with [employer], "magic is real." Reports "I'm done with this conversation."    Symptoms of psychosis/carley persist, remain fx/bx impairing and continue to require inpatient stabilization.       The patient denies any side effects to medications.      Per RN:  Patient dressed in hospital paper scrubs. Patient has a blank stare with intense eye contact. Patient reports " I came here because I told my momma to clean the house because Raul Murphy was coming over. Then, she called the police to bring me here". Patient appears to have an obsession/delusional of having a relationship with Raul Murphy. Patient is illogical with delayed thoughts. The patient displays a delayed response of inappropriate laughter.  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 Patient reports " I do know En and I do not have to share that with y'all because I can read people".           Psychiatric ROS (observed, reported, or endorsed/denied):  Depressed mood - No  Interest/pleasure/anhedonia: No  Guilt/hopelessness/worthlessness - No  Changes in Sleep - No  Changes in Appetite - No  Changes in " Concentration - No  Changes in Energy - No  PMA/R- No  Suicidal- active/passive ideations - No  Homicidal ideations: active/passive ideations - No    Hallucinations - decreasing slowly  Delusions - Continuing  Disorganized behavior - Continuing  Disorganized speech - Continuing  Negative symptoms - No    Elevated mood - variable  Decreased need for sleep - variable  Grandiosity - variable  Racing thoughts - variable  Impulsivity - No  Irritability- variable  Increased energy - No  Distractibility - variable  Increase in goal-directed activity or PMA- No    Symptoms of KANU - No  Symptoms of Panic Disorder- No  Symptoms of PTSD - No        Overall progress: Patient is showing no improvement on the Unit to date            Medical ROS  General ROS: negative  Ophthalmic ROS: negative  ENT ROS: negative  Allergy and Immunology ROS: negative  Hematological and Lymphatic ROS: negative  Endocrine ROS: negative  Respiratory ROS: no cough, shortness of breath, or wheezing  Cardiovascular ROS: no chest pain or dyspnea on exertion  Gastrointestinal ROS: no abdominal pain, change in bowel habits, or black or bloody stools  Genito-Urinary ROS: no dysuria, trouble voiding, or hematuria  Musculoskeletal ROS: negative  Neurological ROS: no TIA or stroke symptoms  Dermatological ROS: negative        PAST MEDICAL HISTORY   Past Medical History:   Diagnosis Date    History of psychiatric hospitalization     Hx of psychiatric care     Psychiatric problem     Schizophrenia     Suicide attempt            PSYCHOTROPIC MEDICATIONS   Scheduled Meds:   ARIPiprazole  7 mg Oral Daily    divalproex ER  1,250 mg Oral QHS    folic acid  1 mg Oral Daily    multivitamin  1 tablet Oral Daily     Continuous Infusions:  PRN Meds:.acetaminophen, aluminum-magnesium hydroxide-simethicone, docusate sodium, hydrOXYzine pamoate, loperamide, melatonin, nicotine, OLANZapine **AND** OLANZapine, ondansetron, promethazine, sodium chloride  "0.9%        EXAMINATION    VITALS   Vitals:    02/09/23 1927 02/10/23 0802 02/10/23 1911 02/11/23 0715   BP: (!) 109/57 125/77 (!) 108/58 125/60   BP Location: Left arm Left arm Left arm Left arm   Patient Position: Sitting Standing Sitting Sitting   Pulse: 64 80 85 64   Resp: 18 20 16 20   Temp: 98 °F (36.7 °C) 97 °F (36.1 °C) 98.7 °F (37.1 °C) 98 °F (36.7 °C)   TempSrc: Oral Oral Oral Oral   SpO2: 96% 99% 97% 97%   Weight:       Height:           Body mass index is 20.25 kg/m².        CONSTITUTIONAL  General Appearance: unremarkable, age appropriate, normal weight, well nourished; waering a wig    MUSCULOSKELETAL  Muscle Strength and Tone:no dyskinesia, no tremor, no tic  Abnormal Involuntary Movements: No  Gait and Station: non-ataxic    PSYCHIATRIC   Level of Consciousness: awake and alert   Orientation: person, place, time, and situation  Grooming: Casually dressed and Well groomed  Psychomotor Behavior: reluctant to participate  Speech: normal tone, normal rate, normal pitch, normal volume  Language: grossly intact, able to name, able to repeat  Mood: "good"  Affect: Incongruent with mood  Thought Process: circumstantial and tangential  Associations: intact   Thought Content: +delusions, denies SI, and denies HI  Perceptions: denies AH and denies  VH  Memory: Able to recall past events, Remote intact, and Recent intact  Attention:Decreased  Fund of Knowledge: Aware of current events and Vocabulary appropriate   Estimate if Intelligence:  Average based on work/education history, vocabulary and mental status exam  Insight: intact, has awareness of illness- fair  Judgment: behavior is adequate to circumstances- fair        DIAGNOSTIC TESTING   Laboratory Results  No results found for this or any previous visit (from the past 24 hour(s)).          MEDICAL DECISION MAKING      ASSESSMENT:      Bipolar disorder, type I, MRE manic, severe with psychotic features  Unspecified anxiety disorder  Insomnia secondary to a " mental illness     Cannabis abuse     Psychosocial stressors        PROBLEM LIST AND MANAGEMENT PLANS      Bipolar disorder, type I, MRE manic, severe with psychotic features: pt counseled  - Aristada 1064mg due Feb 20, 2023  - resume Abilify at 5 mg PO daily- increased to 7 mg po q day   - resumed/continue home Depakote ER at 1250 mg qhs- check level in 3 days  - follow up with outpatient mental health providers after discharge for medication management and psychotherapy  -consider adding adjunctive neuroleptic (haldol)    **Pt tried and failed several neuroleptics including risperdal, zyprexa, abilify and others"     Unspecified anxiety disorder  -started/continue hydroxyzine 50 mg PO q 6 hours PRN  -depakote off-label as above     Insomnia: pt counseled  -vistaril prn     Cannabis abuse: pt counseled  - SW consulted for assistance with additional resources      Psychosocial stressors  - pt counseled  - SW consulted for assistance with additional resources            Discussed diagnosis, risks and benefits of proposed treatment vs alternative treatments vs no treatment, potential side effects of these treatments and the inherent unpredictability of treatment. The patient expresses understanding of the above and displays the capacity to agree with this treatment given said understanding. Patient also agrees that, currently, the benefits outweigh the risks and would like to pursue/continue treatment at this time.    Any medications being used off-label were discussed with the patient inclusive of the evidence base for the use of the medications and consent was obtained for the off-label use of the medication.       DISCHARGE PLANNING  Expected Disposition Plan: Home or Self Care      NEED FOR CONTINUED HOSPITALIZATION  Psychiatric illness continues to pose a potential threat to life or bodily function, of self or others, thereby requiring the need for continued inpatient psychiatric hospitalization: Yes, due to:  significant psychotic thought disorder and gravely disabled, as evidenced by:  Ongoing concerns with grave disability with patient unable to perform basic feeding, hygiene and dressing activities without significant constant support. and Ongoing concerns with perceptual aberrancy and paranoid persecutory delusions leading to potential harm of self or others.    Protective inpatient pyschiatric hospitalization required while a safe disposition plan is enacted: Yes    Patient stabilized and ready for discharge from inpatient psychiatric unit: No      The patient location is: [facility]    Visit type: audiovisual    Face to Face time with patient: 10  12 minutes of total time spent on the encounter, which includes face to face time and non-face to face time preparing to see the patient (eg, review of tests), Obtaining and/or reviewing separately obtained history, Documenting clinical information in the electronic or other health record, Independently interpreting results (not separately reported) and communicating results to the patient/family/caregiver, or Care coordination (not separately reported).     Each patient to whom he or she provides medical services by telemedicine is:  (1) informed of the relationship between the physician and patient and the respective role of any other health care provider with respect to management of the patient; and (2) notified that he or she may decline to receive medical services by telemedicine and may withdraw from such care at any time.          STAFF:   Cristhian Grijalva III, MD  Psychiatry

## 2023-02-11 NOTE — NURSING
Pt calm and cooperative with staff, complaint with medications, follows instructions.  Pt without complaint of pain.  Pt performed ADLs without prompting from staff.  Will continue to selina.

## 2023-02-11 NOTE — PLAN OF CARE
Problem: Violence Risk or Actual  Goal: Anger and Impulse Control  Outcome: Ongoing, Progressing     Problem: Adult Behavioral Health Plan of Care  Goal: Plan of Care Review  Outcome: Ongoing, Progressing  Goal: Patient-Specific Goal (Individualization)  Outcome: Ongoing, Progressing  Goal: Adheres to Safety Considerations for Self and Others  Outcome: Ongoing, Progressing  Goal: Absence of New-Onset Illness or Injury  Outcome: Ongoing, Progressing  Goal: Optimized Coping Skills in Response to Life Stressors  Outcome: Ongoing, Progressing  Goal: Develops/Participates in Therapeutic [place] to Support Successful Transition  Outcome: Ongoing, Progressing  Goal: Rounds/Family Conference  Outcome: Ongoing, Progressing     Problem: Behavior Regulation Impairment (Psychotic Signs/Symptoms)  Goal: Improved Behavioral Control (Psychotic Signs/Symptoms)  Outcome: Ongoing, Progressing     Problem: Decreased Participation and Engagement (Psychotic Signs/Symptoms)  Goal: Increased Participation and Engagement (Psychotic Signs/Symptoms)  Outcome: Ongoing, Progressing     Problem: Mood Impairment (Psychotic Signs/Symptoms)  Goal: Improved Mood Symptoms (Psychotic Signs/Symptoms)  Outcome: Ongoing, Progressing     Problem: Cognitive Impairment (Depressive Signs/Symptoms)  Goal: Optimized Cognitive Function  Outcome: Ongoing, Progressing     Problem: Feelings of Worthlessness, Hopelessness or Excessive Guilt (Depressive Signs/Symptoms)  Goal: Enhanced Self-Esteem and Confidence (Depressive Signs/Symptoms)  Outcome: Ongoing, Progressing     Problem: Social, Occupational or Functional Impairment (Depressive Signs/Symptoms)  Goal: Enhanced Social, Occupational or Functional Skills (Depressive Signs/Symptoms)  Outcome: Ongoing, Progressing

## 2023-02-12 PROCEDURE — 99232 PR SUBSEQUENT HOSPITAL CARE,LEVL II: ICD-10-PCS | Mod: AF,HB,, | Performed by: STUDENT IN AN ORGANIZED HEALTH CARE EDUCATION/TRAINING PROGRAM

## 2023-02-12 PROCEDURE — 11400000 HC PSYCH PRIVATE ROOM

## 2023-02-12 PROCEDURE — 25000003 PHARM REV CODE 250: Performed by: PSYCHIATRY & NEUROLOGY

## 2023-02-12 PROCEDURE — 99232 SBSQ HOSP IP/OBS MODERATE 35: CPT | Mod: AF,HB,, | Performed by: STUDENT IN AN ORGANIZED HEALTH CARE EDUCATION/TRAINING PROGRAM

## 2023-02-12 RX ADMIN — FOLIC ACID 1 MG: 1 TABLET ORAL at 08:02

## 2023-02-12 RX ADMIN — ARIPIPRAZOLE 7 MG: 2 TABLET ORAL at 08:02

## 2023-02-12 RX ADMIN — DIVALPROEX SODIUM 1250 MG: 500 TABLET, FILM COATED, EXTENDED RELEASE ORAL at 08:02

## 2023-02-12 RX ADMIN — Medication 6 MG: at 08:02

## 2023-02-12 RX ADMIN — THERA TABS 1 TABLET: TAB at 08:02

## 2023-02-12 NOTE — PROGRESS NOTES
"PSYCHIATRY DAILY INPATIENT PROGRESS NOTE  SUBSEQUENT HOSPITAL VISIT    ENCOUNTER DATE: 2/12/2023  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 The chart was reviewed.     Reviewed notes from Rns and labs from the last 24 hours.    The patient's case was discussed with the treatment team/care providers today including Rns    Staff reports no behavioral or management issues.     The patient has been compliant with treatment.        Subjective 02/12/2023    Patient more guarded regarding delusions today, continues to believe he is in a relationship with celebrity with Raul Murphy, "I know I love him."    Symptoms of psychosis/carley persist, remain fx/bx impairing and continue to require inpatient stabilization.     The patient denies any side effects to medications.        Psychiatric ROS (observed, reported, or endorsed/denied):  Depressed mood - No  Interest/pleasure/anhedonia: No  Guilt/hopelessness/worthlessness - No  Changes in Sleep - No  Changes in Appetite - No  Changes in Concentration - No  Changes in Energy - No  PMA/R- No  Suicidal- active/passive ideations - No  Homicidal ideations: active/passive ideations - No    Hallucinations - decreasing slowly  Delusions - Continuing  Disorganized behavior - Continuing  Disorganized speech - Continuing  Negative symptoms - No    Elevated mood - variable  Decreased need for sleep - variable  Grandiosity - variable  Racing thoughts - variable  Impulsivity - No  Irritability- variable  Increased energy - No  Distractibility - variable  Increase in goal-directed activity or PMA- No    Symptoms of KANU - No  Symptoms of Panic Disorder- No  Symptoms of PTSD - No        Overall progress: Patient is showing no improvement " on the Unit to date            Medical ROS  General ROS: negative  Ophthalmic ROS: negative  ENT ROS: negative  Allergy and Immunology ROS: negative  Hematological and Lymphatic ROS: negative  Endocrine ROS: negative  Respiratory ROS: no cough, shortness of breath, or wheezing  Cardiovascular ROS: no chest pain or dyspnea on exertion  Gastrointestinal ROS: no abdominal pain, change in bowel habits, or black or bloody stools  Genito-Urinary ROS: no dysuria, trouble voiding, or hematuria  Musculoskeletal ROS: negative  Neurological ROS: no TIA or stroke symptoms  Dermatological ROS: negative        PAST MEDICAL HISTORY   Past Medical History:   Diagnosis Date    History of psychiatric hospitalization     Hx of psychiatric care     Psychiatric problem     Schizophrenia     Suicide attempt            PSYCHOTROPIC MEDICATIONS   Scheduled Meds:   ARIPiprazole  7 mg Oral Daily    divalproex ER  1,250 mg Oral QHS    folic acid  1 mg Oral Daily    multivitamin  1 tablet Oral Daily     Continuous Infusions:  PRN Meds:.acetaminophen, aluminum-magnesium hydroxide-simethicone, docusate sodium, hydrOXYzine pamoate, loperamide, melatonin, nicotine, OLANZapine **AND** OLANZapine, ondansetron, promethazine, sodium chloride 0.9%        EXAMINATION    VITALS   Vitals:    02/10/23 1911 02/11/23 0715 02/11/23 1923 02/12/23 0708   BP: (!) 108/58 125/60 125/68 115/66   BP Location: Left arm Left arm Left arm Left arm   Patient Position: Sitting Sitting Sitting Sitting   Pulse: 85 64 (!) 59 70   Resp: 16 20 16 18   Temp: 98.7 °F (37.1 °C) 98 °F (36.7 °C) 98 °F (36.7 °C) 98.1 °F (36.7 °C)   TempSrc: Oral Oral Oral Oral   SpO2: 97% 97% 98% 98%   Weight:       Height:           Body mass index is 20.25 kg/m².        CONSTITUTIONAL  General Appearance: unremarkable, age appropriate, normal weight, well nourished; waering a wig    MUSCULOSKELETAL  Muscle Strength and Tone:no dyskinesia, no tremor, no tic  Abnormal Involuntary Movements:  "No  Gait and Station: non-ataxic    PSYCHIATRIC   Level of Consciousness: awake and alert   Orientation: person, place, time, and situation  Grooming: Casually dressed and Well groomed  Psychomotor Behavior: reluctant to participate  Speech: normal tone, normal rate, normal pitch, normal volume  Language: grossly intact, able to name, able to repeat  Mood: "good"  Affect: Incongruent with mood  Thought Process: circumstantial and tangential  Associations: intact   Thought Content: +delusions, denies SI, and denies HI  Perceptions: denies AH and denies  VH  Memory: Able to recall past events, Remote intact, and Recent intact  Attention:Decreased  Fund of Knowledge: Aware of current events and Vocabulary appropriate   Estimate if Intelligence:  Average based on work/education history, vocabulary and mental status exam  Insight: intact, has awareness of illness- fair  Judgment: behavior is adequate to circumstances- fair        DIAGNOSTIC TESTING   Laboratory Results  No results found for this or any previous visit (from the past 24 hour(s)).          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     Cannabis abuse     Psychosocial stressors        PROBLEM LIST AND MANAGEMENT PLANS      Bipolar disorder, type I, MRE manic, severe with psychotic features: pt counseled  - Aristada 1064mg due Feb 20, 2023  - resume Abilify at 5 mg PO daily- increased to 7 mg po q day   - resumed/continue home Depakote ER at 1250 mg qhs- check level in 3 days  - follow up with outpatient mental health providers after discharge for medication management and psychotherapy  -consider adding adjunctive neuroleptic (haldol)    **Pt tried and failed several neuroleptics including risperdal, zyprexa, abilify and others""     Unspecified anxiety disorder  -started/continue hydroxyzine 50 mg PO q 6 hours " PRN  -depakote off-label as above     Insomnia: pt counseled  -vistaril prn     Cannabis abuse: pt counseled  - SW consulted for assistance with additional resources      Psychosocial stressors  - pt counseled  - SW consulted for assistance with additional resources            Discussed diagnosis, risks and benefits of proposed treatment vs alternative treatments vs no treatment, potential side effects of these treatments and the inherent unpredictability of treatment. The patient expresses understanding of the above and displays the capacity to agree with this treatment given said understanding. Patient also agrees that, currently, the benefits outweigh the risks and would like to pursue/continue treatment at this time.    Any medications being used off-label were discussed with the patient inclusive of the evidence base for the use of the medications and consent was obtained for the off-label use of the medication.       DISCHARGE PLANNING  Expected Disposition Plan: Home or Self Care      NEED FOR CONTINUED HOSPITALIZATION  Psychiatric illness continues to pose a potential threat to life or bodily function, of self or others, thereby requiring the need for continued inpatient psychiatric hospitalization: Yes, due to: significant psychotic thought disorder and gravely disabled, as evidenced by:  Ongoing concerns with grave disability with patient unable to perform basic feeding, hygiene and dressing activities without significant constant support. and Ongoing concerns with perceptual aberrancy and paranoid persecutory delusions leading to potential harm of self or others.    Protective inpatient pyschiatric hospitalization required while a safe disposition plan is enacted: Yes    Patient stabilized and ready for discharge from inpatient psychiatric unit: No      The patient location is: [facility]    Visit type: audiovisual    Face to Face time with patient: 12  15 minutes of total time spent on the encounter,  which includes face to face time and non-face to face time preparing to see the patient (eg, review of tests), Obtaining and/or reviewing separately obtained history, Documenting clinical information in the electronic or other health record, Independently interpreting results (not separately reported) and communicating results to the patient/family/caregiver, or Care coordination (not separately reported).     Each patient to whom he or she provides medical services by telemedicine is:  (1) informed of the relationship between the physician and patient and the respective role of any other health care provider with respect to management of the patient; and (2) notified that he or she may decline to receive medical services by telemedicine and may withdraw from such care at any time.          STAFF:   Cristhian Grijalva III, MD  Psychiatry

## 2023-02-12 NOTE — PLAN OF CARE
Patient up early, took a shower this am, states his mood is good, appears calm and is cooperative. Patient remains with delusional thoughts, flight of ideas for after discharge, ambulates ad divina with arms folded, looking around stares at times, approaches staff frequently in conversation and making needs known. Appetite is good and is medication complaint. Patient denies depression and anxiety, denies A/VH, denies S/HI. No negative behaviors noted. Patient is sitting in the dining room  engaging with peers at this time, enjoys reading his book, and writing in his journal during the day. Dr. Grijalva visited per telemed with no new orders noted. Close observations continued and safe environment maintained.

## 2023-02-12 NOTE — PLAN OF CARE
Problem: Violence Risk or Actual  Goal: Anger and Impulse Control  Outcome: Ongoing, Progressing     Problem: Adult Behavioral Health Plan of Care  Goal: Plan of Care Review  Outcome: Ongoing, Progressing  Goal: Patient-Specific Goal (Individualization)  Outcome: Ongoing, Progressing  Goal: Adheres to Safety Considerations for Self and Others  Outcome: Ongoing, Progressing  Goal: Absence of New-Onset Illness or Injury  Outcome: Ongoing, Progressing  Goal: Optimized Coping Skills in Response to Life Stressors  Outcome: Ongoing, Progressing  Goal: Develops/Participates in Therapeutic [place] to Support Successful Transition  Outcome: Ongoing, Progressing  Goal: Rounds/Family Conference  Outcome: Ongoing, Progressing     Problem: Behavior Regulation Impairment (Psychotic Signs/Symptoms)  Goal: Improved Behavioral Control (Psychotic Signs/Symptoms)  Outcome: Ongoing, Progressing     Problem: Decreased Participation and Engagement (Psychotic Signs/Symptoms)  Goal: Increased Participation and Engagement (Psychotic Signs/Symptoms)  Outcome: Ongoing, Progressing     Problem: Mood Impairment (Psychotic Signs/Symptoms)  Goal: Improved Mood Symptoms (Psychotic Signs/Symptoms)  Outcome: Ongoing, Progressing     Problem: Cognitive Impairment (Depressive Signs/Symptoms)  Goal: Optimized Cognitive Function  Outcome: Ongoing, Progressing     Problem: Feelings of Worthlessness, Hopelessness or Excessive Guilt (Depressive Signs/Symptoms)  Goal: Enhanced Self-Esteem and Confidence (Depressive Signs/Symptoms)  Outcome: Ongoing, Progressing     Problem: Social, Occupational or Functional Impairment (Depressive Signs/Symptoms)  Goal: Enhanced Social, Occupational or Functional Skills (Depressive Signs/Symptoms)  Outcome: Ongoing, Progressing

## 2023-02-12 NOTE — NURSING
Pt. A/A.  No C/O pain.  Flat affect.  Denies SI/HI/AVH currently.  Pt. Stays to self a lot.  Calm.  Does have some anxiety.  Compliant with medications and follows instructions.  Will continue to observe.

## 2023-02-13 LAB — VALPROATE SERPL-MCNC: 4 UG/ML (ref 50–100)

## 2023-02-13 PROCEDURE — 99232 SBSQ HOSP IP/OBS MODERATE 35: CPT | Mod: SA,HB,, | Performed by: PSYCHIATRY & NEUROLOGY

## 2023-02-13 PROCEDURE — 90833 PR PSYCHOTHERAPY W/PATIENT W/E&M, 30 MIN (ADD ON): ICD-10-PCS | Mod: SA,HB,, | Performed by: PSYCHIATRY & NEUROLOGY

## 2023-02-13 PROCEDURE — 25000003 PHARM REV CODE 250: Performed by: PSYCHIATRY & NEUROLOGY

## 2023-02-13 PROCEDURE — 80164 ASSAY DIPROPYLACETIC ACD TOT: CPT | Performed by: PSYCHIATRY & NEUROLOGY

## 2023-02-13 PROCEDURE — 11400000 HC PSYCH PRIVATE ROOM

## 2023-02-13 PROCEDURE — 36415 COLL VENOUS BLD VENIPUNCTURE: CPT | Performed by: PSYCHIATRY & NEUROLOGY

## 2023-02-13 PROCEDURE — 99232 PR SUBSEQUENT HOSPITAL CARE,LEVL II: ICD-10-PCS | Mod: SA,HB,, | Performed by: PSYCHIATRY & NEUROLOGY

## 2023-02-13 PROCEDURE — 90833 PSYTX W PT W E/M 30 MIN: CPT | Mod: SA,HB,, | Performed by: PSYCHIATRY & NEUROLOGY

## 2023-02-13 RX ADMIN — THERA TABS 1 TABLET: TAB at 08:02

## 2023-02-13 RX ADMIN — DIVALPROEX SODIUM 1250 MG: 500 TABLET, FILM COATED, EXTENDED RELEASE ORAL at 08:02

## 2023-02-13 RX ADMIN — FOLIC ACID 1 MG: 1 TABLET ORAL at 08:02

## 2023-02-13 RX ADMIN — ARIPIPRAZOLE 7 MG: 2 TABLET ORAL at 08:02

## 2023-02-13 NOTE — PLAN OF CARE
"POC reviewed this shift and is on going. Patient is withdrawn, calm, cooperative, quiet, pacing, and paranoid. Denies Suicidal Ideation, Homicidal Ideation, Auditory Hallucinations, Visual Hallucinations, Tactile Hallucinations, Gustatory Hallucinations, and Delusions. Patient acting bizarre, saying he is going to start a "spiritual fast" like Bo did not eating or drinking for 20 days. Patient also mentioned he is disappointed in his mother. ENRIQUE Aguilar did not change his medications today. Pt continues to be medication compliant and staff will continue to monitor pt closely while on the unit.    "

## 2023-02-13 NOTE — NURSING
Pt. A/A.  No C/O pain.  Flat affect.  Pt. Walking around unit stopping at time to watch T.V.  Pt. Slightly suspicious at times.  Denies SI/hi/avh CURRENTLY .  Does have anxiety.  Compliant with medications and follows instructions.  Will continue to observe.

## 2023-02-13 NOTE — PLAN OF CARE
Problem: Violence Risk or Actual  Goal: Anger and Impulse Control  Outcome: Ongoing, Progressing     Problem: Adult Behavioral Health Plan of Care  Goal: Plan of Care Review  Outcome: Ongoing, Progressing  Goal: Patient-Specific Goal (Individualization)  Outcome: Ongoing, Progressing  Goal: Adheres to Safety Considerations for Self and Others  Outcome: Ongoing, Progressing  Goal: Absence of New-Onset Illness or Injury  Outcome: Ongoing, Progressing  Goal: Optimized Coping Skills in Response to Life Stressors  Outcome: Ongoing, Progressing  Goal: Develops/Participates in Therapeutic [place] to Support Successful Transition  Outcome: Ongoing, Progressing  Goal: Rounds/Family Conference  Outcome: Ongoing, Progressing     Problem: Behavior Regulation Impairment (Psychotic Signs/Symptoms)  Goal: Improved Behavioral Control (Psychotic Signs/Symptoms)  Outcome: Ongoing, Progressing     Problem: Decreased Participation and Engagement (Psychotic Signs/Symptoms)  Goal: Increased Participation and Engagement (Psychotic Signs/Symptoms)  Outcome: Ongoing, Progressing     Problem: Mood Impairment (Psychotic Signs/Symptoms)  Goal: Improved Mood Symptoms (Psychotic Signs/Symptoms)  Outcome: Ongoing, Progressing     Problem: Cognitive Impairment (Depressive Signs/Symptoms)  Goal: Optimized Cognitive Function  Outcome: Ongoing, Progressing     Problem: Feelings of Worthlessness, Hopelessness or Excessive Guilt (Depressive Signs/Symptoms)  Goal: Enhanced Self-Esteem and Confidence (Depressive Signs/Symptoms)  Outcome: Ongoing, Progressing     Problem: Social, Occupational or Functional Impairment (Depressive Signs/Symptoms)  Goal: Enhanced Social, Occupational or Functional Skills (Depressive Signs/Symptoms)  Outcome: Ongoing, Progressing

## 2023-02-13 NOTE — PROGRESS NOTES
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 The chart was reviewed.     Reviewed notes from Rns and labs from the last 24 hours.    The patient's case was discussed with the treatment team/care providers today including Rns    Staff reports no behavioral or management issues.     The patient has been compliant with treatment.        Subjective 02/13/2023    Patient reports mood is "fine," affect blunted, somewhat bizarre. Patient refuses to state whether he is experiencing hallucinations of any type, however he does maintain that he "loves Raul Murphy, nothing is going to change that."    Today pt reports that he discontinued depakote at home due to side-effects including headache and nausea. He states that he did not try any OTC medications for these symptoms. He denies any such symptomology today and is encouraged to inform staff if PRN is needed.     Patient mentioned during assessment that he plans to "fast for 20 days and 20 nights, no food or water. That's what Bo did." Patient informed that this is not safe, to which he replied "Well Bo did it. Do you believe in miracles?"    Toward the end of the assessment, patient became quite guarded and mostly stared at the floor providing minimal response to assessment questions.     Note: Spoke to Corie, patient's outpatient . She states that patient has not received any injections outpatient and has been refusing PO medication at home.         Psychiatric ROS (observed, reported, or endorsed/denied):  Depressed mood - No  Interest/pleasure/anhedonia: No  Guilt/hopelessness/worthlessness - " No  Changes in Sleep - No  Changes in Appetite - No  Changes in Concentration - No  Changes in Energy - No  PMA/R- No  Suicidal- active/passive ideations - No  Homicidal ideations: active/passive ideations - No    Hallucinations - variable  Delusions - Continuing  Disorganized behavior - Continuing  Disorganized speech - Continuing  Negative symptoms - No    Elevated mood - less  Decreased need for sleep - less  Grandiosity - variable  Racing thoughts - variable  Impulsivity - No  Irritability- variable  Increased energy - No  Distractibility - variable  Increase in goal-directed activity or PMA- No    Symptoms of KANU - No  Symptoms of Panic Disorder- No  Symptoms of PTSD - No        Overall progress: Patient is showing no improvement on the Unit to date            Medical ROS  General ROS: negative  Ophthalmic ROS: negative  ENT ROS: negative  Allergy and Immunology ROS: negative  Hematological and Lymphatic ROS: negative  Endocrine ROS: negative  Respiratory ROS: no cough, shortness of breath, or wheezing  Cardiovascular ROS: no chest pain or dyspnea on exertion  Gastrointestinal ROS: no abdominal pain, change in bowel habits, or black or bloody stools  Genito-Urinary ROS: no dysuria, trouble voiding, or hematuria  Musculoskeletal ROS: negative  Neurological ROS: no TIA or stroke symptoms  Dermatological ROS: negative      Psychotherapy:  Target symptoms: psychosis  Why chosen therapy is appropriate versus another modality: relevant to diagnosis, evidence based practice  Outcome monitoring methods: self-report, observation  Therapeutic intervention type: insight oriented psychotherapy, supportive psychotherapy  Topics discussed/themes: building skills sets for symptom management, symptom recognition  The patient's response to the intervention is guarded. The patient's progress toward treatment goals is limited.   Duration of intervention: 16 minutes.        PAST MEDICAL HISTORY   Past Medical History:   Diagnosis  "Date    History of psychiatric hospitalization     Hx of psychiatric care     Psychiatric problem     Schizophrenia     Suicide attempt            PSYCHOTROPIC MEDICATIONS   Scheduled Meds:   ARIPiprazole  7 mg Oral Daily    divalproex ER  1,250 mg Oral QHS    folic acid  1 mg Oral Daily    multivitamin  1 tablet Oral Daily     Continuous Infusions:  PRN Meds:.acetaminophen, aluminum-magnesium hydroxide-simethicone, docusate sodium, hydrOXYzine pamoate, loperamide, melatonin, nicotine, OLANZapine **AND** OLANZapine, ondansetron, promethazine, sodium chloride 0.9%        EXAMINATION    VITALS   Vitals:    02/11/23 1923 02/12/23 0708 02/12/23 1919 02/13/23 0801   BP: 125/68 115/66 119/61 121/65   BP Location: Left arm Left arm Left arm Left arm   Patient Position: Sitting Sitting Sitting Standing   Pulse: (!) 59 70 72 76   Resp: 16 18 16 20   Temp: 98 °F (36.7 °C) 98.1 °F (36.7 °C) 99.1 °F (37.3 °C) 98 °F (36.7 °C)   TempSrc: Oral Oral Oral Oral   SpO2: 98% 98% 97% 96%   Weight:       Height:           Body mass index is 20.25 kg/m².      CONSTITUTIONAL  General Appearance: unremarkable, age appropriate, normal weight, well nourished; waering a wig    MUSCULOSKELETAL  Muscle Strength and Tone:no dyskinesia, no tremor, no tic  Abnormal Involuntary Movements: No  Gait and Station: non-ataxic    PSYCHIATRIC   Level of Consciousness: awake and alert   Orientation: person, place, time, and situation  Grooming: Casually dressed and Well groomed  Psychomotor Behavior: reluctant to participate  Speech: normal tone, normal rate, normal pitch, normal volume  Language: grossly intact, able to name, able to repeat  Mood: "Fine"  Affect: Blunted, Constricted, and Incongruent with mood  Thought Process: circumstantial    Associations: intact   Thought Content: +delusions, denies SI, and denies HI  Perceptions: denies AH and denies  VH  Memory: Able to recall past events, Remote intact, and Recent intact  Attention:Decreased  Fund " of Knowledge: Aware of current events and Vocabulary appropriate   Estimate if Intelligence:  Average based on work/education history, vocabulary and mental status exam  Insight: poor awareness of illness  Judgment: behavior is adequate to circumstances- fair      DIAGNOSTIC TESTING   Laboratory Results  Recent Results (from the past 24 hour(s))   Valproic Acid    Collection Time: 02/13/23  5:30 AM   Result Value Ref Range    Valproic Acid Level 4 (L) 50 - 100 ug/mL         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 Will likely consider switching antipsychotics due to limited efficacy as evidence by two subsequent hospitalizations following December aristada injection. Will discontinue PO abilify at this time. May consider haldol as patient has failed multiple 2GA trials.     - resumed/continue home Depakote ER at 1250 mg qhs   -Depakote level =4. Staff informed and instructed to monitor patient during med pass as he is suspected to be cheeking meds.     - follow up with outpatient mental health providers after discharge for medication management and psychotherapy  -consider adding adjunctive neuroleptic (haldol)    **Pt tried and failed several neuroleptics including risperdal, zyprexa, abilify and others"     Unspecified anxiety disorder  -started/continue hydroxyzine 50 mg PO q 6 hours PRN  -depakote off-label as above     Insomnia: pt counseled  -vistaril prn     Cannabis abuse: pt counseled  - SW consulted for assistance with additional resources      Psychosocial stressors  - pt counseled  - SW consulted for assistance with additional resources      Discussed  diagnosis, risks and benefits of proposed treatment vs alternative treatments vs no treatment, potential side effects of these treatments and the inherent unpredictability of treatment. The patient expresses understanding of the above and displays the capacity to agree with this treatment given said understanding. Patient also agrees that, currently, the benefits outweigh the risks and would like to pursue/continue treatment at this time.    Any medications being used off-label were discussed with the patient inclusive of the evidence base for the use of the medications and consent was obtained for the off-label use of the medication.       DISCHARGE PLANNING  Expected Disposition Plan: To be determined      NEED FOR CONTINUED HOSPITALIZATION  Psychiatric illness continues to pose a potential threat to life or bodily function, of self or others, thereby requiring the need for continued inpatient psychiatric hospitalization: Yes, due to: significant psychotic thought disorder and gravely disabled, as evidenced by:  Ongoing concerns with grave disability with patient unable to perform basic feeding, hygiene and dressing activities without significant constant support. and Ongoing concerns with perceptual aberrancy and paranoid persecutory delusions leading to potential harm of self or others.    Protective inpatient pyschiatric hospitalization required while a safe disposition plan is enacted: Yes    Patient stabilized and ready for discharge from inpatient psychiatric unit: No      STAFF:   [facility]  Psychiatry

## 2023-02-14 PROCEDURE — 90833 PSYTX W PT W E/M 30 MIN: CPT | Mod: SA,HB,, | Performed by: PSYCHIATRY & NEUROLOGY

## 2023-02-14 PROCEDURE — 90833 PR PSYCHOTHERAPY W/PATIENT W/E&M, 30 MIN (ADD ON): ICD-10-PCS | Mod: SA,HB,, | Performed by: PSYCHIATRY & NEUROLOGY

## 2023-02-14 PROCEDURE — 99232 SBSQ HOSP IP/OBS MODERATE 35: CPT | Mod: SA,HB,, | Performed by: PSYCHIATRY & NEUROLOGY

## 2023-02-14 PROCEDURE — 11400000 HC PSYCH PRIVATE ROOM

## 2023-02-14 PROCEDURE — 99232 PR SUBSEQUENT HOSPITAL CARE,LEVL II: ICD-10-PCS | Mod: SA,HB,, | Performed by: PSYCHIATRY & NEUROLOGY

## 2023-02-14 PROCEDURE — 25000003 PHARM REV CODE 250: Performed by: PSYCHIATRY & NEUROLOGY

## 2023-02-14 RX ORDER — VALPROIC ACID 250 MG/5ML
500 SOLUTION ORAL 2 TIMES DAILY
Status: DISCONTINUED | OUTPATIENT
Start: 2023-02-14 | End: 2023-02-17

## 2023-02-14 RX ADMIN — VALPROIC ACID 500 MG: 250 SOLUTION ORAL at 12:02

## 2023-02-14 RX ADMIN — VALPROIC ACID 500 MG: 250 SOLUTION ORAL at 08:02

## 2023-02-14 RX ADMIN — THERA TABS 1 TABLET: TAB at 08:02

## 2023-02-14 RX ADMIN — FOLIC ACID 1 MG: 1 TABLET ORAL at 08:02

## 2023-02-14 NOTE — PLAN OF CARE
POC reviewed this shift and is on going. Patient is withdrawn, calm, cooperative, quiet, and anxious. Denies Suicidal Ideation, Homicidal Ideation, Auditory Hallucinations, Visual Hallucinations, Tactile Hallucinations, Gustatory Hallucinations, and Delusions. Patient has been out on the floor at times today. Patient also has been isolating in his room as well. Patient participated in the group that was conducted today on the unit. ENRIQUE Aguilar discontinued his depakote pill form and ordered valproate liquid 500mg BID. Pt continues to be medication compliant and staff will continue to monitor pt closely while on the unit.

## 2023-02-14 NOTE — NURSING
This RN witnessed the patient reacting to internal stimuli during supper. Patient was witnessed mumbling and laughing to himself all through supper.

## 2023-02-14 NOTE — PLAN OF CARE
POC reviewed this shift and is on going.  Pt on unit participating with staff. Pt withdrawn to peers and spends a lot of time in his room. On unit for vs, snacks, and meds. No adverse reactions, pt is + for auditory hallucinations. Denies SI, will continue to monitor for changes and safety.

## 2023-02-14 NOTE — PROGRESS NOTES
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 The chart was reviewed.     Reviewed notes from Rns and labs from the last 24 hours.    The patient's case was discussed with the treatment team/care providers today including Rns    Staff reports no behavioral or management issues.     The patient has been compliant with treatment.        Subjective 2023    Patient reports mood is "fine," affect blunted, somewhat bizarre. Patient was informed that depakote level was 4 yesterday and was asked if he has been cheeking his medications. His initial response was "No, the nurses watch me take it." However, moments later he stated "sometimes it comes up, like indigestion."    Continues to demonstrate lack of insight into illness. States that he does not feel he needs medication but will "take it if y'all say I have to." He continues to blame all of his hospitalizations on his mother, stating "you don't understand the way she talks to me. If you did, you would understand the way I react."     Patient is requesting discharge by Friday, stating that he would like to attend his grandmother's  in [place]. This has not yet been verified.       Psychiatric ROS (observed, reported, or endorsed/denied):  Depressed mood - No  Interest/pleasure/anhedonia: No  Guilt/hopelessness/worthlessness - No  Changes in Sleep - No  Changes in Appetite - No  Changes in Concentration - No  Changes in Energy - No  PMA/R- No  Suicidal- active/passive ideations - No  Homicidal ideations: active/passive ideations - No    Hallucinations - variable  Delusions - Continuing  Disorganized behavior " - Continuing  Disorganized speech - Continuing  Negative symptoms - No    Elevated mood - less  Decreased need for sleep - less  Grandiosity - variable  Racing thoughts - variable  Impulsivity - No  Irritability- variable  Increased energy - No  Distractibility - variable  Increase in goal-directed activity or PMA- No    Symptoms of KANU - No  Symptoms of Panic Disorder- No  Symptoms of PTSD - No        Overall progress: Patient is showing no improvement on the Unit to date            Medical ROS  General ROS: negative  Ophthalmic ROS: negative  ENT ROS: negative  Allergy and Immunology ROS: negative  Hematological and Lymphatic ROS: negative  Endocrine ROS: negative  Respiratory ROS: no cough, shortness of breath, or wheezing  Cardiovascular ROS: no chest pain or dyspnea on exertion  Gastrointestinal ROS: no abdominal pain, change in bowel habits, or black or bloody stools  Genito-Urinary ROS: no dysuria, trouble voiding, or hematuria  Musculoskeletal ROS: negative  Neurological ROS: no TIA or stroke symptoms  Dermatological ROS: negative      Psychotherapy:  Target symptoms: psychosis  Why chosen therapy is appropriate versus another modality: relevant to diagnosis, evidence based practice  Outcome monitoring methods: self-report, observation  Therapeutic intervention type: insight oriented psychotherapy, supportive psychotherapy  Topics discussed/themes: building skills sets for symptom management, symptom recognition  The patient's response to the intervention is guarded. The patient's progress toward treatment goals is limited.   Duration of intervention: 16 minutes.        PAST MEDICAL HISTORY   Past Medical History:   Diagnosis Date    History of psychiatric hospitalization     Hx of psychiatric care     Psychiatric problem     Schizophrenia     Suicide attempt            PSYCHOTROPIC MEDICATIONS   Scheduled Meds:   divalproex ER  1,250 mg Oral QHS    folic acid  1 mg Oral Daily    multivitamin  1 tablet Oral  "Daily     Continuous Infusions:  PRN Meds:.acetaminophen, aluminum-magnesium hydroxide-simethicone, docusate sodium, hydrOXYzine pamoate, loperamide, melatonin, nicotine, OLANZapine **AND** OLANZapine, ondansetron, promethazine, sodium chloride 0.9%        EXAMINATION    VITALS   Vitals:    02/12/23 1919 02/13/23 0801 02/13/23 2000 02/14/23 0748   BP: 119/61 121/65 (!) 128/57 105/67   BP Location: Left arm Left arm Left arm Left arm   Patient Position: Sitting Standing Standing Sitting   Pulse: 72 76 63 73   Resp: 16 20 18 20   Temp: 99.1 °F (37.3 °C) 98 °F (36.7 °C) 97.9 °F (36.6 °C) 98.7 °F (37.1 °C)   TempSrc: Oral Oral Oral Oral   SpO2: 97% 96% 97% 97%   Weight:       Height:           Body mass index is 20.25 kg/m².      CONSTITUTIONAL  General Appearance: unremarkable, age appropriate, normal weight, well nourished; waering a wig    MUSCULOSKELETAL  Muscle Strength and Tone:no dyskinesia, no tremor, no tic  Abnormal Involuntary Movements: No  Gait and Station: non-ataxic    PSYCHIATRIC   Level of Consciousness: awake and alert   Orientation: person, place, time, and situation  Grooming: Casually dressed and Well groomed  Psychomotor Behavior: reluctant to participate  Speech: normal tone, normal rate, normal pitch, normal volume  Language: grossly intact, able to name, able to repeat  Mood: "Fine"  Affect: Blunted, improving somewhat  Thought Process: circumstantial  - IMproving  Associations: intact   Thought Content: +delusions, denies SI, and denies HI  Perceptions: denies AH and denies  VH  Memory: Able to recall past events, Remote intact, and Recent intact  Attention:Decreased  Fund of Knowledge: Aware of current events and Vocabulary appropriate   Estimate if Intelligence:  Average based on work/education history, vocabulary and mental status exam  Insight: poor awareness of illness  Judgment: behavior is adequate to circumstances- fair      DIAGNOSTIC TESTING   Laboratory Results  No results found for " this or any previous visit (from the past 24 hour(s)).        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      - resumed/continue home Depakote ER at 1250 mg qhs   -Depakote level =4. Staff informed and instructed to monitor patient during med pass as he is suspected to be cheeking meds.   -Switch to depakene liquid, 500 mg BID, check level in 3 days    - follow up with outpatient mental health providers after discharge for medication management and psychotherapy  -consider adding adjunctive neuroleptic (haldol)    **Pt tried and failed several neuroleptics including risperdal, zyprexa, abilify and others"     Unspecified anxiety disorder  -started/continue hydroxyzine 50 mg PO q 6 hours PRN  -depakote off-label as above     Insomnia: pt counseled  -vistaril prn     Cannabis abuse: pt counseled  - SW consulted for assistance with additional resources      Psychosocial stressors  - pt counseled  - SW consulted for assistance with additional resources      Discussed diagnosis, risks and benefits of proposed treatment vs alternative treatments vs no treatment, potential side effects of these treatments and the inherent unpredictability of treatment. The patient expresses understanding of the above and displays the capacity to agree with this treatment given said understanding. Patient also agrees that, currently, the benefits outweigh the risks and would like to pursue/continue treatment at this time.    Any medications being used off-label were discussed with the patient inclusive of the evidence base for the use of the medications and consent was obtained for the  off-label use of the medication.       DISCHARGE PLANNING  Expected Disposition Plan: To be determined      NEED FOR CONTINUED HOSPITALIZATION  Psychiatric illness continues to pose a potential threat to life or bodily function, of self or others, thereby requiring the need for continued inpatient psychiatric hospitalization: Yes, due to: significant psychotic thought disorder and gravely disabled, as evidenced by:  Ongoing concerns with grave disability with patient unable to perform basic feeding, hygiene and dressing activities without significant constant support. and Ongoing concerns with perceptual aberrancy and paranoid persecutory delusions leading to potential harm of self or others.    Protective inpatient pyschiatric hospitalization required while a safe disposition plan is enacted: Yes    Patient stabilized and ready for discharge from inpatient psychiatric unit: No      STAFF:   VIN Stafford  Psychiatry

## 2023-02-14 NOTE — PLAN OF CARE
"CSW spoke with pt's mother Nabil Soria regarding discharge and grandmother's . Pt mother stated, "he is not in his right mind, why is he even being giving an option as of now." Nabil stated, " he is requesting to be discharge to go to  but most likely would not attend."     Nabil stated pt called her last night saying he will be discharged today and asked her to buy him a plane ticket for [place].     Nabil is asking that we do not pt of phone call, "he will be pissed at me."       CSW informed MD of mother concerns, MD has agreed not to discharge pt due to his mental health being the priority.   "

## 2023-02-15 PROCEDURE — 99232 SBSQ HOSP IP/OBS MODERATE 35: CPT | Mod: SA,HB,, | Performed by: PSYCHIATRY & NEUROLOGY

## 2023-02-15 PROCEDURE — 25000003 PHARM REV CODE 250: Performed by: PSYCHIATRY & NEUROLOGY

## 2023-02-15 PROCEDURE — 90836 PSYTX W PT W E/M 45 MIN: CPT | Mod: SA,HB,, | Performed by: PSYCHIATRY & NEUROLOGY

## 2023-02-15 PROCEDURE — 90836 PR PSYCHOTHERAPY W/PATIENT W/E&M, 45 MIN (ADD ON): ICD-10-PCS | Mod: SA,HB,, | Performed by: PSYCHIATRY & NEUROLOGY

## 2023-02-15 PROCEDURE — 11400000 HC PSYCH PRIVATE ROOM

## 2023-02-15 PROCEDURE — 99232 PR SUBSEQUENT HOSPITAL CARE,LEVL II: ICD-10-PCS | Mod: SA,HB,, | Performed by: PSYCHIATRY & NEUROLOGY

## 2023-02-15 RX ADMIN — THERA TABS 1 TABLET: TAB at 08:02

## 2023-02-15 RX ADMIN — FOLIC ACID 1 MG: 1 TABLET ORAL at 08:02

## 2023-02-15 RX ADMIN — VALPROIC ACID 500 MG: 250 SOLUTION ORAL at 08:02

## 2023-02-15 RX ADMIN — Medication 6 MG: at 08:02

## 2023-02-15 NOTE — PLAN OF CARE
POC reviewed this shift and is on going. Patient is withdrawn, cooperative, quiet, and anxious.does not  Endorse any ideations,. Patient conts to respond out loud to internal stimuli,exhibiting delayed responses during interacting,continues to act suspicious,refuses to acknowledge internal stimuli,patient have started to laugh inappropriately,smiling with lip movement,there have been a medication change ,new med.valproic acid liquid,dose was given @ hs,patient was monitored for 30mins.after medication,on bedrest at this time resp.even and unlabored,conts to be monitored as per protocol.

## 2023-02-15 NOTE — PROGRESS NOTES
"PSYCHIATRY DAILY INPATIENT PROGRESS NOTE  SUBSEQUENT HOSPITAL VISIT    ENCOUNTER DATE: 2/15/2023  SITE: 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 The chart was reviewed.     Reviewed notes from Rns and labs from the last 24 hours.    The patient's case was discussed with the treatment team/care providers today including Rns    Staff reports no behavioral or management issues.     The patient has been compliant with treatment.        Subjective 02/15/2023    Patient reports mood is "good," affect blunted. Patient continues to lack insight into severity of illness. Discussed with patient possible ways to prevent future hospitalization. Patient replied "I think if I'm working full time, keeping busy will help  keep me out of here." He states he has not been working full time "for a few weeks", but prior to this was employed full time. Patient informed that , while he may not be actively employed at the moment, he was working full time at the time of several of his previous hospitalizations.     Discussed substance abuse, namely cannabis. Pt states he does not feel he uses this as a coping mechanism, rather "I just like the way the way it makes me feel. It helps me open up." Discussed potential adverse effects of cannabis use in relation to chronic mental health problems. Pt verbalized understanding and states he will attempt to abstain.     Discussed reasoning for switching from VPA tablet to liquid suspension. Pt continues to maintain that he was, by and large, taking this medication as prescribed.     He continues to deny auditory/visual hallucinations, however nursing notes indicate patient observed to be responding to internal stimuli as recently as last " night:    Moriah Thurston LPN   Licensed Nurse  Plan of Care  Signed  Creation Time:  2/15/2023 12:18 AM                                     POC reviewed this shift and is on going. Patient is withdrawn, cooperative, quiet, and anxious.does not  Endorse any ideations,. Patient conts to respond out loud to internal stimuli,exhibiting delayed responses during interacting,continues to act suspicious,refuses to acknowledge internal stimuli,patient have started to laugh inappropriately,smiling with lip movement,there have been a medication change ,new med.valproic acid liquid,dose was given @ hs,patient was monitored for 30mins.after medication,on bedrest at this time resp.even and unlabored,conts to be monitored as per protocol.              Psychiatric ROS (observed, reported, or endorsed/denied):  Depressed mood - No  Interest/pleasure/anhedonia: No  Guilt/hopelessness/worthlessness - No  Changes in Sleep - No  Changes in Appetite - No  Changes in Concentration - No  Changes in Energy - No  PMA/R- No  Suicidal- active/passive ideations - No  Homicidal ideations: active/passive ideations - No    Hallucinations - variable-Recently observed by staff   Delusions - Continuing  Disorganized behavior - fluctuating  Disorganized speech - fluctuating  Negative symptoms - No    Elevated mood - less  Decreased need for sleep - less  Grandiosity - variable  Racing thoughts - variable  Impulsivity - No  Irritability- variable  Increased energy - No  Distractibility - variable  Increase in goal-directed activity or PMA- No    Symptoms of KANU - No  Symptoms of Panic Disorder- No  Symptoms of PTSD - No        Overall progress: Patient is showing mild improvement       Medical ROS  General ROS: negative  Ophthalmic ROS: negative  ENT ROS: negative  Allergy and Immunology ROS: negative  Hematological and Lymphatic ROS: negative  Endocrine ROS: negative  Respiratory ROS: no cough, shortness of breath, or wheezing  Cardiovascular ROS: no  chest pain or dyspnea on exertion  Gastrointestinal ROS: no abdominal pain, change in bowel habits, or black or bloody stools  Genito-Urinary ROS: no dysuria, trouble voiding, or hematuria  Musculoskeletal ROS: negative  Neurological ROS: no TIA or stroke symptoms  Dermatological ROS: negative      Psychotherapy:  Target symptoms: substance abuse, psychosis  Why chosen therapy is appropriate versus another modality: relevant to diagnosis, evidence based practice  Outcome monitoring methods: self-report, observation  Therapeutic intervention type: insight oriented psychotherapy, supportive psychotherapy  Topics discussed/themes: building skills sets for symptom management, symptom recognition  The patient's response to the intervention is guarded. The patient's progress toward treatment goals is fair.   Duration of intervention: 31 minutes.        PAST MEDICAL HISTORY   Past Medical History:   Diagnosis Date    History of psychiatric hospitalization     Hx of psychiatric care     Psychiatric problem     Schizophrenia     Suicide attempt            PSYCHOTROPIC MEDICATIONS   Scheduled Meds:   folic acid  1 mg Oral Daily    multivitamin  1 tablet Oral Daily    valproic acid (as sodium salt)  500 mg Oral BID     Continuous Infusions:  PRN Meds:.acetaminophen, aluminum-magnesium hydroxide-simethicone, docusate sodium, hydrOXYzine pamoate, loperamide, melatonin, nicotine, OLANZapine **AND** OLANZapine, ondansetron, promethazine, sodium chloride 0.9%        EXAMINATION    VITALS   Vitals:    02/13/23 2000 02/14/23 0748 02/14/23 1922 02/15/23 0800   BP: (!) 128/57 105/67 (!) 119/56 (!) 148/67   BP Location: Left arm Left arm Left arm Right arm   Patient Position: Standing Sitting Sitting Sitting   Pulse: 63 73 64 76   Resp: 18 20 18 18   Temp: 97.9 °F (36.6 °C) 98.7 °F (37.1 °C) 97.8 °F (36.6 °C) 97.7 °F (36.5 °C)   TempSrc: Oral Oral Oral Oral   SpO2: 97% 97% 98% 98%   Weight:       Height:           Body mass index is  "20.25 kg/m².      CONSTITUTIONAL  General Appearance: unremarkable, age appropriate, normal weight, well nourished;     MUSCULOSKELETAL  Muscle Strength and Tone:no dyskinesia, no tremor, no tic  Abnormal Involuntary Movements: No  Gait and Station: non-ataxic    PSYCHIATRIC   Level of Consciousness: awake and alert   Orientation: person, place, time, and situation  Grooming: Casually dressed and Well groomed  Psychomotor Behavior: cooperative, friendly and cooperative, eye contact minimal  Speech: normal tone, normal rate, normal pitch, normal volume  Language: grossly intact, able to name, able to repeat  Mood: "Fine"  Affect: Blunted, improving somewhat  Thought Process: circumstantial  - Improving  Associations: intact   Thought Content: +delusions, denies SI, and denies HI  Perceptions: denies AH and denies  VH-Recently observed to be RIS  Memory: Able to recall past events, Remote intact, and Recent intact  Attention:Decreased  Fund of Knowledge: Aware of current events and Vocabulary appropriate   Estimate if Intelligence:  Average based on work/education history, vocabulary and mental status exam  Insight: poor awareness of illness  Judgment: behavior is adequate to circumstances- fair      DIAGNOSTIC TESTING   Laboratory Results  No results found for this or any previous visit (from the past 24 hour(s)).        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      - resumed/continue home Depakote ER at 1250 mg qhs   -Depakote level =4. Staff informed and instructed to monitor patient during med pass as he is suspected to be cheeking meds.   -Switch to depakene " liquid, 500 mg BID, check level in 2 days    - follow up with outpatient mental health providers after discharge for medication management and psychotherapy  -consider adding adjunctive neuroleptic (haldol)    **Pt tried and failed several neuroleptics including risperdal, zyprexa, abilify and others"     Unspecified anxiety disorder  -started/continue hydroxyzine 50 mg PO q 6 hours PRN  -depakote off-label as above     Insomnia: pt counseled  -vistaril prn     Cannabis abuse: pt counseled  - SW consulted for assistance with additional resources      Psychosocial stressors  - pt counseled  - SW consulted for assistance with additional resources      Discussed diagnosis, risks and benefits of proposed treatment vs alternative treatments vs no treatment, potential side effects of these treatments and the inherent unpredictability of treatment. The patient expresses understanding of the above and displays the capacity to agree with this treatment given said understanding. Patient also agrees that, currently, the benefits outweigh the risks and would like to pursue/continue treatment at this time.    Any medications being used off-label were discussed with the patient inclusive of the evidence base for the use of the medications and consent was obtained for the off-label use of the medication.       DISCHARGE PLANNING  Expected Disposition Plan: To be determined      NEED FOR CONTINUED HOSPITALIZATION  Psychiatric illness continues to pose a potential threat to life or bodily function, of self or others, thereby requiring the need for continued inpatient psychiatric hospitalization: Yes, due to: significant psychotic thought disorder and gravely disabled, as evidenced by:  Ongoing concerns with grave disability with patient unable to perform basic feeding, hygiene and dressing activities without significant constant support. and Ongoing concerns with perceptual aberrancy and paranoid persecutory delusions leading to  potential harm of self or others.    Protective inpatient pyschiatric hospitalization required while a safe disposition plan is enacted: Yes    Patient stabilized and ready for discharge from inpatient psychiatric unit: No      STAFF:   VIN Stafford  Psychiatry

## 2023-02-15 NOTE — PLAN OF CARE
Patient is in dining room eating his supper meal at this time with good appetite. Patient verbalizes every meal that he feels he doesn't get enough food for double portions, and still receives Boost with every meal as ordered. Dietary was notified. Patient ambulates halls frequently and goes to his room at intervals. Patient is quiet, interacts with staff and peers, delusional thoughts at times, denies A/VH, no observations of RIS today, denies S/HI. No negative behaviors or agitation noted. Patient does appear sad, denies depression but expressed the loss of his grandmother with  upcoming and plans to attend, and go to [place] sometime afterwards. Patient is medication complaint and is taking Depakote liquid form at this time. Will continue to monitor Depakote level as ordered. Jl Aguilar NP visited this am with n o new orders noted. Close observations continued and safe environment maintained.

## 2023-02-16 LAB — VALPROATE SERPL-MCNC: 55 UG/ML (ref 50–100)

## 2023-02-16 PROCEDURE — 90833 PSYTX W PT W E/M 30 MIN: CPT | Mod: SA,HB,, | Performed by: PSYCHIATRY & NEUROLOGY

## 2023-02-16 PROCEDURE — 25000003 PHARM REV CODE 250: Performed by: PSYCHIATRY & NEUROLOGY

## 2023-02-16 PROCEDURE — 36415 COLL VENOUS BLD VENIPUNCTURE: CPT | Performed by: PSYCHIATRY & NEUROLOGY

## 2023-02-16 PROCEDURE — 99232 PR SUBSEQUENT HOSPITAL CARE,LEVL II: ICD-10-PCS | Mod: SA,HB,, | Performed by: PSYCHIATRY & NEUROLOGY

## 2023-02-16 PROCEDURE — 11400000 HC PSYCH PRIVATE ROOM

## 2023-02-16 PROCEDURE — 90833 PR PSYCHOTHERAPY W/PATIENT W/E&M, 30 MIN (ADD ON): ICD-10-PCS | Mod: SA,HB,, | Performed by: PSYCHIATRY & NEUROLOGY

## 2023-02-16 PROCEDURE — 99232 SBSQ HOSP IP/OBS MODERATE 35: CPT | Mod: SA,HB,, | Performed by: PSYCHIATRY & NEUROLOGY

## 2023-02-16 PROCEDURE — 80164 ASSAY DIPROPYLACETIC ACD TOT: CPT | Performed by: PSYCHIATRY & NEUROLOGY

## 2023-02-16 RX ADMIN — VALPROIC ACID 500 MG: 250 SOLUTION ORAL at 08:02

## 2023-02-16 RX ADMIN — THERA TABS 1 TABLET: TAB at 08:02

## 2023-02-16 RX ADMIN — FOLIC ACID 1 MG: 1 TABLET ORAL at 08:02

## 2023-02-16 RX ADMIN — Medication 6 MG: at 08:02

## 2023-02-16 NOTE — PLAN OF CARE
"Patient very positive and " Up Beat."  Expressed the desire to go home.  Tolerating medication with no concerns.  Engaging peers and staff positively.  Slept through the night with no concerns.    "

## 2023-02-16 NOTE — PROGRESS NOTES
"PSYCHIATRY DAILY INPATIENT PROGRESS NOTE  SUBSEQUENT HOSPITAL VISIT    ENCOUNTER DATE: 2/16/2023  SITE: 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 The chart was reviewed.     Reviewed notes from Rns and labs from the last 24 hours.    The patient's case was discussed with the treatment team/care providers today including Rns    Staff reports no behavioral or management issues.     The patient has been compliant with treatment.        Subjective 02/16/2023    Patient reports mood is "good," affect remains blunted. Continues to demonstrate some delayed response to assessment questions. Pt reports speaking to his mother two days ago, noting that the conversation was "pretty good", adding "I think we reached a compromise about the wigs. She said she's ok with me getting suraj in my hair as long as it's not a long wig." Patient became somewhat upset when asked about giving his COVID stimulus check to "a random person" as verbalized by his mother in previous collateral assessment. He states "That's my business, she doesn't need to be talking about all that." Patient states that the person he gave his money to was a "reader" that he met through a mutual friend. States that this person "Told me that I had a connection with Raul Murphy, I could feel his presence." When questioned further, patient states that he has never met Raul Murphy (celebrity) but that "I feel like I could, maybe at one of his concerts or meet and greets." He admits to "being in love with Raul Murphy since I was 17" and "always feeling like maybe we could be together."    NP, RN, and CRT planning on facilitating family session with patient and his mother this afternoon. Patient continues to request " discharge on Friday to attend a  for his Grandmother, however SW collateral note indicates that the patient's mother does not currently feel comfortable with this.         Psychiatric ROS (observed, reported, or endorsed/denied):  Depressed mood - fluctuating  Interest/pleasure/anhedonia: No  Guilt/hopelessness/worthlessness - No  Changes in Sleep - No  Changes in Appetite - No  Changes in Concentration - No  Changes in Energy - No  PMA/R- No  Suicidal- active/passive ideations - No  Homicidal ideations: active/passive ideations - No    Hallucinations - variable-Recently observed by staff   Delusions - Yes  Disorganized behavior - fluctuating  Disorganized speech - fluctuating  Negative symptoms - No    Elevated mood - less  Decreased need for sleep - less  Grandiosity - variable  Racing thoughts - variable  Impulsivity - No  Irritability- variable  Increased energy - No  Distractibility - variable  Increase in goal-directed activity or PMA- No    Symptoms of KANU - No  Symptoms of Panic Disorder- No  Symptoms of PTSD - No        Overall progress: Patient is showing mild improvement       Medical ROS  General ROS: negative  Ophthalmic ROS: negative  ENT ROS: negative  Allergy and Immunology ROS: negative  Hematological and Lymphatic ROS: negative  Endocrine ROS: negative  Respiratory ROS: no cough, shortness of breath, or wheezing  Cardiovascular ROS: no chest pain or dyspnea on exertion  Gastrointestinal ROS: no abdominal pain, change in bowel habits, or black or bloody stools  Genito-Urinary ROS: no dysuria, trouble voiding, or hematuria  Musculoskeletal ROS: negative  Neurological ROS: no TIA or stroke symptoms  Dermatological ROS: negative      Psychotherapy:  Target symptoms: psychosis, delusions  Why chosen therapy is appropriate versus another modality: relevant to diagnosis, evidence based practice  Outcome monitoring methods: self-report, observation  Therapeutic intervention type: insight oriented  psychotherapy, supportive psychotherapy  Topics discussed/themes: building skills sets for symptom management, symptom recognition  The patient's response to the intervention is guarded. The patient's progress toward treatment goals is limited.   Duration of intervention: 16 minutes        PAST MEDICAL HISTORY   Past Medical History:   Diagnosis Date    History of psychiatric hospitalization     Hx of psychiatric care     Psychiatric problem     Schizophrenia     Suicide attempt            PSYCHOTROPIC MEDICATIONS   Scheduled Meds:   folic acid  1 mg Oral Daily    multivitamin  1 tablet Oral Daily    valproic acid (as sodium salt)  500 mg Oral BID     Continuous Infusions:  PRN Meds:.acetaminophen, aluminum-magnesium hydroxide-simethicone, docusate sodium, hydrOXYzine pamoate, loperamide, melatonin, nicotine, OLANZapine **AND** OLANZapine, ondansetron, promethazine, sodium chloride 0.9%        EXAMINATION    VITALS   Vitals:    02/14/23 1922 02/15/23 0800 02/15/23 1914 02/16/23 0800   BP: (!) 119/56 (!) 148/67 116/70 122/72   BP Location: Left arm Right arm Left arm Right arm   Patient Position: Sitting Sitting Sitting Sitting   Pulse: 64 76 72 75   Resp: 18 18 20 18   Temp: 97.8 °F (36.6 °C) 97.7 °F (36.5 °C) 98.5 °F (36.9 °C) 97.9 °F (36.6 °C)   TempSrc: Oral Oral Oral Oral   SpO2: 98% 98% 98% 98%   Weight:       Height:           Body mass index is 20.25 kg/m².      CONSTITUTIONAL  General Appearance: unremarkable, age appropriate, normal weight, well nourished;     MUSCULOSKELETAL  Muscle Strength and Tone:no dyskinesia, no tremor, no tic  Abnormal Involuntary Movements: No  Gait and Station: non-ataxic    PSYCHIATRIC   Level of Consciousness: awake and alert   Orientation: person, place, time, and situation  Grooming: Casually dressed and Well groomed  Psychomotor Behavior: cooperative, friendly and cooperative, eye contact minimal  Speech: normal rate, normal pitch, normal volume, increased latency of  "response, soft  Language: grossly intact, able to name, able to repeat  Mood: "Good"  Affect: Blunted, improving somewhat  Thought Process: circumstantial  - Improving  Associations: intact   Thought Content: +delusions, denies SI, and denies HI  Perceptions: denies AH and denies  VH-Recently observed to be RIS  Memory: Able to recall past events, Remote intact, and Recent intact  Attention:Decreased  Fund of Knowledge: Aware of current events and Vocabulary appropriate   Estimate if Intelligence:  Average based on work/education history, vocabulary and mental status exam  Insight: poor awareness of illness  Judgment: behavior is adequate to circumstances- fair      DIAGNOSTIC TESTING   Laboratory Results  No results found for this or any previous visit (from the past 24 hour(s)).        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      - resumed/continue home Depakote ER at 1250 mg qhs   -Depakote level =4. Staff informed and instructed to monitor patient during med pass as he is suspected to be cheeking meds.   -Switch to depakene liquid, 500 mg BID, check level this evening    - follow up with outpatient mental health providers after discharge for medication management and psychotherapy  -consider adding adjunctive neuroleptic (haldol)    **Pt tried and failed several neuroleptics including risperdal, zyprexa, abilify and others""     Unspecified anxiety disorder  -started/continue hydroxyzine 50 mg PO q 6 hours PRN  -depakote off-label as above     Insomnia: pt counseled  -vistaril prn     Cannabis abuse: pt counseled  - SW consulted for assistance with additional " resources      Psychosocial stressors  - pt counseled  - SW consulted for assistance with additional resources      Discussed diagnosis, risks and benefits of proposed treatment vs alternative treatments vs no treatment, potential side effects of these treatments and the inherent unpredictability of treatment. The patient expresses understanding of the above and displays the capacity to agree with this treatment given said understanding. Patient also agrees that, currently, the benefits outweigh the risks and would like to pursue/continue treatment at this time.    Any medications being used off-label were discussed with the patient inclusive of the evidence base for the use of the medications and consent was obtained for the off-label use of the medication.       DISCHARGE PLANNING  Expected Disposition Plan: To be determined      NEED FOR CONTINUED HOSPITALIZATION  Psychiatric illness continues to pose a potential threat to life or bodily function, of self or others, thereby requiring the need for continued inpatient psychiatric hospitalization: Yes, due to: significant psychotic thought disorder and gravely disabled, as evidenced by:  Ongoing concerns with grave disability with patient unable to perform basic feeding, hygiene and dressing activities without significant constant support. and Ongoing concerns with perceptual aberrancy and paranoid persecutory delusions leading to potential harm of self or others.    Protective inpatient pyschiatric hospitalization required while a safe disposition plan is enacted: Yes    Patient stabilized and ready for discharge from inpatient psychiatric unit: No      STAFF:   VIN Stafford  Psychiatry

## 2023-02-16 NOTE — PLAN OF CARE
Patient just finished family session with mother with NP, RN Charge nurse, and recreational therapist/ Discharge planner present. Patient continues with good appetite and is medication compliant without side effects noted. Patient quiet, calm, depressed, paces halls, intrusive to staff nurse station stares and observes, and listens to staff conversations, stands in front of nurse office looking through the window at times, delusional , flight of ideas, observed RIS in his room with talking and laughing. Patient took a shower  this am overheard loud singing. Patient observed ambulating in the marcial RIS by smiling and laughing with dramatic facial expressions and head movement side to side, ambulating twirling his body in circles with arms extended in the air, ambulates out of his room with arms folded, returns  ambulating toward his room walking with hands on hips and swaying hips from side to side. No negative behaviors and is cooperative. Patient is present at this time attending activity group session. Patient appears unfocused and delayed responses when called upon. Close observations continued and safe environment maintained.  Patient is due for a Depakote Level at 1800 today.

## 2023-02-17 PROCEDURE — 25000003 PHARM REV CODE 250: Performed by: PSYCHIATRY & NEUROLOGY

## 2023-02-17 PROCEDURE — 99232 PR SUBSEQUENT HOSPITAL CARE,LEVL II: ICD-10-PCS | Mod: SA,HB,, | Performed by: PSYCHIATRY & NEUROLOGY

## 2023-02-17 PROCEDURE — 99232 SBSQ HOSP IP/OBS MODERATE 35: CPT | Mod: SA,HB,, | Performed by: PSYCHIATRY & NEUROLOGY

## 2023-02-17 PROCEDURE — 90833 PSYTX W PT W E/M 30 MIN: CPT | Mod: SA,HB,, | Performed by: PSYCHIATRY & NEUROLOGY

## 2023-02-17 PROCEDURE — 90833 PR PSYCHOTHERAPY W/PATIENT W/E&M, 30 MIN (ADD ON): ICD-10-PCS | Mod: SA,HB,, | Performed by: PSYCHIATRY & NEUROLOGY

## 2023-02-17 PROCEDURE — 11400000 HC PSYCH PRIVATE ROOM

## 2023-02-17 RX ORDER — VALPROIC ACID 250 MG/5ML
750 SOLUTION ORAL 2 TIMES DAILY
Status: DISCONTINUED | OUTPATIENT
Start: 2023-02-17 | End: 2023-02-22 | Stop reason: HOSPADM

## 2023-02-17 RX ADMIN — FOLIC ACID 1 MG: 1 TABLET ORAL at 08:02

## 2023-02-17 RX ADMIN — VALPROIC ACID 750 MG: 250 SOLUTION ORAL at 08:02

## 2023-02-17 RX ADMIN — THERA TABS 1 TABLET: TAB at 08:02

## 2023-02-17 RX ADMIN — VALPROIC ACID 500 MG: 250 SOLUTION ORAL at 08:02

## 2023-02-17 NOTE — PROGRESS NOTES
"Family session held this afternoon with patient's mother, Nabil Soria via Zoom. Also present: MARGUERITE Conte, and Kip Eller RN. Discussion began with patient's mother without the patient present.    She expressed multiple concerns. She reports that patient has been consistently verbalizing delusional thoughts regarding being in a relationship with celebrity Raul Murphy. States that patient "handed over his entire tax return" last year to a person on the internet pretending to be Raul Murphy. She reports recent erratic behaviors including patient wearing a wig and knocking on neighbors doors in the middle of the night. States that patient frequently smokes cannabis and tends to begin refusing to take medications shortly after being discharged from the hospital. She also reports that patient frequently smokes synthetic cannabis ("Mojo.") Nabil states that she does not feel safe in her own home, noting that she sleeps with her bedroom door locked because she is afraid of what the patient might do. She adds, as has been previously documented, that patient was recently arrested for assaulting her. She states that patient has recently called her and requested that she purchase plan tickets to [place]. Of note, patient has been telling clinicians consistently that he has "cousins" in [place] and [place] that he might like to stay with. Patient's mother states this is not the case, and that the people in [place] and [place] are individuals that the patient used to work with. She adds that recently the patient drove to [place] to attempt to meet up with one of these friends, however he did not call ahead and arrived in [place] to find that the friend was not in town and subsequently got lost while driving. She states that at this time, she does not feel safe with the patient returning to her home.    Shortly after patient entered the room he became confrontational with his mother. He adamantly denies ever using any " "substances, including "Mojo," other than cannabis. Throughout the discussion, patient frequently asks his mother "What's your mental illness? How come we're not talking about your mental illness" despite multiple attempts from staff to redirect. Patient expresses several concerns, including the fact that his mother will not allow him to wear wigs in her home, and that she "throws away my belongings", referring to a set of tarot cards found in the patient's bedroom. Discussed with patient potentially entering 28 day inpatient rehab, however patient again states "I only smoke weed, how am I gonna go to rehab for that." At other points, patient states that he "might" go to rehab, however he adds that after he leaves, he will continue to smoke cannabis and stop taking medications. Pt makes several delusional statements throughout course of conversation, including that he is in a relationship with Raul Murphy, as well as stating that he is a member of the "illuminati." At times, patient states that he will "Go back home and just follow my mother's rules until I'm able to afford to move out", at other points he states that he would "Rather live on the streets than have to follow her rules."     Discussion inevitably required termination due to continued escalation and argument between the patient and his mother. When patient left the room, his mother again stated that she does not feel safe with the patient returning home at this time. She would like to continue pursuing rehab option. She also states that pts outpatient psychiatry service, [facility], has mentioned possibility of group home placement and that she will follow up on this.    Psychotherapy:  Target symptoms: anxiety , substance abuse, delusions  Why chosen therapy is appropriate versus another modality: relevant to diagnosis, evidence based practice  Outcome monitoring methods: self-report, observation, feedback from family  Therapeutic intervention type: " insight oriented psychotherapy, supportive psychotherapy  Topics discussed/themes: difficulty managing affect in interpersonal relationships, building skills sets for symptom management, symptom recognition, financial stressors, substance abuse  The patient's response to the intervention is guarded. The patient's progress toward treatment goals is limited.   Duration of intervention: 60 minutes.

## 2023-02-17 NOTE — PLAN OF CARE
POC reviewed this shift and is on going. Patient is calm, cooperative, quiet, anxious, and pacing. Denies Suicidal Ideation, Homicidal Ideation, Auditory Hallucinations, Visual Hallucinations, Tactile Hallucinations, Gustatory Hallucinations, and Delusions. Patient has been out on the floor all morning participating in the group that was conducted today. ENRIQUE Aguilar increased his valproic acid syrup 750mg BID and scheduled his aristada 1064mg injection on 2/20/23. Patient seems to be paranoid. Pt continues to be medication compliant and staff will continue to monitor pt closely while on the unit.

## 2023-02-17 NOTE — NURSING
Pt. A/A.  No C/O pain.  Denies SH/HI/VH. Does hear voices at times.  Flat affect.  In dinning room watch T.V.  Has some anxiety.  Compliant with medications and follows instructions.  Will continue to observe.

## 2023-02-17 NOTE — PROGRESS NOTES
"PSYCHIATRY DAILY INPATIENT PROGRESS NOTE  SUBSEQUENT HOSPITAL VISIT    ENCOUNTER DATE: 2/17/2023  SITE: 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 The chart was reviewed.     Reviewed notes from Rns and labs from the last 24 hours.    The patient's case was discussed with the treatment team/care providers today including Rns    Staff reports no behavioral or management issues.     The patient has been compliant with treatment.        Subjective 02/17/2023    Patient observed standing in front of window on unit speaking loudly to self, apparently responding to internal stimuli. When approached by NP patient states he was speaking to "nobody." Affect remains blunted, bizarre at times. Prolonged eye contact noted, continues to demonstrate increased latency of response to assessment questions.     Patient states that as of now, his plan after discharge is to stay with his Uncle Aydin. He states he has not spoken to Aydin about this yet. Pt states that upon discharge, he plans to discontinue all medications, noting that he "doesn't need them if I'm not around my mom, she's the only thing that triggers me."     Several times during assessment, patient states that his mother needs to be in a psychiatric hospital and asks NP to evaluate her. Explained to pt that this is not warranted.     Continues to adamantly deny any synthetic cannabis use despite being reported by multiple sources including his mother and outpatient .     He remains unable to verbalize a cohesive long-term plan after discharge.    Shortly after assessment, patient again observed to be RIS, muttering quietly to himself.     Psychiatric ROS (observed, reported, or " endorsed/denied):  Depressed mood - fluctuating  Interest/pleasure/anhedonia: No  Guilt/hopelessness/worthlessness - No  Changes in Sleep - No  Changes in Appetite - No  Changes in Concentration - No  Changes in Energy - No  PMA/R- No  Suicidal- active/passive ideations - No  Homicidal ideations: active/passive ideations - No    Hallucinations - Yes-Recently observed by staff   Delusions - Yes  Disorganized behavior - fluctuating  Disorganized speech - fluctuating  Negative symptoms - No    Elevated mood - less  Decreased need for sleep - less  Grandiosity - variable  Racing thoughts - variable  Impulsivity - No  Irritability- variable  Increased energy - No  Distractibility - variable  Increase in goal-directed activity or PMA- No    Symptoms of KANU - No  Symptoms of Panic Disorder- No  Symptoms of PTSD - No        Overall progress: Patient is showing mild improvement       Medical ROS  General ROS: negative  Ophthalmic ROS: negative  ENT ROS: negative  Allergy and Immunology ROS: negative  Hematological and Lymphatic ROS: negative  Endocrine ROS: negative  Respiratory ROS: no cough, shortness of breath, or wheezing  Cardiovascular ROS: no chest pain or dyspnea on exertion  Gastrointestinal ROS: no abdominal pain, change in bowel habits, or black or bloody stools  Genito-Urinary ROS: no dysuria, trouble voiding, or hematuria  Musculoskeletal ROS: negative  Neurological ROS: no TIA or stroke symptoms  Dermatological ROS: negative      Psychotherapy:  Target symptoms: psychosis, delusions, substance abuse  Why chosen therapy is appropriate versus another modality: relevant to diagnosis, evidence based practice  Outcome monitoring methods: self-report, observation  Therapeutic intervention type: insight oriented psychotherapy, supportive psychotherapy  Topics discussed/themes: building skills sets for symptom management, symptom recognition  The patient's response to the intervention is guarded. The patient's progress  "toward treatment goals is limited.   Duration of intervention: 16 minutes        PAST MEDICAL HISTORY   Past Medical History:   Diagnosis Date    History of psychiatric hospitalization     Hx of psychiatric care     Psychiatric problem     Schizophrenia     Suicide attempt            PSYCHOTROPIC MEDICATIONS   Scheduled Meds:   folic acid  1 mg Oral Daily    multivitamin  1 tablet Oral Daily    valproic acid (as sodium salt)  500 mg Oral BID     Continuous Infusions:  PRN Meds:.acetaminophen, aluminum-magnesium hydroxide-simethicone, docusate sodium, hydrOXYzine pamoate, loperamide, melatonin, nicotine, OLANZapine **AND** OLANZapine, ondansetron, promethazine, sodium chloride 0.9%        EXAMINATION    VITALS   Vitals:    02/15/23 1914 02/16/23 0800 02/16/23 1908 02/17/23 0718   BP: 116/70 122/72 (!) 115/57 (!) 113/59   BP Location: Left arm Right arm Left arm Left arm   Patient Position: Sitting Sitting Sitting Sitting   Pulse: 72 75 91 78   Resp: 20 18 16 18   Temp: 98.5 °F (36.9 °C) 97.9 °F (36.6 °C) 98.4 °F (36.9 °C) 97.8 °F (36.6 °C)   TempSrc: Oral Oral Oral Oral   SpO2: 98% 98% 97% 99%   Weight:       Height:           Body mass index is 20.25 kg/m².      CONSTITUTIONAL  General Appearance: unremarkable, age appropriate, normal weight, well nourished;     MUSCULOSKELETAL  Muscle Strength and Tone:no dyskinesia, no tremor, no tic  Abnormal Involuntary Movements: No  Gait and Station: non-ataxic    PSYCHIATRIC   Level of Consciousness: awake and alert   Orientation: person, place, time, and situation  Grooming: Casually dressed and Well groomed  Psychomotor Behavior: cooperative, inappropriate prolonged eye contact  Speech: slowed, increased latency of response, soft  Language: grossly intact, able to name, able to repeat  Mood: "Good"  Affect: Blunted, improving somewhat  Thought Process: circumstantial  - Improving  Associations: intact   Thought Content: +delusions, denies SI, and denies HI  Perceptions: " denies AH and denies  VH-Observed RIS today  Memory: Able to recall past events, Remote intact, and Recent intact  Attention:Decreased  Fund of Knowledge: Aware of current events and Vocabulary appropriate   Estimate if Intelligence:  Average based on work/education history, vocabulary and mental status exam  Insight: poor awareness of illness  Judgment: behavior is adequate to circumstances- fair      DIAGNOSTIC TESTING   Laboratory Results  Recent Results (from the past 24 hour(s))   Valproic Acid    Collection Time: 02/16/23  6:49 PM   Result Value Ref Range    Valproic Acid Level 55 50 - 100 ug/mL           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      - resumed/continue home Depakote ER at 1250 mg qhs   -Depakote level =4. Staff informed and instructed to monitor patient during med pass as he is suspected to be cheeking meds.   -Switch to depakene liquid, 500 mg BID, check level this evening  -VPA level 55, increase to 750 mg BID starting this evening.     - follow up with outpatient mental health providers after discharge for medication management and psychotherapy  -consider adding adjunctive neuroleptic (haldol)    **Pt tried and failed several neuroleptics including risperdal, zyprexa, abilify and others"     Unspecified anxiety disorder  -started/continue hydroxyzine 50 mg PO q 6 hours PRN  -depakote off-label as above     Insomnia: pt counseled  -vistaril prn     Cannabis abuse: pt counseled  - SW consulted for assistance with additional resources      Psychosocial stressors  - pt counseled  - SW consulted for assistance with additional resources       Discussed diagnosis, risks and benefits of proposed treatment vs alternative treatments vs no treatment, potential side effects of these treatments and the inherent unpredictability of treatment. The patient expresses understanding of the above and displays the capacity to agree with this treatment given said understanding. Patient also agrees that, currently, the benefits outweigh the risks and would like to pursue/continue treatment at this time.    Any medications being used off-label were discussed with the patient inclusive of the evidence base for the use of the medications and consent was obtained for the off-label use of the medication.       DISCHARGE PLANNING  Expected Disposition Plan: To be determined      NEED FOR CONTINUED HOSPITALIZATION  Psychiatric illness continues to pose a potential threat to life or bodily function, of self or others, thereby requiring the need for continued inpatient psychiatric hospitalization: Yes, due to: significant psychotic thought disorder and gravely disabled, as evidenced by:  Ongoing concerns with grave disability with patient unable to perform basic feeding, hygiene and dressing activities without significant constant support. and Ongoing concerns with perceptual aberrancy and paranoid persecutory delusions leading to potential harm of self or others.    Protective inpatient pyschiatric hospitalization required while a safe disposition plan is enacted: Yes    Patient stabilized and ready for discharge from inpatient psychiatric unit: No      STAFF:   [facility]  [facility]iatry

## 2023-02-17 NOTE — PLAN OF CARE
Problem: Violence Risk or Actual  Goal: Anger and Impulse Control  Outcome: Ongoing, Progressing     Problem: Adult Behavioral Health Plan of Care  Goal: Plan of Care Review  Outcome: Ongoing, Progressing  Goal: Patient-Specific Goal (Individualization)  Outcome: Ongoing, Progressing  Goal: Adheres to Safety Considerations for Self and Others  Outcome: Ongoing, Progressing  Goal: Optimized Coping Skills in Response to Life Stressors  Outcome: Ongoing, Progressing  Goal: Develops/Participates in Therapeutic [place] to Support Successful Transition  Outcome: Ongoing, Progressing  Goal: Rounds/Family Conference  Outcome: Ongoing, Progressing     Problem: Behavior Regulation Impairment (Psychotic Signs/Symptoms)  Goal: Improved Behavioral Control (Psychotic Signs/Symptoms)  Outcome: Ongoing, Progressing     Problem: Decreased Participation and Engagement (Psychotic Signs/Symptoms)  Goal: Increased Participation and Engagement (Psychotic Signs/Symptoms)  Outcome: Ongoing, Progressing     Problem: Mood Impairment (Psychotic Signs/Symptoms)  Goal: Improved Mood Symptoms (Psychotic Signs/Symptoms)  Outcome: Ongoing, Progressing     Problem: Cognitive Impairment (Depressive Signs/Symptoms)  Goal: Optimized Cognitive Function  Outcome: Ongoing, Progressing     Problem: Feelings of Worthlessness, Hopelessness or Excessive Guilt (Depressive Signs/Symptoms)  Goal: Enhanced Self-Esteem and Confidence (Depressive Signs/Symptoms)  Outcome: Ongoing, Progressing     Problem: Social, Occupational or Functional Impairment (Depressive Signs/Symptoms)  Goal: Enhanced Social, Occupational or Functional Skills (Depressive Signs/Symptoms)  Outcome: Ongoing, Progressing

## 2023-02-18 PROCEDURE — 25000003 PHARM REV CODE 250: Performed by: PSYCHIATRY & NEUROLOGY

## 2023-02-18 PROCEDURE — 90833 PR PSYCHOTHERAPY W/PATIENT W/E&M, 30 MIN (ADD ON): ICD-10-PCS | Mod: AF,HB,, | Performed by: PSYCHIATRY & NEUROLOGY

## 2023-02-18 PROCEDURE — 90833 PSYTX W PT W E/M 30 MIN: CPT | Mod: AF,HB,, | Performed by: PSYCHIATRY & NEUROLOGY

## 2023-02-18 PROCEDURE — 11400000 HC PSYCH PRIVATE ROOM

## 2023-02-18 PROCEDURE — 99232 SBSQ HOSP IP/OBS MODERATE 35: CPT | Mod: AF,HB,, | Performed by: PSYCHIATRY & NEUROLOGY

## 2023-02-18 PROCEDURE — 99232 PR SUBSEQUENT HOSPITAL CARE,LEVL II: ICD-10-PCS | Mod: AF,HB,, | Performed by: PSYCHIATRY & NEUROLOGY

## 2023-02-18 RX ADMIN — VALPROIC ACID 750 MG: 250 SOLUTION ORAL at 08:02

## 2023-02-18 RX ADMIN — FOLIC ACID 1 MG: 1 TABLET ORAL at 08:02

## 2023-02-18 RX ADMIN — THERA TABS 1 TABLET: TAB at 08:02

## 2023-02-18 NOTE — PLAN OF CARE
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

## 2023-02-18 NOTE — PLAN OF CARE
POC reviewed this shift and is on going. Patient is withdrawn, calm, cooperative, and anxious. Denies Suicidal Ideation, Homicidal Ideation, Auditory Hallucinations, Visual Hallucinations, Tactile Hallucinations, Gustatory Hallucinations, and Delusions. Patient continues having thoughts of grandiosity. Patient continues to react to internal stimuli. Pt continues to be medication compliant and staff will continue to monitor pt closely while on the unit.

## 2023-02-18 NOTE — PROGRESS NOTES
"PSYCHIATRY DAILY INPATIENT PROGRESS NOTE  SUBSEQUENT HOSPITAL VISIT    ENCOUNTER DATE: 2/18/2023  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 The chart was reviewed.     Reviewed notes from Rns, NP, and LPN and labs from the last 24 hours.    The patient's case was discussed with the treatment team/care providers today including Rns    Staff reports no behavioral or management issues.     The patient has been compliant with treatment.        Subjective 02/18/2023    Yesterday, the following was reported: Patient observed standing in front of window on unit speaking loudly to self, apparently responding to internal stimuli. When approached by NP patient states he was speaking to "nobody." Affect remains blunted, bizarre at times. Prolonged eye contact noted, continues to demonstrate increased latency of response to assessment questions.   Patient states that as of now, his plan after discharge is to stay with his Uncle Aydin. He states he has not spoken to Aydin about this yet. Pt states that upon discharge, he plans to discontinue all medications, noting that he "doesn't need them if I'm not around my mom, she's the only thing that triggers me."   Several times during assessment, patient states that his mother needs to be in a psychiatric hospital and asks NP to evaluate her. Explained to pt that this is not warranted.   Continues to adamantly deny any synthetic cannabis use despite being reported by multiple sources including his mother and outpatient .   He remains unable to verbalize a cohesive long-term plan after discharge.  Shortly after assessment, patient again observed to be RIS, muttering quietly to himself.     Today, he reports that " he is doing well. Staff notes ongoing RIS and psychotic behaviors.   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    Psychiatric ROS (observed, reported, or endorsed/denied):  Depressed mood - fluctuating  Interest/pleasure/anhedonia: No  Guilt/hopelessness/worthlessness - No  Changes in Sleep - No  Changes in Appetite - No  Changes in Concentration - No  Changes in Energy - No  PMA/R- No  Suicidal- active/passive ideations - No  Homicidal ideations: active/passive ideations - No    Hallucinations - Yes-Recently observed by staff   Delusions - Yes  Disorganized behavior - fluctuating  Disorganized speech - fluctuating  Negative symptoms - No    Elevated mood - less  Decreased need for sleep - less  Grandiosity - variable  Racing thoughts - variable  Impulsivity - No  Irritability- variable  Increased energy - No  Distractibility - variable  Increase in goal-directed activity or PMA- No    Symptoms of KANU - No  Symptoms of Panic Disorder- No  Symptoms of PTSD - No      Overall progress: Patient is showing mild improvement       Medical ROS  General ROS: negative  Ophthalmic ROS: negative  ENT ROS: negative  Allergy and Immunology ROS: negative  Hematological and Lymphatic ROS: negative  Endocrine ROS: negative  Respiratory ROS: no cough, shortness of breath, or wheezing  Cardiovascular ROS: no chest pain or dyspnea on exertion  Gastrointestinal ROS: no abdominal pain, change in bowel habits, or black or bloody stools  Genito-Urinary ROS: no dysuria, trouble voiding, or hematuria  Musculoskeletal ROS: negative  Neurological ROS: no TIA or stroke symptoms  Dermatological ROS: negative      Psychotherapy:  Target symptoms: psychosis, delusions, substance abuse  Why chosen therapy  is appropriate versus another modality: relevant to diagnosis, evidence based practice  Outcome monitoring methods: self-report, observation  Therapeutic intervention type: insight oriented psychotherapy, supportive psychotherapy  Topics discussed/themes: building skills sets for symptom management, symptom recognition; discussed issues with identity and delusions  The patient's response to the intervention is guarded. The patient's progress toward treatment goals is limited.   Duration of intervention: 16 minutes        PAST MEDICAL HISTORY   Past Medical History:   Diagnosis Date    History of psychiatric hospitalization     Hx of psychiatric care     Psychiatric problem     Schizophrenia     Suicide attempt            PSYCHOTROPIC MEDICATIONS   Scheduled Meds:   [START ON 2/20/2023] ARIPiprazole lauroxil  1,064 mg Intramuscular 1 time in Clinic/HOD    folic acid  1 mg Oral Daily    multivitamin  1 tablet Oral Daily    valproic acid (as sodium salt)  750 mg Oral BID     Continuous Infusions:  PRN Meds:.acetaminophen, aluminum-magnesium hydroxide-simethicone, docusate sodium, hydrOXYzine pamoate, loperamide, melatonin, nicotine, OLANZapine **AND** OLANZapine, ondansetron, promethazine, sodium chloride 0.9%        EXAMINATION    VITALS   Vitals:    02/16/23 1908 02/17/23 0718 02/17/23 1911 02/18/23 0721   BP: (!) 115/57 (!) 113/59 127/73 126/65   BP Location: Left arm Left arm Left arm Left arm   Patient Position: Sitting Sitting Sitting Sitting   Pulse: 91 78 86 66   Resp: 16 18 18 20   Temp: 98.4 °F (36.9 °C) 97.8 °F (36.6 °C) 97.5 °F (36.4 °C) 97 °F (36.1 °C)   TempSrc: Oral Oral Oral Oral   SpO2: 97% 99% 97% 98%   Weight:       Height:           Body mass index is 20.25 kg/m².      CONSTITUTIONAL  General Appearance: unremarkable, age appropriate, normal weight, well nourished;     MUSCULOSKELETAL  Muscle Strength and Tone:no dyskinesia, no tremor, no tic  Abnormal Involuntary Movements: No  Gait and Station:  "non-ataxic    PSYCHIATRIC   Level of Consciousness: awake and alert   Orientation: person, place, time, and situation  Grooming: Casually dressed and Well groomed  Psychomotor Behavior: cooperative, inappropriate prolonged eye contact  Speech: slowed, increased latency of response, soft  Language: grossly intact, able to name, able to repeat  Mood: "Good"  Affect: Blunted, improving somewhat  Thought Process: circumstantial  - Improving  Associations: intact   Thought Content: +delusions, denies SI, and denies HI  Perceptions: denies AH and denies  VH-Observed RIS today  Memory: Able to recall past events, Remote intact, and Recent intact  Attention:Decreased  Fund of Knowledge: Aware of current events and Vocabulary appropriate   Estimate if Intelligence:  Average based on work/education history, vocabulary and mental status exam  Insight: poor awareness of illness  Judgment: behavior is adequate to circumstances- fair      DIAGNOSTIC TESTING   Laboratory Results  No results found for this or any previous visit (from the past 24 hour(s)).          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      - resumed/continue home Depakote ER at 1250 mg qhs   -Depakote level =4. Staff informed and instructed to monitor patient during med pass as he is suspected to be cheeking meds.   -Switch to depakene liquid, 500 mg BID, check level this evening  -VPA level 55, increase to/continue at 750 mg BID- check level in 4 days     - follow up with outpatient mental health providers after discharge for medication management and psychotherapy  -consider adding adjunctive neuroleptic " (haldol)    **Pt tried and failed several neuroleptics including risperdal, zyprexa, abilify and others"     Unspecified anxiety disorder  -started/continue hydroxyzine 50 mg PO q 6 hours PRN  -depakote off-label as above     Insomnia: pt counseled  -vistaril prn     Cannabis abuse: pt counseled  - SW consulted for assistance with additional resources      Psychosocial stressors  - pt counseled  - SW consulted for assistance with additional resources      Discussed diagnosis, risks and benefits of proposed treatment vs alternative treatments vs no treatment, potential side effects of these treatments and the inherent unpredictability of treatment. The patient expresses understanding of the above and displays the capacity to agree with this treatment given said understanding. Patient also agrees that, currently, the benefits outweigh the risks and would like to pursue/continue treatment at this time.    Any medications being used off-label were discussed with the patient inclusive of the evidence base for the use of the medications and consent was obtained for the off-label use of the medication.       DISCHARGE PLANNING  Expected Disposition Plan: To be determined      NEED FOR CONTINUED HOSPITALIZATION  Psychiatric illness continues to pose a potential threat to life or bodily function, of self or others, thereby requiring the need for continued inpatient psychiatric hospitalization: Yes, due to: significant psychotic thought disorder and gravely disabled, as evidenced by:  Ongoing concerns with grave disability with patient unable to perform basic feeding, hygiene and dressing activities without significant constant support. and Ongoing concerns with perceptual aberrancy and paranoid persecutory delusions leading to potential harm of self or others.    Protective inpatient pyschiatric hospitalization required while a safe disposition plan is enacted: Yes    Patient stabilized and ready for discharge from  inpatient psychiatric unit: No      STAFF:   Frantz Cagle MD  Psychiatry

## 2023-02-19 PROCEDURE — 99233 PR SUBSEQUENT HOSPITAL CARE,LEVL III: ICD-10-PCS | Mod: AF,HB,, | Performed by: PSYCHIATRY & NEUROLOGY

## 2023-02-19 PROCEDURE — 25000003 PHARM REV CODE 250: Performed by: PSYCHIATRY & NEUROLOGY

## 2023-02-19 PROCEDURE — 11400000 HC PSYCH PRIVATE ROOM

## 2023-02-19 PROCEDURE — 99233 SBSQ HOSP IP/OBS HIGH 50: CPT | Mod: AF,HB,, | Performed by: PSYCHIATRY & NEUROLOGY

## 2023-02-19 RX ORDER — HALOPERIDOL 2 MG/1
2 TABLET ORAL 2 TIMES DAILY
Status: DISCONTINUED | OUTPATIENT
Start: 2023-02-19 | End: 2023-02-20

## 2023-02-19 RX ADMIN — VALPROIC ACID 750 MG: 250 SOLUTION ORAL at 08:02

## 2023-02-19 RX ADMIN — FOLIC ACID 1 MG: 1 TABLET ORAL at 08:02

## 2023-02-19 RX ADMIN — HALOPERIDOL 2 MG: 2 TABLET ORAL at 08:02

## 2023-02-19 RX ADMIN — HALOPERIDOL 2 MG: 2 TABLET ORAL at 09:02

## 2023-02-19 RX ADMIN — THERA TABS 1 TABLET: TAB at 08:02

## 2023-02-19 RX ADMIN — VALPROIC ACID 750 MG: 250 SOLUTION ORAL at 09:02

## 2023-02-19 NOTE — PLAN OF CARE
POC reviewed his shift and is ongoing.  Pt cooperative with staff and withdrawn to self with no interacting with peers. Pt is still hearing voices and responding to them. Pt sits in dinning room for snacks, takes vs and meds. Pt denies SI, HI. Admin meds and observed taking and  swallowing med via staff. Will continue to monitor for changes and safety.

## 2023-02-19 NOTE — PLAN OF CARE
POC reviewed this shift and is on going. Patient is calm and cooperative. Denies Suicidal Ideation, Homicidal Ideation, Auditory Hallucinations, Visual Hallucinations, Tactile Hallucinations, Gustatory Hallucinations, and Delusions. Patient has been isolating to his room. Patient has been reacting to internal stimuli all morning. Patient comes out on the floor from time to time. Patient out for meals and snacks. Dr Cagle ordered haldol 2mg BID. Pt continues to be medication compliant and staff will continue to monitor pt closely while on the unit.

## 2023-02-19 NOTE — PROGRESS NOTES
PSYCHIATRY DAILY INPATIENT PROGRESS NOTE  SUBSEQUENT HOSPITAL VISIT    ENCOUNTER DATE: 2/19/2023  SITE: 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 The chart was reviewed.     Reviewed notes from Rns and LPN and labs from the last 24 hours.    The patient's case was discussed with the treatment team/care providers today including Rns    Staff reports no behavioral or management issues.     The patient has been compliant with treatment.        Subjective 02/19/2023      Today, he again reports that he is doing well. Staff notes ongoing RIS and psychotic behaviors.   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    He reportedly had a positive conversation with his mother. He plans to resume living with his mother and his nephew.    Psychiatric ROS (observed, reported, or endorsed/denied):  Depressed mood - fluctuating  Interest/pleasure/anhedonia: No  Guilt/hopelessness/worthlessness - No  Changes in Sleep - No  Changes in Appetite - No  Changes in Concentration - No  Changes in Energy - No  PMA/R- No  Suicidal- active/passive ideations - No  Homicidal ideations: active/passive ideations - No    Hallucinations - Yes-Recently observed by staff   Delusions - Yes  Disorganized behavior - fluctuating  Disorganized speech - fluctuating  Negative symptoms - No    Elevated mood - less  Decreased need for sleep - less  Grandiosity - variable  Racing thoughts - variable  Impulsivity - No  Irritability-  variable  Increased energy - No  Distractibility - variable  Increase in goal-directed activity or PMA- No    Symptoms of KANU - No  Symptoms of Panic Disorder- No  Symptoms of PTSD - No      Overall progress: Patient is showing mild improvement       Medical ROS  General ROS: negative  Ophthalmic ROS: negative  ENT ROS: negative  Allergy and Immunology ROS: negative  Hematological and Lymphatic ROS: negative  Endocrine ROS: negative  Respiratory ROS: no cough, shortness of breath, or wheezing  Cardiovascular ROS: no chest pain or dyspnea on exertion  Gastrointestinal ROS: no abdominal pain, change in bowel habits, or black or bloody stools  Genito-Urinary ROS: no dysuria, trouble voiding, or hematuria  Musculoskeletal ROS: negative  Neurological ROS: no TIA or stroke symptoms  Dermatological ROS: negative      Psychotherapy:  Target symptoms: psychosis, delusions, substance abuse  Why chosen therapy is appropriate versus another modality: relevant to diagnosis, evidence based practice  Outcome monitoring methods: self-report, observation  Therapeutic intervention type: insight oriented psychotherapy, supportive psychotherapy  Topics discussed/themes: building skills sets for symptom management, symptom recognition; discussed issues with identity and delusions  The patient's response to the intervention is guarded. The patient's progress toward treatment goals is limited.   Duration of intervention: 3 minutes        PAST MEDICAL HISTORY   Past Medical History:   Diagnosis Date    History of psychiatric hospitalization     Hx of psychiatric care     Psychiatric problem     Schizophrenia     Suicide attempt            PSYCHOTROPIC MEDICATIONS   Scheduled Meds:   [START ON 2/20/2023] ARIPiprazole lauroxil  1,064 mg Intramuscular 1 time in Clinic/HOD    folic acid  1 mg Oral Daily    multivitamin  1 tablet Oral Daily    valproic acid (as sodium salt)  750 mg Oral BID     Continuous Infusions:  PRN Meds:.acetaminophen,  "aluminum-magnesium hydroxide-simethicone, docusate sodium, hydrOXYzine pamoate, loperamide, melatonin, nicotine, OLANZapine **AND** OLANZapine, ondansetron, promethazine, sodium chloride 0.9%        EXAMINATION    VITALS   Vitals:    02/17/23 1911 02/18/23 0721 02/18/23 1919 02/19/23 0755   BP: 127/73 126/65 (!) 123/57 (!) 147/81   BP Location: Left arm Left arm Left arm Left arm   Patient Position: Sitting Sitting Sitting Standing   Pulse: 86 66 86 77   Resp: 18 20 18 20   Temp: 97.5 °F (36.4 °C) 97 °F (36.1 °C) 97.9 °F (36.6 °C) 97.8 °F (36.6 °C)   TempSrc: Oral Oral Oral Oral   SpO2: 97% 98% 98% 98%   Weight:       Height:           Body mass index is 20.25 kg/m².      CONSTITUTIONAL  General Appearance: unremarkable, age appropriate, normal weight, well nourished;     MUSCULOSKELETAL  Muscle Strength and Tone:no dyskinesia, no tremor, no tic  Abnormal Involuntary Movements: No  Gait and Station: non-ataxic    PSYCHIATRIC   Level of Consciousness: awake and alert   Orientation: person, place, time, and situation  Grooming: Casually dressed and Well groomed  Psychomotor Behavior: cooperative, inappropriate prolonged eye contact  Speech: slowed, increased latency of response, soft  Language: grossly intact, able to name, able to repeat  Mood: "Good"  Affect: Blunted, improving somewhat  Thought Process: circumstantial  - Improving  Associations: intact   Thought Content: +delusions, denies SI, and denies HI  Perceptions: denies AH and denies  VH-Observed RIS today  Memory: Able to recall past events, Remote intact, and Recent intact  Attention:Decreased  Fund of Knowledge: Aware of current events and Vocabulary appropriate   Estimate if Intelligence:  Average based on work/education history, vocabulary and mental status exam  Insight: poor awareness of illness  Judgment: behavior is adequate to circumstances- fair      DIAGNOSTIC TESTING   Laboratory Results  No results found for this or any previous visit (from " the past 24 hour(s)).          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      - resumed/continue home Depakote ER at 1250 mg qhs   -Depakote level =4. Staff informed and instructed to monitor patient during med pass as he is suspected to be cheeking meds.   -Switch to depakene liquid, 500 mg BID, check level this evening  -VPA level 55, increase to/continue at 750 mg BID- check level in 3 days     - follow up with outpatient mental health providers after discharge for medication management and psychotherapy  -Start Adjunctive haldol 2 mg po BID    **Pt tried and failed several neuroleptics including risperdal, zyprexa, abilify and others"     Unspecified anxiety disorder  -started/continue hydroxyzine 50 mg PO q 6 hours PRN  -depakote off-label as above     Insomnia: pt counseled  -vistaril prn     Cannabis abuse: pt counseled  - SW consulted for assistance with additional resources      Psychosocial stressors  - pt counseled  - SW consulted for assistance with additional resources      Discussed diagnosis, risks and benefits of proposed treatment vs alternative treatments vs no treatment, potential side effects of these treatments and the inherent unpredictability of treatment. The patient expresses understanding of the above and displays the capacity to agree with this treatment given said understanding. Patient also agrees that, currently, the benefits outweigh the risks and would like to pursue/continue treatment at this time.    Any medications being used off-label were discussed with the patient inclusive of the evidence base for the use of the medications and  consent was obtained for the off-label use of the medication.       DISCHARGE PLANNING  Expected Disposition Plan: To be determined      NEED FOR CONTINUED HOSPITALIZATION  Psychiatric illness continues to pose a potential threat to life or bodily function, of self or others, thereby requiring the need for continued inpatient psychiatric hospitalization: Yes, due to: significant psychotic thought disorder and gravely disabled, as evidenced by:  Ongoing concerns with grave disability with patient unable to perform basic feeding, hygiene and dressing activities without significant constant support. and Ongoing concerns with perceptual aberrancy and paranoid persecutory delusions leading to potential harm of self or others.    Protective inpatient pyschiatric hospitalization required while a safe disposition plan is enacted: Yes    Patient stabilized and ready for discharge from inpatient psychiatric unit: No      STAFF:   Frantz Cagle MD  Psychiatry

## 2023-02-20 PROCEDURE — 99232 SBSQ HOSP IP/OBS MODERATE 35: CPT | Mod: SA,HB,, | Performed by: PSYCHIATRY & NEUROLOGY

## 2023-02-20 PROCEDURE — 11400000 HC PSYCH PRIVATE ROOM

## 2023-02-20 PROCEDURE — 90833 PSYTX W PT W E/M 30 MIN: CPT | Mod: SA,HB,, | Performed by: PSYCHIATRY & NEUROLOGY

## 2023-02-20 PROCEDURE — 99232 PR SUBSEQUENT HOSPITAL CARE,LEVL II: ICD-10-PCS | Mod: SA,HB,, | Performed by: PSYCHIATRY & NEUROLOGY

## 2023-02-20 PROCEDURE — 90833 PR PSYCHOTHERAPY W/PATIENT W/E&M, 30 MIN (ADD ON): ICD-10-PCS | Mod: SA,HB,, | Performed by: PSYCHIATRY & NEUROLOGY

## 2023-02-20 PROCEDURE — 25000003 PHARM REV CODE 250: Performed by: PSYCHIATRY & NEUROLOGY

## 2023-02-20 PROCEDURE — 63600175 PHARM REV CODE 636 W HCPCS: Performed by: PSYCHIATRY & NEUROLOGY

## 2023-02-20 RX ADMIN — ARIPIPRAZOLE LAUROXIL 1064 MG: 1064 INJECTION, SUSPENSION, EXTENDED RELEASE INTRAMUSCULAR at 10:02

## 2023-02-20 RX ADMIN — VALPROIC ACID 750 MG: 250 SOLUTION ORAL at 08:02

## 2023-02-20 RX ADMIN — THERA TABS 1 TABLET: TAB at 08:02

## 2023-02-20 RX ADMIN — HALOPERIDOL 3 MG: 2 TABLET ORAL at 08:02

## 2023-02-20 RX ADMIN — HYDROXYZINE PAMOATE 50 MG: 50 CAPSULE ORAL at 08:02

## 2023-02-20 RX ADMIN — FOLIC ACID 1 MG: 1 TABLET ORAL at 08:02

## 2023-02-20 RX ADMIN — HALOPERIDOL 2 MG: 2 TABLET ORAL at 08:02

## 2023-02-20 NOTE — PLAN OF CARE
"Patient continues with bizarre behaviors. Patient laughing and smiling inappropriately, delusional, RIS,  pacing in his room and talking out loud along with loud laughing hysterically overheard at the desk, observed holding his hands at waist level as if holding something in his hands which appears as walking in a wedding, continued pacing in halls and back to his room most of the day. Patient had to be redirected by staff due running in the marcial. Staff asked why patient stated "my lips are burning I have to get my chapstick." And just continues to laugh.  Patient also twirling in the halls when hands and arms above his head with continued laughing. Patient is not coming to desk having conversations with staff, not interacting with peers as per usual. Patient had telephone conversation with mother had loud verbal altercation and hung phone up. Appetite is good and is medication compliant. Patient received IM Abilify today as ordered (see emar). Patient had refused initially and then changed his mind. Denies S/HI, A/VH. Patient in marcial kicking his legs up in the air above his head at this time. Patient redirected again for safety. Patient remains calm and cooperative, just appears like he is in his own world ignoring his environment today. Close observations continued and safe environment maintained.   "

## 2023-02-20 NOTE — PROGRESS NOTES
"PSYCHIATRY DAILY INPATIENT PROGRESS NOTE  SUBSEQUENT HOSPITAL VISIT    ENCOUNTER DATE: 2/20/2023  SITE: 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 The chart was reviewed.     Reviewed notes from Rns and LPN and labs from the last 24 hours.    The patient's case was discussed with the treatment team/care providers today including Rns    Staff reports no behavioral or management issues.     The patient has been compliant with treatment.        Subjective 02/20/2023      Patient reports mood is "good." Affect somewhat bizarre with prolonged eye contact, inappropriate smiling. He reports he spoke to his mother over the weekend and plans to go back to her home upon discharge.     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     Once again discussed potential adverse effects of cannabis use, however patient states that he intends to continue after discharge.     Denies side effects to current medication regimen. Scheduled for [facility] 1064 today.     Psychiatric ROS (observed, reported, or endorsed/denied):  Depressed mood - fluctuating  Interest/pleasure/anhedonia: No  Guilt/hopelessness/worthlessness - No  Changes in Sleep - No  Changes in Appetite - No  Changes in Concentration - No  Changes in Energy - No  PMA/R- No  Suicidal- active/passive ideations - No  Homicidal ideations: active/passive ideations - No    Hallucinations - Yes-Recently observed by staff   Delusions - Yes  Disorganized behavior - fluctuating  Disorganized " speech - fluctuating  Negative symptoms - fluctuating    Elevated mood - less  Decreased need for sleep - less  Grandiosity - variable  Racing thoughts - variable  Impulsivity - No  Irritability- variable  Increased energy - No  Distractibility - variable  Increase in goal-directed activity or PMA- No    Symptoms of KANU - No  Symptoms of Panic Disorder- No  Symptoms of PTSD - No      Overall progress: Patient is showing mild improvement       Medical ROS  General ROS: negative  Ophthalmic ROS: negative  ENT ROS: negative  Allergy and Immunology ROS: negative  Hematological and Lymphatic ROS: negative  Endocrine ROS: negative  Respiratory ROS: no cough, shortness of breath, or wheezing  Cardiovascular ROS: no chest pain or dyspnea on exertion  Gastrointestinal ROS: no abdominal pain, change in bowel habits, or black or bloody stools  Genito-Urinary ROS: no dysuria, trouble voiding, or hematuria  Musculoskeletal ROS: negative  Neurological ROS: no TIA or stroke symptoms  Dermatological ROS: negative      Psychotherapy:  Target symptoms: psychosis, delusions, substance abuse  Why chosen therapy is appropriate versus another modality: relevant to diagnosis, evidence based practice  Outcome monitoring methods: self-report, observation  Therapeutic intervention type: insight oriented psychotherapy, supportive psychotherapy  Topics discussed/themes: building skills sets for symptom management, symptom recognition; discussed issues with identity and delusions  The patient's response to the intervention is guarded. The patient's progress toward treatment goals is limited.   Duration of intervention: 16 minutes        PAST MEDICAL HISTORY   Past Medical History:   Diagnosis Date    History of psychiatric hospitalization     Hx of psychiatric care     Psychiatric problem     Schizophrenia     Suicide attempt            PSYCHOTROPIC MEDICATIONS   Scheduled Meds:   ARIPiprazole lauroxil  1,064 mg Intramuscular 1 time in  "Clinic/HOD    folic acid  1 mg Oral Daily    haloperidoL  2 mg Oral BID    multivitamin  1 tablet Oral Daily    valproic acid (as sodium salt)  750 mg Oral BID     Continuous Infusions:  PRN Meds:.acetaminophen, aluminum-magnesium hydroxide-simethicone, docusate sodium, hydrOXYzine pamoate, loperamide, melatonin, nicotine, OLANZapine **AND** OLANZapine, ondansetron, promethazine, sodium chloride 0.9%        EXAMINATION    VITALS   Vitals:    02/18/23 1919 02/19/23 0755 02/19/23 1920 02/20/23 0755   BP: (!) 123/57 (!) 147/81 131/60 127/70   BP Location: Left arm Left arm Left arm Left arm   Patient Position: Sitting Standing Sitting Sitting   Pulse: 86 77 85 73   Resp: 18 20 18 18   Temp: 97.9 °F (36.6 °C) 97.8 °F (36.6 °C) 97.8 °F (36.6 °C) 98.4 °F (36.9 °C)   TempSrc: Oral Oral Oral Oral   SpO2: 98% 98% 97% 98%   Weight:       Height:           Body mass index is 20.25 kg/m².      CONSTITUTIONAL  General Appearance: unremarkable, age appropriate, normal weight, well nourished;     MUSCULOSKELETAL  Muscle Strength and Tone:no dyskinesia, no tremor, no tic  Abnormal Involuntary Movements: No  Gait and Station: non-ataxic    PSYCHIATRIC   Level of Consciousness: awake and alert   Orientation: person, place, time, and situation  Grooming: Casually dressed and Well groomed  Psychomotor Behavior: cooperative, inappropriate prolonged eye contact  Speech: slowed, increased latency of response, soft  Language: grossly intact, able to name, able to repeat  Mood: "Good"  Affect:  bizarre, intense smiling/eye contact ,    Thought Process: circumstantial  - Improving  Associations: intact   Thought Content: +delusions, denies SI, and denies HI  Perceptions: denies AH and denies  VH-Observed RIS today  Memory: Able to recall past events, Remote intact, and Recent intact  Attention:Decreased  Fund of Knowledge: Aware of current events and Vocabulary appropriate   Estimate if Intelligence:  Average based on work/education history, " vocabulary and mental status exam  Insight: poor awareness of illness  Judgment: behavior is adequate to circumstances- fair      DIAGNOSTIC TESTING   Laboratory Results  No results found for this or any previous visit (from the past 24 hour(s)).          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      - resumed/continue home Depakote ER at 1250 mg qhs   -Depakote level =4. Staff informed and instructed to monitor patient during med pass as he is suspected to be cheeking meds.   -Switch to depakene liquid, 500 mg BID, check level this evening  -VPA level 55, increase to/continue at 750 mg BID- check level in 3 days     - follow up with outpatient mental health providers after discharge for medication management and psychotherapy  -Start Adjunctive haldol 2 mg po BID-Increase to 3 mg BID    **Pt tried and failed several neuroleptics including risperdal, zyprexa, abilify and others"     Unspecified anxiety disorder  -started/continue hydroxyzine 50 mg PO q 6 hours PRN  -depakote off-label as above     Insomnia: pt counseled  -vistaril prn     Cannabis abuse: pt counseled  - SW consulted for assistance with additional resources      Psychosocial stressors  - pt counseled  - SW consulted for assistance with additional resources      Discussed diagnosis, risks and benefits of proposed treatment vs alternative treatments vs no treatment, potential side effects of these treatments and the inherent unpredictability of treatment. The patient expresses understanding of the above and displays the capacity to agree with this treatment given said understanding. Patient also agrees  that, currently, the benefits outweigh the risks and would like to pursue/continue treatment at this time.    Any medications being used off-label were discussed with the patient inclusive of the evidence base for the use of the medications and consent was obtained for the off-label use of the medication.       DISCHARGE PLANNING  Expected Disposition Plan: To be determined      NEED FOR CONTINUED HOSPITALIZATION  Psychiatric illness continues to pose a potential threat to life or bodily function, of self or others, thereby requiring the need for continued inpatient psychiatric hospitalization: Yes, due to: significant psychotic thought disorder and gravely disabled, as evidenced by:  Ongoing concerns with grave disability with patient unable to perform basic feeding, hygiene and dressing activities without significant constant support. and Ongoing concerns with perceptual aberrancy and paranoid persecutory delusions leading to potential harm of self or others.    Protective inpatient pyschiatric hospitalization required while a safe disposition plan is enacted: Yes    Patient stabilized and ready for discharge from inpatient psychiatric unit: No      STAFF:   VIN Stafford  Psychiatry

## 2023-02-20 NOTE — PLAN OF CARE
Poc reviewed and is ongoing,mood labile,thoughts loosely associated at times,pleasant when approached,continues to respond to internal stimuli,as evident by observing  inappropriate laughing,hand gestures,dancing in the halls,there have been no interacting with peers,remains isolated and preoccupied,denies ideations,medication compliant,will provide care as per treatment plan.

## 2023-02-21 LAB — VALPROATE SERPL-MCNC: 85 UG/ML (ref 50–100)

## 2023-02-21 PROCEDURE — 36415 COLL VENOUS BLD VENIPUNCTURE: CPT | Performed by: PSYCHIATRY & NEUROLOGY

## 2023-02-21 PROCEDURE — 25000003 PHARM REV CODE 250: Performed by: PSYCHIATRY & NEUROLOGY

## 2023-02-21 PROCEDURE — 99233 PR SUBSEQUENT HOSPITAL CARE,LEVL III: ICD-10-PCS | Mod: AF,HB,, | Performed by: PSYCHIATRY & NEUROLOGY

## 2023-02-21 PROCEDURE — 90833 PR PSYCHOTHERAPY W/PATIENT W/E&M, 30 MIN (ADD ON): ICD-10-PCS | Mod: AF,HB,, | Performed by: PSYCHIATRY & NEUROLOGY

## 2023-02-21 PROCEDURE — 99233 SBSQ HOSP IP/OBS HIGH 50: CPT | Mod: AF,HB,, | Performed by: PSYCHIATRY & NEUROLOGY

## 2023-02-21 PROCEDURE — 80164 ASSAY DIPROPYLACETIC ACD TOT: CPT | Performed by: PSYCHIATRY & NEUROLOGY

## 2023-02-21 PROCEDURE — 11400000 HC PSYCH PRIVATE ROOM

## 2023-02-21 PROCEDURE — 90833 PSYTX W PT W E/M 30 MIN: CPT | Mod: AF,HB,, | Performed by: PSYCHIATRY & NEUROLOGY

## 2023-02-21 RX ORDER — HALOPERIDOL 5 MG/1
5 TABLET ORAL 2 TIMES DAILY
Status: DISCONTINUED | OUTPATIENT
Start: 2023-02-21 | End: 2023-02-22 | Stop reason: HOSPADM

## 2023-02-21 RX ADMIN — FOLIC ACID 1 MG: 1 TABLET ORAL at 08:02

## 2023-02-21 RX ADMIN — HALOPERIDOL 5 MG: 5 TABLET ORAL at 08:02

## 2023-02-21 RX ADMIN — THERA TABS 1 TABLET: TAB at 08:02

## 2023-02-21 RX ADMIN — VALPROIC ACID 750 MG: 250 SOLUTION ORAL at 08:02

## 2023-02-21 RX ADMIN — HALOPERIDOL 3 MG: 2 TABLET ORAL at 08:02

## 2023-02-21 RX ADMIN — Medication 6 MG: at 08:02

## 2023-02-21 RX ADMIN — HYDROXYZINE PAMOATE 50 MG: 50 CAPSULE ORAL at 08:02

## 2023-02-21 NOTE — PLAN OF CARE
Patient paces halls ,dances, sings, RIS (laughing and talking to self),  calm, cooperative, and focused on discharge tomorrow. Appetite is good and is medication compliant without side effects noted. No negative behaviors noted. Dr. Cagle visited per telemed with new orders noted to increase dosage of Haldol 5 mg po bid. Close observations continued and safe environment maintained.

## 2023-02-21 NOTE — PLAN OF CARE
Problem: Violence Risk or Actual  Goal: Anger and Impulse Control  Outcome: Ongoing, Progressing     Problem: Adult Behavioral Health Plan of Care  Goal: Plan of Care Review  Outcome: Ongoing, Progressing  Goal: Patient-Specific Goal (Individualization)  Outcome: Ongoing, Progressing  Goal: Absence of New-Onset Illness or Injury  Outcome: Ongoing, Progressing  Goal: Optimized Coping Skills in Response to Life Stressors  Outcome: Ongoing, Progressing  Goal: Develops/Participates in Therapeutic Butler to Support Successful Transition  Outcome: Ongoing, Progressing  Goal: Rounds/Family Conference  Outcome: Ongoing, Progressing     Problem: Behavior Regulation Impairment (Psychotic Signs/Symptoms)  Goal: Improved Behavioral Control (Psychotic Signs/Symptoms)  Outcome: Ongoing, Progressing     Problem: Decreased Participation and Engagement (Psychotic Signs/Symptoms)  Goal: Increased Participation and Engagement (Psychotic Signs/Symptoms)  Outcome: Ongoing, Progressing     Problem: Mood Impairment (Psychotic Signs/Symptoms)  Goal: Improved Mood Symptoms (Psychotic Signs/Symptoms)  Outcome: Ongoing, Progressing     Problem: Cognitive Impairment (Depressive Signs/Symptoms)  Goal: Optimized Cognitive Function  Outcome: Ongoing, Progressing     Problem: Feelings of Worthlessness, Hopelessness or Excessive Guilt (Depressive Signs/Symptoms)  Goal: Enhanced Self-Esteem and Confidence (Depressive Signs/Symptoms)  Outcome: Ongoing, Progressing     Problem: Social, Occupational or Functional Impairment (Depressive Signs/Symptoms)  Goal: Enhanced Social, Occupational or Functional Skills (Depressive Signs/Symptoms)  Outcome: Ongoing, Progressing

## 2023-02-21 NOTE — NURSING
Pt. A/A.  No C/O pain.  Denies SI/HI/AVH but is clearly responding to internal Stimuli.  Has some anxiety.  Slow to respond to verbal commands.  Compliant with medications but is still paranoid about them.  Flat affect.  Will continue to observe.

## 2023-02-21 NOTE — PROGRESS NOTES
"PSYCHIATRY DAILY INPATIENT PROGRESS NOTE  SUBSEQUENT HOSPITAL VISIT    ENCOUNTER DATE: 2/21/2023  SITE: 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 The chart was reviewed.     Reviewed notes from Rns, NP, and LPN and labs from the last 24 hours.    The patient's case was discussed with the treatment team/care providers today including Rns    Staff reports no behavioral or management issues.     The patient has been compliant with treatment.        Subjective 02/21/2023    Per yesterday's reports: Patient reports mood is "good." Affect somewhat bizarre with prolonged eye contact, inappropriate smiling. He reports he spoke to his mother over the weekend and plans to go back to her home upon discharge. 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   Once again discussed potential adverse effects of cannabis use, however patient states that he intends to continue after discharge. Denies side effects to current medication regimen. Scheduled for aristada 1064 today.     Today, he reports that he is doing better. He notes improving symptoms as documented below. His goal is to "go home, surround myself with supportive family and friends and possibly go back to college."  -His mood symptoms are improving  -he notes much less intense/frequent AH; he would not discuss his delusions  -he is making improvements and will be stable for discharge home tomorrow and able to transition to outpatient care.     Psychiatric ROS (observed, " reported, or endorsed/denied):  Depressed mood - improving steadily  Interest/pleasure/anhedonia: No  Guilt/hopelessness/worthlessness - No  Changes in Sleep - No  Changes in Appetite - No  Changes in Concentration - No  Changes in Energy - No  PMA/R- No  Suicidal- active/passive ideations - No  Homicidal ideations: active/passive ideations - No    Hallucinations - improving steadily  Delusions - improving steadily  Disorganized behavior - improving steadily  Disorganized speech - improving steadily  Negative symptoms - improving steadily    Elevated mood - improving steadily  Decreased need for sleep - improving steadily  Grandiosity - improving steadily  Racing thoughts - improving steadily  Impulsivity - No  Irritability- improving steadily  Increased energy - No  Distractibility - improving steadily  Increase in goal-directed activity or PMA- No    Symptoms of KANU - No  Symptoms of Panic Disorder- No  Symptoms of PTSD - No      Overall progress: Patient is showing moderate improvement      Medical ROS  General ROS: negative  Ophthalmic ROS: negative  ENT ROS: negative  Allergy and Immunology ROS: negative  Hematological and Lymphatic ROS: negative  Endocrine ROS: negative  Respiratory ROS: no cough, shortness of breath, or wheezing  Cardiovascular ROS: no chest pain or dyspnea on exertion  Gastrointestinal ROS: no abdominal pain, change in bowel habits, or black or bloody stools  Genito-Urinary ROS: no dysuria, trouble voiding, or hematuria  Musculoskeletal ROS: negative  Neurological ROS: no TIA or stroke symptoms  Dermatological ROS: negative      Psychotherapy:  Target symptoms: psychosis, delusions, substance abuse  Why chosen therapy is appropriate versus another modality: relevant to diagnosis, evidence based practice  Outcome monitoring methods: self-report, observation  Therapeutic intervention type: insight oriented psychotherapy, supportive psychotherapy  Topics discussed/themes: building skills sets  for symptom management, symptom recognition; discussed issues with identity and delusions; preventing re-hospitalizations   The patient's response to the intervention is reluctant. The patient's progress toward treatment goals is limited.   Duration of intervention: 16 minutes        PAST MEDICAL HISTORY   Past Medical History:   Diagnosis Date    History of psychiatric hospitalization     Hx of psychiatric care     Psychiatric problem     Schizophrenia     Suicide attempt            PSYCHOTROPIC MEDICATIONS   Scheduled Meds:   folic acid  1 mg Oral Daily    haloperidoL  3 mg Oral BID    multivitamin  1 tablet Oral Daily    valproic acid (as sodium salt)  750 mg Oral BID     Continuous Infusions:  PRN Meds:.acetaminophen, aluminum-magnesium hydroxide-simethicone, docusate sodium, hydrOXYzine pamoate, loperamide, melatonin, nicotine, OLANZapine **AND** OLANZapine, ondansetron, promethazine, sodium chloride 0.9%        EXAMINATION    VITALS   Vitals:    02/19/23 1920 02/20/23 0755 02/20/23 1931 02/21/23 0800   BP: 131/60 127/70 121/70 (!) 110/58   BP Location: Left arm Left arm Left arm Left arm   Patient Position: Sitting Sitting Sitting Sitting   Pulse: 85 73 76 75   Resp: 18 18 16 18   Temp: 97.8 °F (36.6 °C) 98.4 °F (36.9 °C) 97.8 °F (36.6 °C) 98.4 °F (36.9 °C)   TempSrc: Oral Oral Oral Oral   SpO2: 97% 98% 97% 97%   Weight:       Height:           Body mass index is 20.25 kg/m².      CONSTITUTIONAL  General Appearance: unremarkable, age appropriate, normal weight, well nourished;     MUSCULOSKELETAL  Muscle Strength and Tone:no dyskinesia, no tremor, no tic  Abnormal Involuntary Movements: No  Gait and Station: non-ataxic    PSYCHIATRIC   Level of Consciousness: awake and alert   Orientation: person, place, time, and situation  Grooming: Casually dressed and Well groomed  Psychomotor Behavior: cooperative, inappropriate prolonged eye contact  Speech: slowed, increased latency of response, soft  Language: grossly  "intact, able to name, able to repeat  Mood: "Good"  Affect:  bizarre, intense smiling/eye contact ,    Thought Process: circumstantial  - Improving  Associations: intact   Thought Content: +delusions, denies SI, and denies HI  Perceptions: denies AH and denies  VH-Observed RIS today  Memory: Able to recall past events, Remote intact, and Recent intact  Attention:Decreased  Fund of Knowledge: Aware of current events and Vocabulary appropriate   Estimate if Intelligence:  Average based on work/education history, vocabulary and mental status exam  Insight: poor awareness of illness  Judgment: behavior is adequate to circumstances- fair      DIAGNOSTIC TESTING   Laboratory Results  Recent Results (from the past 24 hour(s))   Valproic Acid    Collection Time: 02/21/23  5:04 AM   Result Value Ref Range    Valproic Acid Level 85 50 - 100 ug/mL             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     Cannabis abuse     Psychosocial stressors        PROBLEM LIST AND MANAGEMENT PLANS      Bipolar disorder, type I, MRE manic, severe with psychotic features: pt counseled    - Aristada 1064mg given Feb 20, 2023- next dose due on about 4/20/23.    - resumed/continue home Depakote ER at 1250 mg qhs   -Depakote level =4. Staff informed and instructed to monitor patient during med pass as he is suspected to be cheeking meds.   -Switch to depakene liquid, 500 mg BID,-VPA level 55, increase to/continue at 750 mg BID- check level on 2/21/23- 85    - follow up with outpatient mental health providers after discharge for medication management and psychotherapy  -Start Adjunctive haldol 2 mg po BID-Increase to 3 mg BID- increase to 5 mg po BID    **Pt tried and failed several neuroleptics including risperdal, zyprexa, abilify and others""     Unspecified anxiety disorder  -started/continue " hydroxyzine 50 mg PO q 6 hours PRN  -depakote off-label as above     Insomnia: pt counseled  -vistaril prn     Cannabis abuse: pt counseled  - SW consulted for assistance with additional resources      Psychosocial stressors  - pt counseled  - SW consulted for assistance with additional resources      Discussed diagnosis, risks and benefits of proposed treatment vs alternative treatments vs no treatment, potential side effects of these treatments and the inherent unpredictability of treatment. The patient expresses understanding of the above and displays the capacity to agree with this treatment given said understanding. Patient also agrees that, currently, the benefits outweigh the risks and would like to pursue/continue treatment at this time.    Any medications being used off-label were discussed with the patient inclusive of the evidence base for the use of the medications and consent was obtained for the off-label use of the medication.       DISCHARGE PLANNING  Expected Disposition Plan: home tomorrow is stable      NEED FOR CONTINUED HOSPITALIZATION  Psychiatric illness continues to pose a potential threat to life or bodily function, of self or others, thereby requiring the need for continued inpatient psychiatric hospitalization: Yes, due to: significant psychotic thought disorder and gravely disabled, as evidenced by:  Ongoing concerns with grave disability with patient unable to perform basic feeding, hygiene and dressing activities without significant constant support. and Ongoing concerns with perceptual aberrancy and paranoid persecutory delusions leading to potential harm of self or others.    Protective inpatient pyschiatric hospitalization required while a safe disposition plan is enacted: Yes    Patient stabilized and ready for discharge from inpatient psychiatric unit: No      STAFF:   Frantz Cagle MD  Psychiatry

## 2023-02-22 VITALS
BODY MASS INDEX: 20.14 KG/M2 | HEIGHT: 75 IN | DIASTOLIC BLOOD PRESSURE: 77 MMHG | TEMPERATURE: 98 F | HEART RATE: 80 BPM | SYSTOLIC BLOOD PRESSURE: 120 MMHG | OXYGEN SATURATION: 97 % | WEIGHT: 162 LBS | RESPIRATION RATE: 20 BRPM

## 2023-02-22 PROBLEM — R45.851 SUICIDAL IDEATION: Status: RESOLVED | Noted: 2020-03-12 | Resolved: 2023-02-22

## 2023-02-22 PROCEDURE — 99239 PR HOSPITAL DISCHARGE DAY,>30 MIN: ICD-10-PCS | Mod: AF,HB,, | Performed by: PSYCHIATRY & NEUROLOGY

## 2023-02-22 PROCEDURE — 90833 PR PSYCHOTHERAPY W/PATIENT W/E&M, 30 MIN (ADD ON): ICD-10-PCS | Mod: AF,HB,, | Performed by: PSYCHIATRY & NEUROLOGY

## 2023-02-22 PROCEDURE — 90833 PSYTX W PT W E/M 30 MIN: CPT | Mod: AF,HB,, | Performed by: PSYCHIATRY & NEUROLOGY

## 2023-02-22 PROCEDURE — 25000003 PHARM REV CODE 250: Performed by: PSYCHIATRY & NEUROLOGY

## 2023-02-22 PROCEDURE — 99239 HOSP IP/OBS DSCHRG MGMT >30: CPT | Mod: AF,HB,, | Performed by: PSYCHIATRY & NEUROLOGY

## 2023-02-22 RX ORDER — HALOPERIDOL 5 MG/1
5 TABLET ORAL 2 TIMES DAILY
Qty: 60 TABLET | Refills: 2 | Status: ON HOLD | OUTPATIENT
Start: 2023-02-22 | End: 2023-04-06 | Stop reason: SDUPTHER

## 2023-02-22 RX ORDER — DIVALPROEX SODIUM 500 MG/1
1500 TABLET, FILM COATED, EXTENDED RELEASE ORAL 2 TIMES DAILY
Qty: 150 TABLET | Refills: 0 | Status: ON HOLD | OUTPATIENT
Start: 2023-02-22 | End: 2023-04-06 | Stop reason: HOSPADM

## 2023-02-22 RX ADMIN — HALOPERIDOL 5 MG: 5 TABLET ORAL at 08:02

## 2023-02-22 RX ADMIN — THERA TABS 1 TABLET: TAB at 08:02

## 2023-02-22 RX ADMIN — VALPROIC ACID 750 MG: 250 SOLUTION ORAL at 08:02

## 2023-02-22 RX ADMIN — FOLIC ACID 1 MG: 1 TABLET ORAL at 08:02

## 2023-02-22 NOTE — NURSING
Patient has met the criteria to be discharged today. Patient was explained all discharge instructions and verbalized an understanding of those instructions. Patient was returned all his belongings upon departure. Patient was escorted off the unit and out of the hospital by staff.

## 2023-02-22 NOTE — PLAN OF CARE
Goals ongoing.  Patient still somewhat not connected.  Continues to demonstrate RIS while walking and in his room.  Cooperative with medication with no concerns.

## 2023-02-22 NOTE — PROGRESS NOTES
Psychotherapy:  Target symptoms: psychosis, delusions, substance abuse  Why chosen therapy is appropriate versus another modality: relevant to diagnosis, evidence based practice  Outcome monitoring methods: self-report, observation  Therapeutic intervention type: insight oriented psychotherapy, supportive psychotherapy  Topics discussed/themes: building skills sets for symptom management, symptom recognition; discussed issues with identity and delusions; preventing re-hospitalizations   -safety plan and wrap up session  The patient's response to the intervention is reluctant. The patient's progress toward treatment goals is limited.   Duration of intervention: 16 minutes  Frantz Cagle MD  Psychiatry

## 2023-02-22 NOTE — DISCHARGE SUMMARY
Discharge Summary  Psychiatry    Admit Date: 2/8/2023    Discharge Date and Time:  02/22/2023 9:07 AM    Attending Physician: Frantz Cagle MD     Discharge Provider: Frantz Cagle MD    Reason for Admission:   psychosis    History of Present Illness:      The patient presented to the ER on 2/8/2023 . Per staff notes:  -Patient ran past sitter in doorway, ran through ED doors, security staff able to stop patient at main entrance however he was able to move past security. Patient eloped. [facility] dispatched  -Hx of schizophrenia, got into argument with his mother and was running from police, non complaint with meds  -22-year-old male with history of schizoaffective not on medication presents with bizarre behavior.  According to EMS patient got into an argument with his mother and made some threats but proceeded to run away from the .  Patient does admit to having suicidal ideation without plan.  History otherwise limited due to medical condition  -[facility] Soria admitted to [facility] under the care of Frantz Cagle MD with diagnosis of schizoaffective D/O. The patient is PEC'ed. Patient is a 23 yo AAM who presented to the ER per [facility] for reportedly having bizarre behavior and SI's. Ater arrival to ER,pt was PEC'd,then pt eloped and was returned to ER per [facility]. UDS positive for THC. PT sedated per ER physician. Arrived to unit, sedated. Patient withdrawn, depressed, anxious, agitated, irritable, drowsy, sedated, manic, and paranoid. Endorses Suicidal Ideation and Delusions. Denies Homicidal Ideation, Auditory Hallucinations, Visual Hallucinations, Tactile Hallucinations, and Gustatory Hallucinations     The patient was medically cleared and admitted to the [facility].     The patient has a longstanding h/o Severe bipolar disorder with psychotic features complicated by significant psychosocial stressors and poor tx adherence. He was last treated here about 1 month ago for an acute exacerbation of psychotic  carley.     He was stabilized on Depakote and Abilify; he is currently taking a CARRASCO     He reports that he had an argument with his family over wearing wigs. He also reports some severe issues regarding the musician Raul Murphy (a frequent delusion of his), but the details were presented non-coherently.     He was sedated from medications received prn secondary to psychotic agitation- this limited his participation.      Overall, his presentation is consistent with previous  hospitalization.         Symptoms of Depression: diminished mood - Yes, loss of interest/anhedonia - No;  recurrent - No, >14 days - Yes, diminished energy - No, change in sleep - Yes, change in appetite - Yes, diminished concentration or cognition or indecisiveness - Yes, PMA/R -  No, excessive guilt or hopelessness or worthlessness - Yes, suicidal ideations - No     Changes in Sleep: trouble with initiation- Yes, maintenance, - No early morning awakening with inability to return to sleep - No, hypersomnolence - No     Suicidal- active/passive ideations - No, organized plans, future intentions - No  +SI reported in the last 24 hours     Homicidal ideations: active/passive ideations - No, organized plans, future intentions - No     Symptoms of psychosis: hallucinations - Yes, delusions - Yes, disorganized speech - No, disorganized behavior or abnormal motor behavior - No, or negative symptoms (diminshed emotional expression, avolition, anhedonia, alogia, asociality) - No, active phase symptoms >1 month - No, continuous signs of illness > 6 months - No, since onset of illness decreased level of functioning present - No     Symptoms of carley or hypomania: elevated, expansive, or irritable mood with increased energy or activity - Yes; > 4 days - Yes,  >7 days - Yes; with inflated self-esteem or grandiosity - Yes, decreased need for sleep - No, increased rate of speech - No, FOI or racing thoughts - No, distractibility - Yes, increased goal directed  "activity or PMA - Yes, risky/disinhibited behavior - Yes     Symptoms of KANU: excessive anxiety/worry/fear, more days than not, about numerous issues - Yes, ongoing for >6 months - No, difficult to control - No, with restlessness - No, fatigue - No, poor concentration - No, irritability - No, muscle tension - No, sleep disturbance - No; causes functionally impairing distress - No     Symptoms of Panic Disorder: recurrent panic attacks (palpitations/heart racing, sweating, shakiness, dyspnea, choking, chest pain/discomfort, Gi symptoms, dizzy/lightheadedness, hot/col flashes, paresthesias, derealization, fear of losing control or fear of dying or fear of "going crazy") - No, precipitated - No, un-precipitated - No, source of worry and/or behavioral changes secondary for 1 month or longer- No, agoraphobia - No     Symptoms of PTSD: h/o trauma exposure - No; re-experiencing/intrusive symptoms - No, avoidant behavior - No, 2 or more negative alterations in cognition or mood - No, 2 or more hyperarousal symptoms - No; with dissociative symptoms - No, ongoing for 1 or more  months - No     Symptoms of OCD: obsessions (recurrent thoughts/urges/images; intrusive and/or unwanted; uses other thoughts/actions to suppress) - No; compulsions (repetitive behaviors used to lower distress/anxiety/obsessions) - No, time-consuming (over 1 hour per day) or cause significant distress/impairment - - No     Symptoms of Anorexia: restriction of caloric intake leading to significantly low body weight - No, intense fear of gaining weight or persistent behavior that interferes with weight gain even thought at a significantly low weight - No, disturbance in the way in which one's body weight or shape is experienced, undue influence of body weight or shape on self evaluation, or persistent lack of recognition of the seriousness of the current low body weight - No     Symptoms of Bulimia: recurrent episodes of binge eating (definitely larger " amount  than what others would eat and lack of a sense of control over eating during episode) - No, recurrent inappropriate compensatory behaviors in order to prevent weight gain (fasting, medications, exercise, vomiting) - No, binges and compensatory behaviors both occur on average at least once a week for 3 months - No, self evaluations is unduly influenced by body shape/weight- - No     Symptoms of Binge eating: recurrent episodes of binge eating (definitely larger amount than what others would eat and lack of a sense of control over eating during episode) - No, 3 or more of following (eating much more rapidly, eating until uncomfortably full, large amounts when not hungry, eating alone because of embarrassed by how much,  feeling disgusted with oneself, depressed or very guilty afterward) - No, distress regarding binges - No, binges occur on average at least once a week for 3 months - No    Procedures Performed: * No surgery found *    Hospital Course:    Patient was admitted to the inpatient psychiatry unit after being medically cleared in the ED. Chart and labs were reviewed. The patient was stabilized as follows:       Bipolar disorder, type I, MRE manic, severe with psychotic features: pt counseled     - Aristada 1064mg given Feb 20, 2023- next dose due on about 4/20/23.    - resumed/continue home Depakote ER at 1250 mg qhs   -Depakote level =4. Staff informed and instructed to monitor patient during med pass as he is suspected to be cheeking meds.   -Switch to depakene liquid, 500 mg BID,-VPA level 55, increase to/continue at 750 mg BID- check level on 2/21/23- 85     - follow up with outpatient mental health providers after discharge for medication management and psychotherapy  -Start Adjunctive haldol 2 mg po BID-Increase to 3 mg BID- increase to/continue at 5 mg po BID     **Pt tried and failed several neuroleptics including risperdal, zyprexa, abilify and others"     Unspecified anxiety  disorder  -started/continue hydroxyzine 50 mg PO q 6 hours PRN  -depakote off-label as above     Insomnia: pt counseled  -vistaril prn     Cannabis abuse: pt counseled  - SW consulted for assistance with additional resources      Psychosocial stressors  - pt counseled  - SW consulted for assistance with additional resources         During hospitalization, the patient was encouraged to go to both groups and individual counseling. Patient was monitored for any side effects. A meeting was held with multidisciplinary team prior to discharge and pt's diagnosis, current medications, and follow up were discussed. The patient has been compliant with treatment and can adequately attend to activities of daily living in an independent manner. The patient denies any side effects. The patient denies SI, HI, plan or intent for self harm or harm to others. The patient is no longer a danger to self or others nor gravely disabled disabled. Patient discharged  in stable condition with scheduled outpatient follow up.    The patient reports improved symptoms as documented below. The patient is requesting discharge. The patient is currently stable for discharge home and is able/willing to attend outpatient care. The patient is hopeful, future oriented and goal directed. The patient readily discusses both short and long term goals. The patient can identify positive coping skills and social support.     AIMS score was 0    Psychiatric ROS (observed, reported, or endorsed/denied):  Depressed mood - improved  Interest/pleasure/anhedonia: No  Guilt/hopelessness/worthlessness - No  Changes in Sleep - No  Changes in Appetite - No  Changes in Concentration - No  Changes in Energy - No  PMA/R- No  Suicidal- active/passive ideations - No  Homicidal ideations: active/passive ideations - No     Hallucinations -improved  Delusions - improving steadily  Disorganized behavior - improved  Disorganized speech - improved  Negative symptoms - Improved      Elevated mood - improved  Decreased need for sleep - No  Grandiosity - improved  Racing thoughts - improved  Impulsivity - No  Irritability- No  Increased energy - No  Distractibility - No  Increase in goal-directed activity or PMA- No     Symptoms of KANU - improved  Symptoms of Panic Disorder- No  Symptoms of PTSD - No      Discussed diagnosis, risks and benefits of proposed treatment vs alternative treatments vs no treatment, and potential side effects of these treatments.  The patient expresses understanding of the above and displays the capacity to agree with this treatment given said understanding.  Patient also agrees that, currently, the benefits outweigh the risks and would like to pursue treatment at this time.      Discharge MSE: stated age, casually dressed, well groomed.  No psychomotor agitation or retardation.  No abnormal involuntary movements.  Gait normal.  Speech normal, conversational.  Language fluent English. Mood fine.  Affect normal range, pleasant, euthymic.  Thought process linear.  Associations intact.  Denies suicidal or homicidal ideation.  Denies auditory hallucinations, paranoid ideation, ideas of reference.  Memory intact.  Attention intact.  Fund of knowledge intact.  Insight intact.  Judgment intact.  Alert and oriented to person, place, time.      Tobacco Usage:  Is patient a smoker? No  Does patient want prescription for Tobacco Cessation? No  Does patient want counseling for Tobacco Cessation? No    If patient would like to quit, then over the counter nicotine patch could be used. The patient could also follow up with his PCP or psychiatric provider for other alternatives.     Final Diagnoses:    Principal Problem: Bipolar 1 D/o MRE Manic with psychotic features              R/o SAD (unable to r/o or rule in at this time)   Secondary Diagnoses:   Unspecified Anxiety Disorder    Insomnia secondary to a mental illness    Psychosocial stressors     THC abuse    Labs:  Admission on  02/08/2023   Component Date Value Ref Range Status    WBC 02/08/2023 5.78  3.90 - 12.70 K/uL Final    RBC 02/08/2023 4.02 (L)  4.60 - 6.20 M/uL Final    Hemoglobin 02/08/2023 12.8 (L)  14.0 - 18.0 g/dL Final    Hematocrit 02/08/2023 38.2 (L)  40.0 - 54.0 % Final    MCV 02/08/2023 95  82 - 98 fL Final    MCH 02/08/2023 31.8 (H)  27.0 - 31.0 pg Final    MCHC 02/08/2023 33.5  32.0 - 36.0 g/dL Final    RDW 02/08/2023 12.5  11.5 - 14.5 % Final    Platelets 02/08/2023 224  150 - 450 K/uL Final    MPV 02/08/2023 9.4  9.2 - 12.9 fL Final    Immature Granulocytes 02/08/2023 0.2  0.0 - 0.5 % Final    Gran # (ANC) 02/08/2023 3.5  1.8 - 7.7 K/uL Final    Immature Grans (Abs) 02/08/2023 0.01  0.00 - 0.04 K/uL Final    Lymph # 02/08/2023 1.6  1.0 - 4.8 K/uL Final    Mono # 02/08/2023 0.7  0.3 - 1.0 K/uL Final    Eos # 02/08/2023 0.1  0.0 - 0.5 K/uL Final    Baso # 02/08/2023 0.01  0.00 - 0.20 K/uL Final    nRBC 02/08/2023 0  0 /100 WBC Final    Gran % 02/08/2023 59.6  38.0 - 73.0 % Final    Lymph % 02/08/2023 27.0  18.0 - 48.0 % Final    Mono % 02/08/2023 11.4  4.0 - 15.0 % Final    Eosinophil % 02/08/2023 1.6  0.0 - 8.0 % Final    Basophil % 02/08/2023 0.2  0.0 - 1.9 % Final    Differential Method 02/08/2023 Automated   Final    Sodium 02/08/2023 143  136 - 145 mmol/L Final    Potassium 02/08/2023 4.3  3.5 - 5.1 mmol/L Final    Chloride 02/08/2023 109  95 - 110 mmol/L Final    CO2 02/08/2023 30 (H)  23 - 29 mmol/L Final    Glucose 02/08/2023 68 (L)  70 - 110 mg/dL Final    BUN 02/08/2023 17  6 - 20 mg/dL Final    Creatinine 02/08/2023 1.5 (H)  0.5 - 1.4 mg/dL Final    Calcium 02/08/2023 9.3  8.7 - 10.5 mg/dL Final    Total Protein 02/08/2023 7.6  6.0 - 8.4 g/dL Final    Albumin 02/08/2023 3.9  3.5 - 5.2 g/dL Final    Total Bilirubin 02/08/2023 0.6  0.1 - 1.0 mg/dL Final    Alkaline Phosphatase 02/08/2023 62  55 - 135 U/L Final    AST 02/08/2023 27  10 - 40 U/L Final    ALT 02/08/2023 31  10 - 44 U/L Final    Anion Gap 02/08/2023  4 (L)  8 - 16 mmol/L Final    eGFR 02/08/2023 >60.0  >60 mL/min/1.73 m^2 Final    Specimen UA 02/08/2023 Urine, Clean Catch   Final    Color, UA 02/08/2023 Yellow  Yellow, Straw, Ingrid Final    Appearance, UA 02/08/2023 Clear  Clear Final    pH, UA 02/08/2023 6.0  5.0 - 8.0 Final    Specific Gravity, UA 02/08/2023 >=1.030 (A)  1.005 - 1.030 Final    Protein, UA 02/08/2023 1+ (A)  Negative Final    Glucose, UA 02/08/2023 Negative  Negative Final    Ketones, UA 02/08/2023 1+ (A)  Negative Final    Bilirubin (UA) 02/08/2023 Negative  Negative Final    Occult Blood UA 02/08/2023 Negative  Negative Final    Nitrite, UA 02/08/2023 Negative  Negative Final    Urobilinogen, UA 02/08/2023 1.0  <2.0 EU/dL Final    Leukocytes, UA 02/08/2023 Negative  Negative Final    Benzodiazepines 02/08/2023 Negative  Negative Final    Methadone metabolites 02/08/2023 Negative  Negative Final    Cocaine (Metab.) 02/08/2023 Negative  Negative Final    Opiate Scrn, Ur 02/08/2023 Negative  Negative Final    Barbiturate Screen, Ur 02/08/2023 Negative  Negative Final    Amphetamine Screen, Ur 02/08/2023 Negative  Negative Final    THC 02/08/2023 Presumptive Positive (A)  Negative Final    Phencyclidine 02/08/2023 Negative  Negative Final    Creatinine, Urine 02/08/2023 615.0 (H)  23.0 - 375.0 mg/dL Final    Toxicology Information 02/08/2023 SEE COMMENT   Final    Alcohol, Serum 02/08/2023 <3  <10 mg/dL Final    Acetaminophen (Tylenol), Serum 02/08/2023 <2.0 (L)  10.0 - 20.0 ug/mL Final    POC Rapid COVID 02/08/2023 Negative  Negative Final     Acceptable 02/08/2023 Yes   Final    RBC, UA 02/08/2023 2  0 - 4 /hpf Final    WBC, UA 02/08/2023 3  0 - 5 /hpf Final    Bacteria 02/08/2023 Negative  None-Occ /hpf Final    Squam Epithel, UA 02/08/2023 2  /hpf Final    Hyaline Casts, UA 02/08/2023 0  0-1/lpf /lpf Final    Microscopic Comment 02/08/2023 SEE COMMENT   Final    Hemoglobin A1C 02/08/2023 4.9  4.0 - 5.6 % Final    Estimated Avg  Glucose 02/08/2023 94  68 - 131 mg/dL Final    Valproic Acid Level 02/13/2023 4 (L)  50 - 100 ug/mL Final    Valproic Acid Level 02/16/2023 55  50 - 100 ug/mL Final    Valproic Acid Level 02/21/2023 85  50 - 100 ug/mL Final         Discharged Condition: stable and improved; not currently a danger to self/others or gravely disabled    Disposition: Home or Self Care    Is patient being discharged on multiple neuroleptics? Yes  3 failed multiple trials of monotherapy (Please list the failed medications) risperdal, zyprexa, seroquel and others    Follow Up/Patient Instructions:     Take all medications as prescribed.  Attend all psychiatric and medical follow up appointments.   Abstain from all drugs and alcohol.  Call the crisis line at: [phone] for help in a crisis and emergent situations or call 911 and Return to ED for any acute worsening of your condition including suicidal or homicidal ideations      No discharge procedures on file.        [place] [facility] [place]      Medications:  Reconciled Home Medications:      Medication List        START taking these medications      haloperidoL 5 MG tablet  Commonly known as: HALDOL  Take 1 tablet (5 mg total) by mouth 2 (two) times daily.            CHANGE how you take these medications      divalproex 500 MG Tb24  Take 3 tablets (1,500 mg total) by mouth 2 (two) times a day.  What changed:   medication strength  how much to take  when to take this            CONTINUE taking these medications      hydrOXYzine pamoate 25 MG Cap  Commonly known as: VISTARIL  Take 1 capsule (25 mg total) by mouth every 6 (six) hours as needed (anxiety and/or insomnia).            STOP taking these medications      ARIPiprazole 10 MG Tab  Commonly known as: ABILIFY                Diet: regular     Activity as tolerated    Total time spent discharging patient: 35 minutes    Frantz Cagle MD  Psychiatry

## 2023-04-02 ENCOUNTER — HOSPITAL ENCOUNTER (INPATIENT)
Facility: HOSPITAL | Age: 23
LOS: 4 days | Discharge: HOME | DRG: 885 | End: 2023-04-06
Attending: EMERGENCY MEDICINE | Admitting: EMERGENCY MEDICINE
Payer: MEDICAID

## 2023-04-02 DIAGNOSIS — R45.851 SUICIDAL IDEATIONS: ICD-10-CM

## 2023-04-02 DIAGNOSIS — F23 ACUTE PSYCHOSIS: ICD-10-CM

## 2023-04-02 DIAGNOSIS — F25.0 SCHIZOAFFECTIVE DISORDER, BIPOLAR TYPE: Primary | ICD-10-CM

## 2023-04-02 DIAGNOSIS — F12.10 MARIJUANA ABUSE: ICD-10-CM

## 2023-04-02 LAB
ALBUMIN SERPL BCP-MCNC: 4.1 G/DL (ref 3.5–5.2)
ALP SERPL-CCNC: 67 U/L (ref 55–135)
ALT SERPL W/O P-5'-P-CCNC: 56 U/L (ref 10–44)
AMPHET+METHAMPHET UR QL: NEGATIVE
ANION GAP SERPL CALC-SCNC: 3 MMOL/L (ref 8–16)
APAP SERPL-MCNC: <2 UG/ML (ref 10–20)
AST SERPL-CCNC: 42 U/L (ref 10–40)
BARBITURATES UR QL SCN>200 NG/ML: NEGATIVE
BASOPHILS # BLD AUTO: 0.02 K/UL (ref 0–0.2)
BASOPHILS NFR BLD: 0.3 % (ref 0–1.9)
BENZODIAZ UR QL SCN>200 NG/ML: ABNORMAL
BILIRUB SERPL-MCNC: 0.5 MG/DL (ref 0.1–1)
BILIRUB UR QL STRIP: NEGATIVE
BUN SERPL-MCNC: 17 MG/DL (ref 6–20)
BZE UR QL SCN: NEGATIVE
CALCIUM SERPL-MCNC: 9 MG/DL (ref 8.7–10.5)
CANNABINOIDS UR QL SCN: ABNORMAL
CHLORIDE SERPL-SCNC: 109 MMOL/L (ref 95–110)
CLARITY UR: CLEAR
CO2 SERPL-SCNC: 28 MMOL/L (ref 23–29)
COLOR UR: YELLOW
CREAT SERPL-MCNC: 1.4 MG/DL (ref 0.5–1.4)
CREAT UR-MCNC: 586 MG/DL (ref 23–375)
DIFFERENTIAL METHOD: ABNORMAL
EOSINOPHIL # BLD AUTO: 0.1 K/UL (ref 0–0.5)
EOSINOPHIL NFR BLD: 1.8 % (ref 0–8)
ERYTHROCYTE [DISTWIDTH] IN BLOOD BY AUTOMATED COUNT: 12.6 % (ref 11.5–14.5)
EST. GFR  (NO RACE VARIABLE): >60 ML/MIN/1.73 M^2
ETHANOL SERPL-MCNC: <3 MG/DL
GLUCOSE SERPL-MCNC: 110 MG/DL (ref 70–110)
GLUCOSE UR QL STRIP: NEGATIVE
HCT VFR BLD AUTO: 40.7 % (ref 40–54)
HGB BLD-MCNC: 13.4 G/DL (ref 14–18)
HGB UR QL STRIP: NEGATIVE
IMM GRANULOCYTES # BLD AUTO: 0.02 K/UL (ref 0–0.04)
IMM GRANULOCYTES NFR BLD AUTO: 0.3 % (ref 0–0.5)
KETONES UR QL STRIP: ABNORMAL
LEUKOCYTE ESTERASE UR QL STRIP: NEGATIVE
LYMPHOCYTES # BLD AUTO: 1.9 K/UL (ref 1–4.8)
LYMPHOCYTES NFR BLD: 28.9 % (ref 18–48)
MCH RBC QN AUTO: 32.1 PG (ref 27–31)
MCHC RBC AUTO-ENTMCNC: 32.9 G/DL (ref 32–36)
MCV RBC AUTO: 98 FL (ref 82–98)
METHADONE UR QL SCN>300 NG/ML: NEGATIVE
MONOCYTES # BLD AUTO: 0.8 K/UL (ref 0.3–1)
MONOCYTES NFR BLD: 11.2 % (ref 4–15)
NEUTROPHILS # BLD AUTO: 3.8 K/UL (ref 1.8–7.7)
NEUTROPHILS NFR BLD: 57.5 % (ref 38–73)
NITRITE UR QL STRIP: NEGATIVE
NRBC BLD-RTO: 0 /100 WBC
OPIATES UR QL SCN: NEGATIVE
PCP UR QL SCN>25 NG/ML: NEGATIVE
PH UR STRIP: 6 [PH] (ref 5–8)
PLATELET # BLD AUTO: 233 K/UL (ref 150–450)
PMV BLD AUTO: 9.7 FL (ref 9.2–12.9)
POTASSIUM SERPL-SCNC: 3.9 MMOL/L (ref 3.5–5.1)
PROT SERPL-MCNC: 8 G/DL (ref 6–8.4)
PROT UR QL STRIP: ABNORMAL
RBC # BLD AUTO: 4.17 M/UL (ref 4.6–6.2)
SODIUM SERPL-SCNC: 140 MMOL/L (ref 136–145)
SP GR UR STRIP: >=1.03 (ref 1–1.03)
TOXICOLOGY INFORMATION: ABNORMAL
TSH SERPL DL<=0.005 MIU/L-ACNC: 1.12 UIU/ML (ref 0.4–4)
URN SPEC COLLECT METH UR: ABNORMAL
UROBILINOGEN UR STRIP-ACNC: 1 EU/DL
WBC # BLD AUTO: 6.68 K/UL (ref 3.9–12.7)

## 2023-04-02 PROCEDURE — 25000003 PHARM REV CODE 250: Performed by: EMERGENCY MEDICINE

## 2023-04-02 PROCEDURE — 96372 THER/PROPH/DIAG INJ SC/IM: CPT | Performed by: EMERGENCY MEDICINE

## 2023-04-02 PROCEDURE — 99285 EMERGENCY DEPT VISIT HI MDM: CPT

## 2023-04-02 PROCEDURE — 82077 ASSAY SPEC XCP UR&BREATH IA: CPT | Performed by: EMERGENCY MEDICINE

## 2023-04-02 PROCEDURE — 85025 COMPLETE CBC W/AUTO DIFF WBC: CPT | Performed by: EMERGENCY MEDICINE

## 2023-04-02 PROCEDURE — 80307 DRUG TEST PRSMV CHEM ANLYZR: CPT | Performed by: EMERGENCY MEDICINE

## 2023-04-02 PROCEDURE — 36415 COLL VENOUS BLD VENIPUNCTURE: CPT | Performed by: EMERGENCY MEDICINE

## 2023-04-02 PROCEDURE — 11400000 HC PSYCH PRIVATE ROOM

## 2023-04-02 PROCEDURE — 81003 URINALYSIS AUTO W/O SCOPE: CPT | Mod: 59 | Performed by: EMERGENCY MEDICINE

## 2023-04-02 PROCEDURE — 80053 COMPREHEN METABOLIC PANEL: CPT | Performed by: EMERGENCY MEDICINE

## 2023-04-02 PROCEDURE — 80143 DRUG ASSAY ACETAMINOPHEN: CPT | Performed by: EMERGENCY MEDICINE

## 2023-04-02 PROCEDURE — 63600175 PHARM REV CODE 636 W HCPCS: Performed by: EMERGENCY MEDICINE

## 2023-04-02 PROCEDURE — 84443 ASSAY THYROID STIM HORMONE: CPT | Performed by: EMERGENCY MEDICINE

## 2023-04-02 RX ORDER — TALC
6 POWDER (GRAM) TOPICAL NIGHTLY PRN
Status: DISCONTINUED | OUTPATIENT
Start: 2023-04-02 | End: 2023-04-06 | Stop reason: HOSPADM

## 2023-04-02 RX ORDER — FAMOTIDINE 20 MG/1
20 TABLET, FILM COATED ORAL 2 TIMES DAILY
Status: DISCONTINUED | OUTPATIENT
Start: 2023-04-02 | End: 2023-04-06 | Stop reason: HOSPADM

## 2023-04-02 RX ORDER — DIPHENHYDRAMINE HYDROCHLORIDE 50 MG/ML
50 INJECTION INTRAMUSCULAR; INTRAVENOUS
Status: COMPLETED | OUTPATIENT
Start: 2023-04-02 | End: 2023-04-02

## 2023-04-02 RX ORDER — HALOPERIDOL 5 MG/ML
10 INJECTION INTRAMUSCULAR
Status: COMPLETED | OUTPATIENT
Start: 2023-04-02 | End: 2023-04-02

## 2023-04-02 RX ORDER — ONDANSETRON 4 MG/1
8 TABLET, ORALLY DISINTEGRATING ORAL EVERY 8 HOURS PRN
Status: DISCONTINUED | OUTPATIENT
Start: 2023-04-02 | End: 2023-04-06 | Stop reason: HOSPADM

## 2023-04-02 RX ORDER — LORAZEPAM 2 MG/ML
2 INJECTION INTRAMUSCULAR
Status: COMPLETED | OUTPATIENT
Start: 2023-04-02 | End: 2023-04-02

## 2023-04-02 RX ADMIN — DIPHENHYDRAMINE HYDROCHLORIDE 50 MG: 50 INJECTION, SOLUTION INTRAMUSCULAR; INTRAVENOUS at 01:04

## 2023-04-02 RX ADMIN — HALOPERIDOL LACTATE 10 MG: 5 INJECTION, SOLUTION INTRAMUSCULAR at 01:04

## 2023-04-02 RX ADMIN — FAMOTIDINE 20 MG: 20 TABLET ORAL at 08:04

## 2023-04-02 RX ADMIN — LORAZEPAM 2 MG: 2 INJECTION INTRAMUSCULAR; INTRAVENOUS at 02:04

## 2023-04-02 NOTE — PLAN OF CARE
Bishop Soria admitted to [facility] under the care of Blas Hardwick MD with diagnosis of Schizophrenia. [facility] Patient is a 21yo AAM who presented to the ER with A/V hallucinations SI's with no plans. Multiple psychiatric admits.  Patient withdrawn, depressed, anxious, irritable, pacing, showing pressured speech, labile, and paranoid. Endorses Suicidal Ideation, Auditory Hallucinations, Visual Hallucinations, and Delusions. Denies Homicidal Ideation, Tactile Hallucinations, and Gustatory Hallucinations.    Patient assessed and oriented to room unit and routine. Patient denies pain or discomfort at present and remains injury-free.     Regis Yeboah RN

## 2023-04-02 NOTE — ED PROVIDER NOTES
"Encounter Date: 4/2/2023       History     Chief Complaint   Patient presents with    Suicidal     Pt needs psych eval.   Pt states that he was recently discharged from 7th floor and stated that the medicines are not working. Pt also stated "I'm hearing voices, like I killed a " pt also stated that he wants to self- harm, but has no idea".      22-year-old male with history schizophrenia multiple psychiatric admissions, suicide attempt in the past presents to the emergency department with suicide ideations, no plan, bizarre behavior, hearing voices "like I killed a ".  Last admission was in February for same issues    Review of patient's allergies indicates:  No Known Allergies  Past Medical History:   Diagnosis Date    History of psychiatric hospitalization     Hx of psychiatric care     Psychiatric problem     Schizophrenia     Suicide attempt      No past surgical history on file.  Family History   Family history unknown: Yes     Social History     Tobacco Use    Smoking status: Some Days     Types: Vaping with nicotine     Start date: 12/20/2021     Passive exposure: Past    Smokeless tobacco: Never   Substance Use Topics    Alcohol use: Yes     Comment: pt reports drinking wine and tequila, two-four times out of the month    Drug use: Yes     Types: Marijuana     Comment: Pt denies smoking anything synthetic     Review of Systems   Constitutional:  Negative for fever.   HENT:  Negative for sore throat.    Respiratory:  Negative for shortness of breath.    Cardiovascular:  Negative for chest pain.   Gastrointestinal:  Negative for nausea.   Genitourinary:  Negative for dysuria.   Musculoskeletal:  Negative for back pain.   Skin:  Negative for rash.   Neurological:  Negative for weakness.   Hematological:  Does not bruise/bleed easily.   Psychiatric/Behavioral:  Positive for hallucinations and suicidal ideas.    All other systems reviewed and are negative.    Physical Exam     Initial " Vitals [04/02/23 1318]   BP Pulse Resp Temp SpO2   119/73 96 15 98.7 °F (37.1 °C) 97 %      MAP       --         Physical Exam    Nursing note and vitals reviewed.  Constitutional: He appears well-developed and well-nourished. He is not diaphoretic. No distress.   HENT:   Head: Normocephalic and atraumatic.   Eyes: Conjunctivae and EOM are normal. Pupils are equal, round, and reactive to light. Right eye exhibits no discharge. Left eye exhibits no discharge. No scleral icterus.   Neck: Neck supple. No JVD present.   Normal range of motion.  Cardiovascular:  Normal rate, regular rhythm, normal heart sounds and intact distal pulses.           No murmur heard.  Pulmonary/Chest: Breath sounds normal. No stridor. No respiratory distress. He has no wheezes. He has no rhonchi. He has no rales. He exhibits no tenderness.   Abdominal: Abdomen is soft. Bowel sounds are normal. He exhibits no distension and no mass. There is no abdominal tenderness. There is no rebound and no guarding.   Musculoskeletal:         General: No tenderness or edema. Normal range of motion.      Cervical back: Normal range of motion and neck supple.     Neurological: He is alert and oriented to person, place, and time. He has normal strength. GCS score is 15. GCS eye subscore is 4. GCS verbal subscore is 5. GCS motor subscore is 6.   Skin: Skin is warm and dry. Capillary refill takes less than 2 seconds.   Psychiatric: His affect is blunt. His speech is delayed. He is slowed and actively hallucinating. Thought content is paranoid. Cognition and memory are normal. He expresses impulsivity and inappropriate judgment. He expresses suicidal ideation. He is inattentive.       ED Course   Procedures  Labs Reviewed   CBC W/ AUTO DIFFERENTIAL - Abnormal; Notable for the following components:       Result Value    RBC 4.17 (*)     Hemoglobin 13.4 (*)     MCH 32.1 (*)     All other components within normal limits   COMPREHENSIVE METABOLIC PANEL - Abnormal;  Notable for the following components:    AST 42 (*)     ALT 56 (*)     Anion Gap 3 (*)     All other components within normal limits   URINALYSIS, REFLEX TO URINE CULTURE - Abnormal; Notable for the following components:    Specific Gravity, UA >=1.030 (*)     Protein, UA Trace (*)     Ketones, UA Trace (*)     All other components within normal limits    Narrative:     Preferred Collection Type->Urine, Clean Catch  Specimen Source->Urine   DRUG SCREEN PANEL, URINE EMERGENCY - Abnormal; Notable for the following components:    Benzodiazepines Presumptive Positive (*)     THC Presumptive Positive (*)     Creatinine, Urine 586.0 (*)     All other components within normal limits    Narrative:     Preferred Collection Type->Urine, Clean Catch  Specimen Source->Urine   ACETAMINOPHEN LEVEL - Abnormal; Notable for the following components:    Acetaminophen (Tylenol), Serum <2.0 (*)     All other components within normal limits   TSH   ALCOHOL,MEDICAL (ETHANOL)          Imaging Results    None          Medications   haloperidol lactate injection 10 mg (10 mg Intramuscular Given 4/2/23 1327)   diphenhydrAMINE injection 50 mg (50 mg Intramuscular Given 4/2/23 1327)     Medical Decision Making:   Differential Diagnosis:   Acute psychosis, schizophrenia, suicide ideations, substance abuse           ED Course as of 04/02/23 1421   Sun Apr 02, 2023   1414 Benzodiazepines(!): Presumptive Positive [SD]   1415 Marijuana (THC) Metabolite(!): Presumptive Positive [SD]   1419 Accepted for acute psychiatric admission by Dr. Hardwick [SD]      ED Course User Index  [SD] Fan Chao MD                 Clinical Impression:   Final diagnoses:  [R45.851] Suicidal ideations (Primary)  [F23] Acute psychosis  [F12.10] Marijuana abuse        ED Disposition Condition    Admit Stable                Fan Chao MD  04/02/23 1426

## 2023-04-03 PROCEDURE — 25000003 PHARM REV CODE 250: Performed by: INTERNAL MEDICINE

## 2023-04-03 PROCEDURE — 90833 PR PSYCHOTHERAPY W/PATIENT W/E&M, 30 MIN (ADD ON): ICD-10-PCS | Mod: AF,HB,, | Performed by: PSYCHIATRY & NEUROLOGY

## 2023-04-03 PROCEDURE — 25000003 PHARM REV CODE 250: Performed by: PSYCHIATRY & NEUROLOGY

## 2023-04-03 PROCEDURE — 90833 PSYTX W PT W E/M 30 MIN: CPT | Mod: AF,HB,, | Performed by: PSYCHIATRY & NEUROLOGY

## 2023-04-03 PROCEDURE — 99223 PR INITIAL HOSPITAL CARE,LEVL III: ICD-10-PCS | Mod: AF,HB,, | Performed by: PSYCHIATRY & NEUROLOGY

## 2023-04-03 PROCEDURE — 25000003 PHARM REV CODE 250: Performed by: EMERGENCY MEDICINE

## 2023-04-03 PROCEDURE — 99223 1ST HOSP IP/OBS HIGH 75: CPT | Mod: AF,HB,, | Performed by: PSYCHIATRY & NEUROLOGY

## 2023-04-03 PROCEDURE — 11400000 HC PSYCH PRIVATE ROOM

## 2023-04-03 RX ORDER — HALOPERIDOL 5 MG/1
5 TABLET ORAL 2 TIMES DAILY
Status: DISCONTINUED | OUTPATIENT
Start: 2023-04-03 | End: 2023-04-06 | Stop reason: HOSPADM

## 2023-04-03 RX ADMIN — HALOPERIDOL 5 MG: 5 TABLET ORAL at 09:04

## 2023-04-03 RX ADMIN — DIVALPROEX SODIUM 750 MG: 500 TABLET, FILM COATED, EXTENDED RELEASE ORAL at 09:04

## 2023-04-03 RX ADMIN — MELATONIN 6 MG: 3 TAB ORAL at 07:04

## 2023-04-03 RX ADMIN — FAMOTIDINE 20 MG: 20 TABLET ORAL at 08:04

## 2023-04-03 RX ADMIN — DIVALPROEX SODIUM 750 MG: 500 TABLET, FILM COATED, EXTENDED RELEASE ORAL at 11:04

## 2023-04-03 RX ADMIN — HALOPERIDOL 5 MG: 5 TABLET ORAL at 11:04

## 2023-04-03 RX ADMIN — FAMOTIDINE 20 MG: 20 TABLET ORAL at 09:04

## 2023-04-03 NOTE — SUBJECTIVE & OBJECTIVE
Past Medical History:   Diagnosis Date    History of psychiatric hospitalization     Hx of psychiatric care     Psychiatric problem     Schizophrenia     Suicide attempt        No past surgical history on file.    Review of patient's allergies indicates:  No Known Allergies    No current facility-administered medications on file prior to encounter.     Current Outpatient Medications on File Prior to Encounter   Medication Sig    divalproex  MG Tb24 Take 3 tablets (1,500 mg total) by mouth 2 (two) times a day.    haloperidoL (HALDOL) 5 MG tablet Take 1 tablet (5 mg total) by mouth 2 (two) times daily.    hydrOXYzine pamoate (VISTARIL) 25 MG Cap Take 1 capsule (25 mg total) by mouth every 6 (six) hours as needed (anxiety and/or insomnia).    [DISCONTINUED] EScitalopram oxalate (LEXAPRO) 10 MG tablet TAKE 1 TABLET BY MOUTH EVERY DAY    [DISCONTINUED] lithium (LITHOTAB) 300 mg tablet Take 300 mg by mouth 2 (two) times daily.     Family History       Family history is unknown by patient.          Tobacco Use    Smoking status: Some Days     Types: Vaping with nicotine     Start date: 12/20/2021     Passive exposure: Past    Smokeless tobacco: Never   Substance and Sexual Activity    Alcohol use: Yes     Comment: pt reports drinking wine and tequila, two-four times out of the month    Drug use: Yes     Types: Marijuana     Comment: Pt denies smoking anything synthetic    Sexual activity: Not Currently     Partners: Male     Review of Systems   Constitutional:  Negative for fatigue and fever.   HENT:  Negative for congestion, ear pain and sore throat.    Eyes:  Negative for pain and discharge.   Respiratory:  Negative for cough, shortness of breath and wheezing.    Gastrointestinal:  Negative for abdominal pain, constipation, diarrhea, nausea and vomiting.   Endocrine: Negative for cold intolerance and heat intolerance.   Genitourinary:  Negative for difficulty urinating, dysuria and frequency.   Musculoskeletal:   Negative for arthralgias.   Allergic/Immunologic: Negative for environmental allergies.   Neurological:  Negative for dizziness, tremors and seizures.   Psychiatric/Behavioral:  Positive for behavioral problems, dysphoric mood, hallucinations and suicidal ideas.    All other systems reviewed and are negative.  Objective:     Vital Signs (Most Recent):  Temp: 97.8 °F (36.6 °C) (04/03/23 0801)  Pulse: 64 (04/03/23 0801)  Resp: 18 (04/03/23 0801)  BP: 116/64 (04/03/23 0801)  SpO2: 97 % (04/03/23 0801) Vital Signs (24h Range):  Temp:  [97.8 °F (36.6 °C)-98.7 °F (37.1 °C)] 97.8 °F (36.6 °C)  Pulse:  [62-96] 64  Resp:  [15-18] 18  SpO2:  [97 %-100 %] 97 %  BP: (107-119)/(58-73) 116/64     Weight: 73.5 kg (162 lb)  Body mass index is 20.25 kg/m².    Physical Exam  Vitals and nursing note reviewed.   Constitutional:       Appearance: Normal appearance.   HENT:      Head: Normocephalic and atraumatic.      Nose: Nose normal.      Mouth/Throat:      Mouth: Mucous membranes are moist.      Pharynx: Oropharynx is clear.   Eyes:      Extraocular Movements: Extraocular movements intact.      Conjunctiva/sclera: Conjunctivae normal.      Pupils: Pupils are equal, round, and reactive to light.   Cardiovascular:      Rate and Rhythm: Normal rate and regular rhythm.      Pulses: Normal pulses.      Heart sounds: Normal heart sounds.   Pulmonary:      Effort: Pulmonary effort is normal.      Breath sounds: Normal breath sounds.   Abdominal:      General: Abdomen is flat. Bowel sounds are normal.      Palpations: Abdomen is soft.   Musculoskeletal:         General: Normal range of motion.      Cervical back: Normal range of motion and neck supple.   Skin:     General: Skin is warm and dry.      Capillary Refill: Capillary refill takes less than 2 seconds.      Comments: No rashes on limited skin exam.   Neurological:      General: No focal deficit present.      Mental Status: He is alert and oriented to person, place, and time.       Cranial Nerves: No cranial nerve deficit.      Comments: I Olfactory:  Sense of smell intact    II Optic:  Pupils equal round react to light.  Vision intact.    III, IV, VI, Ocular motor, Trochlear, Abducens:  Extraocular movements intact    V Trigeminal:  Facial sensation intact facial sensation intact,, muscles of mastication intact muscles of mastication intact, corneal reflex intact, corneal reflex intact    VII Facial:  Muscles of facial expression intact     VIII Vestibular cochlear: Hearing intact vestibular cochlear: Hearing intact    IX Glossopharyngeal:  Gag reflex intact.  Tasting intact.     X Vagus:  Gag reflex intact.    XI Spinal Accessory:  Shoulder shrug intact.  Head rotation intact.    XII Hypoglossal:  Tongue movements intact.     Psychiatric:      Comments: Patient appears depressed, paranoid, SI         CRANIAL NERVES     CN III, IV, VI   Pupils are equal, round, and reactive to light.     Significant Labs: All pertinent labs within the past 24 hours have been reviewed.    Significant Imaging: I have reviewed all pertinent imaging results/findings within the past 24 hours.

## 2023-04-03 NOTE — HPI
"Chief Complaint   Patient presents with    Suicidal       Pt needs psych eval.   Pt states that he was recently discharged from 7th floor and stated that the medicines are not working. Pt also stated "I'm hearing voices, like I killed a " pt also stated that he wants to self- harm, but has no idea".       22-year-old male with history schizophrenia multiple psychiatric admissions, suicide attempt in the past presents to the emergency department with suicide ideations, no plan, bizarre behavior, hearing voices "like I killed a ".  Last admission was in February for same issues     "

## 2023-04-03 NOTE — NURSING
[facility] Initial Follow Up Call    Call within 2 business days of discharge: Yes    Patient: Marcos Hay Patient : 1976   MRN: [id number]  Reason for Admission: COVID-19  Discharge Date: 10/9/21 RARS: Readmission Risk Score: 21      Last Discharge [facility]       Complaint Diagnosis Description Type Department Provider    10/7/21 COVID rule out and weight loss/not taking meds COVID-19 virus infection . .. ED to Hosp-Admission (Discharged) (ADMITTED) Kang Bocanegra MD; Regina Dancer. .. Attempted to reach the patient for initial [address] Transition call post hospital discharge, however, phone number listed for the pt is disconnected per phone  message. No alternative phone numbers listed for pt on the chart and no HIPAA forms on chart. Unable to make outreaches to this pt. COVID-19 monitoring episode will be resolved, as no way to reach pt with only a disconnected phone number listed. Follow Up  No future appointments.     Emile Velazco RN Pt in activity room at present and has spent the majority of the day out in the common areas.  Pt states he brought himself to the hospital after running out of his meds and unable to reach [facility] to get a refill.  States he told er doctor that he was hearing voices and felt suicidal so he could guarantee he would be admitted.  Pt denies si, hi at present.  Gravely disabled.  Pt noted to be talking to himself at intervals throughout the day. Pt [facility]  Pt is eating 100 percent of meals and snacks.  Taking meds with no side effects.  Pt instructed to call for any needs or concerns at all.  Verbalized understanding. Will cont to monitor for safety.

## 2023-04-03 NOTE — H&P
"PSYCHIATRY INPATIENT ADMISSION NOTE - H & P      4/3/2023 3:07 PM   Bishop Soria   2000   [id number]         DATE OF ADMISSION: 4/2/2023  1:24 PM    SITE: [facility]    CURRENT LEGAL STATUS: PEC and/or [facility] [place]      HISTORY    CHIEF COMPLAINT   Bishop Soria is a 22 y.o. male with a past psychiatric history of  psychosis, mood disorder, anxiety, substance abuse  currently admitted to the inpatient unit with the following chief complaint:  psychosis    HPI   The patient was seen and examined. The chart was reviewed.    The patient presented to the ER on 4/2/2023 . Per staff notes:  -Pt states that he was recently discharged from 7th floor and stated that the medicines are not working. Pt also stated "I'm hearing voices, like I killed a " pt also stated that he wants to self- harm, but has no idea  -22-year-old male with history schizophrenia multiple psychiatric admissions, suicide attempt in the past presents to the emergency department with suicide ideations, no plan, bizarre behavior, hearing voices "like I killed a ".  Last admission was in February for same issues  -The patient is formerly [facility]. Patient is a 23yo AAM who presented to the ER with A/V hallucinations SI's with no plans. Multiple psychiatric admits.  Patient withdrawn, depressed, anxious, irritable, pacing, showing pressured speech, labile, and paranoid. Endorses Suicidal Ideation, Auditory Hallucinations, Visual Hallucinations, and Delusions. Denies Homicidal Ideation, Tactile Hallucinations, and Gustatory Hallucinations.    Patient assessed and oriented to room unit and routine. Patient denies pain or discomfort at present and remains injury-free.    The patient was medically cleared and admitted to the [facility].    The patient has a longstanding h/o Severe bipolar disorder with psychotic features complicated by significant psychosocial stressors and poor tx adherence. He was last treated here from 2/8-2/22/23 for " "psychosis/carley. He presented at that time with the following HPI:  -22-year-old male with history of schizoaffective not on medication presents with bizarre behavior.  According to EMS patient got into an argument with his mother and made some threats but proceeded to run away from the .  Patient does admit to having suicidal ideation without plan.  History otherwise limited due to medical condition  -Bishop Soria admitted to [facility] under the care of Frantz Cagle MD with diagnosis of schizoaffective D/O. The patient is PEC'ed. Patient is a 21 yo AAM who presented to the ER per [facility] for reportedly having bizarre behavior and SI's. Ater arrival to ER,pt was PEC'd,then pt eloped and was returned to ER per [facility]. UDS positive for THC. PT sedated per ER physician. Arrived to unit, sedated. Patient withdrawn, depressed, anxious, agitated, irritable, drowsy, sedated, manic, and paranoid. Endorses Suicidal Ideation and Delusions. Denies Homicidal Ideation, Auditory Hallucinations, Visual Hallucinations, Tactile Hallucinations, and Gustatory Hallucinations  -He reports that he had an argument with his family over wearing wigs. He also reports some severe issues regarding the musician Raul Murphy (a frequent delusion of his), but the details were presented non-coherently.   He was sedated from medications received prn secondary to psychotic agitation- this limited his participation.    Overall, his presentation is consistent with previous  hospitalization.    He was stabilized on Depakote 750 mg po BID, Abilify Aristada 1064 (next dose due on 4/20/23) and Haldol 5 mg po BID    He reports that he ran out pf his medications about 1 week ago. He reports hat he brought himself to the hospital to get back on his medications. He reported the the ER doctors that he was suicidal and hear voices.  -He is now denying all symptoms and reports that he "made it up" in order to guarantee his admission to the [facility] in order to resume " his medications  -he was inappropriately focused on discharge  -he did not openly discuss his usual delusions- he required prompting then quickly miniimized issues.  -his presentation is consistent with previous admission, although he does appear somewhat better today than at his last admission  -His UDS was positive for THC and benzos     Overall, his presentation is consistent with previous  hospitalization.       Symptoms of Depression: diminished mood - Yes, loss of interest/anhedonia - No;  recurrent - No, >14 days - Yes, diminished energy - No, change in sleep - Yes, change in appetite - Yes, diminished concentration or cognition or indecisiveness - Yes, PMA/R -  No, excessive guilt or hopelessness or worthlessness - Yes, suicidal ideations - No    Changes in Sleep: trouble with initiation- Yes, maintenance, - No early morning awakening with inability to return to sleep - No, hypersomnolence - No    Suicidal- active/passive ideations - No, organized plans, future intentions - No  +SI reported in the last 24 hours    Homicidal ideations: active/passive ideations - No, organized plans, future intentions - No    Symptoms of psychosis: hallucinations - Yes, delusions - Yes, disorganized speech - No, disorganized behavior or abnormal motor behavior - No, or negative symptoms (diminshed emotional expression, avolition, anhedonia, alogia, asociality) - No, active phase symptoms >1 month - No, continuous signs of illness > 6 months - No, since onset of illness decreased level of functioning present - No    Symptoms of carley or hypomania: elevated, expansive, or irritable mood with increased energy or activity - Yes; > 4 days - Yes,  >7 days - Yes; with inflated self-esteem or grandiosity - Yes, decreased need for sleep - No, increased rate of speech - No, FOI or racing thoughts - No, distractibility - Yes, increased goal directed activity or PMA - Yes, risky/disinhibited behavior - Yes    Symptoms of KANU: excessive  "anxiety/worry/fear, more days than not, about numerous issues - Yes, ongoing for >6 months - No, difficult to control - No, with restlessness - No, fatigue - No, poor concentration - No, irritability - No, muscle tension - No, sleep disturbance - No; causes functionally impairing distress - No    Symptoms of Panic Disorder: recurrent panic attacks (palpitations/heart racing, sweating, shakiness, dyspnea, choking, chest pain/discomfort, Gi symptoms, dizzy/lightheadedness, hot/col flashes, paresthesias, derealization, fear of losing control or fear of dying or fear of "going crazy") - No, precipitated - No, un-precipitated - No, source of worry and/or behavioral changes secondary for 1 month or longer- No, agoraphobia - No    Symptoms of PTSD: h/o trauma exposure - No; re-experiencing/intrusive symptoms - No, avoidant behavior - No, 2 or more negative alterations in cognition or mood - No, 2 or more hyperarousal symptoms - No; with dissociative symptoms - No, ongoing for 1 or more  months - No    Symptoms of OCD: obsessions (recurrent thoughts/urges/images; intrusive and/or unwanted; uses other thoughts/actions to suppress) - No; compulsions (repetitive behaviors used to lower distress/anxiety/obsessions) - No, time-consuming (over 1 hour per day) or cause significant distress/impairment - - No    Symptoms of Anorexia: restriction of caloric intake leading to significantly low body weight - No, intense fear of gaining weight or persistent behavior that interferes with weight gain even thought at a significantly low weight - No, disturbance in the way in which one's body weight or shape is experienced, undue influence of body weight or shape on self evaluation, or persistent lack of recognition of the seriousness of the current low body weight - No    Symptoms of Bulimia: recurrent episodes of binge eating (definitely larger amount  than what others would eat and lack of a sense of control over eating during episode) - " No, recurrent inappropriate compensatory behaviors in order to prevent weight gain (fasting, medications, exercise, vomiting) - No, binges and compensatory behaviors both occur on average at least once a week for 3 months - No, self evaluations is unduly influenced by body shape/weight- - No    Symptoms of Binge eating: recurrent episodes of binge eating (definitely larger amount than what others would eat and lack of a sense of control over eating during episode) - No, 3 or more of following (eating much more rapidly, eating until uncomfortably full, large amounts when not hungry, eating alone because of embarrassed by how much,  feeling disgusted with oneself, depressed or very guilty afterward) - No, distress regarding binges - No, binges occur on average at least once a week for 3 months - No    PSYCHOTHERAPY ADD-ON +[id number]   16-37 minutes      Time: 16 minutes  Participants: Met with patient    Therapeutic Intervention Type: insight oriented psychotherapy, behavior modifying psychotherapy, supportive psychotherapy, interactive psychotherapy  Why chosen therapy is appropriate versus another modality: relevant to diagnosis, patient responds to this modality, evidence based practice    Target symptoms: mood disorder, psychosis  Primary focus: mood, psychosis, family stressors  Psychotherapeutic techniques: supportive and psycho-educational techniques; setting tx goals and needs    Outcome monitoring methods: self-report, observation    Patient's response to intervention:  The patient's response to intervention is reluctant.    Progress toward goals:  The patient's progress toward goals is limited.          PAST PSYCHIATRIC HISTORY  Previous Psychiatric Hospitalizations: Yes, numerous; 3 in 2022, last in 12/22-1/23 and 2/2023  Previous SI/HI: Yes,  Previous Suicide Attempts: Yes,   Previous Medication Trials: Yes,  Psychiatric Care (current & past): Yes,  History of Psychotherapy: Yes,  History of Violence:  Yes,  History of sexual/physical abuse: No,    PAST MEDICAL & SURGICAL HISTORY   Past Medical History:   Diagnosis Date    History of psychiatric hospitalization     Hx of psychiatric care     Psychiatric problem     Schizophrenia     Suicide attempt      No past surgical history on file.      Home Meds:   Prior to Admission medications    Medication Sig Start Date End Date Taking? Authorizing Provider   divalproex  MG Tb24 Take 3 tablets (1,500 mg total) by mouth 2 (two) times a day. 2/22/23 2/22/24  Frantz Cagle MD   haloperidoL (HALDOL) 5 MG tablet Take 1 tablet (5 mg total) by mouth 2 (two) times daily. 2/22/23 2/22/24  Frantz Cagle MD   hydrOXYzine pamoate (VISTARIL) 25 MG Cap Take 1 capsule (25 mg total) by mouth every 6 (six) hours as needed (anxiety and/or insomnia). 1/9/23   Frantz Cagle MD   EScitalopram oxalate (LEXAPRO) 10 MG tablet TAKE 1 TABLET BY MOUTH EVERY DAY 7/27/21 5/23/22  Jl Aguilar NP   lithium (LITHOTAB) 300 mg tablet Take 300 mg by mouth 2 (two) times daily. 4/26/22 5/31/22  Historical Provider         Scheduled Meds:    famotidine  20 mg Oral BID      PRN Meds: melatonin, ondansetron   Psychotherapeutics (From admission, onward)      None            ALLERGIES   Review of patient's allergies indicates:  No Known Allergies    NEUROLOGIC HISTORY  Seizures: No  Head trauma: No    SOCIAL HISTORY:  Developmental/Childhood:Achieved all developmental milestones timely  Education: some college  Employment Status/Finances:Employed   Relationship Status/Sexual Orientation: single  Children: 0  Housing Status: Home    history:  NO   Access to Firearms: NO ;  Locked up? n/a  Yazdanism:Actively participates in organized Christianity  Recreational activities:Exercise    SUBSTANCE ABUSE HISTORY   Recreational Drugs: marijuana   Use of Alcohol: occasional, social use  Rehab History:no   Tobacco Use:no    LEGAL HISTORY:   Past charges/incarcerations: NO  Pending  "charges:NO    FAMILY PSYCHIATRIC HISTORY   Depression - "dad's side"      ROS  Review of Systems   Constitutional: Negative.  Negative for chills and fever.   HENT: Negative.  Negative for hearing loss.    Eyes: Negative.  Negative for blurred vision and double vision.   Respiratory: Negative.  Negative for shortness of breath.    Cardiovascular: Negative.  Negative for chest pain and palpitations.   Gastrointestinal: Negative.  Negative for constipation, diarrhea, nausea and vomiting.   Genitourinary: Negative.  Negative for dysuria.   Musculoskeletal: Negative.  Negative for back pain and neck pain.   Skin: Negative.  Negative for rash.   Neurological: Negative.  Negative for dizziness and headaches.   Endo/Heme/Allergies: Negative.  Negative for environmental allergies.   Psychiatric/Behavioral:          See HPI       EXAMINATION    PHYSICAL EXAM  Reviewed note/exam by Dr. Zhanna MD from 4/2/23 at 6:14 PM; Med consulted for initial physical exam- pending      VITALS   Vitals:    04/03/23 0801   BP: 116/64   Pulse: 64   Resp: 18   Temp: 97.8 °F (36.6 °C)        Body mass index is 20.25 kg/m².        PAIN  0/10  Subjective report of pain matches objective signs and symptoms: Yes    LABORATORY DATA   Recent Results (from the past 72 hour(s))   Urinalysis, Reflex to Urine Culture Urine, Clean Catch    Collection Time: 04/02/23  1:27 PM    Specimen: Urine, Clean Catch   Result Value Ref Range    Specimen UA Urine, Clean Catch     Color, UA Yellow Yellow, Straw, Ingrid    Appearance, UA Clear Clear    pH, UA 6.0 5.0 - 8.0    Specific Gravity, UA >=1.030 (A) 1.005 - 1.030    Protein, UA Trace (A) Negative    Glucose, UA Negative Negative    Ketones, UA Trace (A) Negative    Bilirubin (UA) Negative Negative    Occult Blood UA Negative Negative    Nitrite, UA Negative Negative    Urobilinogen, UA 1.0 <2.0 EU/dL    Leukocytes, UA Negative Negative   Drug screen panel, emergency    Collection Time: 04/02/23  1:27 PM "   Result Value Ref Range    Benzodiazepines Presumptive Positive (A) Negative    Methadone metabolites Negative Negative    Cocaine (Metab.) Negative Negative    Opiate Scrn, Ur Negative Negative    Barbiturate Screen, Ur Negative Negative    Amphetamine Screen, Ur Negative Negative    THC Presumptive Positive (A) Negative    Phencyclidine Negative Negative    Creatinine, Urine 586.0 (H) 23.0 - 375.0 mg/dL    Toxicology Information SEE COMMENT    CBC auto differential    Collection Time: 04/02/23  1:37 PM   Result Value Ref Range    WBC 6.68 3.90 - 12.70 K/uL    RBC 4.17 (L) 4.60 - 6.20 M/uL    Hemoglobin 13.4 (L) 14.0 - 18.0 g/dL    Hematocrit 40.7 40.0 - 54.0 %    MCV 98 82 - 98 fL    MCH 32.1 (H) 27.0 - 31.0 pg    MCHC 32.9 32.0 - 36.0 g/dL    RDW 12.6 11.5 - 14.5 %    Platelets 233 150 - 450 K/uL    MPV 9.7 9.2 - 12.9 fL    Immature Granulocytes 0.3 0.0 - 0.5 %    Gran # (ANC) 3.8 1.8 - 7.7 K/uL    Immature Grans (Abs) 0.02 0.00 - 0.04 K/uL    Lymph # 1.9 1.0 - 4.8 K/uL    Mono # 0.8 0.3 - 1.0 K/uL    Eos # 0.1 0.0 - 0.5 K/uL    Baso # 0.02 0.00 - 0.20 K/uL    nRBC 0 0 /100 WBC    Gran % 57.5 38.0 - 73.0 %    Lymph % 28.9 18.0 - 48.0 %    Mono % 11.2 4.0 - 15.0 %    Eosinophil % 1.8 0.0 - 8.0 %    Basophil % 0.3 0.0 - 1.9 %    Differential Method Automated    Comprehensive metabolic panel    Collection Time: 04/02/23  1:38 PM   Result Value Ref Range    Sodium 140 136 - 145 mmol/L    Potassium 3.9 3.5 - 5.1 mmol/L    Chloride 109 95 - 110 mmol/L    CO2 28 23 - 29 mmol/L    Glucose 110 70 - 110 mg/dL    BUN 17 6 - 20 mg/dL    Creatinine 1.4 0.5 - 1.4 mg/dL    Calcium 9.0 8.7 - 10.5 mg/dL    Total Protein 8.0 6.0 - 8.4 g/dL    Albumin 4.1 3.5 - 5.2 g/dL    Total Bilirubin 0.5 0.1 - 1.0 mg/dL    Alkaline Phosphatase 67 55 - 135 U/L    AST 42 (H) 10 - 40 U/L    ALT 56 (H) 10 - 44 U/L    Anion Gap 3 (L) 8 - 16 mmol/L    eGFR >60.0 >60 mL/min/1.73 m^2   TSH    Collection Time: 04/02/23  1:38 PM   Result Value Ref  "Range    TSH 1.120 0.400 - 4.000 uIU/mL   Ethanol    Collection Time: 04/02/23  1:38 PM   Result Value Ref Range    Alcohol, Serum <3 <10 mg/dL   Acetaminophen level    Collection Time: 04/02/23  1:38 PM   Result Value Ref Range    Acetaminophen (Tylenol), Serum <2.0 (L) 10.0 - 20.0 ug/mL      Lab Results   Component Value Date    VALPROATE 85 02/21/2023           CONSTITUTIONAL  General Appearance: unremarkable, age appropriate, normal weight, disheveled    MUSCULOSKELETAL  Muscle Strength and Tone:no tremor, no tic  Abnormal Involuntary Movements: None  Gait and Station: non-ataxic    PSYCHIATRIC   Level of Consciousness: awake and alert   Orientation: person, place, time and situation  Grooming: Hospital garb, disheveled  Psychomotor Behavior: normal, cooperative; no PMA/R  Speech: normal tone, normal rate, normal pitch, normal volume  Language: grossly intact, able to name, able to repeat  Mood: "fine"  Affect: Intense  Thought Process: circumstantial, racing  Associations: intact   Thought Content: +delusions, denies SI, and denies HI  Perceptions: +observed RIS   Memory: Able to recall past events, Remote intact, and Recent intact  Attention:Attends to interview without distraction  Fund of Knowledge: Aware of current events and Vocabulary appropriate   Estimate if Intelligence:  Average based on work/education history, vocabulary and mental status exam  Insight: intact, limited awareness of illness- poor  Judgment: impaired due to psychosis/carley - poor      PSYCHOSOCIAL    PSYCHOSOCIAL STRESSORS   Occupational and family    FUNCTIONING RELATIONSHIPS   good support system    STRENGTHS AND LIABILITIES   Strength: Patient accepts guidance/feedback, Strength: Patient is expressive/articulate., Liability: Patient is impulsive., Liability: Patient lacks coping skills.    Is the patient aware of the biomedical complications associated with substance abuse and mental illness? yes    Does the patient have an Advance " Directive for Mental Health treatment? no  (If yes, inform patient to bring copy.)        MEDICAL DECISION MAKING        ASSESSMENT       Bipolar disorder, type I, MRE mixed, severe with psychotic features  Unspecified anxiety disorder  Insomnia secondary to a mental illness    Cannabis abuse    Psychosocial stressors    Elevated Liver Enzymes        PROBLEM LIST AND MANAGEMENT PLANS      Bipolar disorder, type I, MRE manic, severe with psychotic features: pt counseled  -Continue Aristada 1064mg due April 20, 2023  - resume Haldol 5 mg PO BID   -resume home Depakote ER at 750 mg qhs- check level in 4 days  - follow up with outpatient mental health providers after discharge for medication management and psychotherapy    Unspecified anxiety disorder  - start hydroxyzine 50 mg PO q 6 hours PRN  -depakote off-label as above    Insomnia: pt counseled  -vistaril prn    Cannabis abuse: pt counseled  - SW consulted for assistance with additional resources       Psychosocial stressors  - pt counseled  - SW consulted for assistance with additional resources     Elevated Liver Enzymes: pt counseled  -he would like to continue depakote in spite of the risks  -will recheck and trend out      PRESCRIPTION DRUG MANAGEMENT  Compliance: no  Side Effects: unknown  Regimen Adjustments: see above    Discussed diagnosis, risks and benefits of proposed treatment vs alternative treatments vs no treatment, potential side effects of these treatments and the inherent unpredictability of treatment. The patient expresses understanding of the above and displays the capacity to agree with this treatment given said understanding. Patient also agrees that, currently, the benefits outweigh the risks and would like to pursue/continue treatment at this time.    Any medications being used off-label were discussed with the patient inclusive of the evidence base for the use of the medications and consent was obtained for the off-label use of the  medication.         DIAGNOSTIC TESTING  Labs reviewed with patient; follow up pending labs    Disposition:  -Will attempt to obtain outside psychiatric records if available  -SW to assist with aftercare planning and collateral  -Once stable discharge home with outpatient follow up care and/or rehab  -Continue inpatient treatment under a PEC and/or CEC for danger to self/ danger to others/grave disability as evident by danger to self and gravely disabled        Frantz Cagle MD  Psychiatry

## 2023-04-03 NOTE — PLAN OF CARE
Problem: Adult Behavioral Health Plan of Care  Goal: Plan of Care Review  Outcome: Ongoing, Progressing     Problem: Adult Behavioral Health Plan of Care  Goal: Patient-Specific Goal (Individualization)  Outcome: Ongoing, Progressing     Problem: Adult Behavioral Health Plan of Care  Goal: Optimized Coping Skills in Response to Life Stressors  Outcome: Ongoing, Progressing     Problem: Decreased Participation and Engagement (Excessive Substance Use)  Goal: Increased Participation and Engagement (Excessive Substance Use)  Outcome: Ongoing, Progressing

## 2023-04-03 NOTE — PLAN OF CARE
POC reviewed this shift and is ongoing.   Pt cooperative with staff. Pt slept most of the shift. Was offered his food tray and did not want to get up. No ss of distress. Will continue to monitor for changes and safety.

## 2023-04-03 NOTE — H&P
"[facility] Hospital Medicine  History & Physical    Patient Name: Bishop Soria  MRN: [id number]  Patient Class: IP- Psych  Admission Date: 4/2/2023  Attending Physician: Vinh Hardwick MD   Primary Care Provider: Jose Martin Selby MD         Patient information was obtained from ER records.     Subjective:     Principal Problem:Schizoaffective disorder, bipolar type    Chief Complaint:   Chief Complaint   Patient presents with    Suicidal     Pt needs psych eval.   Pt states that he was recently discharged from 7th floor and stated that the medicines are not working. Pt also stated "I'm hearing voices, like I killed a " pt also stated that he wants to self- harm, but has no idea".         HPI:   Chief Complaint   Patient presents with    Suicidal       Pt needs psych eval.   Pt states that he was recently discharged from 7th floor and stated that the medicines are not working. Pt also stated "I'm hearing voices, like I killed a " pt also stated that he wants to self- harm, but has no idea".       22-year-old male with history schizophrenia multiple psychiatric admissions, suicide attempt in the past presents to the emergency department with suicide ideations, no plan, bizarre behavior, hearing voices "like I killed a ".  Last admission was in February for same issues         Past Medical History:   Diagnosis Date    History of psychiatric hospitalization     Hx of psychiatric care     Psychiatric problem     Schizophrenia     Suicide attempt        No past surgical history on file.    Review of patient's allergies indicates:  No Known Allergies    No current facility-administered medications on file prior to encounter.     Current Outpatient Medications on File Prior to Encounter   Medication Sig    divalproex  MG Tb24 Take 3 tablets (1,500 mg total) by mouth 2 (two) times a day.    haloperidoL (HALDOL) 5 MG tablet Take 1 " tablet (5 mg total) by mouth 2 (two) times daily.    hydrOXYzine pamoate (VISTARIL) 25 MG Cap Take 1 capsule (25 mg total) by mouth every 6 (six) hours as needed (anxiety and/or insomnia).    [DISCONTINUED] EScitalopram oxalate (LEXAPRO) 10 MG tablet TAKE 1 TABLET BY MOUTH EVERY DAY    [DISCONTINUED] lithium (LITHOTAB) 300 mg tablet Take 300 mg by mouth 2 (two) times daily.     Family History       Family history is unknown by patient.          Tobacco Use    Smoking status: Some Days     Types: Vaping with nicotine     Start date: 12/20/2021     Passive exposure: Past    Smokeless tobacco: Never   Substance and Sexual Activity    Alcohol use: Yes     Comment: pt reports drinking wine and tequila, two-four times out of the month    Drug use: Yes     Types: Marijuana     Comment: Pt denies smoking anything synthetic    Sexual activity: Not Currently     Partners: Male     Review of Systems   Constitutional:  Negative for fatigue and fever.   HENT:  Negative for congestion, ear pain and sore throat.    Eyes:  Negative for pain and discharge.   Respiratory:  Negative for cough, shortness of breath and wheezing.    Gastrointestinal:  Negative for abdominal pain, constipation, diarrhea, nausea and vomiting.   Endocrine: Negative for cold intolerance and heat intolerance.   Genitourinary:  Negative for difficulty urinating, dysuria and frequency.   Musculoskeletal:  Negative for arthralgias.   Allergic/Immunologic: Negative for environmental allergies.   Neurological:  Negative for dizziness, tremors and seizures.   Psychiatric/Behavioral:  Positive for behavioral problems, dysphoric mood, hallucinations and suicidal ideas.    All other systems reviewed and are negative.  Objective:     Vital Signs (Most Recent):  Temp: 97.8 °F (36.6 °C) (04/03/23 0801)  Pulse: 64 (04/03/23 0801)  Resp: 18 (04/03/23 0801)  BP: 116/64 (04/03/23 0801)  SpO2: 97 % (04/03/23 0801) Vital Signs (24h Range):  Temp:  [97.8 °F (36.6  °C)-98.7 °F (37.1 °C)] 97.8 °F (36.6 °C)  Pulse:  [62-96] 64  Resp:  [15-18] 18  SpO2:  [97 %-100 %] 97 %  BP: (107-119)/(58-73) 116/64     Weight: 73.5 kg (162 lb)  Body mass index is 20.25 kg/m².    Physical Exam  Vitals and nursing note reviewed.   Constitutional:       Appearance: Normal appearance.   HENT:      Head: Normocephalic and atraumatic.      Nose: Nose normal.      Mouth/Throat:      Mouth: Mucous membranes are moist.      Pharynx: Oropharynx is clear.   Eyes:      Extraocular Movements: Extraocular movements intact.      Conjunctiva/sclera: Conjunctivae normal.      Pupils: Pupils are equal, round, and reactive to light.   Cardiovascular:      Rate and Rhythm: Normal rate and regular rhythm.      Pulses: Normal pulses.      Heart sounds: Normal heart sounds.   Pulmonary:      Effort: Pulmonary effort is normal.      Breath sounds: Normal breath sounds.   Abdominal:      General: Abdomen is flat. Bowel sounds are normal.      Palpations: Abdomen is soft.   Musculoskeletal:         General: Normal range of motion.      Cervical back: Normal range of motion and neck supple.   Skin:     General: Skin is warm and dry.      Capillary Refill: Capillary refill takes less than 2 seconds.      Comments: No rashes on limited skin exam.   Neurological:      General: No focal deficit present.      Mental Status: He is alert and oriented to person, place, and time.      Cranial Nerves: No cranial nerve deficit.      Comments: I Olfactory:  Sense of smell intact    II Optic:  Pupils equal round react to light.  Vision intact.    III, IV, VI, Ocular motor, Trochlear, Abducens:  Extraocular movements intact    V Trigeminal:  Facial sensation intact facial sensation intact,, muscles of mastication intact muscles of mastication intact, corneal reflex intact, corneal reflex intact    VII Facial:  Muscles of facial expression intact     VIII Vestibular cochlear: Hearing intact vestibular cochlear: Hearing intact    IX  Glossopharyngeal:  Gag reflex intact.  Tasting intact.     X Vagus:  Gag reflex intact.    XI Spinal Accessory:  Shoulder shrug intact.  Head rotation intact.    XII Hypoglossal:  Tongue movements intact.     Psychiatric:      Comments: Patient appears depressed, paranoid, SI         CRANIAL NERVES     CN III, IV, VI   Pupils are equal, round, and reactive to light.     Significant Labs: All pertinent labs within the past 24 hours have been reviewed.    Significant Imaging: I have reviewed all pertinent imaging results/findings within the past 24 hours.    Assessment/Plan:     * Schizoaffective disorder, bipolar type  To be admitted to our inpatient psychiatric unit for further evaluation and management.        Marijuana abuse  Cessation strongly advised.         VTE Risk Mitigation (From admission, onward)         Ordered     IP VTE LOW RISK PATIENT  Once         04/02/23 8805                           Regis King Jr, MD  Department of Hospital Medicine  [facility]

## 2023-04-04 PROCEDURE — 99233 SBSQ HOSP IP/OBS HIGH 50: CPT | Mod: AF,HB,, | Performed by: PSYCHIATRY & NEUROLOGY

## 2023-04-04 PROCEDURE — 11400000 HC PSYCH PRIVATE ROOM

## 2023-04-04 PROCEDURE — 25000003 PHARM REV CODE 250: Performed by: PSYCHIATRY & NEUROLOGY

## 2023-04-04 PROCEDURE — 25000003 PHARM REV CODE 250: Performed by: INTERNAL MEDICINE

## 2023-04-04 PROCEDURE — 90833 PSYTX W PT W E/M 30 MIN: CPT | Mod: AF,HB,, | Performed by: PSYCHIATRY & NEUROLOGY

## 2023-04-04 PROCEDURE — 99233 PR SUBSEQUENT HOSPITAL CARE,LEVL III: ICD-10-PCS | Mod: AF,HB,, | Performed by: PSYCHIATRY & NEUROLOGY

## 2023-04-04 PROCEDURE — 90833 PR PSYCHOTHERAPY W/PATIENT W/E&M, 30 MIN (ADD ON): ICD-10-PCS | Mod: AF,HB,, | Performed by: PSYCHIATRY & NEUROLOGY

## 2023-04-04 RX ORDER — CARBAMAZEPINE 200 MG/1
200 TABLET ORAL 3 TIMES DAILY
Status: DISCONTINUED | OUTPATIENT
Start: 2023-04-04 | End: 2023-04-06 | Stop reason: HOSPADM

## 2023-04-04 RX ADMIN — DIVALPROEX SODIUM 750 MG: 500 TABLET, FILM COATED, EXTENDED RELEASE ORAL at 08:04

## 2023-04-04 RX ADMIN — HALOPERIDOL 5 MG: 5 TABLET ORAL at 08:04

## 2023-04-04 RX ADMIN — MELATONIN 6 MG: 3 TAB ORAL at 08:04

## 2023-04-04 RX ADMIN — CARBAMAZEPINE 200 MG: 200 TABLET ORAL at 08:04

## 2023-04-04 RX ADMIN — FAMOTIDINE 20 MG: 20 TABLET ORAL at 08:04

## 2023-04-04 RX ADMIN — CARBAMAZEPINE 200 MG: 200 TABLET ORAL at 04:04

## 2023-04-04 NOTE — PROGRESS NOTES
PSYCHIATRY DAILY INPATIENT PROGRESS NOTE  SUBSEQUENT HOSPITAL VISIT    ENCOUNTER DATE: 4/4/2023  SITE: [facility]    DATE OF ADMISSION: 4/2/2023  1:24 PM  LENGTH OF STAY: 2 days      CHIEF COMPLAINT   Bishop Soria is a 22 y.o. 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  Bishop Soria presented with the chief complaint of       The patient was seen and examined. The chart was reviewed.     Reviewed notes from Rns and MD and labs from the last 24 hours.    The patient's case was discussed with the treatment team/care providers today including Rns and MD    Staff reports no behavioral or management issues.     The patient has been compliant with treatment.      Subjective 04/04/2023       Today the patient reports that he is doing well. He maintains that he fabricated symptoms in order to obtain admission to resume his medications.  -his liver enzymes were elevated- we discussed the risk with Depakote at length; he would like to discontinue Depakote and try tegretol in place of it  -he continues to deny al symptoms; carley, depression and psychosis appear better than yesterday but persist with noted impairment. He remains an unreliable historian- he is likely minimizing symptoms to secure discharge.      The patient denies any side effects to medications.          Psychiatric ROS (observed, reported, or endorsed/denied):  Depressed mood - denies  Interest/pleasure/anhedonia: denies  Guilt/hopelessness/worthlessness - denies  Changes in Sleep - denies  Changes in Appetite - denies  Changes in Concentration - denies  Changes in Energy - denies  PMA/R- denies  Suicidal- active/passive ideations - denies  Homicidal ideations: active/passive ideations - denies    Hallucinations - denies  Delusions - denies  Disorganized behavior - denies  Disorganized speech - denies  Negative symptoms - denies    Elevated mood - denies  Decreased need for sleep - denies  Grandiosity - denies  Racing thoughts -  denies  Impulsivity - denies  Irritability- denies  Increased energy - denies  Distractibility - denies  Increase in goal-directed activity or PMA- denies    Symptoms of KANU - denies  Symptoms of Panic Disorder- denies  Symptoms of PTSD - denies        Overall progress: Patient is showing mild improvement         Psychotherapy:  Target symptoms: mood disorder, psychosis  Why chosen therapy is appropriate versus another modality: relevant to diagnosis, patient responds to this modality, evidence based practice  Outcome monitoring methods: self-report, observation  Therapeutic intervention type: insight oriented psychotherapy, behavior modifying psychotherapy, supportive psychotherapy, interactive psychotherapy  Topics discussed/themes: building skills sets for symptom management, symptom recognition  The patient's response to the intervention is accepting. The patient's progress toward treatment goals is limited.   Duration of intervention: 16 minutes.        Medical ROS  Constitutional: Negative.  Negative for chills and fever.   HENT: Negative.  Negative for hearing loss.    Eyes: Negative.  Negative for blurred vision and double vision.   Respiratory: Negative.  Negative for shortness of breath.    Cardiovascular: Negative.  Negative for chest pain and palpitations.   Gastrointestinal: Negative.  Negative for constipation, diarrhea, nausea and vomiting.   Genitourinary: Negative.  Negative for dysuria.   Musculoskeletal: Negative.  Negative for back pain and neck pain.   Skin: Negative.  Negative for rash.   Neurological: Negative.  Negative for dizziness and headaches.   Endo/Heme/Allergies: Negative.  Negative for environmental allergies.   Psychiatric/Behavioral:          See HPI       PAST MEDICAL HISTORY   Past Medical History:   Diagnosis Date    History of psychiatric hospitalization     Hx of psychiatric care     Psychiatric problem     Schizophrenia     Suicide attempt            PSYCHOTROPIC MEDICATIONS  "  Scheduled Meds:   divalproex  750 mg Oral BID    famotidine  20 mg Oral BID    haloperidoL  5 mg Oral BID     Continuous Infusions:  PRN Meds:.melatonin, ondansetron        EXAMINATION    VITALS   Vitals:    04/02/23 1917 04/03/23 0801 04/03/23 1908 04/04/23 0803   BP: (!) 109/58 116/64 (!) 113/58 (!) 110/55   BP Location: Left arm Left arm  Left arm   Patient Position: Sitting Sitting  Sitting   Pulse: 62 64 63 68   Resp: 16 18 20 18   Temp: 98.2 °F (36.8 °C) 97.8 °F (36.6 °C) 98.2 °F (36.8 °C) 97.9 °F (36.6 °C)   TempSrc: Oral Oral  Oral   SpO2: 100% 97% 98% 98%   Weight:       Height:           Body mass index is 20.25 kg/m².    CONSTITUTIONAL  General Appearance: unremarkable, age appropriate, normal weight, disheveled     MUSCULOSKELETAL  Muscle Strength and Tone:no tremor, no tic  Abnormal Involuntary Movements: None  Gait and Station: non-ataxic     PSYCHIATRIC   Level of Consciousness: awake and alert   Orientation: person, place, time and situation  Grooming: Hospital garb, disheveled  Psychomotor Behavior: normal, cooperative; no PMA/R  Speech: normal tone, normal rate, normal pitch, normal volume  Language: grossly intact, able to name, able to repeat  Mood: "fine"  Affect: less Intense  Thought Process: less circumstantial, less racing  Associations: intact   Thought Content: +delusions, denies SI, and denies HI  Perceptions: less observed RIS   Memory: Able to recall past events, Remote intact, and Recent intact  Attention:Attends to interview without distraction  Fund of Knowledge: Aware of current events and Vocabulary appropriate   Estimate if Intelligence:  Average based on work/education history, vocabulary and mental status exam  Insight: intact, limited awareness of illness- poor  Judgment: impaired due to psychosis/carley - poor        DIAGNOSTIC TESTING   Laboratory Results  No results found for this or any previous visit (from the past 24 hour(s)).          MEDICAL DECISION " MAKING      ASSESSMENT:   Bipolar disorder, type I, MRE mixed, severe with psychotic features  Unspecified anxiety disorder  Insomnia secondary to a mental illness     Cannabis abuse     Psychosocial stressors     Elevated Liver Enzymes           PROBLEM LIST AND MANAGEMENT PLANS        Bipolar disorder, type I, MRE manic, severe with psychotic features: pt counseled  -Continue Aristada 1064mg due April 20, 2023  - resume Haldol 5 mg PO BID   -resume home Depakote ER at 750 mg qhs- stop given elevated liver enzymes; change to tegretol  -Start Tegretol 200 mg po TID- check level thursday  - follow up with outpatient mental health providers after discharge for medication management and psychotherapy     Unspecified anxiety disorder  - start hydroxyzine 50 mg PO q 6 hours PRN  -depakote off-label as above     Insomnia: pt counseled  -vistaril prn     Cannabis abuse: pt counseled  - SW consulted for assistance with additional resources         Psychosocial stressors  - pt counseled  - SW consulted for assistance with additional resources      Elevated Liver Enzymes: pt counseled  -stopping Depakote  -will recheck and trend out           Discussed diagnosis, risks and benefits of proposed treatment vs alternative treatments vs no treatment, potential side effects of these treatments and the inherent unpredictability of treatment. The patient expresses understanding of the above and displays the capacity to agree with this treatment given said understanding. Patient also agrees that, currently, the benefits outweigh the risks and would like to pursue/continue treatment at this time.    Any medications being used off-label were discussed with the patient inclusive of the evidence base for the use of the medications and consent was obtained for the off-label use of the medication.       DISCHARGE PLANNING  Expected Disposition Plan: Home or Self Care-plan to discharge Thursday if stable      NEED FOR CONTINUED  HOSPITALIZATION  Psychiatric illness continues to pose a potential threat to life or bodily function, of self or others, thereby requiring the need for continued inpatient psychiatric hospitalization: Yes, due to: significant psychotic thought disorder, danger to self, gravely disabled, and suicidal ideation, as evidenced by:  Ongoing concerns with SI., Ongoing concerns with grave disability with patient unable to perform basic feeding, hygiene and dressing activities without significant constant support., and Ongoing concerns with perceptual aberrancy and paranoid persecutory delusions leading to potential harm of self or others.    Protective inpatient pyschiatric hospitalization required while a safe disposition plan is enacted: Yes    Patient stabilized and ready for discharge from inpatient psychiatric unit: No        STAFF:   Frantz Cagle MD  Psychiatry

## 2023-04-04 NOTE — PLAN OF CARE
Behavioral Health Unit  Psychosocial History and Assessment  Progress Note      Patient Name: Bishop Soria YOB: 2000 SW: Tami Butt, VIRAL  Date: 4/4/2023    Chief Complaint: psychosis    Consent:     Did the patient consent for an interview with the ? Yes    Did the patient consent for the  to contact family/friend/caregiver?   Yes  Name: Nabil Soria, Relationship: mother, and Contact: [phone]    Did the patient give consent for the  to inform family/friend/caregiver of his/her whereabouts or to discuss discharge planning? Yes    Source of Information: Face to face with patient, Telephone interview with family/friend/caregiver, and Chart review    Is information obtained from interviews considered reliable?   yes    Reason for Admission:     Active Hospital Problems    Diagnosis  POA    *Schizoaffective disorder, bipolar type [F25.0]  Yes    Marijuana abuse [F12.10]  Yes      Resolved Hospital Problems   No resolved problems to display.       History of Present Illness - (Patient Perception):   I didn't have any medication I was taken off medication, sleep medic ation was called in but I did not start it because I came to the hospital    History of Present Illness - (Perception of Others): he wasn't on medication only took the shot according to Nabil Soria    Present biopsychosocial functioning: Pt is a 22 year old male with a past psychiatric history currently admitted to the inpatient unit with the following chief complaint: psychosis. AH/VH reported. Pt denies HI/SI. Pt UDS positive for benzos and marijuana. Pt lives in home with mom and is single. Pt works full time as a . Pt can perform all ADLs. Pt reports recent stressor as not having medication.     Past biopsychosocial functioning: Per family and pt,  he was only given the shot, he was told to stop taking Haldol and Depakote because his  liver enzymes  "were elevated but was not prescribed any other medication besides sleep medication, he started not sleeping again, he's better but not himself.      Family and Marital/Relationship History:     Significant Other/Partner Relationships:  Single    Family Relationships: Intact      Childhood History:     Where was patient raised? [place], [place]     Who raised the patient? Stephy Melendez mother       How does patient describe their childhood? "Youthful"      Who is patient's primary support person? mother Das       Culture and Presybeterian:     Presybeterian: Unknown    How strong of a role does Shinto and spirituality play in patient's life? Strong     Islam or spiritual concerns regarding treatment: not applicable     History of Abuse:   History of Abuse: Denies     Psychiatric and Medical History:     History of psychiatric illness or treatment: prior inpatient treatment and has participated in counseling/psychotherapy on an outpatient basis in the past    Medical history:   Past Medical History:   Diagnosis Date    History of psychiatric hospitalization     Hx of psychiatric care     Psychiatric problem     Schizophrenia     Suicide attempt        Substance Abuse History:     Alcohol - (Patient Perspective):   Social History     Substance and Sexual Activity   Alcohol Use Yes    Comment: pt reports drinking wine, two-four times monthly or less       Alcohol - (Collateral Perspective): none  according to stephy melendez     Drugs - (Patient Perspective):   Social History     Substance and Sexual Activity   Drug Use Yes    Types: Marijuana, Benzodiazepines    Comment: pt reports taking benzo within the past few weeks to help sleep       Drugs - (Collateral Perspective): yes, marijuana  according to stephy melendez      Education:     Currently Enrolled? No  Attended College/Technical School    Special Education? No    Interested in Completing Education/GED: No    Employment and Financial: "     Currently employed? Employed: Current Occupation: customer representative     Source of Income:  employment     Able to afford basic needs (food, shelter, utilities)? Yes    Who manages finances/personal affairs? Self       Service:     [place]? no    Combat Service? No     Community Resources:     Describe present use of community resources: Medicaid, [place]     Identify previously used community resources   (Include previous mental health treatment - outpatient and inpatient): pt has had an estimate of 10 previous hospitalizations at [facility] during the time span of 2020 and currently.    Environmental:     Current living situation:Lives with family    Social Evaluation:     Patient Assets: Work skills and Communicable skills    Patient Limitations: substance abuse     High risk psychosocial issues that may impact discharge planning:   Substance abuse and non compliance with medication     Recommendations:     Anticipated discharge plan:   outpatient follow up and home with family     High risk issues requiring early treatment planning and immediate intervention: psychosis    Community resources needed for discharge planning:  aftercare treatment sources    Anticipated social work role(s) in treatment and discharge planning: SW will facilitate process groups to assist pt develop healthy coping skills; CM will arrange outpatient follow-up appointments and assist with discharge planning.

## 2023-04-04 NOTE — NURSING
Pt. A/A.  No C/O pain.  Denies SI/HI/AVH currently  Does have some Anxiety.  Flat affect.  Compliant with medications and follows instructions.  Will continue to observe.

## 2023-04-04 NOTE — PLAN OF CARE
Problem: Violence Risk or Actual  Goal: Anger and Impulse Control  Outcome: Ongoing, Progressing     Problem: Adult Behavioral Health Plan of Care  Goal: Plan of Care Review  Outcome: Ongoing, Progressing  Goal: Patient-Specific Goal (Individualization)  Outcome: Ongoing, Progressing  Goal: Adheres to Safety Considerations for Self and Others  Outcome: Ongoing, Progressing  Goal: Absence of New-Onset Illness or Injury  Outcome: Ongoing, Progressing  Goal: Optimized Coping Skills in Response to Life Stressors  Outcome: Ongoing, Progressing  Goal: Develops/Participates in Therapeutic [place] to Support Successful Transition  Outcome: Ongoing, Progressing  Goal: Rounds/Family Conference  Outcome: Ongoing, Progressing     Problem: Activity and Energy Impairment (Excessive Substance Use)  Goal: Optimized Energy Level (Excessive Substance Use)  Outcome: Ongoing, Progressing     Problem: Behavior Regulation Impairment (Excessive Substance Use)  Goal: Improved Behavioral Control (Excessive Substance Use)  Outcome: Ongoing, Progressing     Problem: Decreased Participation and Engagement (Excessive Substance Use)  Goal: Increased Participation and Engagement (Excessive Substance Use)  Outcome: Ongoing, Progressing     Problem: Physiologic Impairment (Excessive Substance Use)  Goal: Improved Physiologic Symptoms (Excessive Substance Use)  Outcome: Ongoing, Progressing     Problem: Social, Occupational or Functional Impairment (Excessive Substance Use)  Goal: Enhanced Social, Occupational or Functional Skills (Excessive Substance Use)  Outcome: Ongoing, Progressing     Problem: Behavior Regulation Impairment (Psychotic Signs/Symptoms)  Goal: Improved Behavioral Control (Psychotic Signs/Symptoms)  Outcome: Ongoing, Progressing     Problem: Cognitive Impairment (Psychotic Signs/Symptoms)  Goal: Optimal Cognitive Function (Psychotic Signs/Symptoms)  Outcome: Ongoing, Progressing     Problem: Decreased Participation and  Engagement (Psychotic Signs/Symptoms)  Goal: Increased Participation and Engagement (Psychotic Signs/Symptoms)  Outcome: Ongoing, Progressing     Problem: Mood Impairment (Psychotic Signs/Symptoms)  Goal: Improved Mood Symptoms (Psychotic Signs/Symptoms)  Outcome: Ongoing, Progressing     Problem: Psychomotor Impairment (Psychotic Signs/Symptoms)  Goal: Improved Psychomotor Symptoms (Psychotic Signs/Symptoms)  Outcome: Ongoing, Progressing     Problem: Sensory Perception Impairment (Psychotic Signs/Symptoms)  Goal: Decreased Sensory Symptoms (Psychotic Signs/Symptoms)  Outcome: Ongoing, Progressing     Problem: Sleep Disturbance (Psychotic Signs/Symptoms)  Goal: Improved Sleep (Psychotic Signs/Symptoms)  Outcome: Ongoing, Progressing     Problem: Social, Occupational or Functional Impairment (Psychotic Signs/Symptoms)  Goal: Enhanced Social, Occupational or Functional Skills (Psychotic Signs/Symptoms)  Outcome: Ongoing, Progressing     Problem: Activity and Energy Impairment (Anxiety Signs/Symptoms)  Goal: Optimized Energy Level (Anxiety Signs/Symptoms)  Outcome: Ongoing, Progressing     Problem: Cognitive Impairment (Anxiety Signs/Symptoms)  Goal: Optimized Cognitive Function (Anxiety Signs/Symptoms)  Outcome: Ongoing, Progressing     Problem: Mood Impairment (Anxiety Signs/Symptoms)  Goal: Improved Mood Symptoms (Anxiety Signs/Symptoms)  Outcome: Ongoing, Progressing     Problem: Sleep Impairment (Anxiety Signs/Symptoms)  Goal: Improved Sleep (Anxiety Signs/Symptoms)  Outcome: Ongoing, Progressing     Problem: Social, Occupational or Functional Impairment (Anxiety Signs/Symptoms)  Goal: Enhanced Social, Occupational or Functional Skills (Anxiety Signs/Symptoms)  Outcome: Ongoing, Progressing     Problem: Somatic Disturbance (Anxiety Signs/Symptoms)  Goal: Improved Somatic Symptoms (Anxiety Signs/Symptoms)  Outcome: Ongoing, Progressing

## 2023-04-04 NOTE — NURSING
POC reviewed with Patient. Patient spend a majority of day in room resting, coming out to eat meals and eat snacks. Patient ate 100 percent of meals for all three meals. Patient denies si, hi today and at present. Patient is gravely disabled. Nurse observed that patient was talking and laughing to himself with no one else around. Patient taking all medication with no side effects. Patient in no need for PRN medication today. Patient instructed to call for any needs.

## 2023-04-04 NOTE — PLAN OF CARE
04/04/23 1611   Step 1: Warning Signs   Warning Sign 1 not being able to express self   Warning Sign 2 argument/fight with mother   Warning Sign 3 being off medication   Step 2: Internal coping strategies - Things I can do to take my mind off my problems without contacting another person:   Coping Strategy 1 dancing   Coping Strategy 2 singing   Coping Strategy 3 acting/peforming   Step 3: People and social settings that provide distraction:   1. Name Ryley Morel (friend)   2. Name Zia Fields (friend)       Phone [phone]   3. Place [place]   4. Place [facility]    Step 4: People whom I can ask for help:   1. Person Nabil Soria (mother)       Phone [phone]   2. Person Ingrid Hardwick (friend)   3. Person João Callejas (friend)   Step 5: Professionals or agencies I can contact during a crisis:   1. Clinician Name Daphnie       Phone [phone]   2. Clinician Name [facility]       Phone [phone]   3. Suicide Prevention Lifeline: 2-459-120-TALK (8973) Suicide Prevention Lifeline 1-218.383.7226   4. [facility]       911   Step 6: Making the environment safe:   Safe environment 1 be compliant with medication   Safe environment 2 talk more to God

## 2023-04-05 PROCEDURE — 90833 PR PSYCHOTHERAPY W/PATIENT W/E&M, 30 MIN (ADD ON): ICD-10-PCS | Mod: AF,HB,, | Performed by: PSYCHIATRY & NEUROLOGY

## 2023-04-05 PROCEDURE — 25000003 PHARM REV CODE 250: Performed by: INTERNAL MEDICINE

## 2023-04-05 PROCEDURE — 99233 PR SUBSEQUENT HOSPITAL CARE,LEVL III: ICD-10-PCS | Mod: AF,HB,, | Performed by: PSYCHIATRY & NEUROLOGY

## 2023-04-05 PROCEDURE — 25000003 PHARM REV CODE 250: Performed by: PSYCHIATRY & NEUROLOGY

## 2023-04-05 PROCEDURE — 99233 SBSQ HOSP IP/OBS HIGH 50: CPT | Mod: AF,HB,, | Performed by: PSYCHIATRY & NEUROLOGY

## 2023-04-05 PROCEDURE — 90833 PSYTX W PT W E/M 30 MIN: CPT | Mod: AF,HB,, | Performed by: PSYCHIATRY & NEUROLOGY

## 2023-04-05 PROCEDURE — 11400000 HC PSYCH PRIVATE ROOM

## 2023-04-05 RX ADMIN — FAMOTIDINE 20 MG: 20 TABLET ORAL at 08:04

## 2023-04-05 RX ADMIN — MELATONIN 6 MG: 3 TAB ORAL at 08:04

## 2023-04-05 RX ADMIN — CARBAMAZEPINE 200 MG: 200 TABLET ORAL at 08:04

## 2023-04-05 RX ADMIN — HALOPERIDOL 5 MG: 5 TABLET ORAL at 08:04

## 2023-04-05 RX ADMIN — CARBAMAZEPINE 200 MG: 200 TABLET ORAL at 02:04

## 2023-04-05 NOTE — PLAN OF CARE
Problem: Violence Risk or Actual  Goal: Anger and Impulse Control  Outcome: Ongoing, Progressing     Problem: Adult Behavioral Health Plan of Care  Goal: Plan of Care Review  Outcome: Ongoing, Progressing  Goal: Patient-Specific Goal (Individualization)  Outcome: Ongoing, Progressing  Goal: Adheres to Safety Considerations for Self and Others  Outcome: Ongoing, Progressing  Goal: Absence of New-Onset Illness or Injury  Outcome: Ongoing, Progressing  Goal: Optimized Coping Skills in Response to Life Stressors  Outcome: Ongoing, Progressing  Goal: Develops/Participates in Therapeutic [place] to Support Successful Transition  Outcome: Ongoing, Progressing  Goal: Rounds/Family Conference  Outcome: Ongoing, Progressing     Problem: Activity and Energy Impairment (Excessive Substance Use)  Goal: Optimized Energy Level (Excessive Substance Use)  Outcome: Ongoing, Progressing     Problem: Behavior Regulation Impairment (Excessive Substance Use)  Goal: Improved Behavioral Control (Excessive Substance Use)  Outcome: Ongoing, Progressing     Problem: Decreased Participation and Engagement (Excessive Substance Use)  Goal: Increased Participation and Engagement (Excessive Substance Use)  Outcome: Ongoing, Progressing     Problem: Physiologic Impairment (Excessive Substance Use)  Goal: Improved Physiologic Symptoms (Excessive Substance Use)  Outcome: Ongoing, Progressing     Problem: Social, Occupational or Functional Impairment (Excessive Substance Use)  Goal: Enhanced Social, Occupational or Functional Skills (Excessive Substance Use)  Outcome: Ongoing, Progressing     Problem: Behavior Regulation Impairment (Psychotic Signs/Symptoms)  Goal: Improved Behavioral Control (Psychotic Signs/Symptoms)  Outcome: Ongoing, Progressing     Problem: Cognitive Impairment (Psychotic Signs/Symptoms)  Goal: Optimal Cognitive Function (Psychotic Signs/Symptoms)  Outcome: Ongoing, Progressing     Problem: Decreased Participation and  Engagement (Psychotic Signs/Symptoms)  Goal: Increased Participation and Engagement (Psychotic Signs/Symptoms)  Outcome: Ongoing, Progressing     Problem: Mood Impairment (Psychotic Signs/Symptoms)  Goal: Improved Mood Symptoms (Psychotic Signs/Symptoms)  Outcome: Ongoing, Progressing     Problem: Psychomotor Impairment (Psychotic Signs/Symptoms)  Goal: Improved Psychomotor Symptoms (Psychotic Signs/Symptoms)  Outcome: Ongoing, Progressing     Problem: Sensory Perception Impairment (Psychotic Signs/Symptoms)  Goal: Decreased Sensory Symptoms (Psychotic Signs/Symptoms)  Outcome: Ongoing, Progressing     Problem: Sleep Disturbance (Psychotic Signs/Symptoms)  Goal: Improved Sleep (Psychotic Signs/Symptoms)  Outcome: Ongoing, Progressing     Problem: Social, Occupational or Functional Impairment (Psychotic Signs/Symptoms)  Goal: Enhanced Social, Occupational or Functional Skills (Psychotic Signs/Symptoms)  Outcome: Ongoing, Progressing     Problem: Activity and Energy Impairment (Anxiety Signs/Symptoms)  Goal: Optimized Energy Level (Anxiety Signs/Symptoms)  Outcome: Ongoing, Progressing     Problem: Cognitive Impairment (Anxiety Signs/Symptoms)  Goal: Optimized Cognitive Function (Anxiety Signs/Symptoms)  Outcome: Ongoing, Progressing     Problem: Mood Impairment (Anxiety Signs/Symptoms)  Goal: Improved Mood Symptoms (Anxiety Signs/Symptoms)  Outcome: Ongoing, Progressing     Problem: Sleep Impairment (Anxiety Signs/Symptoms)  Goal: Improved Sleep (Anxiety Signs/Symptoms)  Outcome: Ongoing, Progressing     Problem: Social, Occupational or Functional Impairment (Anxiety Signs/Symptoms)  Goal: Enhanced Social, Occupational or Functional Skills (Anxiety Signs/Symptoms)  Outcome: Ongoing, Progressing     Problem: Somatic Disturbance (Anxiety Signs/Symptoms)  Goal: Improved Somatic Symptoms (Anxiety Signs/Symptoms)  Outcome: Ongoing, Progressing

## 2023-04-05 NOTE — PLAN OF CARE
POC reviewed this shift and is on going. Patient is withdrawn, calm, cooperative, and quiet. Denies Suicidal Ideation, Homicidal Ideation, Auditory Hallucinations, Visual Hallucinations, Tactile Hallucinations, Gustatory Hallucinations, and Delusions. Patient has been out on the floor at times today but hasn't stayed out for a long time. Patient has been quiet in his room. Patient remains a good candidate for discharge tomorrow. Pt continues to be medication compliant and staff will continue to monitor pt closely while on the unit.

## 2023-04-05 NOTE — PROGRESS NOTES
PSYCHIATRY DAILY INPATIENT PROGRESS NOTE  SUBSEQUENT HOSPITAL VISIT    ENCOUNTER DATE: 4/5/2023  SITE: [facility]    DATE OF ADMISSION: 4/2/2023  1:24 PM  LENGTH OF STAY: 3 days      CHIEF COMPLAINT   Bishop Soria is a 22 y.o. 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  Bishop Soria presented with the chief complaint of       The patient was seen and examined. The chart was reviewed.     Reviewed notes from Rns and SW and labs from the last 24 hours.    The patient's case was discussed with the treatment team/care providers today including Rns, CTRS, NP, and SW    Staff reports no behavioral or management issues.     The patient has been compliant with treatment.      Subjective 04/05/2023       Today the patient again reports that he is doing well. He maintains that he fabricated symptoms in order to obtain admission to resume his medications.  -his liver enzymes were elevated- we discussed the risk with Depakote at length; he would like to discontinue Depakote and try tegretol in place of it. Tegretol was initiated without complications  -he continues to deny al symptoms; carley, depression and psychosis appear better than yesterday/admission but persist with noted impairment. He remains an unreliable historian- he is likely minimizing symptoms to secure discharge. He does appear to be improving and is approaching his previous baseline- he will be stable discharge home tomorrow and able to transition to outpatient care. HE reports willingness to continue his care with ACT.    He is more hopeful, future oriented and goal directed.       The patient denies any side effects to medications.          Psychiatric ROS (observed, reported, or endorsed/denied):  Depressed mood - denies  Interest/pleasure/anhedonia: denies  Guilt/hopelessness/worthlessness - denies  Changes in Sleep - denies  Changes in Appetite - denies  Changes in Concentration - denies  Changes in Energy - denies  PMA/R-  denies  Suicidal- active/passive ideations - denies  Homicidal ideations: active/passive ideations - denies    Hallucinations - denies  Delusions - denies  Disorganized behavior - denies  Disorganized speech - denies  Negative symptoms - denies    Elevated mood - denies  Decreased need for sleep - denies  Grandiosity - denies  Racing thoughts - denies  Impulsivity - denies  Irritability- denies  Increased energy - denies  Distractibility - denies  Increase in goal-directed activity or PMA- denies    Symptoms of KANU - denies  Symptoms of Panic Disorder- denies  Symptoms of PTSD - denies        Overall progress: Patient is showing moderate improvement        Psychotherapy:  Target symptoms: mood disorder, psychosis  Why chosen therapy is appropriate versus another modality: relevant to diagnosis, patient responds to this modality, evidence based practice  Outcome monitoring methods: self-report, observation  Therapeutic intervention type: insight oriented psychotherapy, behavior modifying psychotherapy, supportive psychotherapy, interactive psychotherapy  Topics discussed/themes: building skills sets for symptom management, symptom recognition  The patient's response to the intervention is accepting. The patient's progress toward treatment goals is limited.   Duration of intervention: 16 minutes.        Medical ROS  Constitutional: Negative.  Negative for chills and fever.   HENT: Negative.  Negative for hearing loss.    Eyes: Negative.  Negative for blurred vision and double vision.   Respiratory: Negative.  Negative for shortness of breath.    Cardiovascular: Negative.  Negative for chest pain and palpitations.   Gastrointestinal: Negative.  Negative for constipation, diarrhea, nausea and vomiting.   Genitourinary: Negative.  Negative for dysuria.   Musculoskeletal: Negative.  Negative for back pain and neck pain.   Skin: Negative.  Negative for rash.   Neurological: Negative.  Negative for dizziness and headaches.  "  Endo/Heme/Allergies: Negative.  Negative for environmental allergies.   Psychiatric/Behavioral:          See HPI       PAST MEDICAL HISTORY   Past Medical History:   Diagnosis Date    History of psychiatric hospitalization     Hx of psychiatric care     Psychiatric problem     Schizophrenia     Suicide attempt            PSYCHOTROPIC MEDICATIONS   Scheduled Meds:   carBAMazepine  200 mg Oral TID    famotidine  20 mg Oral BID    haloperidoL  5 mg Oral BID     Continuous Infusions:  PRN Meds:.melatonin, ondansetron        EXAMINATION    VITALS   Vitals:    04/03/23 1908 04/04/23 0803 04/04/23 1908 04/05/23 0835   BP: (!) 113/58 (!) 110/55 (!) 117/58 (!) 104/59   BP Location:  Left arm Left arm Left arm   Patient Position:  Sitting Sitting Sitting   Pulse: 63 68 69 81   Resp: 20 18 18 20   Temp: 98.2 °F (36.8 °C) 97.9 °F (36.6 °C) 98.5 °F (36.9 °C) 98.1 °F (36.7 °C)   TempSrc:  Oral Oral Oral   SpO2: 98% 98% 97% 96%   Weight:       Height:           Body mass index is 20.25 kg/m².    CONSTITUTIONAL  General Appearance: unremarkable, age appropriate, normal weight, disheveled     MUSCULOSKELETAL  Muscle Strength and Tone:no tremor, no tic  Abnormal Involuntary Movements: None  Gait and Station: non-ataxic     PSYCHIATRIC   Level of Consciousness: awake and alert   Orientation: person, place, time and situation  Grooming: Hospital garb, improving  Psychomotor Behavior: normal, cooperative; no PMA/R  Speech: normal tone, normal rate, normal pitch, normal volume  Language: grossly intact, able to name, able to repeat  Mood: "fine"  Affect: less Intense; Improving  Thought Process: less circumstantial, less racing; more linear and organized  Associations: intact   Thought Content: less delusions, denies SI, and denies HI  Perceptions: less observed RIS; improving  Memory: Able to recall past events, Remote intact, and Recent intact  Attention:Attends to interview without distraction  Fund of Knowledge: Aware of current " events and Vocabulary appropriate   Estimate if Intelligence:  Average based on work/education history, vocabulary and mental status exam  Insight: intact, limited awareness of illness- fair  Judgment: improving- less psychosis/carley - fair        DIAGNOSTIC TESTING   Laboratory Results  No results found for this or any previous visit (from the past 24 hour(s)).          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     Cannabis abuse     Psychosocial stressors     Elevated Liver Enzymes           PROBLEM LIST AND MANAGEMENT PLANS        Bipolar disorder, type I, MRE manic, severe with psychotic features: pt counseled  -Continue Aristada 1064mg due April 20, 2023  - resumed/continue Haldol 5 mg PO BID   -resume home Depakote ER at 750 mg qhs- stop given elevated liver enzymes; changed to tegretol  -Started/continue Tegretol 200 mg po TID- check level thursday  - follow up with outpatient mental health providers after discharge for medication management and psychotherapy     Unspecified anxiety disorder  - started/continue hydroxyzine 50 mg PO q 6 hours PRN  -tegretol off-label as above     Insomnia: pt counseled  -vistaril prn     Cannabis abuse: pt counseled  - SW consulted for assistance with additional resources         Psychosocial stressors  - pt counseled  - SW consulted for assistance with additional resources      Elevated Liver Enzymes: pt counseled  -stopping Depakote  -will recheck and trend out- check level tomorrow AM           Discussed diagnosis, risks and benefits of proposed treatment vs alternative treatments vs no treatment, potential side effects of these treatments and the inherent unpredictability of treatment. The patient expresses understanding of the above and displays the capacity to agree with this treatment given said understanding. Patient also agrees  that, currently, the benefits outweigh the risks and would like to pursue/continue treatment at this time.    Any medications being used off-label were discussed with the patient inclusive of the evidence base for the use of the medications and consent was obtained for the off-label use of the medication.       DISCHARGE PLANNING  Expected Disposition Plan: Home or Self Care-plan to discharge home tomorrow      NEED FOR CONTINUED HOSPITALIZATION  Psychiatric illness continues to pose a potential threat to life or bodily function, of self or others, thereby requiring the need for continued inpatient psychiatric hospitalization: Yes, due to: significant psychotic thought disorder, danger to self, gravely disabled, and suicidal ideation, as evidenced by:  Ongoing concerns with SI., Ongoing concerns with grave disability with patient unable to perform basic feeding, hygiene and dressing activities without significant constant support., and Ongoing concerns with perceptual aberrancy and paranoid persecutory delusions leading to potential harm of self or others.    Protective inpatient pyschiatric hospitalization required while a safe disposition plan is enacted: Yes    Patient stabilized and ready for discharge from inpatient psychiatric unit: No        STAFF:   Frantz Cagle MD  Psychiatry

## 2023-04-05 NOTE — NURSING
Pt. A/A.  No C/O pain.  Denies SI/HI/AVH currently.  Does have anxiety and depression.  Flat affect.  Compliant with medications with medications and follows instructions.  Will continue to observe.

## 2023-04-05 NOTE — PLAN OF CARE
Pt was only patient present for group. Pt and CSW had a 1:1. Pt and CSW discussed compliance with medication management and relationship with mother.

## 2023-04-05 NOTE — PLAN OF CARE
CSW spoke with pt's mother Nabil Soria [phone]  He wasn't on medication, only took shot.   He was told to stop haldol and depakote because it was messing with his liver but wasn't given any other medication    Shot isn't working he needs something to go along with the shot   He has started being back up all night   He was better but not himself    They did call a sleep medication in but he hasn't started it because he came to the hospital   [place] hasn't been out in the past three weeks   He  has been smoking marijuana I caught him

## 2023-04-06 VITALS
HEART RATE: 77 BPM | RESPIRATION RATE: 20 BRPM | WEIGHT: 162 LBS | BODY MASS INDEX: 20.14 KG/M2 | SYSTOLIC BLOOD PRESSURE: 124 MMHG | HEIGHT: 75 IN | OXYGEN SATURATION: 96 % | TEMPERATURE: 98 F | DIASTOLIC BLOOD PRESSURE: 76 MMHG

## 2023-04-06 LAB
ALBUMIN SERPL BCP-MCNC: 3.5 G/DL (ref 3.5–5.2)
ALP SERPL-CCNC: 58 U/L (ref 55–135)
ALT SERPL W/O P-5'-P-CCNC: 31 U/L (ref 10–44)
ANION GAP SERPL CALC-SCNC: 0 MMOL/L (ref 8–16)
AST SERPL-CCNC: 19 U/L (ref 10–40)
BILIRUB SERPL-MCNC: 0.3 MG/DL (ref 0.1–1)
BUN SERPL-MCNC: 15 MG/DL (ref 6–20)
CALCIUM SERPL-MCNC: 9.1 MG/DL (ref 8.7–10.5)
CARBAMAZEPINE SERPL-MCNC: 9.5 UG/ML (ref 4–12)
CHLORIDE SERPL-SCNC: 109 MMOL/L (ref 95–110)
CO2 SERPL-SCNC: 29 MMOL/L (ref 23–29)
CREAT SERPL-MCNC: 1.2 MG/DL (ref 0.5–1.4)
EST. GFR  (NO RACE VARIABLE): >60 ML/MIN/1.73 M^2
GLUCOSE SERPL-MCNC: 80 MG/DL (ref 70–110)
POTASSIUM SERPL-SCNC: 4.4 MMOL/L (ref 3.5–5.1)
PROT SERPL-MCNC: 7.1 G/DL (ref 6–8.4)
SODIUM SERPL-SCNC: 138 MMOL/L (ref 136–145)

## 2023-04-06 PROCEDURE — 90833 PR PSYCHOTHERAPY W/PATIENT W/E&M, 30 MIN (ADD ON): ICD-10-PCS | Mod: AF,HB,, | Performed by: PSYCHIATRY & NEUROLOGY

## 2023-04-06 PROCEDURE — 25000003 PHARM REV CODE 250: Performed by: PSYCHIATRY & NEUROLOGY

## 2023-04-06 PROCEDURE — 25000003 PHARM REV CODE 250: Performed by: INTERNAL MEDICINE

## 2023-04-06 PROCEDURE — 90833 PSYTX W PT W E/M 30 MIN: CPT | Mod: AF,HB,, | Performed by: PSYCHIATRY & NEUROLOGY

## 2023-04-06 PROCEDURE — 99239 PR HOSPITAL DISCHARGE DAY,>30 MIN: ICD-10-PCS | Mod: AF,HB,, | Performed by: PSYCHIATRY & NEUROLOGY

## 2023-04-06 PROCEDURE — 99239 HOSP IP/OBS DSCHRG MGMT >30: CPT | Mod: AF,HB,, | Performed by: PSYCHIATRY & NEUROLOGY

## 2023-04-06 PROCEDURE — 80156 ASSAY CARBAMAZEPINE TOTAL: CPT | Performed by: PSYCHIATRY & NEUROLOGY

## 2023-04-06 PROCEDURE — 80053 COMPREHEN METABOLIC PANEL: CPT | Performed by: PSYCHIATRY & NEUROLOGY

## 2023-04-06 PROCEDURE — 36415 COLL VENOUS BLD VENIPUNCTURE: CPT | Performed by: PSYCHIATRY & NEUROLOGY

## 2023-04-06 RX ORDER — CARBAMAZEPINE 200 MG/1
200 TABLET ORAL 3 TIMES DAILY
Qty: 90 TABLET | Refills: 2 | Status: ON HOLD | OUTPATIENT
Start: 2023-04-06 | End: 2023-05-09 | Stop reason: HOSPADM

## 2023-04-06 RX ORDER — HALOPERIDOL 5 MG/1
5 TABLET ORAL 2 TIMES DAILY
Qty: 60 TABLET | Refills: 2 | Status: ON HOLD | OUTPATIENT
Start: 2023-04-06 | End: 2023-05-09 | Stop reason: SDUPTHER

## 2023-04-06 RX ADMIN — CARBAMAZEPINE 200 MG: 200 TABLET ORAL at 08:04

## 2023-04-06 RX ADMIN — FAMOTIDINE 20 MG: 20 TABLET ORAL at 08:04

## 2023-04-06 RX ADMIN — HALOPERIDOL 5 MG: 5 TABLET ORAL at 08:04

## 2023-04-06 NOTE — NURSING
Patient was explained all discharge instructions and the patient verbalized an understanding of those instructions. Patient was given all personal belongings back upon departure. Patient was escorted off the unit and out of the hospital by a staff member.

## 2023-04-06 NOTE — PROGRESS NOTES
Psychotherapy:  Target symptoms: mood disorder, psychosis  Why chosen therapy is appropriate versus another modality: relevant to diagnosis, patient responds to this modality, evidence based practice  Outcome monitoring methods: self-report, observation  Therapeutic intervention type: insight oriented psychotherapy, behavior modifying psychotherapy, supportive psychotherapy, interactive psychotherapy  Topics discussed/themes: building skills sets for symptom management, symptom recognition  -safety planning and wrap up session  The patient's response to the intervention is accepting. The patient's progress toward treatment goals is fair.   Duration of intervention: 16 minutes.    Frantz Cagle MD  Psychiatry

## 2023-04-06 NOTE — DISCHARGE SUMMARY
"Discharge Summary  Psychiatry    Admit Date: 4/2/2023    Discharge Date and Time:  04/06/2023 9:07 AM    Attending Physician: Vinh Hardwick MD     Discharge Provider: Frantz Cagle MD    Reason for Admission:   psychosis    History of Present Illness:   The patient presented to the ER on 4/2/2023 . Per staff notes:  -Pt states that he was recently discharged from 7th floor and stated that the medicines are not working. Pt also stated "I'm hearing voices, like I killed a " pt also stated that he wants to self- harm, but has no idea  -22-year-old male with history schizophrenia multiple psychiatric admissions, suicide attempt in the past presents to the emergency department with suicide ideations, no plan, bizarre behavior, hearing voices "like I killed a ".  Last admission was in February for same issues  -The patient is [employer]. Patient is a 23yo AAM who presented to the ER with A/V hallucinations SI's with no plans. Multiple psychiatric admits.  Patient withdrawn, depressed, anxious, irritable, pacing, showing pressured speech, labile, and paranoid. Endorses Suicidal Ideation, Auditory Hallucinations, Visual Hallucinations, and Delusions. Denies Homicidal Ideation, Tactile Hallucinations, and Gustatory Hallucinations.    Patient assessed and oriented to room unit and routine. Patient denies pain or discomfort at present and remains injury-free.     The patient was medically cleared and admitted to the [facility].     The patient has a longstanding h/o Severe bipolar disorder with psychotic features complicated by significant psychosocial stressors and poor tx adherence. He was last treated here from 2/8-2/22/23 for psychosis/carley. He presented at that time with the following HPI:  -22-year-old male with history of schizoaffective not on medication presents with bizarre behavior.  According to EMS patient got into an argument with his mother and made some threats but proceeded to " "run away from the .  Patient does admit to having suicidal ideation without plan.  History otherwise limited due to medical condition  -[facility] admitted to [facility] under the care of Frantz Calge MD with diagnosis of schizoaffective D/O. The patient is PEC'ed. Patient is a 21 yo AAM who presented to the ER per [facility] for reportedly having bizarre behavior and SI's. Ater arrival to ER,pt was PEC'd,then pt eloped and was returned to ER per [facility]. UDS positive for THC. PT sedated per ER physician. Arrived to unit, sedated. Patient withdrawn, depressed, anxious, agitated, irritable, drowsy, sedated, manic, and paranoid. Endorses Suicidal Ideation and Delusions. Denies Homicidal Ideation, Auditory Hallucinations, Visual Hallucinations, Tactile Hallucinations, and Gustatory Hallucinations  -He reports that he had an argument with his family over wearing wigs. He also reports some severe issues regarding the musician Raul Murphy (a frequent delusion of his), but the details were presented non-coherently.   He was sedated from medications received prn secondary to psychotic agitation- this limited his participation.    Overall, his presentation is consistent with previous  hospitalization.     He was stabilized on Depakote 750 mg po BID, Abilify Aristada 1064 (next dose due on 4/20/23) and Haldol 5 mg po BID     He reports that he ran out pf his medications about 1 week ago. He reports hat he brought himself to the hospital to get back on his medications. He reported the the ER doctors that he was suicidal and hear voices.  -He is now denying all symptoms and reports that he "made it up" in order to guarantee his admission to the [facility] in order to resume his medications  -he was inappropriately focused on discharge  -he did not openly discuss his usual delusions- he required prompting then quickly miniimized issues.  -his presentation is consistent with previous admission, although he does appear somewhat better " today than at his last admission  -His UDS was positive for THC and benzos     Overall, his presentation is consistent with previous  hospitalization.         Symptoms of Depression: diminished mood - Yes, loss of interest/anhedonia - No;  recurrent - No, >14 days - Yes, diminished energy - No, change in sleep - Yes, change in appetite - Yes, diminished concentration or cognition or indecisiveness - Yes, PMA/R -  No, excessive guilt or hopelessness or worthlessness - Yes, suicidal ideations - No     Changes in Sleep: trouble with initiation- Yes, maintenance, - No early morning awakening with inability to return to sleep - No, hypersomnolence - No     Suicidal- active/passive ideations - No, organized plans, future intentions - No  +SI reported in the last 24 hours     Homicidal ideations: active/passive ideations - No, organized plans, future intentions - No     Symptoms of psychosis: hallucinations - Yes, delusions - Yes, disorganized speech - No, disorganized behavior or abnormal motor behavior - No, or negative symptoms (diminshed emotional expression, avolition, anhedonia, alogia, asociality) - No, active phase symptoms >1 month - No, continuous signs of illness > 6 months - No, since onset of illness decreased level of functioning present - No     Symptoms of carley or hypomania: elevated, expansive, or irritable mood with increased energy or activity - Yes; > 4 days - Yes,  >7 days - Yes; with inflated self-esteem or grandiosity - Yes, decreased need for sleep - No, increased rate of speech - No, FOI or racing thoughts - No, distractibility - Yes, increased goal directed activity or PMA - Yes, risky/disinhibited behavior - Yes     Symptoms of KANU: excessive anxiety/worry/fear, more days than not, about numerous issues - Yes, ongoing for >6 months - No, difficult to control - No, with restlessness - No, fatigue - No, poor concentration - No, irritability - No, muscle tension - No, sleep disturbance - No; causes  "functionally impairing distress - No     Symptoms of Panic Disorder: recurrent panic attacks (palpitations/heart racing, sweating, shakiness, dyspnea, choking, chest pain/discomfort, Gi symptoms, dizzy/lightheadedness, hot/col flashes, paresthesias, derealization, fear of losing control or fear of dying or fear of "going crazy") - No, precipitated - No, un-precipitated - No, source of worry and/or behavioral changes secondary for 1 month or longer- No, agoraphobia - No     Symptoms of PTSD: h/o trauma exposure - No; re-experiencing/intrusive symptoms - No, avoidant behavior - No, 2 or more negative alterations in cognition or mood - No, 2 or more hyperarousal symptoms - No; with dissociative symptoms - No, ongoing for 1 or more  months - No     Symptoms of OCD: obsessions (recurrent thoughts/urges/images; intrusive and/or unwanted; uses other thoughts/actions to suppress) - No; compulsions (repetitive behaviors used to lower distress/anxiety/obsessions) - No, time-consuming (over 1 hour per day) or cause significant distress/impairment - - No     Symptoms of Anorexia: restriction of caloric intake leading to significantly low body weight - No, intense fear of gaining weight or persistent behavior that interferes with weight gain even thought at a significantly low weight - No, disturbance in the way in which one's body weight or shape is experienced, undue influence of body weight or shape on self evaluation, or persistent lack of recognition of the seriousness of the current low body weight - No     Symptoms of Bulimia: recurrent episodes of binge eating (definitely larger amount  than what others would eat and lack of a sense of control over eating during episode) - No, recurrent inappropriate compensatory behaviors in order to prevent weight gain (fasting, medications, exercise, vomiting) - No, binges and compensatory behaviors both occur on average at least once a week for 3 months - No, self evaluations is " unduly influenced by body shape/weight- - No     Symptoms of Binge eating: recurrent episodes of binge eating (definitely larger amount than what others would eat and lack of a sense of control over eating during episode) - No, 3 or more of following (eating much more rapidly, eating until uncomfortably full, large amounts when not hungry, eating alone because of embarrassed by how much,  feeling disgusted with oneself, depressed or very guilty afterward) - No, distress regarding binges - No, binges occur on average at least once a week for 3 months - No       Procedures Performed: * No surgery found *    Hospital Course:    Patient was admitted to the inpatient psychiatry unit after being medically cleared in the ED. Chart and labs were reviewed. The patient was stabilized as follows:      Bipolar disorder, type I, MRE manic, severe with psychotic features: pt counseled  -Continue Aristada 1064mg due April 20, 2023  - resumed/continue Haldol 5 mg PO BID   -resumed home Depakote ER at 750 mg qhs- stopped given elevated liver enzymes; changed to tegretol  -Started/continue Tegretol 200 mg po TID- check level- recheck level in 1 week  - follow up with outpatient mental health providers after discharge for medication management and psychotherapy     Unspecified anxiety disorder  - started/continue hydroxyzine 50 mg PO q 6 hours PRN  -tegretol off-label as above     Insomnia: pt counseled  -vistaril prn     Cannabis abuse: pt counseled  - SW consulted for assistance with additional resources         Psychosocial stressors  - pt counseled  - SW consulted for assistance with additional resources      Elevated Liver Enzymes: pt counseled  -stopping Depakote  -will recheck and trend out- checked level- resolved since stopping depakote           During hospitalization, the patient was encouraged to go to both groups and individual counseling. Patient was monitored for any side effects. A meeting was held with  multidisciplinary team prior to discharge and pt's diagnosis, current medications, and follow up were discussed. The patient has been compliant with treatment and can adequately attend to activities of daily living in an independent manner. The patient denies any side effects. The patient denies SI, HI, plan or intent for self harm or harm to others. The patient is no longer a danger to self or others nor gravely disabled disabled. Patient discharged  in stable condition with scheduled outpatient follow up.    The patient reports improved symptoms as documented below. The patient is requesting discharge. The patient is currently stable for discharge home and is able/willing to attend outpatient care. The patient is hopeful, future oriented and goal directed. The patient readily discusses both short and long term goals. The patient can identify positive coping skills and social support.     AIMS score was 0    Psychiatric ROS (observed, reported, or endorsed/denied):  Depressed mood - improved  Interest/pleasure/anhedonia: No  Guilt/hopelessness/worthlessness - No  Changes in Sleep - No  Changes in Appetite - No  Changes in Concentration - No  Changes in Energy - No  PMA/R- No  Suicidal- active/passive ideations - No  Homicidal ideations: active/passive ideations - No     Hallucinations -improved  Delusions - improving steadily  Disorganized behavior - improved  Disorganized speech - improved  Negative symptoms - Improved     Elevated mood - improved  Decreased need for sleep - No  Grandiosity - improved  Racing thoughts - improved  Impulsivity - No  Irritability- No  Increased energy - No  Distractibility - No  Increase in goal-directed activity or PMA- No     Symptoms of KANU - improved  Symptoms of Panic Disorder- No  Symptoms of PTSD - No      Discussed diagnosis, risks and benefits of proposed treatment vs alternative treatments vs no treatment, and potential side effects of these treatments.  The patient  expresses understanding of the above and displays the capacity to agree with this treatment given said understanding.  Patient also agrees that, currently, the benefits outweigh the risks and would like to pursue treatment at this time.      Discharge MSE: stated age, casually dressed, well groomed.  No psychomotor agitation or retardation.  No abnormal involuntary movements.  Gait normal.  Speech normal, conversational.  Language fluent English. Mood fine.  Affect normal range, pleasant, euthymic.  Thought process linear.  Associations intact.  Denies suicidal or homicidal ideation.  Denies auditory hallucinations, paranoid ideation, ideas of reference.  Memory intact.  Attention intact.  Fund of knowledge intact.  Insight intact.  Judgment intact.  Alert and oriented to person, place, time.      Tobacco Usage:  Is patient a smoker? No  Does patient want prescription for Tobacco Cessation? No  Does patient want counseling for Tobacco Cessation? No    If patient would like to quit, then over the counter nicotine patch could be used. The patient could also follow up with his PCP or psychiatric provider for other alternatives.     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    Cannabis abuse     Psychosocial stressors     Elevated Liver Enzymes       Labs:  Admission on 04/02/2023   Component Date Value Ref Range Status    WBC 04/02/2023 6.68  3.90 - 12.70 K/uL Final    RBC 04/02/2023 4.17 (L)  4.60 - 6.20 M/uL Final    Hemoglobin 04/02/2023 13.4 (L)  14.0 - 18.0 g/dL Final    Hematocrit 04/02/2023 40.7  40.0 - 54.0 % Final    MCV 04/02/2023 98  82 - 98 fL Final    MCH 04/02/2023 32.1 (H)  27.0 - 31.0 pg Final    MCHC 04/02/2023 32.9  32.0 - 36.0 g/dL Final    RDW 04/02/2023 12.6  11.5 - 14.5 % Final     Platelets 04/02/2023 233  150 - 450 K/uL Final    MPV 04/02/2023 9.7  9.2 - 12.9 fL Final    Immature Granulocytes 04/02/2023 0.3  0.0 - 0.5 % Final    Gran # (ANC) 04/02/2023 3.8  1.8 - 7.7 K/uL Final    Immature Grans (Abs) 04/02/2023 0.02  0.00 - 0.04 K/uL Final    Lymph # 04/02/2023 1.9  1.0 - 4.8 K/uL Final    Mono # 04/02/2023 0.8  0.3 - 1.0 K/uL Final    Eos # 04/02/2023 0.1  0.0 - 0.5 K/uL Final    Baso # 04/02/2023 0.02  0.00 - 0.20 K/uL Final    nRBC 04/02/2023 0  0 /100 WBC Final    Gran % 04/02/2023 57.5  38.0 - 73.0 % Final    Lymph % 04/02/2023 28.9  18.0 - 48.0 % Final    Mono % 04/02/2023 11.2  4.0 - 15.0 % Final    Eosinophil % 04/02/2023 1.8  0.0 - 8.0 % Final    Basophil % 04/02/2023 0.3  0.0 - 1.9 % Final    Differential Method 04/02/2023 Automated   Final    Sodium 04/02/2023 140  136 - 145 mmol/L Final    Potassium 04/02/2023 3.9  3.5 - 5.1 mmol/L Final    Chloride 04/02/2023 109  95 - 110 mmol/L Final    CO2 04/02/2023 28  23 - 29 mmol/L Final    Glucose 04/02/2023 110  70 - 110 mg/dL Final    BUN 04/02/2023 17  6 - 20 mg/dL Final    Creatinine 04/02/2023 1.4  0.5 - 1.4 mg/dL Final    Calcium 04/02/2023 9.0  8.7 - 10.5 mg/dL Final    Total Protein 04/02/2023 8.0  6.0 - 8.4 g/dL Final    Albumin 04/02/2023 4.1  3.5 - 5.2 g/dL Final    Total Bilirubin 04/02/2023 0.5  0.1 - 1.0 mg/dL Final    Alkaline Phosphatase 04/02/2023 67  55 - 135 U/L Final    AST 04/02/2023 42 (H)  10 - 40 U/L Final    ALT 04/02/2023 56 (H)  10 - 44 U/L Final    Anion Gap 04/02/2023 3 (L)  8 - 16 mmol/L Final    eGFR 04/02/2023 >60.0  >60 mL/min/1.73 m^2 Final    TSH 04/02/2023 1.120  0.400 - 4.000 uIU/mL Final    Specimen UA 04/02/2023 Urine, Clean Catch   Final    Color, UA 04/02/2023 Yellow  Yellow, Straw, Ingrid Final    Appearance, UA 04/02/2023 Clear  Clear Final    pH, UA 04/02/2023 6.0  5.0 - 8.0 Final    Specific Gravity, UA 04/02/2023 >=1.030 (A)  1.005 - 1.030 Final    Protein, UA 04/02/2023 Trace (A)  Negative  Final    Glucose, UA 04/02/2023 Negative  Negative Final    Ketones, UA 04/02/2023 Trace (A)  Negative Final    Bilirubin (UA) 04/02/2023 Negative  Negative Final    Occult Blood UA 04/02/2023 Negative  Negative Final    Nitrite, UA 04/02/2023 Negative  Negative Final    Urobilinogen, UA 04/02/2023 1.0  <2.0 EU/dL Final    Leukocytes, UA 04/02/2023 Negative  Negative Final    Benzodiazepines 04/02/2023 Presumptive Positive (A)  Negative Final    Methadone metabolites 04/02/2023 Negative  Negative Final    Cocaine (Metab.) 04/02/2023 Negative  Negative Final    Opiate Scrn, Ur 04/02/2023 Negative  Negative Final    Barbiturate Screen, Ur 04/02/2023 Negative  Negative Final    Amphetamine Screen, Ur 04/02/2023 Negative  Negative Final    THC 04/02/2023 Presumptive Positive (A)  Negative Final    Phencyclidine 04/02/2023 Negative  Negative Final    Creatinine, Urine 04/02/2023 586.0 (H)  23.0 - 375.0 mg/dL Final    Toxicology Information 04/02/2023 SEE COMMENT   Final    Alcohol, Serum 04/02/2023 <3  <10 mg/dL Final    Acetaminophen (Tylenol), Serum 04/02/2023 <2.0 (L)  10.0 - 20.0 ug/mL Final    Sodium 04/06/2023 138  136 - 145 mmol/L Final    Potassium 04/06/2023 4.4  3.5 - 5.1 mmol/L Final    Chloride 04/06/2023 109  95 - 110 mmol/L Final    CO2 04/06/2023 29  23 - 29 mmol/L Final    Glucose 04/06/2023 80  70 - 110 mg/dL Final    BUN 04/06/2023 15  6 - 20 mg/dL Final    Creatinine 04/06/2023 1.2  0.5 - 1.4 mg/dL Final    Calcium 04/06/2023 9.1  8.7 - 10.5 mg/dL Final    Total Protein 04/06/2023 7.1  6.0 - 8.4 g/dL Final    Albumin 04/06/2023 3.5  3.5 - 5.2 g/dL Final    Total Bilirubin 04/06/2023 0.3  0.1 - 1.0 mg/dL Final    Alkaline Phosphatase 04/06/2023 58  55 - 135 U/L Final    AST 04/06/2023 19  10 - 40 U/L Final    ALT 04/06/2023 31  10 - 44 U/L Final    Anion Gap 04/06/2023 0 (L)  8 - 16 mmol/L Final    eGFR 04/06/2023 >60.0  >60 mL/min/1.73 m^2 Final         Discharged Condition: stable and improved; not  currently a danger to self/others or gravely disabled    Disposition: Home or Self Care    Is patient being discharged on multiple neuroleptics? Yes  3 failed multiple trials of monotherapy (Please list the failed medications) risperdal, zyprexa, seroquel and others    Follow Up/Patient Instructions:     Take all medications as prescribed.  Attend all psychiatric and medical follow up appointments.   Abstain from all drugs and alcohol.  Call the crisis line at: [phone] for help in a crisis and emergent situations or call 911 and Return to ED for any acute worsening of your condition including suicidal or homicidal ideations      No discharge procedures on file.        Follow up apt: local [facility] [place]- see SW/discharge notes      Medications:  Reconciled Home Medications:      Medication List        START taking these medications      carBAMazepine 200 mg tablet  Commonly known as: TEGRETOL  Take 1 tablet (200 mg total) by mouth 3 (three) times daily.            CONTINUE taking these medications      haloperidoL 5 MG tablet  Commonly known as: HALDOL  Take 1 tablet (5 mg total) by mouth 2 (two) times daily.            STOP taking these medications      divalproex 500 MG Tb24     hydrOXYzine pamoate 25 MG Cap  Commonly known as: VISTARIL                Diet: regular     Activity as tolerated    Total time spent discharging patient: 35 minutes    Frantz Cagle MD  Psychiatry

## 2023-04-06 NOTE — PLAN OF CARE
POC reviewed this shift and is on going. Patient is calm, cooperative, quiet, and showing pressured speech. Does not Endorse Suicidal Ideation, Homicidal Ideation, and Visual Hallucinations.however patient have been responding to internal stimuli,as evident by laughing out loud inappropriately,  have been isolated in room,withdrawn,and preoccupied, Pt continues to be medication compliant and staff will continue to monitor pt closely while on the unit. Cont.care as per treatment plan.

## 2023-04-20 ENCOUNTER — HOSPITAL ENCOUNTER (EMERGENCY)
Facility: HOSPITAL | Age: 23
Discharge: HOME | End: 2023-04-20
Attending: EMERGENCY MEDICINE
Payer: MEDICAID

## 2023-04-20 VITALS
HEART RATE: 100 BPM | BODY MASS INDEX: 20.14 KG/M2 | HEIGHT: 75 IN | SYSTOLIC BLOOD PRESSURE: 128 MMHG | OXYGEN SATURATION: 96 % | WEIGHT: 162 LBS | DIASTOLIC BLOOD PRESSURE: 62 MMHG | TEMPERATURE: 98 F | RESPIRATION RATE: 18 BRPM

## 2023-04-20 DIAGNOSIS — F20.9 SCHIZOPHRENIA, UNSPECIFIED TYPE: Primary | ICD-10-CM

## 2023-04-20 PROCEDURE — 99284 EMERGENCY DEPT VISIT MOD MDM: CPT

## 2023-04-20 PROCEDURE — 63600175 PHARM REV CODE 636 W HCPCS: Performed by: EMERGENCY MEDICINE

## 2023-04-20 PROCEDURE — 96372 THER/PROPH/DIAG INJ SC/IM: CPT | Performed by: EMERGENCY MEDICINE

## 2023-04-20 RX ORDER — HALOPERIDOL 5 MG/ML
10 INJECTION INTRAMUSCULAR
Status: COMPLETED | OUTPATIENT
Start: 2023-04-20 | End: 2023-04-20

## 2023-04-20 RX ORDER — DIPHENHYDRAMINE HYDROCHLORIDE 50 MG/ML
50 INJECTION INTRAMUSCULAR; INTRAVENOUS
Status: COMPLETED | OUTPATIENT
Start: 2023-04-20 | End: 2023-04-20

## 2023-04-20 RX ADMIN — DIPHENHYDRAMINE HYDROCHLORIDE 50 MG: 50 INJECTION, SOLUTION INTRAMUSCULAR; INTRAVENOUS at 07:04

## 2023-04-20 RX ADMIN — HALOPERIDOL LACTATE 10 MG: 5 INJECTION, SOLUTION INTRAMUSCULAR at 07:04

## 2023-04-21 NOTE — ED PROVIDER NOTES
Encounter Date: 4/20/2023       History     Chief Complaint   Patient presents with    Mental Health Problem     Pt brought via ems, history of cristi     22-year-old male with history of psychiatric issues in the past, schizophrenia here here multiple times for multiple admits for multiple different reasons, here because he was running away from EMS personnel after his mother called.  History of this multiple times in the past.  Mother states he is not taking his medications.  He denies SI, denies HI    Review of patient's allergies indicates:  No Known Allergies  Past Medical History:   Diagnosis Date    History of psychiatric hospitalization     Hx of psychiatric care     Psychiatric problem     Schizophrenia     Suicide attempt      No past surgical history on file.  Family History   Family history unknown: Yes     Social History     Tobacco Use    Smoking status: Every Day     Types: Cigars     Passive exposure: Never    Smokeless tobacco: Never   Substance Use Topics    Alcohol use: Yes     Comment: pt reports drinking wine, two-four times monthly or less    Drug use: Yes     Types: Marijuana, Benzodiazepines     Comment: pt reports taking benzo within the past few weeks to help sleep     Review of Systems   Constitutional:  Negative for fever.   HENT:  Negative for sore throat.    Respiratory:  Negative for shortness of breath.    Cardiovascular:  Negative for chest pain.   Gastrointestinal:  Negative for nausea.   Genitourinary:  Negative for dysuria.   Musculoskeletal:  Negative for back pain.   Skin:  Negative for rash.   Neurological:  Negative for weakness.   Hematological:  Does not bruise/bleed easily.   All other systems reviewed and are negative.    Physical Exam     Initial Vitals   BP Pulse Resp Temp SpO2   -- -- -- -- --      MAP       --         Physical Exam    Nursing note and vitals reviewed.  Constitutional: He appears well-developed and well-nourished. He is not diaphoretic. No distress.    HENT:   Head: Normocephalic and atraumatic.   Eyes: Conjunctivae and EOM are normal. Pupils are equal, round, and reactive to light. Right eye exhibits no discharge. Left eye exhibits no discharge. No scleral icterus.   Neck: Neck supple. No JVD present.   Normal range of motion.  Cardiovascular:  Normal rate, regular rhythm, normal heart sounds and intact distal pulses.           No murmur heard.  Pulmonary/Chest: Breath sounds normal. No stridor. No respiratory distress. He has no wheezes. He has no rhonchi. He has no rales. He exhibits no tenderness.   Abdominal: Abdomen is soft. Bowel sounds are normal. He exhibits no distension and no mass. There is no abdominal tenderness. There is no rebound and no guarding.   Musculoskeletal:         General: No tenderness or edema. Normal range of motion.      Cervical back: Normal range of motion and neck supple.     Neurological: He is alert and oriented to person, place, and time. He has normal strength. GCS score is 15. GCS eye subscore is 4. GCS verbal subscore is 5. GCS motor subscore is 6.   Skin: Skin is warm and dry. Capillary refill takes less than 2 seconds.   Psychiatric:   No SI, no HI, pleasant and polite       ED Course   Procedures  Labs Reviewed - No data to display       Imaging Results    None          Medications   haloperidol lactate injection 10 mg (has no administration in time range)   diphenhydrAMINE injection 50 mg (has no administration in time range)     Medical Decision Making:   Differential Diagnosis:   Schizophrenia           ED Course as of 04/20/23 1920   Thu Apr 20, 2023 1918 Patient is not a threat to himself or others at this time, no need for a pec.  No SI, no HI.  This is a chronic issue, needs to follow up with his primary care physician or his mental health advocate [SD]      ED Course User Index  [SD] Fan Chao MD                 Clinical Impression:   Final diagnoses:  [F20.9] Schizophrenia, unspecified type (Primary)         ED Disposition Condition    Discharge Stable          ED Prescriptions    None       Follow-up Information       Follow up With Specialties Details Why Contact Info Additional Information    Primary care physician  In 2 days       [facility] Emergency Department Emergency Medicine  As needed [facility] [facility] [id number] [phone] Floor 1             Fan Chao MD  04/20/23 1918       Fan Chao MD  04/20/23 1920

## 2023-05-01 ENCOUNTER — HOSPITAL ENCOUNTER (INPATIENT)
Facility: HOSPITAL | Age: 23
LOS: 8 days | Discharge: HOME | DRG: 885 | End: 2023-05-09
Attending: STUDENT IN AN ORGANIZED HEALTH CARE EDUCATION/TRAINING PROGRAM | Admitting: PSYCHIATRY & NEUROLOGY
Payer: MEDICAID

## 2023-05-01 DIAGNOSIS — R44.3 HALLUCINATION: ICD-10-CM

## 2023-05-01 DIAGNOSIS — F25.0 SCHIZOAFFECTIVE DISORDER, BIPOLAR TYPE: Primary | ICD-10-CM

## 2023-05-01 LAB
ALBUMIN SERPL BCP-MCNC: 3.9 G/DL (ref 3.5–5.2)
ALP SERPL-CCNC: 70 U/L (ref 55–135)
ALT SERPL W/O P-5'-P-CCNC: 42 U/L (ref 10–44)
AMPHET+METHAMPHET UR QL: NEGATIVE
ANION GAP SERPL CALC-SCNC: 4 MMOL/L (ref 8–16)
APAP SERPL-MCNC: <2 UG/ML (ref 10–20)
AST SERPL-CCNC: 22 U/L (ref 10–40)
BARBITURATES UR QL SCN>200 NG/ML: NEGATIVE
BASOPHILS # BLD AUTO: 0.01 K/UL (ref 0–0.2)
BASOPHILS NFR BLD: 0.1 % (ref 0–1.9)
BENZODIAZ UR QL SCN>200 NG/ML: NEGATIVE
BILIRUB SERPL-MCNC: 0.3 MG/DL (ref 0.1–1)
BILIRUB UR QL STRIP: NEGATIVE
BUN SERPL-MCNC: 18 MG/DL (ref 6–20)
BZE UR QL SCN: NEGATIVE
CALCIUM SERPL-MCNC: 9.1 MG/DL (ref 8.7–10.5)
CANNABINOIDS UR QL SCN: ABNORMAL
CHLORIDE SERPL-SCNC: 111 MMOL/L (ref 95–110)
CLARITY UR: CLEAR
CO2 SERPL-SCNC: 28 MMOL/L (ref 23–29)
COLOR UR: YELLOW
CREAT SERPL-MCNC: 1.2 MG/DL (ref 0.5–1.4)
CREAT UR-MCNC: 489 MG/DL (ref 23–375)
DIFFERENTIAL METHOD: ABNORMAL
EOSINOPHIL # BLD AUTO: 0.1 K/UL (ref 0–0.5)
EOSINOPHIL NFR BLD: 1.5 % (ref 0–8)
ERYTHROCYTE [DISTWIDTH] IN BLOOD BY AUTOMATED COUNT: 12.6 % (ref 11.5–14.5)
EST. GFR  (NO RACE VARIABLE): >60 ML/MIN/1.73 M^2
ETHANOL SERPL-MCNC: <3 MG/DL
GLUCOSE SERPL-MCNC: 101 MG/DL (ref 70–110)
GLUCOSE UR QL STRIP: NEGATIVE
HCT VFR BLD AUTO: 40.7 % (ref 40–54)
HGB BLD-MCNC: 13.3 G/DL (ref 14–18)
HGB UR QL STRIP: NEGATIVE
IMM GRANULOCYTES # BLD AUTO: 0.01 K/UL (ref 0–0.04)
IMM GRANULOCYTES NFR BLD AUTO: 0.1 % (ref 0–0.5)
KETONES UR QL STRIP: ABNORMAL
LEUKOCYTE ESTERASE UR QL STRIP: NEGATIVE
LYMPHOCYTES # BLD AUTO: 3.7 K/UL (ref 1–4.8)
LYMPHOCYTES NFR BLD: 50.1 % (ref 18–48)
MCH RBC QN AUTO: 31.8 PG (ref 27–31)
MCHC RBC AUTO-ENTMCNC: 32.7 G/DL (ref 32–36)
MCV RBC AUTO: 97 FL (ref 82–98)
METHADONE UR QL SCN>300 NG/ML: NEGATIVE
MONOCYTES # BLD AUTO: 0.7 K/UL (ref 0.3–1)
MONOCYTES NFR BLD: 8.9 % (ref 4–15)
NEUTROPHILS # BLD AUTO: 2.9 K/UL (ref 1.8–7.7)
NEUTROPHILS NFR BLD: 39.3 % (ref 38–73)
NITRITE UR QL STRIP: NEGATIVE
NRBC BLD-RTO: 0 /100 WBC
OPIATES UR QL SCN: NEGATIVE
PCP UR QL SCN>25 NG/ML: NEGATIVE
PH UR STRIP: 6 [PH] (ref 5–8)
PLATELET # BLD AUTO: 274 K/UL (ref 150–450)
PMV BLD AUTO: 9.2 FL (ref 9.2–12.9)
POTASSIUM SERPL-SCNC: 3.9 MMOL/L (ref 3.5–5.1)
PROT SERPL-MCNC: 7.8 G/DL (ref 6–8.4)
PROT UR QL STRIP: ABNORMAL
RBC # BLD AUTO: 4.18 M/UL (ref 4.6–6.2)
SALICYLATES SERPL-MCNC: 2.6 MG/DL (ref 15–30)
SODIUM SERPL-SCNC: 143 MMOL/L (ref 136–145)
SP GR UR STRIP: >=1.03 (ref 1–1.03)
TOXICOLOGY INFORMATION: ABNORMAL
TSH SERPL DL<=0.005 MIU/L-ACNC: 1.66 UIU/ML (ref 0.4–4)
URN SPEC COLLECT METH UR: ABNORMAL
UROBILINOGEN UR STRIP-ACNC: 1 EU/DL
WBC # BLD AUTO: 7.4 K/UL (ref 3.9–12.7)

## 2023-05-01 PROCEDURE — 36415 COLL VENOUS BLD VENIPUNCTURE: CPT | Performed by: STUDENT IN AN ORGANIZED HEALTH CARE EDUCATION/TRAINING PROGRAM

## 2023-05-01 PROCEDURE — 80061 LIPID PANEL: CPT | Performed by: PSYCHIATRY & NEUROLOGY

## 2023-05-01 PROCEDURE — 82077 ASSAY SPEC XCP UR&BREATH IA: CPT | Performed by: STUDENT IN AN ORGANIZED HEALTH CARE EDUCATION/TRAINING PROGRAM

## 2023-05-01 PROCEDURE — 83036 HEMOGLOBIN GLYCOSYLATED A1C: CPT | Performed by: PSYCHIATRY & NEUROLOGY

## 2023-05-01 PROCEDURE — 80143 DRUG ASSAY ACETAMINOPHEN: CPT | Performed by: STUDENT IN AN ORGANIZED HEALTH CARE EDUCATION/TRAINING PROGRAM

## 2023-05-01 PROCEDURE — 11400000 HC PSYCH PRIVATE ROOM

## 2023-05-01 PROCEDURE — 85025 COMPLETE CBC W/AUTO DIFF WBC: CPT | Performed by: STUDENT IN AN ORGANIZED HEALTH CARE EDUCATION/TRAINING PROGRAM

## 2023-05-01 PROCEDURE — 81003 URINALYSIS AUTO W/O SCOPE: CPT | Performed by: STUDENT IN AN ORGANIZED HEALTH CARE EDUCATION/TRAINING PROGRAM

## 2023-05-01 PROCEDURE — 80179 DRUG ASSAY SALICYLATE: CPT | Performed by: STUDENT IN AN ORGANIZED HEALTH CARE EDUCATION/TRAINING PROGRAM

## 2023-05-01 PROCEDURE — 80307 DRUG TEST PRSMV CHEM ANLYZR: CPT | Performed by: STUDENT IN AN ORGANIZED HEALTH CARE EDUCATION/TRAINING PROGRAM

## 2023-05-01 PROCEDURE — 84443 ASSAY THYROID STIM HORMONE: CPT | Performed by: STUDENT IN AN ORGANIZED HEALTH CARE EDUCATION/TRAINING PROGRAM

## 2023-05-01 PROCEDURE — 80053 COMPREHEN METABOLIC PANEL: CPT | Performed by: STUDENT IN AN ORGANIZED HEALTH CARE EDUCATION/TRAINING PROGRAM

## 2023-05-01 PROCEDURE — 99285 EMERGENCY DEPT VISIT HI MDM: CPT

## 2023-05-02 LAB
CHOLEST SERPL-MCNC: 157 MG/DL (ref 120–199)
CHOLEST/HDLC SERPL: 2.5 {RATIO} (ref 2–5)
ESTIMATED AVG GLUCOSE: 97 MG/DL (ref 68–131)
HBA1C MFR BLD: 5 % (ref 4–5.6)
HDLC SERPL-MCNC: 64 MG/DL (ref 40–75)
HDLC SERPL: 40.8 % (ref 20–50)
LDLC SERPL CALC-MCNC: 80.6 MG/DL (ref 63–159)
NONHDLC SERPL-MCNC: 93 MG/DL
TRIGL SERPL-MCNC: 62 MG/DL (ref 30–150)

## 2023-05-02 PROCEDURE — 25000003 PHARM REV CODE 250: Performed by: PSYCHIATRY & NEUROLOGY

## 2023-05-02 PROCEDURE — 90833 PSYTX W PT W E/M 30 MIN: CPT | Mod: AF,HB,, | Performed by: PSYCHIATRY & NEUROLOGY

## 2023-05-02 PROCEDURE — 90833 PR PSYCHOTHERAPY W/PATIENT W/E&M, 30 MIN (ADD ON): ICD-10-PCS | Mod: AF,HB,, | Performed by: PSYCHIATRY & NEUROLOGY

## 2023-05-02 PROCEDURE — 36415 COLL VENOUS BLD VENIPUNCTURE: CPT | Performed by: PSYCHIATRY & NEUROLOGY

## 2023-05-02 PROCEDURE — 99223 1ST HOSP IP/OBS HIGH 75: CPT | Mod: AF,HB,, | Performed by: PSYCHIATRY & NEUROLOGY

## 2023-05-02 PROCEDURE — 99223 PR INITIAL HOSPITAL CARE,LEVL III: ICD-10-PCS | Mod: AF,HB,, | Performed by: PSYCHIATRY & NEUROLOGY

## 2023-05-02 PROCEDURE — 25000003 PHARM REV CODE 250: Performed by: INTERNAL MEDICINE

## 2023-05-02 PROCEDURE — 11400000 HC PSYCH PRIVATE ROOM

## 2023-05-02 RX ORDER — LOPERAMIDE HYDROCHLORIDE 2 MG/1
2 CAPSULE ORAL 4 TIMES DAILY PRN
Status: DISCONTINUED | OUTPATIENT
Start: 2023-05-02 | End: 2023-05-02

## 2023-05-02 RX ORDER — OLANZAPINE 10 MG/2ML
10 INJECTION, POWDER, FOR SOLUTION INTRAMUSCULAR EVERY 8 HOURS PRN
Status: DISCONTINUED | OUTPATIENT
Start: 2023-05-02 | End: 2023-05-09 | Stop reason: HOSPADM

## 2023-05-02 RX ORDER — CARBAMAZEPINE 200 MG/1
400 TABLET ORAL 2 TIMES DAILY
Status: DISCONTINUED | OUTPATIENT
Start: 2023-05-02 | End: 2023-05-09 | Stop reason: HOSPADM

## 2023-05-02 RX ORDER — OLANZAPINE 10 MG/2ML
10 INJECTION, POWDER, FOR SOLUTION INTRAMUSCULAR EVERY 8 HOURS PRN
Status: DISCONTINUED | OUTPATIENT
Start: 2023-05-02 | End: 2023-05-02

## 2023-05-02 RX ORDER — MAG HYDROX/ALUMINUM HYD/SIMETH 200-200-20
30 SUSPENSION, ORAL (FINAL DOSE FORM) ORAL EVERY 6 HOURS PRN
Status: DISCONTINUED | OUTPATIENT
Start: 2023-05-02 | End: 2023-05-02

## 2023-05-02 RX ORDER — OLANZAPINE 10 MG/1
10 TABLET ORAL EVERY 8 HOURS PRN
Status: DISCONTINUED | OUTPATIENT
Start: 2023-05-02 | End: 2023-05-09 | Stop reason: HOSPADM

## 2023-05-02 RX ORDER — HYDROXYZINE PAMOATE 50 MG/1
50 CAPSULE ORAL NIGHTLY PRN
Status: DISCONTINUED | OUTPATIENT
Start: 2023-05-02 | End: 2023-05-02

## 2023-05-02 RX ORDER — OLANZAPINE 10 MG/1
10 TABLET ORAL EVERY 8 HOURS PRN
Status: DISCONTINUED | OUTPATIENT
Start: 2023-05-02 | End: 2023-05-02

## 2023-05-02 RX ORDER — MAG HYDROX/ALUMINUM HYD/SIMETH 200-200-20
30 SUSPENSION, ORAL (FINAL DOSE FORM) ORAL EVERY 6 HOURS PRN
Status: DISCONTINUED | OUTPATIENT
Start: 2023-05-02 | End: 2023-05-09 | Stop reason: HOSPADM

## 2023-05-02 RX ORDER — ONDANSETRON 4 MG/1
4 TABLET, ORALLY DISINTEGRATING ORAL EVERY 8 HOURS PRN
Status: DISCONTINUED | OUTPATIENT
Start: 2023-05-02 | End: 2023-05-09 | Stop reason: HOSPADM

## 2023-05-02 RX ORDER — ACETAMINOPHEN 325 MG/1
650 TABLET ORAL EVERY 6 HOURS PRN
Status: DISCONTINUED | OUTPATIENT
Start: 2023-05-02 | End: 2023-05-09 | Stop reason: HOSPADM

## 2023-05-02 RX ORDER — ACETAMINOPHEN 325 MG/1
650 TABLET ORAL EVERY 6 HOURS PRN
Status: DISCONTINUED | OUTPATIENT
Start: 2023-05-02 | End: 2023-05-02

## 2023-05-02 RX ORDER — DOCUSATE SODIUM 100 MG/1
100 CAPSULE, LIQUID FILLED ORAL DAILY PRN
Status: DISCONTINUED | OUTPATIENT
Start: 2023-05-02 | End: 2023-05-02

## 2023-05-02 RX ORDER — PROMETHAZINE HYDROCHLORIDE 12.5 MG/1
25 TABLET ORAL EVERY 6 HOURS PRN
Status: DISCONTINUED | OUTPATIENT
Start: 2023-05-02 | End: 2023-05-09 | Stop reason: HOSPADM

## 2023-05-02 RX ORDER — IBUPROFEN 200 MG
1 TABLET ORAL DAILY
Status: DISCONTINUED | OUTPATIENT
Start: 2023-05-02 | End: 2023-05-02

## 2023-05-02 RX ORDER — IBUPROFEN 200 MG
1 TABLET ORAL DAILY PRN
Status: DISCONTINUED | OUTPATIENT
Start: 2023-05-02 | End: 2023-05-02

## 2023-05-02 RX ORDER — LOPERAMIDE HYDROCHLORIDE 2 MG/1
2 CAPSULE ORAL
Status: DISCONTINUED | OUTPATIENT
Start: 2023-05-02 | End: 2023-05-09 | Stop reason: HOSPADM

## 2023-05-02 RX ORDER — IBUPROFEN 200 MG
1 TABLET ORAL DAILY PRN
Status: DISCONTINUED | OUTPATIENT
Start: 2023-05-02 | End: 2023-05-09 | Stop reason: HOSPADM

## 2023-05-02 RX ORDER — HYDROXYZINE PAMOATE 50 MG/1
50 CAPSULE ORAL EVERY 6 HOURS PRN
Status: DISCONTINUED | OUTPATIENT
Start: 2023-05-02 | End: 2023-05-09 | Stop reason: HOSPADM

## 2023-05-02 RX ORDER — BENZTROPINE MESYLATE 1 MG/ML
2 INJECTION, SOLUTION INTRAMUSCULAR; INTRAVENOUS EVERY 8 HOURS PRN
Status: DISCONTINUED | OUTPATIENT
Start: 2023-05-02 | End: 2023-05-09 | Stop reason: HOSPADM

## 2023-05-02 RX ORDER — BENZONATATE 100 MG/1
100 CAPSULE ORAL 3 TIMES DAILY PRN
Status: DISCONTINUED | OUTPATIENT
Start: 2023-05-02 | End: 2023-05-09 | Stop reason: HOSPADM

## 2023-05-02 RX ORDER — HALOPERIDOL 5 MG/1
5 TABLET ORAL 2 TIMES DAILY
Status: DISCONTINUED | OUTPATIENT
Start: 2023-05-02 | End: 2023-05-09 | Stop reason: HOSPADM

## 2023-05-02 RX ADMIN — HALOPERIDOL 5 MG: 5 TABLET ORAL at 08:05

## 2023-05-02 RX ADMIN — CARBAMAZEPINE 400 MG: 200 TABLET ORAL at 10:05

## 2023-05-02 RX ADMIN — CARBAMAZEPINE 400 MG: 200 TABLET ORAL at 08:05

## 2023-05-02 RX ADMIN — HALOPERIDOL 5 MG: 5 TABLET ORAL at 10:05

## 2023-05-02 NOTE — HPI
History       Chief Complaint   Patient presents with    Psychiatric Evaluation       Pt presents to the ER for psych eval via ems due to reported visual/auditory hallucinations. Pt arrived from [facility], staff states pt began having hallucinations today and displaying aggressive behavior towards staff. Upon arrival pt states he is unsure why he is here, is AAOX4.       22-year-old male with history of schizophrenia presents for a psychiatric evaluation.  Patient was previously at [facility], pt noted to be having auditory and visual hallucinations, also displaying aggressive behavior towards staff, as a result was brought in for evaluation.

## 2023-05-02 NOTE — NURSING
Pt in bedroom at this time.  Pt has spent the majority of the day in his room sleeping.  Pt out of room for meals and snacks.  Pt states he thought he was coming in for a psych eval in the er and going right back to [facility].  Pt states he's been at [facility] for 6 days and is going back once discharged.  Pt states he is court ordered.  Pt denies si, hi or a v hallucinations. Gravely disabled.  Pt remains anxious but states he just received his abilify aristada injection 2 days ago and he feels fine.  No aggressive behavior or outbursts noted.  Pt denies depression.  Mild anxiety noted.  Pt asked the doctor to go back to [facility] as soon as possible so he can finish his drug treatment.  Pt instructed to call for any needs or concerns at all.  Verbalized understanding.  Will cont to monitor for safety.

## 2023-05-02 NOTE — PLAN OF CARE
22-year-old male with history of schizophrenia, psychiatric hospitalizations, aggressive behavior, and substance abuse presents for a psychiatric evaluation.  Patient was previously at [facility].  Patient noted of having auditory and visual hallucinations, and displaying aggressive behavior towards staff.  As a as result, patient was brought in for an evaluation at [facility]'s ED and assigned a PEC.  Patient was positive for THC.  Upon arrival to [facility], patient was very angry with a disheveled appearance.  He participated in vitals and stormed to his room using profanity.  Did not want to participate in conversation with staff.  Shortly after, patient could be heard RIS with loud outburst of laughter and talking episodes.  He later came out his room and asked for a snack. After returning to bed, he went to sleep and would wake up periodically laughing.  No further concerns.

## 2023-05-02 NOTE — H&P
"PSYCHIATRY INPATIENT ADMISSION NOTE - H & P      5/2/2023 3:07 PM   Bishop Soria   2000   [id number]         DATE OF ADMISSION: 5/1/2023  9:56 PM    SITE: [facility]    CURRENT LEGAL STATUS: PEC and/or CEC      HISTORY    CHIEF COMPLAINT   Bishop Soria is a 22 y.o. male with a past psychiatric history of  psychosis, mood disorder, anxiety, substance abuse  currently admitted to the inpatient unit with the following chief complaint:  psychosis - "they told me I needed a psych eval."    HPI   The patient was seen and examined. The chart was reviewed.    The patient presented to the ER on 5/1/2023 . Per staff notes:  -Pt presents to the ER for psych eval via ems due to reported visual/auditory hallucinations. Pt arrived from [facility], staff states pt began having hallucinations today and displaying aggressive behavior towards staff. Upon arrival pt states he is unsure why he is here, is AAOX4.  -22-year-old male with history of schizophrenia presents for a psychiatric evaluation.  Patient was previously at [facility], pt noted to be having auditory and visual hallucinations, also displaying aggressive behavior towards staff, as a result was brought in for evaluation      The patient was medically cleared and admitted to the U.    The patient has a longstanding h/o Severe bipolar disorder with psychotic features complicated by significant psychosocial stressors and poor tx adherence. He was last treated here from 2/8-2/22/23 for psychosis/carley. He presented at that time with the following HPI:  -22-year-old male with history of schizoaffective not on medication presents with bizarre behavior.  According to EMS patient got into an argument with his mother and made some threats but proceeded to run away from the .  Patient does admit to having suicidal ideation without plan.  History otherwise limited due to medical condition  -Bishop Soria admitted to [facility] under the " "care of Frantz Cagle MD with diagnosis of schizoaffective D/O. The patient is [facility]ed. Patient is a 21 yo AAM who presented to the ER per [facility] for reportedly having bizarre behavior and SI's. Ater arrival to ER,pt was PEC'd,then pt eloped and was returned to ER per [facility]. UDS positive for THC. PT sedated per ER physician. Arrived to unit, sedated. Patient withdrawn, depressed, anxious, agitated, irritable, drowsy, sedated, manic, and paranoid. Endorses Suicidal Ideation and Delusions. Denies Homicidal Ideation, Auditory Hallucinations, Visual Hallucinations, Tactile Hallucinations, and Gustatory Hallucinations  -He reports that he had an argument with his family over wearing wigs. He also reports some severe issues regarding the musician Raul Murphy (a frequent delusion of his), but the details were presented non-coherently.   He was sedated from medications received prn secondary to psychotic agitation- this limited his participation.    Overall, his presentation is consistent with previous  hospitalization.    He was stabilized on Depakote 750 mg po BID, Abilify Aristada 1064 (next dose due on 4/20/23) and Haldol 5 mg po BID    HE was treated here again 4/2-4/6/23 with the following HPI: He reports that he ran out pf his medications about 1 week ago. He reports hat he brought himself to the hospital to get back on his medications. He reported the the ER doctors that he was suicidal and hear voices.  -He is now denying all symptoms and reports that he "made it up" in order to guarantee his admission to the [facility] in order to resume his medications  -he was inappropriately focused on discharge  -he did not openly discuss his usual delusions- he required prompting then quickly miniimized issues.  -his presentation is consistent with previous admission, although he does appear somewhat better today than at his last admission  -His UDS was positive for THC and benzos   Overall, his presentation is consistent with " "previous  hospitalization.     He was stabilized on Tegretol 200 mg po TID, Abilify Aristada 1064 (next dose due on 4/20/23) and Haldol 5 mg po BID    Today, he reports that he went to rehab about 1 week ago (court ordered) to help quit cannabis. While there, "the nurse suggested I come get a psych evaluation." He denied all symptoms this Am aside form being upset about the hospitalization. Reports of ongoing psychosis/carley were reported as documented below.       Symptoms of Depression: diminished mood - No, loss of interest/anhedonia - No;  recurrent - No, >14 days - No, diminished energy - No, change in sleep - No, change in appetite - No, diminished concentration or cognition or indecisiveness - No, PMA/R -  No, excessive guilt or hopelessness or worthlessness - No, suicidal ideations - No    Changes in Sleep: trouble with initiation- No, maintenance, - No early morning awakening with inability to return to sleep - No, hypersomnolence - No    Suicidal- active/passive ideations - No, organized plans, future intentions - No  +SI reported in the last 24 hours    Homicidal ideations: active/passive ideations - No, organized plans, future intentions - No    Symptoms of psychosis: hallucinations - Yes, delusions - Yes, disorganized speech - No, disorganized behavior or abnormal motor behavior - No, or negative symptoms (diminshed emotional expression, avolition, anhedonia, alogia, asociality) - No, active phase symptoms >1 month - No, continuous signs of illness > 6 months - No, since onset of illness decreased level of functioning present - No    Symptoms of carley or hypomania: elevated, expansive, or irritable mood with increased energy or activity - Yes; > 4 days - Yes,  >7 days - Yes; with inflated self-esteem or grandiosity - Yes, decreased need for sleep - No, increased rate of speech - No, FOI or racing thoughts - No, distractibility - Yes, increased goal directed activity or PMA - Yes, risky/disinhibited " "behavior - Yes    Symptoms of KANU: excessive anxiety/worry/fear, more days than not, about numerous issues - Yes, ongoing for >6 months - No, difficult to control - No, with restlessness - No, fatigue - No, poor concentration - No, irritability - No, muscle tension - No, sleep disturbance - No; causes functionally impairing distress - No    Symptoms of Panic Disorder: recurrent panic attacks (palpitations/heart racing, sweating, shakiness, dyspnea, choking, chest pain/discomfort, Gi symptoms, dizzy/lightheadedness, hot/col flashes, paresthesias, derealization, fear of losing control or fear of dying or fear of "going crazy") - No, precipitated - No, un-precipitated - No, source of worry and/or behavioral changes secondary for 1 month or longer- No, agoraphobia - No    Symptoms of PTSD: h/o trauma exposure - No; re-experiencing/intrusive symptoms - No, avoidant behavior - No, 2 or more negative alterations in cognition or mood - No, 2 or more hyperarousal symptoms - No; with dissociative symptoms - No, ongoing for 1 or more  months - No    Symptoms of OCD: obsessions (recurrent thoughts/urges/images; intrusive and/or unwanted; uses other thoughts/actions to suppress) - No; compulsions (repetitive behaviors used to lower distress/anxiety/obsessions) - No, time-consuming (over 1 hour per day) or cause significant distress/impairment - - No    Symptoms of Anorexia: restriction of caloric intake leading to significantly low body weight - No, intense fear of gaining weight or persistent behavior that interferes with weight gain even thought at a significantly low weight - No, disturbance in the way in which one's body weight or shape is experienced, undue influence of body weight or shape on self evaluation, or persistent lack of recognition of the seriousness of the current low body weight - No    Symptoms of Bulimia: recurrent episodes of binge eating (definitely larger amount  than what others would eat and lack of a " sense of control over eating during episode) - No, recurrent inappropriate compensatory behaviors in order to prevent weight gain (fasting, medications, exercise, vomiting) - No, binges and compensatory behaviors both occur on average at least once a week for 3 months - No, self evaluations is unduly influenced by body shape/weight- - No    Symptoms of Binge eating: recurrent episodes of binge eating (definitely larger amount than what others would eat and lack of a sense of control over eating during episode) - No, 3 or more of following (eating much more rapidly, eating until uncomfortably full, large amounts when not hungry, eating alone because of embarrassed by how much,  feeling disgusted with oneself, depressed or very guilty afterward) - No, distress regarding binges - No, binges occur on average at least once a week for 3 months - No    PSYCHOTHERAPY ADD-ON +[id number]   16-37 minutes      Time: 16 minutes  Participants: Met with patient    Therapeutic Intervention Type: insight oriented psychotherapy, behavior modifying psychotherapy, supportive psychotherapy, interactive psychotherapy  Why chosen therapy is appropriate versus another modality: relevant to diagnosis, patient responds to this modality, evidence based practice    Target symptoms: mood disorder, psychosis  Primary focus: mood, psychosis, family stressors, cannabis use  Psychotherapeutic techniques: supportive and psycho-educational techniques; setting tx goals and needs    Outcome monitoring methods: self-report, observation    Patient's response to intervention:  The patient's response to intervention is reluctant.    Progress toward goals:  The patient's progress toward goals is limited.          PAST PSYCHIATRIC HISTORY  Previous Psychiatric Hospitalizations: Yes, numerous; 3 in 2022, last in 12/22-1/23,  2/2023, then 4/2023  Previous SI/HI: Yes,  Previous Suicide Attempts: Yes,   Previous Medication Trials: Yes,  Psychiatric Care (current &  past): Yes,  History of Psychotherapy: Yes,  History of Violence: Yes,  History of sexual/physical abuse: No,    PAST MEDICAL & SURGICAL HISTORY   Past Medical History:   Diagnosis Date    History of psychiatric hospitalization     Hx of psychiatric care     Psychiatric problem     Schizophrenia     Suicide attempt      No past surgical history on file.      Home Meds:   Prior to Admission medications    Medication Sig Start Date End Date Taking? Authorizing Provider   carBAMazepine (TEGRETOL) 200 mg tablet Take 1 tablet (200 mg total) by mouth 3 (three) times daily. 4/6/23 4/5/24  Frantz Cagle MD   haloperidoL (HALDOL) 5 MG tablet Take 1 tablet (5 mg total) by mouth 2 (two) times daily. 4/6/23 4/5/24  Frantz Cagle MD   EScitalopram oxalate (LEXAPRO) 10 MG tablet TAKE 1 TABLET BY MOUTH EVERY DAY 7/27/21 5/23/22  Jl Aguilar NP   lithium (LITHOTAB) 300 mg tablet Take 300 mg by mouth 2 (two) times daily. 4/26/22 5/31/22  Historical Provider           Scheduled Meds:        PRN Meds: acetaminophen, aluminum-magnesium hydroxide-simethicone, docusate sodium, hydrOXYzine pamoate, loperamide, nicotine, OLANZapine **AND** OLANZapine   Psychotherapeutics (From admission, onward)      Start     Stop Route Frequency Ordered    05/02/23 0531  OLANZapine tablet 10 mg  (Olanzapine PRN (</= 64 yo))        See Hyperspace for full Linked Orders Report.    -- Oral Every 8 hours PRN 05/02/23 0432    05/02/23 0531  OLANZapine injection 10 mg  (Olanzapine PRN (</= 64 yo))        See Hyperspace for full Linked Orders Report.    -- IM Every 8 hours PRN 05/02/23 0432            ALLERGIES   Review of patient's allergies indicates:  No Known Allergies    NEUROLOGIC HISTORY  Seizures: No  Head trauma: No    SOCIAL HISTORY:  Developmental/Childhood:Achieved all developmental milestones timely  Education: some college  Employment Status/Finances:Employed   Relationship Status/Sexual Orientation: single  Children:  "0  Housing Status: Home    history:  NO   Access to Firearms: NO ;  Locked up? n/a  Confucianist:Actively participates in organized Judaism  Recreational activities:Exercise    SUBSTANCE ABUSE HISTORY   Recreational Drugs: marijuana   Use of Alcohol: occasional, social use  Rehab History:no   Tobacco Use:no    LEGAL HISTORY:   Past charges/incarcerations: NO  Pending charges:NO    FAMILY PSYCHIATRIC HISTORY   Depression - "dad's side"      ROS  Review of Systems   Constitutional: Negative.  Negative for chills and fever.   HENT: Negative.  Negative for hearing loss.    Eyes: Negative.  Negative for blurred vision and double vision.   Respiratory: Negative.  Negative for shortness of breath.    Cardiovascular: Negative.  Negative for chest pain and palpitations.   Gastrointestinal: Negative.  Negative for constipation, diarrhea, nausea and vomiting.   Genitourinary: Negative.  Negative for dysuria.   Musculoskeletal: Negative.  Negative for back pain and neck pain.   Skin: Negative.  Negative for rash.   Neurological: Negative.  Negative for dizziness and headaches.   Endo/Heme/Allergies: Negative.  Negative for environmental allergies.   Psychiatric/Behavioral:          See HPI       EXAMINATION    PHYSICAL EXAM  Reviewed note/exam by Dr. Renee MD from 5/123 at 10:48 PM; Med consulted for initial physical exam- pending      VITALS   Vitals:    05/02/23 0752   BP: 120/77   Pulse: 60   Resp: 18   Temp: 98.1 °F (36.7 °C)        Body mass index is 21.25 kg/m².        PAIN  0/10  Subjective report of pain matches objective signs and symptoms: Yes    LABORATORY DATA   Recent Results (from the past 72 hour(s))   Comprehensive metabolic panel    Collection Time: 05/01/23 10:22 PM   Result Value Ref Range    Sodium 143 136 - 145 mmol/L    Potassium 3.9 3.5 - 5.1 mmol/L    Chloride 111 (H) 95 - 110 mmol/L    CO2 28 23 - 29 mmol/L    Glucose 101 70 - 110 mg/dL    BUN 18 6 - 20 mg/dL    Creatinine 1.2 0.5 - 1.4 mg/dL    " Calcium 9.1 8.7 - 10.5 mg/dL    Total Protein 7.8 6.0 - 8.4 g/dL    Albumin 3.9 3.5 - 5.2 g/dL    Total Bilirubin 0.3 0.1 - 1.0 mg/dL    Alkaline Phosphatase 70 55 - 135 U/L    AST 22 10 - 40 U/L    ALT 42 10 - 44 U/L    Anion Gap 4 (L) 8 - 16 mmol/L    eGFR >60.0 >60 mL/min/1.73 m^2   TSH    Collection Time: 05/01/23 10:22 PM   Result Value Ref Range    TSH 1.660 0.400 - 4.000 uIU/mL   Ethanol    Collection Time: 05/01/23 10:22 PM   Result Value Ref Range    Alcohol, Serum <3 <10 mg/dL   Acetaminophen level    Collection Time: 05/01/23 10:22 PM   Result Value Ref Range    Acetaminophen (Tylenol), Serum <2.0 (L) 10.0 - 20.0 ug/mL   Salicylate level    Collection Time: 05/01/23 10:22 PM   Result Value Ref Range    Salicylate Lvl 2.6 (L) 15.0 - 30.0 mg/dL   CBC auto differential    Collection Time: 05/01/23 10:23 PM   Result Value Ref Range    WBC 7.40 3.90 - 12.70 K/uL    RBC 4.18 (L) 4.60 - 6.20 M/uL    Hemoglobin 13.3 (L) 14.0 - 18.0 g/dL    Hematocrit 40.7 40.0 - 54.0 %    MCV 97 82 - 98 fL    MCH 31.8 (H) 27.0 - 31.0 pg    MCHC 32.7 32.0 - 36.0 g/dL    RDW 12.6 11.5 - 14.5 %    Platelets 274 150 - 450 K/uL    MPV 9.2 9.2 - 12.9 fL    Immature Granulocytes 0.1 0.0 - 0.5 %    Gran # (ANC) 2.9 1.8 - 7.7 K/uL    Immature Grans (Abs) 0.01 0.00 - 0.04 K/uL    Lymph # 3.7 1.0 - 4.8 K/uL    Mono # 0.7 0.3 - 1.0 K/uL    Eos # 0.1 0.0 - 0.5 K/uL    Baso # 0.01 0.00 - 0.20 K/uL    nRBC 0 0 /100 WBC    Gran % 39.3 38.0 - 73.0 %    Lymph % 50.1 (H) 18.0 - 48.0 %    Mono % 8.9 4.0 - 15.0 %    Eosinophil % 1.5 0.0 - 8.0 %    Basophil % 0.1 0.0 - 1.9 %    Differential Method Automated    Lipid Panel    Collection Time: 05/01/23 10:23 PM   Result Value Ref Range    Cholesterol 157 120 - 199 mg/dL    Triglycerides 62 30 - 150 mg/dL    HDL 64 40 - 75 mg/dL    LDL Cholesterol 80.6 63.0 - 159.0 mg/dL    HDL/Cholesterol Ratio 40.8 20.0 - 50.0 %    Total Cholesterol/HDL Ratio 2.5 2.0 - 5.0    Non-HDL Cholesterol 93 mg/dL   Hemoglobin  "A1C    Collection Time: 05/01/23 10:23 PM   Result Value Ref Range    Hemoglobin A1C 5.0 4.0 - 5.6 %    Estimated Avg Glucose 97 68 - 131 mg/dL   Urinalysis, Reflex to Urine Culture Urine, Clean Catch    Collection Time: 05/01/23 11:15 PM    Specimen: Urine, Clean Catch   Result Value Ref Range    Specimen UA Urine, Clean Catch     Color, UA Yellow Yellow, Straw, Ingrid    Appearance, UA Clear Clear    pH, UA 6.0 5.0 - 8.0    Specific Gravity, UA >=1.030 (A) 1.005 - 1.030    Protein, UA Trace (A) Negative    Glucose, UA Negative Negative    Ketones, UA Trace (A) Negative    Bilirubin (UA) Negative Negative    Occult Blood UA Negative Negative    Nitrite, UA Negative Negative    Urobilinogen, UA 1.0 <2.0 EU/dL    Leukocytes, UA Negative Negative   Drug screen panel, emergency    Collection Time: 05/01/23 11:15 PM   Result Value Ref Range    Benzodiazepines Negative Negative    Methadone metabolites Negative Negative    Cocaine (Metab.) Negative Negative    Opiate Scrn, Ur Negative Negative    Barbiturate Screen, Ur Negative Negative    Amphetamine Screen, Ur Negative Negative    THC Presumptive Positive (A) Negative    Phencyclidine Negative Negative    Creatinine, Urine 489.0 (H) 23.0 - 375.0 mg/dL    Toxicology Information SEE COMMENT       Lab Results   Component Value Date    VALPROATE 85 02/21/2023    CBMZ 9.5 04/06/2023           CONSTITUTIONAL  General Appearance: unremarkable, age appropriate, normal weight, disheveled    MUSCULOSKELETAL  Muscle Strength and Tone:no tremor, no tic  Abnormal Involuntary Movements: None  Gait and Station: non-ataxic    PSYCHIATRIC   Level of Consciousness: awake and alert   Orientation: person, place, time and situation  Grooming: Hospital garb, disheveled  Psychomotor Behavior: normal, cooperative; no PMA/R  Speech: normal tone, normal rate, normal pitch, normal volume  Language: grossly intact, able to name, able to repeat  Mood: "fine"  Affect: Intense  Thought Process: " circumstantial, racing  Associations: intact   Thought Content: +delusions, denies SI, and denies HI  Perceptions: +observed RIS   Memory: Able to recall past events, Remote intact, and Recent intact  Attention:Attends to interview without distraction  Fund of Knowledge: Aware of current events and Vocabulary appropriate   Estimate if Intelligence:  Average based on work/education history, vocabulary and mental status exam  Insight: intact, limited awareness of illness- poor  Judgment: impaired due to psychosis/carley - poor      PSYCHOSOCIAL    PSYCHOSOCIAL STRESSORS   Occupational and family    FUNCTIONING RELATIONSHIPS   good support system    STRENGTHS AND LIABILITIES   Strength: Patient accepts guidance/feedback, Strength: Patient is expressive/articulate., Liability: Patient is impulsive., Liability: Patient lacks coping skills.    Is the patient aware of the biomedical complications associated with substance abuse and mental illness? yes    Does the patient have an Advance Directive for Mental Health treatment? no  (If yes, inform patient to bring copy.)        MEDICAL DECISION MAKING        ASSESSMENT       Bipolar disorder, type I, MRE manic, severe with psychotic features  Unspecified anxiety disorder  Insomnia secondary to a mental illness    Cannabis abuse    Psychosocial stressors    Elevated Liver Enzymes        PROBLEM LIST AND MANAGEMENT PLANS      Bipolar disorder, type I, MRE manic, severe with psychotic features: pt counseled  -Continue Aristada 1064mg given April 28, 2023 (?) will contact outpatient provider to verify  - resume Haldol 5 mg PO BID   -resume/increase home tegretol from 200 TID to 400 mg po BID- check level in 4 days  - follow up with outpatient mental health providers after discharge for medication management and psychotherapy    Unspecified anxiety disorder  - started/continue hydroxyzine 50 mg PO q 6 hours PRN      Insomnia: pt counseled  -vistaril prn    Cannabis abuse: pt  counseled  - SW consulted for assistance with additional resources   -return to rehab once stable      Psychosocial stressors  - pt counseled  - SW consulted for assistance with additional resources     Elevated Liver Enzymes: pt counseled  -resolved since stopping depakote; amee WNL      PRESCRIPTION DRUG MANAGEMENT  Compliance: yes  Side Effects: no  Regimen Adjustments: see above    Discussed diagnosis, risks and benefits of proposed treatment vs alternative treatments vs no treatment, potential side effects of these treatments and the inherent unpredictability of treatment. The patient expresses understanding of the above and displays the capacity to agree with this treatment given said understanding. Patient also agrees that, currently, the benefits outweigh the risks and would like to pursue/continue treatment at this time.    Any medications being used off-label were discussed with the patient inclusive of the evidence base for the use of the medications and consent was obtained for the off-label use of the medication.         DIAGNOSTIC TESTING  Labs reviewed with patient; follow up pending labs    Disposition:  -Will attempt to obtain outside psychiatric records if available  -SW to assist with aftercare planning and collateral  -Once stable discharge home with outpatient follow up care and/or rehab  -Continue inpatient treatment under a PEC and/or CEC for danger to self/ danger to others/grave disability as evident by significant psychotic thought disorder, aggressive neuroleptic titration, hallucinations, and gravely disabled        Frantz Cagle MD  Psychiatry

## 2023-05-02 NOTE — SUBJECTIVE & OBJECTIVE
Past Medical History:   Diagnosis Date    History of psychiatric hospitalization     Hx of psychiatric care     Psychiatric problem     Schizophrenia     Suicide attempt        No past surgical history on file.    Review of patient's allergies indicates:  No Known Allergies    No current facility-administered medications on file prior to encounter.     Current Outpatient Medications on File Prior to Encounter   Medication Sig    carBAMazepine (TEGRETOL) 200 mg tablet Take 1 tablet (200 mg total) by mouth 3 (three) times daily.    haloperidoL (HALDOL) 5 MG tablet Take 1 tablet (5 mg total) by mouth 2 (two) times daily.    [DISCONTINUED] EScitalopram oxalate (LEXAPRO) 10 MG tablet TAKE 1 TABLET BY MOUTH EVERY DAY    [DISCONTINUED] lithium (LITHOTAB) 300 mg tablet Take 300 mg by mouth 2 (two) times daily.     Family History       Family history is unknown by patient.          Tobacco Use    Smoking status: Every Day     Types: Cigars     Passive exposure: Never    Smokeless tobacco: Never   Substance and Sexual Activity    Alcohol use: Yes     Comment: pt reports drinking wine, two-four times monthly or less    Drug use: Yes     Types: Marijuana, Benzodiazepines     Comment: pt reports taking benzo within the past few weeks to help sleep    Sexual activity: Not Currently     Partners: Male     Review of Systems   Unable to perform ROS: Psychiatric disorder   Objective:     Vital Signs (Most Recent):  Temp: 98.1 °F (36.7 °C) (05/02/23 0752)  Pulse: 60 (05/02/23 0752)  Resp: 18 (05/02/23 0752)  BP: 120/77 (05/02/23 0752)  SpO2: 97 % (05/02/23 0752) Vital Signs (24h Range):  Temp:  [97.9 °F (36.6 °C)-98.1 °F (36.7 °C)] 98.1 °F (36.7 °C)  Pulse:  [60-98] 60  Resp:  [16-20] 18  SpO2:  [96 %-97 %] 97 %  BP: (120-131)/(64-77) 120/77     Weight: 77.1 kg (170 lb)  Body mass index is 21.25 kg/m².    Physical Exam  Vitals and nursing note reviewed.   Constitutional:       Appearance: Normal appearance.   HENT:      Head:  Normocephalic and atraumatic.      Nose: Nose normal.      Mouth/Throat:      Mouth: Mucous membranes are moist.      Pharynx: Oropharynx is clear.   Eyes:      Extraocular Movements: Extraocular movements intact.      Conjunctiva/sclera: Conjunctivae normal.      Pupils: Pupils are equal, round, and reactive to light.   Cardiovascular:      Rate and Rhythm: Normal rate and regular rhythm.      Pulses: Normal pulses.      Heart sounds: Normal heart sounds.   Pulmonary:      Effort: Pulmonary effort is normal.      Breath sounds: Normal breath sounds.   Abdominal:      General: Abdomen is flat. Bowel sounds are normal.      Palpations: Abdomen is soft.   Musculoskeletal:         General: Normal range of motion.      Cervical back: Normal range of motion and neck supple.   Skin:     General: Skin is warm and dry.      Capillary Refill: Capillary refill takes less than 2 seconds.      Comments: No rashes on limited skin exam.   Neurological:      General: No focal deficit present.      Mental Status: He is alert and oriented to person, place, and time.      Cranial Nerves: No cranial nerve deficit.      Comments: I Olfactory:  Sense of smell intact    II Optic:  Pupils equal round react to light.  Vision intact.    III, IV, VI, Ocular motor, Trochlear, Abducens:  Extraocular movements intact    V Trigeminal:  Facial sensation intact facial sensation intact,, muscles of mastication intact muscles of mastication intact, corneal reflex intact, corneal reflex intact    VII Facial:  Muscles of facial expression intact     VIII Vestibular cochlear: Hearing intact vestibular cochlear: Hearing intact    IX Glossopharyngeal:  Gag reflex intact.  Tasting intact.     X Vagus:  Gag reflex intact.    XI Spinal Accessory:  Shoulder shrug intact.  Head rotation intact.    XII Hypoglossal:  Tongue movements intact.     Psychiatric:      Comments: Patient appears depressed.  Hallucinating          CRANIAL NERVES     CN III, IV, VI    Pupils are equal, round, and reactive to light.     Significant Labs: All pertinent labs within the past 24 hours have been reviewed.    Significant Imaging: I have reviewed all pertinent imaging results/findings within the past 24 hours.

## 2023-05-02 NOTE — ED PROVIDER NOTES
"  History  Chief Complaint   Patient presents with    Psychiatric Evaluation     Pt presents to the ER for psych eval via ems due to reported visual/auditory hallucinations. Pt arrived from [facility], staff states pt began having hallucinations today and displaying aggressive behavior towards staff. Upon arrival pt states he is unsure why he is here, is AAOX4.      22-year-old male with history of schizophrenia presents for a psychiatric evaluation.  Patient was previously at [facility], pt noted to be having auditory and visual hallucinations, also displaying aggressive behavior towards staff, as a result was brought in for evaluation.       Past Medical History:   Diagnosis Date    History of psychiatric hospitalization     Hx of psychiatric care     Psychiatric problem     Schizophrenia     Suicide attempt        No past surgical history on file.    Family History   Family history unknown: Yes       Social History     Tobacco Use    Smoking status: Every Day     Types: Cigars     Passive exposure: Never    Smokeless tobacco: Never   Substance Use Topics    Alcohol use: Yes     Comment: pt reports drinking wine, two-four times monthly or less    Drug use: Yes     Types: Marijuana, Benzodiazepines     Comment: pt reports taking benzo within the past few weeks to help sleep       ROS  Review of Systems   Psychiatric/Behavioral:  Positive for behavioral problems.      Physical Exam  /64 (Patient Position: Sitting)   Pulse 98   Temp 97.9 °F (36.6 °C) (Oral)   Resp 16   Ht 6' 3" (1.905 m)   Wt 77.1 kg (170 lb)   SpO2 96%   BMI 21.25 kg/m²   Physical Exam    Constitutional: He appears well-developed and well-nourished. He is cooperative.   HENT:   Head: Normocephalic and atraumatic.   Eyes: Conjunctivae, EOM and lids are normal. Pupils are equal, round, and reactive to light.   Neck: Phonation normal.   Normal range of motion.  Cardiovascular:  Normal rate, regular rhythm and " intact distal pulses.           Pulmonary/Chest: Breath sounds normal. No stridor. No respiratory distress.   Abdominal: Abdomen is soft. There is no abdominal tenderness.   Musculoskeletal:      Cervical back: Normal range of motion.     Neurological: He is alert and oriented to person, place, and time.   Skin: Skin is warm and dry.   Psychiatric: He has a normal mood and affect. His speech is normal and behavior is normal. He is actively hallucinating. He expresses no homicidal and no suicidal ideation.             Labs Reviewed   CBC W/ AUTO DIFFERENTIAL - Abnormal; Notable for the following components:       Result Value    RBC 4.18 (*)     Hemoglobin 13.3 (*)     MCH 31.8 (*)     Lymph % 50.1 (*)     All other components within normal limits   COMPREHENSIVE METABOLIC PANEL - Abnormal; Notable for the following components:    Chloride 111 (*)     Anion Gap 4 (*)     All other components within normal limits   URINALYSIS, REFLEX TO URINE CULTURE - Abnormal; Notable for the following components:    Specific Gravity, UA >=1.030 (*)     Protein, UA Trace (*)     Ketones, UA Trace (*)     All other components within normal limits    Narrative:     Preferred Collection Type->Urine, Clean Catch  Specimen Source->Urine   DRUG SCREEN PANEL, URINE EMERGENCY - Abnormal; Notable for the following components:    THC Presumptive Positive (*)     Creatinine, Urine 489.0 (*)     All other components within normal limits    Narrative:     Preferred Collection Type->Urine, Clean Catch  Specimen Source->Urine   ACETAMINOPHEN LEVEL - Abnormal; Notable for the following components:    Acetaminophen (Tylenol), Serum <2.0 (*)     All other components within normal limits   SALICYLATE LEVEL - Abnormal; Notable for the following components:    Salicylate Lvl 2.6 (*)     All other components within normal limits   TSH   ALCOHOL,MEDICAL (ETHANOL)                         Procedures             Medical Decision Making  22-year-old male with  history of schizophrenia presents for a psychiatric evaluation.  Patient was previously at [facility], pt noted to be having auditory and visual hallucinations, also displaying aggressive behavior towards staff, as a result was brought in for evaluation.  Given concern for hallucinations with observed abnormal behavior while in the ED decision was made to place a pec.  See ED course for updates.    Amount and/or Complexity of Data Reviewed  Labs: ordered. Decision-making details documented in ED Course.          Additional MDM:   Psych: Evaluation: Physician Emergency Certificate (PEC) was done prior to arrival in the ED. A Physician Emergency Certificate (PEC) was done in the ED for: Gravely Disabled. The patient has been medically cleared. Outcome: The patient was admitted by Psychiatry.     ED Course as of 05/02/23 0038   Mon May 01, 2023   2346 Marijuana (THC) Metabolite(!): Presumptive Positive [NA]   2346 Creatinine, Urine(!): 489.0 [NA]   2346 Patient medically clear for psychiatric admission [NA]   Tue May 02, 2023   0036 Spoke with Dr. Cagle, psych, will admit [NA]      ED Course User Index  [NA] Charlene Duran MD       Clinical Impression  The encounter diagnosis was Hallucination.       Charlene Duran MD  05/02/23 0038

## 2023-05-02 NOTE — PLAN OF CARE
Problem: Adult Behavioral Health Plan of Care  Goal: Plan of Care Review  Outcome: Ongoing, Progressing     Problem: Adult Behavioral Health Plan of Care  Goal: Patient-Specific Goal (Individualization)  Outcome: Ongoing, Progressing     Problem: Adult Behavioral Health Plan of Care  Goal: Optimized Coping Skills in Response to Life Stressors  Outcome: Ongoing, Progressing     Problem: Mood Impairment (Psychotic Signs/Symptoms)  Goal: Improved Mood Symptoms (Psychotic Signs/Symptoms)  Outcome: Ongoing, Progressing

## 2023-05-02 NOTE — H&P
[facility] Hospital Medicine  History & Physical    Patient Name: Bishop Soria  MRN: [id number]  Patient Class: IP- Psych  Admission Date: 5/1/2023  Attending Physician: Frantz Cagle MD   Primary Care Provider: Jose Martin Selby MD         Patient information was obtained from ER records.     Subjective:     Principal Problem:Schizoaffective disorder, bipolar type    Chief Complaint:   Chief Complaint   Patient presents with    Psychiatric Evaluation     Pt presents to the ER for psych eval via ems due to reported visual/auditory hallucinations. Pt arrived from [facility], staff states pt began having hallucinations today and displaying aggressive behavior towards staff. Upon arrival pt states he is unsure why he is here, is AAOX4.         HPI:   History       Chief Complaint   Patient presents with    Psychiatric Evaluation       Pt presents to the ER for psych eval via ems due to reported visual/auditory hallucinations. Pt arrived from [facility], staff states pt began having hallucinations today and displaying aggressive behavior towards staff. Upon arrival pt states he is unsure why he is here, is AAOX4.       22-year-old male with history of schizophrenia presents for a psychiatric evaluation.  Patient was previously at [facility], pt noted to be having auditory and visual hallucinations, also displaying aggressive behavior towards staff, as a result was brought in for evaluation.          Past Medical History:   Diagnosis Date    History of psychiatric hospitalization     Hx of psychiatric care     Psychiatric problem     Schizophrenia     Suicide attempt        No past surgical history on file.    Review of patient's allergies indicates:  No Known Allergies    No current facility-administered medications on file prior to encounter.     Current Outpatient Medications on File Prior to Encounter   Medication Sig     carBAMazepine (TEGRETOL) 200 mg tablet Take 1 tablet (200 mg total) by mouth 3 (three) times daily.    haloperidoL (HALDOL) 5 MG tablet Take 1 tablet (5 mg total) by mouth 2 (two) times daily.    [DISCONTINUED] EScitalopram oxalate (LEXAPRO) 10 MG tablet TAKE 1 TABLET BY MOUTH EVERY DAY    [DISCONTINUED] lithium (LITHOTAB) 300 mg tablet Take 300 mg by mouth 2 (two) times daily.     Family History       Family history is unknown by patient.          Tobacco Use    Smoking status: Every Day     Types: Cigars     Passive exposure: Never    Smokeless tobacco: Never   Substance and Sexual Activity    Alcohol use: Yes     Comment: pt reports drinking wine, two-four times monthly or less    Drug use: Yes     Types: Marijuana, Benzodiazepines     Comment: pt reports taking benzo within the past few weeks to help sleep    Sexual activity: Not Currently     Partners: Male     Review of Systems   Unable to perform ROS: Psychiatric disorder   Objective:     Vital Signs (Most Recent):  Temp: 98.1 °F (36.7 °C) (05/02/23 0752)  Pulse: 60 (05/02/23 0752)  Resp: 18 (05/02/23 0752)  BP: 120/77 (05/02/23 0752)  SpO2: 97 % (05/02/23 0752) Vital Signs (24h Range):  Temp:  [97.9 °F (36.6 °C)-98.1 °F (36.7 °C)] 98.1 °F (36.7 °C)  Pulse:  [60-98] 60  Resp:  [16-20] 18  SpO2:  [96 %-97 %] 97 %  BP: (120-131)/(64-77) 120/77     Weight: 77.1 kg (170 lb)  Body mass index is 21.25 kg/m².    Physical Exam  Vitals and nursing note reviewed.   Constitutional:       Appearance: Normal appearance.   HENT:      Head: Normocephalic and atraumatic.      Nose: Nose normal.      Mouth/Throat:      Mouth: Mucous membranes are moist.      Pharynx: Oropharynx is clear.   Eyes:      Extraocular Movements: Extraocular movements intact.      Conjunctiva/sclera: Conjunctivae normal.      Pupils: Pupils are equal, round, and reactive to light.   Cardiovascular:      Rate and Rhythm: Normal rate and regular rhythm.      Pulses: Normal pulses.      Heart  sounds: Normal heart sounds.   Pulmonary:      Effort: Pulmonary effort is normal.      Breath sounds: Normal breath sounds.   Abdominal:      General: Abdomen is flat. Bowel sounds are normal.      Palpations: Abdomen is soft.   Musculoskeletal:         General: Normal range of motion.      Cervical back: Normal range of motion and neck supple.   Skin:     General: Skin is warm and dry.      Capillary Refill: Capillary refill takes less than 2 seconds.      Comments: No rashes on limited skin exam.   Neurological:      General: No focal deficit present.      Mental Status: He is alert and oriented to person, place, and time.      Cranial Nerves: No cranial nerve deficit.      Comments: I Olfactory:  Sense of smell intact    II Optic:  Pupils equal round react to light.  Vision intact.    III, IV, VI, Ocular motor, Trochlear, Abducens:  Extraocular movements intact    V Trigeminal:  Facial sensation intact facial sensation intact,, muscles of mastication intact muscles of mastication intact, corneal reflex intact, corneal reflex intact    VII Facial:  Muscles of facial expression intact     VIII Vestibular cochlear: Hearing intact vestibular cochlear: Hearing intact    IX Glossopharyngeal:  Gag reflex intact.  Tasting intact.     X Vagus:  Gag reflex intact.    XI Spinal Accessory:  Shoulder shrug intact.  Head rotation intact.    XII Hypoglossal:  Tongue movements intact.     Psychiatric:      Comments: Patient appears depressed.  Hallucinating          CRANIAL NERVES     CN III, IV, VI   Pupils are equal, round, and reactive to light.     Significant Labs: All pertinent labs within the past 24 hours have been reviewed.    Significant Imaging: I have reviewed all pertinent imaging results/findings within the past 24 hours.    Assessment/Plan:     * Schizoaffective disorder, bipolar type  To be admitted to our inpatient psychiatric unit for further evaluation and management.        Marijuana abuse  Cessation  advised        VTE Risk Mitigation (From admission, onward)    None                     Regis King Jr, MD  Department of Hospital Medicine  [facility] ([facility])

## 2023-05-03 PROCEDURE — 25000003 PHARM REV CODE 250: Performed by: INTERNAL MEDICINE

## 2023-05-03 PROCEDURE — 90833 PSYTX W PT W E/M 30 MIN: CPT | Mod: AF,HB,, | Performed by: PSYCHIATRY & NEUROLOGY

## 2023-05-03 PROCEDURE — 11400000 HC PSYCH PRIVATE ROOM

## 2023-05-03 PROCEDURE — 90833 PR PSYCHOTHERAPY W/PATIENT W/E&M, 30 MIN (ADD ON): ICD-10-PCS | Mod: AF,HB,, | Performed by: PSYCHIATRY & NEUROLOGY

## 2023-05-03 PROCEDURE — 99233 SBSQ HOSP IP/OBS HIGH 50: CPT | Mod: AF,HB,, | Performed by: PSYCHIATRY & NEUROLOGY

## 2023-05-03 PROCEDURE — 99233 PR SUBSEQUENT HOSPITAL CARE,LEVL III: ICD-10-PCS | Mod: AF,HB,, | Performed by: PSYCHIATRY & NEUROLOGY

## 2023-05-03 RX ADMIN — HALOPERIDOL 5 MG: 5 TABLET ORAL at 09:05

## 2023-05-03 RX ADMIN — CARBAMAZEPINE 400 MG: 200 TABLET ORAL at 08:05

## 2023-05-03 RX ADMIN — CARBAMAZEPINE 400 MG: 200 TABLET ORAL at 09:05

## 2023-05-03 RX ADMIN — HALOPERIDOL 5 MG: 5 TABLET ORAL at 08:05

## 2023-05-03 NOTE — PLAN OF CARE
"Behavioral Health Unit  Psychosocial History and Assessment  Progress Note      Patient Name: Bishop Soria YOB: 2000 SW: Tami Butt, W  Date: 5/3/2023    Chief Complaint: psychosis    Consent:     Did the patient consent for an interview with the ? Yes    Did the patient consent for the  to contact family/friend/caregiver?   Yes  Name: Nabil Soria, Relationship: mother, and Contact: [phone]    Did the patient give consent for the  to inform family/friend/caregiver of his/her whereabouts or to discuss discharge planning? Yes    Source of Information: Face to face with patient, Telephone interview with family/friend/caregiver, and Chart review    Is information obtained from interviews considered reliable?   yes    Reason for Admission:     Active Hospital Problems    Diagnosis  POA    *Schizoaffective disorder, bipolar type [F25.0]  Yes    Marijuana abuse [F12.10]  Yes      Resolved Hospital Problems   No resolved problems to display.       History of Present Illness - (Patient Perception):   "I was arguing with a few people, I was talking to myself to try to calm myself down and they mistaken it for hallucinations.     History of Present Illness - (Perception of Others): I didn't know he was at the hospital until I spoke with him on yesterday; he in rehab at [facility]. He was given a choice of group home or rehab. He chose to go to rehab.  according to Nabil Soria.    Present biopsychosocial functioning: Per MD Note, Pt is a 22 year old male currently admitted to the inpatient unit with the following chief complaint: psychosis, "they told me I needed a psych eval." Pt UDS positive for marijuana. Pt is unemployed. Pt lives in home with mother. Pt can perform all ADLs. Pt reports intact relationship with family. Pt is single. Pt denies recent stressors, changes, or losses.     Past biopsychosocial functioning: Pt reports being " non compliant with medication since previous discharge. Pt reports after prior discharge he did continue to use marijuana. Pt has been at [facility] for the past 2 weeks for substance abuse.     Family and Marital/Relationship History:     Significant Other/Partner Relationships:  Single    Family Relationships: Intact      Childhood History:     Where was patient raised? [place]     Who raised the patient? Mother, Nabil Soria          How does patient describe their childhood? Youthful       Who is patient's primary support person? Nabil Soria       Culture and Gnosticism:     Gnosticism: Unknown    How strong of a role does Sikh and spirituality play in patient's life? Strong     Adventism or spiritual concerns regarding treatment: not applicable     History of Abuse:   History of Abuse: Denies    Psychiatric and Medical History:     History of psychiatric illness or treatment: prior inpatient treatment, has participated in counseling/psychotherapy on an outpatient basis in the past, and currently under psychiatric care    Medical history:   Past Medical History:   Diagnosis Date    History of psychiatric hospitalization     Hx of psychiatric care     Psychiatric problem     Schizophrenia     Suicide attempt        Substance Abuse History:     Alcohol - (Patient Perspective):   Social History     Substance and Sexual Activity   Alcohol Use Yes    Comment: pt reports drinking wine, two-four times monthly or less       Alcohol - (Collateral Perspective): None  according to Nabil Soria    Drugs - (Patient Perspective):   Social History     Substance and Sexual Activity   Drug Use Yes    Types: Marijuana, Benzodiazepines    Comment: pt reports taking benzo within the past few weeks to help sleep       Drugs - (Collateral Perspective): smokes marijuana according to Nabil Soria     Education:     Currently Enrolled? No  Attended College/Technical School    Special Education?  No    Interested in Completing Education/GED: No    Employment and Financial:     Currently employed? Unemployed     Source of Income:  none     Able to afford basic needs (food, shelter, utilities)? Mother provides basic needs     Who manages finances/personal affairs? Not applicable       Service:     ? no    Combat Service? No     Community Resources:     Describe present use of community resources: [place]     Identify previously used community resources   (Include previous mental health treatment - outpatient and inpatient): Per chart review several hospitalizations at [facility] between 2020 and currently.     Environmental:     Current living situation:Lives with family, Lives in home    Social Evaluation:     Patient Assets: General fund of knowledge, Capable of independent living, Work skills, and Ability for insight    Patient Limitations: unemployed     High risk psychosocial issues that may impact discharge planning:   Substance abuse, possible relapse, non compliance with medication     Recommendations:     Anticipated discharge plan:   Return to rehab facility, [facility]     High risk issues requiring early treatment planning and immediate intervention: psychosis    Community resources needed for discharge planning:  aftercare treatment sources    Anticipated social work role(s) in treatment and discharge planning: SW will facilitate process groups to assist pt develop healthy coping skills; CM will arrange outpatient follow-up appointments and assist with discharge planning.

## 2023-05-03 NOTE — PLAN OF CARE
POC reviewed this shift and is on going. Patient is withdrawn, depressed, calm, cooperative, quiet, and sleeping. Denies Suicidal Ideation, Homicidal Ideation, Auditory Hallucinations, Visual Hallucinations, Tactile Hallucinations, Gustatory Hallucinations, and Delusions. Patient isolating in his room. Patient only out on the floor for meals. [place] [facility] Discussed with patient to refrain from smoking marijuana. Patient participated in the group session conducted today. Patient continues to be calm and cooperative. Pt continues to be medication compliant and staff will continue to monitor pt closely while on the unit.

## 2023-05-03 NOTE — NURSING
Patient  calm and cooperative.  Patient is isolating himself from other peers.  Compliant with medication.  No concerns

## 2023-05-03 NOTE — CONSULTS
Received consult for new admit. Spoke with RN and RN stated pt has no dietary issues at this time. RD to sign off. Please consult if any nutrition/dietary issues arise.

## 2023-05-03 NOTE — PROGRESS NOTES
"PSYCHIATRY DAILY INPATIENT PROGRESS NOTE  SUBSEQUENT HOSPITAL VISIT    ENCOUNTER DATE: 5/3/2023  SITE: 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 The chart was reviewed.     Reviewed notes from Rns and MD and labs from the last 24 hours.    The patient's case was discussed with the treatment team/care providers today including Rns, CTRS, and SW    Staff reports no behavioral or management issues.     The patient has been compliant with treatment.      Subjective 05/03/2023       Today the patient reports that he is"doing well." He was somewhat irritable during the assessment. He was inappropriately focused on discharge.    He plans to go back to rehab once stable.    -he continues to deny al symptoms as documented below; However, symptoms of carley and psychosis appear better than yesterday but persist with noted fx/bx impairment. He remains an unreliable historian- he is likely minimizing symptoms to secure discharge.      The patient denies any side effects to medications.          Psychiatric ROS (observed, reported, or endorsed/denied):  Depressed mood - denies  Interest/pleasure/anhedonia: denies  Guilt/hopelessness/worthlessness - denies  Changes in Sleep - denies  Changes in Appetite - denies  Changes in Concentration - denies  Changes in Energy - denies  PMA/R- denies  Suicidal- active/passive ideations - denies  Homicidal ideations: active/passive ideations - denies    Hallucinations - denies  Delusions - denies  Disorganized behavior - denies  Disorganized speech - denies  Negative symptoms - denies    Elevated mood - denies  Decreased need for sleep - denies  Grandiosity - denies  Racing thoughts - denies  Impulsivity " - denies  Irritability- denies  Increased energy - denies  Distractibility - denies  Increase in goal-directed activity or PMA- denies    Symptoms of KANU - denies  Symptoms of Panic Disorder- denies  Symptoms of PTSD - denies        Overall progress: Patient is showing mild improvement         Psychotherapy:  Target symptoms: mood disorder, psychosis  Why chosen therapy is appropriate versus another modality: relevant to diagnosis, patient responds to this modality, evidence based practice  Outcome monitoring methods: self-report, observation  Therapeutic intervention type: insight oriented psychotherapy, behavior modifying psychotherapy, supportive psychotherapy, interactive psychotherapy  Topics discussed/themes: building skills sets for symptom management, symptom recognition; substance use  The patient's response to the intervention is accepting. The patient's progress toward treatment goals is limited.   Duration of intervention: 16 minutes.        Medical ROS  Constitutional: Negative.  Negative for chills and fever.   HENT: Negative.  Negative for hearing loss.    Eyes: Negative.  Negative for blurred vision and double vision.   Respiratory: Negative.  Negative for shortness of breath.    Cardiovascular: Negative.  Negative for chest pain and palpitations.   Gastrointestinal: Negative.  Negative for constipation, diarrhea, nausea and vomiting.   Genitourinary: Negative.  Negative for dysuria.   Musculoskeletal: Negative.  Negative for back pain and neck pain.   Skin: Negative.  Negative for rash.   Neurological: Negative.  Negative for dizziness and headaches.   Endo/Heme/Allergies: Negative.  Negative for environmental allergies.   Psychiatric/Behavioral:          See HPI       PAST MEDICAL HISTORY   Past Medical History:   Diagnosis Date    History of psychiatric hospitalization     Hx of psychiatric care     Psychiatric problem     Schizophrenia     Suicide attempt            PSYCHOTROPIC MEDICATIONS  "  Scheduled Meds:   carBAMazepine  400 mg Oral BID    haloperidoL  5 mg Oral BID     Continuous Infusions:  PRN Meds:.acetaminophen, aluminum-magnesium hydroxide-simethicone, benzonatate, benztropine mesylate, hydrOXYzine pamoate, loperamide, nicotine, OLANZapine **AND** OLANZapine, ondansetron, promethazine        EXAMINATION    VITALS   Vitals:    05/02/23 0105 05/02/23 0752 05/02/23 1925 05/03/23 0800   BP: 130/67 120/77 (!) 108/54 102/66   BP Location: Left arm Left arm Left arm Left arm   Patient Position: Sitting Sitting Sitting Standing   Pulse: 76 60 87 96   Resp: 20 18 20 20   Temp: 97.9 °F (36.6 °C) 98.1 °F (36.7 °C) 98.8 °F (37.1 °C) 98.8 °F (37.1 °C)   TempSrc: Oral Oral Oral Oral   SpO2: 96% 97% 95% 97%   Weight: 77.1 kg (170 lb)      Height: 6' 3" (1.905 m)          Body mass index is 21.25 kg/m².    CONSTITUTIONAL  General Appearance: unremarkable, age appropriate, normal weight, disheveled     MUSCULOSKELETAL  Muscle Strength and Tone:no tremor, no tic  Abnormal Involuntary Movements: None  Gait and Station: non-ataxic     PSYCHIATRIC   Level of Consciousness: awake and alert   Orientation: person, place, time and situation  Grooming: Hospital garb, disheveled  Psychomotor Behavior: normal, cooperative; no PMA/R  Speech: normal tone, normal rate, normal pitch, normal volume  Language: grossly intact, able to name, able to repeat  Mood: "fine"  Affect: less Intense  Thought Process: less circumstantial, less racing  Associations: intact   Thought Content: +delusions, denies SI, and denies HI  Perceptions: less observed RIS   Memory: Able to recall past events, Remote intact, and Recent intact  Attention:Attends to interview without distraction  Fund of Knowledge: Aware of current events and Vocabulary appropriate   Estimate if Intelligence:  Average based on work/education history, vocabulary and mental status exam  Insight: intact, limited awareness of illness- poor  Judgment: impaired due to " psychosis/carley - poor        DIAGNOSTIC TESTING   Laboratory Results  No results found for this or any previous visit (from the past 24 hour(s)).          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     Cannabis abuse     Psychosocial stressors     Elevated Liver Enzymes           PROBLEM LIST AND MANAGEMENT PLANS           Bipolar disorder, type I, MRE manic, severe with psychotic features: pt counseled  -Continue Aristada 1064mg given April 28, 2023 (?) will contact outpatient provider to verify- pending  - resumed/continue Haldol 5 mg PO BID   -resume/increase home tegretol from 200 TID to 400 mg po BID- continue at 400 mg po BID- check level in 3 days  - follow up with outpatient mental health providers after discharge for medication management and psychotherapy     Unspecified anxiety disorder  - started/continue hydroxyzine 50 mg PO q 6 hours PRN        Insomnia: pt counseled  -vistaril prn     Cannabis abuse: pt counseled  - SW consulted for assistance with additional resources   -return to rehab once stable        Psychosocial stressors  - pt counseled  - SW consulted for assistance with additional resources      Elevated Liver Enzymes: pt counseled  -resolved since stopping depakote; amee WNL      Discussed diagnosis, risks and benefits of proposed treatment vs alternative treatments vs no treatment, potential side effects of these treatments and the inherent unpredictability of treatment. The patient expresses understanding of the above and displays the capacity to agree with this treatment given said understanding. Patient also agrees that, currently, the benefits outweigh the risks and would like to pursue/continue treatment at this time.    Any medications being used off-label were discussed with the patient inclusive of the evidence base for the use of the  medications and consent was obtained for the off-label use of the medication.       DISCHARGE PLANNING  Expected Disposition Plan: Home or Self Care/rehab once stable      NEED FOR CONTINUED HOSPITALIZATION  Psychiatric illness continues to pose a potential threat to life or bodily function, of self or others, thereby requiring the need for continued inpatient psychiatric hospitalization: Yes, due to: significant psychotic thought disorder and gravely disabled, as evidenced by:  Ongoing concerns with grave disability with patient unable to perform basic feeding, hygiene and dressing activities without significant constant support. and Ongoing concerns with perceptual aberrancy and paranoid persecutory delusions leading to potential harm of self or others.    Protective inpatient pyschiatric hospitalization required while a safe disposition plan is enacted: Yes    Patient stabilized and ready for discharge from inpatient psychiatric unit: No        STAFF:   Frantz Cagle MD  Psychiatry

## 2023-05-04 PROCEDURE — 90833 PR PSYCHOTHERAPY W/PATIENT W/E&M, 30 MIN (ADD ON): ICD-10-PCS | Mod: AF,HB,, | Performed by: PSYCHIATRY & NEUROLOGY

## 2023-05-04 PROCEDURE — 25000003 PHARM REV CODE 250: Performed by: INTERNAL MEDICINE

## 2023-05-04 PROCEDURE — 99233 PR SUBSEQUENT HOSPITAL CARE,LEVL III: ICD-10-PCS | Mod: AF,HB,, | Performed by: PSYCHIATRY & NEUROLOGY

## 2023-05-04 PROCEDURE — 90833 PSYTX W PT W E/M 30 MIN: CPT | Mod: AF,HB,, | Performed by: PSYCHIATRY & NEUROLOGY

## 2023-05-04 PROCEDURE — 99233 SBSQ HOSP IP/OBS HIGH 50: CPT | Mod: AF,HB,, | Performed by: PSYCHIATRY & NEUROLOGY

## 2023-05-04 PROCEDURE — 11400000 HC PSYCH PRIVATE ROOM

## 2023-05-04 RX ADMIN — CARBAMAZEPINE 400 MG: 200 TABLET ORAL at 08:05

## 2023-05-04 RX ADMIN — HALOPERIDOL 5 MG: 5 TABLET ORAL at 08:05

## 2023-05-04 NOTE — PLAN OF CARE
CSW spoke with pt's mother, Nabil Soria, [phone]   I didn't know he was at the hospital until I spoke with him on yesterday.  He's been doing good at the rehab. He has been there about 2 weeks   Pt took shot with [facility] on 4/21/23  On 4/20/23 pt was having delusions, family and Cristy from [facility] attempted to have pt go to hospital, pt ran from [facility], pt eventually went to ED, was given a shot but sent home.   Pt was given a choice of half-way or rehab, Pt chose to go to rehab.   Mother is willing to transport pt from hospital back to rehab.

## 2023-05-04 NOTE — PLAN OF CARE
POC reviewed this shift and is on going. Patient is withdrawn, depressed, cooperative, and anxious.  Denies Suicidal Ideation, Homicidal Ideation, Auditory Hallucinations, Visual Hallucinations, Tactile Hallucinations, Gustatory Hallucinations, and Delusions. Minimal interaction with peers,isolates to self. Pt continues to be medication compliant and staff will continue to monitor pt closely while on the unit.

## 2023-05-04 NOTE — PLAN OF CARE
Problem: Adult Behavioral Health Plan of Care  Goal: Optimized Coping Skills in Response to Life Stressors  Intervention: Promote Effective Coping Strategies  Flowsheets (Taken 5/2/2023 1230)  Supportive Measures:   active listening utilized   verbalization of feelings encouraged   self-reflection promoted       Behavior: pt was engaged during group     Intervention: In this CBT-focused process group CSW facilitated a group discussion on personal strengths and weaknesses. Pt was asked to identify strengths and weaknesses of a tiger, turtle, and ant. Pt was then asked to identify which animal they relate to and why.      Response: Pt was able to identify strengths and weaknesses of different animals. Pt identified relation to the tiger, pt shared he is resilient and has the ability to dominate whatever he's going through.     Plan: Continue to encourage pt to participate in process groups to verbalize feelings and develop healthy coping skills.

## 2023-05-04 NOTE — PLAN OF CARE
Problem: Violence Risk or Actual  Goal: Anger and Impulse Control  Outcome: Ongoing, Progressing     Problem: Adult Behavioral Health Plan of Care  Goal: Plan of Care Review  Outcome: Ongoing, Progressing  Goal: Patient-Specific Goal (Individualization)  Outcome: Ongoing, Progressing  Goal: Adheres to Safety Considerations for Self and Others  Outcome: Ongoing, Progressing  Goal: Absence of New-Onset Illness or Injury  Outcome: Ongoing, Progressing  Goal: Optimized Coping Skills in Response to Life Stressors  Outcome: Ongoing, Progressing  Goal: Develops/Participates in Therapeutic [place] to Support Successful Transition  Outcome: Ongoing, Progressing  Goal: Rounds/Family Conference  Outcome: Ongoing, Progressing     Problem: Activity and Energy Impairment (Excessive Substance Use)  Goal: Optimized Energy Level (Excessive Substance Use)  Outcome: Ongoing, Progressing     Problem: Behavior Regulation Impairment (Excessive Substance Use)  Goal: Improved Behavioral Control (Excessive Substance Use)  Outcome: Ongoing, Progressing     Problem: Decreased Participation and Engagement (Excessive Substance Use)  Goal: Increased Participation and Engagement (Excessive Substance Use)  Outcome: Ongoing, Progressing     Problem: Physiologic Impairment (Excessive Substance Use)  Goal: Improved Physiologic Symptoms (Excessive Substance Use)  Outcome: Ongoing, Progressing     Problem: Social, Occupational or Functional Impairment (Excessive Substance Use)  Goal: Enhanced Social, Occupational or Functional Skills (Excessive Substance Use)  Outcome: Ongoing, Progressing     Problem: Decreased Participation and Engagement (Psychotic Signs/Symptoms)  Goal: Increased Participation and Engagement (Psychotic Signs/Symptoms)  Outcome: Ongoing, Progressing     Problem: Mood Impairment (Psychotic Signs/Symptoms)  Goal: Improved Mood Symptoms (Psychotic Signs/Symptoms)  Outcome: Ongoing, Progressing     Problem: Social, Occupational or  Functional Impairment (Psychotic Signs/Symptoms)  Goal: Enhanced Social, Occupational or Functional Skills (Psychotic Signs/Symptoms)  Outcome: Ongoing, Progressing     Problem: Behavior Regulation Impairment (Disruptive Behavior)  Goal: Improved Impulse and Aggression Control (Disruptive Behavior)  Outcome: Ongoing, Progressing     Problem: Sleep Disturbance (Disruptive Behavior)  Goal: Improved Sleep (Disruptive Behavior)  Outcome: Ongoing, Progressing     Problem: Coping Ineffective  Goal: Effective Coping  Outcome: Ongoing, Progressing

## 2023-05-04 NOTE — PLAN OF CARE
Problem: Adult Behavioral Health Plan of Care  Goal: Optimized Coping Skills in Response to Life Stressors  Intervention: Promote Effective Coping Strategies  5/4/2023 1432 by ARVIND Neal   Flowsheets (Taken 5/3/2023 1115)  Supportive Measures:   active listening utilized   verbalization of feelings encouraged   self-reflection promoted     Behavior: pt was engaged during group    Intervention: In this CBT-focused process group CSW facilitated a group discussion on different types of communication, including assertive, non-assertive, and aggressive.  Each patient was asked to identify their primary way of communicating and discuss ways in which that type of communication is/isn't helpful, and ways to communicate more appropriately, if applicable.      Response: pt identified himself as assertive. Pt shared I know how to talk to people. Pt denies any need to improve communication.     Plan: Continue to encourage pt to participate in process groups to verbalize feelings and develop healthy coping skills.

## 2023-05-04 NOTE — PLAN OF CARE
Problem: Violence Risk or Actual  Goal: Anger and Impulse Control  Outcome: Ongoing, Not Progressing     Problem: Adult Behavioral Health Plan of Care  Goal: Plan of Care Review  Outcome: Ongoing, Not Progressing  Goal: Patient-Specific Goal (Individualization)  Outcome: Ongoing, Not Progressing  Goal: Adheres to Safety Considerations for Self and Others  Outcome: Ongoing, Not Progressing  Goal: Absence of New-Onset Illness or Injury  Outcome: Ongoing, Not Progressing  Goal: Optimized Coping Skills in Response to Life Stressors  Outcome: Ongoing, Not Progressing  Goal: Develops/Participates in Therapeutic [place] to Support Successful Transition  Outcome: Ongoing, Not Progressing  Goal: Rounds/Family Conference  Outcome: Ongoing, Not Progressing     Problem: Activity and Energy Impairment (Excessive Substance Use)  Goal: Optimized Energy Level (Excessive Substance Use)  Outcome: Ongoing, Not Progressing     Problem: Behavior Regulation Impairment (Excessive Substance Use)  Goal: Improved Behavioral Control (Excessive Substance Use)  Outcome: Ongoing, Not Progressing     Problem: Decreased Participation and Engagement (Excessive Substance Use)  Goal: Increased Participation and Engagement (Excessive Substance Use)  Outcome: Ongoing, Not Progressing     Problem: Physiologic Impairment (Excessive Substance Use)  Goal: Improved Physiologic Symptoms (Excessive Substance Use)  Outcome: Ongoing, Not Progressing     Problem: Social, Occupational or Functional Impairment (Excessive Substance Use)  Goal: Enhanced Social, Occupational or Functional Skills (Excessive Substance Use)  Outcome: Ongoing, Not Progressing     Problem: Decreased Participation and Engagement (Psychotic Signs/Symptoms)  Goal: Increased Participation and Engagement (Psychotic Signs/Symptoms)  Outcome: Ongoing, Not Progressing     Problem: Mood Impairment (Psychotic Signs/Symptoms)  Goal: Improved Mood Symptoms (Psychotic Signs/Symptoms)  Outcome:  Ongoing, Not Progressing     Problem: Social, Occupational or Functional Impairment (Psychotic Signs/Symptoms)  Goal: Enhanced Social, Occupational or Functional Skills (Psychotic Signs/Symptoms)  Outcome: Ongoing, Not Progressing     Problem: Behavior Regulation Impairment (Disruptive Behavior)  Goal: Improved Impulse and Aggression Control (Disruptive Behavior)  Outcome: Ongoing, Not Progressing     Problem: Sleep Disturbance (Disruptive Behavior)  Goal: Improved Sleep (Disruptive Behavior)  Outcome: Ongoing, Not Progressing     Problem: Coping Ineffective  Goal: Effective Coping  Outcome: Ongoing, Not Progressing

## 2023-05-04 NOTE — PROGRESS NOTES
"PSYCHIATRY DAILY INPATIENT PROGRESS NOTE  SUBSEQUENT HOSPITAL VISIT    ENCOUNTER DATE: 5/4/2023  SITE: 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 The chart was reviewed.     Reviewed notes from Rns and RD and labs from the last 24 hours.    The patient's case was discussed with the treatment team/care providers today including Rns, CTRS, and SW    Staff reports no behavioral or management issues.     The patient has been compliant with treatment.      Subjective 05/04/2023       Today the patient reports that he is "doing well." He is happy to have his wig back. He was again somewhat irritable during the assessment, especially when discussing precipitants. He was again inappropriately focused on discharge.    He plans to go back to rehab once stable.    -he continues to deny all symptoms as documented below; However, symptoms of carley and psychosis appear better than yesterday but persist with noted fx/bx impairment. He remains an unreliable historian- he is likely minimizing symptoms to secure discharge.    Meds are being adjusted and have been well tolerated.    The patient denies any side effects to medications.          Psychiatric ROS (observed, reported, or endorsed/denied):  Depressed mood - denies  Interest/pleasure/anhedonia: denies  Guilt/hopelessness/worthlessness - denies  Changes in Sleep - denies  Changes in Appetite - denies  Changes in Concentration - denies  Changes in Energy - denies  PMA/R- denies  Suicidal- active/passive ideations - denies  Homicidal ideations: active/passive ideations - denies    Hallucinations - denies  Delusions - denies  Disorganized behavior - denies  Disorganized speech - " denies  Negative symptoms - denies    Elevated mood - denies  Decreased need for sleep - denies  Grandiosity - denies  Racing thoughts - denies  Impulsivity - denies  Irritability- denies  Increased energy - denies  Distractibility - denies  Increase in goal-directed activity or PMA- denies    Symptoms of KANU - denies  Symptoms of Panic Disorder- denies  Symptoms of PTSD - denies        Overall progress: Patient is showing mild improvement         Psychotherapy:  Target symptoms: mood disorder, psychosis  Why chosen therapy is appropriate versus another modality: relevant to diagnosis, patient responds to this modality, evidence based practice  Outcome monitoring methods: self-report, observation  Therapeutic intervention type: insight oriented psychotherapy, behavior modifying psychotherapy, supportive psychotherapy, interactive psychotherapy  Topics discussed/themes: building skills sets for symptom management, symptom recognition; substance use  The patient's response to the intervention is accepting. The patient's progress toward treatment goals is limited.   Duration of intervention: 16 minutes.        Medical ROS  Constitutional: Negative.  Negative for chills and fever.   HENT: Negative.  Negative for hearing loss.    Eyes: Negative.  Negative for blurred vision and double vision.   Respiratory: Negative.  Negative for shortness of breath.    Cardiovascular: Negative.  Negative for chest pain and palpitations.   Gastrointestinal: Negative.  Negative for constipation, diarrhea, nausea and vomiting.   Genitourinary: Negative.  Negative for dysuria.   Musculoskeletal: Negative.  Negative for back pain and neck pain.   Skin: Negative.  Negative for rash.   Neurological: Negative.  Negative for dizziness and headaches.   Endo/Heme/Allergies: Negative.  Negative for environmental allergies.   Psychiatric/Behavioral:          See HPI       PAST MEDICAL HISTORY   Past Medical History:   Diagnosis Date    History of  "psychiatric hospitalization     Hx of psychiatric care     Psychiatric problem     Schizophrenia     Suicide attempt            PSYCHOTROPIC MEDICATIONS   Scheduled Meds:   carBAMazepine  400 mg Oral BID    haloperidoL  5 mg Oral BID     Continuous Infusions:  PRN Meds:.acetaminophen, aluminum-magnesium hydroxide-simethicone, benzonatate, benztropine mesylate, hydrOXYzine pamoate, loperamide, nicotine, OLANZapine **AND** OLANZapine, ondansetron, promethazine        EXAMINATION    VITALS   Vitals:    05/02/23 1925 05/03/23 0800 05/03/23 1912 05/04/23 0751   BP: (!) 108/54 102/66 (!) 108/56 114/72   BP Location: Left arm Left arm Left arm Left arm   Patient Position: Sitting Standing Sitting Standing   Pulse: 87 96 109 88   Resp: 20 20 16 20   Temp: 98.8 °F (37.1 °C) 98.8 °F (37.1 °C) 98.9 °F (37.2 °C) 98.2 °F (36.8 °C)   TempSrc: Oral Oral Oral Oral   SpO2: 95% 97% 97% 98%   Weight:       Height:           Body mass index is 21.25 kg/m².    CONSTITUTIONAL  General Appearance: unremarkable, age appropriate, normal weight, disheveled     MUSCULOSKELETAL  Muscle Strength and Tone:no tremor, no tic  Abnormal Involuntary Movements: None  Gait and Station: non-ataxic     PSYCHIATRIC   Level of Consciousness: awake and alert   Orientation: person, place, time and situation  Grooming: Hospital garb, disheveled  Psychomotor Behavior: normal, cooperative; no PMA/R  Speech: normal tone, normal rate, normal pitch, normal volume  Language: grossly intact, able to name, able to repeat  Mood: "fine"  Affect: less Intense  Thought Process: less circumstantial, less racing  Associations: intact   Thought Content: +delusions, denies SI, and denies HI  Perceptions: less observed RIS   Memory: Able to recall past events, Remote intact, and Recent intact  Attention:Attends to interview without distraction  Fund of Knowledge: Aware of current events and Vocabulary appropriate   Estimate if Intelligence:  Average based on work/education " history, vocabulary and mental status exam  Insight: intact, limited awareness of illness- poor  Judgment: impaired due to psychosis/carley - poor        DIAGNOSTIC TESTING   Laboratory Results  No results found for this or any previous visit (from the past 24 hour(s)).          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     Cannabis abuse     Psychosocial stressors     Elevated Liver Enzymes           PROBLEM LIST AND MANAGEMENT PLANS           Bipolar disorder, type I, MRE manic, severe with psychotic features: pt counseled  -Continue Aristada 1064mg given April 28, 2023 (?) will contact outpatient provider to verify- pending  - resumed/continue Haldol 5 mg PO BID   -resume/increase home tegretol from 200 TID to 400 mg po BID- continue at 400 mg po BID- check level in 2 days  - follow up with outpatient mental health providers after discharge for medication management and psychotherapy     Unspecified anxiety disorder  - started/continue hydroxyzine 50 mg PO q 6 hours PRN        Insomnia: pt counseled  -vistaril prn     Cannabis abuse: pt counseled  - SW consulted for assistance with additional resources   -return to rehab once stable        Psychosocial stressors  - pt counseled  - SW consulted for assistance with additional resources      Elevated Liver Enzymes: pt counseled  -resolved since stopping depakote; amee DICKEY      Discussed diagnosis, risks and benefits of proposed treatment vs alternative treatments vs no treatment, potential side effects of these treatments and the inherent unpredictability of treatment. The patient expresses understanding of the above and displays the capacity to agree with this treatment given said understanding. Patient also agrees that, currently, the benefits outweigh the risks and would like to pursue/continue treatment at this  time.    Any medications being used off-label were discussed with the patient inclusive of the evidence base for the use of the medications and consent was obtained for the off-label use of the medication.       DISCHARGE PLANNING  Expected Disposition Plan: Home or Self Care/rehab once stable      NEED FOR CONTINUED HOSPITALIZATION  Psychiatric illness continues to pose a potential threat to life or bodily function, of self or others, thereby requiring the need for continued inpatient psychiatric hospitalization: Yes, due to: significant psychotic thought disorder and gravely disabled, as evidenced by:  Ongoing concerns with grave disability with patient unable to perform basic feeding, hygiene and dressing activities without significant constant support. and Ongoing concerns with perceptual aberrancy and paranoid persecutory delusions leading to potential harm of self or others.    Protective inpatient pyschiatric hospitalization required while a safe disposition plan is enacted: Yes    Patient stabilized and ready for discharge from inpatient psychiatric unit: No        STAFF:   Frantz Cagle MD  Psychiatry

## 2023-05-05 PROCEDURE — 25000003 PHARM REV CODE 250: Performed by: INTERNAL MEDICINE

## 2023-05-05 PROCEDURE — 90833 PR PSYCHOTHERAPY W/PATIENT W/E&M, 30 MIN (ADD ON): ICD-10-PCS | Mod: SA,HB,, | Performed by: PSYCHIATRY & NEUROLOGY

## 2023-05-05 PROCEDURE — 99232 PR SUBSEQUENT HOSPITAL CARE,LEVL II: ICD-10-PCS | Mod: SA,HB,, | Performed by: PSYCHIATRY & NEUROLOGY

## 2023-05-05 PROCEDURE — 11400000 HC PSYCH PRIVATE ROOM

## 2023-05-05 PROCEDURE — 90833 PSYTX W PT W E/M 30 MIN: CPT | Mod: SA,HB,, | Performed by: PSYCHIATRY & NEUROLOGY

## 2023-05-05 PROCEDURE — 99232 SBSQ HOSP IP/OBS MODERATE 35: CPT | Mod: SA,HB,, | Performed by: PSYCHIATRY & NEUROLOGY

## 2023-05-05 RX ADMIN — CARBAMAZEPINE 400 MG: 200 TABLET ORAL at 08:05

## 2023-05-05 RX ADMIN — HALOPERIDOL 5 MG: 5 TABLET ORAL at 08:05

## 2023-05-05 NOTE — PROGRESS NOTES
"  PSYCHIATRY DAILY INPATIENT PROGRESS NOTE  SUBSEQUENT HOSPITAL VISIT    ENCOUNTER DATE: 5/5/2023  SITE: 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 The chart was reviewed.     Reviewed notes from Rns and RD and labs from the last 24 hours.    The patient's case was discussed with the treatment team/care providers today including Rns, CTRS, and SW    Staff reports no behavioral or management issues.     The patient has been compliant with treatment.      Subjective 05/05/2023       Patient reports mood is "good." Currently denies suicidal/homicidal ideations. Nursing notes indicate patient observed to be intermittently responding to internal stimuli, however patient currently denies auditory/visual hallucinations. Reports reason for hospitalization is "I kind of got into it with another ivan at the rehab" who was apparently making offensive remarks regarding his sexuality. Patient states that he has spoken to  at rehab ([facility]) who told him he would be able to return to the program. This has not yet been verified.     Pt reports he has been sleeping and eating well. Denies SE of current medication regimen.           Psychiatric ROS (observed, reported, or endorsed/denied):  Depressed mood - denies  Interest/pleasure/anhedonia: denies  Guilt/hopelessness/worthlessness - denies  Changes in Sleep - denies  Changes in Appetite - denies  Changes in Concentration - denies  Changes in Energy - denies  PMA/R- denies  Suicidal- active/passive ideations - denies  Homicidal ideations: active/passive ideations - denies    Hallucinations - denies  Delusions - denies  Disorganized behavior - denies  Disorganized speech - " denies  Negative symptoms - denies    Elevated mood - denies  Decreased need for sleep - denies  Grandiosity - denies  Racing thoughts - denies  Impulsivity - denies  Irritability- denies  Increased energy - denies  Distractibility - denies  Increase in goal-directed activity or PMA- denies    Symptoms of KANU - denies  Symptoms of Panic Disorder- denies  Symptoms of PTSD - denies        Overall progress: Patient is showing mild improvement         Psychotherapy:  Target symptoms: mood disorder, psychosis  Why chosen therapy is appropriate versus another modality: relevant to diagnosis, patient responds to this modality, evidence based practice  Outcome monitoring methods: self-report, observation  Therapeutic intervention type: insight oriented psychotherapy, behavior modifying psychotherapy, supportive psychotherapy, interactive psychotherapy  Topics discussed/themes: building skills sets for symptom management, symptom recognition; substance use  The patient's response to the intervention is accepting. The patient's progress toward treatment goals is limited.   Duration of intervention: 16 minutes.        Medical ROS  Constitutional: Negative.  Negative for chills and fever.   HENT: Negative.  Negative for hearing loss.    Eyes: Negative.  Negative for blurred vision and double vision.   Respiratory: Negative.  Negative for shortness of breath.    Cardiovascular: Negative.  Negative for chest pain and palpitations.   Gastrointestinal: Negative.  Negative for constipation, diarrhea, nausea and vomiting.   Genitourinary: Negative.  Negative for dysuria.   Musculoskeletal: Negative.  Negative for back pain and neck pain.   Skin: Negative.  Negative for rash.   Neurological: Negative.  Negative for dizziness and headaches.   Endo/Heme/Allergies: Negative.  Negative for environmental allergies.   Psychiatric/Behavioral:          See HPI       PAST MEDICAL HISTORY   Past Medical History:   Diagnosis Date    History of  "psychiatric hospitalization     Hx of psychiatric care     Psychiatric problem     Schizophrenia     Suicide attempt            PSYCHOTROPIC MEDICATIONS   Scheduled Meds:   carBAMazepine  400 mg Oral BID    haloperidoL  5 mg Oral BID     Continuous Infusions:  PRN Meds:.acetaminophen, aluminum-magnesium hydroxide-simethicone, benzonatate, benztropine mesylate, hydrOXYzine pamoate, loperamide, nicotine, OLANZapine **AND** OLANZapine, ondansetron, promethazine        EXAMINATION    VITALS   Vitals:    05/03/23 1912 05/04/23 0751 05/04/23 1914 05/05/23 0750   BP: (!) 108/56 114/72 113/61 (!) 106/55   BP Location: Left arm Left arm Left arm Left arm   Patient Position: Sitting Standing Sitting Sitting   Pulse: 109 88 70 66   Resp: 16 20 16 18   Temp: 98.9 °F (37.2 °C) 98.2 °F (36.8 °C) 98.2 °F (36.8 °C) 99.2 °F (37.3 °C)   TempSrc: Oral Oral Oral Oral   SpO2: 97% 98% 97% 99%   Weight:       Height:           Body mass index is 21.25 kg/m².    CONSTITUTIONAL  General Appearance: unremarkable, age appropriate, normal weight, disheveled     MUSCULOSKELETAL  Muscle Strength and Tone:no tremor, no tic  Abnormal Involuntary Movements: None  Gait and Station: non-ataxic     PSYCHIATRIC   Level of Consciousness: awake and alert   Orientation: person, place, time and situation  Grooming: Hospital garb, disheveled  Psychomotor Behavior: normal, cooperative; no PMA/R  Speech: normal tone, normal rate, normal pitch, normal volume  Language: grossly intact, able to name, able to repeat  Mood: "fine"  Affect: less Intense  Thought Process: less circumstantial, less racing  Associations: intact   Thought Content: +delusions, denies SI, and denies HI  Perceptions: less observed RIS   Memory: Able to recall past events, Remote intact, and Recent intact  Attention:Attends to interview without distraction  Fund of Knowledge: Aware of current events and Vocabulary appropriate   Estimate if Intelligence:  Average based on work/education " history, vocabulary and mental status exam  Insight: intact, limited awareness of illness- poor  Judgment: impaired due to psychosis/carley - poor        DIAGNOSTIC TESTING   Laboratory Results  No results found for this or any previous visit (from the past 24 hour(s)).          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     Cannabis abuse     Psychosocial stressors     Elevated Liver Enzymes           PROBLEM LIST AND MANAGEMENT PLANS           Bipolar disorder, type I, MRE manic, severe with psychotic features: pt counseled  -Continue Aristada 1064mg given April 28, 2023 (?) will contact outpatient provider to verify- pending  - resumed/continue Haldol 5 mg PO BID   -resume/increase home tegretol from 200 TID to 400 mg po BID- continue at 400 mg po BID- check level in 2 days  - follow up with outpatient mental health providers after discharge for medication management and psychotherapy     Unspecified anxiety disorder  - started/continue hydroxyzine 50 mg PO q 6 hours PRN        Insomnia: pt counseled  -vistaril prn     Cannabis abuse: pt counseled  - SW consulted for assistance with additional resources   -return to rehab once stable        Psychosocial stressors  - pt counseled  - SW consulted for assistance with additional resources      Elevated Liver Enzymes: pt counseled  -resolved since stopping depakote; amee DICKEY      Discussed diagnosis, risks and benefits of proposed treatment vs alternative treatments vs no treatment, potential side effects of these treatments and the inherent unpredictability of treatment. The patient expresses understanding of the above and displays the capacity to agree with this treatment given said understanding. Patient also agrees that, currently, the benefits outweigh the risks and would like to pursue/continue treatment at this  time.    Any medications being used off-label were discussed with the patient inclusive of the evidence base for the use of the medications and consent was obtained for the off-label use of the medication.       DISCHARGE PLANNING  Expected Disposition Plan: Home or Self Care/rehab once stable      NEED FOR CONTINUED HOSPITALIZATION  Psychiatric illness continues to pose a potential threat to life or bodily function, of self or others, thereby requiring the need for continued inpatient psychiatric hospitalization: Yes, due to: significant psychotic thought disorder and gravely disabled, as evidenced by:  Ongoing concerns with grave disability with patient unable to perform basic feeding, hygiene and dressing activities without significant constant support. and Ongoing concerns with perceptual aberrancy and paranoid persecutory delusions leading to potential harm of self or others.    Protective inpatient pyschiatric hospitalization required while a safe disposition plan is enacted: Yes    Patient stabilized and ready for discharge from inpatient psychiatric unit: No        STAFF:   Jl Aguilar NP  Psychiatry

## 2023-05-05 NOTE — PLAN OF CARE
Problem: Adult Behavioral Health Plan of Care  Goal: Optimized Coping Skills in Response to Life Stressors  Intervention: Promote Effective Coping Strategies  Flowsheets (Taken 5/4/2023 1215)  Supportive Measures:   active listening utilized   verbalization of feelings encouraged   self-reflection promoted   positive reinforcement provided       Behavior: pt was engaged during group    Intervention: In this CBT-focused process group CSW facilitated discussion on positive affirmations. Each pt must identify 3 positive affirmations they can find relation to regarding themselves or their treatment.     Response: pt identified I am free to make my own choices (tend to let other dictate his choices), I deserved to be loved (I dont date but I do deserve love), and I can make a difference (there's boys that cant be who they are).           Plan: Continue to encourage pt to participate in process groups to verbalize feelings and develop healthy coping skills.

## 2023-05-05 NOTE — PLAN OF CARE
Problem: Adult Behavioral Health Plan of Care  Goal: Plan of Care Review  Outcome: Ongoing, Progressing     Problem: Adult Behavioral Health Plan of Care  Goal: Patient-Specific Goal (Individualization)  Outcome: Ongoing, Progressing     Problem: Adult Behavioral Health Plan of Care  Goal: Optimized Coping Skills in Response to Life Stressors  Outcome: Ongoing, Progressing     Problem: Social, Occupational or Functional Impairment (Psychotic Signs/Symptoms)  Goal: Enhanced Social, Occupational or Functional Skills (Psychotic Signs/Symptoms)  Outcome: Ongoing, Progressing     Problem: Coping Ineffective  Goal: Effective Coping  Outcome: Ongoing, Progressing

## 2023-05-05 NOTE — PLAN OF CARE
Problem: Violence Risk or Actual  Goal: Anger and Impulse Control  Outcome: Ongoing, Not Progressing     Problem: Adult Behavioral Health Plan of Care  Goal: Plan of Care Review  Outcome: Ongoing, Not Progressing  Goal: Patient-Specific Goal (Individualization)  Outcome: Ongoing, Not Progressing  Goal: Adheres to Safety Considerations for Self and Others  Outcome: Ongoing, Not Progressing  Goal: Absence of New-Onset Illness or Injury  Outcome: Ongoing, Not Progressing  Goal: Optimized Coping Skills in Response to Life Stressors  Outcome: Ongoing, Not Progressing  Goal: Develops/Participates in Therapeutic [place] to Support Successful Transition  Outcome: Ongoing, Not Progressing  Goal: Rounds/Family Conference  Outcome: Ongoing, Not Progressing     Problem: Activity and Energy Impairment (Excessive Substance Use)  Goal: Optimized Energy Level (Excessive Substance Use)  Outcome: Ongoing, Not Progressing     Problem: Behavior Regulation Impairment (Excessive Substance Use)  Goal: Improved Behavioral Control (Excessive Substance Use)  Outcome: Ongoing, Not Progressing     Problem: Decreased Participation and Engagement (Excessive Substance Use)  Goal: Increased Participation and Engagement (Excessive Substance Use)  Outcome: Ongoing, Not Progressing     Problem: Physiologic Impairment (Excessive Substance Use)  Goal: Improved Physiologic Symptoms (Excessive Substance Use)  Outcome: Ongoing, Not Progressing     Problem: Social, Occupational or Functional Impairment (Excessive Substance Use)  Goal: Enhanced Social, Occupational or Functional Skills (Excessive Substance Use)  Outcome: Ongoing, Not Progressing     Problem: Decreased Participation and Engagement (Psychotic Signs/Symptoms)  Goal: Increased Participation and Engagement (Psychotic Signs/Symptoms)  Outcome: Ongoing, Not Progressing     Problem: Mood Impairment (Psychotic Signs/Symptoms)  Goal: Improved Mood Symptoms (Psychotic Signs/Symptoms)  Outcome:  Ongoing, Not Progressing     Problem: Social, Occupational or Functional Impairment (Psychotic Signs/Symptoms)  Goal: Enhanced Social, Occupational or Functional Skills (Psychotic Signs/Symptoms)  Outcome: Ongoing, Not Progressing     Problem: Behavior Regulation Impairment (Disruptive Behavior)  Goal: Improved Impulse and Aggression Control (Disruptive Behavior)  Outcome: Ongoing, Not Progressing     Problem: Sleep Disturbance (Disruptive Behavior)  Goal: Improved Sleep (Disruptive Behavior)  Outcome: Ongoing, Not Progressing     Problem: Coping Ineffective  Goal: Effective Coping  Outcome: Ongoing, Not Progressing

## 2023-05-05 NOTE — NURSING
Pt in activity room standing by the window.  Pt has been in his room most of the day.  Out of room for meals and snacks.  Pt preoccupied with going back to his rehab program at [facility].   Discharge planner was notified by [facility] that they do not take admits on weekends.  Pt remains anxious, withdrawn, isolative.  Pt noted to be talking and laughing to himself at times.  Pt eating 100 percent of meals and snacks.  Pt taking meds with no side effects noted.  Pt instructed to call for any needs or concerns at all.  Verbalized understanding.  Will cont to monitor for safety.

## 2023-05-06 PROCEDURE — 11400000 HC PSYCH PRIVATE ROOM

## 2023-05-06 PROCEDURE — 99232 PR SUBSEQUENT HOSPITAL CARE,LEVL II: ICD-10-PCS | Mod: AF,HB,, | Performed by: PSYCHIATRY & NEUROLOGY

## 2023-05-06 PROCEDURE — 25000003 PHARM REV CODE 250: Performed by: INTERNAL MEDICINE

## 2023-05-06 PROCEDURE — 99232 SBSQ HOSP IP/OBS MODERATE 35: CPT | Mod: AF,HB,, | Performed by: PSYCHIATRY & NEUROLOGY

## 2023-05-06 RX ADMIN — HALOPERIDOL 5 MG: 5 TABLET ORAL at 08:05

## 2023-05-06 RX ADMIN — CARBAMAZEPINE 400 MG: 200 TABLET ORAL at 08:05

## 2023-05-06 NOTE — PROGRESS NOTES
"  PSYCHIATRY DAILY INPATIENT PROGRESS NOTE  SUBSEQUENT HOSPITAL VISIT    ENCOUNTER DATE: 5/6/2023  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 The chart was reviewed.     Reviewed notes from Rns and RD and labs from the last 24 hours.    The patient's case was discussed with the treatment team/care providers today including Rns, CTRS, and SW    Staff reports no behavioral or management issues.     The patient has been compliant with treatment.      Subjective 05/06/2023       Pt reports he is doing alright today. States that his mood is "pretty good." Denies hearing voices at this time. States he is here because he was arguing with another resident at [facility], which got him upset. States when he gets upset he talks to himself and the facility thought he was RIS and sent him to the hospital. States he does not feel that he needs to be in the hospital. Understands that Tegretol level is being collected tomorrow, and that intake back into rehab is likely Monday.     Patient states that he has spoken to  at rehab ([facility]) who told him he would be able to return to the program. This has not yet been verified.     Pt reports he has been sleeping and eating well. Denies SE of current medication regimen.           Psychiatric ROS (observed, reported, or endorsed/denied):  Depressed mood - denies  Interest/pleasure/anhedonia: denies  Guilt/hopelessness/worthlessness - denies  Changes in Sleep - denies  Changes in Appetite - denies  Changes in Concentration - denies  Changes in Energy - denies  PMA/R- denies  Suicidal- active/passive ideations - denies  Homicidal ideations: active/passive ideations - " denies    Hallucinations - denies  Delusions - denies  Disorganized behavior - denies  Disorganized speech - denies  Negative symptoms - denies    Elevated mood - denies  Decreased need for sleep - denies  Grandiosity - denies  Racing thoughts - denies  Impulsivity - denies  Irritability- denies  Increased energy - denies  Distractibility - denies  Increase in goal-directed activity or PMA- denies    Symptoms of KANU - denies  Symptoms of Panic Disorder- denies  Symptoms of PTSD - denies        Overall progress: Patient is showing mild improvement         Psychotherapy:  Target symptoms: mood disorder, psychosis  Why chosen therapy is appropriate versus another modality: relevant to diagnosis, patient responds to this modality, evidence based practice  Outcome monitoring methods: self-report, observation  Therapeutic intervention type: insight oriented psychotherapy, behavior modifying psychotherapy, supportive psychotherapy, interactive psychotherapy  Topics discussed/themes: building skills sets for symptom management, symptom recognition; substance use  The patient's response to the intervention is accepting. The patient's progress toward treatment goals is limited.   Duration of intervention: 16 minutes.        Medical ROS  Constitutional: Negative.  Negative for chills and fever.   HENT: Negative.  Negative for hearing loss.    Eyes: Negative.  Negative for blurred vision and double vision.   Respiratory: Negative.  Negative for shortness of breath.    Cardiovascular: Negative.  Negative for chest pain and palpitations.   Gastrointestinal: Negative.  Negative for constipation, diarrhea, nausea and vomiting.   Genitourinary: Negative.  Negative for dysuria.   Musculoskeletal: Negative.  Negative for back pain and neck pain.   Skin: Negative.  Negative for rash.   Neurological: Negative.  Negative for dizziness and headaches.   Endo/Heme/Allergies: Negative.  Negative for environmental allergies.  "  Psychiatric/Behavioral:          See HPI       PAST MEDICAL HISTORY   Past Medical History:   Diagnosis Date    History of psychiatric hospitalization     Hx of psychiatric care     Psychiatric problem     Schizophrenia     Suicide attempt            PSYCHOTROPIC MEDICATIONS   Scheduled Meds:   carBAMazepine  400 mg Oral BID    haloperidoL  5 mg Oral BID     Continuous Infusions:  PRN Meds:.acetaminophen, aluminum-magnesium hydroxide-simethicone, benzonatate, benztropine mesylate, hydrOXYzine pamoate, loperamide, nicotine, OLANZapine **AND** OLANZapine, ondansetron, promethazine        EXAMINATION    VITALS   Vitals:    05/04/23 1914 05/05/23 0750 05/05/23 1918 05/06/23 0753   BP: 113/61 (!) 106/55 (!) 109/56 117/70   BP Location: Left arm Left arm Left arm Right arm   Patient Position: Sitting Sitting Sitting Sitting   Pulse: 70 66 90 72   Resp: 16 18 20 18   Temp: 98.2 °F (36.8 °C) 99.2 °F (37.3 °C) 99.1 °F (37.3 °C) 98.2 °F (36.8 °C)   TempSrc: Oral Oral Oral Oral   SpO2: 97% 99% 97% 97%   Weight:       Height:           Body mass index is 21.25 kg/m².    CONSTITUTIONAL  General Appearance: unremarkable, age appropriate, normal weight, disheveled     MUSCULOSKELETAL  Muscle Strength and Tone:no tremor, no tic  Abnormal Involuntary Movements: None  Gait and Station: non-ataxic     PSYCHIATRIC   Level of Consciousness: awake and alert   Orientation: person, place, time and situation  Grooming: Hospital garb, disheveled  Psychomotor Behavior: normal, cooperative; no PMA/R  Speech: normal tone, normal rate, normal pitch, normal volume  Language: grossly intact, able to name, able to repeat  Mood: "fine"  Affect: less Intense  Thought Process: less circumstantial, less racing  Associations: intact   Thought Content: +delusions, denies SI, and denies HI  Perceptions: less observed RIS   Memory: Able to recall past events, Remote intact, and Recent intact  Attention:Attends to interview without distraction  Fund of " Knowledge: Aware of current events and Vocabulary appropriate   Estimate if Intelligence:  Average based on work/education history, vocabulary and mental status exam  Insight: intact, limited awareness of illness- poor  Judgment: impaired due to psychosis/carley - poor        DIAGNOSTIC TESTING   Laboratory Results  No results found for this or any previous visit (from the past 24 hour(s)).          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     Cannabis abuse     Psychosocial stressors     Elevated Liver Enzymes           PROBLEM LIST AND MANAGEMENT PLANS           Bipolar disorder, type I, MRE manic, severe with psychotic features: pt counseled  -Continue Aristada 1064mg given April 28, 2023 (?) will contact outpatient provider to verify- pending  - resumed/continue Haldol 5 mg PO BID   -resume/increase home tegretol from 200 TID to 400 mg po BID- continue at 400 mg po BID- check level in 2 days  - follow up with outpatient mental health providers after discharge for medication management and psychotherapy     Unspecified anxiety disorder  - started/continue hydroxyzine 50 mg PO q 6 hours PRN        Insomnia: pt counseled  -vistaril prn     Cannabis abuse: pt counseled  - SW consulted for assistance with additional resources   -return to rehab once stable        Psychosocial stressors  - pt counseled  - SW consulted for assistance with additional resources      Elevated Liver Enzymes: pt counseled  -resolved since stopping depakote; amee DICKEY      Discussed diagnosis, risks and benefits of proposed treatment vs alternative treatments vs no treatment, potential side effects of these treatments and the inherent unpredictability of treatment. The patient expresses understanding of the above and displays the capacity to agree with this treatment given said understanding. Patient  also agrees that, currently, the benefits outweigh the risks and would like to pursue/continue treatment at this time.    Any medications being used off-label were discussed with the patient inclusive of the evidence base for the use of the medications and consent was obtained for the off-label use of the medication.       DISCHARGE PLANNING  Expected Disposition Plan: Home or Self Care/rehab once stable      NEED FOR CONTINUED HOSPITALIZATION  Psychiatric illness continues to pose a potential threat to life or bodily function, of self or others, thereby requiring the need for continued inpatient psychiatric hospitalization: Yes, due to: significant psychotic thought disorder and gravely disabled, as evidenced by:  Ongoing concerns with grave disability with patient unable to perform basic feeding, hygiene and dressing activities without significant constant support. and Ongoing concerns with perceptual aberrancy and paranoid persecutory delusions leading to potential harm of self or others.    Protective inpatient pyschiatric hospitalization required while a safe disposition plan is enacted: Yes    Patient stabilized and ready for discharge from inpatient psychiatric unit: No        STAFF:   Blas Hardwick MD  Psychiatry

## 2023-05-06 NOTE — PLAN OF CARE
Problem: Adult Behavioral Health Plan of Care  Goal: Plan of Care Review  Outcome: Ongoing, Progressing     Problem: Adult Behavioral Health Plan of Care  Goal: Patient-Specific Goal (Individualization)  Outcome: Ongoing, Progressing     Problem: Mood Impairment (Psychotic Signs/Symptoms)  Goal: Improved Mood Symptoms (Psychotic Signs/Symptoms)  Outcome: Ongoing, Progressing     Problem: Social, Occupational or Functional Impairment (Psychotic Signs/Symptoms)  Goal: Enhanced Social, Occupational or Functional Skills (Psychotic Signs/Symptoms)  Outcome: Ongoing, Progressing     Problem: Coping Ineffective  Goal: Effective Coping  Outcome: Ongoing, Progressing

## 2023-05-06 NOTE — PLAN OF CARE
Problem: Violence Risk or Actual  Goal: Anger and Impulse Control  5/6/2023 1246 by Kip Eller RN  Outcome: Ongoing, Progressing  5/6/2023 1246 by Kip Eller RN  Outcome: Ongoing, Progressing  5/6/2023 1220 by Kip Eller RN  Outcome: Ongoing, Progressing     Problem: Adult Behavioral Health Plan of Care  Goal: Patient-Specific Goal (Individualization)  5/6/2023 1246 by Kip Eller RN  Outcome: Ongoing, Progressing  5/6/2023 1246 by Kip Eller RN  Outcome: Ongoing, Progressing  5/6/2023 1220 by Kip Eller, RN  Outcome: Ongoing, Progressing     Problem: Adult Behavioral Health Plan of Care  Goal: Adheres to Safety Considerations for Self and Others  5/6/2023 1246 by Kip Eller RN  Outcome: Ongoing, Progressing  5/6/2023 1246 by Kip Eller, RN  Outcome: Ongoing, Progressing  5/6/2023 1220 by Kip Eller, SIIRSHA  Outcome: Ongoing, Progressing     Problem: Adult Behavioral Health Plan of Care  Goal: Plan of Care Review  5/6/2023 1246 by Kip Eller RN  Outcome: Ongoing, Progressing  5/6/2023 1246 by Kip Eller, SIRISHA  Outcome: Ongoing, Progressing  5/6/2023 1220 by Kip Eller RN  Outcome: Ongoing, Progressing  Goal: Patient-Specific Goal (Individualization)  5/6/2023 1246 by Kip Eller, RN  Outcome: Ongoing, Progressing  5/6/2023 1246 by Kip Eller, RN  Outcome: Ongoing, Progressing  5/6/2023 1220 by Kip Eller RN  Outcome: Ongoing, Progressing  Goal: Adheres to Safety Considerations for Self and Others  5/6/2023 1246 by Kip Eller, SIRISHA  Outcome: Ongoing, Progressing  5/6/2023 1246 by Kip Eller RN  Outcome: Ongoing, Progressing  5/6/2023 1220 by Kip Eller RN  Outcome: Ongoing, Progressing  Goal: Absence of New-Onset Illness or Injury  5/6/2023 1246 by Kip Eller, SIRISHA  Outcome: Ongoing, Progressing  5/6/2023 1246 by Kip Eller RN  Outcome: Ongoing, Progressing  5/6/2023 1220 by Kip Eller,  RN  Outcome: Ongoing, Progressing  Goal: Optimized Coping Skills in Response to Life Stressors  5/6/2023 1246 by Kip Eller RN  Outcome: Ongoing, Progressing  5/6/2023 1246 by Kip Eller, SIRISHA  Outcome: Ongoing, Progressing  5/6/2023 1220 by Kip Eller, RN  Outcome: Ongoing, Progressing  Goal: Develops/Participates in Therapeutic [place] to Support Successful Transition  5/6/2023 1246 by Kip Eller, SIRISHA  Outcome: Ongoing, Progressing  5/6/2023 1246 by Kip Eller, RN  Outcome: Ongoing, Progressing  5/6/2023 1220 by Kip Eller, RN  Outcome: Ongoing, Progressing  Goal: Rounds/Family Conference  5/6/2023 1246 by Kpi Eller RN  Outcome: Ongoing, Progressing  5/6/2023 1246 by Kip Eller, SIRISHA  Outcome: Ongoing, Progressing  5/6/2023 1220 by Kip Eller, RN  Outcome: Ongoing, Progressing

## 2023-05-06 NOTE — PLAN OF CARE
Problem: Violence Risk or Actual  Goal: Anger and Impulse Control  Outcome: Ongoing, Progressing     Problem: Adult Behavioral Health Plan of Care  Goal: Plan of Care Review  Outcome: Ongoing, Progressing  Goal: Patient-Specific Goal (Individualization)  Outcome: Ongoing, Progressing  Goal: Adheres to Safety Considerations for Self and Others  Outcome: Ongoing, Progressing  Goal: Absence of New-Onset Illness or Injury  Outcome: Ongoing, Progressing  Goal: Optimized Coping Skills in Response to Life Stressors  Outcome: Ongoing, Progressing  Goal: Develops/Participates in Therapeutic [place] to Support Successful Transition  Outcome: Ongoing, Progressing  Goal: Rounds/Family Conference  Outcome: Ongoing, Progressing     Problem: Activity and Energy Impairment (Excessive Substance Use)  Goal: Optimized Energy Level (Excessive Substance Use)  Outcome: Ongoing, Progressing     Problem: Behavior Regulation Impairment (Excessive Substance Use)  Goal: Improved Behavioral Control (Excessive Substance Use)  Outcome: Ongoing, Progressing     Problem: Decreased Participation and Engagement (Excessive Substance Use)  Goal: Increased Participation and Engagement (Excessive Substance Use)  Outcome: Ongoing, Progressing     Problem: Physiologic Impairment (Excessive Substance Use)  Goal: Improved Physiologic Symptoms (Excessive Substance Use)  Outcome: Ongoing, Progressing     Problem: Social, Occupational or Functional Impairment (Excessive Substance Use)  Goal: Enhanced Social, Occupational or Functional Skills (Excessive Substance Use)  Outcome: Ongoing, Progressing     Problem: Decreased Participation and Engagement (Psychotic Signs/Symptoms)  Goal: Increased Participation and Engagement (Psychotic Signs/Symptoms)  Outcome: Ongoing, Progressing     Problem: Mood Impairment (Psychotic Signs/Symptoms)  Goal: Improved Mood Symptoms (Psychotic Signs/Symptoms)  Outcome: Ongoing, Progressing     Problem: Social, Occupational or  Functional Impairment (Psychotic Signs/Symptoms)  Goal: Enhanced Social, Occupational or Functional Skills (Psychotic Signs/Symptoms)  Outcome: Ongoing, Progressing     Problem: Behavior Regulation Impairment (Disruptive Behavior)  Goal: Improved Impulse and Aggression Control (Disruptive Behavior)  Outcome: Ongoing, Progressing     Problem: Sleep Disturbance (Disruptive Behavior)  Goal: Improved Sleep (Disruptive Behavior)  Outcome: Ongoing, Progressing     Problem: Coping Ineffective  Goal: Effective Coping  Outcome: Ongoing, Progressing

## 2023-05-06 NOTE — NURSING
Pt. Lying in bed asleep.  Easily aroused.  No C/O pain.  Flat affect.  Denies SI/HI/AVH currently.  Does have some depression and anxiety.  Compliant with medications.  Follows instructions.  Will continue to observe.

## 2023-05-07 LAB — CARBAMAZEPINE SERPL-MCNC: 11.8 UG/ML (ref 4–12)

## 2023-05-07 PROCEDURE — 36415 COLL VENOUS BLD VENIPUNCTURE: CPT | Performed by: PSYCHIATRY & NEUROLOGY

## 2023-05-07 PROCEDURE — 80156 ASSAY CARBAMAZEPINE TOTAL: CPT | Performed by: PSYCHIATRY & NEUROLOGY

## 2023-05-07 PROCEDURE — 11400000 HC PSYCH PRIVATE ROOM

## 2023-05-07 PROCEDURE — 99232 PR SUBSEQUENT HOSPITAL CARE,LEVL II: ICD-10-PCS | Mod: AF,HB,, | Performed by: PSYCHIATRY & NEUROLOGY

## 2023-05-07 PROCEDURE — 25000003 PHARM REV CODE 250: Performed by: INTERNAL MEDICINE

## 2023-05-07 PROCEDURE — 99232 SBSQ HOSP IP/OBS MODERATE 35: CPT | Mod: AF,HB,, | Performed by: PSYCHIATRY & NEUROLOGY

## 2023-05-07 RX ADMIN — CARBAMAZEPINE 400 MG: 200 TABLET ORAL at 08:05

## 2023-05-07 RX ADMIN — HALOPERIDOL 5 MG: 5 TABLET ORAL at 08:05

## 2023-05-07 NOTE — PLAN OF CARE
"POC reviewed and ongoing. Pt states that he is " doing great". Denies depression, anxiety or AVH, although patient observed reacting to internal stimuli on several occasions this shift. Pt seems to be minimizing symptoms to be discharged. He is focused on being discharged tomorrow, although that was never told to him. Pt is cooperative and compliant with staff and medication. Will continue to monitor patient on unit.   "

## 2023-05-07 NOTE — PLAN OF CARE
Problem: Violence Risk or Actual  Goal: Anger and Impulse Control  Outcome: Ongoing, Progressing     Problem: Adult Behavioral Health Plan of Care  Goal: Plan of Care Review  Outcome: Ongoing, Progressing  Goal: Patient-Specific Goal (Individualization)  Outcome: Ongoing, Progressing  Goal: Adheres to Safety Considerations for Self and Others  Outcome: Ongoing, Progressing  Goal: Absence of New-Onset Illness or Injury  Outcome: Ongoing, Progressing  Goal: Optimized Coping Skills in Response to Life Stressors  Outcome: Ongoing, Progressing  Goal: Develops/Participates in Therapeutic [place] to Support Successful Transition  Outcome: Ongoing, Progressing  Goal: Rounds/Family Conference  Outcome: Ongoing, Progressing     Problem: Activity and Energy Impairment (Excessive Substance Use)  Goal: Optimized Energy Level (Excessive Substance Use)  Outcome: Ongoing, Progressing     Problem: Behavior Regulation Impairment (Excessive Substance Use)  Goal: Improved Behavioral Control (Excessive Substance Use)  Outcome: Ongoing, Progressing     Problem: Decreased Participation and Engagement (Excessive Substance Use)  Goal: Increased Participation and Engagement (Excessive Substance Use)  Outcome: Ongoing, Progressing     Problem: Physiologic Impairment (Excessive Substance Use)  Goal: Improved Physiologic Symptoms (Excessive Substance Use)  Outcome: Ongoing, Progressing     Problem: Social, Occupational or Functional Impairment (Excessive Substance Use)  Goal: Enhanced Social, Occupational or Functional Skills (Excessive Substance Use)  Outcome: Ongoing, Progressing     Problem: Decreased Participation and Engagement (Psychotic Signs/Symptoms)  Goal: Increased Participation and Engagement (Psychotic Signs/Symptoms)  Outcome: Ongoing, Progressing     Problem: Mood Impairment (Psychotic Signs/Symptoms)  Goal: Improved Mood Symptoms (Psychotic Signs/Symptoms)  Outcome: Ongoing, Progressing     Problem: Social, Occupational or  Functional Impairment (Psychotic Signs/Symptoms)  Goal: Enhanced Social, Occupational or Functional Skills (Psychotic Signs/Symptoms)  Outcome: Ongoing, Progressing     Problem: Behavior Regulation Impairment (Disruptive Behavior)  Goal: Improved Impulse and Aggression Control (Disruptive Behavior)  Outcome: Ongoing, Progressing     Problem: Sleep Disturbance (Disruptive Behavior)  Goal: Improved Sleep (Disruptive Behavior)  Outcome: Ongoing, Progressing     Problem: Coping Ineffective  Goal: Effective Coping  Outcome: Ongoing, Progressing

## 2023-05-07 NOTE — NURSING
Pt currently in room resting.  Pt was out of room for dinner.  Pt interactive with staff as necessary for accomplishing ADLs, but remains isolated from staff and peers.  Pt observed laughing and talking to himself.  Pt remains focused on returning to [facility].  Pt denies SI and HI.      Pt compliant with medications.

## 2023-05-07 NOTE — PROGRESS NOTES
"  PSYCHIATRY DAILY INPATIENT PROGRESS NOTE  SUBSEQUENT HOSPITAL VISIT    ENCOUNTER DATE: 5/7/2023  SITE: 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 The chart was reviewed.     Reviewed notes from Rns and RD and labs from the last 24 hours.    The patient's case was discussed with the treatment team/care providers today including Rns, CTRS, and SW    Staff reports no behavioral or management issues.     The patient has been compliant with treatment.      Subjective 05/07/2023    Pt reports he is doing alright today. States that his mood is "perfect." Denies hearing voices at this time.However, he does appear distracted, possibly RIS during interview. Pt is guarded, and denies all psychiatric symptoms.  Continues to minimize reason for admission, but is compliant with treatment. Remains isolative on unit. Denies feeling anxious, though he also appears anxious.    Patient states that he has spoken to  at rehab ([place]) who told him he would be able to return to the program. This has not yet been verified.     Pt reports he has been sleeping and eating well. Denies SE of current medication regimen.       Per Staff:  Pt currently in room resting.  Pt was out of room for dinner.  Pt interactive with staff as necessary for accomplishing ADLs, but remains isolated from staff and peers.  Pt observed laughing and talking to himself.  Pt remains focused on returning to [facility].  Pt denies SI and HI.          Psychiatric ROS (observed, reported, or endorsed/denied):  Depressed mood - denies  Interest/pleasure/anhedonia: denies  Guilt/hopelessness/worthlessness - denies  Changes in Sleep - " denies  Changes in Appetite - denies  Changes in Concentration - denies  Changes in Energy - denies  PMA/R- denies  Suicidal- active/passive ideations - denies  Homicidal ideations: active/passive ideations - denies    Hallucinations - denies  Delusions - denies  Disorganized behavior - denies  Disorganized speech - denies  Negative symptoms - denies    Elevated mood - denies  Decreased need for sleep - denies  Grandiosity - denies  Racing thoughts - denies  Impulsivity - denies  Irritability- denies  Increased energy - denies  Distractibility - denies  Increase in goal-directed activity or PMA- denies    Symptoms of KANU - denies  Symptoms of Panic Disorder- denies  Symptoms of PTSD - denies        Overall progress: Patient is showing mild improvement         Psychotherapy:  Target symptoms: mood disorder, psychosis  Why chosen therapy is appropriate versus another modality: relevant to diagnosis, patient responds to this modality, evidence based practice  Outcome monitoring methods: self-report, observation  Therapeutic intervention type: insight oriented psychotherapy, behavior modifying psychotherapy, supportive psychotherapy, interactive psychotherapy  Topics discussed/themes: building skills sets for symptom management, symptom recognition; substance use  The patient's response to the intervention is accepting. The patient's progress toward treatment goals is limited.   Duration of intervention: 16 minutes.        Medical ROS  Constitutional: Negative.  Negative for chills and fever.   HENT: Negative.  Negative for hearing loss.    Eyes: Negative.  Negative for blurred vision and double vision.   Respiratory: Negative.  Negative for shortness of breath.    Cardiovascular: Negative.  Negative for chest pain and palpitations.   Gastrointestinal: Negative.  Negative for constipation, diarrhea, nausea and vomiting.   Genitourinary: Negative.  Negative for dysuria.   Musculoskeletal: Negative.  Negative for back  "pain and neck pain.   Skin: Negative.  Negative for rash.   Neurological: Negative.  Negative for dizziness and headaches.   Endo/Heme/Allergies: Negative.  Negative for environmental allergies.   Psychiatric/Behavioral:          See HPI       PAST MEDICAL HISTORY   Past Medical History:   Diagnosis Date    History of psychiatric hospitalization     Hx of psychiatric care     Psychiatric problem     Schizophrenia     Suicide attempt            PSYCHOTROPIC MEDICATIONS   Scheduled Meds:   carBAMazepine  400 mg Oral BID    haloperidoL  5 mg Oral BID     Continuous Infusions:  PRN Meds:.acetaminophen, aluminum-magnesium hydroxide-simethicone, benzonatate, benztropine mesylate, hydrOXYzine pamoate, loperamide, nicotine, OLANZapine **AND** OLANZapine, ondansetron, promethazine        EXAMINATION    VITALS   Vitals:    05/05/23 1918 05/06/23 0753 05/06/23 1909 05/07/23 0757   BP: (!) 109/56 117/70 118/64 115/61   BP Location: Left arm Right arm Left arm Left arm   Patient Position: Sitting Sitting Sitting Sitting   Pulse: 90 72 75 75   Resp: 20 18 18 18   Temp: 99.1 °F (37.3 °C) 98.2 °F (36.8 °C) 97.2 °F (36.2 °C) 98.1 °F (36.7 °C)   TempSrc: Oral Oral Oral Oral   SpO2: 97% 97% 98% 97%   Weight:       Height:           Body mass index is 21.25 kg/m².    CONSTITUTIONAL  General Appearance: unremarkable, age appropriate, normal weight, disheveled     MUSCULOSKELETAL  Muscle Strength and Tone:no tremor, no tic  Abnormal Involuntary Movements: None  Gait and Station: non-ataxic     PSYCHIATRIC   Level of Consciousness: awake and alert   Orientation: person, place, time and situation  Grooming: Hospital garb, disheveled  Psychomotor Behavior: normal, cooperative; no PMA/R  Speech: normal tone, normal rate, normal pitch, normal volume  Language: grossly intact, able to name, able to repeat  Mood: "fine"  Affect: less Intense  Thought Process: less circumstantial, less racing  Associations: intact   Thought Content: +delusions, " denies SI, and denies HI  Perceptions: less observed RIS   Memory: Able to recall past events, Remote intact, and Recent intact  Attention:Attends to interview without distraction  Fund of Knowledge: Aware of current events and Vocabulary appropriate   Estimate if Intelligence:  Average based on work/education history, vocabulary and mental status exam  Insight: intact, limited awareness of illness- poor  Judgment: impaired due to psychosis/carley - poor        DIAGNOSTIC TESTING   Laboratory Results  No results found for this or any previous visit (from the past 24 hour(s)).          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     Cannabis abuse     Psychosocial stressors     Elevated Liver Enzymes           PROBLEM LIST AND MANAGEMENT PLANS           Bipolar disorder, type I, MRE manic, severe with psychotic features: pt counseled  -Continue Aristada 1064mg given April 28, 2023 (?) will contact outpatient provider to verify- pending  - resumed/continue Haldol 5 mg PO BID   -resume/increase home tegretol from 200 TID to 400 mg po BID- continue at 400 mg po BID- check level  5/7, pending  - follow up with outpatient mental health providers after discharge for medication management and psychotherapy     Unspecified anxiety disorder  - started/continue hydroxyzine 50 mg PO q 6 hours PRN        Insomnia: pt counseled  -vistaril prn     Cannabis abuse: pt counseled  - SW consulted for assistance with additional resources   -return to rehab once stable        Psychosocial stressors  - pt counseled  - SW consulted for assistance with additional resources      Elevated Liver Enzymes: pt counseled  -resolved since stopping depakote; amee DICKEY      Discussed diagnosis, risks and benefits of proposed treatment vs alternative treatments vs no treatment, potential side effects of these  treatments and the inherent unpredictability of treatment. The patient expresses understanding of the above and displays the capacity to agree with this treatment given said understanding. Patient also agrees that, currently, the benefits outweigh the risks and would like to pursue/continue treatment at this time.    Any medications being used off-label were discussed with the patient inclusive of the evidence base for the use of the medications and consent was obtained for the off-label use of the medication.       DISCHARGE PLANNING  Expected Disposition Plan: Home or Self Care/rehab once stable      NEED FOR CONTINUED HOSPITALIZATION  Psychiatric illness continues to pose a potential threat to life or bodily function, of self or others, thereby requiring the need for continued inpatient psychiatric hospitalization: Yes, due to: significant psychotic thought disorder and gravely disabled, as evidenced by:  Ongoing concerns with grave disability with patient unable to perform basic feeding, hygiene and dressing activities without significant constant support. and Ongoing concerns with perceptual aberrancy and paranoid persecutory delusions leading to potential harm of self or others.    Protective inpatient pyschiatric hospitalization required while a safe disposition plan is enacted: Yes    Patient stabilized and ready for discharge from inpatient psychiatric unit: No        STAFF:   Blas Hardwick MD  Psychiatry

## 2023-05-08 PROCEDURE — 99233 SBSQ HOSP IP/OBS HIGH 50: CPT | Mod: AF,HB,, | Performed by: STUDENT IN AN ORGANIZED HEALTH CARE EDUCATION/TRAINING PROGRAM

## 2023-05-08 PROCEDURE — 25000003 PHARM REV CODE 250: Performed by: INTERNAL MEDICINE

## 2023-05-08 PROCEDURE — 99233 PR SUBSEQUENT HOSPITAL CARE,LEVL III: ICD-10-PCS | Mod: AF,HB,, | Performed by: STUDENT IN AN ORGANIZED HEALTH CARE EDUCATION/TRAINING PROGRAM

## 2023-05-08 PROCEDURE — 11400000 HC PSYCH PRIVATE ROOM

## 2023-05-08 RX ADMIN — HALOPERIDOL 5 MG: 5 TABLET ORAL at 08:05

## 2023-05-08 RX ADMIN — CARBAMAZEPINE 400 MG: 200 TABLET ORAL at 08:05

## 2023-05-08 NOTE — PROGRESS NOTES
"  PSYCHIATRY DAILY INPATIENT PROGRESS NOTE  SUBSEQUENT HOSPITAL VISIT    ENCOUNTER DATE: 5/8/2023  SITE: 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 The chart was reviewed.     Reviewed notes from MD and LPN and labs from the last 24 hours.    The patient's case was discussed with the treatment team/care providers today including Rns, NP, and SW    Staff reports no behavioral or management issues.     The patient has been compliant with treatment.        Subjective 05/08/2023    Patient states she is no longer having thoughts of Raul Murphy, "I let go of my lust for a famous person."    Behavior is improving in milieu.    He is willing to return to rehab.      Per RN: POC reviewed and ongoing. Pt states that he is " doing great". Denies depression, anxiety or AVH, although patient observed reacting to internal stimuli on several occasions this shift. Pt seems to be minimizing symptoms to be discharged. He is focused on being discharged tomorrow, although that was never told to him. Pt is cooperative and compliant with staff and medication.         Psychiatric ROS (observed, reported, or endorsed/denied):  Depressed mood - denies  Interest/pleasure/anhedonia: denies  Guilt/hopelessness/worthlessness - denies  Changes in Sleep - denies  Changes in Appetite - denies  Changes in Concentration - denies  Changes in Energy - denies  PMA/R- denies  Suicidal- active/passive ideations - denies  Homicidal ideations: active/passive ideations - denies    Hallucinations - denies  Delusions - denies  Disorganized behavior - denies  Disorganized speech - denies  Negative symptoms - denies    Elevated mood - denies  Decreased need for sleep - " denies  Grandiosity - denies  Racing thoughts - denies  Impulsivity - denies  Irritability- denies  Increased energy - denies  Distractibility - denies  Increase in goal-directed activity or PMA- denies    Symptoms of KANU - denies  Symptoms of Panic Disorder- denies  Symptoms of PTSD - denies        Overall progress: Patient is showing mild improvement           Medical ROS  Constitutional: Negative.  Negative for chills and fever.   HENT: Negative.  Negative for hearing loss.    Eyes: Negative.  Negative for blurred vision and double vision.   Respiratory: Negative.  Negative for shortness of breath.    Cardiovascular: Negative.  Negative for chest pain and palpitations.   Gastrointestinal: Negative.  Negative for constipation, diarrhea, nausea and vomiting.   Genitourinary: Negative.  Negative for dysuria.   Musculoskeletal: Negative.  Negative for back pain and neck pain.   Skin: Negative.  Negative for rash.   Neurological: Negative.  Negative for dizziness and headaches.   Endo/Heme/Allergies: Negative.  Negative for environmental allergies.   Psychiatric/Behavioral:          See HPI       PAST MEDICAL HISTORY   Past Medical History:   Diagnosis Date    History of psychiatric hospitalization     Hx of psychiatric care     Psychiatric problem     Schizophrenia     Suicide attempt            PSYCHOTROPIC MEDICATIONS   Scheduled Meds:   carBAMazepine  400 mg Oral BID    haloperidoL  5 mg Oral BID     Continuous Infusions:  PRN Meds:.acetaminophen, aluminum-magnesium hydroxide-simethicone, benzonatate, benztropine mesylate, hydrOXYzine pamoate, loperamide, nicotine, OLANZapine **AND** OLANZapine, ondansetron, promethazine        EXAMINATION    VITALS   Vitals:    05/06/23 1909 05/07/23 0757 05/07/23 1903 05/08/23 0732   BP: 118/64 115/61 107/60 129/60   BP Location: Left arm Left arm Left arm Left arm   Patient Position: Sitting Sitting Sitting Sitting   Pulse: 75 75 78 61   Resp: 18 18 20 20   Temp: 97.2 °F (36.2  "°C) 98.1 °F (36.7 °C) 98.4 °F (36.9 °C) 98.8 °F (37.1 °C)   TempSrc: Oral Oral Oral Oral   SpO2: 98% 97% 97% 99%   Weight:       Height:           Body mass index is 21.25 kg/m².        CONSTITUTIONAL  General Appearance: unremarkable, age appropriate, normal weight, disheveled     MUSCULOSKELETAL  Muscle Strength and Tone:no tremor, no tic  Abnormal Involuntary Movements: None  Gait and Station: non-ataxic     PSYCHIATRIC   Level of Consciousness: awake and alert   Orientation: person, place, time and situation  Grooming: Hospital garb, disheveled  Psychomotor Behavior: normal, cooperative; no PMA/R  Speech: normal tone, normal rate, normal pitch, normal volume  Language: grossly intact, able to name, able to repeat  Mood: "great"  Affect: even  Thought Process: less circumstantial, less racing  Associations: intact   Thought Content: no delusions, denies SI, and denies HI  Perceptions: less observed RIS   Memory: Able to recall past events, Remote intact, and Recent intact  Attention:Attends to interview without distraction  Fund of Knowledge: Aware of current events and Vocabulary appropriate   Estimate if Intelligence:  Average based on work/education history, vocabulary and mental status exam  Insight: intact has awareness of illness  Judgment: improved        DIAGNOSTIC TESTING   Laboratory Results  No results found for this or any previous visit (from the past 24 hour(s)).          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     Cannabis abuse     Psychosocial stressors     Elevated Liver Enzymes           PROBLEM LIST AND MANAGEMENT PLANS           Bipolar disorder, type I, MRE manic, severe with psychotic features: pt counseled  -Continue Aristada 1064mg given April 28, 2023 (?) will contact outpatient provider to verify- pending  - resumed/continue Haldol 5 mg PO BID "   -resume/increase home tegretol from 200 TID to 400 mg po BID- continue at 400 mg po BID- check level  11.8  - follow up with outpatient mental health providers after discharge for medication management and psychotherapy     Unspecified anxiety disorder  - started/continue hydroxyzine 50 mg PO q 6 hours PRN        Insomnia: pt counseled  -vistaril prn     Cannabis abuse: pt counseled  - SW consulted for assistance with additional resources   -return to rehab once stable        Psychosocial stressors  - pt counseled  - SW consulted for assistance with additional resources      Elevated Liver Enzymes: pt counseled  -resolved since stopping depakote; amee WNL          Discussed diagnosis, risks and benefits of proposed treatment vs alternative treatments vs no treatment, potential side effects of these treatments and the inherent unpredictability of treatment. The patient expresses understanding of the above and displays the capacity to agree with this treatment given said understanding. Patient also agrees that, currently, the benefits outweigh the risks and would like to pursue/continue treatment at this time.    Any medications being used off-label were discussed with the patient inclusive of the evidence base for the use of the medications and consent was obtained for the off-label use of the medication.       DISCHARGE PLANNING  Expected Disposition Plan: Home or Self Care/rehab once stable      NEED FOR CONTINUED HOSPITALIZATION  Psychiatric illness continues to pose a potential threat to life or bodily function, of self or others, thereby requiring the need for continued inpatient psychiatric hospitalization: Yes, due to: significant psychotic thought disorder and gravely disabled, as evidenced by:  Ongoing concerns with grave disability with patient unable to perform basic feeding, hygiene and dressing activities without significant constant support. and Ongoing concerns with perceptual aberrancy and  paranoid persecutory delusions leading to potential harm of self or others.    Protective inpatient pyschiatric hospitalization required while a safe disposition plan is enacted: Yes    Patient stabilized and ready for discharge from inpatient psychiatric unit: No      The patient location is: [facility]    Visit type: audiovisual    Face to Face time with patient: 10  15 minutes of total time spent on the encounter, which includes face to face time and non-face to face time preparing to see the patient (eg, review of tests), Obtaining and/or reviewing separately obtained history, Documenting clinical information in the electronic or other health record, Independently interpreting results (not separately reported) and communicating results to the patient/family/caregiver, or Care coordination (not separately reported).     Each patient to whom he or she provides medical services by telemedicine is:  (1) informed of the relationship between the physician and patient and the respective role of any other health care provider with respect to management of the patient; and (2) notified that he or she may decline to receive medical services by telemedicine and may withdraw from such care at any time.            STAFF:   Cristhian Grijalva III, MD  Psychiatry

## 2023-05-08 NOTE — PLAN OF CARE
"  Problem: Adult Behavioral Health Plan of Care  Goal: Optimized Coping Skills in Response to Life Stressors  Intervention: Promote Effective Coping Strategies  Flowsheets (Taken 5/5/2023 7632)  Supportive Measures:   active listening utilized   verbalization of feelings encouraged   self-reflection promoted       Behavior: pt was engaged during group       Intervention:  In this CBT-focused process group CSW facilitated a group discussion on motivation. Pt was asked to identified who or what motivates you, what keeps you going when times get tough, what advice or positive saying have you heard that inspire and motivate you personally       Response: Pt identified motivation as his future. Pt identified "his heart, the love he has for others and himself," keeps him going when time gets tough. Pt identified "never a failure always a lesson," as positive saying that motivates him personally.       Plan: Continue to encourage pt to participate in process groups to verbalize feelings and develop healthy coping skills.                    "

## 2023-05-08 NOTE — PLAN OF CARE
POC reviewed this shift and is on going. Patient is calm and cooperative.  Denies Suicidal Ideation, Homicidal Ideation, Auditory Hallucinations, Visual Hallucinations, Tactile Hallucinations, Gustatory Hallucinations, and Delusions. States he is ready to go back home. Pt continues to be medication compliant and staff will continue to monitor pt closely while on the unit.

## 2023-05-08 NOTE — PLAN OF CARE
Problem: Violence Risk or Actual  Goal: Anger and Impulse Control  Outcome: Ongoing, Progressing     Problem: Adult Behavioral Health Plan of Care  Goal: Plan of Care Review  Outcome: Ongoing, Progressing  Goal: Patient-Specific Goal (Individualization)  Outcome: Ongoing, Progressing  Goal: Adheres to Safety Considerations for Self and Others  Outcome: Ongoing, Progressing  Goal: Absence of New-Onset Illness or Injury  Outcome: Ongoing, Progressing  Goal: Optimized Coping Skills in Response to Life Stressors  Outcome: Ongoing, Progressing  Goal: Develops/Participates in Therapeutic [place] to Support Successful Transition  Outcome: Ongoing, Progressing  Goal: Rounds/Family Conference  Outcome: Ongoing, Progressing     Problem: Activity and Energy Impairment (Excessive Substance Use)  Goal: Optimized Energy Level (Excessive Substance Use)  Outcome: Ongoing, Progressing     Problem: Behavior Regulation Impairment (Excessive Substance Use)  Goal: Improved Behavioral Control (Excessive Substance Use)  Outcome: Ongoing, Progressing     Problem: Decreased Participation and Engagement (Excessive Substance Use)  Goal: Increased Participation and Engagement (Excessive Substance Use)  Outcome: Ongoing, Progressing     Problem: Physiologic Impairment (Excessive Substance Use)  Goal: Improved Physiologic Symptoms (Excessive Substance Use)  Outcome: Ongoing, Progressing     Problem: Social, Occupational or Functional Impairment (Excessive Substance Use)  Goal: Enhanced Social, Occupational or Functional Skills (Excessive Substance Use)  Outcome: Ongoing, Progressing     Problem: Decreased Participation and Engagement (Psychotic Signs/Symptoms)  Goal: Increased Participation and Engagement (Psychotic Signs/Symptoms)  Outcome: Ongoing, Progressing     Problem: Mood Impairment (Psychotic Signs/Symptoms)  Goal: Improved Mood Symptoms (Psychotic Signs/Symptoms)  Outcome: Ongoing, Progressing     Problem: Social, Occupational or  Functional Impairment (Psychotic Signs/Symptoms)  Goal: Enhanced Social, Occupational or Functional Skills (Psychotic Signs/Symptoms)  Outcome: Ongoing, Progressing     Problem: Behavior Regulation Impairment (Disruptive Behavior)  Goal: Improved Impulse and Aggression Control (Disruptive Behavior)  Outcome: Ongoing, Progressing     Problem: Sleep Disturbance (Disruptive Behavior)  Goal: Improved Sleep (Disruptive Behavior)  Outcome: Ongoing, Progressing     Problem: Coping Ineffective  Goal: Effective Coping  Outcome: Ongoing, Progressing

## 2023-05-09 VITALS
OXYGEN SATURATION: 93 % | HEIGHT: 75 IN | WEIGHT: 170 LBS | BODY MASS INDEX: 21.14 KG/M2 | TEMPERATURE: 98 F | DIASTOLIC BLOOD PRESSURE: 70 MMHG | SYSTOLIC BLOOD PRESSURE: 115 MMHG | RESPIRATION RATE: 20 BRPM | HEART RATE: 80 BPM

## 2023-05-09 PROCEDURE — 25000003 PHARM REV CODE 250: Performed by: INTERNAL MEDICINE

## 2023-05-09 PROCEDURE — 99239 PR HOSPITAL DISCHARGE DAY,>30 MIN: ICD-10-PCS | Mod: AF,HB,, | Performed by: STUDENT IN AN ORGANIZED HEALTH CARE EDUCATION/TRAINING PROGRAM

## 2023-05-09 PROCEDURE — 99239 HOSP IP/OBS DSCHRG MGMT >30: CPT | Mod: AF,HB,, | Performed by: STUDENT IN AN ORGANIZED HEALTH CARE EDUCATION/TRAINING PROGRAM

## 2023-05-09 RX ORDER — HALOPERIDOL 5 MG/1
5 TABLET ORAL 2 TIMES DAILY
Qty: 60 TABLET | Refills: 0 | Status: ON HOLD | OUTPATIENT
Start: 2023-05-09 | End: 2023-09-14 | Stop reason: HOSPADM

## 2023-05-09 RX ORDER — CARBAMAZEPINE 200 MG/1
400 TABLET ORAL 2 TIMES DAILY
Qty: 120 TABLET | Refills: 0 | Status: ON HOLD | OUTPATIENT
Start: 2023-05-09 | End: 2023-09-14 | Stop reason: HOSPADM

## 2023-05-09 RX ADMIN — CARBAMAZEPINE 400 MG: 200 TABLET ORAL at 08:05

## 2023-05-09 RX ADMIN — HALOPERIDOL 5 MG: 5 TABLET ORAL at 08:05

## 2023-05-09 NOTE — PLAN OF CARE
Problem: Violence Risk or Actual  Goal: Anger and Impulse Control  Outcome: Ongoing, Progressing     Problem: Adult Behavioral Health Plan of Care  Goal: Plan of Care Review  Outcome: Ongoing, Progressing  Goal: Patient-Specific Goal (Individualization)  Outcome: Ongoing, Progressing  Goal: Adheres to Safety Considerations for Self and Others  Outcome: Ongoing, Progressing  Goal: Absence of New-Onset Illness or Injury  Outcome: Ongoing, Progressing  Goal: Optimized Coping Skills in Response to Life Stressors  Outcome: Ongoing, Progressing  Goal: Develops/Participates in Therapeutic [place] to Support Successful Transition  Outcome: Ongoing, Progressing  Goal: Rounds/Family Conference  Outcome: Ongoing, Progressing     Problem: Activity and Energy Impairment (Excessive Substance Use)  Goal: Optimized Energy Level (Excessive Substance Use)  Outcome: Ongoing, Progressing     Problem: Behavior Regulation Impairment (Excessive Substance Use)  Goal: Improved Behavioral Control (Excessive Substance Use)  Outcome: Ongoing, Progressing     Problem: Decreased Participation and Engagement (Excessive Substance Use)  Goal: Increased Participation and Engagement (Excessive Substance Use)  Outcome: Ongoing, Progressing     Problem: Physiologic Impairment (Excessive Substance Use)  Goal: Improved Physiologic Symptoms (Excessive Substance Use)  Outcome: Ongoing, Progressing     Problem: Social, Occupational or Functional Impairment (Excessive Substance Use)  Goal: Enhanced Social, Occupational or Functional Skills (Excessive Substance Use)  Outcome: Ongoing, Progressing     Problem: Decreased Participation and Engagement (Psychotic Signs/Symptoms)  Goal: Increased Participation and Engagement (Psychotic Signs/Symptoms)  Outcome: Ongoing, Progressing     Problem: Mood Impairment (Psychotic Signs/Symptoms)  Goal: Improved Mood Symptoms (Psychotic Signs/Symptoms)  Outcome: Ongoing, Progressing     Problem: Social, Occupational or  Functional Impairment (Psychotic Signs/Symptoms)  Goal: Enhanced Social, Occupational or Functional Skills (Psychotic Signs/Symptoms)  Outcome: Ongoing, Progressing     Problem: Behavior Regulation Impairment (Disruptive Behavior)  Goal: Improved Impulse and Aggression Control (Disruptive Behavior)  Outcome: Ongoing, Progressing     Problem: Sleep Disturbance (Disruptive Behavior)  Goal: Improved Sleep (Disruptive Behavior)  Outcome: Ongoing, Progressing     Problem: Coping Ineffective  Goal: Effective Coping  Outcome: Ongoing, Progressing

## 2023-05-09 NOTE — DISCHARGE SUMMARY
"Discharge Summary  Psychiatry    Admit Date: 5/1/2023    Discharge Date and Time:  05/09/2023 11:11 AM    Attending Physician: Frantz Cagle MD     Discharge Provider: Cristhian Grijalva III    Reason for Admission:    CHIEF COMPLAINT   Bishop Soria is a 22 y.o. male with a past psychiatric history of  psychosis, mood disorder, anxiety, substance abuse  currently admitted to the inpatient unit with the following chief complaint:  psychosis - "they told me I needed a psych eval."    HPI   The patient was seen and examined. The chart was reviewed.     The patient presented to the ER on 5/1/2023 . Per staff notes:  -Pt presents to the ER for psych eval via ems due to reported visual/auditory hallucinations. Pt arrived from [facility], staff states pt began having hallucinations today and displaying aggressive behavior towards staff. Upon arrival pt states he is unsure why he is here, is AAOX4.  -22-year-old male with history of schizophrenia presents for a psychiatric evaluation.  Patient was previously at [facility], pt noted to be having auditory and visual hallucinations, also displaying aggressive behavior towards staff, as a result was brought in for evaluation        The patient was medically cleared and admitted to the BHU.     The patient has a longstanding h/o Severe bipolar disorder with psychotic features complicated by significant psychosocial stressors and poor tx adherence. He was last treated here from 2/8-2/22/23 for psychosis/carley. He presented at that time with the following HPI:  -22-year-old male with history of schizoaffective not on medication presents with bizarre behavior.  According to EMS patient got into an argument with his mother and made some threats but proceeded to run away from the .  Patient does admit to having suicidal ideation without plan.  History otherwise limited due to medical condition  -Bishop Soria admitted to [facility] under the care of " "Frantz Cagle MD with diagnosis of schizoaffective D/O. The patient is [facility]. Patient is a 23 yo AAM who presented to the ER per [facility] for reportedly having bizarre behavior and SI's. Ater arrival to ER,pt was PEC'd,then pt eloped and was returned to ER per [facility]. UDS positive for THC. PT sedated per ER physician. Arrived to unit, sedated. Patient withdrawn, depressed, anxious, agitated, irritable, drowsy, sedated, manic, and paranoid. Endorses Suicidal Ideation and Delusions. Denies Homicidal Ideation, Auditory Hallucinations, Visual Hallucinations, Tactile Hallucinations, and Gustatory Hallucinations  -He reports that he had an argument with his family over wearing wigs. He also reports some severe issues regarding the musician Raul Murphy (a frequent delusion of his), but the details were presented non-coherently.   He was sedated from medications received prn secondary to psychotic agitation- this limited his participation.    Overall, his presentation is consistent with previous  hospitalization.     He was stabilized on Depakote 750 mg po BID, Abilify Aristada 1064 (next dose due on 4/20/23) and Haldol 5 mg po BID     HE was treated here again 4/2-4/6/23 with the following HPI: He reports that he ran out pf his medications about 1 week ago. He reports hat he brought himself to the hospital to get back on his medications. He reported the the ER doctors that he was suicidal and hear voices.  -He is now denying all symptoms and reports that he "made it up" in order to guarantee his admission to the BHU in order to resume his medications  -he was inappropriately focused on discharge  -he did not openly discuss his usual delusions- he required prompting then quickly miniimized issues.  -his presentation is consistent with previous admission, although he does appear somewhat better today than at his last admission  -His UDS was positive for THC and benzos   Overall, his presentation is consistent with previous  " "hospitalization.      He was stabilized on Tegretol 200 mg po TID, Abilify Aristada 1064 (next dose due on 4/20/23) and Haldol 5 mg po BID     Today, he reports that he went to rehab about 1 week ago (court ordered) to help quit cannabis. While there, "the nurse suggested I come get a psych evaluation." He denied all symptoms this Am aside form being upset about the hospitalization. Reports of ongoing psychosis/carley were reported as documented below.        Procedures Performed: * No surgery found *    Hospital Course:    Patient was admitted to the inpatient psychiatry unit after being medically cleared in the ED. Chart and labs were reviewed. The patient was stabilized as follows:            Bipolar disorder, type I, MRE manic, severe with psychotic features: pt counseled  -Continue Aristada 1064mg given April 21, 2023   - resumed/continue Haldol 5 mg PO BID   -resume/increase home tegretol from 200 TID to 400 mg po BID- continue at 400 mg po BID- check level  11.8  - follow up with outpatient mental health providers after discharge for medication management and psychotherapy     Unspecified anxiety disorder  - started/continue hydroxyzine 50 mg PO q 6 hours PRN        Insomnia: pt counseled  -vistaril prn     Cannabis abuse: pt counseled  - SW consulted for assistance with additional resources   -return to rehab once stable        Psychosocial stressors  - pt counseled  - SW consulted for assistance with additional resources      Elevated Liver Enzymes: pt counseled  -resolved since stopping depakote; currenlty WNL              During hospitalization, the patient was encouraged to go to both groups and individual counseling. Patient was monitored for any side effects. A meeting was held with multidisciplinary team prior to discharge and pt's diagnosis, current medications, and follow up were discussed. The patient has been compliant with treatment and can adequately attend to activities of daily living in an " independent manner. The patient denies any side effects. The patient denies SI, HI, plan or intent for self harm or harm to others. The patient is no longer a danger to self or others nor gravely disabled disabled. Patient discharged  in stable condition with scheduled outpatient follow up.      Discussed diagnosis, risks and benefits of proposed treatment vs alternative treatments vs no treatment, and potential side effects of these treatments.  The patient expresses understanding of the above and displays the capacity to agree with this treatment given said understanding.  Patient also agrees that, currently, the benefits outweigh the risks and would like to pursue treatment at this time.      Discharge MSE: stated age, casually dressed, well groomed.  No psychomotor agitation or retardation.  No abnormal involuntary movements.  Gait normal.  Speech normal, conversational.  Language fluent English. Mood fine.  Affect normal range, pleasant, euthymic.  Thought process linear.  Associations intact.  Denies suicidal or homicidal ideation.  Denies auditory hallucinations, paranoid ideation, ideas of reference.  Memory intact.  Attention intact.  Fund of knowledge intact.  Insight intact.  Judgment intact.  Alert and oriented to person, place, time.      Tobacco Usage:  Is patient a smoker? Yes  Does patient want prescription for Tobacco Cessation? No  Does patient want counseling for Tobacco Cessation? No    If patient would like to quit, then over the counter nicotine patch could be used. The patient could also follow up with his PCP or psychiatric provider for other alternatives.     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     Cannabis abuse     Psychosocial stressors     Elevated Liver Enzymes       Labs:  Admission on 05/01/2023   Component Date Value  Ref Range Status    WBC 05/01/2023 7.40  3.90 - 12.70 K/uL Final    RBC 05/01/2023 4.18 (L)  4.60 - 6.20 M/uL Final    Hemoglobin 05/01/2023 13.3 (L)  14.0 - 18.0 g/dL Final    Hematocrit 05/01/2023 40.7  40.0 - 54.0 % Final    MCV 05/01/2023 97  82 - 98 fL Final    MCH 05/01/2023 31.8 (H)  27.0 - 31.0 pg Final    MCHC 05/01/2023 32.7  32.0 - 36.0 g/dL Final    RDW 05/01/2023 12.6  11.5 - 14.5 % Final    Platelets 05/01/2023 274  150 - 450 K/uL Final    MPV 05/01/2023 9.2  9.2 - 12.9 fL Final    Immature Granulocytes 05/01/2023 0.1  0.0 - 0.5 % Final    Gran # (ANC) 05/01/2023 2.9  1.8 - 7.7 K/uL Final    Immature Grans (Abs) 05/01/2023 0.01  0.00 - 0.04 K/uL Final    Lymph # 05/01/2023 3.7  1.0 - 4.8 K/uL Final    Mono # 05/01/2023 0.7  0.3 - 1.0 K/uL Final    Eos # 05/01/2023 0.1  0.0 - 0.5 K/uL Final    Baso # 05/01/2023 0.01  0.00 - 0.20 K/uL Final    nRBC 05/01/2023 0  0 /100 WBC Final    Gran % 05/01/2023 39.3  38.0 - 73.0 % Final    Lymph % 05/01/2023 50.1 (H)  18.0 - 48.0 % Final    Mono % 05/01/2023 8.9  4.0 - 15.0 % Final    Eosinophil % 05/01/2023 1.5  0.0 - 8.0 % Final    Basophil % 05/01/2023 0.1  0.0 - 1.9 % Final    Differential Method 05/01/2023 Automated   Final    Sodium 05/01/2023 143  136 - 145 mmol/L Final    Potassium 05/01/2023 3.9  3.5 - 5.1 mmol/L Final    Chloride 05/01/2023 111 (H)  95 - 110 mmol/L Final    CO2 05/01/2023 28  23 - 29 mmol/L Final    Glucose 05/01/2023 101  70 - 110 mg/dL Final    BUN 05/01/2023 18  6 - 20 mg/dL Final    Creatinine 05/01/2023 1.2  0.5 - 1.4 mg/dL Final    Calcium 05/01/2023 9.1  8.7 - 10.5 mg/dL Final    Total Protein 05/01/2023 7.8  6.0 - 8.4 g/dL Final    Albumin 05/01/2023 3.9  3.5 - 5.2 g/dL Final    Total Bilirubin 05/01/2023 0.3  0.1 - 1.0 mg/dL Final    Alkaline Phosphatase 05/01/2023 70  55 - 135 U/L Final    AST 05/01/2023 22  10 - 40 U/L Final    ALT 05/01/2023 42  10 - 44 U/L Final    Anion Gap 05/01/2023 4 (L)  8 - 16 mmol/L Final    eGFR  05/01/2023 >60.0  >60 mL/min/1.73 m^2 Final    TSH 05/01/2023 1.660  0.400 - 4.000 uIU/mL Final    Specimen UA 05/01/2023 Urine, Clean Catch   Final    Color, UA 05/01/2023 Yellow  Yellow, Straw, Ingrid Final    Appearance, UA 05/01/2023 Clear  Clear Final    pH, UA 05/01/2023 6.0  5.0 - 8.0 Final    Specific Gravity, UA 05/01/2023 >=1.030 (A)  1.005 - 1.030 Final    Protein, UA 05/01/2023 Trace (A)  Negative Final    Glucose, UA 05/01/2023 Negative  Negative Final    Ketones, UA 05/01/2023 Trace (A)  Negative Final    Bilirubin (UA) 05/01/2023 Negative  Negative Final    Occult Blood UA 05/01/2023 Negative  Negative Final    Nitrite, UA 05/01/2023 Negative  Negative Final    Urobilinogen, UA 05/01/2023 1.0  <2.0 EU/dL Final    Leukocytes, UA 05/01/2023 Negative  Negative Final    Benzodiazepines 05/01/2023 Negative  Negative Final    Methadone metabolites 05/01/2023 Negative  Negative Final    Cocaine (Metab.) 05/01/2023 Negative  Negative Final    Opiate Scrn, Ur 05/01/2023 Negative  Negative Final    Barbiturate Screen, Ur 05/01/2023 Negative  Negative Final    Amphetamine Screen, Ur 05/01/2023 Negative  Negative Final    THC 05/01/2023 Presumptive Positive (A)  Negative Final    Phencyclidine 05/01/2023 Negative  Negative Final    Creatinine, Urine 05/01/2023 489.0 (H)  23.0 - 375.0 mg/dL Final    Toxicology Information 05/01/2023 SEE COMMENT   Final    Alcohol, Serum 05/01/2023 <3  <10 mg/dL Final    Acetaminophen (Tylenol), Serum 05/01/2023 <2.0 (L)  10.0 - 20.0 ug/mL Final    Salicylate Lvl 05/01/2023 2.6 (L)  15.0 - 30.0 mg/dL Final    Cholesterol 05/01/2023 157  120 - 199 mg/dL Final    Triglycerides 05/01/2023 62  30 - 150 mg/dL Final    HDL 05/01/2023 64  40 - 75 mg/dL Final    LDL Cholesterol 05/01/2023 80.6  63.0 - 159.0 mg/dL Final    HDL/Cholesterol Ratio 05/01/2023 40.8  20.0 - 50.0 % Final    Total Cholesterol/HDL Ratio 05/01/2023 2.5  2.0 - 5.0 Final    Non-HDL Cholesterol 05/01/2023 93  mg/dL Final     Hemoglobin A1C 05/01/2023 5.0  4.0 - 5.6 % Final    Estimated Avg Glucose 05/01/2023 97  68 - 131 mg/dL Final    Carbamazepine Lvl 05/07/2023 11.8  4.0 - 12.0 ug/mL Final         Discharged Condition: stable and improved; not currently a danger to self/others or gravely disabled    Disposition: Another Health Care Institution Not Defined    Is patient being discharged on multiple neuroleptics? No    Follow Up/Patient Instructions:     Take all medications as prescribed.  Attend all psychiatric and medical follow up appointments.   Abstain from all drugs and alcohol.  Call the crisis line at: [phone] for help in a crisis and emergent situations or call 911 and Return to ED for any acute worsening of your condition including suicidal or homicidal ideations      Discharge Procedure Orders   Diet Adult Regular     Notify your health care provider if you experience any of the following:  temperature >100.4     Notify your health care provider if you experience any of the following:  persistent nausea and vomiting or diarrhea     Notify your health care provider if you experience any of the following:   Order Comments: Suicidal thoughts, homicidal thoughts, or any other changes in mental status  If you would like immediate help/crisis counseling, please call [phone] (TALK). Through this toll-free phone number for a network of crisis centers across the country. These centers staff their lines with people who are trained to listen and offer support to people in emotional crisis. If you are in an emergency, please call 911.     Notify your health care provider if you experience any of the following:  increased confusion or weakness     Notify your health care provider if you experience any of the following:  persistent dizziness, light-headedness, or visual disturbances     Activity as tolerated      Follow-up Information       [facility] Follow up on 5/9/2023.    Why: Rehab today for,  mental health follow up, addiction screening and follow up, smoking cession appointment  Contact information:  [facility] العلي [facility] [id number] [phone]                             Follow up apt: see above      Medications:  Reconciled Home Medications:      Medication List        CHANGE how you take these medications      carBAMazepine 200 mg tablet  Commonly known as: TEGRETOL  Take 2 tablets (400 mg total) by mouth 2 (two) times a day.  What changed:   how much to take  when to take this            CONTINUE taking these medications      haloperidoL 5 MG tablet  Commonly known as: HALDOL  Take 1 tablet (5 mg total) by mouth 2 (two) times daily.                Diet: regular     Activity as tolerated    Total time spent discharging patient: 36 minutes    Cristhian Grijalva III, MD  Psychiatry

## 2023-05-11 NOTE — PLAN OF CARE
Problem: Adult Behavioral Health Plan of Care  Goal: Optimized Coping Skills in Response to Life Stressors  Intervention: Promote Effective Coping Strategies  Flowsheets (Taken 5/9/2023 1115)  Supportive Measures:   active listening utilized   verbalization of feelings encouraged   self-reflection promoted   self-responsibility promoted   problem-solving facilitated       Behavior: pt was engaged during group       Intervention: In this CBT-focused process group MSW facilitated a group discussion on self-sabotaging behaviors.  Each patient was asked to identify ways in which they may self-sabotage things in their life  and ways in which they can defeat these self-sabotaging behaviors.        Response: pt identified self sabotage behaviors as turning to alcohol, being passive aggressive, and procrastination.  Pt identified due to behaviors he has loss himself, people, and focus. Pt identified ways to defeat behaviors as be more head strong, stand up for self, and be a leader not a follower.       Plan: Continue to encourage pt to participate in process groups to verbalize feelings and develop healthy coping skills.

## 2023-07-21 ENCOUNTER — HOSPITAL ENCOUNTER (EMERGENCY)
Facility: HOSPITAL | Age: 23
Discharge: HOME | End: 2023-07-21
Attending: EMERGENCY MEDICINE
Payer: MEDICAID

## 2023-07-21 VITALS
RESPIRATION RATE: 18 BRPM | SYSTOLIC BLOOD PRESSURE: 106 MMHG | HEIGHT: 75 IN | BODY MASS INDEX: 21.25 KG/M2 | DIASTOLIC BLOOD PRESSURE: 57 MMHG | OXYGEN SATURATION: 98 % | HEART RATE: 94 BPM | TEMPERATURE: 98 F

## 2023-07-21 DIAGNOSIS — F20.9 SCHIZOPHRENIA, UNSPECIFIED TYPE: Primary | ICD-10-CM

## 2023-07-21 PROCEDURE — 99284 EMERGENCY DEPT VISIT MOD MDM: CPT

## 2023-07-21 NOTE — ED PROVIDER NOTES
Encounter Date: 7/21/2023       History     Chief Complaint   Patient presents with    Psychiatric Evaluation     Patient to the ER via [facility] per OPC.     22-year-old male with history of schizophrenia presents the emergency department on an OPC.  OPC states that he has been arguing with his mother, it also states that he was walking yesterday 5:00 a.m. in the afternoon until 11 30.  Patient denies suicidal ideations, states he recently got a job, he has insight, he understands that sometimes he needs to be pec and admitted, but states he feels fine.  I know him very well, this is the best I have ever seen him.  Alert oriented x4, GCS is 15 he is pleasant and polite    Review of patient's allergies indicates:  No Known Allergies  Past Medical History:   Diagnosis Date    History of psychiatric hospitalization     Hx of psychiatric care     Psychiatric problem     Schizophrenia     Suicide attempt      History reviewed. No pertinent surgical history.  Family History   Family history unknown: Yes     Social History     Tobacco Use    Smoking status: Every Day     Types: Cigars     Passive exposure: Never    Smokeless tobacco: Never   Substance Use Topics    Alcohol use: Yes     Comment: pt reports drinking wine, two-four times monthly or less    Drug use: Yes     Types: Marijuana, Benzodiazepines     Comment: pt reports taking benzo within the past few weeks to help sleep     Review of Systems   Constitutional:  Negative for fever.   HENT:  Negative for sore throat.    Respiratory:  Negative for shortness of breath.    Cardiovascular:  Negative for chest pain.   Gastrointestinal:  Negative for nausea.   Genitourinary:  Negative for dysuria.   Musculoskeletal:  Negative for back pain.   Skin:  Negative for rash.   Neurological:  Negative for weakness.   Hematological:  Does not bruise/bleed easily.   Psychiatric/Behavioral:  Negative for agitation, confusion, decreased concentration, hallucinations, self-injury and  suicidal ideas. The patient is not nervous/anxious and is not hyperactive.    All other systems reviewed and are negative.    Physical Exam     Initial Vitals [07/21/23 1448]   BP Pulse Resp Temp SpO2   (!) 106/57 94 18 98.2 °F (36.8 °C) 98 %      MAP       --         Physical Exam    Nursing note and vitals reviewed.  Constitutional: He appears well-developed and well-nourished. He is not diaphoretic. No distress.   HENT:   Head: Normocephalic and atraumatic.   Eyes: Conjunctivae and EOM are normal. Pupils are equal, round, and reactive to light. Right eye exhibits no discharge. Left eye exhibits no discharge. No scleral icterus.   Neck: Neck supple. No JVD present.   Normal range of motion.  Cardiovascular:  Normal rate, regular rhythm, normal heart sounds and intact distal pulses.           No murmur heard.  Pulmonary/Chest: Breath sounds normal. No stridor. No respiratory distress. He has no wheezes. He has no rhonchi. He has no rales. He exhibits no tenderness.   Abdominal: Abdomen is soft. Bowel sounds are normal. He exhibits no distension and no mass. There is no abdominal tenderness. There is no rebound and no guarding.   Musculoskeletal:         General: No tenderness or edema. Normal range of motion.      Cervical back: Normal range of motion and neck supple.     Neurological: He is alert and oriented to person, place, and time. He has normal strength. GCS score is 15. GCS eye subscore is 4. GCS verbal subscore is 5. GCS motor subscore is 6.   Skin: Skin is warm and dry. Capillary refill takes less than 2 seconds.   Psychiatric: He has a normal mood and affect. His speech is normal and behavior is normal. Judgment and thought content normal. He is not actively hallucinating. Thought content is not paranoid and not delusional. Cognition and memory are normal. He expresses no homicidal and no suicidal ideation. He expresses no suicidal plans and no homicidal plans. He is attentive.       ED Course    Procedures  Labs Reviewed - No data to display       Imaging Results    None          Medications - No data to display  Medical Decision Making:   Differential Diagnosis:   History of schizophrenia, OPC evaluation  ED Management:  This patient is very well known to me, this is the best I have ever seen him.  He is not suicidal, he is talkative, good insight.  I discussed case with , he agrees with my assessment and states patient does not meet criteria for a pec at this time.  He is stable for discharge and follow-up.                        Clinical Impression:   Final diagnoses:  [F20.9] Schizophrenia, unspecified type (Primary)        ED Disposition Condition    Discharge Stable          ED Prescriptions    None       Follow-up Information       Follow up With Specialties Details Why Contact Info Additional Information    Primary care physician  In 2 days       [place] - Emergency Department Emergency Medicine  As needed, If symptoms worsen [address] [place] [address] [phone] Floor 1             Fan Chao MD  07/21/23 4533

## 2023-08-13 ENCOUNTER — HOSPITAL ENCOUNTER (EMERGENCY)
Facility: HOSPITAL | Age: 23
Discharge: HOME | End: 2023-08-13
Attending: EMERGENCY MEDICINE
Payer: MEDICAID

## 2023-08-13 VITALS
TEMPERATURE: 99 F | DIASTOLIC BLOOD PRESSURE: 58 MMHG | OXYGEN SATURATION: 97 % | HEART RATE: 82 BPM | WEIGHT: 149 LBS | BODY MASS INDEX: 18.62 KG/M2 | RESPIRATION RATE: 18 BRPM | SYSTOLIC BLOOD PRESSURE: 109 MMHG

## 2023-08-13 DIAGNOSIS — R21 RASH AND NONSPECIFIC SKIN ERUPTION: Primary | ICD-10-CM

## 2023-08-13 PROCEDURE — 99282 EMERGENCY DEPT VISIT SF MDM: CPT

## 2023-08-13 NOTE — ED PROVIDER NOTES
"Encounter Date: 8/13/2023       History     Chief Complaint   Patient presents with    Rash     Pt stated that he has had a "ringworm" on right lower extremity for the past few months that has not resolved.      23-year-old male with history of schizophrenia presents to ED for evaluation of rash to right leg for months.  He reports no improvement with ring worm cream.  Does have intermittent itching.    The history is provided by the patient.     Review of patient's allergies indicates:  No Known Allergies  Past Medical History:   Diagnosis Date    History of psychiatric hospitalization     Hx of psychiatric care     Psychiatric problem     Schizophrenia     Suicide attempt      No past surgical history on file.  Family History   Family history unknown: Yes     Social History     Tobacco Use    Smoking status: Every Day     Current packs/day: 0.00     Types: Cigars     Passive exposure: Never    Smokeless tobacco: Never   Substance Use Topics    Alcohol use: Yes     Comment: pt reports drinking wine, two-four times monthly or less    Drug use: Yes     Types: Marijuana, Benzodiazepines     Comment: pt reports taking benzo within the past few weeks to help sleep     Review of Systems   Constitutional:  Negative for fever.   HENT:  Negative for sore throat.    Eyes: Negative.    Respiratory:  Negative for shortness of breath.    Cardiovascular:  Negative for chest pain.   Gastrointestinal:  Negative for nausea.   Endocrine: Negative.    Genitourinary:  Negative for dysuria.   Musculoskeletal:  Negative for back pain.   Skin:  Positive for color change and rash.   Allergic/Immunologic: Negative.    Neurological:  Negative for weakness.   Hematological:  Does not bruise/bleed easily.   Psychiatric/Behavioral: Negative.         Physical Exam     Initial Vitals [08/13/23 1658]   BP Pulse Resp Temp SpO2   (!) 109/58 82 18 98.6 °F (37 °C) 97 %      MAP       --         Physical Exam    Nursing note and vitals " reviewed.  Constitutional: He appears well-developed and well-nourished.   HENT:   Head: Normocephalic and atraumatic.   Eyes: EOM are normal.   Neck: Neck supple.   Normal range of motion.  Cardiovascular:  Normal rate and regular rhythm.           Pulmonary/Chest: No respiratory distress.   Abdominal: He exhibits no distension.   Musculoskeletal:         General: Normal range of motion.      Cervical back: Normal range of motion and neck supple.     Neurological: He is alert and oriented to person, place, and time.   Skin: Skin is warm and dry. Rash noted.   Psychiatric: He has a normal mood and affect. Thought content normal.         ED Course   Procedures  Labs Reviewed - No data to display       Imaging Results    None          Medications - No data to display  Medical Decision Making:   History:   Old Medical Records: I decided to obtain old medical records.  Differential Diagnosis:   Rash, dermatitis, ring worm  ED Management:  23-year-old male to ED for above complaints.  He was a flat circular lesion to his right lower extremity.  No erythema noted.  No crusting noted.  No drainage.  No swelling noted.  Patient was instructed to moisturize and follow up with primary care or Dermatology.                          Clinical Impression:   Final diagnoses:  [R21] Rash and nonspecific skin eruption (Primary)        ED Disposition Condition    Discharge Stable          ED Prescriptions    None       Follow-up Information       Follow up With Specialties Details Why Contact Info    Jose Martin Selby MD Family Medicine In 2 days  [address] [facility] [address]  [phone]      [facility]  Schedule an appointment as soon as possible for a visit   [address] [facility] [address]  [phone]               Tate Webb NP  08/13/23 1209

## 2023-08-17 ENCOUNTER — LAB VISIT (OUTPATIENT)
Dept: LAB | Facility: HOSPITAL | Age: 23
End: 2023-08-17
Attending: FAMILY MEDICINE
Payer: MEDICAID

## 2023-08-17 DIAGNOSIS — Z00.00 ROUTINE GENERAL MEDICAL EXAMINATION AT A HEALTH CARE FACILITY: ICD-10-CM

## 2023-08-17 DIAGNOSIS — Z11.3 SCREENING EXAMINATION FOR VENEREAL DISEASE: ICD-10-CM

## 2023-08-17 DIAGNOSIS — Z11.4 SCREENING FOR HUMAN IMMUNODEFICIENCY VIRUS: Primary | ICD-10-CM

## 2023-08-17 DIAGNOSIS — R63.4 LOSS OF WEIGHT: ICD-10-CM

## 2023-08-17 LAB
ALBUMIN SERPL BCP-MCNC: 3.3 G/DL (ref 3.5–5.2)
ALP SERPL-CCNC: 73 U/L (ref 55–135)
ALT SERPL W/O P-5'-P-CCNC: 22 U/L (ref 10–44)
ANION GAP SERPL CALC-SCNC: 3 MMOL/L (ref 8–16)
AST SERPL-CCNC: 11 U/L (ref 10–40)
BASOPHILS # BLD AUTO: 0.01 K/UL (ref 0–0.2)
BASOPHILS NFR BLD: 0.2 % (ref 0–1.9)
BILIRUB SERPL-MCNC: 0.2 MG/DL (ref 0.1–1)
BUN SERPL-MCNC: 13 MG/DL (ref 6–20)
C TRACH DNA SPEC QL NAA+PROBE: NOT DETECTED
CALCIUM SERPL-MCNC: 8 MG/DL (ref 8.7–10.5)
CHLORIDE SERPL-SCNC: 112 MMOL/L (ref 95–110)
CHOLEST SERPL-MCNC: 120 MG/DL (ref 120–199)
CHOLEST/HDLC SERPL: 2.6 {RATIO} (ref 2–5)
CO2 SERPL-SCNC: 27 MMOL/L (ref 23–29)
CREAT SERPL-MCNC: 1.1 MG/DL (ref 0.5–1.4)
DIFFERENTIAL METHOD: ABNORMAL
EOSINOPHIL # BLD AUTO: 0.2 K/UL (ref 0–0.5)
EOSINOPHIL NFR BLD: 2.8 % (ref 0–8)
ERYTHROCYTE [DISTWIDTH] IN BLOOD BY AUTOMATED COUNT: 12 % (ref 11.5–14.5)
EST. GFR  (NO RACE VARIABLE): >60 ML/MIN/1.73 M^2
GLUCOSE SERPL-MCNC: 94 MG/DL (ref 70–110)
HCT VFR BLD AUTO: 38.8 % (ref 40–54)
HDLC SERPL-MCNC: 47 MG/DL (ref 40–75)
HDLC SERPL: 39.2 % (ref 20–50)
HGB BLD-MCNC: 12.4 G/DL (ref 14–18)
HIV 1+2 AB+HIV1 P24 AG SERPL QL IA: NORMAL
IMM GRANULOCYTES # BLD AUTO: 0.01 K/UL (ref 0–0.04)
IMM GRANULOCYTES NFR BLD AUTO: 0.2 % (ref 0–0.5)
LDLC SERPL CALC-MCNC: 61 MG/DL (ref 63–159)
LYMPHOCYTES # BLD AUTO: 2.3 K/UL (ref 1–4.8)
LYMPHOCYTES NFR BLD: 40.1 % (ref 18–48)
MCH RBC QN AUTO: 30.8 PG (ref 27–31)
MCHC RBC AUTO-ENTMCNC: 32 G/DL (ref 32–36)
MCV RBC AUTO: 97 FL (ref 82–98)
MONOCYTES # BLD AUTO: 0.5 K/UL (ref 0.3–1)
MONOCYTES NFR BLD: 7.9 % (ref 4–15)
N GONORRHOEA DNA SPEC QL NAA+PROBE: NOT DETECTED
NEUTROPHILS # BLD AUTO: 2.8 K/UL (ref 1.8–7.7)
NEUTROPHILS NFR BLD: 48.8 % (ref 38–73)
NONHDLC SERPL-MCNC: 73 MG/DL
NRBC BLD-RTO: 0 /100 WBC
PLATELET # BLD AUTO: 205 K/UL (ref 150–450)
PMV BLD AUTO: 10.3 FL (ref 9.2–12.9)
POTASSIUM SERPL-SCNC: 4 MMOL/L (ref 3.5–5.1)
PROT SERPL-MCNC: 6.5 G/DL (ref 6–8.4)
RBC # BLD AUTO: 4.02 M/UL (ref 4.6–6.2)
SODIUM SERPL-SCNC: 142 MMOL/L (ref 136–145)
TRIGL SERPL-MCNC: 60 MG/DL (ref 30–150)
TSH SERPL DL<=0.005 MIU/L-ACNC: 1.21 UIU/ML (ref 0.4–4)
WBC # BLD AUTO: 5.81 K/UL (ref 3.9–12.7)

## 2023-08-17 PROCEDURE — 86592 SYPHILIS TEST NON-TREP QUAL: CPT | Performed by: FAMILY MEDICINE

## 2023-08-17 PROCEDURE — 84443 ASSAY THYROID STIM HORMONE: CPT | Performed by: FAMILY MEDICINE

## 2023-08-17 PROCEDURE — 87389 HIV-1 AG W/HIV-1&-2 AB AG IA: CPT | Performed by: FAMILY MEDICINE

## 2023-08-17 PROCEDURE — 85025 COMPLETE CBC W/AUTO DIFF WBC: CPT | Performed by: FAMILY MEDICINE

## 2023-08-17 PROCEDURE — 80061 LIPID PANEL: CPT | Performed by: FAMILY MEDICINE

## 2023-08-17 PROCEDURE — 87591 N.GONORRHOEAE DNA AMP PROB: CPT | Performed by: FAMILY MEDICINE

## 2023-08-17 PROCEDURE — 36415 COLL VENOUS BLD VENIPUNCTURE: CPT | Performed by: FAMILY MEDICINE

## 2023-08-17 PROCEDURE — 80053 COMPREHEN METABOLIC PANEL: CPT | Performed by: FAMILY MEDICINE

## 2023-08-18 LAB — RPR SER QL: NORMAL

## 2023-09-07 PROBLEM — F12.959 CANNABIS-INDUCED PSYCHOTIC DISORDER: Status: ACTIVE | Noted: 2023-09-07

## 2023-10-07 ENCOUNTER — HOSPITAL ENCOUNTER (EMERGENCY)
Facility: HOSPITAL | Age: 23
Discharge: HOME | End: 2023-10-07
Attending: EMERGENCY MEDICINE
Payer: MEDICAID

## 2023-10-07 VITALS
WEIGHT: 151 LBS | RESPIRATION RATE: 20 BRPM | BODY MASS INDEX: 19.38 KG/M2 | TEMPERATURE: 97 F | HEART RATE: 58 BPM | DIASTOLIC BLOOD PRESSURE: 72 MMHG | SYSTOLIC BLOOD PRESSURE: 119 MMHG | HEIGHT: 74 IN | OXYGEN SATURATION: 99 %

## 2023-10-07 DIAGNOSIS — K52.9 GASTROENTERITIS: Primary | ICD-10-CM

## 2023-10-07 LAB
ALBUMIN SERPL BCP-MCNC: 4.4 G/DL (ref 3.5–5.2)
ALP SERPL-CCNC: 96 U/L (ref 55–135)
ALT SERPL W/O P-5'-P-CCNC: 23 U/L (ref 10–44)
ANION GAP SERPL CALC-SCNC: 0 MMOL/L (ref 3–11)
AST SERPL-CCNC: 18 U/L (ref 10–40)
BASOPHILS # BLD AUTO: 0.02 K/UL (ref 0–0.2)
BASOPHILS NFR BLD: 0.2 % (ref 0–1.9)
BILIRUB SERPL-MCNC: 0.4 MG/DL (ref 0.1–1)
BUN SERPL-MCNC: 13 MG/DL (ref 6–20)
CALCIUM SERPL-MCNC: 9.4 MG/DL (ref 8.7–10.5)
CHLORIDE SERPL-SCNC: 110 MMOL/L (ref 95–110)
CO2 SERPL-SCNC: 30 MMOL/L (ref 23–29)
CREAT SERPL-MCNC: 1.2 MG/DL (ref 0.5–1.4)
CTP QC/QA: YES
DIFFERENTIAL METHOD: ABNORMAL
EOSINOPHIL # BLD AUTO: 0.1 K/UL (ref 0–0.5)
EOSINOPHIL NFR BLD: 1.3 % (ref 0–8)
ERYTHROCYTE [DISTWIDTH] IN BLOOD BY AUTOMATED COUNT: 12.5 % (ref 11.5–14.5)
EST. GFR  (NO RACE VARIABLE): >60 ML/MIN/1.73 M^2
GLUCOSE SERPL-MCNC: 110 MG/DL (ref 70–110)
HCT VFR BLD AUTO: 46 % (ref 40–54)
HGB BLD-MCNC: 14.8 G/DL (ref 14–18)
IMM GRANULOCYTES # BLD AUTO: 0.01 K/UL (ref 0–0.04)
IMM GRANULOCYTES NFR BLD AUTO: 0.1 % (ref 0–0.5)
LIPASE SERPL-CCNC: 29 U/L (ref 13–75)
LYMPHOCYTES # BLD AUTO: 1.3 K/UL (ref 1–4.8)
LYMPHOCYTES NFR BLD: 16.1 % (ref 18–48)
MCH RBC QN AUTO: 30.8 PG (ref 27–31)
MCHC RBC AUTO-ENTMCNC: 32.2 G/DL (ref 32–36)
MCV RBC AUTO: 96 FL (ref 82–98)
MONOCYTES # BLD AUTO: 0.5 K/UL (ref 0.3–1)
MONOCYTES NFR BLD: 5.7 % (ref 4–15)
NEUTROPHILS # BLD AUTO: 6.3 K/UL (ref 1.8–7.7)
NEUTROPHILS NFR BLD: 76.6 % (ref 38–73)
NRBC BLD-RTO: 0 /100 WBC
PLATELET # BLD AUTO: 275 K/UL (ref 150–450)
PMV BLD AUTO: 10.1 FL (ref 9.2–12.9)
POTASSIUM SERPL-SCNC: 3.7 MMOL/L (ref 3.5–5.1)
PROT SERPL-MCNC: 8.8 G/DL (ref 6–8.4)
RBC # BLD AUTO: 4.8 M/UL (ref 4.6–6.2)
SARS-COV-2 RDRP RESP QL NAA+PROBE: NEGATIVE
SODIUM SERPL-SCNC: 140 MMOL/L (ref 136–145)
WBC # BLD AUTO: 8.18 K/UL (ref 3.9–12.7)

## 2023-10-07 PROCEDURE — 80053 COMPREHEN METABOLIC PANEL: CPT | Performed by: EMERGENCY MEDICINE

## 2023-10-07 PROCEDURE — 96361 HYDRATE IV INFUSION ADD-ON: CPT

## 2023-10-07 PROCEDURE — 83690 ASSAY OF LIPASE: CPT | Performed by: EMERGENCY MEDICINE

## 2023-10-07 PROCEDURE — 36415 COLL VENOUS BLD VENIPUNCTURE: CPT | Performed by: EMERGENCY MEDICINE

## 2023-10-07 PROCEDURE — 87635 SARS-COV-2 COVID-19 AMP PRB: CPT | Performed by: EMERGENCY MEDICINE

## 2023-10-07 PROCEDURE — 99284 EMERGENCY DEPT VISIT MOD MDM: CPT | Mod: 25

## 2023-10-07 PROCEDURE — 63600175 PHARM REV CODE 636 W HCPCS: Performed by: EMERGENCY MEDICINE

## 2023-10-07 PROCEDURE — 96372 THER/PROPH/DIAG INJ SC/IM: CPT | Performed by: EMERGENCY MEDICINE

## 2023-10-07 PROCEDURE — 96374 THER/PROPH/DIAG INJ IV PUSH: CPT

## 2023-10-07 PROCEDURE — 85025 COMPLETE CBC W/AUTO DIFF WBC: CPT | Performed by: EMERGENCY MEDICINE

## 2023-10-07 PROCEDURE — 25000003 PHARM REV CODE 250: Performed by: EMERGENCY MEDICINE

## 2023-10-07 RX ORDER — DICYCLOMINE HYDROCHLORIDE 10 MG/ML
20 INJECTION INTRAMUSCULAR
Status: COMPLETED | OUTPATIENT
Start: 2023-10-07 | End: 2023-10-07

## 2023-10-07 RX ORDER — ONDANSETRON 2 MG/ML
8 INJECTION INTRAMUSCULAR; INTRAVENOUS
Status: COMPLETED | OUTPATIENT
Start: 2023-10-07 | End: 2023-10-07

## 2023-10-07 RX ORDER — ONDANSETRON 4 MG/1
4 TABLET, ORALLY DISINTEGRATING ORAL EVERY 8 HOURS PRN
Qty: 12 TABLET | Refills: 0 | Status: SHIPPED | OUTPATIENT
Start: 2023-10-07

## 2023-10-07 RX ADMIN — ONDANSETRON 8 MG: 2 INJECTION INTRAMUSCULAR; INTRAVENOUS at 07:10

## 2023-10-07 RX ADMIN — SODIUM CHLORIDE 1000 ML: 9 INJECTION, SOLUTION INTRAVENOUS at 07:10

## 2023-10-07 RX ADMIN — DICYCLOMINE HYDROCHLORIDE 20 MG: 20 INJECTION, SOLUTION INTRAMUSCULAR at 07:10

## 2023-10-07 NOTE — ED PROVIDER NOTES
"Encounter Date: 10/7/2023       History     Chief Complaint   Patient presents with    Abdominal Pain     Pt presents to the ER w/ complaints of L sided abdominal pain beginning yesterday w/ vomiting and watery stool. Pt describes the pain as "cramping," was on R  side yesterday but moved to the L side.      23-year-old male with a history of marijuana abuse, schizophrenia presents to the emergency department with nausea and diarrhea that began last night around 8:00 a.m., states he ate some fish that he made for himself for lunch.  No other abnormal food ingestion.  Denies any fever.  Mild left-sided abdominal cramping, denies pain with palpation.  Normal gait.  No blood in stool.  Not ill appearing, alert oriented x4, GCS is 15.  States he has had these symptoms in the past.  No other sick contacts      Review of patient's allergies indicates:  No Known Allergies  Past Medical History:   Diagnosis Date    History of psychiatric hospitalization 09/06/2023    [facility] MAYO     of psychiatric care     Psychiatric problem     Schizophrenia     Suicide attempt      No past surgical history on file.  Family History   Family history unknown: Yes     Social History     Tobacco Use    Smoking status: Every Day     Types: Cigars     Passive exposure: Never    Smokeless tobacco: Never   Substance Use Topics    Alcohol use: Yes     Comment: pt reports drinking wine, two-four times monthly or less    Drug use: Yes     Types: Marijuana, Benzodiazepines     Comment: pt reports taking benzo within the past few weeks to help sleep     Review of Systems   Constitutional:  Negative for fever.   HENT:  Negative for sore throat.    Respiratory:  Negative for shortness of breath.    Cardiovascular:  Negative for chest pain.   Gastrointestinal:  Positive for diarrhea and nausea.   Genitourinary:  Negative for dysuria.   Musculoskeletal:  Negative for back pain.   Skin:  Negative for rash.   Neurological:  Negative for weakness. "   Hematological:  Does not bruise/bleed easily.   All other systems reviewed and are negative.      Physical Exam     Initial Vitals [10/07/23 0624]   BP Pulse Resp Temp SpO2   119/72 70 20 97.4 °F (36.3 °C) 99 %      MAP       --         Physical Exam    Nursing note and vitals reviewed.  Constitutional: He appears well-developed and well-nourished. He is not diaphoretic. No distress.   HENT:   Head: Normocephalic and atraumatic.   Mouth/Throat: Oropharynx is clear and moist.   Eyes: Conjunctivae and EOM are normal. Pupils are equal, round, and reactive to light. Right eye exhibits no discharge. Left eye exhibits no discharge. No scleral icterus.   Neck: Neck supple. No JVD present.   Normal range of motion.  Cardiovascular:  Normal rate, regular rhythm, normal heart sounds and intact distal pulses.           No murmur heard.  Pulmonary/Chest: Breath sounds normal. No stridor. No respiratory distress. He has no wheezes. He has no rhonchi. He has no rales. He exhibits no tenderness.   Abdominal: Abdomen is soft. Bowel sounds are normal. He exhibits no distension and no mass. There is no abdominal tenderness. There is no rebound and no guarding.   Musculoskeletal:         General: No tenderness or edema. Normal range of motion.      Cervical back: Normal range of motion and neck supple.     Neurological: He is alert and oriented to person, place, and time. He has normal strength. GCS score is 15. GCS eye subscore is 4. GCS verbal subscore is 5. GCS motor subscore is 6.   Skin: Skin is warm and dry. Capillary refill takes less than 2 seconds.         ED Course   Procedures  Labs Reviewed   CBC W/ AUTO DIFFERENTIAL - Abnormal; Notable for the following components:       Result Value    Gran % 76.6 (*)     Lymph % 16.1 (*)     All other components within normal limits   COMPREHENSIVE METABOLIC PANEL - Abnormal; Notable for the following components:    CO2 30 (*)     Total Protein 8.8 (*)     Anion Gap 0 (*)     All  other components within normal limits   LIPASE   SARS-COV-2 RDRP GENE    Narrative:     .This test utilizes isothermal nucleic acid amplification technology to detect the SARS-CoV-2 RdRp nucleic acid segment. The analytical sensitivity (limit of detection) is 500 copies/swab.     A POSITIVE result is indicative of the presence of SARS-CoV-2 RNA; clinical correlation with patient history and other diagnostic information is necessary to determine patient infection status.    A NEGATIVE result means that SARS-CoV-2 nucleic acids are not present above the limit of detection. A NEGATIVE result should be treated as presumptive. It does not rule out the possibility of COVID-19 and should not be the sole basis for treatment decisions. If COVID-19 is strongly suspected based on clinical and exposure history, re-testing using an alternate molecular assay should be considered.     This test is only for use under the Food and Drug Administration s Emergency Use Authorization (EUA).     Commercial kits are provided by [employer]. Performance characteristics of the EUA have been independently verified by [facility] Department of Pathology and Laboratory Medicine.   _________________________________________________________________   The authorized Fact Sheet for Healthcare Providers and the authorized Fact Sheet for Patients of the ID NOW COVID-19 are available on the FDA website:    https://www.fda.gov/media/894643/download      https://www.fda.gov/media/016283/download              Imaging Results    None          Medications   sodium chloride 0.9% bolus 1,000 mL 1,000 mL (0 mLs Intravenous Stopped 10/7/23 0804)   dicyclomine injection 20 mg (20 mg Intramuscular Given 10/7/23 0713)   ondansetron injection 8 mg (8 mg Intravenous Given 10/7/23 0712)     Medical Decision Making  Differential includes viral gastroenteritis, nausea vomiting diarrhea, food-borne illness, marijuana induced nausea    Amount and/or  Complexity of Data Reviewed  Labs: ordered.    Risk  Prescription drug management.               ED Course as of 10/07/23 0807   Sat Oct 07, 2023   0806 Much improved after medications.  Laboratory values are unremarkable, no elevated white count or lipase.  He is COVID negative.  CMP is within normal limits as well.  He is stable for discharge with follow-up to primary care physician [SD]      ED Course User Index  [SD] Fan Chao MD                    Clinical Impression:   Final diagnoses:  [K52.9] Gastroenteritis (Primary)        ED Disposition Condition    Discharge Stable          ED Prescriptions       Medication Sig Dispense Start Date End Date Auth. Provider    ondansetron (ZOFRAN-ODT) 4 MG TbDL Take 1 tablet (4 mg total) by mouth every 8 (eight) hours as needed (Nausea and vomiting). 12 tablet 10/7/2023 -- Fan Chao MD          Follow-up Information       Follow up With Specialties Details Why Contact Info Additional Information    Primary care physician  In 2 days       [facility] - Emergency Department Emergency Medicine  As needed [address] [place] [address] [id number] [phone] Floor 1             Fan Chao MD  10/07/23 0807

## 2024-05-09 ENCOUNTER — HOSPITAL ENCOUNTER (INPATIENT)
Facility: HOSPITAL | Age: 24
LOS: 7 days | Discharge: HOME | DRG: 885 | End: 2024-05-16
Attending: EMERGENCY MEDICINE | Admitting: EMERGENCY MEDICINE
Payer: MEDICAID

## 2024-05-09 DIAGNOSIS — F23 ACUTE PSYCHOSIS: ICD-10-CM

## 2024-05-09 DIAGNOSIS — R45.851 SUICIDAL IDEATION: Primary | ICD-10-CM

## 2024-05-09 LAB
ALBUMIN SERPL BCP-MCNC: 4 G/DL (ref 3.5–5.2)
ALP SERPL-CCNC: 80 U/L (ref 55–135)
ALT SERPL W/O P-5'-P-CCNC: 56 U/L (ref 10–44)
AMPHET+METHAMPHET UR QL: NEGATIVE
ANION GAP SERPL CALC-SCNC: 6 MMOL/L (ref 3–11)
APAP SERPL-MCNC: <2 UG/ML (ref 10–20)
AST SERPL-CCNC: 61 U/L (ref 10–40)
BACTERIA #/AREA URNS HPF: NEGATIVE /HPF
BARBITURATES UR QL SCN>200 NG/ML: NEGATIVE
BASOPHILS # BLD AUTO: 0.02 K/UL (ref 0–0.2)
BASOPHILS NFR BLD: 0.2 % (ref 0–1.9)
BENZODIAZ UR QL SCN>200 NG/ML: ABNORMAL
BILIRUB SERPL-MCNC: 0.7 MG/DL (ref 0.1–1)
BILIRUB UR QL STRIP: ABNORMAL
BUN SERPL-MCNC: 16 MG/DL (ref 6–20)
BZE UR QL SCN: NEGATIVE
CALCIUM SERPL-MCNC: 8.7 MG/DL (ref 8.7–10.5)
CANNABINOIDS UR QL SCN: ABNORMAL
CHLORIDE SERPL-SCNC: 111 MMOL/L (ref 95–110)
CLARITY UR: CLEAR
CO2 SERPL-SCNC: 22 MMOL/L (ref 23–29)
COLOR UR: YELLOW
CREAT SERPL-MCNC: 1.4 MG/DL (ref 0.5–1.4)
CREAT UR-MCNC: 774 MG/DL (ref 23–375)
DIFFERENTIAL METHOD BLD: ABNORMAL
EOSINOPHIL # BLD AUTO: 0.1 K/UL (ref 0–0.5)
EOSINOPHIL NFR BLD: 0.9 % (ref 0–8)
ERYTHROCYTE [DISTWIDTH] IN BLOOD BY AUTOMATED COUNT: 12.3 % (ref 11.5–14.5)
EST. GFR  (NO RACE VARIABLE): >60 ML/MIN/1.73 M^2
ETHANOL SERPL-MCNC: 3 MG/DL
GLUCOSE SERPL-MCNC: 119 MG/DL (ref 70–110)
GLUCOSE UR QL STRIP: NEGATIVE
HCT VFR BLD AUTO: 37.7 % (ref 40–54)
HGB BLD-MCNC: 13.5 G/DL (ref 14–18)
HGB UR QL STRIP: NEGATIVE
HYALINE CASTS #/AREA URNS LPF: 13.1 /LPF
IMM GRANULOCYTES # BLD AUTO: 0.02 K/UL (ref 0–0.04)
IMM GRANULOCYTES NFR BLD AUTO: 0.2 % (ref 0–0.5)
KETONES UR QL STRIP: ABNORMAL
LEUKOCYTE ESTERASE UR QL STRIP: ABNORMAL
LYMPHOCYTES # BLD AUTO: 1.8 K/UL (ref 1–4.8)
LYMPHOCYTES NFR BLD: 20.4 % (ref 18–48)
MCH RBC QN AUTO: 32.4 PG (ref 27–31)
MCHC RBC AUTO-ENTMCNC: 35.8 G/DL (ref 32–36)
MCV RBC AUTO: 90 FL (ref 82–98)
METHADONE UR QL SCN>300 NG/ML: NEGATIVE
MICROSCOPIC COMMENT: ABNORMAL
MONOCYTES # BLD AUTO: 0.8 K/UL (ref 0.3–1)
MONOCYTES NFR BLD: 8.5 % (ref 4–15)
NEUTROPHILS # BLD AUTO: 6.1 K/UL (ref 1.8–7.7)
NEUTROPHILS NFR BLD: 69.8 % (ref 38–73)
NITRITE UR QL STRIP: NEGATIVE
NRBC BLD-RTO: 0 /100 WBC
OPIATES UR QL SCN: NEGATIVE
PCP UR QL SCN>25 NG/ML: NEGATIVE
PH UR STRIP: 6 [PH] (ref 5–8)
PLATELET # BLD AUTO: 238 K/UL (ref 150–450)
PMV BLD AUTO: 9.3 FL (ref 9.2–12.9)
POTASSIUM SERPL-SCNC: 3 MMOL/L (ref 3.5–5.1)
PROT SERPL-MCNC: 8 G/DL (ref 6–8.4)
PROT UR QL STRIP: ABNORMAL
RBC # BLD AUTO: 4.17 M/UL (ref 4.6–6.2)
RBC #/AREA URNS HPF: 2 /HPF (ref 0–4)
SODIUM SERPL-SCNC: 139 MMOL/L (ref 136–145)
SP GR UR STRIP: >=1.03 (ref 1–1.03)
SQUAMOUS #/AREA URNS HPF: 1 /HPF
TOXICOLOGY INFORMATION: ABNORMAL
TSH SERPL DL<=0.005 MIU/L-ACNC: 1.59 UIU/ML (ref 0.4–4)
URN SPEC COLLECT METH UR: ABNORMAL
UROBILINOGEN UR STRIP-ACNC: ABNORMAL EU/DL
WBC # BLD AUTO: 8.81 K/UL (ref 3.9–12.7)
WBC #/AREA URNS HPF: 2 /HPF (ref 0–5)

## 2024-05-09 PROCEDURE — 80143 DRUG ASSAY ACETAMINOPHEN: CPT | Performed by: EMERGENCY MEDICINE

## 2024-05-09 PROCEDURE — 25000003 PHARM REV CODE 250: Performed by: EMERGENCY MEDICINE

## 2024-05-09 PROCEDURE — 99285 EMERGENCY DEPT VISIT HI MDM: CPT | Mod: 25

## 2024-05-09 PROCEDURE — 63600175 PHARM REV CODE 636 W HCPCS: Performed by: EMERGENCY MEDICINE

## 2024-05-09 PROCEDURE — 11400000 HC PSYCH PRIVATE ROOM

## 2024-05-09 PROCEDURE — 80053 COMPREHEN METABOLIC PANEL: CPT | Performed by: EMERGENCY MEDICINE

## 2024-05-09 PROCEDURE — 84443 ASSAY THYROID STIM HORMONE: CPT | Performed by: EMERGENCY MEDICINE

## 2024-05-09 PROCEDURE — 82077 ASSAY SPEC XCP UR&BREATH IA: CPT | Performed by: EMERGENCY MEDICINE

## 2024-05-09 PROCEDURE — 36415 COLL VENOUS BLD VENIPUNCTURE: CPT | Performed by: EMERGENCY MEDICINE

## 2024-05-09 PROCEDURE — 85025 COMPLETE CBC W/AUTO DIFF WBC: CPT | Performed by: EMERGENCY MEDICINE

## 2024-05-09 PROCEDURE — 25000003 PHARM REV CODE 250: Performed by: PSYCHIATRY & NEUROLOGY

## 2024-05-09 PROCEDURE — 80307 DRUG TEST PRSMV CHEM ANLYZR: CPT | Performed by: EMERGENCY MEDICINE

## 2024-05-09 PROCEDURE — 96372 THER/PROPH/DIAG INJ SC/IM: CPT | Performed by: EMERGENCY MEDICINE

## 2024-05-09 PROCEDURE — 81000 URINALYSIS NONAUTO W/SCOPE: CPT | Performed by: EMERGENCY MEDICINE

## 2024-05-09 RX ORDER — OLANZAPINE 10 MG/2ML
10 INJECTION, POWDER, FOR SOLUTION INTRAMUSCULAR EVERY 8 HOURS PRN
Status: DISCONTINUED | OUTPATIENT
Start: 2024-05-09 | End: 2024-05-16 | Stop reason: HOSPADM

## 2024-05-09 RX ORDER — FAMOTIDINE 20 MG/1
20 TABLET, FILM COATED ORAL 2 TIMES DAILY
Status: DISCONTINUED | OUTPATIENT
Start: 2024-05-09 | End: 2024-05-09

## 2024-05-09 RX ORDER — TALC
6 POWDER (GRAM) TOPICAL NIGHTLY PRN
Status: DISCONTINUED | OUTPATIENT
Start: 2024-05-09 | End: 2024-05-10

## 2024-05-09 RX ORDER — HYDROXYZINE PAMOATE 50 MG/1
50 CAPSULE ORAL EVERY 6 HOURS PRN
Status: DISCONTINUED | OUTPATIENT
Start: 2024-05-09 | End: 2024-05-16 | Stop reason: HOSPADM

## 2024-05-09 RX ORDER — DIPHENHYDRAMINE HYDROCHLORIDE 50 MG/ML
50 INJECTION INTRAMUSCULAR; INTRAVENOUS
Status: COMPLETED | OUTPATIENT
Start: 2024-05-09 | End: 2024-05-09

## 2024-05-09 RX ORDER — OLANZAPINE 10 MG/1
10 TABLET ORAL EVERY 8 HOURS PRN
Status: DISCONTINUED | OUTPATIENT
Start: 2024-05-09 | End: 2024-05-16 | Stop reason: HOSPADM

## 2024-05-09 RX ORDER — POTASSIUM CHLORIDE 20 MEQ/1
40 TABLET, EXTENDED RELEASE ORAL DAILY
Status: COMPLETED | OUTPATIENT
Start: 2024-05-10 | End: 2024-05-11

## 2024-05-09 RX ORDER — HALOPERIDOL 5 MG/ML
10 INJECTION INTRAMUSCULAR
Status: COMPLETED | OUTPATIENT
Start: 2024-05-09 | End: 2024-05-09

## 2024-05-09 RX ORDER — IBUPROFEN 200 MG
1 TABLET ORAL DAILY PRN
Status: DISCONTINUED | OUTPATIENT
Start: 2024-05-09 | End: 2024-05-16 | Stop reason: HOSPADM

## 2024-05-09 RX ORDER — ONDANSETRON 4 MG/1
8 TABLET, ORALLY DISINTEGRATING ORAL EVERY 8 HOURS PRN
Status: DISCONTINUED | OUTPATIENT
Start: 2024-05-09 | End: 2024-05-16 | Stop reason: HOSPADM

## 2024-05-09 RX ORDER — PALIPERIDONE PALMITATE 234 MG/1.5ML
INJECTION INTRAMUSCULAR
Status: ON HOLD | COMMUNITY
Start: 2024-04-17

## 2024-05-09 RX ADMIN — DIPHENHYDRAMINE HYDROCHLORIDE 50 MG: 50 INJECTION INTRAMUSCULAR; INTRAVENOUS at 08:05

## 2024-05-09 RX ADMIN — HALOPERIDOL LACTATE 10 MG: 5 INJECTION, SOLUTION INTRAMUSCULAR at 08:05

## 2024-05-09 RX ADMIN — Medication 6 MG: at 08:05

## 2024-05-09 RX ADMIN — OLANZAPINE 10 MG: 10 TABLET, FILM COATED ORAL at 08:05

## 2024-05-09 NOTE — ED PROVIDER NOTES
Encounter Date: 5/9/2024       History     Chief Complaint   Patient presents with    Psychiatric Evaluation     Pt to the ER requesting admission to [facility] for medication adjustment, reports worsening AVH and SI. Pt reports he wants to stab himself.      23-year-old male with a history of schizophrenia, multiple psychiatric hospitalizations presents the emergency department stating he is hearing voices telling him to hurt himself, he is crying, talking to himself, seems to be responding to internal stimuli currently.      Review of patient's allergies indicates:  No Known Allergies  Past Medical History:   Diagnosis Date    History of psychiatric hospitalization 09/06/2023    [facility] of psychiatric care     Psychiatric problem     Schizophrenia     Suicide attempt      No past surgical history on file.  Family History   Family history unknown: Yes     Social History     Tobacco Use    Smoking status: Every Day     Types: Cigars     Passive exposure: Never    Smokeless tobacco: Never   Substance Use Topics    Alcohol use: Yes     Comment: pt reports drinking wine, two-four times monthly or less    Drug use: Yes     Types: Marijuana, Benzodiazepines     Comment: pt reports taking benzo within the past few weeks to help sleep     Review of Systems   Constitutional:  Negative for fever.   HENT:  Negative for sore throat.    Respiratory:  Negative for shortness of breath.    Cardiovascular:  Negative for chest pain.   Gastrointestinal:  Negative for nausea.   Genitourinary:  Negative for dysuria.   Musculoskeletal:  Negative for back pain.   Skin:  Negative for rash.   Neurological:  Negative for weakness.   Hematological:  Does not bruise/bleed easily.   Psychiatric/Behavioral:  Positive for hallucinations and suicidal ideas.    All other systems reviewed and are negative.      Physical Exam     Initial Vitals [05/09/24 0744]   BP Pulse Resp Temp SpO2   120/79 108 18 98.2 °F (36.8 °C) 97 %      MAP       --          Physical Exam    Nursing note and vitals reviewed.  Constitutional: He appears well-developed and well-nourished. He is not diaphoretic. No distress.   HENT:   Head: Normocephalic and atraumatic.   Eyes: Conjunctivae and EOM are normal. Pupils are equal, round, and reactive to light. Right eye exhibits no discharge. Left eye exhibits no discharge. No scleral icterus.   Neck: Neck supple. No JVD present.   Normal range of motion.  Cardiovascular:  Normal rate, regular rhythm, normal heart sounds and intact distal pulses.           No murmur heard.  Pulmonary/Chest: Breath sounds normal. No stridor. No respiratory distress. He has no wheezes. He has no rhonchi. He has no rales. He exhibits no tenderness.   Abdominal: Abdomen is soft. Bowel sounds are normal. He exhibits no distension and no mass. There is no abdominal tenderness. There is no rebound and no guarding.   Musculoskeletal:         General: No tenderness or edema. Normal range of motion.      Cervical back: Normal range of motion and neck supple.     Neurological: He is alert and oriented to person, place, and time. He has normal strength. GCS score is 15. GCS eye subscore is 4. GCS verbal subscore is 5. GCS motor subscore is 6.   Skin: Skin is warm and dry. Capillary refill takes less than 2 seconds.   Psychiatric: His speech is delayed. He is slowed and actively hallucinating. Thought content is paranoid. Cognition and memory are normal. He expresses impulsivity. He exhibits a depressed mood. He expresses suicidal ideation. He is attentive.         ED Course   Procedures  Labs Reviewed   CBC W/ AUTO DIFFERENTIAL - Abnormal; Notable for the following components:       Result Value    RBC 4.17 (*)     Hemoglobin 13.5 (*)     Hematocrit 37.7 (*)     MCH 32.4 (*)     All other components within normal limits   COMPREHENSIVE METABOLIC PANEL - Abnormal; Notable for the following components:    Potassium 3.0 (*)     Chloride 111 (*)     CO2 22 (*)      Glucose 119 (*)     AST 61 (*)     ALT 56 (*)     All other components within normal limits   URINALYSIS, REFLEX TO URINE CULTURE - Abnormal; Notable for the following components:    Specific Gravity, UA >=1.030 (*)     Protein, UA 1+ (*)     Ketones, UA 1+ (*)     Bilirubin (UA) 2+ (*)     Urobilinogen, UA 2.0-3.0 (*)     Leukocytes, UA Trace (*)     All other components within normal limits    Narrative:     Preferred Collection Type->Urine, Clean Catch  Specimen Source->Urine   DRUG SCREEN PANEL, URINE EMERGENCY - Abnormal; Notable for the following components:    Benzodiazepines Presumptive Positive (*)     THC Presumptive Positive (*)     Creatinine, Urine 774.0 (*)     All other components within normal limits    Narrative:     Preferred Collection Type->Urine, Clean Catch  Specimen Source->Urine   ACETAMINOPHEN LEVEL - Abnormal; Notable for the following components:    Acetaminophen (Tylenol), Serum <2.0 (*)     All other components within normal limits   URINALYSIS MICROSCOPIC - Abnormal; Notable for the following components:    Hyaline Casts, UA 13.1 (*)     All other components within normal limits    Narrative:     Preferred Collection Type->Urine, Clean Catch  Specimen Source->Urine   TSH   ALCOHOL,MEDICAL (ETHANOL)          Imaging Results    None          Medications   haloperidol lactate injection 10 mg (10 mg Intramuscular Given 5/9/24 0822)   diphenhydrAMINE injection 50 mg (50 mg Intramuscular Given 5/9/24 0822)     Medical Decision Making  Amount and/or Complexity of Data Reviewed  Labs: ordered.    Risk  Prescription drug management.  Decision regarding hospitalization.               ED Course as of 05/09/24 0848   Thu May 09, 2024   0846 Accepted by  for admission to the acute psychiatric unit [SD]      ED Course User Index  [SD] Fan Chao MD               Medical Decision Making:   Differential Diagnosis:   Acute psychosis, schizophrenia, hallucinations             Clinical  Impression:  Final diagnoses:  [R45.851] Suicidal ideation (Primary)  [F23] Acute psychosis          ED Disposition Condition    Admit Stable                Fan Chao MD  05/09/24 0868

## 2024-05-10 PROBLEM — E87.6 HYPOKALEMIA: Status: ACTIVE | Noted: 2024-05-10

## 2024-05-10 PROCEDURE — 25000003 PHARM REV CODE 250: Performed by: STUDENT IN AN ORGANIZED HEALTH CARE EDUCATION/TRAINING PROGRAM

## 2024-05-10 PROCEDURE — 25000003 PHARM REV CODE 250: Performed by: INTERNAL MEDICINE

## 2024-05-10 PROCEDURE — 99223 1ST HOSP IP/OBS HIGH 75: CPT | Mod: AF,HB,, | Performed by: STUDENT IN AN ORGANIZED HEALTH CARE EDUCATION/TRAINING PROGRAM

## 2024-05-10 PROCEDURE — 90833 PSYTX W PT W E/M 30 MIN: CPT | Mod: AF,HB,, | Performed by: STUDENT IN AN ORGANIZED HEALTH CARE EDUCATION/TRAINING PROGRAM

## 2024-05-10 PROCEDURE — 11400000 HC PSYCH PRIVATE ROOM

## 2024-05-10 PROCEDURE — 25000003 PHARM REV CODE 250: Performed by: PSYCHIATRY & NEUROLOGY

## 2024-05-10 RX ORDER — MIRTAZAPINE 15 MG/1
15 TABLET, FILM COATED ORAL NIGHTLY
Status: DISCONTINUED | OUTPATIENT
Start: 2024-05-10 | End: 2024-05-16 | Stop reason: HOSPADM

## 2024-05-10 RX ORDER — TALC
9 POWDER (GRAM) TOPICAL NIGHTLY PRN
Status: DISCONTINUED | OUTPATIENT
Start: 2024-05-10 | End: 2024-05-16 | Stop reason: HOSPADM

## 2024-05-10 RX ORDER — DIVALPROEX SODIUM 500 MG/1
1000 TABLET, FILM COATED, EXTENDED RELEASE ORAL NIGHTLY
Status: DISCONTINUED | OUTPATIENT
Start: 2024-05-10 | End: 2024-05-16 | Stop reason: HOSPADM

## 2024-05-10 RX ADMIN — Medication 9 MG: at 08:05

## 2024-05-10 RX ADMIN — MIRTAZAPINE 15 MG: 15 TABLET, FILM COATED ORAL at 08:05

## 2024-05-10 RX ADMIN — HYDROXYZINE PAMOATE 50 MG: 50 CAPSULE ORAL at 08:05

## 2024-05-10 RX ADMIN — DIVALPROEX SODIUM 1000 MG: 500 TABLET, FILM COATED, EXTENDED RELEASE ORAL at 08:05

## 2024-05-10 RX ADMIN — POTASSIUM CHLORIDE 40 MEQ: 1500 TABLET, EXTENDED RELEASE ORAL at 08:05

## 2024-05-10 NOTE — NURSING
POC reviewed this shift and is ongoing.  Pt is calm and cooperative on unit, had no medications due this shift.  Pt was out in common areas, interacting well with staff, no peer interactions noted this shift.  Pt denies SI/HI.

## 2024-05-10 NOTE — PLAN OF CARE
Problem: Adult Inpatient Plan of Care  Goal: Plan of Care Review  Outcome: Progressing  Goal: Patient-Specific Goal (Individualized)  Outcome: Progressing  Goal: Absence of Hospital-Acquired Illness or Injury  Outcome: Progressing  Goal: Optimal Comfort and Wellbeing  Outcome: Progressing  Goal: Readiness for Transition of Care  Outcome: Progressing     Problem: Suicide Risk  Goal: Absence of Self-Harm  Outcome: Progressing     Problem: Adult Behavioral Health Plan of Care  Goal: Plan of Care Review  Outcome: Progressing  Goal: Patient-Specific Goal (Individualization)  Outcome: Progressing  Goal: Adheres to Safety Considerations for Self and Others  Outcome: Progressing  Goal: Absence of New-Onset Illness or Injury  Outcome: Progressing  Goal: Optimized Coping Skills in Response to Life Stressors  Outcome: Progressing  Goal: Develops/Participates in Therapeutic [place] to Support Successful Transition  Outcome: Progressing  Goal: Rounds/Family Conference  Outcome: Progressing     Problem: Psychotic Signs/Symptoms  Goal: Improved Behavioral Control (Psychotic Signs/Symptoms)  Outcome: Progressing  Goal: Optimal Cognitive Function (Psychotic Signs/Symptoms)  Outcome: Progressing  Goal: Improved Mood Symptoms (Psychotic Signs/Symptoms)  Outcome: Progressing  Goal: Decreased Sensory Symptoms (Psychotic Signs/Symptoms)  Outcome: Progressing
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
## 2024-05-10 NOTE — ASSESSMENT & PLAN NOTE
Patient has hypokalemia which is Acute and currently controlled. Most recent potassium levels reviewed-   Lab Results   Component Value Date    K 3.0 (L) 05/09/2024   . Will continue potassium replacement per protocol and recheck repeat levels after replacement completed.

## 2024-05-10 NOTE — H&P
[facility]) Hospital Medicine  History & Physical    Patient Name: Bishop Soria  MRN: [id number]  Patient Class: IP- Psych  Admission Date: 5/9/2024  Attending Physician: Cristhian Grijalva III, MD   Primary Care Provider: Jose Martin Selby MD (Inactive)         Patient information was obtained from ER records.     Subjective:     Principal Problem:Schizoaffective disorder, bipolar type    Chief Complaint:   Chief Complaint   Patient presents with    Psychiatric Evaluation     Pt to the ER requesting admission to [facility] for medication adjustment, reports worsening AVH and SI. Pt reports he wants to stab himself.         HPI:   Chief Complaint   Patient presents with    Psychiatric Evaluation       Pt to the ER requesting admission to [facility] for medication adjustment, reports worsening AVH and SI. Pt reports he wants to stab himself.       23-year-old male with a history of schizophrenia, multiple psychiatric hospitalizations presents the emergency department stating he is hearing voices telling him to hurt himself, he is crying, talking to himself, seems to be responding to internal stimuli currently.     HPI:  Patient continues to have ongoing psychosis responding to internal stimuli.    No new subjective & objective note has been filed under this hospital service since the last note was generated.    Assessment/Plan:     * Schizoaffective disorder, bipolar type  To be admitted to our inpatient psychiatric unit for further evaluation and management.        Hypokalemia  Patient has hypokalemia which is Acute and currently controlled. Most recent potassium levels reviewed-   Lab Results   Component Value Date    K 3.0 (L) 05/09/2024   . Will continue potassium replacement per protocol and recheck repeat levels after replacement completed.     Cannabis-induced psychotic disorder  Cessation strongly advised.      Marijuana abuse  Cessation strongly advised.        VTE Risk Mitigation (From  admission, onward)           Ordered     IP VTE LOW RISK PATIENT  Once         05/09/24 0430                                    Regis King Jr, MD  Department of Hospital Medicine  [facility] ([facility])

## 2024-05-10 NOTE — PLAN OF CARE
POC reviewed this shift and is on going. Patient is depressed, calm, cooperative, anxious, and pacing. Endorses Suicidal Ideation, Homicidal Ideation, Auditory Hallucinations, Visual Hallucinations, Tactile Hallucinations, Gustatory Hallucinations, and Delusions. Denies Suicidal Ideation, Homicidal Ideation, Auditory Hallucinations, Visual Hallucinations, Tactile Hallucinations, Gustatory Hallucinations, and Delusions. Patient is an unreliable historian. Patient has been a manipulator since he been on the floor. Patient mentioned to me that the Doctor told him he could double portions but after talking to Dr Grijalva the patient never asked the him for double portions. Patient dancing in the halls. Pt continues to be medication compliant and staff will continue to monitor pt closely while on the unit.

## 2024-05-10 NOTE — PLAN OF CARE
"Behavioral Health Unit  Psychosocial History and Assessment  Progress Note      Patient Name: Bishop Soria YOB: 2000 SW: Mouna De La Fuente, SSW  Date: 5/10/2024    Chief Complaint: hallucinations, substance abuse, and thoughts of death/suicide     Consent:     Did the patient consent for an interview with the ? Yes    Did the patient consent for the  to contact family/friend/caregiver? Yes, Nabil Soria, mother, [phone]     Did the patient give consent for the  to inform family/friend/caregiver of his/her whereabouts or to discuss discharge planning? Yes    Source of Information: Face to face with patient, Telephone interview with family/friend/caregiver, and Chart review    Is information obtained from interviews considered reliable? 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       History of Present Illness - (Patient Perception):     I came here to get a medication adjustment. I lied and said that I wanted to harm myself so I could be admitted. I tried to get a medication adjustment with the [facility], but they wouldn't let me add any medications. I wanted to add Seroquel and Depakote. I already take the Invega shot.       History of Present Illness - (Perception of Others):     He started acting weird and acting crazy. He left the house and was running down the street. I went looking for him. according to Nabil Soria, mother.      Present biopsychosocial functioning:     Per H&P, "Bishop Sroia is a 23 y.o. male with a past psychiatric history of Substance Abuse and bipolar disorder, schizophrenia  currently admitted to the inpatient unit with the following chief complaint: " "hallucinations, substance abuse, and thoughts of death/suicide." Pt denies HI. UDS positive for THC and Benzodiazepines. Pt is single and independent with ADLs. Pt has no children. Pt lives at home with his mother and aunt. Pt is unemployed. Pt has good family support. Pt's most recent stressor was medication issues. Pt's mother reports that pt did not receive his Invega shot by due date and received the shot several days later. Pt currently receives mental health treatment with the [facility] with [facility] in [facility]. Pt stated he wanted to change mental health providers to [facility] in [place], [place].       Past biopsychosocial functioning:     Pt has had multiple inpt psych hospitalizations. Per chart review, last psych hospitalization was 09/2023. Per pt's mother, pt had a prior SA 12/2023.     Family and Marital/Relationship History:     Significant Other/Partner Relationships: Single    Family Relationships: Intact      Childhood History:     Where was patient raised? [place], [place]       Who raised the patient? Biological parents       How does patient describe their childhood? Pretty eventuful      Who is patient's primary support person? Mother       Culture and Mandaeism:     Mandaeism: Unknown    How strong of a role does Mandaen and spirituality play in patient's life? Pretty strong    Islam or spiritual concerns regarding treatment: not applicable     History of Abuse:   History of Abuse: Denies      Outcome: n/a    Psychiatric and Medical History:     History of psychiatric illness or treatment: prior inpatient treatment, prior suicide attempt(s), has participated in counseling/psychotherapy on an outpatient basis in the past, and currently under psychiatric care    Medical history:   Past Medical History:   Diagnosis Date    History of psychiatric hospitalization 09/06/2023    [facility] MAYO     of psychiatric care     Psychiatric problem     Schizophrenia     Suicide attempt  "       Substance Abuse History:     Alcohol - (Patient Perspective):   Social History     Substance and Sexual Activity   Alcohol Use Yes    Comment: pt reports drinking wine, two-four times monthly or less       Alcohol - (Collateral Perspective): No according to Nabil Soria, mother.    Drugs - (Patient Perspective):   Social History     Substance and Sexual Activity   Drug Use Yes    Types: Marijuana, Benzodiazepines    Comment: pt reports taking benzo within the past few weeks to help sleep       Drugs - (Collateral Perspective): Marijuana according to Nabil Soria, mother.    Additional Comments: n/a    Education:     Currently Enrolled? No  High School (9-12) or GED    Special Education? No    Interested in Completing Education/GED: No    Employment and Financial:     Currently employed? Unemployed     Source of Income:  none reported    Able to afford basic needs (food, shelter, utilities)? Yes    Who manages finances/personal affairs? Self         Service:     [place]? no    Combat Service? No     Community Resources:     Describe present use of community resources: [facility] with [facility] in [place], [place]     Identify previously used community resources   (Include previous mental health treatment - outpatient and inpatient): Pt has had multiple inpt psych hospitalizations. Per chart review, last psych hospitalization was 09/2023. Per pt's mother, pt had a prior SA 12/2023.     Environmental:     Current living situation:Lives with family, Lives in home    Social Evaluation:     Patient Assets: General fund of knowledge, Supportive family/friends, Motivation for treatment/growth, Capable of independent living, and Communicable skills    Patient Limitations: lacks coping skills, impulsive    High risk psychosocial issues that may impact discharge planning: None reported    Recommendations:     Anticipated discharge plan: home with outpatient follow up    High risk issues requiring early  treatment planning and immediate intervention: hallucinations, substance abuse, and thoughts of death/suicide     Community resources needed for discharge planning: aftercare treatment sources    Anticipated social work role(s) in treatment and discharge planning: SW will facilitate process groups to assist pt develop healthy coping skills; CM will arrange outpatient follow-up appointments and assist with discharge planning.

## 2024-05-10 NOTE — PLAN OF CARE
Pt missed group due to meeting with the doctor. SW will continue to encourage pt to participate in process groups to verbalize feelings and develop healthy coping skills.

## 2024-05-10 NOTE — NURSING
23 year old male PmHx of schizophrenia admitted from [facility] ED for the services of Dr Grijalva for auditory hallucinations.    Per ED note, pt was tearful upon arrival, paranoid, endorsed active hallucinations and appeared RIS.  Pt denies SI/HI to this author.    Pt was drowsy upon arrival to unit, cooperated with admission process, signed necessary consents and acknowledgments. .  UDS positive for benzos and THC.  Belongings were inventoried and secured per tech and signed for by patient.

## 2024-05-10 NOTE — H&P
"PSYCHIATRY INPATIENT ADMISSION NOTE - H & P      5/10/2024 11:07 AM   Bishop Soria   2000   [id number]         DATE OF ADMISSION: 5/9/2024  7:47 AM    SITE: [facility]    CURRENT LEGAL STATUS: PEC and/or CEC      HISTORY    CHIEF COMPLAINT   Bishop Soria is a 23 y.o. male with a past psychiatric history of Substance Abuse and bipolar disorder, schizophrenia  currently admitted to the inpatient unit with the following chief complaint: hallucinations, substance abuse, and thoughts of death/suicide    HPI   The patient was seen and examined. The chart was reviewed.    The patient presented to the ER on 5/9/2024 .    The patient was medically cleared and admitted to the U.    Per ED MD:   Psychiatric Evaluation       Pt to the ER requesting admission to U for medication adjustment, reports worsening AVH and SI. Pt reports he wants to stab himself.       23-year-old male with a history of schizophrenia, multiple psychiatric hospitalizations presents the emergency department stating he is hearing voices telling him to hurt himself, he is crying, talking to himself, seems to be responding to internal stimuli currently.       Psychiatric interview:  Reports he feels he needs medication changes. He reports he has been taking his invega injection monthly but feels he needs "depakote" for sleep. He states he has been smoking MJ because "it helps me eat," pt is concerned because he is losing weight and unable to eat, "the invega makes my stomach too tight." Reports mood has been "fluctuating a lot, I've been at the peak of my scale," for the last few days. He states he feels "let down by life, it's been really stressful, I have so much potential to be something more, but the devil is holding me back, I want to be a neurologist... I like to sing and dance, school excites me, you typing on the computer soothes me, I feel like I can tell you anything." Pt admits to taking 1 xanax PTA to "calm down." Then after " "explaining risks of w/d and potential need for detox if patient is taking more than reporting, pt states he has been taking it more, then with repeat questioning, pt denies taking it regularly, "I was lying to you, I just wanted to get some valium so I could sleep, I only took it that once, it was my mom's, she's prescribed it." Pt is an unreliable historian due to manipulative behavior.        Symptoms of Depression: diminished mood - Yes, loss of interest/anhedonia - No;  recurrent - No, >14 days - No, diminished energy - No, change in sleep - Yes, change in appetite - Yes, diminished concentration or cognition or indecisiveness - Yes, PMA/R -  Yes, excessive guilt or hopelessness or worthlessness - No, suicidal ideations - Yes    Changes in Sleep: trouble with initiation- Yes, maintenance, - Yes early morning awakening with inability to return to sleep - No, hypersomnolence - No    Suicidal- active/passive ideations - Yes, organized plans, future intentions - No    Homicidal ideations: active/passive ideations - No, organized plans, future intentions - No    Symptoms of psychosis: hallucinations - Yes, delusions - Yes, disorganized speech - No, disorganized behavior or abnormal motor behavior - No, or negative symptoms (diminshed emotional expression, avolition, anhedonia, alogia, asociality) - Yes    Symptoms of carley or hypomania: elevated, expansive, or irritable mood with increased energy or activity - Yes; > 4 days - No,  >7 days - No; with inflated self-esteem or grandiosity - Yes, decreased need for sleep - Yes, increased rate of speech - No, FOI or racing thoughts - No, distractibility - Yes, increased goal directed activity or PMA - No, risky/disinhibited behavior - Yes    Symptoms of KANU: excessive anxiety/worry/fear, more days than not, about numerous issues - No, ongoing for >6 months - No, difficult to control - No, with restlessness - No, fatigue - No, poor concentration - No, irritability - No, " "muscle tension - No, sleep disturbance - No; causes functionally impairing distress - No    Symptoms of Panic Disorder: recurrent panic attacks (palpitations/heart racing, sweating, shakiness, dyspnea, choking, chest pain/discomfort, Gi symptoms, dizzy/lightheadedness, hot/col flashes, paresthesias, derealization, fear of losing control or fear of dying or fear of "going crazy") - No, precipitated - No, un-precipitated - No, source of worry and/or behavioral changes secondary for 1 month or longer- No, agoraphobia - No    Symptoms of PTSD: h/o trauma exposure - No; re-experiencing/intrusive symptoms - No, avoidant behavior - No, 2 or more negative alterations in cognition or mood - No, 2 or more hyperarousal symptoms - No; with dissociative symptoms - No, ongoing for 1 or more  months - No    Symptoms of OCD: obsessions (recurrent thoughts/urges/images; intrusive and/or unwanted; uses other thoughts/actions to suppress) - No; compulsions (repetitive behaviors used to lower distress/anxiety/obsessions) - No, time-consuming (over 1 hour per day) or cause significant distress/impairment - - No    Symptoms of Anorexia: restriction of caloric intake leading to significantly low body weight - No, intense fear of gaining weight or persistent behavior that interferes with weight gain even thought at a significantly low weight - No, disturbance in the way in which one's body weight or shape is experienced, undue influence of body weight or shape on self evaluation, or persistent lack of recognition of the seriousness of the current low body weight - No    Symptoms of Bulimia: recurrent episodes of binge eating (definitely larger amount  than what others would eat and lack of a sense of control over eating during episode) - No, recurrent inappropriate compensatory behaviors in order to prevent weight gain (fasting, medications, exercise, vomiting) - No, binges and compensatory behaviors both occur on average at least once a " week for 3 months - No, self evaluations is unduly influenced by body shape/weight- - No    Symptoms of Binge eating: recurrent episodes of binge eating (definitely larger amount than what others would eat and lack of a sense of control over eating during episode) - No, 3 or more of following (eating much more rapidly, eating until uncomfortably full, large amounts when not hungry, eating alone because of embarrassed by how much,  feeling disgusted with oneself, depressed or very guilty afterward) - No, distress regarding binges - No, binges occur on average at least once a week for 3 months - No      Psychotherapy:  Target symptoms: substance abuse, mood disorder, psychosis  Why chosen therapy is appropriate versus another modality: relevant to diagnosis  Outcome monitoring methods: self-report  Therapeutic intervention type: supportive psychotherapy  Topics discussed/themes: building skills sets for symptom management, symptom recognition, substance abuse  The patient's response to the intervention is accepting. The patient's progress toward treatment goals is fair.   Duration of intervention: 16 minutes.      PAST PSYCHIATRIC HISTORY  Previous Psychiatric Hospitalizations: Yes,extensive  Previous SI/HI: Yes,  Previous Suicide Attempts: Yes,   Previous Medication Trials: Yes,  Psychiatric Care (current & past): Yes,  History of Psychotherapy: Yes,  History of Violence: Yes,  History of sexual/physical abuse: No,    PAST MEDICAL & SURGICAL HISTORY   Past Medical History:   Diagnosis Date    History of psychiatric hospitalization 09/06/2023    Daniel KAMERON     of psychiatric care     Psychiatric problem     Schizophrenia     Suicide attempt      No past surgical history on file.      Home Meds:   Prior to Admission medications    Medication Sig Start Date End Date Taking? Authorizing Provider   INVEGA SUSTENNA 234 mg/1.5 mL Syrg injection Inject into the muscle. 4/17/24  Yes Provider, Historical   mirtazapine  "(REMERON) 15 MG tablet Take 1 tablet (15 mg total) by mouth every evening. 9/14/23 9/13/24 Yes Elroy Espino FNP   ondansetron (ZOFRAN-ODT) 4 MG TbDL Take 1 tablet (4 mg total) by mouth every 8 (eight) hours as needed (Nausea and vomiting). 10/7/23  Yes Fan Chao MD   risperiDONE (RISPERDAL) 2 MG tablet Take 1 tablet (2 mg total) by mouth nightly. 9/14/23 9/13/24 Yes Elroy Espino FNP   EScitalopram oxalate (LEXAPRO) 10 MG tablet TAKE 1 TABLET BY MOUTH EVERY DAY 7/27/21 5/23/22  Jl Aguilar NP   lithium (LITHOTAB) 300 mg tablet Take 300 mg by mouth 2 (two) times daily. 4/26/22 5/31/22  Provider, Historical       Scheduled Meds:    potassium chloride  40 mEq Oral Daily      PRN Meds:   Current Facility-Administered Medications:     hydrOXYzine pamoate, 50 mg, Oral, Q6H PRN    melatonin, 6 mg, Oral, Nightly PRN    nicotine, 1 patch, Transdermal, Daily PRN    OLANZapine, 10 mg, Oral, Q8H PRN **AND** OLANZapine, 10 mg, Intramuscular, Q8H PRN    ondansetron, 8 mg, Oral, Q8H PRN   Psychotherapeutics (From admission, onward)      Start     Stop Route Frequency Ordered    05/09/24 1848  OLANZapine tablet 10 mg  (Olanzapine PRN (</= 64 yo))        Placed in "And" Linked Group    -- Oral Every 8 hours PRN 05/09/24 1750    05/09/24 1848  OLANZapine injection 10 mg  (Olanzapine PRN (</= 64 yo))        Placed in "And" Linked Group    -- IM Every 8 hours PRN 05/09/24 1750            ALLERGIES   Review of patient's allergies indicates:  No Known Allergies    NEUROLOGIC HISTORY  Seizures: No  Head trauma: No     SOCIAL HISTORY:  Developmental/Childhood:Achieved all developmental milestones timely  Education: some college  Employment Status/Finances:Employed   Relationship Status/Sexual Orientation: single  Children: 0  Housing Status: Home    history:  NO   Access to Firearms: NO ;  Locked up? n/a  Advent:Actively participates in organized Mandaeism  Recreational activities:Exercise   "   SUBSTANCE ABUSE HISTORY   Recreational Drugs: marijuana   Use of Alcohol: occasional, social use  Rehab History:no   Tobacco Use:no     LEGAL HISTORY:   Past charges/incarcerations: NO  Pending charges:NO     FAMILY PSYCHIATRIC HISTORY   Depression - father            ROS  Review of Systems   Constitutional:  Negative for chills and fever.   HENT:  Negative for hearing loss.    Eyes:  Negative for blurred vision and double vision.   Respiratory:  Negative for shortness of breath.    Cardiovascular:  Negative for chest pain and palpitations.   Gastrointestinal:  Negative for constipation, diarrhea, nausea and vomiting.   Genitourinary:  Negative for dysuria.   Musculoskeletal:  Negative for back pain and neck pain.   Skin:  Negative for rash.   Neurological:  Negative for dizziness and headaches.   Endo/Heme/Allergies:  Negative for environmental allergies.         EXAMINATION    PHYSICAL EXAM  Reviewed note/exam by Dr. Chao, Fan FAUST MD from 05/09/24 0848    VITALS   Vitals:    05/10/24 0738   BP: (!) 113/58   Pulse: 102   Resp: 20   Temp: 98.6 °F (37 °C)        Body mass index is 19.39 kg/m².        PAIN  0/10  Subjective report of pain matches objective signs and symptoms: Yes    LABORATORY DATA   Recent Results (from the past 72 hour(s))   Urinalysis, Reflex to Urine Culture Urine, Clean Catch    Collection Time: 05/09/24  7:54 AM    Specimen: Urine   Result Value Ref Range    Specimen UA Urine, Clean Catch     Color, UA Yellow Yellow, Straw, Ingrid    Appearance, UA Clear Clear    pH, UA 6.0 5.0 - 8.0    Specific Gravity, UA >=1.030 (A) 1.005 - 1.030    Protein, UA 1+ (A) Negative    Glucose, UA Negative Negative    Ketones, UA 1+ (A) Negative    Bilirubin (UA) 2+ (A) Negative    Occult Blood UA Negative Negative    Nitrite, UA Negative Negative    Urobilinogen, UA 2.0-3.0 (A) <2.0 EU/dL    Leukocytes, UA Trace (A) Negative   Urinalysis Microscopic    Collection Time: 05/09/24  7:54 AM   Result Value Ref  Range    RBC, UA 2 0 - 4 /hpf    WBC, UA 2 0 - 5 /hpf    Bacteria Negative None-Occ /hpf    Squam Epithel, UA 1 /hpf    Hyaline Casts, UA 13.1 (A) 0-1/lpf /lpf    Microscopic Comment SEE COMMENT    Drug screen panel, emergency    Collection Time: 05/09/24  7:55 AM   Result Value Ref Range    Benzodiazepines Presumptive Positive (A) Negative    Methadone metabolites Negative Negative    Cocaine (Metab.) Negative Negative    Opiate Scrn, Ur Negative Negative    Barbiturate Screen, Ur Negative Negative    Amphetamine Screen, Ur Negative Negative    THC Presumptive Positive (A) Negative    Phencyclidine Negative Negative    Creatinine, Urine 774.0 (H) 23.0 - 375.0 mg/dL    Toxicology Information SEE COMMENT    CBC auto differential    Collection Time: 05/09/24  8:09 AM   Result Value Ref Range    WBC 8.81 3.90 - 12.70 K/uL    RBC 4.17 (L) 4.60 - 6.20 M/uL    Hemoglobin 13.5 (L) 14.0 - 18.0 g/dL    Hematocrit 37.7 (L) 40.0 - 54.0 %    MCV 90 82 - 98 fL    MCH 32.4 (H) 27.0 - 31.0 pg    MCHC 35.8 32.0 - 36.0 g/dL    RDW 12.3 11.5 - 14.5 %    Platelets 238 150 - 450 K/uL    MPV 9.3 9.2 - 12.9 fL    Immature Granulocytes 0.2 0.0 - 0.5 %    Gran # (ANC) 6.1 1.8 - 7.7 K/uL    Immature Grans (Abs) 0.02 0.00 - 0.04 K/uL    Lymph # 1.8 1.0 - 4.8 K/uL    Mono # 0.8 0.3 - 1.0 K/uL    Eos # 0.1 0.0 - 0.5 K/uL    Baso # 0.02 0.00 - 0.20 K/uL    nRBC 0 0 /100 WBC    Gran % 69.8 38.0 - 73.0 %    Lymph % 20.4 18.0 - 48.0 %    Mono % 8.5 4.0 - 15.0 %    Eosinophil % 0.9 0.0 - 8.0 %    Basophil % 0.2 0.0 - 1.9 %    Differential Method Automated    Comprehensive metabolic panel    Collection Time: 05/09/24  8:09 AM   Result Value Ref Range    Sodium 139 136 - 145 mmol/L    Potassium 3.0 (L) 3.5 - 5.1 mmol/L    Chloride 111 (H) 95 - 110 mmol/L    CO2 22 (L) 23 - 29 mmol/L    Glucose 119 (H) 70 - 110 mg/dL    BUN 16 6 - 20 mg/dL    Creatinine 1.4 0.5 - 1.4 mg/dL    Calcium 8.7 8.7 - 10.5 mg/dL    Total Protein 8.0 6.0 - 8.4 g/dL    Albumin  4.0 3.5 - 5.2 g/dL    Total Bilirubin 0.7 0.1 - 1.0 mg/dL    Alkaline Phosphatase 80 55 - 135 U/L    AST 61 (H) 10 - 40 U/L    ALT 56 (H) 10 - 44 U/L    eGFR >60.0 >60 mL/min/1.73 m^2    Anion Gap 6 3 - 11 mmol/L   TSH    Collection Time: 05/09/24  8:09 AM   Result Value Ref Range    TSH 1.590 0.400 - 4.000 uIU/mL   Ethanol    Collection Time: 05/09/24  8:09 AM   Result Value Ref Range    Alcohol, Serum 3 <10 mg/dL   Acetaminophen level    Collection Time: 05/09/24  8:09 AM   Result Value Ref Range    Acetaminophen (Tylenol), Serum <2.0 (L) 10.0 - 20.0 ug/mL      Lab Results   Component Value Date    VALPROATE 85 02/21/2023    CBMZ 11.8 05/07/2023           CONSTITUTIONAL  General Appearance: unremarkable, age appropriate    MUSCULOSKELETAL  Muscle Strength and Tone:no tremor, no tic  Abnormal Involuntary Movements: No  Gait and Station: non-ataxic    PSYCHIATRIC   Level of Consciousness: awake and alert   Orientation: person, place, and situation  Grooming: Casually dressed and Well groomed  Psychomotor Behavior: normal, cooperative  Speech: slowed, soft  Language: grossly intact  Mood: fine  Affect: Intense, Elated, and Inappropriate  Thought Process: circumstantial  Associations: intact   Thought Content: +delusions, denies SI, and denies HI  Perceptions: +AH and denies  VH  Memory: Able to recall past events, Remote intact, and Recent intact  Attention:Easily distracted  Fund of Knowledge: Aware of current events and Vocabulary appropriate   Estimate if Intelligence:  Average based on work/education history, vocabulary and mental status exam  Insight: has awareness of illness  Judgment: behavior is adequate to circumstances      PSYCHOSOCIAL    PSYCHOSOCIAL STRESSORS   family and drug abuse    FUNCTIONING RELATIONSHIPS   good support system    STRENGTHS AND LIABILITIES   Strength: Patient accepts guidance/feedback, Strength: Patient is expressive/articulate., Strength: Patient is intelligent., Liability: Patient  is dependent., Liability: Patient is impulsive., Liability: Patient lacks coping skills.    Is the patient aware of the biomedical complications associated with substance abuse and mental illness? yes    Does the patient have an Advance Directive for Mental Health treatment? no  (If yes, inform patient to bring copy.)        MEDICAL DECISION MAKING        ASSESSMENT       Schizoaffective disorder, bipolar type, severe  SI  Unspecified anxiety disorder  Cannabis abuse  Psychosocial stressors  Nicotine dependence  Hypokalemia  Elevated LFTs      PROBLEM LIST AND MANAGEMENT PLANS    Schizoaffective disorder, bipolar type, severe  - reports received invega CARRASCO last month, will attempt to verify with ACT  - start depakote 1000 mg PO qhs, will check level, monitor LFTs  - pt counseled  - follow up with outpatient mental health providers after discharge for medication management and psychotherapy      Suicidal ideations  - continue psychiatric hospitalization  - provide psychotherapeutic interventions and medication management    Unspecified anxiety disorder  - start remeron 15 mg PO qhs  - pt counseled  - follow up with outpatient mental health providers after discharge for medication management and psychotherapy    Cannabis abuse  - consider rehab  - consider gabapentin  - pt counseled  - follow up with outpatient mental health providers after discharge for medication management and psychotherapy    Psychosocial stressors  - pt counseled  - SW consulted for assistance with additional resources     Nicotine dependence  - start nicotine patch 14 mg PO qd PRN    Hypokalemia  - FM consulted    Elevated LFTs  - FM consulted      PRESCRIPTION DRUG MANAGEMENT  Compliance: yes  Side Effects: no  Regimen Adjustments: see above    Discussed diagnosis, risks and benefits of proposed treatment vs alternative treatments vs no treatment, potential side effects of these treatments and the inherent unpredictability of treatment. The  patient expresses understanding of the above and displays the capacity to agree with this treatment given said understanding. Patient also agrees that, currently, the benefits outweigh the risks and would like to pursue/continue treatment at this time.    Any medications being used off-label were discussed with the patient inclusive of the evidence base for the use of the medications and consent was obtained for the off-label use of the medication.         DIAGNOSTIC TESTING  Labs reviewed with patient; follow up pending labs    Disposition:  -Will attempt to obtain outside psychiatric records if available  -SW to assist with aftercare planning and collateral  -Once stable discharge home with outpatient follow up care and/or rehab  -Continue inpatient treatment under a PEC and/or CEC for danger to self/ danger to others/grave disability as evident by danger to self and gravely disabled        Cristhian Grijalva III, MD  Psychiatry

## 2024-05-10 NOTE — HPI
Chief Complaint   Patient presents with    Psychiatric Evaluation       Pt to the ER requesting admission to [facility] for medication adjustment, reports worsening AVH and SI. Pt reports he wants to stab himself.       23-year-old male with a history of schizophrenia, multiple psychiatric hospitalizations presents the emergency department stating he is hearing voices telling him to hurt himself, he is crying, talking to himself, seems to be responding to internal stimuli currently.     HPI:  Patient continues to have ongoing psychosis responding to internal stimuli.

## 2024-05-11 LAB
ANION GAP SERPL CALC-SCNC: 6 MMOL/L (ref 3–11)
BUN SERPL-MCNC: 13 MG/DL (ref 6–20)
CALCIUM SERPL-MCNC: 8.7 MG/DL (ref 8.7–10.5)
CHLORIDE SERPL-SCNC: 113 MMOL/L (ref 95–110)
CO2 SERPL-SCNC: 21 MMOL/L (ref 23–29)
CREAT SERPL-MCNC: 1.1 MG/DL (ref 0.5–1.4)
EST. GFR  (NO RACE VARIABLE): >60 ML/MIN/1.73 M^2
GLUCOSE SERPL-MCNC: 88 MG/DL (ref 70–110)
POTASSIUM SERPL-SCNC: 3.6 MMOL/L (ref 3.5–5.1)
SODIUM SERPL-SCNC: 140 MMOL/L (ref 136–145)

## 2024-05-11 PROCEDURE — 99233 SBSQ HOSP IP/OBS HIGH 50: CPT | Mod: AF,HB,, | Performed by: PSYCHIATRY & NEUROLOGY

## 2024-05-11 PROCEDURE — 11400000 HC PSYCH PRIVATE ROOM

## 2024-05-11 PROCEDURE — 25000003 PHARM REV CODE 250: Performed by: INTERNAL MEDICINE

## 2024-05-11 PROCEDURE — 80048 BASIC METABOLIC PNL TOTAL CA: CPT | Performed by: INTERNAL MEDICINE

## 2024-05-11 PROCEDURE — 36415 COLL VENOUS BLD VENIPUNCTURE: CPT | Performed by: INTERNAL MEDICINE

## 2024-05-11 PROCEDURE — 25000003 PHARM REV CODE 250: Performed by: STUDENT IN AN ORGANIZED HEALTH CARE EDUCATION/TRAINING PROGRAM

## 2024-05-11 RX ADMIN — DIVALPROEX SODIUM 1000 MG: 500 TABLET, FILM COATED, EXTENDED RELEASE ORAL at 08:05

## 2024-05-11 RX ADMIN — Medication 9 MG: at 08:05

## 2024-05-11 RX ADMIN — HYDROXYZINE PAMOATE 50 MG: 50 CAPSULE ORAL at 08:05

## 2024-05-11 RX ADMIN — POTASSIUM CHLORIDE 40 MEQ: 1500 TABLET, EXTENDED RELEASE ORAL at 08:05

## 2024-05-11 RX ADMIN — MIRTAZAPINE 15 MG: 15 TABLET, FILM COATED ORAL at 08:05

## 2024-05-11 NOTE — NURSING
POC reviewed this shift and is ongoing.  Pt is cooperative on unit and compliant with medications.  Pt denies SI/HI.  Pt initially denied AH but later endorsed them.  Pt was observed to be RIS while alone in his room.  Pt was having a conversation with something unseen by this writer.  Pt denies VH, but appears to be focused on things not seen by those around him.  Pt was out of his room much of the evening, appeared anxious and was pacing the halls.  Pt reported difficultly sleeping to staff, but had already taken his PRN medications for insomnia.

## 2024-05-11 NOTE — PLAN OF CARE
Problem: Adult Inpatient Plan of Care  Goal: Plan of Care Review  Outcome: Progressing  Goal: Patient-Specific Goal (Individualized)  Outcome: Progressing  Goal: Absence of Hospital-Acquired Illness or Injury  Outcome: Progressing  Goal: Optimal Comfort and Wellbeing  Outcome: Progressing  Goal: Readiness for Transition of Care  Outcome: Progressing     Problem: Suicide Risk  Goal: Absence of Self-Harm  Outcome: Progressing     Problem: Adult Behavioral Health Plan of Care  Goal: Plan of Care Review  Outcome: Progressing  Goal: Patient-Specific Goal (Individualization)  Outcome: Progressing  Goal: Adheres to Safety Considerations for Self and Others  Outcome: Progressing  Goal: Absence of New-Onset Illness or Injury  Outcome: Progressing  Goal: Optimized Coping Skills in Response to Life Stressors  Outcome: Progressing  Goal: Develops/Participates in Therapeutic [place] to Support Successful Transition  Outcome: Progressing  Goal: Rounds/Family Conference  Outcome: Progressing     Problem: Psychotic Signs/Symptoms  Goal: Improved Behavioral Control (Psychotic Signs/Symptoms)  Outcome: Progressing  Goal: Optimal Cognitive Function (Psychotic Signs/Symptoms)  Outcome: Progressing  Goal: Improved Mood Symptoms (Psychotic Signs/Symptoms)  Outcome: Progressing  Goal: Decreased Sensory Symptoms (Psychotic Signs/Symptoms)  Outcome: Progressing

## 2024-05-11 NOTE — PROGRESS NOTES
"[facility]  Progress Note  Psychiatry    Admit Date: 5/9/2024   LOS: 2 days       LEGAL STATUS: PEC/CEC    SUBJECTIVE:     Interim Events: 05/11/2024  Pt seen and chart reviewed. Met with pt. Staff reports pt is doing well on the unit. He states he he feels good, "I'm happy". States he lied to get into the hospital bc his outpt svc, Daphnie, would not start him on pills and just wanted him to take sustenna. States he got his shot about 2 week ago but wanted back on depakote and remeron bc he feels pills help more. He  Denies thoughts of harm to self or others. He was talkative and frequently circumstantial. States he wants to be a neurologist or neurosurgeon. Some disorganization of thought processes.    Pt is sleeping well. Reports appetite is normal. No physical complaints today. Denies side effects from medication. Denies SI/HI/AVH.     Chart Review (Past 24 hours):  Reviewed notes from Rns and MD and labs from the last 24 hours.    The patient's case was discussed with the treatment team/care providers today including Rns    Staff reports no behavioral or management issues.     The patient has been compliant with treatment.      HPI:    CHIEF COMPLAINT   Bishop Soria is a 23 y.o. male with a past psychiatric history of Substance Abuse and bipolar disorder, schizophrenia  currently admitted to the inpatient unit with the following chief complaint: hallucinations, substance abuse, and thoughts of death/suicide    HPI   The patient was seen and examined. The chart was reviewed.     The patient presented to the ER on 5/9/2024 .     The patient was medically cleared and admitted to the [facility].     Per ED MD:        Psychiatric Evaluation       Pt to the ER requesting admission to [facility] for medication adjustment, reports worsening AVH and SI. Pt reports he wants to stab himself.       23-year-old male with a history of schizophrenia, multiple psychiatric hospitalizations presents the emergency department " "stating he is hearing voices telling him to hurt himself, he is crying, talking to himself, seems to be responding to internal stimuli currently.        Psychiatric interview:  Reports he feels he needs medication changes. He reports he has been taking his invega injection monthly but feels he needs "depakote" for sleep. He states he has been smoking MJ because "it helps me eat," pt is concerned because he is losing weight and unable to eat, "the invega makes my stomach too tight." Reports mood has been "fluctuating a lot, I've been at the peak of my scale," for the last few days. He states he feels "let down by life, it's been really stressful, I have so much potential to be something more, but the devil is holding me back, I want to be a neurologist... I like to sing and dance, school excites me, you typing on the computer soothes me, I feel like I can tell you anything." Pt admits to taking 1 xanax PTA to "calm down." Then after explaining risks of w/d and potential need for detox if patient is taking more than reporting, pt states he has been taking it more, then with repeat questioning, pt denies taking it regularly, "I was lying to you, I just wanted to get some valium so I could sleep, I only took it that once, it was my mom's, she's prescribed it." Pt is an unreliable historian due to manipulative behavior.               REVIEW OF SYSTEMS  Review of Systems   All 12 systems otherwise negative.        Scheduled Meds:   divalproex ER  1,000 mg Oral QHS    mirtazapine  15 mg Oral QHS     PRN Meds:  Current Facility-Administered Medications:     hydrOXYzine pamoate, 50 mg, Oral, Q6H PRN    melatonin, 9 mg, Oral, Nightly PRN    nicotine, 1 patch, Transdermal, Daily PRN    OLANZapine, 10 mg, Oral, Q8H PRN **AND** OLANZapine, 10 mg, Intramuscular, Q8H PRN    ondansetron, 8 mg, Oral, Q8H PRN      Review of patient's allergies indicates:  No Known Allergies          OBJECTIVE:     Vital Signs (Most Recent)  Temp: " 97.9 °F (36.6 °C) (05/11/24 0719)  Pulse: 70 (05/11/24 0719)  Resp: 20 (05/11/24 0719)  BP: 110/64 (05/11/24 0719)  Weight: 68.5 kg (151 lb) (05/09/24 1300)  BMI (Calculated): 19.4 (05/09/24 1300)        CONSTITUTIONAL  General Appearance: unremarkable, age appropriate     MUSCULOSKELETAL  Muscle Strength and Tone:no tremor, no tic  Abnormal Involuntary Movements: No  Gait and Station: non-ataxic     PSYCHIATRIC   Level of Consciousness: awake and alert   Orientation: person, place, and situation  Grooming: Casually dressed and Well groomed  Psychomotor Behavior: normal, cooperative  Speech: slowed, soft  Language: grossly intact  Mood: happy  Affect: Intense, Elated, and Inappropriate  Thought Process: circumstantial, disorganized  Associations: intact   Thought Content: +delusions, denies SI, and denies HI  Perceptions: denies AH and denies  VH  Memory: Able to recall past events, Remote intact, and Recent intact  Attention:Easily distracted  Fund of Knowledge: Aware of current events and Vocabulary appropriate   Estimate if Intelligence:  Average based on work/education history, vocabulary and mental status exam  Insight: has awareness of illness  Judgment: behavior is adequate to circumstances    LABS/IMAGING  Recent Results (from the past 48 hour(s))   Basic Metabolic Panel    Collection Time: 05/11/24  6:58 AM   Result Value Ref Range    Sodium 140 136 - 145 mmol/L    Potassium 3.6 3.5 - 5.1 mmol/L    Chloride 113 (H) 95 - 110 mmol/L    CO2 21 (L) 23 - 29 mmol/L    Glucose 88 70 - 110 mg/dL    BUN 13 6 - 20 mg/dL    Creatinine 1.1 0.5 - 1.4 mg/dL    Calcium 8.7 8.7 - 10.5 mg/dL    Anion Gap 6 3 - 11 mmol/L    eGFR >60.0 >60 mL/min/1.73 m^2      Lab Results   Component Value Date    VALPROATE 85 02/21/2023    CBMZ 11.8 05/07/2023           ASSESSMENT/PLAN:     Patient is showing Patient is showing mild improvement     Diagnosis:  SCHIZOPHRENIA AND OTHER PSYCHOTIC DISORDERS; Schizoaffective Disorder      Patient  Active Problem List    Diagnosis Date Noted    Hypokalemia 05/10/2024    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    Schizoaffective disorder, bipolar type 02/07/2021    Marijuana abuse 12/04/2020          ASSESSMENT AND TREATMENT PLAN:    Schizoaffective disorder, bipolar type, severe  SI  Unspecified anxiety disorder  Cannabis abuse  Psychosocial stressors  Nicotine dependence  Hypokalemia  Elevated LFTs        PROBLEM LIST AND MANAGEMENT PLANS     Schizoaffective disorder, bipolar type, severe  - reports received invega CARRASCO last month, will attempt to verify with ACT and continue  -depakote 1000 mg PO qhs, will check level, monitor LFTs  - pt counseled  - follow up with outpatient mental health providers after discharge for medication management and psychotherapy        Suicidal ideations  - continue psychiatric hospitalization  - provide psychotherapeutic interventions and medication management     Unspecified anxiety disorder  - remeron 15 mg PO qhs  - pt counseled  - follow up with outpatient mental health providers after discharge for medication management and psychotherapy     Cannabis abuse  - consider rehab  - consider gabapentin  - pt counseled  - follow up with outpatient mental health providers after discharge for medication management and psychotherapy       Psychosocial stressors  - pt counseled  - SW consulted for assistance with additional resources      Nicotine dependence  - start nicotine patch 14 mg PO qd PRN     Hypokalemia  - FM consulted     Elevated LFTs  - FM consulted       PRESCRIPTION DRUG MANAGEMENT  Compliance: yes  Side Effects: no  Regimen Adjustments: see above     Discussed diagnosis, risks and benefits of proposed treatment vs alternative treatments vs no treatment, potential side effects of these treatments and the inherent unpredictability of treatment. The patient expresses understanding of the above and displays the capacity to agree with this treatment given said  understanding. Patient also agrees that, currently, the benefits outweigh the risks and would like to pursue/continue treatment at this time.        - Social Work will obtain follow up appointments for patient.     HEALTH SCREENING  Hemoglobin A1c  Lipid Panel    ENCOURAGE CURRAN MILIEU      NEED FOR CONTINUED HOSPITALIZATION  Psychiatric illness continues to pose a potential threat to life or bodily function, of self or others, thereby requiring the need for continued inpatient psychiatric hospitalization: Yes, due to: danger to self, as evidenced by:  Concerns with SI--Fading.    Protective inpatient pyschiatric hospitalization required while a safe disposition plan is enacted: Yes    Patient stabilized and ready for discharge from inpatient psychiatric unit: No    Target Disposition:  home    ESTIMATED 5-7 DAYS TO DISCHARGE    PROGNOSIS: GUARDED    Time: 35min     Willian Avery md

## 2024-05-11 NOTE — PLAN OF CARE
POC reviewed this shift and is on going. Patient is depressed, calm, cooperative, quiet, and pacing. Denies Suicidal Ideation, Homicidal Ideation, Auditory Hallucinations, Visual Hallucinations, Tactile Hallucinations, Gustatory Hallucinations, and Delusions. Patient has been in and out of his room all day and pacing the halls. Patient admitted to lying in the ER about hearing voices and having suicidal ideations. He wanted to get on the unit so he could get more medicine. Pt continues to be medication compliant and staff will continue to monitor pt closely while on the unit.

## 2024-05-12 PROCEDURE — 25000003 PHARM REV CODE 250: Performed by: STUDENT IN AN ORGANIZED HEALTH CARE EDUCATION/TRAINING PROGRAM

## 2024-05-12 PROCEDURE — 25000003 PHARM REV CODE 250: Performed by: INTERNAL MEDICINE

## 2024-05-12 PROCEDURE — 11400000 HC PSYCH PRIVATE ROOM

## 2024-05-12 PROCEDURE — 99232 SBSQ HOSP IP/OBS MODERATE 35: CPT | Mod: AF,HB,, | Performed by: PSYCHIATRY & NEUROLOGY

## 2024-05-12 RX ADMIN — Medication 9 MG: at 08:05

## 2024-05-12 RX ADMIN — MIRTAZAPINE 15 MG: 15 TABLET, FILM COATED ORAL at 08:05

## 2024-05-12 RX ADMIN — HYDROXYZINE PAMOATE 50 MG: 50 CAPSULE ORAL at 08:05

## 2024-05-12 RX ADMIN — DIVALPROEX SODIUM 1000 MG: 500 TABLET, FILM COATED, EXTENDED RELEASE ORAL at 08:05

## 2024-05-12 NOTE — PLAN OF CARE
Problem: Adult Inpatient Plan of Care  Goal: Plan of Care Review  Outcome: Progressing  Goal: Patient-Specific Goal (Individualized)  Outcome: Progressing  Goal: Absence of Hospital-Acquired Illness or Injury  Outcome: Progressing  Goal: Optimal Comfort and Wellbeing  Outcome: Progressing  Goal: Readiness for Transition of Care  Outcome: Progressing     Problem: Suicide Risk  Goal: Absence of Self-Harm  Outcome: Progressing     Problem: Adult Behavioral Health Plan of Care  Goal: Plan of Care Review  Outcome: Progressing  Goal: Patient-Specific Goal (Individualization)  Outcome: Progressing  Goal: Adheres to Safety Considerations for Self and Others  Outcome: Progressing  Goal: Absence of New-Onset Illness or Injury  Outcome: Progressing  Goal: Optimized Coping Skills in Response to Life Stressors  Outcome: Progressing     Problem: Psychotic Signs/Symptoms  Goal: Improved Behavioral Control (Psychotic Signs/Symptoms)  Outcome: Progressing  Goal: Optimal Cognitive Function (Psychotic Signs/Symptoms)  Outcome: Progressing  Goal: Improved Mood Symptoms (Psychotic Signs/Symptoms)  Outcome: Progressing  Goal: Decreased Sensory Symptoms (Psychotic Signs/Symptoms)  Outcome: Progressing

## 2024-05-12 NOTE — PLAN OF CARE
POC reviewed this shift and is on going. Patient is calm, cooperative, anxious, and pacing. Denies Suicidal Ideation, Homicidal Ideation, Auditory Hallucinations, Visual Hallucinations, Tactile Hallucinations, Gustatory Hallucinations, and Delusions. Patient has been pacing from the dining room to his room. Patient continues to be restless.  Patient has been seen responding to internal stimuli. Pt continues to be medication compliant and staff will continue to monitor pt closely while on the unit.

## 2024-05-12 NOTE — PLAN OF CARE
POC reviewed this shift and is ongoing.  Pt cooperative with staff and withdrawn to room. Comes out on unit as needed. Med compliant. No ss of distress. Will continue to monitor for changes and safety.

## 2024-05-12 NOTE — PROGRESS NOTES
[facility]  Progress Note  Psychiatry    Admit Date: 5/9/2024   LOS: 3 days       LEGAL STATUS: PEC/CEC    SUBJECTIVE:     Interim Events: 05/12/2024  Pt seen and chart reviewed. Met with pt. Staff reports pt is doing well on the unit. He states he he feels good. He denies any problems on the unit. States he has no thoughts of harm to self or others this am.  His thought processses were more organized this am.  Denies thoughts of harm to self or others. He was talkative and circumstantial.     Pt is sleeping well. Reports appetite is normal. No physical complaints today. Denies side effects from medication. Denies SI/HI/AVH.     Chart Review (Past 24 hours):  Reviewed notes from Rns and MD and labs from the last 24 hours.    The patient's case was discussed with the treatment team/care providers today including Rns    Staff reports no behavioral or management issues.     The patient has been compliant with treatment.      HPI:    CHIEF COMPLAINT   Bishop Soria is a 23 y.o. male with a past psychiatric history of Substance Abuse and bipolar disorder, schizophrenia  currently admitted to the inpatient unit with the following chief complaint: hallucinations, substance abuse, and thoughts of death/suicide    HPI   The patient was seen and examined. The chart was reviewed.     The patient presented to the ER on 5/9/2024 .     The patient was medically cleared and admitted to the [facility].     Per ED MD:        Psychiatric Evaluation       Pt to the ER requesting admission to [facility] for medication adjustment, reports worsening AVH and SI. Pt reports he wants to stab himself.       23-year-old male with a history of schizophrenia, multiple psychiatric hospitalizations presents the emergency department stating he is hearing voices telling him to hurt himself, he is crying, talking to himself, seems to be responding to internal stimuli currently.        Psychiatric interview:  Reports he feels he needs medication  "changes. He reports he has been taking his invega injection monthly but feels he needs "depakote" for sleep. He states he has been smoking MJ because "it helps me eat," pt is concerned because he is losing weight and unable to eat, "the invega makes my stomach too tight." Reports mood has been "fluctuating a lot, I've been at the peak of my scale," for the last few days. He states he feels "let down by life, it's been really stressful, I have so much potential to be something more, but the devil is holding me back, I want to be a neurologist... I like to sing and dance, school excites me, you typing on the computer soothes me, I feel like I can tell you anything." Pt admits to taking 1 xanax PTA to "calm down." Then after explaining risks of w/d and potential need for detox if patient is taking more than reporting, pt states he has been taking it more, then with repeat questioning, pt denies taking it regularly, "I was lying to you, I just wanted to get some valium so I could sleep, I only took it that once, it was my mom's, she's prescribed it." Pt is an unreliable historian due to manipulative behavior.               REVIEW OF SYSTEMS  Review of Systems   All 12 systems otherwise negative.        Scheduled Meds:   divalproex ER  1,000 mg Oral QHS    mirtazapine  15 mg Oral QHS     PRN Meds:  Current Facility-Administered Medications:     hydrOXYzine pamoate, 50 mg, Oral, Q6H PRN    melatonin, 9 mg, Oral, Nightly PRN    nicotine, 1 patch, Transdermal, Daily PRN    OLANZapine, 10 mg, Oral, Q8H PRN **AND** OLANZapine, 10 mg, Intramuscular, Q8H PRN    ondansetron, 8 mg, Oral, Q8H PRN      Review of patient's allergies indicates:  No Known Allergies          OBJECTIVE:     Vital Signs (Most Recent)  Temp: 98.1 °F (36.7 °C) (05/12/24 0731)  Pulse: 71 (05/12/24 0731)  Resp: 20 (05/12/24 0731)  BP: 110/74 (05/12/24 0731)  Weight: 68.5 kg (151 lb) (05/09/24 1300)  BMI (Calculated): 19.4 (05/09/24 " "1300)        CONSTITUTIONAL  General Appearance: unremarkable, age appropriate     MUSCULOSKELETAL  Muscle Strength and Tone:no tremor, no tic  Abnormal Involuntary Movements: No  Gait and Station: non-ataxic     PSYCHIATRIC   Level of Consciousness: awake and alert   Orientation: person, place, and situation  Grooming: Casually dressed and Well groomed  Psychomotor Behavior: normal, cooperative  Speech: slowed, soft  Language: grossly intact  Mood: "happy"  Affect: appropriate  Thought Process: circumstantial. Some disorganization but less today.  Associations: intact   Thought Content: +delusions, denies SI, and denies HI  Perceptions: denies AH and denies  VH  Memory: Able to recall past events, Remote intact, and Recent intact  Attention:Easily distracted  Fund of Knowledge: Aware of current events and Vocabulary appropriate   Estimate if Intelligence:  Average based on work/education history, vocabulary and mental status exam  Insight: has awareness of illness  Judgment: behavior is adequate to circumstances    LABS/IMAGING  Recent Results (from the past 48 hour(s))   Basic Metabolic Panel    Collection Time: 05/11/24  6:58 AM   Result Value Ref Range    Sodium 140 136 - 145 mmol/L    Potassium 3.6 3.5 - 5.1 mmol/L    Chloride 113 (H) 95 - 110 mmol/L    CO2 21 (L) 23 - 29 mmol/L    Glucose 88 70 - 110 mg/dL    BUN 13 6 - 20 mg/dL    Creatinine 1.1 0.5 - 1.4 mg/dL    Calcium 8.7 8.7 - 10.5 mg/dL    Anion Gap 6 3 - 11 mmol/L    eGFR >60.0 >60 mL/min/1.73 m^2      Lab Results   Component Value Date    VALPROATE 85 02/21/2023    CBMZ 11.8 05/07/2023           ASSESSMENT/PLAN:     Patient is showing Patient is showing mild improvement     Diagnosis:  SCHIZOPHRENIA AND OTHER PSYCHOTIC DISORDERS; Schizoaffective Disorder      Patient Active Problem List    Diagnosis Date Noted    Hypokalemia 05/10/2024    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    Schizoaffective disorder, bipolar type 02/07/2021    Marijuana " abuse 12/04/2020          ASSESSMENT AND TREATMENT PLAN:    Schizoaffective disorder, bipolar type, severe  SI  Unspecified anxiety disorder  Cannabis abuse  Psychosocial stressors  Nicotine dependence  Hypokalemia  Elevated LFTs        PROBLEM LIST AND MANAGEMENT PLANS     Schizoaffective disorder, bipolar type, severe  - reports received invega CARRASCO last month, will attempt to verify with ACT and continue  -depakote 1000 mg PO qhs, will check level, monitor LFTs  - pt counseled  - follow up with outpatient mental health providers after discharge for medication management and psychotherapy        Suicidal ideations  - continue psychiatric hospitalization  - provide psychotherapeutic interventions and medication management     Unspecified anxiety disorder  - remeron 15 mg PO qhs  - pt counseled  - follow up with outpatient mental health providers after discharge for medication management and psychotherapy     Cannabis abuse  - consider rehab  - consider gabapentin  - pt counseled  - follow up with outpatient mental health providers after discharge for medication management and psychotherapy       Psychosocial stressors  - pt counseled  - SW consulted for assistance with additional resources      Nicotine dependence  - start nicotine patch 14 mg PO qd PRN     Hypokalemia  - FM consulted     Elevated LFTs  - FM consulted       PRESCRIPTION DRUG MANAGEMENT  Compliance: yes  Side Effects: no  Regimen Adjustments: see above     Discussed diagnosis, risks and benefits of proposed treatment vs alternative treatments vs no treatment, potential side effects of these treatments and the inherent unpredictability of treatment. The patient expresses understanding of the above and displays the capacity to agree with this treatment given said understanding. Patient also agrees that, currently, the benefits outweigh the risks and would like to pursue/continue treatment at this time.        - Social Work will obtain follow up  appointments for patient.     HEALTH SCREENING  Hemoglobin A1c  Lipid Panel    ENCOURAGE CURRAN MILIEU      NEED FOR CONTINUED HOSPITALIZATION  Psychiatric illness continues to pose a potential threat to life or bodily function, of self or others, thereby requiring the need for continued inpatient psychiatric hospitalization: Yes, due to: danger to self, as evidenced by:  Concerns with SI--Fading.    Protective inpatient pyschiatric hospitalization required while a safe disposition plan is enacted: Yes    Patient stabilized and ready for discharge from inpatient psychiatric unit: No    Target Disposition:  home    ESTIMATED 5-7 DAYS TO DISCHARGE    PROGNOSIS: GUARDED    Time: 25min     Willian Avery md

## 2024-05-13 PROCEDURE — 11400000 HC PSYCH PRIVATE ROOM

## 2024-05-13 PROCEDURE — 90833 PSYTX W PT W E/M 30 MIN: CPT | Mod: AF,HB,, | Performed by: PSYCHIATRY & NEUROLOGY

## 2024-05-13 PROCEDURE — 99233 SBSQ HOSP IP/OBS HIGH 50: CPT | Mod: AF,HB,, | Performed by: PSYCHIATRY & NEUROLOGY

## 2024-05-13 PROCEDURE — 25000003 PHARM REV CODE 250: Performed by: STUDENT IN AN ORGANIZED HEALTH CARE EDUCATION/TRAINING PROGRAM

## 2024-05-13 RX ADMIN — MIRTAZAPINE 15 MG: 15 TABLET, FILM COATED ORAL at 08:05

## 2024-05-13 RX ADMIN — DIVALPROEX SODIUM 1000 MG: 500 TABLET, FILM COATED, EXTENDED RELEASE ORAL at 08:05

## 2024-05-13 RX ADMIN — Medication 9 MG: at 08:05

## 2024-05-13 NOTE — NURSING
POC reviewed this shift and is ongoing.  Pt is anxious and cooperative.  Pt continues to pace in the hallways.  Pt denies SI/HI/AVH, continues to appear RIS, talking and gesturing while alone in his room.  Pt continues to dance in his room and in the hallway.  Pt redirects easily. Pt is medication complaint.

## 2024-05-13 NOTE — PLAN OF CARE
Plan of care reviewed and ongoing. Patient awake and alert. Cooperative with staff.  Pt noted pacing unit most of the day. Out for meals and snack times, good appetite. Pt noted having conversations with himself frequently during this shift. Patient participates in group sessions. Interacts appropriately with peers on unit. Pt denies any SI at this time. No distress noted. No behavioral concerns at this time. Will continue to monitor for patient safety.    Problem: Adult Inpatient Plan of Care  Goal: Plan of Care Review  Outcome: Progressing  Goal: Patient-Specific Goal (Individualized)  Outcome: Progressing  Goal: Absence of Hospital-Acquired Illness or Injury  Outcome: Progressing  Goal: Optimal Comfort and Wellbeing  Outcome: Progressing  Goal: Readiness for Transition of Care  Outcome: Progressing     Problem: Suicide Risk  Goal: Absence of Self-Harm  Outcome: Progressing     Problem: Adult Behavioral Health Plan of Care  Goal: Plan of Care Review  Outcome: Progressing  Goal: Patient-Specific Goal (Individualization)  Outcome: Progressing  Goal: Adheres to Safety Considerations for Self and Others  Outcome: Progressing  Goal: Absence of New-Onset Illness or Injury  Outcome: Progressing  Goal: Optimized Coping Skills in Response to Life Stressors  Outcome: Progressing  Goal: Develops/Participates in Therapeutic [place] to Support Successful Transition  Outcome: Progressing  Goal: Rounds/Family Conference  Outcome: Progressing     Problem: Psychotic Signs/Symptoms  Goal: Improved Behavioral Control (Psychotic Signs/Symptoms)  Outcome: Progressing  Goal: Optimal Cognitive Function (Psychotic Signs/Symptoms)  Outcome: Progressing  Goal: Improved Mood Symptoms (Psychotic Signs/Symptoms)  Outcome: Progressing  Goal: Decreased Sensory Symptoms (Psychotic Signs/Symptoms)  Outcome: Progressing

## 2024-05-13 NOTE — PLAN OF CARE
Problem: Adult Behavioral Health Plan of Care  Goal: Develops/Participates in Therapeutic [place] to Support Successful Transition  Intervention: Mutually Develop Transition Plan  Flowsheets (Taken 5/13/2024 8071)  Current Discharge Risk: substance use/abuse  Readmission Within the Last 30 Days: no previous admission in last 30 days  Outpatient/Agency/Support Group Needs:   outpatient substance abuse treatment (specify)   outpatient counseling   outpatient medication management  Anticipated Discharge Disposition: home with family  Transportation Concerns: none  Patient/Family Anticipated Services at Transition: mental health services  Patient/Family Anticipates Transition to: home with family  Transportation Anticipated: family or friend will provide  Concerns to be Addressed:   suicidal   substance/tobacco abuse/use   medication   mental health

## 2024-05-13 NOTE — PLAN OF CARE
05/10/24 1447   Step 1: Warning Signs   Warning Sign 1 Being misunderstood   Warning Sign 2 n/a   Step 2: Internal coping strategies - Things I can do to take my mind off my problems without contacting another person:   Coping Strategy 1 Listen to music   Coping Strategy 2 Read   Coping Strategy 3 Dance   Step 3: People and social settings that provide distraction:   1. Name [facility] [place]       Phone [phone]   2. Name n/a   3. Place [employer]   4. Place n/a    Step 4: People whom I can ask for help:   1. Person [facility] [place]       Phone [phone]   2. Person n/a   3. Person n/a   Step 5: Professionals or agencies I can contact during a crisis:   1. Clinician Name [facility]       Phone [phone]   2. Clinician Name ACT Team with [facility] in [place]       Phone [phone]   3. Suicide Prevention Lifeline: 988 Suicide Prevention Lifeline: 988   4. Local Emergency Service       911   Step 6: Making the environment safer (plan for lethal means safety)   Safe Environment Plan Remove weed from around me   Safe Environment Optional: What is most important to me and worth living for? My life, reaching my goals, being successful   Safety Plan Tasks   Provided a Hard Copy to the Patient Y   Explained How to Follow the Steps Y   Discussed Facilitators and Barriers Y

## 2024-05-13 NOTE — PROGRESS NOTES
"PSYCHIATRY DAILY INPATIENT PROGRESS NOTE  SUBSEQUENT HOSPITAL VISIT    ENCOUNTER DATE: 5/13/2024  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  Bishop Soria presented with the chief complaint of hallucinations, substance abuse, and thoughts of death/suicide       The patient was seen and examined. The chart was reviewed.     Reviewed notes from Rns and MD and labs from the last 24 hours.    The patient's case was discussed with the treatment team/care providers today including Rns, CTRS, NP, and SW    Staff reports some behavioral or management issues.     The patient has been compliant with treatment.      Subjective 05/13/2024       Today the patient reports that he is doing great, "I just came I to get some sleeping meds.. 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     He is making slow but steady progress.    He reports that his last CARRASCO was given on 4/30/24.    The patient denies any side effects to medications.    At this time symptoms remains severe, functionally and behaviorally impairing and pt remains in need of continued acute inpatient hospitalization for both safety and stabilization.               Psychiatric ROS (observed, reported, or endorsed/denied):  Depressed mood - decreasing steadily  Interest/pleasure/anhedonia: No  Guilt/hopelessness/worthlessness - No  Changes in Sleep - variable  Changes in Appetite - variable  Changes in Concentration - variable  Changes in Energy - variable  PMA/R- No  Suicidal- active/passive ideations - decreasing slowly  Homicidal ideations: active/passive ideations - No    Hallucinations - recently observed by staff  Delusions - Continuing  Disorganized behavior - No  Disorganized speech - No  Negative " symptoms - No    Elevated mood - No  Decreased need for sleep - variable  Grandiosity - variable  Racing thoughts - variable  Impulsivity - variable  Irritability- variable  Increased energy - variable  Distractibility - variable  Increase in goal-directed activity or PMA- variable    Symptoms of KANU - variable  Symptoms of Panic Disorder- No  Symptoms of PTSD - No        Overall progress: Patient is showing no improvement on the Unit to date        Psychotherapy:  Target symptoms: mood disorder, psychosis  Why chosen therapy is appropriate versus another modality: relevant to diagnosis, patient responds to this modality, evidence based practice  Outcome monitoring methods: self-report, observation  Therapeutic intervention type: insight oriented psychotherapy, behavior modifying psychotherapy, supportive psychotherapy, interactive psychotherapy  Topics discussed/themes: building skills sets for symptom management, symptom recognition  The patient's response to the intervention is accepting. The patient's progress toward treatment goals is limited.   Duration of intervention: 17 minutes.        Medical ROS  General ROS: negative  Ophthalmic ROS: negative  ENT ROS: negative  Allergy and Immunology ROS: negative  Hematological and Lymphatic ROS: negative  Endocrine ROS: negative  Respiratory ROS: no cough, shortness of breath, or wheezing  Cardiovascular ROS: no chest pain or dyspnea on exertion  Gastrointestinal ROS: no abdominal pain, change in bowel habits, or black or bloody stools  Genito-Urinary ROS: no dysuria, trouble voiding, or hematuria  Musculoskeletal ROS: negative  Neurological ROS: no TIA or stroke symptoms  Dermatological ROS: negative        PAST MEDICAL HISTORY   Past Medical History:   Diagnosis Date    History of psychiatric hospitalization 09/06/2023    [facility]     of psychiatric care     Psychiatric problem     Schizophrenia     Suicide attempt            PSYCHOTROPIC MEDICATIONS  "  Scheduled Meds:   divalproex ER  1,000 mg Oral QHS    mirtazapine  15 mg Oral QHS     Continuous Infusions:  PRN Meds:.  Current Facility-Administered Medications:     hydrOXYzine pamoate, 50 mg, Oral, Q6H PRN    melatonin, 9 mg, Oral, Nightly PRN    nicotine, 1 patch, Transdermal, Daily PRN    OLANZapine, 10 mg, Oral, Q8H PRN **AND** OLANZapine, 10 mg, Intramuscular, Q8H PRN    ondansetron, 8 mg, Oral, Q8H PRN        EXAMINATION    VITALS   Vitals:    05/11/24 1913 05/12/24 0731 05/12/24 1919 05/13/24 0713   BP: 109/73 110/74 (!) 114/55 118/69   BP Location: Left arm Left arm Left arm Left arm   Patient Position: Sitting Sitting Sitting Sitting   Pulse: 76 71 72 66   Resp: 16 20 16 18   Temp: 98.4 °F (36.9 °C) 98.1 °F (36.7 °C) 98.1 °F (36.7 °C) 97.8 °F (36.6 °C)   TempSrc: Oral Oral Oral Oral   SpO2: 99% 97% 98% 99%   Weight:       Height:           Body mass index is 19.39 kg/m².        CONSTITUTIONAL  General Appearance: unremarkable, age appropriate     MUSCULOSKELETAL  Muscle Strength and Tone:no tremor, no tic  Abnormal Involuntary Movements: No  Gait and Station: non-ataxic     PSYCHIATRIC   Level of Consciousness: awake and alert   Orientation: person, place, time and situation  Grooming: Casually dressed and Well groomed  Psychomotor Behavior: normal, cooperative  Speech: increased r/s, normal v/t  Language: grossly intact  Mood: "great"  Affect: labile   Thought Process: circumstantial. Some disorganization but less today.  Associations: intact   Thought Content: +delusions, denies SI, and denies HI  Perceptions: denies AH and denies  VH; possibel RIS noted by staff  Memory: Able to recall past events, Remote intact, and Recent intact  Attention:Easily distracted  Fund of Knowledge: Aware of current events and Vocabulary appropriate   Estimate if Intelligence:  Average based on work/education history, vocabulary and mental status exam  Insight: has awareness of illness  Judgment: behavior is adequate to " circumstances        DIAGNOSTIC TESTING   Laboratory Results  No results found for this or any previous visit (from the past 24 hour(s)).          MEDICAL DECISION MAKING      ASSESSMENT:   Schizoaffective disorder, bipolar type, severe  SI  Unspecified anxiety disorder  Cannabis abuse    Psychosocial stressors  Nicotine dependence    Hypokalemia  Elevated LFTs        PROBLEM LIST AND MANAGEMENT PLANS      Schizoaffective disorder, bipolar type, severe  - reports received invega CARRASCO last month, will attempt to verify with ACT  - started/continue depakote 1000 mg PO qhs, will check level tomorrow AM, monitor LFTs  - pt counseled  - follow up with outpatient mental health providers after discharge for medication management and psychotherapy        Suicidal ideations  - continue psychiatric hospitalization  - provide psychotherapeutic interventions and medication management     Unspecified anxiety disorder  - start remeron 15 mg PO qhs  - pt counseled  - follow up with outpatient mental health providers after discharge for medication management and psychotherapy     Cannabis abuse  - consider rehab  - consider gabapentin  - pt counseled  - follow up with outpatient mental health providers after discharge for medication management and psychotherapy     Psychosocial stressors  - pt counseled  - SW consulted for assistance with additional resources      Nicotine dependence  - start nicotine patch 14 mg PO qd PRN     Hypokalemia  - FM consulted- repletion completed  -rechecked on 5/11- resolved     Elevated LFTs  - FM consulted            Discussed diagnosis, risks and benefits of proposed treatment vs alternative treatments vs no treatment, potential side effects of these treatments and the inherent unpredictability of treatment. The patient expresses understanding of the above and displays the capacity to agree with this treatment given said understanding. Patient also agrees that, currently, the benefits outweigh the  risks and would like to pursue/continue treatment at this time.    Any medications being used off-label were discussed with the patient inclusive of the evidence base for the use of the medications and consent was obtained for the off-label use of the medication.       DISCHARGE PLANNING  Expected Disposition Plan: Home or Self Care      NEED FOR CONTINUED HOSPITALIZATION  Psychiatric illness continues to pose a potential threat to life or bodily function, of self or others, thereby requiring the need for continued inpatient psychiatric hospitalization: Yes, due to: significant psychotic thought disorder, aggressive neuroleptic titration, hallucinations, danger to self, gravely disabled, and suicidal ideation, as evidenced by:  Ongoing concerns with SI., Ongoing concerns with grave disability with patient unable to perform basic feeding, hygiene and dressing activities without significant constant support., and Ongoing concerns with perceptual aberrancy and paranoid persecutory delusions leading to potential harm of self or others.    Protective inpatient pyschiatric hospitalization required while a safe disposition plan is enacted: Yes    Patient stabilized and ready for discharge from inpatient psychiatric unit: No        STAFF:   Frantz Cagle MD  Psychiatry

## 2024-05-14 LAB
ALBUMIN SERPL BCP-MCNC: 4 G/DL (ref 3.5–5.2)
ALP SERPL-CCNC: 72 U/L (ref 55–135)
ALT SERPL W/O P-5'-P-CCNC: 33 U/L (ref 10–44)
ANION GAP SERPL CALC-SCNC: 5 MMOL/L (ref 3–11)
AST SERPL-CCNC: 18 U/L (ref 10–40)
BILIRUB SERPL-MCNC: 0.2 MG/DL (ref 0.1–1)
BUN SERPL-MCNC: 17 MG/DL (ref 6–20)
CALCIUM SERPL-MCNC: 9.3 MG/DL (ref 8.7–10.5)
CHLORIDE SERPL-SCNC: 112 MMOL/L (ref 95–110)
CO2 SERPL-SCNC: 26 MMOL/L (ref 23–29)
CREAT SERPL-MCNC: 1.2 MG/DL (ref 0.5–1.4)
EST. GFR  (NO RACE VARIABLE): >60 ML/MIN/1.73 M^2
GLUCOSE SERPL-MCNC: 94 MG/DL (ref 70–110)
POTASSIUM SERPL-SCNC: 4.5 MMOL/L (ref 3.5–5.1)
PROT SERPL-MCNC: 7.7 G/DL (ref 6–8.4)
SODIUM SERPL-SCNC: 143 MMOL/L (ref 136–145)
VALPROATE SERPL-MCNC: 65 UG/ML (ref 50–100)

## 2024-05-14 PROCEDURE — 80164 ASSAY DIPROPYLACETIC ACD TOT: CPT | Performed by: STUDENT IN AN ORGANIZED HEALTH CARE EDUCATION/TRAINING PROGRAM

## 2024-05-14 PROCEDURE — 80053 COMPREHEN METABOLIC PANEL: CPT | Performed by: PSYCHIATRY & NEUROLOGY

## 2024-05-14 PROCEDURE — 25000003 PHARM REV CODE 250: Performed by: INTERNAL MEDICINE

## 2024-05-14 PROCEDURE — 36415 COLL VENOUS BLD VENIPUNCTURE: CPT | Performed by: STUDENT IN AN ORGANIZED HEALTH CARE EDUCATION/TRAINING PROGRAM

## 2024-05-14 PROCEDURE — 11400000 HC PSYCH PRIVATE ROOM

## 2024-05-14 PROCEDURE — 99233 SBSQ HOSP IP/OBS HIGH 50: CPT | Mod: AF,HB,, | Performed by: PSYCHIATRY & NEUROLOGY

## 2024-05-14 PROCEDURE — 25000003 PHARM REV CODE 250: Performed by: STUDENT IN AN ORGANIZED HEALTH CARE EDUCATION/TRAINING PROGRAM

## 2024-05-14 PROCEDURE — S4991 NICOTINE PATCH NONLEGEND: HCPCS | Performed by: INTERNAL MEDICINE

## 2024-05-14 PROCEDURE — 90833 PSYTX W PT W E/M 30 MIN: CPT | Mod: AF,HB,, | Performed by: PSYCHIATRY & NEUROLOGY

## 2024-05-14 RX ADMIN — Medication 9 MG: at 08:05

## 2024-05-14 RX ADMIN — NICOTINE 1 PATCH: 14 PATCH, EXTENDED RELEASE TRANSDERMAL at 09:05

## 2024-05-14 RX ADMIN — MIRTAZAPINE 15 MG: 15 TABLET, FILM COATED ORAL at 08:05

## 2024-05-14 RX ADMIN — DIVALPROEX SODIUM 1000 MG: 500 TABLET, FILM COATED, EXTENDED RELEASE ORAL at 08:05

## 2024-05-14 NOTE — PROGRESS NOTES
"PSYCHIATRY DAILY INPATIENT PROGRESS NOTE  SUBSEQUENT HOSPITAL VISIT    ENCOUNTER DATE: 5/14/2024  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  Bishop Soria presented with the chief complaint of hallucinations, substance abuse, and thoughts of death/suicide       The patient was seen and examined. The chart was reviewed.     Reviewed notes from Rns, SW, and LPN and labs from the last 24 hours.    The patient's case was discussed with the treatment team/care providers today including Rns, CTRS, NP, and SW    Staff reports some behavioral or management issues.     The patient has been compliant with treatment.      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   -Future plans are grandiose but are less impairing    He is making slow but steady progress. Symptoms of carley/psychosis are steadily improving. He os not yet at baseline.     He reports that his last CARRASCO was given on 4/30/24.    The patient denies any side effects to medications.    At this time symptoms remains severe, functionally and behaviorally impairing and pt remains in need of continued acute inpatient hospitalization for both safety and stabilization.               Psychiatric ROS (observed, reported, or endorsed/denied):  Depressed mood - decreasing steadily  Interest/pleasure/anhedonia: No  Guilt/hopelessness/worthlessness - No  Changes in Sleep - variable  Changes in Appetite - variable  Changes in Concentration - variable  Changes in Energy - variable  PMA/R- No  Suicidal- active/passive ideations - decreasing slowly  Homicidal ideations: active/passive ideations - No    Hallucinations - improving steadily  Delusions - decreasing " steadily  Disorganized behavior - No  Disorganized speech - No  Negative symptoms - No    Elevated mood - No  Decreased need for sleep - decreasing steadily  Grandiosity - decreasing steadily  Racing thoughts - decreasing steadily  Impulsivity - decreasing steadily  Irritability- decreasing steadily  Increased energy - decreasing steadily  Distractibility - decreasing steadily  Increase in goal-directed activity or PMA- decreasing steadily    Symptoms of KANU - decreasing steadily  Symptoms of Panic Disorder- No  Symptoms of PTSD - No        Overall progress: Patient is showing mild improvement         Psychotherapy:  Target symptoms: mood disorder, psychosis  Why chosen therapy is appropriate versus another modality: relevant to diagnosis, patient responds to this modality, evidence based practice  Outcome monitoring methods: self-report, observation  Therapeutic intervention type: insight oriented psychotherapy, behavior modifying psychotherapy, supportive psychotherapy, interactive psychotherapy  Topics discussed/themes: building skills sets for symptom management, symptom recognition  The patient's response to the intervention is accepting. The patient's progress toward treatment goals is limited.   Duration of intervention: 17 minutes.        Medical ROS  General ROS: negative  Ophthalmic ROS: negative  ENT ROS: negative  Allergy and Immunology ROS: negative  Hematological and Lymphatic ROS: negative  Endocrine ROS: negative  Respiratory ROS: no cough, shortness of breath, or wheezing  Cardiovascular ROS: no chest pain or dyspnea on exertion  Gastrointestinal ROS: no abdominal pain, change in bowel habits, or black or bloody stools  Genito-Urinary ROS: no dysuria, trouble voiding, or hematuria  Musculoskeletal ROS: negative  Neurological ROS: no TIA or stroke symptoms  Dermatological ROS: negative        PAST MEDICAL HISTORY   Past Medical History:   Diagnosis Date    History of psychiatric hospitalization  "09/06/2023    [facility]     Psychiatric problem     Schizophrenia     Suicide attempt            PSYCHOTROPIC MEDICATIONS   Scheduled Meds:   divalproex ER  1,000 mg Oral QHS    mirtazapine  15 mg Oral QHS     Continuous Infusions:  PRN Meds:.  Current Facility-Administered Medications:     hydrOXYzine pamoate, 50 mg, Oral, Q6H PRN    melatonin, 9 mg, Oral, Nightly PRN    nicotine, 1 patch, Transdermal, Daily PRN    OLANZapine, 10 mg, Oral, Q8H PRN **AND** OLANZapine, 10 mg, Intramuscular, Q8H PRN    ondansetron, 8 mg, Oral, Q8H PRN        EXAMINATION    VITALS   Vitals:    05/12/24 1919 05/13/24 0713 05/13/24 1919 05/14/24 0727   BP: (!) 114/55 118/69 115/68 114/70   BP Location: Left arm Left arm Left arm Left arm   Patient Position: Sitting Sitting Sitting Sitting   Pulse: 72 66 64 68   Resp: 16 18 16 18   Temp: 98.1 °F (36.7 °C) 97.8 °F (36.6 °C) 98.1 °F (36.7 °C) 98 °F (36.7 °C)   TempSrc: Oral Oral Oral Oral   SpO2: 98% 99% 100% 99%   Weight:       Height:           Body mass index is 19.39 kg/m².        CONSTITUTIONAL  General Appearance: unremarkable, age appropriate     MUSCULOSKELETAL  Muscle Strength and Tone:no tremor, no tic  Abnormal Involuntary Movements: No  Gait and Station: non-ataxic     PSYCHIATRIC   Level of Consciousness: awake and alert   Orientation: person, place, time and situation  Grooming: Casually dressed and Well groomed  Psychomotor Behavior: normal, cooperative  Speech: increased r/s, normal v/t  Language: grossly intact  Mood: "great"  Affect: labile   Thought Process: circumstantial. Some disorganization but less today.  Associations: intact   Thought Content: +delusions, denies SI, and denies HI  Perceptions: denies AH and denies  VH; possibel RIS noted by staff  Memory: Able to recall past events, Remote intact, and Recent intact  Attention:Easily distracted  Fund of Knowledge: Aware of current events and Vocabulary appropriate   Estimate if " Intelligence:  Average based on work/education history, vocabulary and mental status exam  Insight: has awareness of illness  Judgment: behavior is adequate to circumstances        DIAGNOSTIC TESTING   Laboratory Results  No results found for this or any previous visit (from the past 24 hour(s)).          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  Cannabis abuse    Psychosocial stressors  Nicotine dependence    Hypokalemia  Elevated LFTs        PROBLEM LIST AND MANAGEMENT PLANS      Schizoaffective disorder, bipolar type, severe  - reports received invega CARRASCO last month, will attempt to verify with ACT  - started/continue depakote 1000 mg PO qhs, will check level totoday with CMP, monitor LFTs  - pt counseled  - follow up with outpatient mental health providers after discharge for medication management and psychotherapy        Suicidal ideations  - continue psychiatric hospitalization  - provide psychotherapeutic interventions and medication management     Unspecified anxiety disorder  - started/continue remeron 15 mg PO qhs  - pt counseled  - follow up with outpatient mental health providers after discharge for medication management and psychotherapy    Insomnia: pt counseled  -remeron off label as above  -vistaril prn     Cannabis abuse  - consider rehab  - consider gabapentin  - pt counseled  - follow up with outpatient mental health providers after discharge for medication management and psychotherapy     Psychosocial stressors  - pt counseled  - SW consulted for assistance with additional resources      Nicotine dependence  - start nicotine patch 14 mg PO qd PRN     Hypokalemia  - FM consulted- repletion completed  -rechecked on 5/11- resolved     Elevated LFTs  - FM consulted  -recheck today            Discussed diagnosis, risks and benefits of proposed treatment vs alternative treatments  vs no treatment, potential side effects of these treatments and the inherent unpredictability of treatment. The patient expresses understanding of the above and displays the capacity to agree with this treatment given said understanding. Patient also agrees that, currently, the benefits outweigh the risks and would like to pursue/continue treatment at this time.    Any medications being used off-label were discussed with the patient inclusive of the evidence base for the use of the medications and consent was obtained for the off-label use of the medication.       DISCHARGE PLANNING  Expected Disposition Plan: Home or Self Care      NEED FOR CONTINUED HOSPITALIZATION  Psychiatric illness continues to pose a potential threat to life or bodily function, of self or others, thereby requiring the need for continued inpatient psychiatric hospitalization: Yes, due to: significant psychotic thought disorder, aggressive neuroleptic titration, hallucinations, danger to self, gravely disabled, and suicidal ideation, as evidenced by:  Ongoing concerns with SI., Ongoing concerns with grave disability with patient unable to perform basic feeding, hygiene and dressing activities without significant constant support., and Ongoing concerns with perceptual aberrancy and paranoid persecutory delusions leading to potential harm of self or others.    Protective inpatient pyschiatric hospitalization required while a safe disposition plan is enacted: Yes    Patient stabilized and ready for discharge from inpatient psychiatric unit: No        STAFF:   Frantz Cagle MD  Psychiatry

## 2024-05-14 NOTE — PLAN OF CARE
Problem: Adult Inpatient Plan of Care  Goal: Plan of Care Review  Outcome: Progressing  Goal: Patient-Specific Goal (Individualized)  Outcome: Progressing  Goal: Absence of Hospital-Acquired Illness or Injury  Outcome: Progressing  Goal: Optimal Comfort and Wellbeing  Outcome: Progressing  Goal: Readiness for Transition of Care  Outcome: Progressing     Problem: Suicide Risk  Goal: Absence of Self-Harm  Outcome: Progressing     Problem: Adult Behavioral Health Plan of Care  Goal: Plan of Care Review  Outcome: Progressing  Goal: Patient-Specific Goal (Individualization)  Outcome: Progressing  Goal: Adheres to Safety Considerations for Self and Others  Outcome: Progressing  Goal: Absence of New-Onset Illness or Injury  Outcome: Progressing  Goal: Optimized Coping Skills in Response to Life Stressors  Outcome: Progressing  Goal: Develops/Participates in Therapeutic [place] to Support Successful Transition  Outcome: Progressing  Goal: Rounds/Family Conference  Outcome: Progressing     Problem: Psychotic Signs/Symptoms  Goal: Improved Behavioral Control (Psychotic Signs/Symptoms)  Outcome: Progressing  Goal: Optimal Cognitive Function (Psychotic Signs/Symptoms)  Outcome: Progressing  Goal: Improved Mood Symptoms (Psychotic Signs/Symptoms)  Outcome: Progressing  Goal: Decreased Sensory Symptoms (Psychotic Signs/Symptoms)  Outcome: Progressing

## 2024-05-14 NOTE — PLAN OF CARE
"  Problem: Adult Behavioral Health Plan of Care  Goal: Optimized Coping Skills in Response to Life Stressors  Intervention: Promote Effective Coping Strategies  Flowsheets (Taken 5/13/2024 1030)  Supportive Measures:   active listening utilized   verbalization of feelings encouraged   self-reflection promoted       Behavior: pt was alert, oriented, and engaged. Pt stated I been doing good you can even ask my mama.             Intervention:  In this CBT-focused process group CSW facilitated a group discussion on motivation. Pt was asked to identified who or what motivates you, what keeps you going when times get tough, what advice or positive saying have you heard that inspire and motivate you personally           Response: pt identified his future as motivation. Pt identified family as what keeps him going when times get tough. Pt identified, "never a failure always a lesson" as positive saying that motivates him.           Plan: Continue to encourage pt to participate in process groups to verbalize feelings and develop healthy coping skills.                   "

## 2024-05-14 NOTE — PLAN OF CARE
Problem: Adult Behavioral Health Plan of Care  Goal: Optimized Coping Skills in Response to Life Stressors  Intervention: Promote Effective Coping Strategies  Flowsheets (Taken 5/14/2024 1820)  Supportive Measures:   active listening utilized   positive reinforcement provided   goal-setting facilitated   verbalization of feelings encouraged       Behavior: alert, oriented. Pt observed talking to himself during group. Pt stated he was doing pretty good today.         Intervention: In this CBT-focused group, patients discussed the importance of setting goals and each patient was asked to identify specific goals to aid in their future treatment.        Response: Pt stated his goal was to do better than yesterday. Pt stated goals are important because it is something he can achieve and reach for.         Plan: Continue to encourage pt to participate in process groups to verbalize feelings and develop healthy coping skills.

## 2024-05-14 NOTE — NURSING
POC reviewed this shift and is ongoing.  Pt is cooperative on unit and complaint with medications.  Pt presented no specific behavior concerns this shift.

## 2024-05-14 NOTE — PLAN OF CARE
Plan of care ongoing. Patient awake, alert, anxious. Patient out on unit most of day, pacing hallway. Noted having conversations with himself throughout the day. Pt denies any SI/HI/AVH. Out for meals and snack times, good appetite. Patient participates in group sessions. Interacts appropriately with peers on unit. No distress noted. No behavioral concerns at this time. Will continue to monitor for patient safety.     Problem: Adult Inpatient Plan of Care  Goal: Plan of Care Review  Outcome: Progressing  Goal: Patient-Specific Goal (Individualized)  Outcome: Progressing  Goal: Absence of Hospital-Acquired Illness or Injury  Outcome: Progressing  Goal: Optimal Comfort and Wellbeing  Outcome: Progressing  Goal: Readiness for Transition of Care  Outcome: Progressing     Problem: Suicide Risk  Goal: Absence of Self-Harm  Outcome: Progressing     Problem: Adult Behavioral Health Plan of Care  Goal: Plan of Care Review  Outcome: Progressing  Goal: Patient-Specific Goal (Individualization)  Outcome: Progressing  Goal: Adheres to Safety Considerations for Self and Others  Outcome: Progressing  Goal: Absence of New-Onset Illness or Injury  Outcome: Progressing  Goal: Optimized Coping Skills in Response to Life Stressors  Outcome: Progressing  Goal: Develops/Participates in Therapeutic [place] to Support Successful Transition  Outcome: Progressing  Goal: Rounds/Family Conference  Outcome: Progressing     Problem: Psychotic Signs/Symptoms  Goal: Improved Behavioral Control (Psychotic Signs/Symptoms)  Outcome: Progressing  Goal: Optimal Cognitive Function (Psychotic Signs/Symptoms)  Outcome: Progressing  Goal: Improved Mood Symptoms (Psychotic Signs/Symptoms)  Outcome: Progressing  Goal: Decreased Sensory Symptoms (Psychotic Signs/Symptoms)  Outcome: Progressing

## 2024-05-15 PROCEDURE — 99233 SBSQ HOSP IP/OBS HIGH 50: CPT | Mod: AF,HB,, | Performed by: PSYCHIATRY & NEUROLOGY

## 2024-05-15 PROCEDURE — 25000003 PHARM REV CODE 250: Performed by: STUDENT IN AN ORGANIZED HEALTH CARE EDUCATION/TRAINING PROGRAM

## 2024-05-15 PROCEDURE — 90833 PSYTX W PT W E/M 30 MIN: CPT | Mod: AF,HB,, | Performed by: PSYCHIATRY & NEUROLOGY

## 2024-05-15 PROCEDURE — 25000003 PHARM REV CODE 250: Performed by: INTERNAL MEDICINE

## 2024-05-15 PROCEDURE — S4991 NICOTINE PATCH NONLEGEND: HCPCS | Performed by: INTERNAL MEDICINE

## 2024-05-15 PROCEDURE — 11400000 HC PSYCH PRIVATE ROOM

## 2024-05-15 RX ADMIN — DIVALPROEX SODIUM 1000 MG: 500 TABLET, FILM COATED, EXTENDED RELEASE ORAL at 08:05

## 2024-05-15 RX ADMIN — HYDROXYZINE PAMOATE 50 MG: 50 CAPSULE ORAL at 08:05

## 2024-05-15 RX ADMIN — Medication 9 MG: at 08:05

## 2024-05-15 RX ADMIN — NICOTINE 1 PATCH: 14 PATCH, EXTENDED RELEASE TRANSDERMAL at 07:05

## 2024-05-15 RX ADMIN — MIRTAZAPINE 15 MG: 15 TABLET, FILM COATED ORAL at 08:05

## 2024-05-15 NOTE — NURSING
POC reviewed this shift and is ongoing.  Pt is cooperative on unit and compliant with medications.  Pt continues to lack insight into illness, denies SI/HI/AVH, no signs of RIS this shift.  Pt remains anxious, restless and elevated.

## 2024-05-15 NOTE — PROGRESS NOTES
"PSYCHIATRY DAILY INPATIENT PROGRESS NOTE  SUBSEQUENT HOSPITAL VISIT    ENCOUNTER DATE: 5/15/2024  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  Bishop Soria presented with the chief complaint of hallucinations, substance abuse, and thoughts of death/suicide       The patient was seen and examined. The chart was reviewed.     Reviewed notes from Rns, SW, and LPN and labs from the last 24 hours.    The patient's case was discussed with the treatment team/care providers today including Rns, CTRS, NP, and SW    Staff reports some behavioral or management issues.     The patient has been compliant with treatment.      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 This remains unchanged since last seen  -Future plans are grandiose but are less impairing    He is making  steady progress. Symptoms of carley/psychosis are steadily improving. He is approaching his baseline.     He reports that his last CARRASCO was given on 4/30/24.    His primary gaol was improving sleep- he has been averaging 5-6 hours most night    He will be stable for discharge in 1-2 days pending further improvements    The patient denies any side effects to medications.    At this time symptoms remains severe, functionally and behaviorally impairing and pt remains in need of continued acute inpatient hospitalization for both safety and stabilization.               Psychiatric ROS (observed, reported, or endorsed/denied):  Depressed mood - improving steadily  Interest/pleasure/anhedonia: No  Guilt/hopelessness/worthlessness - No  Changes in Sleep - improving steadily  Changes in Appetite - improving steadily  Changes in Concentration - improving " steadily  Changes in Energy - improving steadily  PMA/R- No  Suicidal- active/passive ideations - improving steadily  Homicidal ideations: active/passive ideations - No    Hallucinations - improving steadily  Delusions - improving steadily  Disorganized behavior - No  Disorganized speech - No  Negative symptoms - No    Elevated mood - No  Decreased need for sleep - improving steadily  Grandiosity - improving steadily  Racing thoughts - improving steadily  Impulsivity - improving steadily  Irritability- improving steadily  Increased energy - improving steadily  Distractibility - improving steadily  Increase in goal-directed activity or PMA- improving steadily    Symptoms of KANU - improving steadily  Symptoms of Panic Disorder- No  Symptoms of PTSD - No        Overall progress: Patient is showing moderate improvement        Psychotherapy:  Target symptoms: mood disorder, psychosis  Why chosen therapy is appropriate versus another modality: relevant to diagnosis, patient responds to this modality, evidence based practice  Outcome monitoring methods: self-report, observation  Therapeutic intervention type: insight oriented psychotherapy, behavior modifying psychotherapy, supportive psychotherapy, interactive psychotherapy  Topics discussed/themes: building skills sets for symptom management, symptom recognition  The patient's response to the intervention is accepting. The patient's progress toward treatment goals is fair.   Duration of intervention: 16 minutes.        Medical ROS  General ROS: negative  Ophthalmic ROS: negative  ENT ROS: negative  Allergy and Immunology ROS: negative  Hematological and Lymphatic ROS: negative  Endocrine ROS: negative  Respiratory ROS: no cough, shortness of breath, or wheezing  Cardiovascular ROS: no chest pain or dyspnea on exertion  Gastrointestinal ROS: no abdominal pain, change in bowel habits, or black or bloody stools  Genito-Urinary ROS: no dysuria, trouble voiding, or  "hematuria  Musculoskeletal ROS: negative  Neurological ROS: no TIA or stroke symptoms  Dermatological ROS: negative        PAST MEDICAL HISTORY   Past Medical History:   Diagnosis Date    History of psychiatric hospitalization 09/06/2023    Daniel GAEL     of psychiatric care     Psychiatric problem     Schizophrenia     Suicide attempt            PSYCHOTROPIC MEDICATIONS   Scheduled Meds:   divalproex ER  1,000 mg Oral QHS    mirtazapine  15 mg Oral QHS     Continuous Infusions:  PRN Meds:.  Current Facility-Administered Medications:     hydrOXYzine pamoate, 50 mg, Oral, Q6H PRN    melatonin, 9 mg, Oral, Nightly PRN    nicotine, 1 patch, Transdermal, Daily PRN    OLANZapine, 10 mg, Oral, Q8H PRN **AND** OLANZapine, 10 mg, Intramuscular, Q8H PRN    ondansetron, 8 mg, Oral, Q8H PRN        EXAMINATION    VITALS   Vitals:    05/13/24 1919 05/14/24 0727 05/14/24 1910 05/15/24 0718   BP: 115/68 114/70 122/60 (!) 121/57   BP Location: Left arm Left arm Left arm Left arm   Patient Position: Sitting Sitting Sitting Sitting   Pulse: 64 68 82 73   Resp: 16 18 18 18   Temp: 98.1 °F (36.7 °C) 98 °F (36.7 °C) 98 °F (36.7 °C) 97.4 °F (36.3 °C)   TempSrc: Oral Oral Oral Oral   SpO2: 100% 99% 98% 98%   Weight:       Height:           Body mass index is 19.39 kg/m².        CONSTITUTIONAL  General Appearance: unremarkable, age appropriate     MUSCULOSKELETAL  Muscle Strength and Tone:no tremor, no tic  Abnormal Involuntary Movements: No  Gait and Station: non-ataxic     PSYCHIATRIC   Level of Consciousness: awake and alert   Orientation: person, place, time and situation  Grooming: Casually dressed and Well groomed  Psychomotor Behavior: normal, cooperative; no PMA/R  Speech: improving r/s, normal v/t  Language: grossly intact  Mood: "good"  Affect: less labile, improving   Thought Process: circumstantial. Some disorganization but less today.  Associations: intact   Thought Content: less delusions, denies SI, and denies " HI  Perceptions: denies AH and denies  VH; possibel RIS noted by staff  Memory: Able to recall past events, Remote intact, and Recent intact  Attention:Easily distracted  Fund of Knowledge: Aware of current events and Vocabulary appropriate   Estimate if Intelligence:  Average based on work/education history, vocabulary and mental status exam  Insight: has awareness of illness- fair/improving  Judgment: behavior is adequate to circumstances- fair/improving        DIAGNOSTIC TESTING   Laboratory Results  Recent Results (from the past 24 hour(s))   Valproic Acid    Collection Time: 05/14/24  6:52 PM   Result Value Ref Range    Valproic Acid Level 65 50 - 100 ug/mL   Comprehensive Metabolic Panel    Collection Time: 05/14/24  6:52 PM   Result Value Ref Range    Sodium 143 136 - 145 mmol/L    Potassium 4.5 3.5 - 5.1 mmol/L    Chloride 112 (H) 95 - 110 mmol/L    CO2 26 23 - 29 mmol/L    Glucose 94 70 - 110 mg/dL    BUN 17 6 - 20 mg/dL    Creatinine 1.2 0.5 - 1.4 mg/dL    Calcium 9.3 8.7 - 10.5 mg/dL    Total Protein 7.7 6.0 - 8.4 g/dL    Albumin 4.0 3.5 - 5.2 g/dL    Total Bilirubin 0.2 0.1 - 1.0 mg/dL    Alkaline Phosphatase 72 55 - 135 U/L    AST 18 10 - 40 U/L    ALT 33 10 - 44 U/L    eGFR >60.0 >60 mL/min/1.73 m^2    Anion Gap 5 3 - 11 mmol/L             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  Cannabis abuse    Psychosocial stressors  Nicotine dependence    Hypokalemia  Elevated LFTs        PROBLEM LIST AND MANAGEMENT PLANS      Schizoaffective disorder, bipolar type, severe  - reports received invega CARRASCO last month, will attempt to verify with ACT  - started/continue depakote 1000 mg PO qhs, level was 65; liver enzymes are WNL  - pt counseled  - follow up with outpatient mental health providers after discharge for medication management and psychotherapy        Suicidal ideations  - continue  psychiatric hospitalization  - provide psychotherapeutic interventions and medication management     Unspecified anxiety disorder  - started/continue remeron 15 mg PO qhs  - pt counseled  - follow up with outpatient mental health providers after discharge for medication management and psychotherapy    Insomnia: pt counseled  -remeron off label as above  -vistaril prn     Cannabis abuse  - consider rehab  - consider gabapentin  - pt counseled  - follow up with outpatient mental health providers after discharge for medication management and psychotherapy     Psychosocial stressors  - pt counseled  - SW consulted for assistance with additional resources      Nicotine dependence  - start nicotine patch 14 mg PO qd PRN     Hypokalemia  - FM consulted- repletion completed  -rechecked on 5/11- resolved     Elevated LFTs  - FM consulted  -recheck and resolved            Discussed diagnosis, risks and benefits of proposed treatment vs alternative treatments vs no treatment, potential side effects of these treatments and the inherent unpredictability of treatment. The patient expresses understanding of the above and displays the capacity to agree with this treatment given said understanding. Patient also agrees that, currently, the benefits outweigh the risks and would like to pursue/continue treatment at this time.    Any medications being used off-label were discussed with the patient inclusive of the evidence base for the use of the medications and consent was obtained for the off-label use of the medication.       DISCHARGE PLANNING  Expected Disposition Plan: Home or Self Care      NEED FOR CONTINUED HOSPITALIZATION  Psychiatric illness continues to pose a potential threat to life or bodily function, of self or others, thereby requiring the need for continued inpatient psychiatric hospitalization: Yes, due to: significant psychotic thought disorder, aggressive neuroleptic titration, hallucinations, danger to self,  gravely disabled, and suicidal ideation, as evidenced by:  Ongoing concerns with SI., Ongoing concerns with grave disability with patient unable to perform basic feeding, hygiene and dressing activities without significant constant support., and Ongoing concerns with perceptual aberrancy and paranoid persecutory delusions leading to potential harm of self or others.    Protective inpatient pyschiatric hospitalization required while a safe disposition plan is enacted: Yes    Patient stabilized and ready for discharge from inpatient psychiatric unit: No        STAFF:   Frantz Cagle MD  Psychiatry

## 2024-05-15 NOTE — PLAN OF CARE
POC reviewed this shift and is on going. Patient is calm, cooperative, quiet, anxious, and sleeping. Denies Suicidal Ideation, Homicidal Ideation, Auditory Hallucinations, Visual Hallucinations, Tactile Hallucinations, Gustatory Hallucinations, and Delusions. Patient was out on the floor most of the morning. Patient went to his room after lunch and laid down in his bed. Patient participated in the group session that was conducted today. Pt continues to be medication compliant and staff will continue to monitor pt closely while on the unit.

## 2024-05-15 NOTE — PLAN OF CARE
Problem: Adult Inpatient Plan of Care  Goal: Plan of Care Review  Outcome: Progressing  Goal: Patient-Specific Goal (Individualized)  Outcome: Progressing  Goal: Absence of Hospital-Acquired Illness or Injury  Outcome: Progressing  Goal: Optimal Comfort and Wellbeing  Outcome: Progressing  Goal: Readiness for Transition of Care  Outcome: Progressing     Problem: Suicide Risk  Goal: Absence of Self-Harm  Outcome: Progressing     Problem: Adult Behavioral Health Plan of Care  Goal: Plan of Care Review  Outcome: Progressing  Goal: Patient-Specific Goal (Individualization)  Outcome: Progressing  Goal: Adheres to Safety Considerations for Self and Others  Outcome: Progressing  Goal: Absence of New-Onset Illness or Injury  Outcome: Progressing  Goal: Optimized Coping Skills in Response to Life Stressors  Outcome: Progressing  Goal: Develops/Participates in Therapeutic [place] to Support Successful Transition  Outcome: Progressing  Goal: Rounds/Family Conference  Outcome: Progressing     Problem: Psychotic Signs/Symptoms  Goal: Improved Behavioral Control (Psychotic Signs/Symptoms)  Outcome: Progressing  Goal: Optimal Cognitive Function (Psychotic Signs/Symptoms)  Outcome: Progressing  Goal: Improved Mood Symptoms (Psychotic Signs/Symptoms)  Outcome: Progressing  Goal: Decreased Sensory Symptoms (Psychotic Signs/Symptoms)  Outcome: Progressing

## 2024-05-16 VITALS
TEMPERATURE: 98 F | HEART RATE: 64 BPM | DIASTOLIC BLOOD PRESSURE: 60 MMHG | SYSTOLIC BLOOD PRESSURE: 109 MMHG | WEIGHT: 151 LBS | RESPIRATION RATE: 20 BRPM | HEIGHT: 74 IN | BODY MASS INDEX: 19.38 KG/M2 | OXYGEN SATURATION: 98 %

## 2024-05-16 PROBLEM — F12.959 CANNABIS-INDUCED PSYCHOTIC DISORDER: Status: RESOLVED | Noted: 2023-09-07 | Resolved: 2024-05-16

## 2024-05-16 PROCEDURE — 90833 PSYTX W PT W E/M 30 MIN: CPT | Mod: AF,HB,, | Performed by: PSYCHIATRY & NEUROLOGY

## 2024-05-16 PROCEDURE — 99239 HOSP IP/OBS DSCHRG MGMT >30: CPT | Mod: AF,HB,, | Performed by: PSYCHIATRY & NEUROLOGY

## 2024-05-16 RX ORDER — TALC
9 POWDER (GRAM) TOPICAL NIGHTLY PRN
Qty: 90 TABLET | Refills: 2 | Status: ON HOLD | OUTPATIENT
Start: 2024-05-16

## 2024-05-16 RX ORDER — MIRTAZAPINE 15 MG/1
15 TABLET, FILM COATED ORAL NIGHTLY
Qty: 30 TABLET | Refills: 2 | Status: ON HOLD | OUTPATIENT
Start: 2024-05-16 | End: 2025-05-16

## 2024-05-16 RX ORDER — DIVALPROEX SODIUM 500 MG/1
1000 TABLET, FILM COATED, EXTENDED RELEASE ORAL NIGHTLY
Qty: 60 TABLET | Refills: 2 | Status: ON HOLD | OUTPATIENT
Start: 2024-05-16 | End: 2025-05-16

## 2024-05-16 NOTE — PROGRESS NOTES
Psychotherapy:  Target symptoms: mood disorder, psychosis  Why chosen therapy is appropriate versus another modality: relevant to diagnosis, patient responds to this modality, evidence based practice  Outcome monitoring methods: self-report, observation  Therapeutic intervention type: insight oriented psychotherapy, behavior modifying psychotherapy, supportive psychotherapy, interactive psychotherapy  Topics discussed/themes: building skills sets for symptom management, symptom recognition  Safety planning and wrap up session  The patient's response to the intervention is accepting. The patient's progress toward treatment goals is fair.   Duration of intervention: 16 minutes.    Frantz Cagle MD  Psychiatry

## 2024-05-16 NOTE — DISCHARGE SUMMARY
"Discharge Summary  Psychiatry    Admit Date: 5/9/2024    Discharge Date and Time:  05/16/2024 7:52 AM    Attending Physician: Cristhian Grijalva III, MD     Discharge Provider: Frantz Cagle MD    Reason for Admission:   hallucinations, substance abuse, and thoughts of death/suicide     History of Present Illness:   The patient presented to the ER on 5/9/2024 .     The patient was medically cleared and admitted to the U.     Per ED MD:        Psychiatric Evaluation       Pt to the ER requesting admission to U for medication adjustment, reports worsening AVH and SI. Pt reports he wants to stab himself.       23-year-old male with a history of schizophrenia, multiple psychiatric hospitalizations presents the emergency department stating he is hearing voices telling him to hurt himself, he is crying, talking to himself, seems to be responding to internal stimuli currently.        Psychiatric interview:  Reports he feels he needs medication changes. He reports he has been taking his invega injection monthly but feels he needs "depakote" for sleep. He states he has been smoking MJ because "it helps me eat," pt is concerned because he is losing weight and unable to eat, "the invega makes my stomach too tight." Reports mood has been "fluctuating a lot, I've been at the peak of my scale," for the last few days. He states he feels "let down by life, it's been really stressful, I have so much potential to be something more, but the devil is holding me back, I want to be a neurologist... I like to sing and dance, school excites me, you typing on the computer soothes me, I feel like I can tell you anything." Pt admits to taking 1 xanax PTA to "calm down." Then after explaining risks of w/d and potential need for detox if patient is taking more than reporting, pt states he has been taking it more, then with repeat questioning, pt denies taking it regularly, "I was lying to you, I just wanted to get some valium so I could " "sleep, I only took it that once, it was my mom's, she's prescribed it." Pt is an unreliable historian due to manipulative behavior.           Symptoms of Depression: diminished mood - Yes, loss of interest/anhedonia - No;  recurrent - No, >14 days - No, diminished energy - No, change in sleep - Yes, change in appetite - Yes, diminished concentration or cognition or indecisiveness - Yes, PMA/R -  Yes, excessive guilt or hopelessness or worthlessness - No, suicidal ideations - Yes     Changes in Sleep: trouble with initiation- Yes, maintenance, - Yes early morning awakening with inability to return to sleep - No, hypersomnolence - No     Suicidal- active/passive ideations - Yes, organized plans, future intentions - No     Homicidal ideations: active/passive ideations - No, organized plans, future intentions - No     Symptoms of psychosis: hallucinations - Yes, delusions - Yes, disorganized speech - No, disorganized behavior or abnormal motor behavior - No, or negative symptoms (diminshed emotional expression, avolition, anhedonia, alogia, asociality) - Yes     Symptoms of carley or hypomania: elevated, expansive, or irritable mood with increased energy or activity - Yes; > 4 days - No,  >7 days - No; with inflated self-esteem or grandiosity - Yes, decreased need for sleep - Yes, increased rate of speech - No, FOI or racing thoughts - No, distractibility - Yes, increased goal directed activity or PMA - No, risky/disinhibited behavior - Yes     Symptoms of KANU: excessive anxiety/worry/fear, more days than not, about numerous issues - No, ongoing for >6 months - No, difficult to control - No, with restlessness - No, fatigue - No, poor concentration - No, irritability - No, muscle tension - No, sleep disturbance - No; causes functionally impairing distress - No     Symptoms of Panic Disorder: recurrent panic attacks (palpitations/heart racing, sweating, shakiness, dyspnea, choking, chest pain/discomfort, Gi symptoms, " "dizzy/lightheadedness, hot/col flashes, paresthesias, derealization, fear of losing control or fear of dying or fear of "going crazy") - No, precipitated - No, un-precipitated - No, source of worry and/or behavioral changes secondary for 1 month or longer- No, agoraphobia - No     Symptoms of PTSD: h/o trauma exposure - No; re-experiencing/intrusive symptoms - No, avoidant behavior - No, 2 or more negative alterations in cognition or mood - No, 2 or more hyperarousal symptoms - No; with dissociative symptoms - No, ongoing for 1 or more  months - No     Symptoms of OCD: obsessions (recurrent thoughts/urges/images; intrusive and/or unwanted; uses other thoughts/actions to suppress) - No; compulsions (repetitive behaviors used to lower distress/anxiety/obsessions) - No, time-consuming (over 1 hour per day) or cause significant distress/impairment - - No     Symptoms of Anorexia: restriction of caloric intake leading to significantly low body weight - No, intense fear of gaining weight or persistent behavior that interferes with weight gain even thought at a significantly low weight - No, disturbance in the way in which one's body weight or shape is experienced, undue influence of body weight or shape on self evaluation, or persistent lack of recognition of the seriousness of the current low body weight - No     Symptoms of Bulimia: recurrent episodes of binge eating (definitely larger amount  than what others would eat and lack of a sense of control over eating during episode) - No, recurrent inappropriate compensatory behaviors in order to prevent weight gain (fasting, medications, exercise, vomiting) - No, binges and compensatory behaviors both occur on average at least once a week for 3 months - No, self evaluations is unduly influenced by body shape/weight- - No     Symptoms of Binge eating: recurrent episodes of binge eating (definitely larger amount than what others would eat and lack of a sense of control over " eating during episode) - No, 3 or more of following (eating much more rapidly, eating until uncomfortably full, large amounts when not hungry, eating alone because of embarrassed by how much,  feeling disgusted with oneself, depressed or very guilty afterward) - No, distress regarding binges - No, binges occur on average at least once a week for 3 months - No          Procedures Performed: * No surgery found *    Hospital Course:    Patient was admitted to the inpatient psychiatry unit after being medically cleared in the ED. Chart and labs were reviewed. The patient was stabilized as follows:      Schizoaffective disorder, bipolar type, severe  - reports received invega CARRASCO last month, will attempt to verify with ACT  - started/continue depakote 1000 mg PO qhs, level was 65; liver enzymes are WNL  - pt counseled  - follow up with outpatient mental health providers after discharge for medication management and psychotherapy        Suicidal ideations  - continue psychiatric hospitalization  - provide psychotherapeutic interventions and medication management     Unspecified anxiety disorder  - started/continue remeron 15 mg PO qhs  - pt counseled  - follow up with outpatient mental health providers after discharge for medication management and psychotherapy     Insomnia: pt counseled  -remeron off label as above  -vistaril prn     Cannabis abuse  - consider rehab  - consider gabapentin  - pt counseled  - follow up with outpatient mental health providers after discharge for medication management and psychotherapy     Psychosocial stressors  - pt counseled  - SW consulted for assistance with additional resources      Nicotine dependence  - start nicotine patch 14 mg PO qd PRN     Hypokalemia  - FM consulted- repletion completed  -rechecked on 5/11- resolved     Elevated LFTs  - FM consulted  -recheck and resolved           During hospitalization, the patient was encouraged to go to both groups and individual counseling.  Patient was monitored for any side effects. A meeting was held with multidisciplinary team prior to discharge and pt's diagnosis, current medications, and follow up were discussed. The patient has been compliant with treatment and can adequately attend to activities of daily living in an independent manner. The patient denies any side effects. The patient denies SI, HI, plan or intent for self harm or harm to others. The patient is no longer a danger to self or others nor gravely disabled disabled. Patient discharged  in stable condition with scheduled outpatient follow up.    AIMS score was 0    He denied withdrawals or cravings. He declined rehab.    The patient reports improved symptoms as documented below. The patient is requesting discharge. The patient is currently stable for discharge home and is able/willing to attend outpatient care. The patient is hopeful, future oriented and goal directed. The patient readily discusses both short and long term goals. The patient can identify positive coping skills and social support.     Psychiatric ROS (observed, reported, or endorsed/denied):  Depressed mood - improved  Interest/pleasure/anhedonia: No  Guilt/hopelessness/worthlessness - No  Changes in Sleep - improved  Changes in Appetite - improved  Changes in Concentration - improved  Changes in Energy - improved  PMA/R- No  Suicidal- active/passive ideations - improved/resolved  Homicidal ideations: active/passive ideations - No     Hallucinations - improved  Delusions - improved  Disorganized behavior - No  Disorganized speech - No  Negative symptoms - No     Elevated mood - No  Decreased need for sleep - improved  Grandiosity - improved  Racing thoughts - improved  Impulsivity - improved  Irritability- improved  Increased energy - improved  Distractibility - improved  Increase in goal-directed activity or PMA- improved     Symptoms of KANU - improved  Symptoms of Panic Disorder- No  Symptoms of PTSD -  No      Discussed diagnosis, risks and benefits of proposed treatment vs alternative treatments vs no treatment, and potential side effects of these treatments.  The patient expresses understanding of the above and displays the capacity to agree with this treatment given said understanding.  Patient also agrees that, currently, the benefits outweigh the risks and would like to pursue treatment at this time.      Discharge MSE: stated age, casually dressed, well groomed.  No psychomotor agitation or retardation.  No abnormal involuntary movements.  Gait normal.  Speech normal, conversational.  Language fluent English. Mood fine.  Affect normal range, pleasant, euthymic.  Thought process linear.  Associations intact.  Denies suicidal or homicidal ideation.  Denies auditory hallucinations, paranoid ideation, ideas of reference.  Memory intact.  Attention intact.  Fund of knowledge intact.  Insight intact.  Judgment intact.  Alert and oriented to person, place, time.      Tobacco Usage:  Is patient a smoker? Yes  Does patient want prescription for Tobacco Cessation? No  Does patient want counseling for Tobacco Cessation? No    If patient would like to quit, then over the counter nicotine patch could be used. The patient could also follow up with his PCP or psychiatric provider for other alternatives.     Tobacco Use Treatment Practical Counseling    Were the following discussed with the patient:    Recognizing danger situations:    A. Alcohol use during the first month after quitting - Yes   B. Being around smoke and/or smokers or time/situations when the patient routinely smoked - Yes   C. Triggers and/or roadblocks are the same as danger situations - Yes    2.   Developing coping skills   A. Learning new ways to manage stress - Yes   B. Exercising and/or relaxation breathing - Yes   C. Changing routines - Yes   D. Distraction techniques to prevent tobacco use - Yes    3.   Basic information about quitting:   A.  Benefits of quitting tobacco - Yes   B. How to quit techniques - Yes   C. 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  Cannabis abuse     Psychosocial stressors  Nicotine dependence     Hypokalemia  Elevated LFTs    Labs:  Admission on 05/09/2024   Component Date Value Ref Range Status    WBC 05/09/2024 8.81  3.90 - 12.70 K/uL Final    RBC 05/09/2024 4.17 (L)  4.60 - 6.20 M/uL Final    Hemoglobin 05/09/2024 13.5 (L)  14.0 - 18.0 g/dL Final    Hematocrit 05/09/2024 37.7 (L)  40.0 - 54.0 % Final    MCV 05/09/2024 90  82 - 98 fL Final    MCH 05/09/2024 32.4 (H)  27.0 - 31.0 pg Final    MCHC 05/09/2024 35.8  32.0 - 36.0 g/dL Final    RDW 05/09/2024 12.3  11.5 - 14.5 % Final    Platelets 05/09/2024 238  150 - 450 K/uL Final    MPV 05/09/2024 9.3  9.2 - 12.9 fL Final    Immature Granulocytes 05/09/2024 0.2  0.0 - 0.5 % Final    Gran # (ANC) 05/09/2024 6.1  1.8 - 7.7 K/uL Final    Immature Grans (Abs) 05/09/2024 0.02  0.00 - 0.04 K/uL Final    Lymph # 05/09/2024 1.8  1.0 - 4.8 K/uL Final    Mono # 05/09/2024 0.8  0.3 - 1.0 K/uL Final    Eos # 05/09/2024 0.1  0.0 - 0.5 K/uL Final    Baso # 05/09/2024 0.02  0.00 - 0.20 K/uL Final    nRBC 05/09/2024 0  0 /100 WBC Final    Gran % 05/09/2024 69.8  38.0 - 73.0 % Final    Lymph % 05/09/2024 20.4  18.0 - 48.0 % Final    Mono % 05/09/2024 8.5  4.0 - 15.0 % Final    Eosinophil % 05/09/2024 0.9  0.0 - 8.0 % Final    Basophil % 05/09/2024 0.2  0.0 - 1.9 % Final    Differential Method 05/09/2024 Automated   Final    Sodium 05/09/2024 139  136 - 145 mmol/L Final    Potassium 05/09/2024 3.0 (L)  3.5 - 5.1 mmol/L Final    Chloride 05/09/2024 111 (H)  95 - 110 mmol/L Final    CO2 05/09/2024 22 (L)  23 - 29 mmol/L Final    Glucose 05/09/2024 119 (H)  70 - 110 mg/dL Final    BUN 05/09/2024 16  6 - 20  mg/dL Final    Creatinine 05/09/2024 1.4  0.5 - 1.4 mg/dL Final    Calcium 05/09/2024 8.7  8.7 - 10.5 mg/dL Final    Total Protein 05/09/2024 8.0  6.0 - 8.4 g/dL Final    Albumin 05/09/2024 4.0  3.5 - 5.2 g/dL Final    Total Bilirubin 05/09/2024 0.7  0.1 - 1.0 mg/dL Final    Alkaline Phosphatase 05/09/2024 80  55 - 135 U/L Final    AST 05/09/2024 61 (H)  10 - 40 U/L Final    ALT 05/09/2024 56 (H)  10 - 44 U/L Final    eGFR 05/09/2024 >60.0  >60 mL/min/1.73 m^2 Final    Anion Gap 05/09/2024 6  3 - 11 mmol/L Final    TSH 05/09/2024 1.590  0.400 - 4.000 uIU/mL Final    Specimen UA 05/09/2024 Urine, Clean Catch   Final    Color, UA 05/09/2024 Yellow  Yellow, Straw, Ingrid Final    Appearance, UA 05/09/2024 Clear  Clear Final    pH, UA 05/09/2024 6.0  5.0 - 8.0 Final    Specific Gravity, UA 05/09/2024 >=1.030 (A)  1.005 - 1.030 Final    Protein, UA 05/09/2024 1+ (A)  Negative Final    Glucose, UA 05/09/2024 Negative  Negative Final    Ketones, UA 05/09/2024 1+ (A)  Negative Final    Bilirubin (UA) 05/09/2024 2+ (A)  Negative Final    Occult Blood UA 05/09/2024 Negative  Negative Final    Nitrite, UA 05/09/2024 Negative  Negative Final    Urobilinogen, UA 05/09/2024 2.0-3.0 (A)  <2.0 EU/dL Final    Leukocytes, UA 05/09/2024 Trace (A)  Negative Final    Benzodiazepines 05/09/2024 Presumptive Positive (A)  Negative Final    Methadone metabolites 05/09/2024 Negative  Negative Final    Cocaine (Metab.) 05/09/2024 Negative  Negative Final    Opiate Scrn, Ur 05/09/2024 Negative  Negative Final    Barbiturate Screen, Ur 05/09/2024 Negative  Negative Final    Amphetamine Screen, Ur 05/09/2024 Negative  Negative Final    THC 05/09/2024 Presumptive Positive (A)  Negative Final    Phencyclidine 05/09/2024 Negative  Negative Final    Creatinine, Urine 05/09/2024 774.0 (H)  23.0 - 375.0 mg/dL Final    Toxicology Information 05/09/2024 SEE COMMENT   Final    Alcohol, Serum 05/09/2024 3  <10 mg/dL Final    Acetaminophen (Tylenol), Serum  05/09/2024 <2.0 (L)  10.0 - 20.0 ug/mL Final    RBC, UA 05/09/2024 2  0 - 4 /hpf Final    WBC, UA 05/09/2024 2  0 - 5 /hpf Final    Bacteria 05/09/2024 Negative  None-Occ /hpf Final    Squam Epithel, UA 05/09/2024 1  /hpf Final    Hyaline Casts, UA 05/09/2024 13.1 (A)  0-1/lpf /lpf Final    Microscopic Comment 05/09/2024 SEE COMMENT   Final    Sodium 05/11/2024 140  136 - 145 mmol/L Final    Potassium 05/11/2024 3.6  3.5 - 5.1 mmol/L Final    Chloride 05/11/2024 113 (H)  95 - 110 mmol/L Final    CO2 05/11/2024 21 (L)  23 - 29 mmol/L Final    Glucose 05/11/2024 88  70 - 110 mg/dL Final    BUN 05/11/2024 13  6 - 20 mg/dL Final    Creatinine 05/11/2024 1.1  0.5 - 1.4 mg/dL Final    Calcium 05/11/2024 8.7  8.7 - 10.5 mg/dL Final    Anion Gap 05/11/2024 6  3 - 11 mmol/L Final    eGFR 05/11/2024 >60.0  >60 mL/min/1.73 m^2 Final    Valproic Acid Level 05/14/2024 65  50 - 100 ug/mL Final    Sodium 05/14/2024 143  136 - 145 mmol/L Final    Potassium 05/14/2024 4.5  3.5 - 5.1 mmol/L Final    Chloride 05/14/2024 112 (H)  95 - 110 mmol/L Final    CO2 05/14/2024 26  23 - 29 mmol/L Final    Glucose 05/14/2024 94  70 - 110 mg/dL Final    BUN 05/14/2024 17  6 - 20 mg/dL Final    Creatinine 05/14/2024 1.2  0.5 - 1.4 mg/dL Final    Calcium 05/14/2024 9.3  8.7 - 10.5 mg/dL Final    Total Protein 05/14/2024 7.7  6.0 - 8.4 g/dL Final    Albumin 05/14/2024 4.0  3.5 - 5.2 g/dL Final    Total Bilirubin 05/14/2024 0.2  0.1 - 1.0 mg/dL Final    Alkaline Phosphatase 05/14/2024 72  55 - 135 U/L Final    AST 05/14/2024 18  10 - 40 U/L Final    ALT 05/14/2024 33  10 - 44 U/L Final    eGFR 05/14/2024 >60.0  >60 mL/min/1.73 m^2 Final    Anion Gap 05/14/2024 5  3 - 11 mmol/L Final         Discharged Condition: stable and improved; not currently a danger to self/others or gravely disabled    Disposition: Home or Self Care    Is patient being discharged on multiple neuroleptics? No    Follow Up/Patient Instructions:     Take all medications as  prescribed.  Attend all psychiatric and medical follow up appointments.   Abstain from all drugs and alcohol.  Call the crisis line at: [phone] for help in a crisis and emergent situations or call 911 and Return to ED for any acute worsening of your condition including suicidal or homicidal ideations      No discharge procedures on file.        Follow up apt: local [facility]- see SW/discharge notes      Medications:  Reconciled Home Medications:      Medication List        START taking these medications      divalproex  MG Tb24  Commonly known as: DEPAKOTE ER  Take 2 tablets (1,000 mg total) by mouth every evening.     melatonin 3 mg tablet  Commonly known as: MELATIN  Take 3 tablets (9 mg total) by mouth nightly as needed for Insomnia.            CONTINUE taking these medications      INVEGA SUSTENNA 234 mg/1.5 mL Syrg injection  Generic drug: paliperidone palmitate  Inject into the muscle.     mirtazapine 15 MG tablet  Commonly known as: REMERON  Take 1 tablet (15 mg total) by mouth every evening.            STOP taking these medications      ondansetron 4 MG Tbdl  Commonly known as: ZOFRAN-ODT     risperiDONE 2 MG tablet  Commonly known as: RISPERDAL                Diet: regular     Activity as tolerated    Total time spent discharging patient: 35 minutes    Frantz Cagle MD  Psychiatry

## 2024-05-16 NOTE — PLAN OF CARE
Problem: Adult Behavioral Health Plan of Care  Goal: Optimized Coping Skills in Response to Life Stressors  Intervention: Promote Effective Coping Strategies  Flowsheets (Taken 5/15/2024 1115)  Supportive Measures:   active listening utilized   verbalization of feelings encouraged   self-reflection promoted       Behavior: Pt was engaged, oriented, and alert. Pt stated he is feeling good.             Intervention: In this CBT-focused process group LMSW facilitated a group discussion on gratitude.  Each patient was then asked to identify and discuss things they are grateful for in their lives and things they may take for granted.             Response: Pt stated this journey that I am on is what I am grateful for. Pt identified not being appreciative of his mother is what he takes for granted.           Plan: Continue to encourage pt to participate in process groups to verbalize feelings and develop healthy coping skills.

## 2024-05-16 NOTE — PLAN OF CARE
Problem: Adult Inpatient Plan of Care  Goal: Plan of Care Review  Outcome: Progressing  Goal: Patient-Specific Goal (Individualized)  Outcome: Progressing  Goal: Absence of Hospital-Acquired Illness or Injury  Outcome: Progressing  Goal: Optimal Comfort and Wellbeing  Outcome: Progressing  Goal: Readiness for Transition of Care  Outcome: Progressing     Problem: Suicide Risk  Goal: Absence of Self-Harm  Outcome: Progressing     Problem: Adult Behavioral Health Plan of Care  Goal: Plan of Care Review  Outcome: Progressing  Goal: Patient-Specific Goal (Individualization)  Outcome: Progressing  Goal: Adheres to Safety Considerations for Self and Others  Outcome: Progressing  Goal: Absence of New-Onset Illness or Injury  Outcome: Progressing  Goal: Optimized Coping Skills in Response to Life Stressors  Outcome: Progressing  Goal: Develops/Participates in Therapeutic [place] to Support Successful Transition  Outcome: Progressing  Goal: Rounds/Family Conference  Outcome: Progressing     Problem: Psychotic Signs/Symptoms  Goal: Improved Behavioral Control (Psychotic Signs/Symptoms)  Outcome: Progressing  Goal: Optimal Cognitive Function (Psychotic Signs/Symptoms)  Outcome: Progressing  Goal: Improved Mood Symptoms (Psychotic Signs/Symptoms)  Outcome: Progressing  Goal: Decreased Sensory Symptoms (Psychotic Signs/Symptoms)  Outcome: Progressing

## 2024-05-16 NOTE — NURSING
POC reviewed this shift and is ongoing.  Pt is medication complaint, remains a little hyperactive, dancing in the hallway.  Pt observed RIS earlier this shift, talking to himself while alone in his room.  Pt denies SI/HI/AVH, reports that his medications are working. Pt did report some difficulty sleeping last night and reports that as his only concern.

## 2024-06-17 ENCOUNTER — HOSPITAL ENCOUNTER (INPATIENT)
Facility: HOSPITAL | Age: 24
LOS: 4 days | Discharge: HOME | DRG: 885 | End: 2024-06-21
Attending: EMERGENCY MEDICINE | Admitting: STUDENT IN AN ORGANIZED HEALTH CARE EDUCATION/TRAINING PROGRAM
Payer: MEDICAID

## 2024-06-17 DIAGNOSIS — R45.851 DEPRESSION WITH SUICIDAL IDEATION: Primary | ICD-10-CM

## 2024-06-17 DIAGNOSIS — F25.0 SCHIZOAFFECTIVE DISORDER, BIPOLAR TYPE: ICD-10-CM

## 2024-06-17 DIAGNOSIS — F32.A DEPRESSION WITH SUICIDAL IDEATION: Primary | ICD-10-CM

## 2024-06-17 LAB
ALBUMIN SERPL BCP-MCNC: 3.5 G/DL (ref 3.5–5.2)
ALP SERPL-CCNC: 72 U/L (ref 55–135)
ALT SERPL W/O P-5'-P-CCNC: 16 U/L (ref 10–44)
AMPHET+METHAMPHET UR QL: NEGATIVE
ANION GAP SERPL CALC-SCNC: 11 MMOL/L (ref 3–11)
APAP SERPL-MCNC: <2 UG/ML (ref 10–20)
AST SERPL-CCNC: 11 U/L (ref 10–40)
BARBITURATES UR QL SCN>200 NG/ML: NEGATIVE
BASOPHILS # BLD AUTO: 0.01 K/UL (ref 0–0.2)
BASOPHILS NFR BLD: 0.2 % (ref 0–1.9)
BENZODIAZ UR QL SCN>200 NG/ML: NEGATIVE
BILIRUB SERPL-MCNC: 0.5 MG/DL (ref 0.1–1)
BILIRUB UR QL STRIP: NEGATIVE
BUN SERPL-MCNC: 14 MG/DL (ref 6–20)
BZE UR QL SCN: NEGATIVE
CALCIUM SERPL-MCNC: 8.7 MG/DL (ref 8.7–10.5)
CANNABINOIDS UR QL SCN: ABNORMAL
CHLORIDE SERPL-SCNC: 105 MMOL/L (ref 95–110)
CLARITY UR: CLEAR
CO2 SERPL-SCNC: 24 MMOL/L (ref 23–29)
COLOR UR: YELLOW
CREAT SERPL-MCNC: 1.2 MG/DL (ref 0.5–1.4)
CREAT UR-MCNC: 335 MG/DL (ref 23–375)
DIFFERENTIAL METHOD BLD: ABNORMAL
EOSINOPHIL # BLD AUTO: 0.1 K/UL (ref 0–0.5)
EOSINOPHIL NFR BLD: 2 % (ref 0–8)
ERYTHROCYTE [DISTWIDTH] IN BLOOD BY AUTOMATED COUNT: 13.3 % (ref 11.5–14.5)
EST. GFR  (NO RACE VARIABLE): >60 ML/MIN/1.73 M^2
ETHANOL SERPL-MCNC: <3 MG/DL
GLUCOSE SERPL-MCNC: 110 MG/DL (ref 70–110)
GLUCOSE UR QL STRIP: NEGATIVE
HCT VFR BLD AUTO: 35.7 % (ref 40–54)
HGB BLD-MCNC: 11.9 G/DL (ref 14–18)
HGB UR QL STRIP: NEGATIVE
IMM GRANULOCYTES # BLD AUTO: 0.01 K/UL (ref 0–0.04)
IMM GRANULOCYTES NFR BLD AUTO: 0.2 % (ref 0–0.5)
KETONES UR QL STRIP: ABNORMAL
LEUKOCYTE ESTERASE UR QL STRIP: ABNORMAL
LYMPHOCYTES # BLD AUTO: 1.8 K/UL (ref 1–4.8)
LYMPHOCYTES NFR BLD: 32.4 % (ref 18–48)
MCH RBC QN AUTO: 31.6 PG (ref 27–31)
MCHC RBC AUTO-ENTMCNC: 33.3 G/DL (ref 32–36)
MCV RBC AUTO: 95 FL (ref 82–98)
METHADONE UR QL SCN>300 NG/ML: NEGATIVE
MICROSCOPIC COMMENT: NORMAL
MONOCYTES # BLD AUTO: 0.6 K/UL (ref 0.3–1)
MONOCYTES NFR BLD: 10 % (ref 4–15)
NEUTROPHILS # BLD AUTO: 3.1 K/UL (ref 1.8–7.7)
NEUTROPHILS NFR BLD: 55.2 % (ref 38–73)
NITRITE UR QL STRIP: NEGATIVE
NRBC BLD-RTO: 0 /100 WBC
OPIATES UR QL SCN: NEGATIVE
PCP UR QL SCN>25 NG/ML: NEGATIVE
PH UR STRIP: 8 [PH] (ref 5–8)
PLATELET # BLD AUTO: 237 K/UL (ref 150–450)
PMV BLD AUTO: 9.3 FL (ref 9.2–12.9)
POTASSIUM SERPL-SCNC: 3.4 MMOL/L (ref 3.5–5.1)
PROT SERPL-MCNC: 7.4 G/DL (ref 6–8.4)
PROT UR QL STRIP: NEGATIVE
RBC # BLD AUTO: 3.77 M/UL (ref 4.6–6.2)
SODIUM SERPL-SCNC: 140 MMOL/L (ref 136–145)
SP GR UR STRIP: 1.02 (ref 1–1.03)
TOXICOLOGY INFORMATION: ABNORMAL
TSH SERPL DL<=0.005 MIU/L-ACNC: 0.69 UIU/ML (ref 0.4–4)
URN SPEC COLLECT METH UR: ABNORMAL
UROBILINOGEN UR STRIP-ACNC: ABNORMAL EU/DL
WBC # BLD AUTO: 5.58 K/UL (ref 3.9–12.7)
WBC #/AREA URNS HPF: 1 /HPF (ref 0–5)

## 2024-06-17 PROCEDURE — 99285 EMERGENCY DEPT VISIT HI MDM: CPT

## 2024-06-17 PROCEDURE — 36415 COLL VENOUS BLD VENIPUNCTURE: CPT | Performed by: EMERGENCY MEDICINE

## 2024-06-17 PROCEDURE — 81000 URINALYSIS NONAUTO W/SCOPE: CPT | Performed by: EMERGENCY MEDICINE

## 2024-06-17 PROCEDURE — 82077 ASSAY SPEC XCP UR&BREATH IA: CPT | Performed by: EMERGENCY MEDICINE

## 2024-06-17 PROCEDURE — 80061 LIPID PANEL: CPT | Performed by: EMERGENCY MEDICINE

## 2024-06-17 PROCEDURE — 83036 HEMOGLOBIN GLYCOSYLATED A1C: CPT | Performed by: EMERGENCY MEDICINE

## 2024-06-17 PROCEDURE — 80143 DRUG ASSAY ACETAMINOPHEN: CPT | Performed by: EMERGENCY MEDICINE

## 2024-06-17 PROCEDURE — 84443 ASSAY THYROID STIM HORMONE: CPT | Performed by: EMERGENCY MEDICINE

## 2024-06-17 PROCEDURE — 85025 COMPLETE CBC W/AUTO DIFF WBC: CPT | Performed by: EMERGENCY MEDICINE

## 2024-06-17 PROCEDURE — 25000003 PHARM REV CODE 250: Performed by: STUDENT IN AN ORGANIZED HEALTH CARE EDUCATION/TRAINING PROGRAM

## 2024-06-17 PROCEDURE — 80053 COMPREHEN METABOLIC PANEL: CPT | Performed by: EMERGENCY MEDICINE

## 2024-06-17 PROCEDURE — 11400000 HC PSYCH PRIVATE ROOM

## 2024-06-17 PROCEDURE — 80307 DRUG TEST PRSMV CHEM ANLYZR: CPT | Performed by: EMERGENCY MEDICINE

## 2024-06-17 RX ORDER — HYDROXYZINE PAMOATE 50 MG/1
50 CAPSULE ORAL NIGHTLY PRN
Status: DISCONTINUED | OUTPATIENT
Start: 2024-06-17 | End: 2024-06-21 | Stop reason: HOSPADM

## 2024-06-17 RX ADMIN — HYDROXYZINE PAMOATE 50 MG: 50 CAPSULE ORAL at 08:06

## 2024-06-17 NOTE — ED PROVIDER NOTES
Encounter Date: 6/17/2024       History     Chief Complaint   Patient presents with    Suicidal     Patient reports he is having thoughts of suicide and thinks he needs his depression medication increased. Patient reports he has been feeling fine except his depression.     22 yo male with history as below here with SI. Reports associated with depression. No fever. No known sick contacts. Multiple prior similar presentations.       Review of patient's allergies indicates:  No Known Allergies  Past Medical History:   Diagnosis Date    History of psychiatric hospitalization 09/06/2023    [facility] of psychiatric care     Psychiatric problem     Schizophrenia     Suicide attempt      History reviewed. No pertinent surgical history.  Family History   Family history unknown: Yes     Social History     Tobacco Use    Smoking status: Every Day     Current packs/day: 1.00     Average packs/day: 1 pack/day for 5.1 years (5.1 ttl pk-yrs)     Types: Cigars, Cigarettes     Start date: 5/10/2019     Passive exposure: Never    Smokeless tobacco: Never   Substance Use Topics    Alcohol use: Yes     Comment: pt reports drinking wine, two-four times monthly or less    Drug use: Yes     Types: Marijuana, Benzodiazepines     Comment: pt reports taking benzo within the past few weeks to help sleep     Review of Systems   Constitutional: Negative.    Respiratory: Negative.     Cardiovascular: Negative.    Gastrointestinal: Negative.    Psychiatric/Behavioral:  Positive for dysphoric mood and suicidal ideas.    All other systems reviewed and are negative.      Physical Exam     Initial Vitals [06/17/24 1536]   BP Pulse Resp Temp SpO2   106/63 80 18 98 °F (36.7 °C) 99 %      MAP       --         Physical Exam    Nursing note and vitals reviewed.  Constitutional: He appears well-developed and well-nourished. He is not diaphoretic. No distress.   HENT:   Head: Normocephalic and atraumatic.   Eyes: EOM are normal. Pupils are equal,  round, and reactive to light.   Neck: Neck supple.   Normal range of motion.  Cardiovascular:  Normal rate, regular rhythm and intact distal pulses.           Pulmonary/Chest: Breath sounds normal. No respiratory distress. He has no wheezes. He has no rales.   Abdominal: Abdomen is soft. Bowel sounds are normal. He exhibits no distension. There is no abdominal tenderness. There is no rebound.   Musculoskeletal:         General: No tenderness or edema. Normal range of motion.      Cervical back: Normal range of motion and neck supple.     Neurological: He is alert and oriented to person, place, and time. GCS score is 15. GCS eye subscore is 4. GCS verbal subscore is 5. GCS motor subscore is 6.   Skin: Skin is warm and dry. Capillary refill takes less than 2 seconds.   Psychiatric: He exhibits a depressed mood. He expresses suicidal ideation.         ED Course   Procedures  Labs Reviewed   CBC W/ AUTO DIFFERENTIAL - Abnormal; Notable for the following components:       Result Value    RBC 3.77 (*)     Hemoglobin 11.9 (*)     Hematocrit 35.7 (*)     MCH 31.6 (*)     All other components within normal limits   COMPREHENSIVE METABOLIC PANEL - Abnormal; Notable for the following components:    Potassium 3.4 (*)     All other components within normal limits   URINALYSIS, REFLEX TO URINE CULTURE - Abnormal; Notable for the following components:    Ketones, UA Trace (*)     Urobilinogen, UA 2.0-3.0 (*)     Leukocytes, UA Trace (*)     All other components within normal limits    Narrative:     Preferred Collection Type->Urine, Clean Catch  Specimen Source->Urine   DRUG SCREEN PANEL, URINE EMERGENCY - Abnormal; Notable for the following components:    THC Presumptive Positive (*)     All other components within normal limits    Narrative:     Preferred Collection Type->Urine, Clean Catch  Specimen Source->Urine   ACETAMINOPHEN LEVEL - Abnormal; Notable for the following components:    Acetaminophen (Tylenol), Serum <2.0 (*)      All other components within normal limits   TSH   ALCOHOL,MEDICAL (ETHANOL)   URINALYSIS MICROSCOPIC    Narrative:     Preferred Collection Type->Urine, Clean Catch  Specimen Source->Urine          Imaging Results    None          Medications - No data to display  Medical Decision Making  PEC 2/2 SI. Cleared for placement. Discussed with Dr Grijalva, patient accepted to [facility].     Amount and/or Complexity of Data Reviewed  Labs: ordered.                                      Clinical Impression:  Final diagnoses:  [F32.A, R45.851] Depression with suicidal ideation (Primary)          ED Disposition Condition    Admit Stable                Frantz Romero MD  06/17/24 4742

## 2024-06-18 PROBLEM — R45.851 DEPRESSION WITH SUICIDAL IDEATION: Status: ACTIVE | Noted: 2024-06-18

## 2024-06-18 PROBLEM — F32.A DEPRESSION WITH SUICIDAL IDEATION: Status: ACTIVE | Noted: 2024-06-18

## 2024-06-18 PROBLEM — F19.10 SUBSTANCE ABUSE: Status: ACTIVE | Noted: 2024-06-18

## 2024-06-18 LAB
CHOLEST SERPL-MCNC: 170 MG/DL (ref 120–199)
CHOLEST/HDLC SERPL: 3.3 {RATIO} (ref 2–5)
ESTIMATED AVG GLUCOSE: 97 MG/DL (ref 68–131)
HBA1C MFR BLD: 5 % (ref 4–5.6)
HDLC SERPL-MCNC: 52 MG/DL (ref 40–75)
HDLC SERPL: 30.6 % (ref 20–50)
LDLC SERPL CALC-MCNC: 105.8 MG/DL (ref 63–159)
NONHDLC SERPL-MCNC: 118 MG/DL
TRIGL SERPL-MCNC: 61 MG/DL (ref 30–150)

## 2024-06-18 PROCEDURE — 99223 1ST HOSP IP/OBS HIGH 75: CPT | Mod: AF,HB,, | Performed by: STUDENT IN AN ORGANIZED HEALTH CARE EDUCATION/TRAINING PROGRAM

## 2024-06-18 PROCEDURE — 25000003 PHARM REV CODE 250: Performed by: STUDENT IN AN ORGANIZED HEALTH CARE EDUCATION/TRAINING PROGRAM

## 2024-06-18 PROCEDURE — 11400000 HC PSYCH PRIVATE ROOM

## 2024-06-18 RX ORDER — MIRTAZAPINE 15 MG/1
15 TABLET, FILM COATED ORAL NIGHTLY
Status: DISCONTINUED | OUTPATIENT
Start: 2024-06-18 | End: 2024-06-21 | Stop reason: HOSPADM

## 2024-06-18 RX ORDER — DIVALPROEX SODIUM 500 MG/1
1000 TABLET, FILM COATED, EXTENDED RELEASE ORAL NIGHTLY
Status: DISCONTINUED | OUTPATIENT
Start: 2024-06-18 | End: 2024-06-21 | Stop reason: HOSPADM

## 2024-06-18 RX ORDER — MICONAZOLE NITRATE 2 %
2 CREAM (GRAM) TOPICAL
Status: DISCONTINUED | OUTPATIENT
Start: 2024-06-18 | End: 2024-06-21 | Stop reason: HOSPADM

## 2024-06-18 RX ADMIN — MIRTAZAPINE 15 MG: 15 TABLET, FILM COATED ORAL at 08:06

## 2024-06-18 RX ADMIN — DIVALPROEX SODIUM 1000 MG: 500 TABLET, FILM COATED, EXTENDED RELEASE ORAL at 08:06

## 2024-06-18 NOTE — H&P
[facility] (Hospital) Hospital Medicine  History & Physical    Patient Name: Bishop Soria  MRN: [id number]  Patient Class: IP- Psych  Admission Date: 6/17/2024  Attending Physician: Cristhian Grijalva III, MD   Primary Care Provider: Jose Martin Selby MD (Inactive)         Patient information was obtained from ER records.     Subjective:     Principal Problem:Depression with suicidal ideation    Chief Complaint:   Chief Complaint   Patient presents with    Suicidal     Patient reports he is having thoughts of suicide and thinks he needs his depression medication increased. Patient reports he has been feeling fine except his depression.        HPI:   Suicidal        Patient reports he is having thoughts of suicide and thinks he needs his depression medication increased. Patient reports he has been feeling fine except his depression.      24 yo male with history as below here with SI. Reports associated with depression. No fever. No known sick contacts. Multiple prior similar presentations.     06/18/24: Patient continues with SI. Will continue inpatient psych admission for further evaluation and medication management.     Past Medical History:   Diagnosis Date    History of psychiatric hospitalization 09/06/2023    [facility]     of psychiatric care     Psychiatric problem     Schizophrenia     Suicide attempt        History reviewed. No pertinent surgical history.    Review of patient's allergies indicates:  No Known Allergies    No current facility-administered medications on file prior to encounter.     Current Outpatient Medications on File Prior to Encounter   Medication Sig    divalproex ER (DEPAKOTE ER) 500 MG Tb24 Take 2 tablets (1,000 mg total) by mouth every evening.    INVEGA SUSTENNA 234 mg/1.5 mL Syrg injection Inject into the muscle.    melatonin (MELATIN) 3 mg tablet Take 3 tablets (9 mg total) by mouth nightly as needed for Insomnia.    mirtazapine (REMERON) 15 MG tablet Take 1  tablet (15 mg total) by mouth every evening.    [DISCONTINUED] EScitalopram oxalate (LEXAPRO) 10 MG tablet TAKE 1 TABLET BY MOUTH EVERY DAY    [DISCONTINUED] lithium (LITHOTAB) 300 mg tablet Take 300 mg by mouth 2 (two) times daily.     Family History    Family history is unknown by patient.       Tobacco Use    Smoking status: Every Day     Current packs/day: 1.00     Average packs/day: 1 pack/day for 5.1 years (5.1 ttl pk-yrs)     Types: Cigars, Cigarettes     Start date: 5/10/2019     Passive exposure: Never    Smokeless tobacco: Never   Substance and Sexual Activity    Alcohol use: Yes     Comment: pt reports drinking wine, two-four times monthly or less    Drug use: Yes     Types: Marijuana, Benzodiazepines     Comment: pt reports taking benzo within the past few weeks to help sleep    Sexual activity: Not Currently     Partners: Male     Review of Systems   Constitutional:  Negative for fatigue and fever.   HENT:  Negative for congestion, ear pain and sore throat.    Eyes:  Negative for pain and discharge.   Respiratory:  Negative for cough, shortness of breath and wheezing.    Gastrointestinal:  Negative for abdominal pain, constipation, diarrhea, nausea and vomiting.   Endocrine: Negative for cold intolerance and heat intolerance.   Genitourinary:  Negative for difficulty urinating, dysuria and frequency.   Musculoskeletal:  Negative for arthralgias.   Allergic/Immunologic: Negative for environmental allergies.   Neurological:  Negative for dizziness, tremors and seizures.   Psychiatric/Behavioral:  Positive for behavioral problems and suicidal ideas.    All other systems reviewed and are negative.    Objective:     Vital Signs (Most Recent):  Temp: 97.8 °F (36.6 °C) (06/18/24 0731)  Pulse: 72 (06/18/24 0731)  Resp: 20 (06/18/24 0731)  BP: 115/69 (06/18/24 0731)  SpO2: 99 % (06/18/24 0731) Vital Signs (24h Range):  Temp:  [97.8 °F (36.6 °C)-98.4 °F (36.9 °C)] 97.8 °F (36.6 °C)  Pulse:  [72-80] 72  Resp:   [18-20] 20  SpO2:  [99 %] 99 %  BP: (106-115)/(63-69) 115/69     Weight: 68.5 kg (151 lb)  Body mass index is 19.39 kg/m².     Physical Exam  Vitals and nursing note reviewed.   Constitutional:       Appearance: Normal appearance.   HENT:      Head: Normocephalic and atraumatic.      Nose: Nose normal.      Mouth/Throat:      Mouth: Mucous membranes are moist.      Pharynx: Oropharynx is clear.   Eyes:      Extraocular Movements: Extraocular movements intact.      Conjunctiva/sclera: Conjunctivae normal.      Pupils: Pupils are equal, round, and reactive to light.   Cardiovascular:      Rate and Rhythm: Normal rate and regular rhythm.      Pulses: Normal pulses.      Heart sounds: Normal heart sounds.   Pulmonary:      Effort: Pulmonary effort is normal.      Breath sounds: Normal breath sounds.   Abdominal:      General: Abdomen is flat. Bowel sounds are normal.      Palpations: Abdomen is soft.   Musculoskeletal:         General: Normal range of motion.      Cervical back: Normal range of motion and neck supple.   Skin:     General: Skin is warm and dry.      Capillary Refill: Capillary refill takes less than 2 seconds.      Comments: No rashes on limited skin exam.   Neurological:      General: No focal deficit present.      Mental Status: He is alert and oriented to person, place, and time.      Cranial Nerves: No cranial nerve deficit.      Comments: I Olfactory:  Sense of smell intact    II Optic:  Pupils equal round react to light.  Vision intact.    III, IV, VI, Ocular motor, Trochlear, Abducens:  Extraocular movements intact    V Trigeminal:  Facial sensation intact facial sensation intact,, muscles of mastication intact muscles of mastication intact, corneal reflex intact, corneal reflex intact    VII Facial:  Muscles of facial expression intact     VIII Vestibular cochlear: Hearing intact vestibular cochlear: Hearing intact    IX Glossopharyngeal:  Gag reflex intact.  Tasting intact.     X Vagus:  Gag  reflex intact.    XI Spinal Accessory:  Shoulder shrug intact.  Head rotation intact.    XII Hypoglossal:  Tongue movements intact.     Psychiatric:         Mood and Affect: Mood is depressed.         Behavior: Behavior is slowed and withdrawn.         Thought Content: Thought content includes suicidal ideation.      Comments: Patient appears depressed              CRANIAL NERVES     CN III, IV, VI   Pupils are equal, round, and reactive to light.       Significant Labs: All pertinent labs within the past 24 hours have been reviewed.    Significant Imaging: I have reviewed all pertinent imaging results/findings within the past 24 hours.  Assessment/Plan:     * Depression with suicidal ideation  To be admitted to our inpatient psychiatric unit for further evaluation and management.      Substance abuse  UDS + THC. Recommend cessation.       Schizoaffective disorder, bipolar type  To be admitted to our inpatient psychiatric unit for further evaluation and management.          VTE Risk Mitigation (From admission, onward)      None                            Regis King Jr, MD  Department of Hospital Medicine  [facility] ([facility])

## 2024-06-18 NOTE — PLAN OF CARE
POC reviewed this shift and is ongoing.  Pt cooperates with staff, med compliant with no adverse reaction. Withdrawn to his room. Pt has SI and states he do not want to die. + A/ hallucinations.

## 2024-06-18 NOTE — HPI
Suicidal        Patient reports he is having thoughts of suicide and thinks he needs his depression medication increased. Patient reports he has been feeling fine except his depression.      22 yo male with history as below here with SI. Reports associated with depression. No fever. No known sick contacts. Multiple prior similar presentations.     06/18/24: Patient continues with SI. Will continue inpatient psych admission for further evaluation and medication management.

## 2024-06-18 NOTE — SUBJECTIVE & OBJECTIVE
Past Medical History:   Diagnosis Date    History of psychiatric hospitalization 09/06/2023    [facility] psychiatric care     Psychiatric problem     Schizophrenia     Suicide attempt        History reviewed. No pertinent surgical history.    Review of patient's allergies indicates:  No Known Allergies    No current facility-administered medications on file prior to encounter.     Current Outpatient Medications on File Prior to Encounter   Medication Sig    divalproex ER (DEPAKOTE ER) 500 MG Tb24 Take 2 tablets (1,000 mg total) by mouth every evening.    INVEGA SUSTENNA 234 mg/1.5 mL Syrg injection Inject into the muscle.    melatonin (MELATIN) 3 mg tablet Take 3 tablets (9 mg total) by mouth nightly as needed for Insomnia.    mirtazapine (REMERON) 15 MG tablet Take 1 tablet (15 mg total) by mouth every evening.    [DISCONTINUED] EScitalopram oxalate (LEXAPRO) 10 MG tablet TAKE 1 TABLET BY MOUTH EVERY DAY    [DISCONTINUED] lithium (LITHOTAB) 300 mg tablet Take 300 mg by mouth 2 (two) times daily.     Family History    Family history is unknown by patient.       Tobacco Use    Smoking status: Every Day     Current packs/day: 1.00     Average packs/day: 1 pack/day for 5.1 years (5.1 ttl pk-yrs)     Types: Cigars, Cigarettes     Start date: 5/10/2019     Passive exposure: Never    Smokeless tobacco: Never   Substance and Sexual Activity    Alcohol use: Yes     Comment: pt reports drinking wine, two-four times monthly or less    Drug use: Yes     Types: Marijuana, Benzodiazepines     Comment: pt reports taking benzo within the past few weeks to help sleep    Sexual activity: Not Currently     Partners: Male     Review of Systems   Constitutional:  Negative for fatigue and fever.   HENT:  Negative for congestion, ear pain and sore throat.    Eyes:  Negative for pain and discharge.   Respiratory:  Negative for cough, shortness of breath and wheezing.    Gastrointestinal:  Negative for abdominal pain,  constipation, diarrhea, nausea and vomiting.   Endocrine: Negative for cold intolerance and heat intolerance.   Genitourinary:  Negative for difficulty urinating, dysuria and frequency.   Musculoskeletal:  Negative for arthralgias.   Allergic/Immunologic: Negative for environmental allergies.   Neurological:  Negative for dizziness, tremors and seizures.   Psychiatric/Behavioral:  Positive for behavioral problems and suicidal ideas.    All other systems reviewed and are negative.    Objective:     Vital Signs (Most Recent):  Temp: 97.8 °F (36.6 °C) (06/18/24 0731)  Pulse: 72 (06/18/24 0731)  Resp: 20 (06/18/24 0731)  BP: 115/69 (06/18/24 0731)  SpO2: 99 % (06/18/24 0731) Vital Signs (24h Range):  Temp:  [97.8 °F (36.6 °C)-98.4 °F (36.9 °C)] 97.8 °F (36.6 °C)  Pulse:  [72-80] 72  Resp:  [18-20] 20  SpO2:  [99 %] 99 %  BP: (106-115)/(63-69) 115/69     Weight: 68.5 kg (151 lb)  Body mass index is 19.39 kg/m².     Physical Exam  Vitals and nursing note reviewed.   Constitutional:       Appearance: Normal appearance.   HENT:      Head: Normocephalic and atraumatic.      Nose: Nose normal.      Mouth/Throat:      Mouth: Mucous membranes are moist.      Pharynx: Oropharynx is clear.   Eyes:      Extraocular Movements: Extraocular movements intact.      Conjunctiva/sclera: Conjunctivae normal.      Pupils: Pupils are equal, round, and reactive to light.   Cardiovascular:      Rate and Rhythm: Normal rate and regular rhythm.      Pulses: Normal pulses.      Heart sounds: Normal heart sounds.   Pulmonary:      Effort: Pulmonary effort is normal.      Breath sounds: Normal breath sounds.   Abdominal:      General: Abdomen is flat. Bowel sounds are normal.      Palpations: Abdomen is soft.   Musculoskeletal:         General: Normal range of motion.      Cervical back: Normal range of motion and neck supple.   Skin:     General: Skin is warm and dry.      Capillary Refill: Capillary refill takes less than 2 seconds.       Comments: No rashes on limited skin exam.   Neurological:      General: No focal deficit present.      Mental Status: He is alert and oriented to person, place, and time.      Cranial Nerves: No cranial nerve deficit.      Comments: I Olfactory:  Sense of smell intact    II Optic:  Pupils equal round react to light.  Vision intact.    III, IV, VI, Ocular motor, Trochlear, Abducens:  Extraocular movements intact    V Trigeminal:  Facial sensation intact facial sensation intact,, muscles of mastication intact muscles of mastication intact, corneal reflex intact, corneal reflex intact    VII Facial:  Muscles of facial expression intact     VIII Vestibular cochlear: Hearing intact vestibular cochlear: Hearing intact    IX Glossopharyngeal:  Gag reflex intact.  Tasting intact.     X Vagus:  Gag reflex intact.    XI Spinal Accessory:  Shoulder shrug intact.  Head rotation intact.    XII Hypoglossal:  Tongue movements intact.     Psychiatric:         Mood and Affect: Mood is depressed.         Behavior: Behavior is slowed and withdrawn.         Thought Content: Thought content includes suicidal ideation.      Comments: Patient appears depressed              CRANIAL NERVES     CN III, IV, VI   Pupils are equal, round, and reactive to light.       Significant Labs: All pertinent labs within the past 24 hours have been reviewed.    Significant Imaging: I have reviewed all pertinent imaging results/findings within the past 24 hours.

## 2024-06-18 NOTE — PLAN OF CARE
POC reviewed this shift and is on going. Patient is withdrawn, depressed, calm, cooperative, quiet, anxious, and sleeping. Endorses Auditory Hallucinations. Denies Suicidal Ideation, Homicidal Ideation, Visual Hallucinations, Tactile Hallucinations, Gustatory Hallucinations, and Delusions. Patient has been staying in his room most of the day. Patient comes out for meals and snacks. Patient admitted to having suicidal thoughts but would act on them. Pt continues to be medication compliant and staff will continue to monitor pt closely while on the unit.

## 2024-06-18 NOTE — H&P
"PSYCHIATRY INPATIENT ADMISSION NOTE - H & P      6/18/2024 10:00 AM   Bishop Soria   2000   [id number]         DATE OF ADMISSION: 6/17/2024  3:32 PM    SITE: [facility]    CURRENT LEGAL STATUS: PEC and/or CEC      HISTORY    CHIEF COMPLAINT   Bishop Soria is a 23 y.o. male with a past psychiatric history of Schizoaffective Disorder and Substance Abuse currently admitted to the inpatient unit with the following chief complaint: thoughts of death/suicide    HPI   The patient was seen and examined. The chart was reviewed.    The patient presented to the ER on 6/17/2024 .    The patient was medically cleared and admitted to the [facility].    Per ED MD:    Suicidal        Patient reports he is having thoughts of suicide and thinks he needs his depression medication increased. Patient reports he has been feeling fine except his depression.      24 yo male with history as below here with SI. Reports associated with depression. No fever. No known sick contacts. Multiple prior similar presentations.       Psychiatric interview:  "I having been hearing voices, it's just annoying, I've taken everything, invega, haldol, depakote, all these drugs and I still hear them." Reports AH, "up to date things, things going on in the world, regular conversations of people." Reports he did receive his CARRASCO last month with [facility] services. He admits to continuing to use MJ, "I smoked my last blunt yesterday."        Symptoms of Depression: diminished mood - Yes, loss of interest/anhedonia - No;  recurrent - Yes, >14 days - Yes, diminished energy - No, change in sleep - No, change in appetite - Yes, diminished concentration or cognition or indecisiveness - No, PMA/R -  No, excessive guilt or hopelessness or worthlessness - No, suicidal ideations - Yes    Changes in Sleep: trouble with initiation- No, maintenance, - No early morning awakening with inability to return to sleep - No, hypersomnolence - No    Suicidal- active/passive ideations " "- Yes, organized plans, future intentions - No    Homicidal ideations: active/passive ideations - No, organized plans, future intentions - No    Symptoms of psychosis: hallucinations - Yes, delusions - Yes, disorganized speech - No, disorganized behavior or abnormal motor behavior - No, or negative symptoms (diminshed emotional expression, avolition, anhedonia, alogia, asociality) - No    Symptoms of carley or hypomania: elevated, expansive, or irritable mood with increased energy or activity - No; > 4 days - No,  >7 days - No; with inflated self-esteem or grandiosity - No, decreased need for sleep - No, increased rate of speech - No, FOI or racing thoughts - No, distractibility - No, increased goal directed activity or PMA - No, risky/disinhibited behavior - No    Symptoms of KANU: excessive anxiety/worry/fear, more days than not, about numerous issues - No, ongoing for >6 months - No, difficult to control - No, with restlessness - No, fatigue - No, poor concentration - No, irritability - No, muscle tension - No, sleep disturbance - No; causes functionally impairing distress - No    Symptoms of Panic Disorder: recurrent panic attacks (palpitations/heart racing, sweating, shakiness, dyspnea, choking, chest pain/discomfort, Gi symptoms, dizzy/lightheadedness, hot/col flashes, paresthesias, derealization, fear of losing control or fear of dying or fear of "going crazy") - No, precipitated - No, un-precipitated - No, source of worry and/or behavioral changes secondary for 1 month or longer- No, agoraphobia - No    Symptoms of PTSD: h/o trauma exposure - No; re-experiencing/intrusive symptoms - No, avoidant behavior - No, 2 or more negative alterations in cognition or mood - No, 2 or more hyperarousal symptoms - No; with dissociative symptoms - No, ongoing for 1 or more  months - No    Symptoms of OCD: obsessions (recurrent thoughts/urges/images; intrusive and/or unwanted; uses other thoughts/actions to suppress) - No; " compulsions (repetitive behaviors used to lower distress/anxiety/obsessions) - No, time-consuming (over 1 hour per day) or cause significant distress/impairment - - No    Symptoms of Anorexia: restriction of caloric intake leading to significantly low body weight - No, intense fear of gaining weight or persistent behavior that interferes with weight gain even thought at a significantly low weight - No, disturbance in the way in which one's body weight or shape is experienced, undue influence of body weight or shape on self evaluation, or persistent lack of recognition of the seriousness of the current low body weight - No    Symptoms of Bulimia: recurrent episodes of binge eating (definitely larger amount  than what others would eat and lack of a sense of control over eating during episode) - No, recurrent inappropriate compensatory behaviors in order to prevent weight gain (fasting, medications, exercise, vomiting) - No, binges and compensatory behaviors both occur on average at least once a week for 3 months - No, self evaluations is unduly influenced by body shape/weight- - No          PAST PSYCHIATRIC HISTORY  Previous Psychiatric Hospitalizations: Yes,extensive  Previous SI/HI: Yes,  Previous Suicide Attempts: Yes,   Previous Medication Trials: Yes,  Psychiatric Care (current & past): Yes,  History of Psychotherapy: Yes,  History of Violence: Yes,  History of sexual/physical abuse: No,        PAST MEDICAL & SURGICAL HISTORY   Past Medical History:   Diagnosis Date    History of psychiatric hospitalization 09/06/2023    [facility] psychiatric care     Psychiatric problem     Schizophrenia     Suicide attempt      History reviewed. No pertinent surgical history.    Home Meds:   Prior to Admission medications    Medication Sig Start Date End Date Taking? Authorizing Provider   divalproex ER (DEPAKOTE ER) 500 MG Tb24 Take 2 tablets (1,000 mg total) by mouth every evening. 5/16/24 5/16/25 Yes Gurjit  Frantz WHITE MD   INVEGA SUSTENNA 234 mg/1.5 mL Syrg injection Inject into the muscle. 4/17/24  Yes Provider, Historical   melatonin (MELATIN) 3 mg tablet Take 3 tablets (9 mg total) by mouth nightly as needed for Insomnia. 5/16/24  Yes Frantz Cagle MD   mirtazapine (REMERON) 15 MG tablet Take 1 tablet (15 mg total) by mouth every evening. 5/16/24 5/16/25 Yes Frantz Cagle MD   EScitalopram oxalate (LEXAPRO) 10 MG tablet TAKE 1 TABLET BY MOUTH EVERY DAY 7/27/21 5/23/22  Jl Aguilar NP   lithium (LITHOTAB) 300 mg tablet Take 300 mg by mouth 2 (two) times daily. 4/26/22 5/31/22  Provider, Historical         Scheduled Meds:    PRN Meds:   Current Facility-Administered Medications:     hydrOXYzine pamoate, 50 mg, Oral, Nightly PRN    nicotine (polacrilex), 2 mg, Oral, Q1H PRN   Psychotherapeutics (From admission, onward)      None            ALLERGIES   Review of patient's allergies indicates:  No Known Allergies    NEUROLOGIC HISTORY  Seizures: No  Head trauma: No      SOCIAL HISTORY:  Developmental/Childhood:Achieved all developmental milestones timely  Education: some college  Employment Status/Finances:Employed   Relationship Status/Sexual Orientation: single  Children: 0  Housing Status: Home    history:  NO   Access to Firearms: NO ;  Locked up? n/a  Taoism:Actively participates in organized Jewish  Recreational activities:Exercise     SUBSTANCE ABUSE HISTORY   Recreational Drugs: marijuana   Use of Alcohol: occasional, social use  Rehab History:no   Tobacco Use:no    LEGAL HISTORY:   Past charges/incarcerations: NO  Pending charges:NO    FAMILY PSYCHIATRIC HISTORY   Family History   Family history unknown: Yes     Depression - father         ROS  Review of Systems   Constitutional:  Negative for chills and fever.   HENT:  Negative for hearing loss.    Eyes:  Negative for blurred vision and double vision.   Respiratory:  Negative for shortness of breath.    Cardiovascular:   Negative for chest pain and palpitations.   Gastrointestinal:  Negative for constipation, diarrhea, nausea and vomiting.   Genitourinary:  Negative for dysuria.   Musculoskeletal:  Negative for back pain and neck pain.   Skin:  Negative for rash.   Neurological:  Negative for dizziness and headaches.   Endo/Heme/Allergies:  Negative for environmental allergies.         EXAMINATION    PHYSICAL EXAM  Reviewed note/exam by Dr. Romero, Frantz WHITE MD from 06/17/24 1641    VITALS   Vitals:    06/18/24 0731   BP: 115/69   Pulse: 72   Resp: 20   Temp: 97.8 °F (36.6 °C)        Body mass index is 19.39 kg/m².        PAIN  0/10  Subjective report of pain matches objective signs and symptoms: Yes    LABORATORY DATA   Recent Results (from the past 72 hour(s))   Urinalysis, Reflex to Urine Culture Urine, Clean Catch    Collection Time: 06/17/24  3:35 PM    Specimen: Urine, Clean Catch   Result Value Ref Range    Specimen UA Urine, Clean Catch     Color, UA Yellow Yellow, Straw, Ingrid    Appearance, UA Clear Clear    pH, UA 8.0 5.0 - 8.0    Specific Gravity, UA 1.025 1.005 - 1.030    Protein, UA Negative Negative    Glucose, UA Negative Negative    Ketones, UA Trace (A) Negative    Bilirubin (UA) Negative Negative    Occult Blood UA Negative Negative    Nitrite, UA Negative Negative    Urobilinogen, UA 2.0-3.0 (A) <2.0 EU/dL    Leukocytes, UA Trace (A) Negative   Drug screen panel, emergency    Collection Time: 06/17/24  3:35 PM   Result Value Ref Range    Benzodiazepines Negative Negative    Methadone metabolites Negative Negative    Cocaine (Metab.) Negative Negative    Opiate Scrn, Ur Negative Negative    Barbiturate Screen, Ur Negative Negative    Amphetamine Screen, Ur Negative Negative    THC Presumptive Positive (A) Negative    Phencyclidine Negative Negative    Creatinine, Urine 335.0 23.0 - 375.0 mg/dL    Toxicology Information SEE COMMENT    Urinalysis Microscopic    Collection Time: 06/17/24  3:35 PM   Result Value  Ref Range    WBC, UA 1 0 - 5 /hpf    Microscopic Comment SEE COMMENT    Comprehensive metabolic panel    Collection Time: 06/17/24  3:43 PM   Result Value Ref Range    Sodium 140 136 - 145 mmol/L    Potassium 3.4 (L) 3.5 - 5.1 mmol/L    Chloride 105 95 - 110 mmol/L    CO2 24 23 - 29 mmol/L    Glucose 110 70 - 110 mg/dL    BUN 14 6 - 20 mg/dL    Creatinine 1.2 0.5 - 1.4 mg/dL    Calcium 8.7 8.7 - 10.5 mg/dL    Total Protein 7.4 6.0 - 8.4 g/dL    Albumin 3.5 3.5 - 5.2 g/dL    Total Bilirubin 0.5 0.1 - 1.0 mg/dL    Alkaline Phosphatase 72 55 - 135 U/L    AST 11 10 - 40 U/L    ALT 16 10 - 44 U/L    eGFR >60.0 >60 mL/min/1.73 m^2    Anion Gap 11 3 - 11 mmol/L   TSH    Collection Time: 06/17/24  3:43 PM   Result Value Ref Range    TSH 0.691 0.400 - 4.000 uIU/mL   Ethanol    Collection Time: 06/17/24  3:43 PM   Result Value Ref Range    Alcohol, Serum <3 <10 mg/dL   Acetaminophen level    Collection Time: 06/17/24  3:43 PM   Result Value Ref Range    Acetaminophen (Tylenol), Serum <2.0 (L) 10.0 - 20.0 ug/mL   Lipid Panel    Collection Time: 06/17/24  3:43 PM   Result Value Ref Range    Cholesterol 170 120 - 199 mg/dL    Triglycerides 61 30 - 150 mg/dL    HDL 52 40 - 75 mg/dL    LDL Cholesterol 105.8 63.0 - 159.0 mg/dL    HDL/Cholesterol Ratio 30.6 20.0 - 50.0 %    Total Cholesterol/HDL Ratio 3.3 2.0 - 5.0    Non-HDL Cholesterol 118 mg/dL   CBC auto differential    Collection Time: 06/17/24  3:44 PM   Result Value Ref Range    WBC 5.58 3.90 - 12.70 K/uL    RBC 3.77 (L) 4.60 - 6.20 M/uL    Hemoglobin 11.9 (L) 14.0 - 18.0 g/dL    Hematocrit 35.7 (L) 40.0 - 54.0 %    MCV 95 82 - 98 fL    MCH 31.6 (H) 27.0 - 31.0 pg    MCHC 33.3 32.0 - 36.0 g/dL    RDW 13.3 11.5 - 14.5 %    Platelets 237 150 - 450 K/uL    MPV 9.3 9.2 - 12.9 fL    Immature Granulocytes 0.2 0.0 - 0.5 %    Gran # (ANC) 3.1 1.8 - 7.7 K/uL    Immature Grans (Abs) 0.01 0.00 - 0.04 K/uL    Lymph # 1.8 1.0 - 4.8 K/uL    Mono # 0.6 0.3 - 1.0 K/uL    Eos # 0.1 0.0 - 0.5  K/uL    Baso # 0.01 0.00 - 0.20 K/uL    nRBC 0 0 /100 WBC    Gran % 55.2 38.0 - 73.0 %    Lymph % 32.4 18.0 - 48.0 %    Mono % 10.0 4.0 - 15.0 %    Eosinophil % 2.0 0.0 - 8.0 %    Basophil % 0.2 0.0 - 1.9 %    Differential Method Automated       Lab Results   Component Value Date    VALPROATE 65 05/14/2024    CBMZ 11.8 05/07/2023           CONSTITUTIONAL  General Appearance: unremarkable, age appropriate    MUSCULOSKELETAL  Muscle Strength and Tone:no tremor, no tic  Abnormal Involuntary Movements: No  Gait and Station: non-ataxic    PSYCHIATRIC   Level of Consciousness: awake and alert   Orientation: person, place, and situation  Grooming: Casually dressed and Well groomed  Psychomotor Behavior: normal, cooperative  Speech: normal tone, normal rate, normal pitch, normal volume  Language: grossly intact  Mood: depressed  Affect: Consistent with mood  Thought Process: linear, logical  Associations: intact   Thought Content: +SI, +delusions, and denies HI  Perceptions: denies AH and denies  VH  Memory: Able to recall past events, Remote intact, and Recent intact  Attention:Attends to interview without distraction  Fund of Knowledge: Aware of current events and Vocabulary appropriate   Estimate if Intelligence:  Average based on work/education history, vocabulary and mental status exam  Insight: has awareness of illness  Judgment: behavior is adequate to circumstances      PSYCHOSOCIAL    PSYCHOSOCIAL STRESSORS   family and drug abuse    FUNCTIONING RELATIONSHIPS   good support system    STRENGTHS AND LIABILITIES   Strength: Patient accepts guidance/feedback, Strength: Patient is expressive/articulate., Liability: Patient is impulsive., Liability: Patient lacks coping skills.    Is the patient aware of the biomedical complications associated with substance abuse and mental illness? yes    Does the patient have an Advance Directive for Mental Health treatment? no  (If yes, inform patient to bring copy.)      ASSESSMENT          Schizoaffective disorder, bipolar type, severe  SI  Unspecified anxiety disorder  Cannabis abuse  Psychosocial stressors  Nicotine dependence  Hypokalemia    Anemia       PROBLEM LIST AND MANAGEMENT PLANS         Schizoaffective disorder, bipolar type, severe  - reports received invega CARRASCO last month, will attempt to verify with [facility]  - resume depakote 1000 mg PO qhs, will recheck level, monitor LFTs  - pt counseled  - follow up with outpatient mental health providers after discharge for medication management and psychotherapy        Suicidal ideations  - continue psychiatric hospitalization  - provide psychotherapeutic interventions and medication management       Unspecified anxiety disorder  - resume remeron 15 mg PO qhs  - pt counseled  - follow up with outpatient mental health providers after discharge for medication management and psychotherapy       Cannabis abuse  - consider rehab  - consider gabapentin  - pt counseled  - follow up with outpatient mental health providers after discharge for medication management and psychotherapy       Psychosocial stressors  - pt counseled  - SW consulted for assistance with additional resources      Nicotine dependence  - start nicotine patch 14 mg PO qd PRN     Hypokalemia  - FM consulted      Anemia  - FM consulted          PRESCRIPTION DRUG MANAGEMENT  Compliance: unknown  Side Effects: unknown  Regimen Adjustments: see above    Discussed diagnosis, risks and benefits of proposed treatment vs alternative treatments vs no treatment, potential side effects of these treatments and the inherent unpredictability of treatment. The patient expresses understanding of the above and displays the capacity to agree with this treatment given said understanding. Patient also agrees that, currently, the benefits outweigh the risks and would like to pursue/continue treatment at this time.    Any medications being used off-label were discussed with the patient inclusive of the  evidence base for the use of the medications and consent was obtained for the off-label use of the medication.         DIAGNOSTIC TESTING  Labs reviewed with patient; follow up pending labs    Disposition:  -Will attempt to obtain outside psychiatric records if available  -SW to assist with aftercare planning and collateral  -Once stable discharge home with outpatient follow up care and/or rehab  -Continue inpatient treatment under a PEC and/or CEC for danger to self/ danger to others/grave disability as evident by danger to self        Cristhian Grijalva III, MD  Psychiatry

## 2024-06-19 PROCEDURE — 99233 SBSQ HOSP IP/OBS HIGH 50: CPT | Mod: AF,HB,, | Performed by: STUDENT IN AN ORGANIZED HEALTH CARE EDUCATION/TRAINING PROGRAM

## 2024-06-19 PROCEDURE — 11400000 HC PSYCH PRIVATE ROOM

## 2024-06-19 PROCEDURE — 25000003 PHARM REV CODE 250: Performed by: STUDENT IN AN ORGANIZED HEALTH CARE EDUCATION/TRAINING PROGRAM

## 2024-06-19 RX ADMIN — MIRTAZAPINE 15 MG: 15 TABLET, FILM COATED ORAL at 08:06

## 2024-06-19 RX ADMIN — DIVALPROEX SODIUM 1000 MG: 500 TABLET, FILM COATED, EXTENDED RELEASE ORAL at 08:06

## 2024-06-19 NOTE — PROGRESS NOTES
"PSYCHIATRY DAILY INPATIENT PROGRESS NOTE  SUBSEQUENT HOSPITAL VISIT    ENCOUNTER DATE: 6/19/2024  [facility]    DATE OF ADMISSION: 6/17/2024  3:32 PM  LENGTH OF STAY: 2 days      CHIEF COMPLAINT   Bishop Soria is a 23 y.o. 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  Bishop Soria presented with the chief complaint of mood disorder, psychosis, and substance problems      The patient was seen and examined. The chart was reviewed.     Reviewed notes from Rns and LPN and labs from the last 24 hours.    The patient's case was discussed with the treatment team/care providers today including Rns    Staff reports no behavioral or management issues.     The patient has been compliant with treatment.      Subjective 06/19/2024       Today the patient reports "I didn't tell you the story, I told my brother, that I would smoke my last blunt with him, I told my mom I wanted to be scber... I'm done smoking, I don't want to smoke, I have a lot on my plate."      The patient denies any side effects to medications.    At this time symptoms remains severe, functionally and behaviorally impairing and pt remains in need of continued acute inpatient hospitalization for both safety and stabilization.           Interim/overnight events per report/notes:          Psychiatric ROS (observed, reported, or endorsed/denied):  Depressed mood - less  Interest/pleasure/anhedonia: less  Guilt/hopelessness/worthlessness - less  Changes in Sleep - less  Changes in Appetite - less  Changes in Concentration - No  Changes in Energy - No  PMA/R- No  Suicidal- active/passive ideations - less  Homicidal ideations: active/passive ideations - denies    Hallucinations - fluctuating  Delusions - fluctuating  Disorganized behavior - No  Disorganized speech - No  Negative symptoms - No    Elevated mood - No  Decreased need for sleep - No  Grandiosity - No  Racing thoughts - No  Impulsivity - No  Irritability- No  Increased energy - " No  Distractibility - No  Increase in goal-directed activity or PMA- No    Symptoms of KANU - No  Symptoms of Panic Disorder- No  Symptoms of PTSD - No        Overall progress: Patient is showing mild improvement           Medical ROS  Review of Systems   Constitutional:  Negative for chills and fever.   HENT:  Negative for hearing loss.    Eyes:  Negative for blurred vision and double vision.   Respiratory:  Negative for shortness of breath.    Cardiovascular:  Negative for chest pain and palpitations.   Gastrointestinal:  Negative for constipation, diarrhea, nausea and vomiting.   Genitourinary:  Negative for dysuria.   Musculoskeletal:  Negative for back pain and neck pain.   Skin:  Negative for rash.   Neurological:  Negative for dizziness and headaches.   Endo/Heme/Allergies:  Negative for environmental allergies.         PAST MEDICAL HISTORY   Past Medical History:   Diagnosis Date    History of psychiatric hospitalization 09/06/2023    [facility]     of psychiatric care     Psychiatric problem     Schizophrenia     Suicide attempt            PSYCHOTROPIC MEDICATIONS   Scheduled Meds:   divalproex ER  1,000 mg Oral QHS    mirtazapine  15 mg Oral QHS     Continuous Infusions:  PRN Meds:.  Current Facility-Administered Medications:     hydrOXYzine pamoate, 50 mg, Oral, Nightly PRN    nicotine (polacrilex), 2 mg, Oral, Q1H PRN        EXAMINATION    VITALS   Vitals:    06/17/24 2246 06/18/24 0731 06/18/24 1928 06/19/24 0752   BP: 112/68 115/69 117/64 (!) 109/54   BP Location:  Left arm Left arm Right arm   Patient Position:  Sitting Sitting Lying   Pulse: 73 72 (!) 53 (!) 58   Resp: 18 20 16 18   Temp: 98.4 °F (36.9 °C) 97.8 °F (36.6 °C) 98.2 °F (36.8 °C) 98.3 °F (36.8 °C)   TempSrc:  Oral Oral Oral   SpO2:  99% 98% 98%   Weight: 68.5 kg (151 lb)      Height:           Body mass index is 19.39 kg/m².        CONSTITUTIONAL  General Appearance: unremarkable, age appropriate    MUSCULOSKELETAL  Muscle Strength and  Tone:no tremor, no tic  Abnormal Involuntary Movements: No  Gait and Station: non-ataxic    PSYCHIATRIC   Level of Consciousness: awake and alert   Orientation: person, place, and situation  Grooming: Casually dressed and Well groomed  Psychomotor Behavior: normal, cooperative  Speech: normal tone, normal rate, normal pitch, normal volume  Language: grossly intact  Mood: fine  Affect: Consistent with mood  Thought Process: linear, logical  Associations: intact   Thought Content: less SI, denies HI, and no delusions  Perceptions: less AH and denies  VH  Memory: Able to recall past events, Remote intact, and Recent intact  Attention:Attends to interview without distraction  Fund of Knowledge: Aware of current events and Vocabulary appropriate   Estimate if Intelligence:  Average based on work/education history, vocabulary and mental status exam  Insight: has awareness of illness  Judgment: behavior is adequate to circumstances        DIAGNOSTIC TESTING   Laboratory Results  No results found for this or any previous visit (from the past 24 hour(s)).        ASSESSMENT         Schizoaffective disorder, bipolar type, severe  SI  Unspecified anxiety disorder  Cannabis abuse  Psychosocial stressors  Nicotine dependence  Hypokalemia     Anemia        PROBLEM LIST AND MANAGEMENT PLANS             Schizoaffective disorder, bipolar type, severe  - staff discussed with ACT, recevied Haldol D 100 mg IM on 5/30/24  - resume depakote 1000 mg PO qhs, will recheck level, monitor LFTs  - pt counseled  - follow up with outpatient mental health providers after discharge for medication management and psychotherapy        Suicidal ideations  - continue psychiatric hospitalization  - provide psychotherapeutic interventions and medication management        Unspecified anxiety disorder  - resume remeron 15 mg PO qhs  - pt counseled  - follow up with outpatient mental health providers after discharge for medication management and  psychotherapy        Cannabis abuse  - consider rehab  - consider gabapentin  - pt counseled  - follow up with outpatient mental health providers after discharge for medication management and psychotherapy        Psychosocial stressors  - pt counseled  - SW consulted for assistance with additional resources      Nicotine dependence  - start nicotine patch 14 mg PO qd PRN     Hypokalemia  - FM consulted        Anemia  - FM consulted          Discussed diagnosis, risks and benefits of proposed treatment vs alternative treatments vs no treatment, potential side effects of these treatments and the inherent unpredictability of treatment. The patient expresses understanding of the above and displays the capacity to agree with this treatment given said understanding. Patient also agrees that, currently, the benefits outweigh the risks and would like to pursue/continue treatment at this time.    Any medications being used off-label were discussed with the patient inclusive of the evidence base for the use of the medications and consent was obtained for the off-label use of the medication.       DISCHARGE PLANNING  Expected Disposition Plan: Home or Self Care      NEED FOR CONTINUED HOSPITALIZATION  Psychiatric illness continues to pose a potential threat to life or bodily function, of self or others, thereby requiring the need for continued inpatient psychiatric hospitalization: Yes, due to: danger to self, as evidenced by:  Concerns with SI--Fading.    Protective inpatient pyschiatric hospitalization required while a safe disposition plan is enacted: Yes    Patient stabilized and ready for discharge from inpatient psychiatric unit: No        STAFF:   Cristhian Grijalva III, MD  Psychiatry

## 2024-06-19 NOTE — PLAN OF CARE
Problem: Adult Behavioral Health Plan of Care  Goal: Optimized Coping Skills in Response to Life Stressors  Intervention: Promote Effective Coping Strategies  Flowsheets (Taken 6/19/2024 8967)  Supportive Measures:   active listening utilized   verbalization of feelings encouraged   self-reflection promoted   positive reinforcement provided   self-responsibility promoted       Behavior: Pt alert, oriented, and withdrawn during group. Pt stated he was feeling good. Pt stated he was ready to be discharged to finish some business.           Intervention: In this CBT-focused process group LMSW facilitated a group discussion on gratitude.  Each patient was then asked to identify and discuss things they are grateful for in their lives and things they may take for granted.            Response: Pt stated he is grateful for his life and the path he is taking. Pt stated he has to apply pressure to become a tram, and it is sometimes a struggle. Pt stated that sometimes he takes his happiness for granted.          Plan: Continue to encourage pt to participate in process groups to verbalize feelings and develop healthy coping skills.

## 2024-06-19 NOTE — PLAN OF CARE
POC reviewed this shift and is ongoing.  Pt spent most time in his room. Pt came on unit for meds vs and meals. Med compliant with no adverse reactions. Will continue to monitor for changes and safety.

## 2024-06-19 NOTE — PLAN OF CARE
Problem: Adult Behavioral Health Plan of Care  Goal: Optimized Coping Skills in Response to Life Stressors  Intervention: Promote Effective Coping Strategies  Flowsheets (Taken 6/18/2024 0192)  Supportive Measures:   active listening utilized   verbalization of feelings encouraged   relaxation techniques promoted   self-reflection promoted   problem-solving facilitated       Behavior:  Pt was engaged during group. Pt stated he is doing good.          Intervention:  In this CBT-focused group CSW facilitated group discussion on coping strategies. Each patient was asked to identify unhealthy coping strategies and healthy coping strategies.         Response:  Pt identified unhealthy coping strategies as smoking weed and smoking cigarettes. Pt identified healthy coping strategies as stop and pray, open self to different aspects of Muslim.         Plan:  Continue to encourage pt to participate in process groups to verbalize feelings and develop healthy coping skills.

## 2024-06-20 LAB — VALPROATE SERPL-MCNC: 45 UG/ML (ref 50–100)

## 2024-06-20 PROCEDURE — 36415 COLL VENOUS BLD VENIPUNCTURE: CPT | Performed by: STUDENT IN AN ORGANIZED HEALTH CARE EDUCATION/TRAINING PROGRAM

## 2024-06-20 PROCEDURE — 80164 ASSAY DIPROPYLACETIC ACD TOT: CPT | Performed by: STUDENT IN AN ORGANIZED HEALTH CARE EDUCATION/TRAINING PROGRAM

## 2024-06-20 PROCEDURE — 90833 PSYTX W PT W E/M 30 MIN: CPT | Mod: AF,HB,, | Performed by: STUDENT IN AN ORGANIZED HEALTH CARE EDUCATION/TRAINING PROGRAM

## 2024-06-20 PROCEDURE — 99233 SBSQ HOSP IP/OBS HIGH 50: CPT | Mod: AF,HB,, | Performed by: STUDENT IN AN ORGANIZED HEALTH CARE EDUCATION/TRAINING PROGRAM

## 2024-06-20 PROCEDURE — 25000003 PHARM REV CODE 250: Performed by: STUDENT IN AN ORGANIZED HEALTH CARE EDUCATION/TRAINING PROGRAM

## 2024-06-20 PROCEDURE — 11400000 HC PSYCH PRIVATE ROOM

## 2024-06-20 RX ADMIN — DIVALPROEX SODIUM 1000 MG: 500 TABLET, FILM COATED, EXTENDED RELEASE ORAL at 08:06

## 2024-06-20 RX ADMIN — MIRTAZAPINE 15 MG: 15 TABLET, FILM COATED ORAL at 08:06

## 2024-06-20 NOTE — PLAN OF CARE
"Collateral:   Nabil Soria, mother, [phone]    Collateral Perception of Problem:   He just started talking out his head again saying he wanted to kill himself and kill his friend   He said to call her over so "we can kill her"   He did not act on any thoughts   His friend is aware of his HI  towards her   He had been up all night   Started back talking to himself   Has been complaint with [facility] and taking medication   Still using marijuana   No other inpatients since last month hospitalization   Went to [place] stayed less than a week            "

## 2024-06-20 NOTE — PLAN OF CARE
Plan of care reviewed with pt.     Problem: Suicide Risk  Goal: Absence of Self-Harm  Outcome: Progressing     Problem: Adult Behavioral Health Plan of Care  Goal: Plan of Care Review  Outcome: Progressing  Goal: Patient-Specific Goal (Individualization)  Outcome: Progressing  Goal: Adheres to Safety Considerations for Self and Others  Outcome: Progressing  Goal: Absence of New-Onset Illness or Injury  Outcome: Progressing  Goal: Optimized Coping Skills in Response to Life Stressors  Outcome: Progressing  Goal: Develops/Participates in Therapeutic [place] to Support Successful Transition  Outcome: Progressing  Goal: Rounds/Family Conference  Outcome: Progressing     Problem: Excessive Substance Use  Goal: Optimized Energy Level (Excessive Substance Use)  Outcome: Progressing  Goal: Improved Behavioral Control (Excessive Substance Use)  Outcome: Progressing  Goal: Increased Participation and Engagement (Excessive Substance Use)  Outcome: Progressing  Goal: Improved Physiologic Symptoms (Excessive Substance Use)  Outcome: Progressing  Goal: Enhanced Social, Occupational or Functional Skills (Excessive Substance Use)  Outcome: Progressing

## 2024-06-20 NOTE — PLAN OF CARE
Patient is out of room socializing with peers.  Smiling and laughing.  No signs of paranoia.  Patient sleeping with the assistance of medication , no concerns.

## 2024-06-20 NOTE — PROGRESS NOTES
"PSYCHIATRY DAILY INPATIENT PROGRESS NOTE  SUBSEQUENT HOSPITAL VISIT    ENCOUNTER DATE: 6/20/2024  [facility]    DATE OF ADMISSION: 6/17/2024  3:32 PM  LENGTH OF STAY: 3 days      CHIEF COMPLAINT   Bishop Soria is a 23 y.o. 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  Bishop Soria presented with the chief complaint of mood disorder, psychosis, and substance problems      The patient was seen and examined. The chart was reviewed.     Reviewed notes from Rns and SW and labs from the last 24 hours.    The patient's case was discussed with the treatment team/care providers today including Rns    Staff reports no behavioral or management issues.     The patient has been compliant with treatment.      Subjective 06/20/2024    Discussed MJ abuse at length. Pt states his brother gives him MJ, "he will say in front my mom, you ain't hitting this, but alone, he says if you trip out that's on you."    Discussed motivation for cessation. Discussed goals for the future and conflict with continued substance abuse.    The patient denies any side effects to medications.    At this time symptoms remains severe, functionally and behaviorally impairing and pt remains in need of continued acute inpatient hospitalization for both safety and stabilization.           Interim/overnight events per report/notes:    Per RN:  Patient is out of room socializing with peers.  Smiling and laughing.  No signs of paranoia.  Patient sleeping with the assistance of medication , no concerns.                     Psychiatric ROS (observed, reported, or endorsed/denied):  Depressed mood - less  Interest/pleasure/anhedonia: less  Guilt/hopelessness/worthlessness - less  Changes in Sleep - less  Changes in Appetite - less  Changes in Concentration - No  Changes in Energy - No  PMA/R- No  Suicidal- active/passive ideations - denies  Homicidal ideations: active/passive ideations - denies    Hallucinations - less  Delusions - " less  Disorganized behavior - No  Disorganized speech - No  Negative symptoms - No    Elevated mood - No  Decreased need for sleep - No  Grandiosity - No  Racing thoughts - No  Impulsivity - No  Irritability- No  Increased energy - No  Distractibility - No  Increase in goal-directed activity or PMA- No    Symptoms of KANU - No  Symptoms of Panic Disorder- No  Symptoms of PTSD - No        Psychotherapy:  Target symptoms: depression, anxiety , substance abuse  Why chosen therapy is appropriate versus another modality: relevant to diagnosis  Outcome monitoring methods: self-report  Therapeutic intervention type: supportive psychotherapy  Topics discussed/themes: building skills sets for symptom management, symptom recognition, substance abuse  The patient's response to the intervention is accepting. The patient's progress toward treatment goals is fair.   Duration of intervention: 16 minutes.      Overall progress: Patient is showing mild improvement               Medical ROS  Review of Systems   Constitutional:  Negative for chills and fever.   HENT:  Negative for hearing loss.    Eyes:  Negative for blurred vision and double vision.   Respiratory:  Negative for shortness of breath.    Cardiovascular:  Negative for chest pain and palpitations.   Gastrointestinal:  Negative for constipation, diarrhea, nausea and vomiting.   Genitourinary:  Negative for dysuria.   Musculoskeletal:  Negative for back pain and neck pain.   Skin:  Negative for rash.   Neurological:  Negative for dizziness and headaches.   Endo/Heme/Allergies:  Negative for environmental allergies.         PAST MEDICAL HISTORY   Past Medical History:   Diagnosis Date    History of psychiatric hospitalization 09/06/2023    [facility]     of psychiatric care     Psychiatric problem     Schizophrenia     Suicide attempt            PSYCHOTROPIC MEDICATIONS   Scheduled Meds:   divalproex ER  1,000 mg Oral QHS    mirtazapine  15 mg Oral QHS     Continuous  Infusions:  PRN Meds:.  Current Facility-Administered Medications:     hydrOXYzine pamoate, 50 mg, Oral, Nightly PRN    nicotine (polacrilex), 2 mg, Oral, Q1H PRN        EXAMINATION    VITALS   Vitals:    06/18/24 1928 06/19/24 0752 06/19/24 1916 06/20/24 0713   BP: 117/64 (!) 109/54 (!) 104/58 109/61   BP Location: Left arm Right arm Left arm Left arm   Patient Position: Sitting Lying Sitting Sitting   Pulse: (!) 53 (!) 58 (!) 56 66   Resp: 16 18 16 18   Temp: 98.2 °F (36.8 °C) 98.3 °F (36.8 °C) 98.2 °F (36.8 °C) 98.5 °F (36.9 °C)   TempSrc: Oral Oral Oral Oral   SpO2: 98% 98% 98% 99%   Weight:       Height:           Body mass index is 19.39 kg/m².        CONSTITUTIONAL  General Appearance: unremarkable, age appropriate    MUSCULOSKELETAL  Muscle Strength and Tone:no tremor, no tic  Abnormal Involuntary Movements: No  Gait and Station: non-ataxic    PSYCHIATRIC   Level of Consciousness: awake and alert   Orientation: person, place, and situation  Grooming: Casually dressed and Well groomed  Psychomotor Behavior: normal, cooperative  Speech: normal tone, normal rate, normal pitch, normal volume  Language: grossly intact  Mood: good  Affect: Consistent with mood  Thought Process: linear, logical  Associations: intact   Thought Content: denies SI, denies HI, and no delusions  Perceptions: less AH and denies  VH  Memory: Able to recall past events, Remote intact, and Recent intact  Attention:Attends to interview without distraction  Fund of Knowledge: Aware of current events and Vocabulary appropriate   Estimate if Intelligence:  Average based on work/education history, vocabulary and mental status exam  Insight: has awareness of illness  Judgment: behavior is adequate to circumstances        DIAGNOSTIC TESTING   Laboratory Results  No results found for this or any previous visit (from the past 24 hour(s)).        ASSESSMENT         Schizoaffective disorder, bipolar type, severe  SI  Unspecified anxiety  disorder  Cannabis abuse  Psychosocial stressors  Nicotine dependence  Hypokalemia     Anemia        PROBLEM LIST AND MANAGEMENT PLANS             Schizoaffective disorder, bipolar type, severe  - staff discussed with [facility], recevied Haldol D 100 mg IM on 5/30/24  - resume depakote 1000 mg PO qhs, will recheck level, monitor LFTs  - pt counseled  - follow up with outpatient mental health providers after discharge for medication management and psychotherapy        Suicidal ideations  - continue psychiatric hospitalization  - provide psychotherapeutic interventions and medication management        Unspecified anxiety disorder  - resume remeron 15 mg PO qhs  - pt counseled  - follow up with outpatient mental health providers after discharge for medication management and psychotherapy        Cannabis abuse  - consider rehab  - consider gabapentin  - pt counseled  - follow up with outpatient mental health providers after discharge for medication management and psychotherapy        Psychosocial stressors  - pt counseled  - SW consulted for assistance with additional resources      Nicotine dependence  - start nicotine patch 14 mg PO qd PRN     Hypokalemia  - FM consulted        Anemia  - FM consulted          Discussed diagnosis, risks and benefits of proposed treatment vs alternative treatments vs no treatment, potential side effects of these treatments and the inherent unpredictability of treatment. The patient expresses understanding of the above and displays the capacity to agree with this treatment given said understanding. Patient also agrees that, currently, the benefits outweigh the risks and would like to pursue/continue treatment at this time.    Any medications being used off-label were discussed with the patient inclusive of the evidence base for the use of the medications and consent was obtained for the off-label use of the medication.       DISCHARGE PLANNING  Expected Disposition Plan: Home or Self  Care      NEED FOR CONTINUED HOSPITALIZATION  Psychiatric illness continues to pose a potential threat to life or bodily function, of self or others, thereby requiring the need for continued inpatient psychiatric hospitalization: Yes, due to: danger to self, as evidenced by:  Concerns with SI--Fading.    Protective inpatient pyschiatric hospitalization required while a safe disposition plan is enacted: Yes    Patient stabilized and ready for discharge from inpatient psychiatric unit: No        STAFF:   Cristhian Grijalva III, MD  Psychiatry

## 2024-06-20 NOTE — PLAN OF CARE
"Behavioral Health Unit  Psychosocial History and Assessment  Progress Note      Patient Name: Bishop Soria YOB: 2000 SW: Tami [place], VIRAL  Date: 6/20/2024    Chief Complaint: thoughts of death/suicide     Consent:     Did the patient consent for an interview with the ? Yes    Did the patient consent for the  to contact family/friend/caregiver?   Yes  Name: Nabil Soria, Relationship: mother, and Contact: [phone]    Did the patient give consent for the  to inform family/friend/caregiver of his/her whereabouts or to discuss discharge planning? Yes    Source of Information: Face to face with patient, Telephone interview with family/friend/caregiver, and Chart review    Is information obtained from interviews considered reliable?   yes    Reason for Admission:     Active Hospital Problems    Diagnosis  POA    *Depression with suicidal ideation [F32.A, R45.851]  Not Applicable    Substance abuse [F19.10]  Unknown    Schizoaffective disorder, bipolar type [F25.0]  Yes      Resolved Hospital Problems   No resolved problems to display.       History of Present Illness - (Patient Perception):   Pt reports. My mother and I was having a conversation and I was just saying how I'm frustrated with where I'm at in life. Jokingly I said man I'm so aggravated I just want to kill myself and my mother response was oh no you can't say that. I was doing good for the most part but of course she didn't like when I got a hold of a blunt.   SW questioned pt about HI towards friend which pt's mother reported. Pt stated, " I dont have even have friends, the conversation was only about me I feel like my mother is adelso picking at little things now."    History of Present Illness - (Perception of Others):   Per Nabil Soria. He just started talking out his head again saying he wanted to kill himself and kill his friend. He said to call her over so "we can kill her." He " "did not act on any thoughts. His friend is aware of his HI  towards her. He had been up all night. Started back talking to himself. Has been complaint with [facility] and taking medication. Still using marijuana even thought he is not supposed to.He also went to [place] and stayed less than a week. He has had no other inpatients since last month hospitalization.     Present biopsychosocial functioning:   Pt is a 23 y.o. male with a past psychiatric history of Schizoaffective Disorder and Substance Abuse currently admitted to the inpatient unit with the following chief complaint: thoughts of death/suicide. Pt currently denies SI/HI/AH/VH. Family reports SI/HI/AH/VH. Pt UDS was positive for marijuana. Pt ETOH was normal. Pt is single. Pt lives in home with family. Pt can perform all ADLs. Pt is unemployed. Pt currently has outpatient psychiatric care with [facility] of [facility]. Pt denies any recent stressors, changes, or losses.     Past biopsychosocial functioning: Pt last inpatient treatment; [facility] during May of 2024. Per chart review; 1518-1927 pt has had a history of inpatient treatments.     Family and Marital/Relationship History:     Significant Other/Partner Relationships:  Single    Family Relationships: "its ok, but at times it can be strained, like now, I feel like my mother is adelso picking with me"      Childhood History:     Where was patient raised? [place]     Who raised the patient? Parents       How does patient describe their childhood? "Eventful"      Who is patient's primary support person? MotherNabil       Culture and Confucianist:     Confucianist: Unknown    How strong of a role does Lutheran and spirituality play in patient's life? Pretty strong     Restoration or spiritual concerns regarding treatment: not applicable     History of Abuse:   History of Abuse: Denies      Outcome: n/a    Psychiatric and Medical History:     History of psychiatric illness or treatment: prior " inpatient treatment, has participated in counseling/psychotherapy on an outpatient basis in the past, and currently under psychiatric care    Medical history:   Past Medical History:   Diagnosis Date    History of psychiatric hospitalization 09/06/2023    [facility] of psychiatric care     Psychiatric problem     Schizophrenia     Suicide attempt        Substance Abuse History:     Alcohol - (Patient Perspective):   Social History     Substance and Sexual Activity   Alcohol Use Yes    Comment: pt reports drinking wine, two-four times monthly or less           Drugs - (Patient Perspective):   Social History     Substance and Sexual Activity   Drug Use Yes    Types: Marijuana       Drugs - (Collateral Perspective): Marijuana  according to Nabil Soria       Education:     Currently Enrolled? No  Attended College/Technical School    Special Education? No    Interested in Completing Education/GED: No    Employment and Financial:     Currently employed? Unemployed     Source of Income:  none reported     Able to afford basic needs (food, shelter, utilities)? No, mother currently provides basic needs     Who manages finances/personal affairs? Self       Service:     ? no    Combat Service? No     Community Resources:     Describe present use of community resources: [facility]      Identify previously used community resources   (Include previous mental health treatment - outpatient and inpatient):  Pt last inpatient treatment; [facility] during May of 2024.     Environmental:     Current living situation:Lives with family, Lives in home    Social Evaluation:     Patient Assets: General fund of knowledge, Work skills, and Communicable skills    Patient Limitations: poor impulse control, unemployed, substance abuse     High risk psychosocial issues that may impact discharge planning:   None reported     Recommendations:     Anticipated discharge plan:   outpatient follow up with [facility],  home with family     High risk issues requiring early treatment planning and immediate intervention: thoughts of death/suicide     Community resources needed for discharge planning:  aftercare treatment sources    Anticipated social work role(s) in treatment and discharge planning: SW will facilitate process groups to assist pt develop healthy coping skills; CM will arrange outpatient follow-up appointments and assist with discharge planning.

## 2024-06-20 NOTE — PLAN OF CARE
Problem: Adult Behavioral Health Plan of Care  Goal: Develops/Participates in Therapeutic [place] to Support Successful Transition  Intervention: Mutually Develop Transition Plan  Flowsheets (Taken 6/20/2024 1213)  Current Discharge Risk:   substance use/abuse   psychiatric illness  Readmission Within the Last 30 Days: no previous admission in last 30 days  Outpatient/Agency/Support Group Needs: [facility]  Transition Support: follow-up care discussed  Anticipated Discharge Disposition: home with family  Transportation Concerns: none  Patient/Family Anticipated Services at Transition: community agency  Patient/Family Anticipates Transition to: home with family  Transportation Anticipated: family or friend will provide  Concerns to be Addressed:   substance/tobacco abuse/use   mental health   medication   coping/stress  Patient's Choice of Community Agency(s): [facility]

## 2024-06-21 VITALS
SYSTOLIC BLOOD PRESSURE: 109 MMHG | HEIGHT: 74 IN | HEART RATE: 54 BPM | DIASTOLIC BLOOD PRESSURE: 58 MMHG | BODY MASS INDEX: 19.38 KG/M2 | RESPIRATION RATE: 20 BRPM | OXYGEN SATURATION: 99 % | TEMPERATURE: 98 F | WEIGHT: 151 LBS

## 2024-06-21 PROCEDURE — 90833 PSYTX W PT W E/M 30 MIN: CPT | Mod: AF,HB,, | Performed by: STUDENT IN AN ORGANIZED HEALTH CARE EDUCATION/TRAINING PROGRAM

## 2024-06-21 PROCEDURE — 99239 HOSP IP/OBS DSCHRG MGMT >30: CPT | Mod: AF,HB,, | Performed by: STUDENT IN AN ORGANIZED HEALTH CARE EDUCATION/TRAINING PROGRAM

## 2024-06-21 NOTE — DISCHARGE SUMMARY
"Discharge Summary  Psychiatry    Admit Date: 6/17/2024    Discharge Date and Time:  06/21/2024 9:59 AM    Attending Physician: Cristhian Grijalva III, MD     Discharge Provider: Cristhian Grijalva III    Reason for Admission:  CHIEF COMPLAINT   Bishop Soria is a 23 y.o. male with a past psychiatric history of Schizoaffective Disorder and Substance Abuse currently admitted to the inpatient unit with the following chief complaint: thoughts of death/suicide    HPI   The patient was seen and examined. The chart was reviewed.     The patient presented to the ER on 6/17/2024 .     The patient was medically cleared and admitted to the [facility].     Per ED MD:         Suicidal         Patient reports he is having thoughts of suicide and thinks he needs his depression medication increased. Patient reports he has been feeling fine except his depression.      22 yo male with history as below here with SI. Reports associated with depression. No fever. No known sick contacts. Multiple prior similar presentations.         Psychiatric interview:  "I having been hearing voices, it's just annoying, I've taken everything, invega, haldol, depakote, all these drugs and I still hear them." Reports AH, "up to date things, things going on in the world, regular conversations of people." Reports he did receive his CARRASCO last month with ACT services. He admits to continuing to use MJ, "I smoked my last blunt yesterday."          Procedures Performed: * No surgery found *    Hospital Course:    Patient was admitted to the inpatient psychiatry unit after being medically cleared in the ED. Chart and labs were reviewed. The patient was stabilized as follows:        Schizoaffective disorder, bipolar type, severe  - staff discussed with ACT, ashwini Haldol D 100 mg IM on 5/30/24  - resume depakote 1000 mg PO qhs, will recheck level, monitor LFTs  -level 45  - pt counseled  - follow up with outpatient mental health providers after discharge for medication " management and psychotherapy        Suicidal ideations  - continue psychiatric hospitalization  - provide psychotherapeutic interventions and medication management        Unspecified anxiety disorder  - resume remeron 15 mg PO qhs  - pt counseled  - follow up with outpatient mental health providers after discharge for medication management and psychotherapy        Cannabis abuse  - consider rehab  - consider gabapentin  - pt counseled  - follow up with outpatient mental health providers after discharge for medication management and psychotherapy        Psychosocial stressors  - pt counseled  - SW consulted for assistance with additional resources      Nicotine dependence  - start nicotine patch 14 mg PO qd PRN     Hypokalemia  - FM consulted        Anemia  - FM consulted           The patient reports improved symptoms as documented. The patient is requesting discharge.The patient is hopeful, future oriented and goal directed. The patient readily discusses both short and long term goals. The patient can identify positive coping skills and social support. AIMS score was 0. During hospitalization, the patient was encouraged to go to both groups and individual counseling. Patient was monitored for any side effects. A meeting was held with multidisciplinary team prior to discharge and pt's diagnosis, current medications, and follow up were discussed. The patient has been compliant with treatment and can adequately attend to activities of daily living in an independent manner. The patient denies any side effects. The patient denies SI, HI, plan or intent for self harm or harm to others. The patient is no longer a danger to self or others nor gravely disabled disabled. Patient discharged  in stable condition with scheduled outpatient follow up.      Discussed diagnosis, risks and benefits of proposed treatment vs alternative treatments vs no treatment, and potential side effects of these treatments.  The patient expresses  understanding of the above and displays the capacity to agree with this treatment given said understanding.  Patient also agrees that, currently, the benefits outweigh the risks and would like to pursue treatment at this time.      Discharge MSE: stated age, casually dressed, well groomed.  No psychomotor agitation or retardation.  No abnormal involuntary movements.  Gait normal.  Speech normal, conversational.  Language fluent English. Mood fine.  Affect normal range, pleasant, euthymic.  Thought process linear.  Associations intact.  Denies suicidal or homicidal ideation.  Denies auditory hallucinations, paranoid ideation, ideas of reference.  Memory intact.  Attention intact.  Fund of knowledge intact.  Insight intact.  Judgment intact.  Alert and oriented to person, place, time.      Tobacco Usage:  Is patient a smoker? Yes  Does patient want prescription for Tobacco Cessation? No  Does patient want counseling for Tobacco Cessation? No    If patient would like to quit, then over the counter nicotine patch could be used. The patient could also follow up with his PCP or psychiatric provider for other alternatives.     Final Diagnoses:    Principal Problem: Schizoaffective disorder, bipolar type, severe   Secondary Diagnoses:       Unspecified anxiety disorder  Cannabis abuse  Psychosocial stressors  Nicotine dependence  Hypokalemia     Anemia       Labs:  Admission on 06/17/2024   Component Date Value Ref Range Status    WBC 06/17/2024 5.58  3.90 - 12.70 K/uL Final    RBC 06/17/2024 3.77 (L)  4.60 - 6.20 M/uL Final    Hemoglobin 06/17/2024 11.9 (L)  14.0 - 18.0 g/dL Final    Hematocrit 06/17/2024 35.7 (L)  40.0 - 54.0 % Final    MCV 06/17/2024 95  82 - 98 fL Final    MCH 06/17/2024 31.6 (H)  27.0 - 31.0 pg Final    MCHC 06/17/2024 33.3  32.0 - 36.0 g/dL Final    RDW 06/17/2024 13.3  11.5 - 14.5 % Final    Platelets 06/17/2024 237  150 - 450 K/uL Final    MPV 06/17/2024 9.3  9.2 - 12.9 fL Final    Immature  Granulocytes 06/17/2024 0.2  0.0 - 0.5 % Final    Gran # (ANC) 06/17/2024 3.1  1.8 - 7.7 K/uL Final    Immature Grans (Abs) 06/17/2024 0.01  0.00 - 0.04 K/uL Final    Lymph # 06/17/2024 1.8  1.0 - 4.8 K/uL Final    Mono # 06/17/2024 0.6  0.3 - 1.0 K/uL Final    Eos # 06/17/2024 0.1  0.0 - 0.5 K/uL Final    Baso # 06/17/2024 0.01  0.00 - 0.20 K/uL Final    nRBC 06/17/2024 0  0 /100 WBC Final    Gran % 06/17/2024 55.2  38.0 - 73.0 % Final    Lymph % 06/17/2024 32.4  18.0 - 48.0 % Final    Mono % 06/17/2024 10.0  4.0 - 15.0 % Final    Eosinophil % 06/17/2024 2.0  0.0 - 8.0 % Final    Basophil % 06/17/2024 0.2  0.0 - 1.9 % Final    Differential Method 06/17/2024 Automated   Final    Sodium 06/17/2024 140  136 - 145 mmol/L Final    Potassium 06/17/2024 3.4 (L)  3.5 - 5.1 mmol/L Final    Chloride 06/17/2024 105  95 - 110 mmol/L Final    CO2 06/17/2024 24  23 - 29 mmol/L Final    Glucose 06/17/2024 110  70 - 110 mg/dL Final    BUN 06/17/2024 14  6 - 20 mg/dL Final    Creatinine 06/17/2024 1.2  0.5 - 1.4 mg/dL Final    Calcium 06/17/2024 8.7  8.7 - 10.5 mg/dL Final    Total Protein 06/17/2024 7.4  6.0 - 8.4 g/dL Final    Albumin 06/17/2024 3.5  3.5 - 5.2 g/dL Final    Total Bilirubin 06/17/2024 0.5  0.1 - 1.0 mg/dL Final    Alkaline Phosphatase 06/17/2024 72  55 - 135 U/L Final    AST 06/17/2024 11  10 - 40 U/L Final    ALT 06/17/2024 16  10 - 44 U/L Final    eGFR 06/17/2024 >60.0  >60 mL/min/1.73 m^2 Final    Anion Gap 06/17/2024 11  3 - 11 mmol/L Final    TSH 06/17/2024 0.691  0.400 - 4.000 uIU/mL Final    Specimen UA 06/17/2024 Urine, Clean Catch   Final    Color, UA 06/17/2024 Yellow  Yellow, Straw, Ingrid Final    Appearance, UA 06/17/2024 Clear  Clear Final    pH, UA 06/17/2024 8.0  5.0 - 8.0 Final    Specific Gravity, UA 06/17/2024 1.025  1.005 - 1.030 Final    Protein, UA 06/17/2024 Negative  Negative Final    Glucose, UA 06/17/2024 Negative  Negative Final    Ketones, UA 06/17/2024 Trace (A)  Negative Final     Bilirubin (UA) 06/17/2024 Negative  Negative Final    Occult Blood UA 06/17/2024 Negative  Negative Final    Nitrite, UA 06/17/2024 Negative  Negative Final    Urobilinogen, UA 06/17/2024 2.0-3.0 (A)  <2.0 EU/dL Final    Leukocytes, UA 06/17/2024 Trace (A)  Negative Final    Benzodiazepines 06/17/2024 Negative  Negative Final    Methadone metabolites 06/17/2024 Negative  Negative Final    Cocaine (Metab.) 06/17/2024 Negative  Negative Final    Opiate Scrn, Ur 06/17/2024 Negative  Negative Final    Barbiturate Screen, Ur 06/17/2024 Negative  Negative Final    Amphetamine Screen, Ur 06/17/2024 Negative  Negative Final    THC 06/17/2024 Presumptive Positive (A)  Negative Final    Phencyclidine 06/17/2024 Negative  Negative Final    Creatinine, Urine 06/17/2024 335.0  23.0 - 375.0 mg/dL Final    Toxicology Information 06/17/2024 SEE COMMENT   Final    Alcohol, Serum 06/17/2024 <3  <10 mg/dL Final    Acetaminophen (Tylenol), Serum 06/17/2024 <2.0 (L)  10.0 - 20.0 ug/mL Final    WBC, UA 06/17/2024 1  0 - 5 /hpf Final    Microscopic Comment 06/17/2024 SEE COMMENT   Final    Hemoglobin A1C 06/17/2024 5.0  4.0 - 5.6 % Final    Estimated Avg Glucose 06/17/2024 97  68 - 131 mg/dL Final    Cholesterol 06/17/2024 170  120 - 199 mg/dL Final    Triglycerides 06/17/2024 61  30 - 150 mg/dL Final    HDL 06/17/2024 52  40 - 75 mg/dL Final    LDL Cholesterol 06/17/2024 105.8  63.0 - 159.0 mg/dL Final    HDL/Cholesterol Ratio 06/17/2024 30.6  20.0 - 50.0 % Final    Total Cholesterol/HDL Ratio 06/17/2024 3.3  2.0 - 5.0 Final    Non-HDL Cholesterol 06/17/2024 118  mg/dL Final    Valproic Acid Level 06/20/2024 45 (L)  50 - 100 ug/mL Final         Discharged Condition: stable and improved; not currently a danger to self/others or gravely disabled    Disposition: Home or Self Care    Is patient being discharged on multiple neuroleptics? No    Follow Up/Patient Instructions:     Take all medications as prescribed.  Attend all psychiatric and  medical follow up appointments.   Abstain from all drugs and alcohol.  Call the crisis line at: [phone] for help in a crisis and emergent situations or call 911 and Return to ED for any acute worsening of your condition including suicidal or homicidal ideations      Discharge Procedure Orders   Diet Adult Regular     Notify your health care provider if you experience any of the following:  temperature >100.4     Notify your health care provider if you experience any of the following:  persistent nausea and vomiting or diarrhea     Notify your health care provider if you experience any of the following:   Order Comments: Suicidal thoughts, homicidal thoughts, or any other changes in mental status  If you would like immediate help/crisis counseling, please call [phone] (TALK). Through this toll-free phone number for a network of crisis centers across the country. These centers staff their lines with people who are trained to listen and offer support to people in emotional crisis. If you are in an emergency, please call 911.     Notify your health care provider if you experience any of the following:  increased confusion or weakness     Notify your health care provider if you experience any of the following:  persistent dizziness, light-headedness, or visual disturbances     Activity as tolerated           Follow up apt: staff will schedule      Medications:  Reconciled Home Medications:      Medication List        CONTINUE taking these medications      divalproex  MG Tb24 24 hr tablet  Commonly known as: DEPAKOTE ER  Take 2 tablets (1,000 mg total) by mouth every evening.     mirtazapine 15 MG tablet  Commonly known as: REMERON  Take 1 tablet (15 mg total) by mouth every evening.            STOP taking these medications      INVEGA SUSTENNA 234 mg/1.5 mL Syrg injection  Generic drug: paliperidone palmitate     melatonin 3 mg tablet  Commonly known as: MELATIN            - staff discussed with ACT,  ashwini Haldol D 100 mg IM on 5/30/24        Psychotherapy:  Target symptoms: substance abuse, psychosis  Why chosen therapy is appropriate versus another modality: relevant to diagnosis  Outcome monitoring methods: self-report  Therapeutic intervention type: supportive psychotherapy  Topics discussed/themes: building skills sets for symptom management, symptom recognition  The patient's response to the intervention is accepting. The patient's progress toward treatment goals is fair.   Duration of intervention: 16 minutes.      Diet: regular     Activity as tolerated    Total time spent discharging patient: 34 minutes    Cristhian Grijalva III, MD  Psychiatry

## 2024-06-21 NOTE — PLAN OF CARE
"   06/21/24 1015   Step 1: Warning Signs   Warning Sign 1 when mom accuses me of "tripping" or smoking marijuana when im not   Warning Sign 2 not being where i want to be in life   Warning Sign 3 mom putting me down   Step 2: Internal coping strategies - Things I can do to take my mind off my problems without contacting another person:   Coping Strategy 1 listen to music   Coping Strategy 2 dance   Coping Strategy 3 read   Step 3: People and social settings that provide distraction:   1. Name n/a   2. Name n/a   3. Place park   4. Place restaurants    Step 4: People whom I can ask for help:   1. Person Nabil Soria (mother)       Phone [phone]   2. Person n/a   3. Person n/a   Step 5: Professionals or agencies I can contact during a crisis:   1. Clinician Name [facility] [address] [place] [address]       Phone [phone]   3. Suicide Prevention Lifeline: 988 Suicide Prevention Lifeline 988   4. [facility]       911       Emergency Services Phone warm line [phone] wed-sun 5pm-10pm   Step 6: Making the environment safer (plan for lethal means safety)   Safe Environment Plan walk away when there is negativity, stop smoking weeds   Safe Environment Optional: What is most important to me and worth living for? my aspirations, my goals   Safety Plan Tasks   Provided a Hard Copy to the Patient Y   Explained How to Follow the Steps Y   Discussed Facilitators and Barriers Y       "

## 2024-06-21 NOTE — PLAN OF CARE
Problem: Adult Behavioral Health Plan of Care  Goal: Optimized Coping Skills in Response to Life Stressors  Intervention: Promote Effective Coping Strategies  Flowsheets (Taken 6/20/2024 1600)  Supportive Measures:   active listening utilized   self-reflection promoted   problem-solving facilitated       Behavior: Pt was engaged during group             Intervention: In this CBT-focused group LMSW facilitated discussion on the importance of boundaries and different types of boundaries.  Each patient was asked to discuss what boundaries they need to better establish in order to meet their treatment goals.            Response: Pt identified boundary as learning to dance and be social with his cousin without smoking marijuana. Pt reports habit of getting together with cousins and smoking and dancing; smoking is just an add on.           Plan: Continue to encourage pt to participate in process groups to verbalize feelings and develop healthy coping skills.

## 2024-06-21 NOTE — NURSING
"Patient alert and oriented, pleasant, and cooperative. Patient is out of his room ambulating on unit, interacting with staff, watching tv at intervals. Patient took shower this am. Appetite good and received no medication this am as ordered. Patient denies depression and anxiety, denies S/HI, denies A/VH. No delusions present or negative behaviors noted. Patient states "I am feeling better since I am here, I think I was having withdrawal symptoms from smoking my last blunt and I got in an argument with my mom and didn't mean what I said". Close observations continued and safe environment maintained.  "
22 yo male with history as below here with SI. Alert and oriented.  Reports associated with depression and suicidal thoughts.  No fever. No known sick contacts. Multiple prior similar presentations. Patient on unit and out among peers and smiling.  Showered as well with no concerns.       
Patient has met all criteria to be discharged today. Patient was explained all discharge instructions and the patient verbalized an understanding of those instructions. Patient was returned all personal belongings upon departure. Patient was escorted off the unit and out of the hospital by a staff member.   
Patient in a good mood this evening stating he may be discharging home tomorrow. Patient accepted HS snack and compliant with HS medication. Patient required no PRN's. Patient states he is feeling fine denies depression and anxiety, denies S/HI, denies A/VH and has no complaints with sleep. No negative behaviors noted. Patient watched tv before going to his room. Patient is asleep at this time. Close observations continued and safe environment maintained.  
Opt out

## 2024-08-28 ENCOUNTER — HOSPITAL ENCOUNTER (INPATIENT)
Facility: HOSPITAL | Age: 24
LOS: 9 days | Discharge: NURSING, REHAB OR LONG-TERM CARE | DRG: 885 | End: 2024-09-06
Attending: STUDENT IN AN ORGANIZED HEALTH CARE EDUCATION/TRAINING PROGRAM | Admitting: STUDENT IN AN ORGANIZED HEALTH CARE EDUCATION/TRAINING PROGRAM
Payer: MEDICAID

## 2024-08-28 DIAGNOSIS — F25.0 SCHIZOAFFECTIVE DISORDER, BIPOLAR TYPE: Primary | ICD-10-CM

## 2024-08-28 DIAGNOSIS — R45.851 SUICIDAL IDEATION: ICD-10-CM

## 2024-08-28 LAB
ALBUMIN SERPL BCP-MCNC: 3.8 G/DL (ref 3.5–5.2)
ALP SERPL-CCNC: 76 U/L (ref 55–135)
ALT SERPL W/O P-5'-P-CCNC: 17 U/L (ref 10–44)
AMPHET+METHAMPHET UR QL: NEGATIVE
ANION GAP SERPL CALC-SCNC: 8 MMOL/L (ref 3–11)
APAP SERPL-MCNC: <2 UG/ML (ref 10–20)
AST SERPL-CCNC: 12 U/L (ref 10–40)
BACTERIA #/AREA URNS HPF: NEGATIVE /HPF
BARBITURATES UR QL SCN>200 NG/ML: NEGATIVE
BASOPHILS # BLD AUTO: 0.02 K/UL (ref 0–0.2)
BASOPHILS NFR BLD: 0.2 % (ref 0–1.9)
BENZODIAZ UR QL SCN>200 NG/ML: NEGATIVE
BILIRUB SERPL-MCNC: 0.5 MG/DL (ref 0.1–1)
BILIRUB UR QL STRIP: ABNORMAL
BUN SERPL-MCNC: 17 MG/DL (ref 6–20)
BZE UR QL SCN: NEGATIVE
CALCIUM SERPL-MCNC: 8.8 MG/DL (ref 8.7–10.5)
CANNABINOIDS UR QL SCN: ABNORMAL
CHLORIDE SERPL-SCNC: 102 MMOL/L (ref 95–110)
CHOLEST SERPL-MCNC: 177 MG/DL (ref 120–199)
CHOLEST/HDLC SERPL: 2.7 {RATIO} (ref 2–5)
CLARITY UR: CLEAR
CO2 SERPL-SCNC: 30 MMOL/L (ref 23–29)
COLOR UR: YELLOW
CREAT SERPL-MCNC: 1.4 MG/DL (ref 0.5–1.4)
CREAT UR-MCNC: 563 MG/DL (ref 23–375)
DIFFERENTIAL METHOD BLD: ABNORMAL
EOSINOPHIL # BLD AUTO: 0.2 K/UL (ref 0–0.5)
EOSINOPHIL NFR BLD: 1.7 % (ref 0–8)
ERYTHROCYTE [DISTWIDTH] IN BLOOD BY AUTOMATED COUNT: 12.9 % (ref 11.5–14.5)
EST. GFR  (NO RACE VARIABLE): >60 ML/MIN/1.73 M^2
ESTIMATED AVG GLUCOSE: 91 MG/DL (ref 68–131)
ETHANOL SERPL-MCNC: <3 MG/DL
GLUCOSE SERPL-MCNC: 88 MG/DL (ref 70–110)
GLUCOSE UR QL STRIP: NEGATIVE
HBA1C MFR BLD: 4.8 % (ref 4–5.6)
HCT VFR BLD AUTO: 39 % (ref 40–54)
HDLC SERPL-MCNC: 66 MG/DL (ref 40–75)
HDLC SERPL: 37.3 % (ref 20–50)
HGB BLD-MCNC: 13.3 G/DL (ref 14–18)
HGB UR QL STRIP: NEGATIVE
HYALINE CASTS #/AREA URNS LPF: 4 /LPF
IMM GRANULOCYTES # BLD AUTO: 0.03 K/UL (ref 0–0.04)
IMM GRANULOCYTES NFR BLD AUTO: 0.2 % (ref 0–0.5)
KETONES UR QL STRIP: ABNORMAL
LDLC SERPL CALC-MCNC: 95.6 MG/DL (ref 63–159)
LEUKOCYTE ESTERASE UR QL STRIP: ABNORMAL
LYMPHOCYTES # BLD AUTO: 4.1 K/UL (ref 1–4.8)
LYMPHOCYTES NFR BLD: 33.4 % (ref 18–48)
MCH RBC QN AUTO: 32.1 PG (ref 27–31)
MCHC RBC AUTO-ENTMCNC: 34.1 G/DL (ref 32–36)
MCV RBC AUTO: 94 FL (ref 82–98)
METHADONE UR QL SCN>300 NG/ML: NEGATIVE
MICROSCOPIC COMMENT: ABNORMAL
MONOCYTES # BLD AUTO: 1.1 K/UL (ref 0.3–1)
MONOCYTES NFR BLD: 9.1 % (ref 4–15)
NEUTROPHILS # BLD AUTO: 6.8 K/UL (ref 1.8–7.7)
NEUTROPHILS NFR BLD: 55.4 % (ref 38–73)
NITRITE UR QL STRIP: NEGATIVE
NONHDLC SERPL-MCNC: 111 MG/DL
NRBC BLD-RTO: 0 /100 WBC
OPIATES UR QL SCN: NEGATIVE
PCP UR QL SCN>25 NG/ML: NEGATIVE
PH UR STRIP: 6 [PH] (ref 5–8)
PLATELET # BLD AUTO: 223 K/UL (ref 150–450)
PMV BLD AUTO: 9.5 FL (ref 9.2–12.9)
POTASSIUM SERPL-SCNC: 3.7 MMOL/L (ref 3.5–5.1)
PROT SERPL-MCNC: 8.1 G/DL (ref 6–8.4)
PROT UR QL STRIP: ABNORMAL
RBC # BLD AUTO: 4.14 M/UL (ref 4.6–6.2)
RBC #/AREA URNS HPF: 2 /HPF (ref 0–4)
SALICYLATES SERPL-MCNC: 3.5 MG/DL (ref 15–30)
SODIUM SERPL-SCNC: 140 MMOL/L (ref 136–145)
SP GR UR STRIP: >=1.03 (ref 1–1.03)
SQUAMOUS #/AREA URNS HPF: 0 /HPF
TOXICOLOGY INFORMATION: ABNORMAL
TRIGL SERPL-MCNC: 77 MG/DL (ref 30–150)
TSH SERPL DL<=0.005 MIU/L-ACNC: 2.14 UIU/ML (ref 0.4–4)
URN SPEC COLLECT METH UR: ABNORMAL
UROBILINOGEN UR STRIP-ACNC: 1 EU/DL
WBC # BLD AUTO: 12.26 K/UL (ref 3.9–12.7)
WBC #/AREA URNS HPF: 2 /HPF (ref 0–5)

## 2024-08-28 PROCEDURE — S4991 NICOTINE PATCH NONLEGEND: HCPCS | Performed by: STUDENT IN AN ORGANIZED HEALTH CARE EDUCATION/TRAINING PROGRAM

## 2024-08-28 PROCEDURE — 83036 HEMOGLOBIN GLYCOSYLATED A1C: CPT | Performed by: STUDENT IN AN ORGANIZED HEALTH CARE EDUCATION/TRAINING PROGRAM

## 2024-08-28 PROCEDURE — 80143 DRUG ASSAY ACETAMINOPHEN: CPT | Performed by: STUDENT IN AN ORGANIZED HEALTH CARE EDUCATION/TRAINING PROGRAM

## 2024-08-28 PROCEDURE — 99285 EMERGENCY DEPT VISIT HI MDM: CPT

## 2024-08-28 PROCEDURE — 11400000 HC PSYCH PRIVATE ROOM

## 2024-08-28 PROCEDURE — 81000 URINALYSIS NONAUTO W/SCOPE: CPT | Mod: 59 | Performed by: STUDENT IN AN ORGANIZED HEALTH CARE EDUCATION/TRAINING PROGRAM

## 2024-08-28 PROCEDURE — 25000003 PHARM REV CODE 250: Performed by: STUDENT IN AN ORGANIZED HEALTH CARE EDUCATION/TRAINING PROGRAM

## 2024-08-28 PROCEDURE — 84443 ASSAY THYROID STIM HORMONE: CPT | Performed by: STUDENT IN AN ORGANIZED HEALTH CARE EDUCATION/TRAINING PROGRAM

## 2024-08-28 PROCEDURE — 80053 COMPREHEN METABOLIC PANEL: CPT | Performed by: STUDENT IN AN ORGANIZED HEALTH CARE EDUCATION/TRAINING PROGRAM

## 2024-08-28 PROCEDURE — 99223 1ST HOSP IP/OBS HIGH 75: CPT | Mod: AF,HB,, | Performed by: STUDENT IN AN ORGANIZED HEALTH CARE EDUCATION/TRAINING PROGRAM

## 2024-08-28 PROCEDURE — 82077 ASSAY SPEC XCP UR&BREATH IA: CPT | Performed by: STUDENT IN AN ORGANIZED HEALTH CARE EDUCATION/TRAINING PROGRAM

## 2024-08-28 PROCEDURE — 80061 LIPID PANEL: CPT | Performed by: STUDENT IN AN ORGANIZED HEALTH CARE EDUCATION/TRAINING PROGRAM

## 2024-08-28 PROCEDURE — 85025 COMPLETE CBC W/AUTO DIFF WBC: CPT | Performed by: STUDENT IN AN ORGANIZED HEALTH CARE EDUCATION/TRAINING PROGRAM

## 2024-08-28 PROCEDURE — 36415 COLL VENOUS BLD VENIPUNCTURE: CPT | Performed by: STUDENT IN AN ORGANIZED HEALTH CARE EDUCATION/TRAINING PROGRAM

## 2024-08-28 PROCEDURE — 80179 DRUG ASSAY SALICYLATE: CPT | Performed by: STUDENT IN AN ORGANIZED HEALTH CARE EDUCATION/TRAINING PROGRAM

## 2024-08-28 PROCEDURE — 80307 DRUG TEST PRSMV CHEM ANLYZR: CPT | Performed by: STUDENT IN AN ORGANIZED HEALTH CARE EDUCATION/TRAINING PROGRAM

## 2024-08-28 RX ORDER — OLANZAPINE 10 MG/2ML
10 INJECTION, POWDER, FOR SOLUTION INTRAMUSCULAR EVERY 8 HOURS PRN
Status: DISCONTINUED | OUTPATIENT
Start: 2024-08-28 | End: 2024-09-06 | Stop reason: HOSPADM

## 2024-08-28 RX ORDER — ALUMINUM HYDROXIDE, MAGNESIUM HYDROXIDE, AND SIMETHICONE 1200; 120; 1200 MG/30ML; MG/30ML; MG/30ML
30 SUSPENSION ORAL EVERY 6 HOURS PRN
Status: DISCONTINUED | OUTPATIENT
Start: 2024-08-28 | End: 2024-09-06 | Stop reason: HOSPADM

## 2024-08-28 RX ORDER — HALOPERIDOL DECANOATE 100 MG/ML
INJECTION INTRAMUSCULAR
Status: ON HOLD | COMMUNITY
Start: 2024-06-17 | End: 2024-09-06 | Stop reason: HOSPADM

## 2024-08-28 RX ORDER — LOPERAMIDE HYDROCHLORIDE 2 MG/1
2 CAPSULE ORAL 4 TIMES DAILY PRN
Status: DISCONTINUED | OUTPATIENT
Start: 2024-08-28 | End: 2024-09-06 | Stop reason: HOSPADM

## 2024-08-28 RX ORDER — IBUPROFEN 200 MG
1 TABLET ORAL DAILY PRN
Status: DISCONTINUED | OUTPATIENT
Start: 2024-08-28 | End: 2024-09-06 | Stop reason: HOSPADM

## 2024-08-28 RX ORDER — DIVALPROEX SODIUM 500 MG/1
500 TABLET, FILM COATED, EXTENDED RELEASE ORAL 2 TIMES DAILY
Status: DISCONTINUED | OUTPATIENT
Start: 2024-08-28 | End: 2024-09-06 | Stop reason: HOSPADM

## 2024-08-28 RX ORDER — SODIUM CHLORIDE 0.9 % (FLUSH) 0.9 %
10 SYRINGE (ML) INJECTION
Status: DISCONTINUED | OUTPATIENT
Start: 2024-08-28 | End: 2024-09-06 | Stop reason: HOSPADM

## 2024-08-28 RX ORDER — HYDROXYZINE PAMOATE 50 MG/1
50 CAPSULE ORAL NIGHTLY PRN
Status: DISCONTINUED | OUTPATIENT
Start: 2024-08-28 | End: 2024-09-06 | Stop reason: HOSPADM

## 2024-08-28 RX ORDER — DOCUSATE SODIUM 100 MG/1
100 CAPSULE, LIQUID FILLED ORAL DAILY PRN
Status: DISCONTINUED | OUTPATIENT
Start: 2024-08-28 | End: 2024-09-06 | Stop reason: HOSPADM

## 2024-08-28 RX ORDER — RISPERIDONE 0.5 MG/1
0.5 TABLET ORAL 2 TIMES DAILY
Status: DISCONTINUED | OUTPATIENT
Start: 2024-08-28 | End: 2024-08-29

## 2024-08-28 RX ORDER — ACETAMINOPHEN 325 MG/1
650 TABLET ORAL EVERY 6 HOURS PRN
Status: DISCONTINUED | OUTPATIENT
Start: 2024-08-28 | End: 2024-09-06 | Stop reason: HOSPADM

## 2024-08-28 RX ORDER — OLANZAPINE 10 MG/1
10 TABLET ORAL EVERY 8 HOURS PRN
Status: DISCONTINUED | OUTPATIENT
Start: 2024-08-28 | End: 2024-09-06 | Stop reason: HOSPADM

## 2024-08-28 RX ADMIN — RISPERIDONE 0.5 MG: 0.5 TABLET ORAL at 08:08

## 2024-08-28 RX ADMIN — DIVALPROEX SODIUM 500 MG: 500 TABLET, FILM COATED, EXTENDED RELEASE ORAL at 08:08

## 2024-08-28 RX ADMIN — DIVALPROEX SODIUM 500 MG: 500 TABLET, FILM COATED, EXTENDED RELEASE ORAL at 01:08

## 2024-08-28 RX ADMIN — NICOTINE 1 PATCH: 14 PATCH, EXTENDED RELEASE TRANSDERMAL at 08:08

## 2024-08-28 RX ADMIN — RISPERIDONE 0.5 MG: 0.5 TABLET ORAL at 01:08

## 2024-08-28 NOTE — ED PROVIDER NOTES
"  History  Chief Complaint   Patient presents with    Psychiatric Evaluation     Pt presents to ED complaining of depression and SI without a plan x 2 days. Pt also complaining of pain to right foot pain after "being stung by a wasp today."   Pt asking "for the booty juice so he can get some sleep."      24-year-old male with history of schizophrenia presents for psychiatric evaluation.  Patient reporting suicidal ideations but without a plan.  Patient also reports hallucinations states that he was  to famous scott and they are planning a life together        Past Medical History:   Diagnosis Date    History of psychiatric hospitalization 09/06/2023    [facility] of psychiatric care     Psychiatric problem     Schizophrenia     Suicide attempt        History reviewed. No pertinent surgical history.    Family History   Family history unknown: Yes       Social History     Tobacco Use    Smoking status: Every Day     Current packs/day: 1.00     Average packs/day: 1 pack/day for 5.3 years (5.3 ttl pk-yrs)     Types: Cigars, Cigarettes     Start date: 5/10/2019     Passive exposure: Never    Smokeless tobacco: Never   Substance Use Topics    Alcohol use: Yes     Comment: pt reports drinking wine, two-four times monthly or less    Drug use: Yes     Types: Marijuana       ROS  Review of Systems   Psychiatric/Behavioral:  Positive for suicidal ideas.        Physical Exam  /77   Pulse 79   Temp 98.2 °F (36.8 °C)   Resp 18   Ht 6' 2" (1.88 m)   Wt 75.9 kg (167 lb 6.4 oz)   SpO2 96%   BMI 21.49 kg/m²   Physical Exam    Constitutional: He appears well-developed and well-nourished. He is cooperative.   HENT:   Head: Normocephalic and atraumatic.   Eyes: Conjunctivae, EOM and lids are normal. Pupils are equal, round, and reactive to light.   Neck: Phonation normal.   Normal range of motion.  Cardiovascular:  Normal rate, regular rhythm and intact distal pulses.           Musculoskeletal:      Cervical " back: Normal range of motion.     Neurological: He is alert and oriented to person, place, and time.   Skin: Skin is warm and dry.   Psychiatric: He has a normal mood and affect. His speech is normal and behavior is normal. He is actively hallucinating. He expresses suicidal ideation.               Labs Reviewed   CBC W/ AUTO DIFFERENTIAL - Abnormal       Result Value    WBC 12.26      RBC 4.14 (*)     Hemoglobin 13.3 (*)     Hematocrit 39.0 (*)     MCV 94      MCH 32.1 (*)     MCHC 34.1      RDW 12.9      Platelets 223      MPV 9.5      Immature Granulocytes 0.2      Gran # (ANC) 6.8      Immature Grans (Abs) 0.03      Lymph # 4.1      Mono # 1.1 (*)     Eos # 0.2      Baso # 0.02      nRBC 0      Gran % 55.4      Lymph % 33.4      Mono % 9.1      Eosinophil % 1.7      Basophil % 0.2      Differential Method Automated     COMPREHENSIVE METABOLIC PANEL - Abnormal    Sodium 140      Potassium 3.7      Chloride 102      CO2 30 (*)     Glucose 88      BUN 17      Creatinine 1.4      Calcium 8.8      Total Protein 8.1      Albumin 3.8      Total Bilirubin 0.5      Alkaline Phosphatase 76      AST 12      ALT 17      eGFR >60.0      Anion Gap 8     URINALYSIS, REFLEX TO URINE CULTURE - Abnormal    Specimen UA Urine, Clean Catch      Color, UA Yellow      Appearance, UA Clear      pH, UA 6.0      Specific Gravity, UA >=1.030 (*)     Protein, UA Trace (*)     Glucose, UA Negative      Ketones, UA 1+ (*)     Bilirubin (UA) 1+ (*)     Occult Blood UA Negative      Nitrite, UA Negative      Urobilinogen, UA 1.0      Leukocytes, UA Trace (*)     Narrative:     Preferred Collection Type->Urine, Clean Catch  Specimen Source->Urine   DRUG SCREEN PANEL, URINE EMERGENCY - Abnormal    Benzodiazepines Negative      Methadone metabolites Negative      Cocaine (Metab.) Negative      Opiate Scrn, Ur Negative      Barbiturate Screen, Ur Negative      Amphetamine Screen, Ur Negative      THC Presumptive Positive (*)     Phencyclidine  Negative      Creatinine, Urine 563.0 (*)     Toxicology Information SEE COMMENT      Narrative:     Preferred Collection Type->Urine, Clean Catch  Specimen Source->Urine   ACETAMINOPHEN LEVEL - Abnormal    Acetaminophen (Tylenol), Serum <2.0 (*)    SALICYLATE LEVEL - Abnormal    Salicylate Lvl 3.5 (*)    URINALYSIS MICROSCOPIC - Abnormal    RBC, UA 2      WBC, UA 2      Bacteria Negative      Squam Epithel, UA 0      Hyaline Casts, UA 4.0 (*)     Microscopic Comment SEE COMMENT      Narrative:     Preferred Collection Type->Urine, Clean Catch  Specimen Source->Urine   TSH    TSH 2.140     ALCOHOL,MEDICAL (ETHANOL)    Alcohol, Serum <3             Imaging Results    None                     Procedures             Medical Decision Making  Will admit psychiatric evaluation    Amount and/or Complexity of Data Reviewed  Labs: ordered.          Additional MDM:   Psych: Evaluation: Physician Emergency Certificate (PEC) was done prior to arrival in the ED. A Physician Emergency Certificate (PEC) was done in the ED for: Suicidal. The patient has been medically cleared. Outcome: The patient was admitted by Psychiatry.            DISCHARGE NOTE:       Bishop Soria's  results have been reviewed with him.  He has been counseled regarding his diagnosis, treatment, and plan.  He verbally conveys understanding and agreement of the signs, symptoms, diagnosis, treatment and prognosis and additionally agrees to follow up as discussed.  He also agrees with the care-plan and conveys that all of his questions have been answered.  I have also provided discharge instructions for him that include: educational information regarding their diagnosis and treatment, and list of reasons why they would want to return to the ED prior to their follow-up appointment, should his condition change. He has been provided with education for proper emergency department utilization.      CLINICAL IMPRESSION:         1. Suicidal ideation              PLAN:    1. Admit to Hospital  2.   New Prescriptions    No medications on file     3. No follow-up provider specified.        Charlene Duran MD  08/28/24 [id number]

## 2024-08-28 NOTE — HPI
"  Psychiatric Evaluation        Pt presents to ED complaining of depression and SI without a plan x 2 days. Pt also complaining of pain to right foot pain after "being stung by a wasp today."   Pt asking "for the booty juice so he can get some sleep."       24-year-old male with history of schizophrenia presents for psychiatric evaluation.  Patient reporting suicidal ideations but without a plan.  Patient also reports hallucinations states that he was  to famous scott and they are planning a life together    08/28/24: patient resting comfortably with no c/o. Will continue inpatient psych admission for further evaluation and medication management.   "

## 2024-08-28 NOTE — H&P
"PSYCHIATRY INPATIENT ADMISSION NOTE - H & P      8/28/2024 12:04 PM   Bishop Soria   2000   [id number]         DATE OF ADMISSION: 8/28/2024  1:47 AM    SITE: [facility]    CURRENT LEGAL STATUS: PEC and/or CEC      HISTORY    CHIEF COMPLAINT   Bishop Soria is a 24 y.o. male with a past psychiatric history of  bipolar, psychosis  currently admitted to the inpatient unit with the following chief complaint: thoughts of death/suicide    HPI   The patient was seen and examined. The chart was reviewed.    The patient presented to the ER on 8/28/2024 .    The patient was medically cleared and admitted to the U.    Per ED MD:  Chief Complaint   Patient presents with    Psychiatric Evaluation       Pt presents to ED complaining of depression and SI without a plan x 2 days. Pt also complaining of pain to right foot pain after "being stung by a wasp today."   Pt asking "for the booty juice so he can get some sleep."       24-year-old male with history of schizophrenia presents for psychiatric evaluation.  Patient reporting suicidal ideations but without a plan.  Patient also reports hallucinations states that he was  to famous scott and they are planning a life together      Per RN:    24-year-old male with history of schizophrenia presents for psychiatric evaluation.  Patient reporting suicidal ideations but without a plan.  Patient also reports hallucinations and states that he was  to a famous scott, Raul Murphy, and they are planning a life together.  Patient's UDS is positive for THC.  Upon arrival to [facility], patient is cooperative and pleasant.  He is responding to internal stimuli and laughing while at the table,.          Psychiatric interview:  Reports "I need to go to rehab to control my thirst for blood, my family is very gifted, we are born different, we came here to get my blood to drink." Reports "I was spazing out and couldn't wait for rehab... I really want to practice, I keep " "grabbing my throat... being safe with who I am... I am thirsty... you know vampires, werewolves, goblins, mermaids, are real? have you ever seen a siren before? There's a picture on google, if you look up sirens in [place], there's a picture online.. 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 Reports "as many times I can get my hands on a blunt, I do," reports he is smoking weed frequently. Reports, "I can hear a lot of voices, telling me things are coming before they do."              Symptoms of Depression: diminished mood - No, loss of interest/anhedonia - No;  recurrent - No, >14 days - No, diminished energy - No, change in sleep - No, change in appetite - No, diminished concentration or cognition or indecisiveness - No, PMA/R -  No, excessive guilt or hopelessness or worthlessness - No, suicidal ideations - Yes    Changes in Sleep: trouble with initiation- No, maintenance, - No early morning awakening with inability to return to sleep - No, hypersomnolence - No    Suicidal- active/passive ideations - Yes, organized plans, future intentions - No    Homicidal ideations: active/passive ideations - No, organized plans, future intentions - No    Symptoms of psychosis: hallucinations - Yes, delusions - Yes, disorganized speech - Yes, disorganized behavior or abnormal motor behavior - No, or negative symptoms (diminshed emotional expression, avolition, anhedonia, alogia, asociality) - Yes    Symptoms of carley or hypomania: elevated, expansive, or irritable mood with increased energy or activity - Yes; > 4 days -  SOFIYA ,  >7 days -  SOFIYA ; with inflated self-esteem or grandiosity - Yes, decreased need for sleep -  SOFIYA , increased rate of speech - No, FOI or racing thoughts - No, distractibility - No, increased goal directed activity or PMA - No, risky/disinhibited behavior - No    Symptoms of KANU: excessive anxiety/worry/fear, more " "days than not, about numerous issues - No, ongoing for >6 months - No, difficult to control - No, with restlessness - No, fatigue - No, poor concentration - No, irritability - No, muscle tension - No, sleep disturbance - No; causes functionally impairing distress - No    Symptoms of Panic Disorder: recurrent panic attacks (palpitations/heart racing, sweating, shakiness, dyspnea, choking, chest pain/discomfort, Gi symptoms, dizzy/lightheadedness, hot/col flashes, paresthesias, derealization, fear of losing control or fear of dying or fear of "going crazy") - No, precipitated - No, un-precipitated - No, source of worry and/or behavioral changes secondary for 1 month or longer- No, agoraphobia - No    Symptoms of PTSD: h/o trauma exposure - No; re-experiencing/intrusive symptoms - No, avoidant behavior - No, 2 or more negative alterations in cognition or mood - No, 2 or more hyperarousal symptoms - No; with dissociative symptoms - No, ongoing for 1 or more  months - No    Symptoms of OCD: obsessions (recurrent thoughts/urges/images; intrusive and/or unwanted; uses other thoughts/actions to suppress) - No; compulsions (repetitive behaviors used to lower distress/anxiety/obsessions) - No, time-consuming (over 1 hour per day) or cause significant distress/impairment - - No    Symptoms of Anorexia: restriction of caloric intake leading to significantly low body weight - No, intense fear of gaining weight or persistent behavior that interferes with weight gain even thought at a significantly low weight - No, disturbance in the way in which one's body weight or shape is experienced, undue influence of body weight or shape on self evaluation, or persistent lack of recognition of the seriousness of the current low body weight - No    Symptoms of Bulimia: recurrent episodes of binge eating (definitely larger amount  than what others would eat and lack of a sense of control over eating during episode) - No, recurrent " inappropriate compensatory behaviors in order to prevent weight gain (fasting, medications, exercise, vomiting) - No, binges and compensatory behaviors both occur on average at least once a week for 3 months - No, self evaluations is unduly influenced by body shape/weight- - No    Symptoms of Binge eating: recurrent episodes of binge eating (definitely larger amount than what others would eat and lack of a sense of control over eating during episode) - No, 3 or more of following (eating much more rapidly, eating until uncomfortably full, large amounts when not hungry, eating alone because of embarrassed by how much,  feeling disgusted with oneself, depressed or very guilty afterward) - No, distress regarding binges - No, binges occur on average at least once a week for 3 months - No            PAST PSYCHIATRIC HISTORY  Previous Psychiatric Hospitalizations: Yes,extensive  Previous SI/HI: Yes,  Previous Suicide Attempts: Yes,   Previous Medication Trials: Yes,  Psychiatric Care (current & past): Yes,  History of Psychotherapy: Yes,  History of Violence: Yes,  History of sexual/physical abuse: No,       PAST MEDICAL & SURGICAL HISTORY   Past Medical History:   Diagnosis Date    History of psychiatric hospitalization 09/06/2023    [facility] psychiatric care     Psychiatric problem     Schizophrenia     Suicide attempt      History reviewed. No pertinent surgical history.      Home Meds:   Prior to Admission medications    Medication Sig Start Date End Date Taking? Authorizing Provider   divalproex ER (DEPAKOTE ER) 500 MG Tb24 Take 2 tablets (1,000 mg total) by mouth every evening. 5/16/24 5/16/25 Yes Frantz Cagle MD   haloperidol decanoate (HALDOL DECANOATE) 100 mg/mL injection Inject into the muscle. 6/17/24  Yes Provider, Historical   mirtazapine (REMERON) 15 MG tablet Take 1 tablet (15 mg total) by mouth every evening. 5/16/24 5/16/25 Yes Frantz Cagle MD   EScitalopram oxalate  "(LEXAPRO) 10 MG tablet TAKE 1 TABLET BY MOUTH EVERY DAY 7/27/21 5/23/22  Jl Aguilar NP   lithium (LITHOTAB) 300 mg tablet Take 300 mg by mouth 2 (two) times daily. 4/26/22 5/31/22  Provider, Historical         Scheduled Meds:    PRN Meds:   Current Facility-Administered Medications:     acetaminophen, 650 mg, Oral, Q6H PRN    aluminum-magnesium hydroxide-simethicone, 30 mL, Oral, Q6H PRN    docusate sodium, 100 mg, Oral, Daily PRN    hydrOXYzine pamoate, 50 mg, Oral, Nightly PRN    loperamide, 2 mg, Oral, QID PRN    nicotine, 1 patch, Transdermal, Daily PRN    OLANZapine, 10 mg, Oral, Q8H PRN **AND** OLANZapine, 10 mg, Intramuscular, Q8H PRN    sodium chloride 0.9%, 10 mL, Intravenous, PRN   Psychotherapeutics (From admission, onward)      Start     Stop Route Frequency Ordered    08/28/24 0553  OLANZapine tablet 10 mg  (Olanzapine PRN (</= 64 yo))        Placed in "And" Linked Group    -- Oral Every 8 hours PRN 08/28/24 0456    08/28/24 0553  OLANZapine injection 10 mg  (Olanzapine PRN (</= 64 yo))        Placed in "And" Linked Group    -- IM Every 8 hours PRN 08/28/24 0456            ALLERGIES   Review of patient's allergies indicates:  No Known Allergies    NEUROLOGIC HISTORY  Seizures: No  Head trauma: No    SOCIAL HISTORY:  Developmental/Childhood:Achieved all developmental milestones timely  Education: some college  Employment Status/Finances:Employed   Relationship Status/Sexual Orientation: single  Children: 0  Housing Status: Home    history:  NO   Access to Firearms: NO ;  Locked up? n/a  B[employer]:Actively participates in organized Worship  Recreational activities:Exercise     SUBSTANCE ABUSE HISTORY   Recreational Drugs: marijuana   Use of Alcohol: occasional, social use  Rehab History:no   Tobacco Use:no     LEGAL HISTORY:   Past charges/incarcerations: NO  Pending charges:NO     FAMILY PSYCHIATRIC HISTORY   Family History   Family history unknown: Yes      Depression - " father      ROS  Review of Systems   Constitutional:  Negative for chills and fever.   HENT:  Negative for hearing loss.    Eyes:  Negative for blurred vision and double vision.   Respiratory:  Negative for shortness of breath.    Cardiovascular:  Negative for chest pain and palpitations.   Gastrointestinal:  Negative for constipation, diarrhea, nausea and vomiting.   Genitourinary:  Negative for dysuria.   Musculoskeletal:  Negative for back pain and neck pain.   Skin:  Negative for rash.   Neurological:  Negative for dizziness and headaches.   Endo/Heme/Allergies:  Negative for environmental allergies.         EXAMINATION    PHYSICAL EXAM  Reviewed note/exam by Dr. Duran, Charlene SALMERON MD from 08/28/24 0400    VITALS   Vitals:    08/28/24 0724   BP: 115/68   Pulse: 63   Resp: 20   Temp: 97.8 °F (36.6 °C)        Body mass index is 21.48 kg/m².        PAIN  0/10  Subjective report of pain matches objective signs and symptoms: Yes    LABORATORY DATA   Recent Results (from the past 72 hour(s))   Urinalysis, Reflex to Urine Culture Urine, Clean Catch    Collection Time: 08/28/24  2:48 AM    Specimen: Urine, Clean Catch   Result Value Ref Range    Specimen UA Urine, Clean Catch     Color, UA Yellow Yellow, Straw, Ingrid    Appearance, UA Clear Clear    pH, UA 6.0 5.0 - 8.0    Specific Gravity, UA >=1.030 (A) 1.005 - 1.030    Protein, UA Trace (A) Negative    Glucose, UA Negative Negative    Ketones, UA 1+ (A) Negative    Bilirubin (UA) 1+ (A) Negative    Occult Blood UA Negative Negative    Nitrite, UA Negative Negative    Urobilinogen, UA 1.0 <2.0 EU/dL    Leukocytes, UA Trace (A) Negative   Drug screen panel, emergency    Collection Time: 08/28/24  2:48 AM   Result Value Ref Range    Benzodiazepines Negative Negative    Methadone metabolites Negative Negative    Cocaine (Metab.) Negative Negative    Opiate Scrn, Ur Negative Negative    Barbiturate Screen, Ur Negative Negative    Amphetamine Screen, Ur Negative Negative     THC Presumptive Positive (A) Negative    Phencyclidine Negative Negative    Creatinine, Urine 563.0 (H) 23.0 - 375.0 mg/dL    Toxicology Information SEE COMMENT    Urinalysis Microscopic    Collection Time: 08/28/24  2:48 AM   Result Value Ref Range    RBC, UA 2 0 - 4 /hpf    WBC, UA 2 0 - 5 /hpf    Bacteria Negative None-Occ /hpf    Squam Epithel, UA 0 /hpf    Hyaline Casts, UA 4.0 (A) 0-1/lpf /lpf    Microscopic Comment SEE COMMENT    CBC auto differential    Collection Time: 08/28/24  2:49 AM   Result Value Ref Range    WBC 12.26 3.90 - 12.70 K/uL    RBC 4.14 (L) 4.60 - 6.20 M/uL    Hemoglobin 13.3 (L) 14.0 - 18.0 g/dL    Hematocrit 39.0 (L) 40.0 - 54.0 %    MCV 94 82 - 98 fL    MCH 32.1 (H) 27.0 - 31.0 pg    MCHC 34.1 32.0 - 36.0 g/dL    RDW 12.9 11.5 - 14.5 %    Platelets 223 150 - 450 K/uL    MPV 9.5 9.2 - 12.9 fL    Immature Granulocytes 0.2 0.0 - 0.5 %    Gran # (ANC) 6.8 1.8 - 7.7 K/uL    Immature Grans (Abs) 0.03 0.00 - 0.04 K/uL    Lymph # 4.1 1.0 - 4.8 K/uL    Mono # 1.1 (H) 0.3 - 1.0 K/uL    Eos # 0.2 0.0 - 0.5 K/uL    Baso # 0.02 0.00 - 0.20 K/uL    nRBC 0 0 /100 WBC    Gran % 55.4 38.0 - 73.0 %    Lymph % 33.4 18.0 - 48.0 %    Mono % 9.1 4.0 - 15.0 %    Eosinophil % 1.7 0.0 - 8.0 %    Basophil % 0.2 0.0 - 1.9 %    Differential Method Automated    Comprehensive metabolic panel    Collection Time: 08/28/24  2:49 AM   Result Value Ref Range    Sodium 140 136 - 145 mmol/L    Potassium 3.7 3.5 - 5.1 mmol/L    Chloride 102 95 - 110 mmol/L    CO2 30 (H) 23 - 29 mmol/L    Glucose 88 70 - 110 mg/dL    BUN 17 6 - 20 mg/dL    Creatinine 1.4 0.5 - 1.4 mg/dL    Calcium 8.8 8.7 - 10.5 mg/dL    Total Protein 8.1 6.0 - 8.4 g/dL    Albumin 3.8 3.5 - 5.2 g/dL    Total Bilirubin 0.5 0.1 - 1.0 mg/dL    Alkaline Phosphatase 76 55 - 135 U/L    AST 12 10 - 40 U/L    ALT 17 10 - 44 U/L    eGFR >60.0 >60 mL/min/1.73 m^2    Anion Gap 8 3 - 11 mmol/L   TSH    Collection Time: 08/28/24  2:49 AM   Result Value Ref Range    TSH  2.140 0.400 - 4.000 uIU/mL   Ethanol    Collection Time: 08/28/24  2:49 AM   Result Value Ref Range    Alcohol, Serum <3 <10 mg/dL   Acetaminophen level    Collection Time: 08/28/24  2:49 AM   Result Value Ref Range    Acetaminophen (Tylenol), Serum <2.0 (L) 10.0 - 20.0 ug/mL   Salicylate level    Collection Time: 08/28/24  2:49 AM   Result Value Ref Range    Salicylate Lvl 3.5 (L) 15.0 - 30.0 mg/dL   Lipid Panel    Collection Time: 08/28/24  2:49 AM   Result Value Ref Range    Cholesterol 177 120 - 199 mg/dL    Triglycerides 77 30 - 150 mg/dL    HDL 66 40 - 75 mg/dL    LDL Cholesterol 95.6 63.0 - 159.0 mg/dL    HDL/Cholesterol Ratio 37.3 20.0 - 50.0 %    Total Cholesterol/HDL Ratio 2.7 2.0 - 5.0    Non-HDL Cholesterol 111 mg/dL   Hemoglobin A1C    Collection Time: 08/28/24  2:49 AM   Result Value Ref Range    Hemoglobin A1C 4.8 4.0 - 5.6 %    Estimated Avg Glucose 91 68 - 131 mg/dL      Lab Results   Component Value Date    VALPROATE 45 (L) 06/20/2024    CBMZ 11.8 05/07/2023           CONSTITUTIONAL  General Appearance: unremarkable, age appropriate    MUSCULOSKELETAL  Muscle Strength and Tone:no tremor, no tic  Abnormal Involuntary Movements: No  Gait and Station: non-ataxic    PSYCHIATRIC   Level of Consciousness: awake and alert   Orientation: person, place, and situation  Grooming: Casually dressed  Psychomotor Behavior: normal, cooperative  Speech: normal tone, normal rate, normal pitch, normal volume  Language: grossly intact  Mood: fine  Affect: Labile, Inappropriate, and Indifferent (bizarre)  Thought Process: tangential  Associations: intact   Thought Content: +delusions  Perceptions: +AH and denies  VH  Memory: Able to recall past events, Remote intact, and Recent intact  Attention:Attends to interview without distraction  Fund of Knowledge: Aware of current events and Vocabulary appropriate   Estimate if Intelligence:  Average based on work/education history, vocabulary and mental status exam  Insight:  poor awareness of illness  Judgment: behavior is adequate to circumstances      PSYCHOSOCIAL    PSYCHOSOCIAL STRESSORS   occupational and drugs    FUNCTIONING RELATIONSHIPS   poor relationship with parents    STRENGTHS AND LIABILITIES   Strength: Patient accepts guidance/feedback, Strength: Patient is expressive/articulate., Liability: Patient is impulsive., Liability: Patient lacks coping skills.    Is the patient aware of the biomedical complications associated with substance abuse and mental illness? yes    Does the patient have an Advance Directive for Mental Health treatment? no  (If yes, inform patient to bring copy.)        ASSESSMENT         Schizoaffective disorder, bipolar type, severe  SI  Unspecified anxiety disorder  Cannabis abuse  Psychosocial stressors  Nicotine dependence  Hypokalemia     Anemia        PROBLEM LIST AND MANAGEMENT PLANS           Schizoaffective disorder, bipolar type, severe  - retrial risperdal with plan for CARRASCO, start risperdal 0.5 mg PO BID  - resume depakote at 500 mg PO BID, will check level for toxicity  - pt counseled  - follow up with outpatient mental health providers after discharge for medication management and psychotherapy        Suicidal ideations  - continue psychiatric hospitalization  - provide psychotherapeutic interventions and medication management        Unspecified anxiety disorder  - start hydroxyzine 50 mg PO q 6 hours PRN  - pt counseled  - follow up with outpatient mental health providers after discharge for medication management and psychotherapy        Cannabis abuse  - consider rehab  - consider gabapentin  - pt counseled  - follow up with outpatient mental health providers after discharge for medication management and psychotherapy        Psychosocial stressors  - pt counseled  - SW consulted for assistance with additional resources      Nicotine dependence  - start nicotine patch 14 mg PO qd PRN               PRESCRIPTION DRUG MANAGEMENT  Compliance:  unknown  Side Effects: unknown  Regimen Adjustments: see above    Discussed diagnosis, risks and benefits of proposed treatment vs alternative treatments vs no treatment, potential side effects of these treatments and the inherent unpredictability of treatment. The patient expresses understanding of the above and displays the capacity to agree with this treatment given said understanding. Patient also agrees that, currently, the benefits outweigh the risks and would like to pursue/continue treatment at this time.    Any medications being used off-label were discussed with the patient inclusive of the evidence base for the use of the medications and consent was obtained for the off-label use of the medication.         DIAGNOSTIC TESTING  Labs reviewed with patient; follow up pending labs    Disposition:  -Will attempt to obtain outside psychiatric records if available  -SW to assist with aftercare planning and collateral  -Once stable discharge home with outpatient follow up care and/or rehab  -Continue inpatient treatment under a PEC and/or CEC for danger to self/ danger to others/grave disability as evident by danger to self and gravely disabled        The patient location [facility]: [facility]    Visit type: audiovisual    Face to Face time with patient: 30  45 minutes of total time spent on the encounter, which includes face to face time and non-face to face time preparing to see the patient (eg, review of tests), Obtaining and/or reviewing separately obtained history, Documenting clinical information in the electronic or other health record, Independently interpreting results (not separately reported) and communicating results to the patient/family/caregiver, or Care coordination (not separately reported).     Each patient to whom he or she provides medical services by telemedicine is:  (1) informed of the relationship between the physician and patient and the respective role of any other health care provider with  respect to management of the patient; and (2) notified that he or she may decline to receive medical services by telemedicine and may withdraw from such care at any time.          Cristhian Grijalva III, MD  Psychiatry

## 2024-08-28 NOTE — PLAN OF CARE
Problem: Adult Behavioral Health Plan of Care  Goal: Plan of Care Review  8/28/2024 1453 by Pamela Lemus RN  Outcome: Progressing  8/28/2024 1453 by Pamela Lemus RN  Outcome: Progressing  Goal: Patient-Specific Goal (Individualization)  8/28/2024 1453 by Pamela Lemus RN  Outcome: Progressing  8/28/2024 1453 by Pamela Lemus RN  Outcome: Progressing  Goal: Adheres to Safety Considerations for Self and Others  8/28/2024 1453 by Pamela Lemus RN  Outcome: Progressing  8/28/2024 1453 by Pamela Lemus RN  Outcome: Progressing  Goal: Absence of New-Onset Illness or Injury  8/28/2024 1453 by Pamela Lemus RN  Outcome: Progressing  8/28/2024 1453 by Pamela Lemus RN  Outcome: Progressing  Goal: Optimized Coping Skills in Response to Life Stressors  Outcome: Progressing     Problem: Suicidal Behavior  Goal: Suicidal Behavior is Absent or Managed  8/28/2024 1453 by Pamela Lemus RN  Outcome: Progressing  8/28/2024 1453 by Pamela Leums RN  Outcome: Progressing     Problem: Depression  Goal: Improved Mood  8/28/2024 1453 by Pamela Lemus RN  Outcome: Progressing  8/28/2024 1453 by Pamela Lemus RN  Outcome: Progressing     Problem: Excessive Substance Use  Goal: Optimized Energy Level (Excessive Substance Use)  8/28/2024 1453 by Pamela Lemus RN  Outcome: Progressing  8/28/2024 1453 by Pamela Lemus RN  Outcome: Progressing  Goal: Improved Behavioral Control (Excessive Substance Use)  8/28/2024 1453 by Pamela Lemus RN  Outcome: Progressing  8/28/2024 1453 by Pamela Lemus RN  Outcome: Progressing  Goal: Increased Participation and Engagement (Excessive Substance Use)  8/28/2024 1453 by Pamela Lemus RN  Outcome: Progressing  8/28/2024 1453 by Pamela Lemus RN  Outcome: Progressing  Goal: Improved Physiologic Symptoms (Excessive Substance Use)  8/28/2024 1453 by Pamela Lemus RN  Outcome: Progressing  8/28/2024 1453 by Pamela Lemus,  RN  Outcome: Progressing  Goal: Enhanced Social, Occupational or Functional Skills (Excessive Substance Use)  8/28/2024 1453 by Pamela Lemus RN  Outcome: Progressing  8/28/2024 1453 by Pamela Lemus RN  Outcome: Progressing     Problem: Coping Ineffective  Goal: Effective Coping  8/28/2024 1453 by Pamela Lemus RN  Outcome: Progressing  8/28/2024 1453 by Pamela Lemus RN  Outcome: Progressing

## 2024-08-28 NOTE — PLAN OF CARE
Problem: Adult Behavioral Health Plan of Care  Goal: Optimized Coping Skills in Response to Life Stressors  Intervention: Promote Effective Coping Strategies  Flowsheets (Taken 8/28/2024 3491)  Supportive Measures:   problem-solving facilitated   self-reflection promoted   verbalization of feelings encouraged   active listening utilized     Behavior: Pt was responding to internal stimuli during group             Intervention: In this CBT-focused group LMSW facilitated discussion on patients learning about boundaries and how to hold healthy boundaries. The goal is that patients will gain greater awareness into their current boundary styles and feel empowered in their ability to set healthy boundaries with others.  Each patient was given handout 9.4 regarding boundaries.                Response: Pt stated in 3D unhealthy boundaries in 5 D healthy boundaries. Pt was talking and stopped in mid sentence. Pt stated I start thinking about things and it makes me made. Pt then asked SW what kind of boundaries he can implement for anger.           Plan: Continue to encourage pt to participate in process groups to verbalize feelings and develop healthy coping skills.

## 2024-08-28 NOTE — SUBJECTIVE & OBJECTIVE
Past Medical History:   Diagnosis Date    History of psychiatric hospitalization 09/06/2023    [facility] psychiatric care     Psychiatric problem     Schizophrenia     Suicide attempt        History reviewed. No pertinent surgical history.    Review of patient's allergies indicates:  No Known Allergies    No current facility-administered medications on file prior to encounter.     Current Outpatient Medications on File Prior to Encounter   Medication Sig    haloperidol decanoate (HALDOL DECANOATE) 100 mg/mL injection Inject into the muscle.    divalproex ER (DEPAKOTE ER) 500 MG Tb24 Take 2 tablets (1,000 mg total) by mouth every evening.    mirtazapine (REMERON) 15 MG tablet Take 1 tablet (15 mg total) by mouth every evening.    [DISCONTINUED] EScitalopram oxalate (LEXAPRO) 10 MG tablet TAKE 1 TABLET BY MOUTH EVERY DAY    [DISCONTINUED] lithium (LITHOTAB) 300 mg tablet Take 300 mg by mouth 2 (two) times daily.     Family History    Family history is unknown by patient.       Tobacco Use    Smoking status: Every Day     Current packs/day: 1.00     Average packs/day: 1 pack/day for 5.3 years (5.3 ttl pk-yrs)     Types: Cigars, Cigarettes     Start date: 5/10/2019     Passive exposure: Never    Smokeless tobacco: Never   Substance and Sexual Activity    Alcohol use: Yes     Comment: pt reports drinking wine, two-four times monthly or less    Drug use: Yes     Types: Marijuana    Sexual activity: Not Currently     Partners: Male     Review of Systems   Constitutional:  Negative for fatigue and fever.   HENT:  Negative for congestion, ear pain and sore throat.    Eyes:  Negative for pain and discharge.   Respiratory:  Negative for cough, shortness of breath and wheezing.    Gastrointestinal:  Negative for abdominal pain, constipation, diarrhea, nausea and vomiting.   Endocrine: Negative for cold intolerance and heat intolerance.   Genitourinary:  Negative for difficulty urinating, dysuria and frequency.    Musculoskeletal:  Negative for arthralgias.   Allergic/Immunologic: Negative for environmental allergies.   Neurological:  Negative for dizziness, tremors and seizures.   Psychiatric/Behavioral:  Positive for behavioral problems, hallucinations and suicidal ideas.    All other systems reviewed and are negative.    Objective:     Vital Signs (Most Recent):  Temp: 97.8 °F (36.6 °C) (08/28/24 0724)  Pulse: 63 (08/28/24 0724)  Resp: 20 (08/28/24 0724)  BP: 115/68 (08/28/24 0724)  SpO2: 99 % (08/28/24 0724) Vital Signs (24h Range):  Temp:  [97.8 °F (36.6 °C)-98.2 °F (36.8 °C)] 97.8 °F (36.6 °C)  Pulse:  [8-79] 63  Resp:  [16-20] 20  SpO2:  [96 %-99 %] 99 %  BP: (113-127)/(58-77) 115/68     Weight: 75.9 kg (167 lb 5.3 oz)  Body mass index is 21.48 kg/m².     Physical Exam  Vitals and nursing note reviewed.   Constitutional:       Appearance: Normal appearance.   HENT:      Head: Normocephalic and atraumatic.      Nose: Nose normal.      Mouth/Throat:      Mouth: Mucous membranes are moist.      Pharynx: Oropharynx is clear.   Eyes:      Extraocular Movements: Extraocular movements intact.      Conjunctiva/sclera: Conjunctivae normal.      Pupils: Pupils are equal, round, and reactive to light.   Cardiovascular:      Rate and Rhythm: Normal rate and regular rhythm.      Pulses: Normal pulses.      Heart sounds: Normal heart sounds.   Pulmonary:      Effort: Pulmonary effort is normal.      Breath sounds: Normal breath sounds.   Abdominal:      General: Abdomen is flat. Bowel sounds are normal.      Palpations: Abdomen is soft.   Musculoskeletal:         General: Normal range of motion.      Cervical back: Normal range of motion and neck supple.   Skin:     General: Skin is warm and dry.      Capillary Refill: Capillary refill takes less than 2 seconds.      Comments: No rashes on limited skin exam.   Neurological:      General: No focal deficit present.      Mental Status: He is alert and oriented to person, place, and  time.      Cranial Nerves: No cranial nerve deficit.      Comments: I Olfactory:  Sense of smell intact    II Optic:  Pupils equal round react to light.  Vision intact.    III, IV, VI, Ocular motor, Trochlear, Abducens:  Extraocular movements intact    V Trigeminal:  Facial sensation intact facial sensation intact,, muscles of mastication intact muscles of mastication intact, corneal reflex intact, corneal reflex intact    VII Facial:  Muscles of facial expression intact     VIII Vestibular cochlear: Hearing intact vestibular cochlear: Hearing intact    IX Glossopharyngeal:  Gag reflex intact.  Tasting intact.     X Vagus:  Gag reflex intact.    XI Spinal Accessory:  Shoulder shrug intact.  Head rotation intact.    XII Hypoglossal:  Tongue movements intact.     Psychiatric:         Attention and Perception: He perceives auditory hallucinations.         Thought Content: Thought content includes suicidal ideation.         Judgment: Judgment is inappropriate.      Comments: Patient appears depressed              CRANIAL NERVES     CN III, IV, VI   Pupils are equal, round, and reactive to light.       Significant Labs: All pertinent labs within the past 24 hours have been reviewed.    Significant Imaging: I have reviewed all pertinent imaging results/findings within the past 24 hours.

## 2024-08-28 NOTE — PLAN OF CARE
24-year-old male with history of schizophrenia presents for psychiatric evaluation.  Patient reporting suicidal ideations but without a plan.  Patient also reports hallucinations and states that he was  to a famous scott, Raul Murphy, and they are planning a life together.  Patient's UDS is positive for THC.  Upon arrival to [facility], patient is cooperative and pleasant.  He is responding to internal stimuli and laughing while at the table,.  Patient denies pain or discomfort.  Very reluctant to answer questions.  Refuses snack and refuses to call family.  No further concerns.

## 2024-08-28 NOTE — NURSING
POC reviewed this shift and is ongoing.  Pt is cooperative on unit and complaint with medications.  Pt endorses SI, AH, delusions. Pt is RIS, laughing and talking to himself.

## 2024-08-28 NOTE — H&P
"  [facility] Hospital Medicine  History & Physical    Patient Name: Bishop Soria  MRN: [id number]  Patient Class: IP- Psych  Admission Date: 8/28/2024  Attending Physician: No att. providers found   Primary Care Provider: Jose Martin Selby MD (Inactive)         Patient information was obtained from ER records.     Subjective:     Principal Problem:Depression with suicidal ideation    Chief Complaint:   Chief Complaint   Patient presents with    Psychiatric Evaluation     Pt presents to ED complaining of depression and SI without a plan x 2 days. Pt also complaining of pain to right foot pain after "being stung by a wasp today."   Pt asking "for the booty juice so he can get some sleep."         HPI:     Psychiatric Evaluation        Pt presents to ED complaining of depression and SI without a plan x 2 days. Pt also complaining of pain to right foot pain after "being stung by a wasp today."   Pt asking "for the booty juice so he can get some sleep."       24-year-old male with history of schizophrenia presents for psychiatric evaluation.  Patient reporting suicidal ideations but without a plan.  Patient also reports hallucinations states that he was  to famous scott and they are planning a life together    08/28/24: patient resting comfortably with no c/o. Will continue inpatient psych admission for further evaluation and medication management.     Past Medical History:   Diagnosis Date    History of psychiatric hospitalization 09/06/2023    [facility]     Psychiatric problem     Schizophrenia     Suicide attempt        History reviewed. No pertinent surgical history.    Review of patient's allergies indicates:  No Known Allergies    No current facility-administered medications on file prior to encounter.     Current Outpatient Medications on File Prior to Encounter   Medication Sig    haloperidol decanoate (HALDOL DECANOATE) 100 mg/mL injection Inject " into the muscle.    divalproex ER (DEPAKOTE ER) 500 MG Tb24 Take 2 tablets (1,000 mg total) by mouth every evening.    mirtazapine (REMERON) 15 MG tablet Take 1 tablet (15 mg total) by mouth every evening.    [DISCONTINUED] EScitalopram oxalate (LEXAPRO) 10 MG tablet TAKE 1 TABLET BY MOUTH EVERY DAY    [DISCONTINUED] lithium (LITHOTAB) 300 mg tablet Take 300 mg by mouth 2 (two) times daily.     Family History    Family history is unknown by patient.       Tobacco Use    Smoking status: Every Day     Current packs/day: 1.00     Average packs/day: 1 pack/day for 5.3 years (5.3 ttl pk-yrs)     Types: Cigars, Cigarettes     Start date: 5/10/2019     Passive exposure: Never    Smokeless tobacco: Never   Substance and Sexual Activity    Alcohol use: Yes     Comment: pt reports drinking wine, two-four times monthly or less    Drug use: Yes     Types: Marijuana    Sexual activity: Not Currently     Partners: Male     Review of Systems   Constitutional:  Negative for fatigue and fever.   HENT:  Negative for congestion, ear pain and sore throat.    Eyes:  Negative for pain and discharge.   Respiratory:  Negative for cough, shortness of breath and wheezing.    Gastrointestinal:  Negative for abdominal pain, constipation, diarrhea, nausea and vomiting.   Endocrine: Negative for cold intolerance and heat intolerance.   Genitourinary:  Negative for difficulty urinating, dysuria and frequency.   Musculoskeletal:  Negative for arthralgias.   Allergic/Immunologic: Negative for environmental allergies.   Neurological:  Negative for dizziness, tremors and seizures.   Psychiatric/Behavioral:  Positive for behavioral problems, hallucinations and suicidal ideas.    All other systems reviewed and are negative.    Objective:     Vital Signs (Most Recent):  Temp: 97.8 °F (36.6 °C) (08/28/24 0724)  Pulse: 63 (08/28/24 0724)  Resp: 20 (08/28/24 0724)  BP: 115/68 (08/28/24 0724)  SpO2: 99 % (08/28/24 0724) Vital Signs (24h Range):  Temp:   [97.8 °F (36.6 °C)-98.2 °F (36.8 °C)] 97.8 °F (36.6 °C)  Pulse:  [8-79] 63  Resp:  [16-20] 20  SpO2:  [96 %-99 %] 99 %  BP: (113-127)/(58-77) 115/68     Weight: 75.9 kg (167 lb 5.3 oz)  Body mass index is 21.48 kg/m².     Physical Exam  Vitals and nursing note reviewed.   Constitutional:       Appearance: Normal appearance.   HENT:      Head: Normocephalic and atraumatic.      Nose: Nose normal.      Mouth/Throat:      Mouth: Mucous membranes are moist.      Pharynx: Oropharynx is clear.   Eyes:      Extraocular Movements: Extraocular movements intact.      Conjunctiva/sclera: Conjunctivae normal.      Pupils: Pupils are equal, round, and reactive to light.   Cardiovascular:      Rate and Rhythm: Normal rate and regular rhythm.      Pulses: Normal pulses.      Heart sounds: Normal heart sounds.   Pulmonary:      Effort: Pulmonary effort is normal.      Breath sounds: Normal breath sounds.   Abdominal:      General: Abdomen is flat. Bowel sounds are normal.      Palpations: Abdomen is soft.   Musculoskeletal:         General: Normal range of motion.      Cervical back: Normal range of motion and neck supple.   Skin:     General: Skin is warm and dry.      Capillary Refill: Capillary refill takes less than 2 seconds.      Comments: No rashes on limited skin exam.   Neurological:      General: No focal deficit present.      Mental Status: He is alert and oriented to person, place, and time.      Cranial Nerves: No cranial nerve deficit.      Comments: I Olfactory:  Sense of smell intact    II Optic:  Pupils equal round react to light.  Vision intact.    III, IV, VI, Ocular motor, Trochlear, Abducens:  Extraocular movements intact    V Trigeminal:  Facial sensation intact facial sensation intact,, muscles of mastication intact muscles of mastication intact, corneal reflex intact, corneal reflex intact    VII Facial:  Muscles of facial expression intact     VIII Vestibular cochlear: Hearing intact vestibular cochlear:  Hearing intact    IX Glossopharyngeal:  Gag reflex intact.  Tasting intact.     X Vagus:  Gag reflex intact.    XI Spinal Accessory:  Shoulder shrug intact.  Head rotation intact.    XII Hypoglossal:  Tongue movements intact.     Psychiatric:         Attention and Perception: He perceives auditory hallucinations.         Thought Content: Thought content includes suicidal ideation.         Judgment: Judgment is inappropriate.      Comments: Patient appears depressed              CRANIAL NERVES     CN III, IV, VI   Pupils are equal, round, and reactive to light.       Significant Labs: All pertinent labs within the past 24 hours have been reviewed.    Significant Imaging: I have reviewed all pertinent imaging results/findings within the past 24 hours.  Assessment/Plan:     * Depression with suicidal ideation  To be admitted to our inpatient psychiatric unit for further evaluation and management.      Substance abuse  UDS + for THC. Recommend cessation.       Schizoaffective disorder, bipolar type  To be admitted to our inpatient psychiatric unit for further evaluation and management.        VTE Risk Mitigation (From admission, onward)           Ordered     IP VTE LOW RISK PATIENT  Once         08/28/24 1111                                    Regis King Jr, MD  Department of Hospital Medicine  [facility] ([facility])

## 2024-08-29 PROCEDURE — 25000003 PHARM REV CODE 250: Performed by: STUDENT IN AN ORGANIZED HEALTH CARE EDUCATION/TRAINING PROGRAM

## 2024-08-29 PROCEDURE — 25000003 PHARM REV CODE 250: Performed by: PSYCHIATRY & NEUROLOGY

## 2024-08-29 PROCEDURE — 11400000 HC PSYCH PRIVATE ROOM

## 2024-08-29 PROCEDURE — 90833 PSYTX W PT W E/M 30 MIN: CPT | Mod: SA,HB,, | Performed by: PSYCHIATRY & NEUROLOGY

## 2024-08-29 PROCEDURE — 99232 SBSQ HOSP IP/OBS MODERATE 35: CPT | Mod: SA,HB,, | Performed by: PSYCHIATRY & NEUROLOGY

## 2024-08-29 RX ORDER — RISPERIDONE 0.5 MG/1
0.5 TABLET ORAL DAILY
Status: DISCONTINUED | OUTPATIENT
Start: 2024-08-30 | End: 2024-08-30

## 2024-08-29 RX ORDER — RISPERIDONE 1 MG/1
1 TABLET ORAL NIGHTLY
Status: DISCONTINUED | OUTPATIENT
Start: 2024-08-29 | End: 2024-08-30

## 2024-08-29 RX ADMIN — RISPERIDONE 0.5 MG: 0.5 TABLET ORAL at 08:08

## 2024-08-29 RX ADMIN — DIVALPROEX SODIUM 500 MG: 500 TABLET, FILM COATED, EXTENDED RELEASE ORAL at 08:08

## 2024-08-29 RX ADMIN — RISPERIDONE 1 MG: 1 TABLET, FILM COATED ORAL at 08:08

## 2024-08-29 NOTE — PLAN OF CARE
Collateral:       Nabil Soria (Mother) [phone]       Collateral Perception of Problem:     He started acting up and talking crazy on social media.       Previous Psych History/Hospitalizations:    Yes many       Suicide Attempts (how/severity):    Talking like he wanted to harm himself       How long has pt had problems (childhood dx?):    Diagnosed with Bipolar and Schizophrenia.       Impulse issues:    Yes very       History of violence:     No      Drug Use:    Yes, marijuana      Alcohol Use:    No      Legal Issues:    No       Other Pertinent Info:     He has a chemical imbalance when smokes weed, and he knows this.   He knows that he is not supposed to be smoking.   He has the ACT Team.   He was supposed to take a shot tomorrow.   He can't miss that.       Baseline:    Sweet person   He was going to [employer] and Sunday school.       Discharge Plan:    Home with mother

## 2024-08-29 NOTE — PROGRESS NOTES
"PSYCHIATRY DAILY INPATIENT PROGRESS NOTE  SUBSEQUENT HOSPITAL VISIT    ENCOUNTER DATE: 8/29/2024  SITE: [facility]    DATE OF ADMISSION: 8/28/2024  1:47 AM  LENGTH OF STAY: 1 days      CHIEF COMPLAINT   Bishop Soria is a 24 y.o. 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 The chart was reviewed.     Reviewed notes from Rns and MD and labs from the last 24 hours.    The patient's case was discussed with the treatment team/care providers today including Rns and MD    Staff reports no behavioral or management issues.     The patient has been compliant with treatment.      Subjective 08/29/2024       Patient markedly less elevated today compared to yesterday. Affect is flat, vocal quality monotone. He is unable to specifically name the medications that were initiated yesterday, answering instead "Oh you know, nothing new."  He currently denies auditory and visual hallucinations, however staff reported observing patient responding to internal stimuli as recently as yesterday.     Patient gives contradictory responses when discussing rehab, at one point stating that he would like to participate in inpatient rehab, however later stating that he has no intention to discontinue his cannabis use.     No blatantly delusional statements were verbalized during this morning's assessment, again a marked difference compared to yesterday,       The patient denies any side effects to medications.        Interim/overnight events per report/notes:  POC reviewed this shift and is ongoing. Pt is cooperative on unit and complaint with medications. Pt endorses SI, AH, delusions. Pt is RIS, laughing and talking to himself         Psychiatric ROS (observed, reported, or endorsed/denied):  Depressed mood - fluctuating  Interest/pleasure/anhedonia: fluctuating  Guilt/hopelessness/worthlessness - " No  Changes in Sleep - less  Changes in Appetite - No  Changes in Concentration - Continuing  Changes in Energy - fluctuating  PMA/R- No  Suicidal- active/passive ideations - No  Homicidal ideations: active/passive ideations - No    Hallucinations - recently observed by staff  Delusions - recently observed by staff  Disorganized behavior - No  Disorganized speech - recently observed by staff  Negative symptoms - No    Elevated mood - recently observed by staff  Decreased need for sleep - No  Grandiosity - recently observed by staff  Racing thoughts - recently observed by staff  Impulsivity - No  Irritability- No  Increased energy - recently observed by staff  Distractibility - recently observed by staff  Increase in goal-directed activity or PMA- No    Symptoms of KANU - No  Symptoms of Panic Disorder- No  Symptoms of PTSD - No        Overall progress: Patient is showing no improvement on the Unit to date        Psychotherapy:  Target symptoms: substance abuse, mood disorder  Why chosen therapy is appropriate versus another modality: relevant to diagnosis, evidence based practice  Outcome monitoring methods: self-report, observation  Therapeutic intervention type: insight oriented psychotherapy, supportive psychotherapy  Topics discussed/themes: difficulty managing affect in interpersonal relationships, building skills sets for symptom management, substance abuse  The patient's response to the intervention is guarded. The patient's progress toward treatment goals is limited.   Duration of intervention: 16 minutes.        Medical ROS  Review of Systems   Constitutional: Negative.    HENT: Negative.     Eyes: Negative.    Respiratory: Negative.     Cardiovascular: Negative.    Gastrointestinal: Negative.    Genitourinary: Negative.    Musculoskeletal: Negative.    Skin: Negative.    Neurological: Negative.    Endo/Heme/Allergies: Negative.          PAST MEDICAL HISTORY   Past Medical History:   Diagnosis Date     "History of psychiatric hospitalization 09/06/2023    [facility] psychiatric care     Psychiatric problem     Schizophrenia     Suicide attempt            PSYCHOTROPIC MEDICATIONS   Scheduled Meds:   divalproex ER  500 mg Oral BID    risperiDONE  0.5 mg Oral BID     Continuous Infusions:  PRN Meds:.  Current Facility-Administered Medications:     acetaminophen, 650 mg, Oral, Q6H PRN    aluminum-magnesium hydroxide-simethicone, 30 mL, Oral, Q6H PRN    docusate sodium, 100 mg, Oral, Daily PRN    hydrOXYzine pamoate, 50 mg, Oral, Nightly PRN    loperamide, 2 mg, Oral, QID PRN    nicotine, 1 patch, Transdermal, Daily PRN    OLANZapine, 10 mg, Oral, Q8H PRN **AND** OLANZapine, 10 mg, Intramuscular, Q8H PRN    sodium chloride 0.9%, 10 mL, Intravenous, PRN        EXAMINATION    VITALS   Vitals:    08/28/24 0530 08/28/24 0724 08/28/24 1921 08/29/24 0744   BP: (!) 124/58 115/68 120/77 117/68   BP Location:  Left arm Left arm Left arm   Patient Position:  Sitting Sitting Sitting   Pulse: (!) 8 63 60 70   Resp: 16 20 16 18   Temp: 97.9 °F (36.6 °C) 97.8 °F (36.6 °C) 98.3 °F (36.8 °C) 98.4 °F (36.9 °C)   TempSrc:  Oral Oral Oral   SpO2: 98% 99% 98% 98%   Weight: 75.9 kg (167 lb 5.3 oz)      Height: 6' 2" (1.88 m)          Body mass index is 21.48 kg/m².        CONSTITUTIONAL  General Appearance: unremarkable, age appropriate    MUSCULOSKELETAL  Muscle Strength and Tone:no tremor, no tic  Abnormal Involuntary Movements: No  Gait and Station: non-ataxic    PSYCHIATRIC   Level of Consciousness: awake, alert , and oriented  Orientation: person, place, time, and situation  Grooming: Casually dressed and Well groomed  Psychomotor Behavior: normal, cooperative, friendly and cooperative  Speech: normal rate, normal pitch, normal volume, monotone  Language: grossly intact  Mood: fine  Affect: Flat and Constricted  Thought Process: Linear, perhaps superficially  Associations: intact   Thought Content: less delusions, denies SI, " and denies HI  Perceptions: +observed RIS   Memory: Able to recall past events, Remote intact, and Recent intact  Attention:Impaired but improving  Fund of Knowledge: Aware of current events and Vocabulary appropriate   Estimate if Intelligence:  Average based on work/education history, vocabulary and mental status exam  Insight: limited awareness of illness  Judgment: behavior is adequate to circumstances        DIAGNOSTIC TESTING   Laboratory Results  No results found for this or any previous visit (from the past 24 hour(s)).          MEDICAL DECISION MAKING      ASSESSMENT:   Schizoaffective disorder, bipolar type, severe  SI  Unspecified anxiety disorder  Cannabis abuse  Psychosocial stressors  Nicotine dependence  Hypokalemia     Anemia        PROBLEM LIST AND MANAGEMENT PLANS           Schizoaffective disorder, bipolar type, severe  - retrial risperdal with plan for [facility], start risperdal 0.5 mg PO BID-Increase PM dose to 1 mg tonight  - resume depakote at 500 mg PO BID, will check level for toxicity  - pt counseled  - follow up with outpatient mental health providers after discharge for medication management and psychotherapy        Suicidal ideations  - continue psychiatric hospitalization  - provide psychotherapeutic interventions and medication management        Unspecified anxiety disorder  - Continue  hydroxyzine 50 mg PO q 6 hours PRN  - pt counseled  - follow up with outpatient mental health providers after discharge for medication management and psychotherapy        Cannabis abuse  - consider rehab  - consider gabapentin  - pt counseled  - follow up with outpatient mental health providers after discharge for medication management and psychotherapy        Psychosocial stressors  - pt counseled  - SW consulted for assistance with additional resources      Nicotine dependence  - start nicotine patch 14 mg PO qd PRN        Discussed diagnosis, risks and benefits of proposed treatment vs alternative  treatments vs no treatment, potential side effects of these treatments and the inherent unpredictability of treatment. The patient expresses understanding of the above and displays the capacity to agree with this treatment given said understanding. Patient also agrees that, currently, the benefits outweigh the risks and would like to pursue/continue treatment at this time.    Any medications being used off-label were discussed with the patient inclusive of the evidence base for the use of the medications and consent was obtained for the off-label use of the medication.       DISCHARGE PLANNING  Expected Disposition Plan: To be determined      NEED FOR CONTINUED HOSPITALIZATION  Psychiatric illness continues to pose a potential threat to life or bodily function, of self or others, thereby requiring the need for continued inpatient psychiatric hospitalization: Yes, due to: significant psychotic thought disorder, as evidenced by:  Ongoing concerns with grave disability with patient unable to perform basic feeding, hygiene and dressing activities without significant constant support.    Protective inpatient pyschiatric hospitalization required while a safe disposition plan is enacted: Yes    Patient stabilized and ready for discharge from inpatient psychiatric unit: No        STAFF:   Jl Aguilar NP  Psychiatry

## 2024-08-29 NOTE — PLAN OF CARE
08/29/24 0792   Physical Activity   On average, how many days per week do you engage in moderate to strenuous exercise (like a brisk walk)? 4 days   On average, how many minutes do you engage in exercise at this level? 120 min   Financial Resource Strain   How hard is it for you to pay for the very basics like food, housing, medical care, and heating? Not hard   Housing Stability   In the last 12 months, was there a time when you were not able to pay the mortgage or rent on time? N   At any time in the past 12 months, were you homeless or living in a shelter (including now)? N   Transportation Needs   Has the lack of transportation kept you from medical appointments, meetings, work or from getting things needed for daily living? No   Food Insecurity   Within the past 12 months, you worried that your food would run out before you got the money to buy more. Never true   Within the past 12 months, the food you bought just didn't last and you didn't have money to get more. Never true   Stress   Do you feel stress - tense, restless, nervous, or anxious, or unable to sleep at night because your mind is troubled all the time - these days? Not at all   Social Isolation   How often do you feel lonely or isolated from those around you?  Never   Alcohol Use   Q1: How often do you have a drink containing alcohol? Monthly or l   Q2: How many drinks containing alcohol do you have on a typical day when you are drinking? 1 or 2   Q3: How often do you have six or more drinks on one occasion? Never   Utilities   In the past 12 months has the electric, gas, oil, or water company threatened to shut off services in your home? No   Health Literacy   How often do you need to have someone help you when you read instructions, pamphlets, or other written material from your doctor or pharmacy? Never

## 2024-08-29 NOTE — PLAN OF CARE
Plan of care reviewed and ongoing. Patient lethargic, drowsy in room most of the day resting. Cooperative with staff. Compliant with medication. Patient present for all meals, good appetite. Denies any homicidal or suicidal thoughts. No distress noted. Will continue to monitor for patient health and safety.     Problem: Adult Behavioral Health Plan of Care  Goal: Plan of Care Review  Outcome: Progressing  Goal: Patient-Specific Goal (Individualization)  Outcome: Progressing  Goal: Adheres to Safety Considerations for Self and Others  Outcome: Progressing  Goal: Absence of New-Onset Illness or Injury  Outcome: Progressing  Goal: Optimized Coping Skills in Response to Life Stressors  Outcome: Progressing  Goal: Develops/Participates in Therapeutic [place] to Support Successful Transition  Outcome: Progressing  Goal: Rounds/Family Conference  Outcome: Progressing     Problem: Suicidal Behavior  Goal: Suicidal Behavior is Absent or Managed  Outcome: Progressing     Problem: Depression  Goal: Improved Mood  Outcome: Progressing     Problem: Excessive Substance Use  Goal: Optimized Energy Level (Excessive Substance Use)  Outcome: Progressing  Goal: Improved Behavioral Control (Excessive Substance Use)  Outcome: Progressing  Goal: Increased Participation and Engagement (Excessive Substance Use)  Outcome: Progressing  Goal: Improved Physiologic Symptoms (Excessive Substance Use)  Outcome: Progressing  Goal: Enhanced Social, Occupational or Functional Skills (Excessive Substance Use)  Outcome: Progressing     Problem: Coping Ineffective  Goal: Effective Coping  Outcome: Progressing

## 2024-08-29 NOTE — PLAN OF CARE
"Behavioral Health Unit  Psychosocial History and Assessment  Progress Note      Patient Name: Bishop Soria YOB: 2000 SW: Mouna De La Fuente, SSW  Date: 8/29/2024    Chief Complaint: suicidal ideation    Consent:     Did the patient consent for an interview with the ? Yes    Did the patient consent for the  to contact family/friend/caregiver? Nabil Soria (Mother) [phone]    Did the patient give consent for the  to inform family/friend/caregiver of his/her whereabouts or to discuss discharge planning? Yes    Source of Information: Face to face with patient, Telephone interview with family/friend/caregiver, and Chart review    Is information obtained from interviews considered reliable? yes    Reason for Admission:     Active Hospital Problems    Diagnosis  POA    *Depression with suicidal ideation [F32.A, R45.851]  Not Applicable    Substance abuse [F19.10]  Yes    Schizoaffective disorder, bipolar type [F25.0]  Yes      Resolved Hospital Problems   No resolved problems to display.       History of Present Illness - (Patient Perception):     "I wanted to get a fresh start."      History of Present Illness - (Perception of Others):     "He started acting up and talking crazy on social media. Talking like he wanted to harm himself according to Nabil Soria (Mother).       Present biopsychosocial functioning:     Per H&P, "Bishop Soria is a 24 y.o. male with a past psychiatric history of  bipolar, psychosis  currently admitted to the inpatient unit with the following chief complaint: thoughts of death/suicide." Pt denies HI/AVH. Pt's UDS positive for THC. ETOH level was normal. Pt is single and independent with ADLs. Pt lives with his mother. Pt is unemployed. Pt has no children. Pt has good family support. Pt denies any recent stressors. Pt currently receives mental health treatment with [facility] in [place], [place].       Past biopsychosocial " functioning:     Pt last inpatient treatment; [facility] during June of 2024. Per chart review; pt has had a history of inpatient treatments from 0497-4771.       Family and Marital/Relationship History:     Significant Other/Partner Relationships: Single     Family Relationships: Intact      Childhood History:     Where was patient raised? [place], [place]       Who raised the patient? Biological parents       How does patient describe their childhood? Eventful       Who is patient's primary support person? Nabil Soria (Mother)      Culture and Taoist:     Taoist: Unknown    How strong of a role does Jew and spirituality play in patient's life? Strong     Caodaism or spiritual concerns regarding treatment: not applicable     History of Abuse:   History of Abuse: Denies      Outcome: n/a    Psychiatric and Medical History:     History of psychiatric illness or treatment: prior inpatient treatment, has participated in counseling/psychotherapy on an outpatient basis in the past, and currently under psychiatric care    Medical history:   Past Medical History:   Diagnosis Date    History of psychiatric hospitalization 09/06/2023    [facility]     of psychiatric care     Psychiatric problem     Schizophrenia     Suicide attempt        Substance Abuse History:     Alcohol - (Patient Perspective):   Social History     Substance and Sexual Activity   Alcohol Use Yes    Comment: pt reports drinking wine, two-four times monthly or less       Alcohol - (Collateral Perspective): No according to Nabil Soria (Mother).     Drugs - (Patient Perspective):   Social History     Substance and Sexual Activity   Drug Use Yes    Types: Marijuana       Drugs - (Collateral Perspective): Yes, marijuana according to Nabil Soria (Mother).     Additional Comments: He has a chemical imbalance when smokes weed, and he knows this. He knows that he is not supposed to be smoking.     Education:     Currently Enrolled? No   Attended College/Technical School    Special Education? No    Interested in Completing Education/GED: No    Employment and Financial:     Currently employed? Unemployed     Source of Income:  none reported    Able to afford basic needs (food, shelter, utilities)? Yes    Who manages finances/personal affairs? Self       Service:     ? no    Combat Service? No     Community Resources:     Describe present use of community resources: ACT Team with [facility] in [place], [place]      Identify previously used community resources   (Include previous mental health treatment - outpatient and inpatient): Pt last inpatient treatment; [facility] during June of 2024. Per chart review; pt has had a history of inpatient treatments from 7822-0398.     Environmental:     Current living situation:Lives with family, Lives in home    Social Evaluation:     Patient Assets: General fund of knowledge, Supportive family/friends, and Communicable skills    Patient Limitations: lacks coping skills     High risk psychosocial issues that may impact discharge planning: none reported       Recommendations:     Anticipated discharge plan: home with outpatient follow up    High risk issues requiring early treatment planning and immediate intervention: suicidal ideation     Community resources needed for discharge planning: aftercare treatment sources    Anticipated social work role(s) in treatment and discharge planning: SW will facilitate process groups to assist pt develop healthy coping skills; CM will arrange outpatient follow-up appointments and assist with discharge planning.

## 2024-08-30 PROCEDURE — 25000003 PHARM REV CODE 250: Performed by: PSYCHIATRY & NEUROLOGY

## 2024-08-30 PROCEDURE — 25000003 PHARM REV CODE 250: Performed by: INTERNAL MEDICINE

## 2024-08-30 PROCEDURE — 99232 SBSQ HOSP IP/OBS MODERATE 35: CPT | Mod: SA,HB,, | Performed by: PSYCHIATRY & NEUROLOGY

## 2024-08-30 PROCEDURE — 11400000 HC PSYCH PRIVATE ROOM

## 2024-08-30 PROCEDURE — 25000003 PHARM REV CODE 250: Performed by: STUDENT IN AN ORGANIZED HEALTH CARE EDUCATION/TRAINING PROGRAM

## 2024-08-30 PROCEDURE — 90833 PSYTX W PT W E/M 30 MIN: CPT | Mod: SA,HB,, | Performed by: PSYCHIATRY & NEUROLOGY

## 2024-08-30 RX ORDER — RISPERIDONE 1 MG/1
1 TABLET ORAL 2 TIMES DAILY
Status: DISCONTINUED | OUTPATIENT
Start: 2024-08-30 | End: 2024-08-31

## 2024-08-30 RX ADMIN — DIVALPROEX SODIUM 500 MG: 500 TABLET, FILM COATED, EXTENDED RELEASE ORAL at 08:08

## 2024-08-30 RX ADMIN — HYDROXYZINE PAMOATE 50 MG: 50 CAPSULE ORAL at 08:08

## 2024-08-30 RX ADMIN — RISPERIDONE 0.5 MG: 0.5 TABLET ORAL at 08:08

## 2024-08-30 RX ADMIN — DOCUSATE SODIUM 100 MG: 100 CAPSULE, LIQUID FILLED ORAL at 08:08

## 2024-08-30 RX ADMIN — RISPERIDONE 1 MG: 1 TABLET, FILM COATED ORAL at 08:08

## 2024-08-30 NOTE — PLAN OF CARE
Problem: Adult Behavioral Health Plan of Care  Goal: Plan of Care Review  Outcome: Progressing  Goal: Patient-Specific Goal (Individualization)  Outcome: Progressing  Goal: Adheres to Safety Considerations for Self and Others  Outcome: Progressing  Goal: Absence of New-Onset Illness or Injury  Outcome: Progressing  Goal: Optimized Coping Skills in Response to Life Stressors  Outcome: Progressing     Problem: Suicidal Behavior  Goal: Suicidal Behavior is Absent or Managed  Outcome: Progressing     Problem: Depression  Goal: Improved Mood  Outcome: Progressing     Problem: Excessive Substance Use  Goal: Optimized Energy Level (Excessive Substance Use)  Outcome: Progressing  Goal: Improved Behavioral Control (Excessive Substance Use)  Outcome: Progressing  Goal: Increased Participation and Engagement (Excessive Substance Use)  Outcome: Progressing  Goal: Improved Physiologic Symptoms (Excessive Substance Use)  Outcome: Progressing  Goal: Enhanced Social, Occupational or Functional Skills (Excessive Substance Use)  Outcome: Progressing     Problem: Coping Ineffective  Goal: Effective Coping  Outcome: Progressing

## 2024-08-30 NOTE — PLAN OF CARE
08/29/24 1555   Step 1: Warning Signs   Warning Sign 1 Not being heard   Warning Sign 2 My mother   Warning Sign 3 n/a   Step 2: Internal coping strategies - Things I can do to take my mind off my problems without contacting another person:   Coping Strategy 1 Sing   Coping Strategy 2 Dance   Coping Strategy 3 Acting   Step 3: People and social settings that provide distraction:   1. Name Nabil Soria (Mother)       Phone [phone]   2. Name n/a   3. Place n/a    Step 4: People whom I can ask for help:   1. Person Nabil Soria (Mother)       Phone [phone]   Step 5: Professionals or agencies I can contact during a crisis:   1. Clinician Name ACT Team with [facility] in [place]       Phone [phone]   2. Clinician Name Warm Line (Wednesday-Sunday 5pm-10pm)       Phone [phone]   3. Suicide Prevention Lifeline: 988 Suicide Prevention Lifeline: 988   4. Local Emergency Service       911   Step 6: Making the environment safer (plan for lethal means safety)   Safe Environment Plan Take my medicine   Safe Environment Optional: What is most important to me and worth living for? My life   Safety Plan Tasks   Provided a Hard Copy to the Patient Y   Explained How to Follow the Steps Y   Discussed Facilitators and Barriers Y

## 2024-08-30 NOTE — PROGRESS NOTES
"PSYCHIATRY DAILY INPATIENT PROGRESS NOTE  SUBSEQUENT HOSPITAL VISIT    ENCOUNTER DATE: 8/30/2024  SITE: [facility]    DATE OF ADMISSION: 8/28/2024  1:47 AM  LENGTH OF STAY: 2 days      CHIEF COMPLAINT   Bishop Soria is a 24 y.o. 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 The chart was reviewed.     Reviewed notes from Rns and MD and labs from the last 24 hours.    The patient's case was discussed with the treatment team/care providers today including Rns and MD    Staff reports no behavioral or management issues.     The patient has been compliant with treatment.      Subjective 08/30/2024    Patient continues to demonstrate flat affect, somewhat aloof and disinterested during this morning's assessment. Patient states "I brought myself to the hospital, I need rehab." Aside from cannabis, patient reports recently smoking "mojo" with last use being on day of admission. Additionally, patient endorses occasional "pill popping." Asked to elaborate, patient states that he obtains "oxycodone from a family member once every few months" which he states helps him sleep.     Attempted to discuss delusional thought content patient verbalized on day of admission (regarding vampires, drinking blood, etc). While pt does acknowledge having these conversations, he states he does not wish to discuss the topic at this time.          The patient denies any side effects to medications.      Per overnight nursing report:       POC reviewed this shift and is ongoing.  Pt is cooperative with care and complaint with medications.  Pt remained isolated to self and room for this shift, out only for medications.  Pt slept most of the shift.          Psychiatric ROS (observed, reported, or endorsed/denied):  Depressed mood - fluctuating  Interest/pleasure/anhedonia: " fluctuating  Guilt/hopelessness/worthlessness - No  Changes in Sleep - less  Changes in Appetite - No  Changes in Concentration - Continuing  Changes in Energy - fluctuating  PMA/R- No  Suicidal- active/passive ideations - No  Homicidal ideations: active/passive ideations - No    Hallucinations - recently observed by staff  Delusions - recently observed by staff  Disorganized behavior - No  Disorganized speech - recently observed by staff  Negative symptoms - No    Elevated mood - recently observed by staff  Decreased need for sleep - No  Grandiosity - recently observed by staff  Racing thoughts - recently observed by staff  Impulsivity - No  Irritability- No  Increased energy - recently observed by staff  Distractibility - recently observed by staff  Increase in goal-directed activity or PMA- No    Symptoms of KANU - No  Symptoms of Panic Disorder- No  Symptoms of PTSD - No        Overall progress: Patient is showing mild improvement         Psychotherapy:  Target symptoms: substance abuse, mood disorder  Why chosen therapy is appropriate versus another modality: relevant to diagnosis, evidence based practice  Outcome monitoring methods: self-report, observation  Therapeutic intervention type: insight oriented psychotherapy, supportive psychotherapy  Topics discussed/themes: difficulty managing affect in interpersonal relationships, building skills sets for symptom management, substance abuse  The patient's response to the intervention is guarded. The patient's progress toward treatment goals is limited.   Duration of intervention: 16 minutes.        Medical ROS  Review of Systems   Constitutional: Negative.    HENT: Negative.     Eyes: Negative.    Respiratory: Negative.     Cardiovascular: Negative.    Gastrointestinal: Negative.    Genitourinary: Negative.    Musculoskeletal: Negative.    Skin: Negative.    Neurological: Negative.    Endo/Heme/Allergies: Negative.          PAST MEDICAL HISTORY   Past Medical  History:   Diagnosis Date    History of psychiatric hospitalization 09/06/2023    [facility] psychiatric care     Psychiatric problem     Schizophrenia     Suicide attempt            PSYCHOTROPIC MEDICATIONS   Scheduled Meds:   divalproex ER  500 mg Oral BID    risperiDONE  1 mg Oral BID     Continuous Infusions:  PRN Meds:.  Current Facility-Administered Medications:     acetaminophen, 650 mg, Oral, Q6H PRN    aluminum-magnesium hydroxide-simethicone, 30 mL, Oral, Q6H PRN    docusate sodium, 100 mg, Oral, Daily PRN    hydrOXYzine pamoate, 50 mg, Oral, Nightly PRN    loperamide, 2 mg, Oral, QID PRN    nicotine, 1 patch, Transdermal, Daily PRN    OLANZapine, 10 mg, Oral, Q8H PRN **AND** OLANZapine, 10 mg, Intramuscular, Q8H PRN    sodium chloride 0.9%, 10 mL, Intravenous, PRN        EXAMINATION    VITALS   Vitals:    08/28/24 1921 08/29/24 0744 08/29/24 1920 08/30/24 0743   BP: 120/77 117/68 113/68 129/69   BP Location: Left arm Left arm Left arm Left arm   Patient Position: Sitting Sitting Sitting Sitting   Pulse: 60 70 74 74   Resp: 16 18 20 20   Temp: 98.3 °F (36.8 °C) 98.4 °F (36.9 °C) 98.2 °F (36.8 °C) 98.2 °F (36.8 °C)   TempSrc: Oral Oral Oral Oral   SpO2: 98% 98% 99% 96%   Weight:       Height:           Body mass index is 21.48 kg/m².        CONSTITUTIONAL  General Appearance: unremarkable, age appropriate    MUSCULOSKELETAL  Muscle Strength and Tone:no tremor, no tic  Abnormal Involuntary Movements: No  Gait and Station: non-ataxic    PSYCHIATRIC   Level of Consciousness: awake, alert , and oriented  Orientation: person, place, time, and situation  Grooming: Casually dressed and Well groomed  Psychomotor Behavior: normal, cooperative, friendly and cooperative  Speech: normal rate, normal pitch, normal volume, monotone  Language: grossly intact  Mood: fine  Affect: Flat and Constricted  Thought Process: Linear, perhaps superficially  Associations: intact   Thought Content: less delusions, denies  SI, and denies HI  Perceptions: +observed RIS   Memory: Able to recall past events, Remote intact, and Recent intact  Attention:Impaired but improving  Fund of Knowledge: Aware of current events and Vocabulary appropriate   Estimate if Intelligence:  Average based on work/education history, vocabulary and mental status exam  Insight: limited awareness of illness  Judgment: behavior is adequate to circumstances        DIAGNOSTIC TESTING   Laboratory Results  No results found for this or any previous visit (from the past 24 hour(s)).          MEDICAL DECISION MAKING      ASSESSMENT:   Schizoaffective disorder, bipolar type, severe  SI  Unspecified anxiety disorder  Cannabis abuse  Psychosocial stressors  Nicotine dependence  Hypokalemia     Anemia        PROBLEM LIST AND MANAGEMENT PLANS           Schizoaffective disorder, bipolar type, severe  - retrial risperdal with plan for [place], start risperdal 0.5 mg PO BID-Increase PM dose to 1 mg tonight-Increase to 1 mg BID  - resume depakote at 500 mg PO BID, will check level for toxicity in 4 days  - pt counseled  - follow up with outpatient mental health providers after discharge for medication management and psychotherapy        Suicidal ideations  - continue psychiatric hospitalization  - provide psychotherapeutic interventions and medication management        Unspecified anxiety disorder  - Continue  hydroxyzine 50 mg PO q 6 hours PRN  - pt counseled  - follow up with outpatient mental health providers after discharge for medication management and psychotherapy        Cannabis abuse  - consider rehab  - consider gabapentin  - pt counseled  - follow up with outpatient mental health providers after discharge for medication management and psychotherapy        Psychosocial stressors  - pt counseled  - SW consulted for assistance with additional resources      Nicotine dependence  - start nicotine patch 14 mg PO qd PRN        Discussed diagnosis, risks and benefits of  proposed treatment vs alternative treatments vs no treatment, potential side effects of these treatments and the inherent unpredictability of treatment. The patient expresses understanding of the above and displays the capacity to agree with this treatment given said understanding. Patient also agrees that, currently, the benefits outweigh the risks and would like to pursue/continue treatment at this time.    Any medications being used off-label were discussed with the patient inclusive of the evidence base for the use of the medications and consent was obtained for the off-label use of the medication.       DISCHARGE PLANNING  Expected Disposition Plan: To be determined      NEED FOR CONTINUED HOSPITALIZATION  Psychiatric illness continues to pose a potential threat to life or bodily function, of self or others, thereby requiring the need for continued inpatient psychiatric hospitalization: Yes, due to: significant psychotic thought disorder, as evidenced by:  Ongoing concerns with grave disability with patient unable to perform basic feeding, hygiene and dressing activities without significant constant support.    Protective inpatient pyschiatric hospitalization required while a safe disposition plan is enacted: Yes    Patient stabilized and ready for discharge from inpatient psychiatric unit: No        STAFF:   Jl Aguilar NP  Psychiatry

## 2024-08-30 NOTE — NURSING
POC reviewed this shift and is ongoing.  Pt is cooperative with care and complaint with medications.  Pt remained isolated to self and room for this shift, out only for medications.  Pt slept most of the shift.

## 2024-08-30 NOTE — PLAN OF CARE
POC reviewed this shift and is on going. Patient is withdrawn, depressed, calm, cooperative, quiet, pacing, sleeping, and manic. Denies Suicidal Ideation, Homicidal Ideation, Auditory Hallucinations, Visual Hallucinations, Tactile Hallucinations, Gustatory Hallucinations, and Delusions. Patient stayed in his room most of the day sleeping. Patient did come out for his meals. Patient didn't eat his breakfast, he threw it away. Patient did attend the group session that was conducted today. Patient did eat his lunch and supper. Pt continues to be medication compliant and staff will continue to monitor pt closely while on the unit.

## 2024-08-30 NOTE — PLAN OF CARE
"Problem: Adult Behavioral Health Plan of Care  Goal: Optimized Coping Skills in Response to Life Stressors  Intervention: Promote Effective Coping Strategies  Flowsheets (Taken 8/30/2024 [id number])  Supportive Measures:   active listening utilized   positive reinforcement provided   verbalization of feelings encouraged   self-reflection promoted       Behavior: Pt was alert and oriented during group. When SW asked pt how he was doing today, pt responded with "I don't know".             Intervention: In this CBT-focused process group SW facilitated a group discussion on strengths. Pt were asked to [place] strengths using strength exploration handout. Pt were asked to identify how those strengths will help with relationships or recovery.           Response: Pt identified running and patience as strengths. Pt participated in group discussion and completed handout.           Plan: Continue to encourage pt to participate in process groups to verbalize feelings and develop healthy coping skills.                 "

## 2024-08-31 PROCEDURE — 25000003 PHARM REV CODE 250: Performed by: STUDENT IN AN ORGANIZED HEALTH CARE EDUCATION/TRAINING PROGRAM

## 2024-08-31 PROCEDURE — 25000003 PHARM REV CODE 250: Performed by: PSYCHIATRY & NEUROLOGY

## 2024-08-31 PROCEDURE — 25000003 PHARM REV CODE 250: Performed by: INTERNAL MEDICINE

## 2024-08-31 PROCEDURE — 99233 SBSQ HOSP IP/OBS HIGH 50: CPT | Mod: AF,HB,, | Performed by: PSYCHIATRY & NEUROLOGY

## 2024-08-31 PROCEDURE — 90833 PSYTX W PT W E/M 30 MIN: CPT | Mod: AF,HB,, | Performed by: PSYCHIATRY & NEUROLOGY

## 2024-08-31 PROCEDURE — 11400000 HC PSYCH PRIVATE ROOM

## 2024-08-31 RX ORDER — RISPERIDONE 1 MG/1
2 TABLET ORAL 2 TIMES DAILY
Status: DISCONTINUED | OUTPATIENT
Start: 2024-08-31 | End: 2024-09-02

## 2024-08-31 RX ADMIN — DIVALPROEX SODIUM 500 MG: 500 TABLET, FILM COATED, EXTENDED RELEASE ORAL at 08:08

## 2024-08-31 RX ADMIN — RISPERIDONE 1 MG: 1 TABLET, FILM COATED ORAL at 08:08

## 2024-08-31 RX ADMIN — HYDROXYZINE PAMOATE 50 MG: 50 CAPSULE ORAL at 08:08

## 2024-08-31 RX ADMIN — RISPERIDONE 2 MG: 1 TABLET, FILM COATED ORAL at 08:08

## 2024-08-31 NOTE — PROGRESS NOTES
"PSYCHIATRY DAILY INPATIENT PROGRESS NOTE  SUBSEQUENT HOSPITAL VISIT    ENCOUNTER DATE: 8/31/2024  SITE: 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 The chart was reviewed.     Reviewed notes from Rns, NP, and SW and labs from the last 24 hours.    The patient's case was discussed with the treatment team/care providers today including Rns    Staff reports no behavioral or management issues.     The patient has been compliant with treatment.      Subjective 08/31/2024    Per yesterday's notes: Patient continues to demonstrate flat affect, somewhat aloof and disinterested during this morning's assessment. Patient states "I brought myself to the hospital, I need rehab." Aside from cannabis, patient reports recently smoking "mojo" with last use being on day of admission. Additionally, patient endorses occasional "pill popping." Asked to elaborate, patient states that he obtains "oxycodone from a family member once every few months" which he states helps him sleep.   Attempted to discuss delusional thought content patient verbalized on day of admission (regarding vampires, drinking blood, etc). While pt does acknowledge having these conversations, he states he does not wish to discuss the topic at this time.     Today, he reports that he wants rehab. His mood remains labile. Delusions persists with a derailed thought process.   -He reports varying symptoms of depression/anxiety.       The patient denies any side effects to medications.      Psychiatric ROS (observed, reported, or endorsed/denied):  Depressed mood - fluctuating  Interest/pleasure/anhedonia: fluctuating  Guilt/hopelessness/worthlessness - No  Changes in Sleep - less  Changes in Appetite - " No  Changes in Concentration - Continuing  Changes in Energy - fluctuating  PMA/R- No  Suicidal- active/passive ideations - No  Homicidal ideations: active/passive ideations - No    Hallucinations - recently observed by staff  Delusions - recently observed by staff  Disorganized behavior - No  Disorganized speech - recently observed by staff  Negative symptoms - No    Elevated mood - recently observed by staff  Decreased need for sleep - No  Grandiosity - recently observed by staff  Racing thoughts - recently observed by staff  Impulsivity - No  Irritability- No  Increased energy - recently observed by staff  Distractibility - recently observed by staff  Increase in goal-directed activity or PMA- No    Symptoms of KANU - No  Symptoms of Panic Disorder- No  Symptoms of PTSD - No        Overall progress: Patient is showing mild improvement         Psychotherapy:  Target symptoms: substance abuse, mood disorder  Why chosen therapy is appropriate versus another modality: relevant to diagnosis, evidence based practice  Outcome monitoring methods: self-report, observation  Therapeutic intervention type: insight oriented psychotherapy, supportive psychotherapy  Topics discussed/themes: difficulty managing affect in interpersonal relationships, building skills sets for symptom management, substance abuse  The patient's response to the intervention is guarded. The patient's progress toward treatment goals is limited.   Duration of intervention: 16 minutes.        Medical ROS  Review of Systems   Constitutional: Negative.    HENT: Negative.     Eyes: Negative.    Respiratory: Negative.     Cardiovascular: Negative.    Gastrointestinal: Negative.    Genitourinary: Negative.    Musculoskeletal: Negative.    Skin: Negative.    Neurological: Negative.    Endo/Heme/Allergies: Negative.          PAST MEDICAL HISTORY   Past Medical History:   Diagnosis Date    History of psychiatric hospitalization 09/06/2023    [facility] of  psychiatric care     Psychiatric problem     Schizophrenia     Suicide attempt            PSYCHOTROPIC MEDICATIONS   Scheduled Meds:   divalproex ER  500 mg Oral BID    risperiDONE  1 mg Oral BID     Continuous Infusions:  PRN Meds:.  Current Facility-Administered Medications:     acetaminophen, 650 mg, Oral, Q6H PRN    aluminum-magnesium hydroxide-simethicone, 30 mL, Oral, Q6H PRN    docusate sodium, 100 mg, Oral, Daily PRN    hydrOXYzine pamoate, 50 mg, Oral, Nightly PRN    loperamide, 2 mg, Oral, QID PRN    nicotine, 1 patch, Transdermal, Daily PRN    OLANZapine, 10 mg, Oral, Q8H PRN **AND** OLANZapine, 10 mg, Intramuscular, Q8H PRN    sodium chloride 0.9%, 10 mL, Intravenous, PRN        EXAMINATION    VITALS   Vitals:    08/29/24 1920 08/30/24 0743 08/30/24 1905 08/31/24 0738   BP: 113/68 129/69 (!) 125/58 117/86   BP Location: Left arm Left arm Left arm Right arm   Patient Position: Sitting Sitting Sitting Standing   Pulse: 74 74 62 100   Resp: 20 20 16 20   Temp: 98.2 °F (36.8 °C) 98.2 °F (36.8 °C) 98.4 °F (36.9 °C) 98.2 °F (36.8 °C)   TempSrc: Oral Oral Oral Oral   SpO2: 99% 96% 98% 98%   Weight:       Height:           Body mass index is 21.48 kg/m².        CONSTITUTIONAL  General Appearance: unremarkable, age appropriate    MUSCULOSKELETAL  Muscle Strength and Tone:no tremor, no tic  Abnormal Involuntary Movements: No  Gait and Station: non-ataxic    PSYCHIATRIC   Level of Consciousness: awake, alert , and oriented  Orientation: person, place, time, and situation  Grooming: Casually dressed and Well groomed  Psychomotor Behavior: normal, cooperative, friendly and cooperative  Speech: normal rate, normal pitch, normal volume, monotone  Language: grossly intact  Mood: fine  Affect: Flat and Constricted  Thought Process: derailed  Associations: intact   Thought Content: less delusions, denies SI, and denies HI  Perceptions: +observed RIS   Memory: Able to recall past events, Remote intact, and Recent  intact  Attention:Impaired but improving  Fund of Knowledge: Aware of current events and Vocabulary appropriate   Estimate if Intelligence:  Average based on work/education history, vocabulary and mental status exam  Insight: limited awareness of illness  Judgment: behavior is adequate to circumstances        DIAGNOSTIC TESTING   Laboratory Results  No results found for this or any previous visit (from the past 24 hour(s)).          MEDICAL DECISION MAKING      ASSESSMENT:   Schizoaffective disorder, bipolar type, severe  SI  Unspecified anxiety disorder  Cannabis abuse  Psychosocial stressors  Nicotine dependence  Hypokalemia     Anemia        PROBLEM LIST AND MANAGEMENT PLANS           Schizoaffective disorder, bipolar type, severe  - retrial risperdal with plan for CARRASCO, start risperdal 0.5 mg PO BID-Increase PM dose to 1 mg tonight-Increase to 1 mg BID- increase to 1/2 today  - resumed/continue  depakote at 500 mg PO BID, will check level for toxicity in 3 days  - pt counseled  - follow up with outpatient mental health providers after discharge for medication management and psychotherapy        Suicidal ideations  - continue psychiatric hospitalization  - provide psychotherapeutic interventions and medication management        Unspecified anxiety disorder  - Continue  hydroxyzine 50 mg PO q 6 hours PRN  - pt counseled  - follow up with outpatient mental health providers after discharge for medication management and psychotherapy        Cannabis abuse  - consider rehab  - consider gabapentin  - pt counseled  - follow up with outpatient mental health providers after discharge for medication management and psychotherapy        Psychosocial stressors  - pt counseled  - SW consulted for assistance with additional resources      Nicotine dependence  - start nicotine patch 14 mg PO qd PRN        Discussed diagnosis, risks and benefits of proposed treatment vs alternative treatments vs no treatment, potential side  effects of these treatments and the inherent unpredictability of treatment. The patient expresses understanding of the above and displays the capacity to agree with this treatment given said understanding. Patient also agrees that, currently, the benefits outweigh the risks and would like to pursue/continue treatment at this time.    Any medications being used off-label were discussed with the patient inclusive of the evidence base for the use of the medications and consent was obtained for the off-label use of the medication.       DISCHARGE PLANNING  Expected Disposition Plan: To be determined      NEED FOR CONTINUED HOSPITALIZATION  Psychiatric illness continues to pose a potential threat to life or bodily function, of self or others, thereby requiring the need for continued inpatient psychiatric hospitalization: Yes, due to: significant psychotic thought disorder, as evidenced by:  Ongoing concerns with grave disability with patient unable to perform basic feeding, hygiene and dressing activities without significant constant support.    Protective inpatient pyschiatric hospitalization required while a safe disposition plan is enacted: Yes    Patient stabilized and ready for discharge from inpatient psychiatric unit: No        STAFF:   Frantz Cagle MD  Psychiatry

## 2024-08-31 NOTE — PLAN OF CARE
POC reviewed this shift and is on going. Patient is withdrawn, depressed, calm, cooperative, quiet, anxious, and sleeping. Denies Suicidal Ideation, Homicidal Ideation, Auditory Hallucinations, Visual Hallucinations, Tactile Hallucinations, Gustatory Hallucinations, and Delusions. Patient has gotten his appetite back. Patient has been spending a lot of time sleeping in his room. Patient reports he wants to go to Rehab once discharged from the hospital. Pt continues to be medication compliant and staff will continue to monitor pt closely while on the unit.

## 2024-08-31 NOTE — NURSING
POC reviewed this shift and is ongoing.  Pt is cooperative with care and complaint with medications.  Pt spent much of the evening out in common areas, interacting well with peers.  Pt denies SI/HI/AVH, no RIS observed.

## 2024-09-01 PROCEDURE — 25000003 PHARM REV CODE 250: Performed by: STUDENT IN AN ORGANIZED HEALTH CARE EDUCATION/TRAINING PROGRAM

## 2024-09-01 PROCEDURE — 99233 SBSQ HOSP IP/OBS HIGH 50: CPT | Mod: AF,HB,, | Performed by: PSYCHIATRY & NEUROLOGY

## 2024-09-01 PROCEDURE — 11400000 HC PSYCH PRIVATE ROOM

## 2024-09-01 PROCEDURE — 25000003 PHARM REV CODE 250: Performed by: INTERNAL MEDICINE

## 2024-09-01 PROCEDURE — 25000003 PHARM REV CODE 250: Performed by: PSYCHIATRY & NEUROLOGY

## 2024-09-01 RX ADMIN — RISPERIDONE 2 MG: 1 TABLET, FILM COATED ORAL at 08:09

## 2024-09-01 RX ADMIN — HYDROXYZINE PAMOATE 50 MG: 50 CAPSULE ORAL at 08:09

## 2024-09-01 RX ADMIN — DIVALPROEX SODIUM 500 MG: 500 TABLET, FILM COATED, EXTENDED RELEASE ORAL at 08:09

## 2024-09-01 NOTE — PLAN OF CARE
POC reviewed this shift and is ongoing.  Pt sitting in dayroom interacting with peers. Pt still showing signs of delusions, a/v hallucinations. Med compliant with no adverse reactions. Cooperative with staff. Will continue to monitor for changes and safety.

## 2024-09-01 NOTE — PROGRESS NOTES
PSYCHIATRY DAILY INPATIENT PROGRESS NOTE  SUBSEQUENT HOSPITAL VISIT    ENCOUNTER DATE: 9/1/2024  SITE: 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 The chart was reviewed.     Reviewed notes from Rns and labs from the last 24 hours.    The patient's case was discussed with the treatment team/care providers today including Rns    Staff reports no behavioral or management issues.     The patient has been compliant with treatment.      Subjective 09/01/2024      Today, he again reports that he wants rehab. His mood remains labile. Delusions persists with a derailed thought process. +RIS noted by staff  -He reports varying symptoms of depression/anxiety.   -he reported issues with a peers    Reports were obtained that he was on Haldol D with the ACT team- seeking to verify dosage and last date administered       The patient denies any side effects to medications.      Psychiatric ROS (observed, reported, or endorsed/denied):  Depressed mood - fluctuating  Interest/pleasure/anhedonia: fluctuating  Guilt/hopelessness/worthlessness - No  Changes in Sleep - less  Changes in Appetite - No  Changes in Concentration - Continuing  Changes in Energy - fluctuating  PMA/R- No  Suicidal- active/passive ideations - No  Homicidal ideations: active/passive ideations - No    Hallucinations - recently observed by staff  Delusions - recently observed by staff  Disorganized behavior - No  Disorganized speech - recently observed by staff  Negative symptoms - No    Elevated mood - recently observed by staff  Decreased need for sleep - No  Grandiosity - recently observed by staff  Racing thoughts - recently observed by staff  Impulsivity - No  Irritability- No  Increased energy  - recently observed by staff  Distractibility - recently observed by staff  Increase in goal-directed activity or PMA- No    Symptoms of KANU - No  Symptoms of Panic Disorder- No  Symptoms of PTSD - No        Overall progress: Patient is showing mild improvement         Psychotherapy:  Target symptoms: substance abuse, mood disorder  Why chosen therapy is appropriate versus another modality: relevant to diagnosis, evidence based practice  Outcome monitoring methods: self-report, observation  Therapeutic intervention type: insight oriented psychotherapy, supportive psychotherapy  Topics discussed/themes: difficulty managing affect in interpersonal relationships, building skills sets for symptom management, substance abuse  The patient's response to the intervention is guarded. The patient's progress toward treatment goals is limited.   Duration of intervention: 5 minutes.        Medical ROS  Review of Systems   Constitutional: Negative.    HENT: Negative.     Eyes: Negative.    Respiratory: Negative.     Cardiovascular: Negative.    Gastrointestinal: Negative.    Genitourinary: Negative.    Musculoskeletal: Negative.    Skin: Negative.    Neurological: Negative.    Endo/Heme/Allergies: Negative.          PAST MEDICAL HISTORY   Past Medical History:   Diagnosis Date    History of psychiatric hospitalization 09/06/2023    [facility]O     of psychiatric care     Psychiatric problem     Schizophrenia     Suicide attempt            PSYCHOTROPIC MEDICATIONS   Scheduled Meds:   divalproex ER  500 mg Oral BID    risperiDONE  2 mg Oral BID     Continuous Infusions:  PRN Meds:.  Current Facility-Administered Medications:     acetaminophen, 650 mg, Oral, Q6H PRN    aluminum-magnesium hydroxide-simethicone, 30 mL, Oral, Q6H PRN    docusate sodium, 100 mg, Oral, Daily PRN    hydrOXYzine pamoate, 50 mg, Oral, Nightly PRN    loperamide, 2 mg, Oral, QID PRN    nicotine, 1 patch, Transdermal, Daily PRN    OLANZapine, 10 mg, Oral,  Q8H PRN **AND** OLANZapine, 10 mg, Intramuscular, Q8H PRN    sodium chloride 0.9%, 10 mL, Intravenous, PRN        EXAMINATION    VITALS   Vitals:    08/30/24 1905 08/31/24 0738 08/31/24 1908 09/01/24 0722   BP: (!) 125/58 117/86 121/70 127/72   BP Location: Left arm Right arm Left arm Left arm   Patient Position: Sitting Standing Sitting Sitting   Pulse: 62 100 62 65   Resp: 16 20 16 20   Temp: 98.4 °F (36.9 °C) 98.2 °F (36.8 °C) 97.9 °F (36.6 °C) 97.5 °F (36.4 °C)   TempSrc: Oral Oral Oral Oral   SpO2: 98% 98% 97% 100%   Weight:       Height:           Body mass index is 21.48 kg/m².        CONSTITUTIONAL  General Appearance: unremarkable, age appropriate    MUSCULOSKELETAL  Muscle Strength and Tone:no tremor, no tic  Abnormal Involuntary Movements: No  Gait and Station: non-ataxic    PSYCHIATRIC   Level of Consciousness: awake, alert , and oriented  Orientation: person, place, time, and situation  Grooming: Casually dressed and Well groomed  Psychomotor Behavior: normal, cooperative, friendly and cooperative  Speech: normal rate, normal pitch, normal volume, monotone  Language: grossly intact  Mood: fine  Affect: Flat and Constricted  Thought Process: derailed  Associations: intact   Thought Content: less delusions, denies SI, and denies HI  Perceptions: +observed RIS   Memory: Able to recall past events, Remote intact, and Recent intact  Attention:Impaired but improving  Fund of Knowledge: Aware of current events and Vocabulary appropriate   Estimate if Intelligence:  Average based on work/education history, vocabulary and mental status exam  Insight: limited awareness of illness  Judgment: behavior is adequate to circumstances        DIAGNOSTIC TESTING   Laboratory Results  No results found for this or any previous visit (from the past 24 hour(s)).          MEDICAL DECISION MAKING      ASSESSMENT:   Schizoaffective disorder, bipolar type, severe  SI  Unspecified anxiety disorder  Cannabis abuse  Psychosocial  stressors  Nicotine dependence  Hypokalemia     Anemia        PROBLEM LIST AND MANAGEMENT PLANS           Schizoaffective disorder, bipolar type, severe  - retrial risperdal with plan for CARRASCO, start risperdal 0.5 mg PO BID-Increase PM dose to 1 mg tonight-Increase to 1 mg BID- increase to 1/2- increase to 2 mg po BID today  -trying to verify that pt on Haldol D 100 mg IM q month, last given in July?  - resumed/continue  depakote at 500 mg PO BID, will check level for toxicity in 2 days  - pt counseled  - follow up with outpatient mental health providers after discharge for medication management and psychotherapy        Suicidal ideations  - continue psychiatric hospitalization  - provide psychotherapeutic interventions and medication management        Unspecified anxiety disorder  - Continue  hydroxyzine 50 mg PO q 6 hours PRN  - pt counseled  - follow up with outpatient mental health providers after discharge for medication management and psychotherapy        Cannabis abuse  - consider rehab  - consider gabapentin  - pt counseled  - follow up with outpatient mental health providers after discharge for medication management and psychotherapy        Psychosocial stressors  - pt counseled  - SW consulted for assistance with additional resources      Nicotine dependence  - start nicotine patch 14 mg PO qd PRN        Discussed diagnosis, risks and benefits of proposed treatment vs alternative treatments vs no treatment, potential side effects of these treatments and the inherent unpredictability of treatment. The patient expresses understanding of the above and displays the capacity to agree with this treatment given said understanding. Patient also agrees that, currently, the benefits outweigh the risks and would like to pursue/continue treatment at this time.    Any medications being used off-label were discussed with the patient inclusive of the evidence base for the use of the medications and consent was obtained  for the off-label use of the medication.       DISCHARGE PLANNING  Expected Disposition Plan: To be determined      NEED FOR CONTINUED HOSPITALIZATION  Psychiatric illness continues to pose a potential threat to life or bodily function, of self or others, thereby requiring the need for continued inpatient psychiatric hospitalization: Yes, due to: significant psychotic thought disorder, as evidenced by:  Ongoing concerns with grave disability with patient unable to perform basic feeding, hygiene and dressing activities without significant constant support.    Protective inpatient pyschiatric hospitalization required while a safe disposition plan is enacted: Yes    Patient stabilized and ready for discharge from inpatient psychiatric unit: No        STAFF:   Frantz Cagle MD  Psychiatry

## 2024-09-01 NOTE — PLAN OF CARE
POC reviewed this shift and is on going. Patient is withdrawn, calm, cooperative, quiet, anxious, and sleeping. Denies Suicidal Ideation, Homicidal Ideation, Auditory Hallucinations, Visual Hallucinations, Tactile Hallucinations, Gustatory Hallucinations, and Delusions. Patient has been isolating in his room sleeping most of the day. Patient has been seen by this RN responding to internal stimuli. Patient is out for meals. Patient is interested in going to REHAB later this week. Pt continues to be medication compliant and staff will continue to monitor pt closely while on the unit.

## 2024-09-02 LAB — VALPROATE SERPL-MCNC: 80 UG/ML (ref 50–100)

## 2024-09-02 PROCEDURE — 99233 SBSQ HOSP IP/OBS HIGH 50: CPT | Mod: AF,HB,, | Performed by: PSYCHIATRY & NEUROLOGY

## 2024-09-02 PROCEDURE — 25000003 PHARM REV CODE 250: Performed by: STUDENT IN AN ORGANIZED HEALTH CARE EDUCATION/TRAINING PROGRAM

## 2024-09-02 PROCEDURE — 11400000 HC PSYCH PRIVATE ROOM

## 2024-09-02 PROCEDURE — 80164 ASSAY DIPROPYLACETIC ACD TOT: CPT | Performed by: PSYCHIATRY & NEUROLOGY

## 2024-09-02 PROCEDURE — 25000003 PHARM REV CODE 250: Performed by: PSYCHIATRY & NEUROLOGY

## 2024-09-02 PROCEDURE — 36415 COLL VENOUS BLD VENIPUNCTURE: CPT | Performed by: PSYCHIATRY & NEUROLOGY

## 2024-09-02 PROCEDURE — 25000003 PHARM REV CODE 250: Performed by: INTERNAL MEDICINE

## 2024-09-02 RX ORDER — RISPERIDONE 1 MG/1
3 TABLET ORAL 2 TIMES DAILY
Status: DISCONTINUED | OUTPATIENT
Start: 2024-09-02 | End: 2024-09-06 | Stop reason: HOSPADM

## 2024-09-02 RX ADMIN — DIVALPROEX SODIUM 500 MG: 500 TABLET, FILM COATED, EXTENDED RELEASE ORAL at 08:09

## 2024-09-02 RX ADMIN — HYDROXYZINE PAMOATE 50 MG: 50 CAPSULE ORAL at 08:09

## 2024-09-02 RX ADMIN — RISPERIDONE 3 MG: 1 TABLET, FILM COATED ORAL at 08:09

## 2024-09-02 RX ADMIN — RISPERIDONE 2 MG: 1 TABLET, FILM COATED ORAL at 08:09

## 2024-09-02 NOTE — PROGRESS NOTES
PSYCHIATRY DAILY INPATIENT PROGRESS NOTE  SUBSEQUENT HOSPITAL VISIT    ENCOUNTER DATE: 9/2/2024  SITE: 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 The chart was reviewed.     Reviewed notes from Rns and labs from the last 24 hours.    The patient's case was discussed with the treatment team/care providers today including Rns    Staff reports no behavioral or management issues.     The patient has been compliant with treatment.      Subjective 09/02/2024      Today, he again reports that he wants rehab. His mood remains labile but is steadily improving.    Delusions persists, but his thought process is less derailed.   +RIS was again noted by staff  -He reports varying symptoms of depression/anxiety.   -he reported issues with a peers    Reports were obtained that he was on Haldol D with the ACT team- seeking to verify dosage and last date administered       The patient denies any side effects to medications.      Psychiatric ROS (observed, reported, or endorsed/denied):  Depressed mood - decreasing slowly  Interest/pleasure/anhedonia: decreasing slowly  Guilt/hopelessness/worthlessness - No  Changes in Sleep - decreasing slowly  Changes in Appetite - No  Changes in Concentration - decreasing slowly  Changes in Energy - decreasing slowly  PMA/R- No  Suicidal- active/passive ideations - No  Homicidal ideations: active/passive ideations - No    Hallucinations - decreasing slowly  Delusions - decreasing slowly  Disorganized behavior - No  Disorganized speech - decreasing slowly  Negative symptoms - No    Elevated mood - decreasing slowly  Decreased need for sleep - No  Grandiosity - decreasing slowly  Racing thoughts - decreasing slowly  Impulsivity -  No  Irritability- No  Increased energy - decreasing slowly  Distractibility - decreasing slowly  Increase in goal-directed activity or PMA- No    Symptoms of KANU - No  Symptoms of Panic Disorder- No  Symptoms of PTSD - No        Overall progress: Patient is showing mild improvement         Psychotherapy:  Target symptoms: substance abuse, mood disorder  Why chosen therapy is appropriate versus another modality: relevant to diagnosis, evidence based practice  Outcome monitoring methods: self-report, observation  Therapeutic intervention type: insight oriented psychotherapy, supportive psychotherapy  Topics discussed/themes: difficulty managing affect in interpersonal relationships, building skills sets for symptom management, substance abuse  The patient's response to the intervention is guarded. The patient's progress toward treatment goals is limited.   Duration of intervention: 5 minutes.        Medical ROS  Review of Systems   Constitutional: Negative.    HENT: Negative.     Eyes: Negative.    Respiratory: Negative.     Cardiovascular: Negative.    Gastrointestinal: Negative.    Genitourinary: Negative.    Musculoskeletal: Negative.    Skin: Negative.    Neurological: Negative.    Endo/Heme/Allergies: Negative.          PAST MEDICAL HISTORY   Past Medical History:   Diagnosis Date    History of psychiatric hospitalization 09/06/2023    Daniel MAYO     of psychiatric care     Psychiatric problem     Schizophrenia     Suicide attempt            PSYCHOTROPIC MEDICATIONS   Scheduled Meds:   divalproex ER  500 mg Oral BID    risperiDONE  2 mg Oral BID     Continuous Infusions:  PRN Meds:.  Current Facility-Administered Medications:     acetaminophen, 650 mg, Oral, Q6H PRN    aluminum-magnesium hydroxide-simethicone, 30 mL, Oral, Q6H PRN    docusate sodium, 100 mg, Oral, Daily PRN    hydrOXYzine pamoate, 50 mg, Oral, Nightly PRN    loperamide, 2 mg, Oral, QID PRN    nicotine, 1 patch, Transdermal, Daily PRN     OLANZapine, 10 mg, Oral, Q8H PRN **AND** OLANZapine, 10 mg, Intramuscular, Q8H PRN    sodium chloride 0.9%, 10 mL, Intravenous, PRN        EXAMINATION    VITALS   Vitals:    08/31/24 1908 09/01/24 0722 09/01/24 1928 09/02/24 0756   BP: 121/70 127/72 110/64 113/69   BP Location: Left arm Left arm Left arm Left arm   Patient Position: Sitting Sitting Sitting Sitting   Pulse: 62 65 81 64   Resp: 16 20 16 18   Temp: 97.9 °F (36.6 °C) 97.5 °F (36.4 °C) 97.7 °F (36.5 °C) 98.2 °F (36.8 °C)   TempSrc: Oral Oral Oral Oral   SpO2: 97% 100% 99% 99%   Weight:       Height:           Body mass index is 21.48 kg/m².        CONSTITUTIONAL  General Appearance: unremarkable, age appropriate    MUSCULOSKELETAL  Muscle Strength and Tone:no tremor, no tic  Abnormal Involuntary Movements: No  Gait and Station: non-ataxic    PSYCHIATRIC   Level of Consciousness: awake, alert , and oriented  Orientation: person, place, time, and situation  Grooming: Casually dressed and Well groomed  Psychomotor Behavior: normal, cooperative, friendly and cooperative  Speech: normal rate, normal pitch, normal volume, monotone  Language: grossly intact  Mood: fine  Affect: Labile and Constricted  Thought Process: derailed  Associations: intact   Thought Content: less delusions, denies SI, and denies HI  Perceptions: +observed RIS   Memory: Able to recall past events, Remote intact, and Recent intact  Attention:Impaired but improving  Fund of Knowledge: Aware of current events and Vocabulary appropriate   Estimate if Intelligence:  Average based on work/education history, vocabulary and mental status exam  Insight: limited awareness of illness  Judgment: behavior is adequate to circumstances        DIAGNOSTIC TESTING   Laboratory Results  Recent Results (from the past 24 hour(s))   Valproic Acid    Collection Time: 09/02/24  6:48 AM   Result Value Ref Range    Valproic Acid Level 80 50 - 100 ug/mL             MEDICAL DECISION MAKING      ASSESSMENT:    Schizoaffective disorder, bipolar type, severe  SI  Unspecified anxiety disorder  Cannabis abuse  Psychosocial stressors  Nicotine dependence  Hypokalemia     Anemia        PROBLEM LIST AND MANAGEMENT PLANS           Schizoaffective disorder, bipolar type, severe  - retrial risperdal with plan for CARRASCO, start risperdal 0.5 mg PO BID-Increase PM dose to 1 mg tonight-Increase to 1 mg BID- increase to 1/2- increase to 2 mg po BID- increase to 2/3 today then 3 mg po BID tomorrow   -trying to verify that pt on Haldol D 100 mg IM q month, last given in July?- pt requested to stop Haldol and continue Risperdal   - resumed/continue  depakote at 500 mg PO BID, will check level for toxicity in 2 days  - pt counseled  - follow up with outpatient mental health providers after discharge for medication management and psychotherapy        Suicidal ideations  - continue psychiatric hospitalization  - provide psychotherapeutic interventions and medication management        Unspecified anxiety disorder  - Continue  hydroxyzine 50 mg PO q 6 hours PRN  - pt counseled  - follow up with outpatient mental health providers after discharge for medication management and psychotherapy        Cannabis abuse  - consider rehab  - consider gabapentin  - pt counseled  - follow up with outpatient mental health providers after discharge for medication management and psychotherapy        Psychosocial stressors  - pt counseled  - SW consulted for assistance with additional resources      Nicotine dependence  - start nicotine patch 14 mg PO qd PRN        Discussed diagnosis, risks and benefits of proposed treatment vs alternative treatments vs no treatment, potential side effects of these treatments and the inherent unpredictability of treatment. The patient expresses understanding of the above and displays the capacity to agree with this treatment given said understanding. Patient also agrees that, currently, the benefits outweigh the risks and would  like to pursue/continue treatment at this time.    Any medications being used off-label were discussed with the patient inclusive of the evidence base for the use of the medications and consent was obtained for the off-label use of the medication.       DISCHARGE PLANNING  Expected Disposition Plan: rehab once stable      NEED FOR CONTINUED HOSPITALIZATION  Psychiatric illness continues to pose a potential threat to life or bodily function, of self or others, thereby requiring the need for continued inpatient psychiatric hospitalization: Yes, due to: significant psychotic thought disorder, as evidenced by:  Ongoing concerns with grave disability with patient unable to perform basic feeding, hygiene and dressing activities without significant constant support.    Protective inpatient pyschiatric hospitalization required while a safe disposition plan is enacted: Yes    Patient stabilized and ready for discharge from inpatient psychiatric unit: No        STAFF:   Frantz Cagle MD  Psychiatry

## 2024-09-02 NOTE — PLAN OF CARE
POC reviewed this shift and is ongoing.  Pt hyperactive at times, jumping and slapping  the ceiling. Pt going into the shower with out staff permission after other pt's comes out. Pt has already showered. Pt responding to stimuli, +a/v hallucinations. Cooperative with staff and interacting well with peers. Spent most of this shift in dayroom. Pt sleeping well at this time. Med compliant with no adverse reaction noted. Will continue to monitor for changes and safety.

## 2024-09-02 NOTE — PLAN OF CARE
Plan of care reviewed and ongoing. Patient awake and alert. Patient quiet, calm, cooperative with staff. Patient noted pacing hallways, talking to himself, smiling. Patient present for all meals and snacks, good appetite. Minimal communication with peers on unit. Patient reports no symptoms of anxiety or depression. Denies any homicidal or suicidal thoughts at this time. No distress noted. No behavioral issues. Will continue to monitor for patient health and safety.     Problem: Adult Behavioral Health Plan of Care  Goal: Plan of Care Review  Outcome: Progressing  Goal: Patient-Specific Goal (Individualization)  Outcome: Progressing  Goal: Adheres to Safety Considerations for Self and Others  Outcome: Progressing  Goal: Absence of New-Onset Illness or Injury  Outcome: Progressing  Goal: Optimized Coping Skills in Response to Life Stressors  Outcome: Progressing  Goal: Develops/Participates in Therapeutic [place] to Support Successful Transition  Outcome: Progressing  Goal: Rounds/Family Conference  Outcome: Progressing     Problem: Suicidal Behavior  Goal: Suicidal Behavior is Absent or Managed  Outcome: Progressing     Problem: Depression  Goal: Improved Mood  Outcome: Progressing     Problem: Excessive Substance Use  Goal: Optimized Energy Level (Excessive Substance Use)  Outcome: Progressing  Goal: Improved Behavioral Control (Excessive Substance Use)  Outcome: Progressing  Goal: Increased Participation and Engagement (Excessive Substance Use)  Outcome: Progressing  Goal: Improved Physiologic Symptoms (Excessive Substance Use)  Outcome: Progressing  Goal: Enhanced Social, Occupational or Functional Skills (Excessive Substance Use)  Outcome: Progressing     Problem: Coping Ineffective  Goal: Effective Coping  Outcome: Progressing

## 2024-09-03 PROCEDURE — 90833 PSYTX W PT W E/M 30 MIN: CPT | Mod: SA,HB,, | Performed by: PSYCHIATRY & NEUROLOGY

## 2024-09-03 PROCEDURE — 25000003 PHARM REV CODE 250: Performed by: PSYCHIATRY & NEUROLOGY

## 2024-09-03 PROCEDURE — 11400000 HC PSYCH PRIVATE ROOM

## 2024-09-03 PROCEDURE — 25000003 PHARM REV CODE 250: Performed by: INTERNAL MEDICINE

## 2024-09-03 PROCEDURE — 25000003 PHARM REV CODE 250: Performed by: STUDENT IN AN ORGANIZED HEALTH CARE EDUCATION/TRAINING PROGRAM

## 2024-09-03 PROCEDURE — 99232 SBSQ HOSP IP/OBS MODERATE 35: CPT | Mod: SA,HB,, | Performed by: PSYCHIATRY & NEUROLOGY

## 2024-09-03 RX ORDER — ONDANSETRON 4 MG/1
4 TABLET, FILM COATED ORAL EVERY 8 HOURS PRN
Status: DISCONTINUED | OUTPATIENT
Start: 2024-09-03 | End: 2024-09-03

## 2024-09-03 RX ORDER — PROMETHAZINE HYDROCHLORIDE 25 MG/1
25 TABLET ORAL EVERY 6 HOURS PRN
Status: DISCONTINUED | OUTPATIENT
Start: 2024-09-03 | End: 2024-09-06 | Stop reason: HOSPADM

## 2024-09-03 RX ORDER — ONDANSETRON 4 MG/1
4 TABLET, ORALLY DISINTEGRATING ORAL EVERY 8 HOURS PRN
Status: DISCONTINUED | OUTPATIENT
Start: 2024-09-03 | End: 2024-09-06 | Stop reason: HOSPADM

## 2024-09-03 RX ADMIN — DIVALPROEX SODIUM 500 MG: 500 TABLET, FILM COATED, EXTENDED RELEASE ORAL at 08:09

## 2024-09-03 RX ADMIN — HYDROXYZINE PAMOATE 50 MG: 50 CAPSULE ORAL at 09:09

## 2024-09-03 RX ADMIN — RISPERIDONE 3 MG: 1 TABLET, FILM COATED ORAL at 09:09

## 2024-09-03 RX ADMIN — DIVALPROEX SODIUM 500 MG: 500 TABLET, FILM COATED, EXTENDED RELEASE ORAL at 09:09

## 2024-09-03 RX ADMIN — RISPERIDONE 3 MG: 1 TABLET, FILM COATED ORAL at 08:09

## 2024-09-03 NOTE — PROGRESS NOTES
"PSYCHIATRY DAILY INPATIENT PROGRESS NOTE  SUBSEQUENT HOSPITAL VISIT    ENCOUNTER DATE: 9/3/2024  SITE: 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 The chart was reviewed.     Reviewed notes from Rns and labs from the last 24 hours.    The patient's case was discussed with the treatment team/care providers today including Rns    Staff reports no behavioral or management issues.     The patient has been compliant with treatment.      Subjective 09/03/2024    Patient states today that he decided he no longer wants to go to rehab, adding "I spoke to my family, my immediate family, and we feel like it's something we're going to manage ourselves." Insight is limited: Patient does acknowledge using cannabis to self-medicate (namely insomnia), but does not feel there is any connection to his cannabis use and frequency of hospitalization.     Pt remains delusional, stating that he is "half vampire and half nicole." He states that, while he would like to "try tasting blood" (as well as requesting that blood be provided to him by the hospital), he does not feel that consuming blood is necessary for survival and does not feel he is at risk to others or himself.     Patient reports that he "talks to himself" frequently, but adds "Everyone does that sometimes. Talks to their inner voice."    He denies SE to current medication regimen.           Psychiatric ROS (observed, reported, or endorsed/denied):  Depressed mood - decreasing slowly  Interest/pleasure/anhedonia: decreasing slowly  Guilt/hopelessness/worthlessness - No  Changes in Sleep - decreasing slowly  Changes in Appetite - No  Changes in Concentration - decreasing slowly  Changes in Energy - decreasing " slowly  PMA/R- No  Suicidal- active/passive ideations - No  Homicidal ideations: active/passive ideations - No    Hallucinations - fluctuating  Delusions - Continuing  Disorganized behavior - No  Disorganized speech - decreasing slowly  Negative symptoms - No    Elevated mood - decreasing slowly  Decreased need for sleep - No  Grandiosity - decreasing slowly  Racing thoughts - decreasing slowly  Impulsivity - No  Irritability- No  Increased energy - decreasing slowly  Distractibility - decreasing slowly  Increase in goal-directed activity or PMA- No    Symptoms of KANU - No  Symptoms of Panic Disorder- No  Symptoms of PTSD - No        Overall progress: Patient is showing mild improvement         Psychotherapy:  Target symptoms: substance abuse, mood disorder  Why chosen therapy is appropriate versus another modality: relevant to diagnosis, evidence based practice  Outcome monitoring methods: self-report, observation  Therapeutic intervention type: insight oriented psychotherapy, supportive psychotherapy  Topics discussed/themes: difficulty managing affect in interpersonal relationships, building skills sets for symptom management, substance abuse  The patient's response to the intervention is accepting. The patient's progress toward treatment goals is limited.   Duration of intervention: 35 minutes.        Medical ROS  Review of Systems   Constitutional: Negative.    HENT: Negative.     Eyes: Negative.    Respiratory: Negative.     Cardiovascular: Negative.    Gastrointestinal: Negative.    Genitourinary: Negative.    Musculoskeletal: Negative.    Skin: Negative.    Neurological: Negative.    Endo/Heme/Allergies: Negative.          PAST MEDICAL HISTORY   Past Medical History:   Diagnosis Date    History of psychiatric hospitalization 09/06/2023    Daniel MAYO     of psychiatric care     Psychiatric problem     Schizophrenia     Suicide attempt            PSYCHOTROPIC MEDICATIONS   Scheduled Meds:   divalproex ER   500 mg Oral BID    risperiDONE  3 mg Oral BID     Continuous Infusions:  PRN Meds:.  Current Facility-Administered Medications:     acetaminophen, 650 mg, Oral, Q6H PRN    aluminum-magnesium hydroxide-simethicone, 30 mL, Oral, Q6H PRN    docusate sodium, 100 mg, Oral, Daily PRN    hydrOXYzine pamoate, 50 mg, Oral, Nightly PRN    loperamide, 2 mg, Oral, QID PRN    nicotine, 1 patch, Transdermal, Daily PRN    OLANZapine, 10 mg, Oral, Q8H PRN **AND** OLANZapine, 10 mg, Intramuscular, Q8H PRN    sodium chloride 0.9%, 10 mL, Intravenous, PRN        EXAMINATION    VITALS   Vitals:    09/01/24 1928 09/02/24 0756 09/02/24 1925 09/03/24 0753   BP: 110/64 113/69 113/72 125/72   BP Location: Left arm Left arm Left arm Left arm   Patient Position: Sitting Sitting Sitting Sitting   Pulse: 81 64 82 63   Resp: 16 18 16 18   Temp: 97.7 °F (36.5 °C) 98.2 °F (36.8 °C) 97.8 °F (36.6 °C) 98 °F (36.7 °C)   TempSrc: Oral Oral Oral Oral   SpO2: 99% 99% 97% 99%   Weight:       Height:           Body mass index is 21.48 kg/m².        CONSTITUTIONAL  General Appearance: unremarkable, age appropriate    MUSCULOSKELETAL  Muscle Strength and Tone:no tremor, no tic  Abnormal Involuntary Movements: No  Gait and Station: non-ataxic    PSYCHIATRIC   Level of Consciousness: awake, alert , and oriented  Orientation: person, place, time, and situation  Grooming: Casually dressed and Well groomed  Psychomotor Behavior: normal, cooperative, friendly and cooperative  Speech: normal rate, normal pitch, normal volume, monotone  Language: grossly intact  Mood: fine  Affect: Appropriate  Thought Process: derailed  Associations: intact   Thought Content: +delusions, denies SI, and denies HI  Perceptions: +observed RIS   Memory: Able to recall past events, Remote intact, and Recent intact  Attention:Impaired but improving  Fund of Knowledge: Aware of current events and Vocabulary appropriate   Estimate if Intelligence:  Average based on work/education history,  vocabulary and mental status exam  Insight: limited awareness of illness  Judgment: behavior is adequate to circumstances        DIAGNOSTIC TESTING   Laboratory Results  No results found for this or any previous visit (from the past 24 hour(s)).            MEDICAL DECISION MAKING      ASSESSMENT:   Schizoaffective disorder, bipolar type, severe  SI  Unspecified anxiety disorder  Cannabis abuse  Psychosocial stressors  Nicotine dependence  Hypokalemia     Anemia        PROBLEM LIST AND MANAGEMENT PLANS           Schizoaffective disorder, bipolar type, severe  - retrial risperdal with plan for CARRASCO, start risperdal 0.5 mg PO BID-Increase PM dose to 1 mg tonight-Increase to 1 mg BID- increase to 1/2- increase to 2 mg po BID- increase to 2/3 today then 3 mg po BID tomorrow - Continue  -trying to verify that pt on Haldol D 100 mg IM q month, last given in July?- pt requested to stop Haldol and continue Risperdal   - resumed/continue  depakote at 500 mg PO BID, will check level for toxicity in 2 days- Level 80, continue current dose  - pt counseled  - follow up with outpatient mental health providers after discharge for medication management and psychotherapy        Suicidal ideations  - continue psychiatric hospitalization  - provide psychotherapeutic interventions and medication management        Unspecified anxiety disorder  - Continue  hydroxyzine 50 mg PO q 6 hours PRN  - pt counseled  - follow up with outpatient mental health providers after discharge for medication management and psychotherapy        Cannabis abuse  - consider rehab  - consider gabapentin  - pt counseled  - follow up with outpatient mental health providers after discharge for medication management and psychotherapy        Psychosocial stressors  - pt counseled  - SW consulted for assistance with additional resources      Nicotine dependence  - start nicotine patch 14 mg PO qd PRN        Discussed diagnosis, risks and benefits of proposed treatment  vs alternative treatments vs no treatment, potential side effects of these treatments and the inherent unpredictability of treatment. The patient expresses understanding of the above and displays the capacity to agree with this treatment given said understanding. Patient also agrees that, currently, the benefits outweigh the risks and would like to pursue/continue treatment at this time.    Any medications being used off-label were discussed with the patient inclusive of the evidence base for the use of the medications and consent was obtained for the off-label use of the medication.       DISCHARGE PLANNING  Expected Disposition Plan: rehab once stable      NEED FOR CONTINUED HOSPITALIZATION  Psychiatric illness continues to pose a potential threat to life or bodily function, of self or others, thereby requiring the need for continued inpatient psychiatric hospitalization: Yes, due to: significant psychotic thought disorder, as evidenced by:  Ongoing concerns with grave disability with patient unable to perform basic feeding, hygiene and dressing activities without significant constant support.    Protective inpatient pyschiatric hospitalization required while a safe disposition plan is enacted: Yes    Patient stabilized and ready for discharge from inpatient psychiatric unit: No        STAFF:   Jl Aguilar NP  Psychiatry

## 2024-09-03 NOTE — PLAN OF CARE
Problem: Adult Behavioral Health Plan of Care  Goal: Optimized Coping Skills in Response to Life Stressors  Intervention: Promote Effective Coping Strategies  Flowsheets (Taken 9/3/2024 5989)  Supportive Measures:   active listening utilized   self-responsibility promoted   problem-solving facilitated   self-reflection promoted       Behavior:  Pt was engaged during group. Pt stated he was doing good.           Intervention:  In this CBT-focused process group SW facilitated a group discussion on the 5 basic keys to effective communication. Patients will engage by pairing off completing a back to back drawing activity; then roles would be reversed. SW will engage in group discussion about the 5 basic keys and a list of discussion questions regarding activity.          Response:  Pt engaged in activity. Pt identified self as a good communicator and listener. Pt did not identify any needed improvements in communication.         Plan:  Continue to encourage pt to participate in process groups to verbalize feelings and develop healthy coping skills.

## 2024-09-03 NOTE — NURSING
POC reviewed this shift and is ongoing.  Pt is cooperative with care and complaint with medications.  Pt denies SI/HI/AVH but , pt remains RIS.

## 2024-09-03 NOTE — PLAN OF CARE
Plan of care reviewed. Patient awake and alert. Patient continues to pace hallways talking to himself. Patient out in activity room interacting with peers. Patient denies any homicidal or suicidal thoughts. No signs of anxiety or depression noted. Present for all meals, good appetite. Cooperative with staff, compliant with medication. No behavioral issues noted. Plan of care ongoing.     Problem: Adult Behavioral Health Plan of Care  Goal: Plan of Care Review  Outcome: Progressing  Goal: Patient-Specific Goal (Individualization)  Outcome: Progressing  Goal: Adheres to Safety Considerations for Self and Others  Outcome: Progressing  Goal: Absence of New-Onset Illness or Injury  Outcome: Progressing  Goal: Optimized Coping Skills in Response to Life Stressors  Outcome: Progressing  Goal: Develops/Participates in Therapeutic [place] to Support Successful Transition  Outcome: Progressing  Goal: Rounds/Family Conference  Outcome: Progressing     Problem: Suicidal Behavior  Goal: Suicidal Behavior is Absent or Managed  Outcome: Progressing     Problem: Depression  Goal: Improved Mood  Outcome: Progressing     Problem: Excessive Substance Use  Goal: Optimized Energy Level (Excessive Substance Use)  Outcome: Progressing  Goal: Improved Behavioral Control (Excessive Substance Use)  Outcome: Progressing  Goal: Increased Participation and Engagement (Excessive Substance Use)  Outcome: Progressing  Goal: Improved Physiologic Symptoms (Excessive Substance Use)  Outcome: Progressing  Goal: Enhanced Social, Occupational or Functional Skills (Excessive Substance Use)  Outcome: Progressing     Problem: Coping Ineffective  Goal: Effective Coping  Outcome: Progressing

## 2024-09-04 PROCEDURE — 99232 SBSQ HOSP IP/OBS MODERATE 35: CPT | Mod: SA,HB,, | Performed by: PSYCHIATRY & NEUROLOGY

## 2024-09-04 PROCEDURE — 25000003 PHARM REV CODE 250: Performed by: INTERNAL MEDICINE

## 2024-09-04 PROCEDURE — 25000003 PHARM REV CODE 250: Performed by: STUDENT IN AN ORGANIZED HEALTH CARE EDUCATION/TRAINING PROGRAM

## 2024-09-04 PROCEDURE — 25000003 PHARM REV CODE 250: Performed by: PSYCHIATRY & NEUROLOGY

## 2024-09-04 PROCEDURE — 90833 PSYTX W PT W E/M 30 MIN: CPT | Mod: SA,HB,, | Performed by: PSYCHIATRY & NEUROLOGY

## 2024-09-04 PROCEDURE — 11400000 HC PSYCH PRIVATE ROOM

## 2024-09-04 RX ADMIN — RISPERIDONE 3 MG: 1 TABLET, FILM COATED ORAL at 08:09

## 2024-09-04 RX ADMIN — DIVALPROEX SODIUM 500 MG: 500 TABLET, FILM COATED, EXTENDED RELEASE ORAL at 08:09

## 2024-09-04 RX ADMIN — HYDROXYZINE PAMOATE 50 MG: 50 CAPSULE ORAL at 08:09

## 2024-09-04 NOTE — PLAN OF CARE
Problem: Adult Behavioral Health Plan of Care  Goal: Optimized Coping Skills in Response to Life Stressors  Intervention: Promote Effective Coping Strategies  Flowsheets (Taken 9/4/2024 3573)  Supportive Measures:   active listening utilized   verbalization of feelings encouraged   self-reflection promoted   positive reinforcement provided       Behavior: Pt was alert, oriented, and engaged during group. Pt stated he was feeling fine today.             Intervention: In this CBT-focused group LMSW facilitated discussion on the importance of values and the importance of prioritizing them.  Each patient was asked to discuss what values are important to them, where do values come from, and prioritize their own values from 1-10 in order to meet their treatment goals. Each patient was asked to complete the Personal Values worksheet and the Values Clarification worksheet.           Response: Pt participated in group discussion and completed both worksheets. Pt was able to identify several values that were important to him: love, life, support sytem, connections, peace, knowledge, stability, respect, and honesty.           Plan: Continue to encourage pt to participate in process groups to verbalize feelings and develop healthy coping skills.

## 2024-09-04 NOTE — NURSING
POC reviewed this shift and is ongoing.  Pt is cooperative with care and complaint with medications.  Pt denies SI/HI/AVH, remains RIS.

## 2024-09-04 NOTE — PLAN OF CARE
POC reviewed this shift and is on going. Patient is calm, cooperative, quiet, anxious, and pacing. Denies Suicidal Ideation, Homicidal Ideation, Auditory Hallucinations, Visual Hallucinations, Tactile Hallucinations, Gustatory Hallucinations, and Delusions. Patient reports not hearing or seeing anything that is not there and the Patient continues to react to internal stimuli. Patient also states that he is a Vampire. Pt continues to be medication compliant and staff will continue to monitor pt closely while on the unit.

## 2024-09-04 NOTE — PROGRESS NOTES
"PSYCHIATRY DAILY INPATIENT PROGRESS NOTE  SUBSEQUENT HOSPITAL VISIT    ENCOUNTER DATE: 9/4/2024  SITE: 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 The chart was reviewed.     Reviewed notes from Rns and labs from the last 24 hours.    The patient's case was discussed with the treatment team/care providers today including Rns    Staff reports no behavioral or management issues.     The patient has been compliant with treatment.      Subjective 09/04/2024      Patient calm, pleasant, no blatantly delusional statements made during this morning's assessment. He states that he once again changed his mind and would like to go to rehab for "weed and synthetics" as well as "to maybe quit smoking." Substance use was discussed in depth. Today he seems more receptive to the idea that his drug use may be contributing to ongoing mental health issues/frequency of hospitalizations. Discussed risks of THC regarding exacerbation of MH symptoms. Pt responded "I knew that could happen with synthetics, I didn't think that would happen with the stuff I smoke from the dispensary."    Discussed medication regimen. CARRASCO was offered, however patient declined stating he would prefer to continue PO risperdal.          Psychiatric ROS (observed, reported, or endorsed/denied):  Depressed mood - decreasing slowly  Interest/pleasure/anhedonia: decreasing slowly  Guilt/hopelessness/worthlessness - No  Changes in Sleep - decreasing slowly  Changes in Appetite - No  Changes in Concentration - decreasing slowly  Changes in Energy - decreasing slowly  PMA/R- No  Suicidal- active/passive ideations - No  Homicidal ideations: active/passive ideations - No    Hallucinations - " fluctuating  Delusions - improving minimally  Disorganized behavior - No  Disorganized speech - decreasing slowly  Negative symptoms - No    Elevated mood - decreasing steadily  Decreased need for sleep - No  Grandiosity - decreasing slowly  Racing thoughts - decreasing slowly  Impulsivity - No  Irritability- No  Increased energy - decreasing slowly  Distractibility - decreasing slowly  Increase in goal-directed activity or PMA- No    Symptoms of KANU - No  Symptoms of Panic Disorder- No  Symptoms of PTSD - No        Overall progress: Patient is showing mild improvement         Psychotherapy:  Target symptoms: substance abuse, mood disorder  Why chosen therapy is appropriate versus another modality: relevant to diagnosis, evidence based practice  Outcome monitoring methods: self-report, observation  Therapeutic intervention type: insight oriented psychotherapy, supportive psychotherapy  Topics discussed/themes: difficulty managing affect in interpersonal relationships, building skills sets for symptom management, substance abuse  The patient's response to the intervention is accepting. The patient's progress toward treatment goals is fair.   Duration of intervention: 35 minutes.        Medical ROS  Review of Systems   Constitutional: Negative.    HENT: Negative.     Eyes: Negative.    Respiratory: Negative.     Cardiovascular: Negative.    Gastrointestinal: Negative.    Genitourinary: Negative.    Musculoskeletal: Negative.    Skin: Negative.    Neurological: Negative.    Endo/Heme/Allergies: Negative.          PAST MEDICAL HISTORY   Past Medical History:   Diagnosis Date    History of psychiatric hospitalization 09/06/2023    Daniel MAYO     of psychiatric care     Psychiatric problem     Schizophrenia     Suicide attempt            PSYCHOTROPIC MEDICATIONS   Scheduled Meds:   divalproex ER  500 mg Oral BID    risperiDONE  3 mg Oral BID     Continuous Infusions:  PRN Meds:.  Current Facility-Administered  "Medications:     acetaminophen, 650 mg, Oral, Q6H PRN    aluminum-magnesium hydroxide-simethicone, 30 mL, Oral, Q6H PRN    docusate sodium, 100 mg, Oral, Daily PRN    hydrOXYzine pamoate, 50 mg, Oral, Nightly PRN    loperamide, 2 mg, Oral, QID PRN    nicotine, 1 patch, Transdermal, Daily PRN    OLANZapine, 10 mg, Oral, Q8H PRN **AND** OLANZapine, 10 mg, Intramuscular, Q8H PRN    ondansetron, 4 mg, Oral, Q8H PRN    promethazine, 25 mg, Oral, Q6H PRN    sodium chloride 0.9%, 10 mL, Intravenous, PRN        EXAMINATION    VITALS   Vitals:    09/02/24 1925 09/03/24 0753 09/03/24 1922 09/04/24 0731   BP: 113/72 125/72 122/65 117/68   BP Location: Left arm Left arm Left arm Left arm   Patient Position: Sitting Sitting Sitting Sitting   Pulse: 82 63 80 66   Resp: 16 18 16 20   Temp: 97.8 °F (36.6 °C) 98 °F (36.7 °C) 98.1 °F (36.7 °C) 97.4 °F (36.3 °C)   TempSrc: Oral Oral Oral Oral   SpO2: 97% 99% 98% 98%   Weight:       Height:           Body mass index is 21.48 kg/m².        CONSTITUTIONAL  General Appearance: unremarkable, age appropriate    MUSCULOSKELETAL  Muscle Strength and Tone:no tremor, no tic  Abnormal Involuntary Movements: No  Gait and Station: non-ataxic    PSYCHIATRIC   Level of Consciousness: awake, alert , and oriented  Orientation: person, place, time, and situation  Grooming: Casually dressed and Well groomed  Psychomotor Behavior: normal, cooperative, friendly and cooperative  Speech: normal rate, normal pitch, normal volume, monotone  Language: grossly intact  Mood: "Good"  Affect: Appropriate  Thought Process: Linear, more logical  Associations: intact   Thought Content: +delusions, denies SI, and denies HI  Perceptions: Observed RIS recently by staff. Not observed during this morning's assessment.   Memory: Able to recall past events, Remote intact, and Recent intact  Attention:Impaired but improving  Fund of Knowledge: Aware of current events and Vocabulary appropriate   Estimate if Intelligence:  " Average based on work/education history, vocabulary and mental status exam  Insight: limited awareness of illness  Judgment: behavior is adequate to circumstances        DIAGNOSTIC TESTING   Laboratory Results  No results found for this or any previous visit (from the past 24 hour(s)).            MEDICAL DECISION MAKING      ASSESSMENT:   Schizoaffective disorder, bipolar type, severe  SI  Unspecified anxiety disorder  Cannabis abuse  Psychosocial stressors  Nicotine dependence  Hypokalemia     Anemia        PROBLEM LIST AND MANAGEMENT PLANS           Schizoaffective disorder, bipolar type, severe  - retrial risperdal with plan for CARRASCO, start risperdal 0.5 mg PO BID-Increase PM dose to 1 mg tonight-Increase to 1 mg BID- increase to 1/2- increase to 2 mg po BID- increase to 2/3 today then 3 mg po BID tomorrow - Continue  -trying to verify that pt on Haldol D 100 mg IM q month, last given in July?- pt requested to stop Haldol and continue Risperdal   - resumed/continue  depakote at 500 mg PO BID, will check level for toxicity in 2 days- Level 80, continue current dose  - pt counseled  - follow up with outpatient mental health providers after discharge for medication management and psychotherapy        Suicidal ideations  - continue psychiatric hospitalization  - provide psychotherapeutic interventions and medication management        Unspecified anxiety disorder  - Continue  hydroxyzine 50 mg PO q 6 hours PRN  - pt counseled  - follow up with outpatient mental health providers after discharge for medication management and psychotherapy        Cannabis abuse  - consider rehab  - consider gabapentin  - pt counseled  - follow up with outpatient mental health providers after discharge for medication management and psychotherapy        Psychosocial stressors  - pt counseled  - SW consulted for assistance with additional resources      Nicotine dependence  - start nicotine patch 14 mg PO qd PRN        Discussed diagnosis,  risks and benefits of proposed treatment vs alternative treatments vs no treatment, potential side effects of these treatments and the inherent unpredictability of treatment. The patient expresses understanding of the above and displays the capacity to agree with this treatment given said understanding. Patient also agrees that, currently, the benefits outweigh the risks and would like to pursue/continue treatment at this time.    Any medications being used off-label were discussed with the patient inclusive of the evidence base for the use of the medications and consent was obtained for the off-label use of the medication.       DISCHARGE PLANNING  Expected Disposition Plan: rehab once stable      NEED FOR CONTINUED HOSPITALIZATION  Psychiatric illness continues to pose a potential threat to life or bodily function, of self or others, thereby requiring the need for continued inpatient psychiatric hospitalization: Yes, due to: significant psychotic thought disorder, as evidenced by:  Ongoing concerns with grave disability with patient unable to perform basic feeding, hygiene and dressing activities without significant constant support.    Protective inpatient pyschiatric hospitalization required while a safe disposition plan is enacted: Yes    Patient stabilized and ready for discharge from inpatient psychiatric unit: No        STAFF:   Jl Aguilar NP  Psychiatry

## 2024-09-05 PROCEDURE — 90833 PSYTX W PT W E/M 30 MIN: CPT | Mod: SA,HB,, | Performed by: PSYCHIATRY & NEUROLOGY

## 2024-09-05 PROCEDURE — 25000003 PHARM REV CODE 250: Performed by: INTERNAL MEDICINE

## 2024-09-05 PROCEDURE — 11400000 HC PSYCH PRIVATE ROOM

## 2024-09-05 PROCEDURE — 99232 SBSQ HOSP IP/OBS MODERATE 35: CPT | Mod: SA,HB,, | Performed by: PSYCHIATRY & NEUROLOGY

## 2024-09-05 PROCEDURE — 25000003 PHARM REV CODE 250: Performed by: PSYCHIATRY & NEUROLOGY

## 2024-09-05 PROCEDURE — 25000003 PHARM REV CODE 250: Performed by: STUDENT IN AN ORGANIZED HEALTH CARE EDUCATION/TRAINING PROGRAM

## 2024-09-05 RX ADMIN — HYDROXYZINE PAMOATE 50 MG: 50 CAPSULE ORAL at 08:09

## 2024-09-05 RX ADMIN — RISPERIDONE 3 MG: 1 TABLET, FILM COATED ORAL at 08:09

## 2024-09-05 RX ADMIN — DIVALPROEX SODIUM 500 MG: 500 TABLET, FILM COATED, EXTENDED RELEASE ORAL at 08:09

## 2024-09-05 NOTE — PLAN OF CARE
POC reviewed this shift and is on going. Patient is calm, cooperative, anxious, and irritable. Denies Suicidal Ideation, Homicidal Ideation, Auditory Hallucinations, Visual Hallucinations, Tactile Hallucinations, Gustatory Hallucinations, and Delusions. Patient has been out on the floor at times today and sleeping in his room at times. Patient refused to go to the group session that was conducted today. Patient is a good candidate for discharge to a REHAB tomorrow. Pt continues to be medication compliant and staff will continue to monitor pt closely while on the unit.

## 2024-09-05 NOTE — PROGRESS NOTES
"PSYCHIATRY DAILY INPATIENT PROGRESS NOTE  SUBSEQUENT HOSPITAL VISIT    ENCOUNTER DATE: 9/5/2024  SITE: 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 The chart was reviewed.     Reviewed notes from Rns and labs from the last 24 hours.    The patient's case was discussed with the treatment team/care providers today including Rns    Staff reports no behavioral or management issues.     The patient has been compliant with treatment.      Subjective 09/05/2024     Patient somewhat irritable this morning, stating "I thought I was going to rehab this morning, why are they telling me I have to stay until next week." Unclear at this time why or if patient was given this information, however provider was informed this morning that patient last night once again verbalized that he does not want to go to rehab. Patient states this is untrue. Staff additionally reports patient irritable last night and pouring hand lotion on the floor of unit. Pt states this was "due to being aggravated about the rehab."    Patient informed that pending acceptance, he may be able to discharge to rehab as soon as tomorrow.     Pt possibly demonstrates paranoia this morning, stating "your staff is lying about me and I have proof. Run the cameras back and you'll hear it." Otherwise no blatantly delusional statements made during assessment.            Psychiatric ROS (observed, reported, or endorsed/denied):  Depressed mood - decreasing slowly  Interest/pleasure/anhedonia: decreasing slowly  Guilt/hopelessness/worthlessness - No  Changes in Sleep - decreasing slowly  Changes in Appetite - No  Changes in Concentration - decreasing slowly  Changes in Energy - decreasing slowly  PMA/R- " No  Suicidal- active/passive ideations - No  Homicidal ideations: active/passive ideations - No    Hallucinations - fluctuating  Delusions - improving minimally  Disorganized behavior - No  Disorganized speech - decreasing slowly  Negative symptoms - No    Elevated mood - decreasing steadily  Decreased need for sleep - No  Grandiosity - decreasing slowly  Racing thoughts - decreasing slowly  Impulsivity - No  Irritability- less  Increased energy - decreasing slowly  Distractibility - decreasing slowly  Increase in goal-directed activity or PMA- No    Symptoms of KANU - No  Symptoms of Panic Disorder- No  Symptoms of PTSD - No        Overall progress: Patient is showing mild improvement         Psychotherapy:  Target symptoms: substance abuse, mood disorder  Why chosen therapy is appropriate versus another modality: relevant to diagnosis, evidence based practice  Outcome monitoring methods: self-report, observation  Therapeutic intervention type: insight oriented psychotherapy, supportive psychotherapy  Topics discussed/themes: difficulty managing affect in interpersonal relationships, building skills sets for symptom management, substance abuse  The patient's response to the intervention is guarded. The patient's progress toward treatment goals is limited.   Duration of intervention: 16 minutes.        Medical ROS  Review of Systems   Constitutional: Negative.    HENT: Negative.     Eyes: Negative.    Respiratory: Negative.     Cardiovascular: Negative.    Gastrointestinal: Negative.    Genitourinary: Negative.    Musculoskeletal: Negative.    Skin: Negative.    Neurological: Negative.    Endo/Heme/Allergies: Negative.          PAST MEDICAL HISTORY   Past Medical History:   Diagnosis Date    History of psychiatric hospitalization 09/06/2023    Daniel MAYO     of psychiatric care     Psychiatric problem     Schizophrenia     Suicide attempt            PSYCHOTROPIC MEDICATIONS   Scheduled Meds:   divalproex ER  500  "mg Oral BID    risperiDONE  3 mg Oral BID     Continuous Infusions:  PRN Meds:.  Current Facility-Administered Medications:     acetaminophen, 650 mg, Oral, Q6H PRN    aluminum-magnesium hydroxide-simethicone, 30 mL, Oral, Q6H PRN    docusate sodium, 100 mg, Oral, Daily PRN    hydrOXYzine pamoate, 50 mg, Oral, Nightly PRN    loperamide, 2 mg, Oral, QID PRN    nicotine, 1 patch, Transdermal, Daily PRN    OLANZapine, 10 mg, Oral, Q8H PRN **AND** OLANZapine, 10 mg, Intramuscular, Q8H PRN    ondansetron, 4 mg, Oral, Q8H PRN    promethazine, 25 mg, Oral, Q6H PRN    sodium chloride 0.9%, 10 mL, Intravenous, PRN        EXAMINATION    VITALS   Vitals:    09/03/24 1922 09/04/24 0731 09/04/24 1904 09/05/24 0738   BP: 122/65 117/68 116/60 (!) 106/58   BP Location: Left arm Left arm Left arm Left arm   Patient Position: Sitting Sitting Sitting Sitting   Pulse: 80 66 79 80   Resp: 16 20 16 20   Temp: 98.1 °F (36.7 °C) 97.4 °F (36.3 °C) 98.2 °F (36.8 °C) 97.8 °F (36.6 °C)   TempSrc: Oral Oral Oral Oral   SpO2: 98% 98% 99% 98%   Weight:       Height:           Body mass index is 21.48 kg/m².        CONSTITUTIONAL  General Appearance: unremarkable, age appropriate    MUSCULOSKELETAL  Muscle Strength and Tone:no tremor, no tic  Abnormal Involuntary Movements: No  Gait and Station: non-ataxic    PSYCHIATRIC   Level of Consciousness: awake, alert , and oriented  Orientation: person, place, time, and situation  Grooming: Casually dressed and Well groomed  Psychomotor Behavior: normal, cooperative, friendly and cooperative  Speech: normal rate, normal pitch, normal volume, monotone  Language: grossly intact  Mood: "Annoyed"  Affect: Angry and Anxious, redirectable  Thought Process: Linear, more logical  Associations: intact   Thought Content: +delusions, denies SI, and denies HI  Perceptions: Observed RIS recently by staff. Not observed during this morning's assessment.   Memory: Able to recall past events, Remote intact, and Recent " intact  Attention:Impaired but improving  Fund of Knowledge: Aware of current events and Vocabulary appropriate   Estimate if Intelligence:  Average based on work/education history, vocabulary and mental status exam  Insight: limited awareness of illness  Judgment: behavior is adequate to circumstances        DIAGNOSTIC TESTING   Laboratory Results  No results found for this or any previous visit (from the past 24 hour(s)).            MEDICAL DECISION MAKING      ASSESSMENT:   Schizoaffective disorder, bipolar type, severe  SI  Unspecified anxiety disorder  Cannabis abuse  Psychosocial stressors  Nicotine dependence  Hypokalemia     Anemia        PROBLEM LIST AND MANAGEMENT PLANS           Schizoaffective disorder, bipolar type, severe  - retrial risperdal with plan for CARRASCO, start risperdal 0.5 mg PO BID-Increase PM dose to 1 mg tonight-Increase to 1 mg BID- increase to 1/2- increase to 2 mg po BID- increase to 2/3 today then 3 mg po BID tomorrow - Continue  -trying to verify that pt on Haldol D 100 mg IM q month, last given in July?- pt requested to stop Haldol and continue Risperdal   - resumed/continue  depakote at 500 mg PO BID, will check level for toxicity in 2 days- Level 80, continue current dose  - pt counseled  - follow up with outpatient mental health providers after discharge for medication management and psychotherapy        Suicidal ideations  - continue psychiatric hospitalization  - provide psychotherapeutic interventions and medication management        Unspecified anxiety disorder  - Continue  hydroxyzine 50 mg PO q 6 hours PRN  - pt counseled  - follow up with outpatient mental health providers after discharge for medication management and psychotherapy        Cannabis abuse  - consider rehab  - consider gabapentin  - pt counseled  - follow up with outpatient mental health providers after discharge for medication management and psychotherapy        Psychosocial stressors  - pt counseled  - SW  consulted for assistance with additional resources      Nicotine dependence  - start nicotine patch 14 mg PO qd PRN        Discussed diagnosis, risks and benefits of proposed treatment vs alternative treatments vs no treatment, potential side effects of these treatments and the inherent unpredictability of treatment. The patient expresses understanding of the above and displays the capacity to agree with this treatment given said understanding. Patient also agrees that, currently, the benefits outweigh the risks and would like to pursue/continue treatment at this time.    Any medications being used off-label were discussed with the patient inclusive of the evidence base for the use of the medications and consent was obtained for the off-label use of the medication.       DISCHARGE PLANNING  Expected Disposition Plan: rehab once stable      NEED FOR CONTINUED HOSPITALIZATION  Psychiatric illness continues to pose a potential threat to life or bodily function, of self or others, thereby requiring the need for continued inpatient psychiatric hospitalization: Yes, due to: significant psychotic thought disorder, as evidenced by:  Ongoing concerns with grave disability with patient unable to perform basic feeding, hygiene and dressing activities without significant constant support.    Protective inpatient pyschiatric hospitalization required while a safe disposition plan is enacted: Yes    Patient stabilized and ready for discharge from inpatient psychiatric unit: No        STAFF:   Jl Aguilar NP  Psychiatry

## 2024-09-05 NOTE — PLAN OF CARE
POC reviewed this shift and is on going.  Pt is + a/v hallucinations. Med compliant with no adverse reaction. Cooperative with staff. No ss of distress. Will continue to monitor for changes and safety.

## 2024-09-05 NOTE — PLAN OF CARE

## 2024-09-06 VITALS
BODY MASS INDEX: 21.47 KG/M2 | WEIGHT: 167.31 LBS | HEART RATE: 88 BPM | OXYGEN SATURATION: 99 % | TEMPERATURE: 98 F | HEIGHT: 74 IN | SYSTOLIC BLOOD PRESSURE: 133 MMHG | RESPIRATION RATE: 18 BRPM | DIASTOLIC BLOOD PRESSURE: 65 MMHG

## 2024-09-06 PROBLEM — F32.A DEPRESSION WITH SUICIDAL IDEATION: Status: RESOLVED | Noted: 2024-06-18 | Resolved: 2024-09-06

## 2024-09-06 PROBLEM — R45.851 DEPRESSION WITH SUICIDAL IDEATION: Status: RESOLVED | Noted: 2024-06-18 | Resolved: 2024-09-06

## 2024-09-06 PROCEDURE — 25000003 PHARM REV CODE 250: Performed by: PSYCHIATRY & NEUROLOGY

## 2024-09-06 PROCEDURE — 90833 PSYTX W PT W E/M 30 MIN: CPT | Mod: AF,HB,, | Performed by: PSYCHIATRY & NEUROLOGY

## 2024-09-06 PROCEDURE — 99239 HOSP IP/OBS DSCHRG MGMT >30: CPT | Mod: AF,HB,, | Performed by: PSYCHIATRY & NEUROLOGY

## 2024-09-06 PROCEDURE — 25000003 PHARM REV CODE 250: Performed by: STUDENT IN AN ORGANIZED HEALTH CARE EDUCATION/TRAINING PROGRAM

## 2024-09-06 RX ORDER — DIVALPROEX SODIUM 500 MG/1
1000 TABLET, FILM COATED, EXTENDED RELEASE ORAL NIGHTLY
Qty: 60 TABLET | Refills: 2 | Status: SHIPPED | OUTPATIENT
Start: 2024-09-06 | End: 2025-09-06

## 2024-09-06 RX ORDER — RISPERIDONE 3 MG/1
3 TABLET ORAL 2 TIMES DAILY
Qty: 60 TABLET | Refills: 1 | Status: SHIPPED | OUTPATIENT
Start: 2024-09-06 | End: 2025-09-06

## 2024-09-06 RX ADMIN — DIVALPROEX SODIUM 500 MG: 500 TABLET, FILM COATED, EXTENDED RELEASE ORAL at 08:09

## 2024-09-06 RX ADMIN — RISPERIDONE 3 MG: 1 TABLET, FILM COATED ORAL at 08:09

## 2024-09-06 NOTE — DISCHARGE SUMMARY
"Discharge Summary  Psychiatry    Admit Date: 8/28/2024    Discharge Date and Time:  09/06/2024 8:22 AM    Attending Physician: No att. providers found     Discharge Provider: Frantz Cagle MD    Reason for Admission:  thoughts of death/suicide     History of Present Illness:   The patient presented to the ER on 8/28/2024 .     The patient was medically cleared and admitted to the U.     Per ED MD:       Chief Complaint   Patient presents with    Psychiatric Evaluation       Pt presents to ED complaining of depression and SI without a plan x 2 days. Pt also complaining of pain to right foot pain after "being stung by a wasp today."   Pt asking "for the booty juice so he can get some sleep."       24-year-old male with history of schizophrenia presents for psychiatric evaluation.  Patient reporting suicidal ideations but without a plan.  Patient also reports hallucinations states that he was  to famous scott and they are planning a life together        Per RN:    24-year-old male with history of schizophrenia presents for psychiatric evaluation.  Patient reporting suicidal ideations but without a plan.  Patient also reports hallucinations and states that he was  to a famous scott, Raul Murphy, and they are planning a life together.  Patient's UDS is positive for THC.  Upon arrival to [facility], patient is cooperative and pleasant.  He is responding to internal stimuli and laughing while at the table,.            Psychiatric interview:  Reports "I need to go to rehab to control my thirst for blood, my family is very gifted, we are born different, we came here to get my blood to drink." Reports "I was spazing out and couldn't wait for rehab... I really want to practice, I keep grabbing my throat... being safe with who I am... I am thirsty... you know vampires, werewolves, goblins, mermaids, are real? have you ever seen a siren before? There's a picture on [employer], if you look up sirens in [place], " "there's a picture online.. 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 Reports "as many times I can get my hands on a blunt, I do," reports he is smoking weed frequently. Reports, "I can hear a lot of voices, telling me things are coming before they do."                    Symptoms of Depression: diminished mood - No, loss of interest/anhedonia - No;  recurrent - No, >14 days - No, diminished energy - No, change in sleep - No, change in appetite - No, diminished concentration or cognition or indecisiveness - No, PMA/R -  No, excessive guilt or hopelessness or worthlessness - No, suicidal ideations - Yes     Changes in Sleep: trouble with initiation- No, maintenance, - No early morning awakening with inability to return to sleep - No, hypersomnolence - No     Suicidal- active/passive ideations - Yes, organized plans, future intentions - No     Homicidal ideations: active/passive ideations - No, organized plans, future intentions - No     Symptoms of psychosis: hallucinations - Yes, delusions - Yes, disorganized speech - Yes, disorganized behavior or abnormal motor behavior - No, or negative symptoms (diminshed emotional expression, avolition, anhedonia, alogia, asociality) - Yes     Symptoms of carley or hypomania: elevated, expansive, or irritable mood with increased energy or activity - Yes; > 4 days -  SOFIYA ,  >7 days -  SOFIYA ; with inflated self-esteem or grandiosity - Yes, decreased need for sleep -  SOFIYA , increased rate of speech - No, FOI or racing thoughts - No, distractibility - No, increased goal directed activity or PMA - No, risky/disinhibited behavior - No     Symptoms of KANU: excessive anxiety/worry/fear, more days than not, about numerous issues - No, ongoing for >6 months - No, difficult to control - No, with restlessness - No, fatigue - No, poor concentration - No, irritability - No, muscle tension - No, sleep " "disturbance - No; causes functionally impairing distress - No     Symptoms of Panic Disorder: recurrent panic attacks (palpitations/heart racing, sweating, shakiness, dyspnea, choking, chest pain/discomfort, Gi symptoms, dizzy/lightheadedness, hot/col flashes, paresthesias, derealization, fear of losing control or fear of dying or fear of "going crazy") - No, precipitated - No, un-precipitated - No, source of worry and/or behavioral changes secondary for 1 month or longer- No, agoraphobia - No     Symptoms of PTSD: h/o trauma exposure - No; re-experiencing/intrusive symptoms - No, avoidant behavior - No, 2 or more negative alterations in cognition or mood - No, 2 or more hyperarousal symptoms - No; with dissociative symptoms - No, ongoing for 1 or more  months - No     Symptoms of OCD: obsessions (recurrent thoughts/urges/images; intrusive and/or unwanted; uses other thoughts/actions to suppress) - No; compulsions (repetitive behaviors used to lower distress/anxiety/obsessions) - No, time-consuming (over 1 hour per day) or cause significant distress/impairment - - No     Symptoms of Anorexia: restriction of caloric intake leading to significantly low body weight - No, intense fear of gaining weight or persistent behavior that interferes with weight gain even thought at a significantly low weight - No, disturbance in the way in which one's body weight or shape is experienced, undue influence of body weight or shape on self evaluation, or persistent lack of recognition of the seriousness of the current low body weight - No     Symptoms of Bulimia: recurrent episodes of binge eating (definitely larger amount  than what others would eat and lack of a sense of control over eating during episode) - No, recurrent inappropriate compensatory behaviors in order to prevent weight gain (fasting, medications, exercise, vomiting) - No, binges and compensatory behaviors both occur on average at least once a week for 3 months - No, " self evaluations is unduly influenced by body shape/weight- - No     Symptoms of Binge eating: recurrent episodes of binge eating (definitely larger amount than what others would eat and lack of a sense of control over eating during episode) - No, 3 or more of following (eating much more rapidly, eating until uncomfortably full, large amounts when not hungry, eating alone because of embarrassed by how much,  feeling disgusted with oneself, depressed or very guilty afterward) - No, distress regarding binges - No, binges occur on average at least once a week for 3 months - No    Procedures Performed: * No surgery found *    Hospital Course:    Patient was admitted to the inpatient psychiatry unit after being medically cleared in the ED. Chart and labs were reviewed. The patient was stabilized as follows:      Schizoaffective disorder, bipolar type, severe  - retrial risperdal with plan for CARRASCO, start risperdal 0.5 mg PO BID-Increase PM dose to 1 mg tonight-Increase to 1 mg BID- increase to 1/2- increase to 2 mg po BID- increase to 2/3 today then 3 mg po BID tomorrow - Continue  -trying to verify that pt on Haldol D 100 mg IM q month, last given in July?- pt requested to stop Haldol and continue Risperdal   - resumed/continue  depakote at 500 mg PO BID, will check level for toxicity in 2 days- Level 80, continue current dose  - pt counseled  - follow up with outpatient mental health providers after discharge for medication management and psychotherapy        Suicidal ideations  - continue psychiatric hospitalization  - provide psychotherapeutic interventions and medication management        Unspecified anxiety disorder  - Continue  hydroxyzine 50 mg PO q 6 hours PRN  - pt counseled  - follow up with outpatient mental health providers after discharge for medication management and psychotherapy        Cannabis abuse  - consider rehab  - consider gabapentin  - pt counseled  - follow up with outpatient mental health  providers after discharge for medication management and psychotherapy        Psychosocial stressors  - pt counseled  - SW consulted for assistance with additional resources      Nicotine dependence  - start nicotine patch 14 mg PO qd PRN        The patient reports improved symptoms as documented. The patient is requesting discharge.The patient is hopeful, future oriented and goal directed. The patient readily discusses both short and long term goals. The patient can identify positive coping skills and social support. AIMS score was 0. During hospitalization, the patient was encouraged to go to both groups and individual counseling. Patient was monitored for any side effects. A meeting was held with multidisciplinary team prior to discharge and pt's diagnosis, current medications, and follow up were discussed. The patient has been compliant with treatment and can adequately attend to activities of daily living in an independent manner. The patient denies any side effects. The patient denies SI, HI, plan or intent for self harm or harm to others. The patient is no longer a danger to self or others nor gravely disabled disabled. Patient discharged  in stable condition with scheduled outpatient follow up.    Psychiatric ROS (observed, reported, or endorsed/denied):  Depressed mood - improved  Interest/pleasure/anhedonia:  improved  Guilt/hopelessness/worthlessness - No  Changes in Sleep -  improved  Changes in Appetite - No  Changes in Concentration -  improved  Changes in Energy -  improved  PMA/R- No  Suicidal- active/passive ideations - No  Homicidal ideations: active/passive ideations - No     Hallucinations -  improved  Delusions - improved  Disorganized behavior - No  Disorganized speech -  improved  Negative symptoms - No     Elevated mood -  improved  Decreased need for sleep - No  Grandiosity -  improved  Racing thoughts -  improved  Impulsivity - No  Irritability-  improved  Increased energy -   improved  Distractibility -  improved  Increase in goal-directed activity or PMA- No     Symptoms of KANU - No  Symptoms of Panic Disorder- No  Symptoms of PTSD - No      Discussed diagnosis, risks and benefits of proposed treatment vs alternative treatments vs no treatment, and potential side effects of these treatments.  The patient expresses understanding of the above and displays the capacity to agree with this treatment given said understanding.  Patient also agrees that, currently, the benefits outweigh the risks and would like to pursue treatment at this time.      Discharge MSE: stated age, casually dressed, well groomed.  No psychomotor agitation or retardation.  No abnormal involuntary movements.  Gait normal.  Speech normal, conversational.  Language fluent English. Mood fine.  Affect normal range, pleasant, euthymic.  Thought process linear.  Associations intact.  Denies suicidal or homicidal ideation.  Denies auditory hallucinations, paranoid ideation, ideas of reference.  Memory intact.  Attention intact.  Fund of knowledge intact.  Insight intact.  Judgment intact.  Alert and oriented to person, place, time.      Tobacco Usage:  Is patient a smoker? Yes  Does patient want prescription for Tobacco Cessation? No  Does patient want counseling for Tobacco Cessation? No    If patient would like to quit, then over the counter nicotine patch could be used. The patient could also follow up with his PCP or psychiatric provider for other alternatives.     Tobacco Use Treatment Practical Counseling    Were the following discussed with the patient:    Recognizing danger situations:    A. Alcohol use during the first month after quitting - Yes   B. Being around smoke and/or smokers or time/situations when the patient routinely smoked - Yes   C. Triggers and/or roadblocks are the same as danger situations - Yes    2.   Developing coping skills   A. Learning new ways to manage stress - Yes   B. Exercising and/or  relaxation breathing - Yes   C. Changing routines - Yes   D. Distraction techniques to prevent tobacco use - Yes    3.   Basic information about quitting:   A. Benefits of quitting tobacco - Yes   B. How to quit techniques - Yes   C. Available resources to support quitting - Yes        Final Diagnoses:    Principal Problem: Schizoaffective disorder, bipolar type, severe    Secondary Diagnoses:   Unspecified anxiety disorder  Cannabis abuse  Psychosocial stressors  Nicotine dependence  Hypokalemia     Anemia    Labs:  Admission on 08/28/2024   Component Date Value Ref Range Status    WBC 08/28/2024 12.26  3.90 - 12.70 K/uL Final    RBC 08/28/2024 4.14 (L)  4.60 - 6.20 M/uL Final    Hemoglobin 08/28/2024 13.3 (L)  14.0 - 18.0 g/dL Final    Hematocrit 08/28/2024 39.0 (L)  40.0 - 54.0 % Final    MCV 08/28/2024 94  82 - 98 fL Final    MCH 08/28/2024 32.1 (H)  27.0 - 31.0 pg Final    MCHC 08/28/2024 34.1  32.0 - 36.0 g/dL Final    RDW 08/28/2024 12.9  11.5 - 14.5 % Final    Platelets 08/28/2024 223  150 - 450 K/uL Final    MPV 08/28/2024 9.5  9.2 - 12.9 fL Final    Immature Granulocytes 08/28/2024 0.2  0.0 - 0.5 % Final    Gran # (ANC) 08/28/2024 6.8  1.8 - 7.7 K/uL Final    Immature Grans (Abs) 08/28/2024 0.03  0.00 - 0.04 K/uL Final    Lymph # 08/28/2024 4.1  1.0 - 4.8 K/uL Final    Mono # 08/28/2024 1.1 (H)  0.3 - 1.0 K/uL Final    Eos # 08/28/2024 0.2  0.0 - 0.5 K/uL Final    Baso # 08/28/2024 0.02  0.00 - 0.20 K/uL Final    nRBC 08/28/2024 0  0 /100 WBC Final    Gran % 08/28/2024 55.4  38.0 - 73.0 % Final    Lymph % 08/28/2024 33.4  18.0 - 48.0 % Final    Mono % 08/28/2024 9.1  4.0 - 15.0 % Final    Eosinophil % 08/28/2024 1.7  0.0 - 8.0 % Final    Basophil % 08/28/2024 0.2  0.0 - 1.9 % Final    Differential Method 08/28/2024 Automated   Final    Sodium 08/28/2024 140  136 - 145 mmol/L Final    Potassium 08/28/2024 3.7  3.5 - 5.1 mmol/L Final    Chloride 08/28/2024 102  95 - 110 mmol/L Final    CO2 08/28/2024 30 (H)   23 - 29 mmol/L Final    Glucose 08/28/2024 88  70 - 110 mg/dL Final    BUN 08/28/2024 17  6 - 20 mg/dL Final    Creatinine 08/28/2024 1.4  0.5 - 1.4 mg/dL Final    Calcium 08/28/2024 8.8  8.7 - 10.5 mg/dL Final    Total Protein 08/28/2024 8.1  6.0 - 8.4 g/dL Final    Albumin 08/28/2024 3.8  3.5 - 5.2 g/dL Final    Total Bilirubin 08/28/2024 0.5  0.1 - 1.0 mg/dL Final    Alkaline Phosphatase 08/28/2024 76  55 - 135 U/L Final    AST 08/28/2024 12  10 - 40 U/L Final    ALT 08/28/2024 17  10 - 44 U/L Final    eGFR 08/28/2024 >60.0  >60 mL/min/1.73 m^2 Final    Anion Gap 08/28/2024 8  3 - 11 mmol/L Final    TSH 08/28/2024 2.140  0.400 - 4.000 uIU/mL Final    Specimen UA 08/28/2024 Urine, Clean Catch   Final    Color, UA 08/28/2024 Yellow  Yellow, Straw, Ingrid Final    Appearance, UA 08/28/2024 Clear  Clear Final    pH, UA 08/28/2024 6.0  5.0 - 8.0 Final    Specific Gravity, UA 08/28/2024 >=1.030 (A)  1.005 - 1.030 Final    Protein, UA 08/28/2024 Trace (A)  Negative Final    Glucose, UA 08/28/2024 Negative  Negative Final    Ketones, UA 08/28/2024 1+ (A)  Negative Final    Bilirubin (UA) 08/28/2024 1+ (A)  Negative Final    Occult Blood UA 08/28/2024 Negative  Negative Final    Nitrite, UA 08/28/2024 Negative  Negative Final    Urobilinogen, UA 08/28/2024 1.0  <2.0 EU/dL Final    Leukocytes, UA 08/28/2024 Trace (A)  Negative Final    Benzodiazepines 08/28/2024 Negative  Negative Final    Methadone metabolites 08/28/2024 Negative  Negative Final    Cocaine (Metab.) 08/28/2024 Negative  Negative Final    Opiate Scrn, Ur 08/28/2024 Negative  Negative Final    Barbiturate Screen, Ur 08/28/2024 Negative  Negative Final    Amphetamine Screen, Ur 08/28/2024 Negative  Negative Final    THC 08/28/2024 Presumptive Positive (A)  Negative Final    Phencyclidine 08/28/2024 Negative  Negative Final    Creatinine, Urine 08/28/2024 563.0 (H)  23.0 - 375.0 mg/dL Final    Toxicology Information 08/28/2024 SEE COMMENT   Final    Alcohol,  Serum 08/28/2024 <3  <10 mg/dL Final    Acetaminophen (Tylenol), Serum 08/28/2024 <2.0 (L)  10.0 - 20.0 ug/mL Final    Salicylate Lvl 08/28/2024 3.5 (L)  15.0 - 30.0 mg/dL Final    RBC, UA 08/28/2024 2  0 - 4 /hpf Final    WBC, UA 08/28/2024 2  0 - 5 /hpf Final    Bacteria 08/28/2024 Negative  None-Occ /hpf Final    Squam Epithel, UA 08/28/2024 0  /hpf Final    Hyaline Casts, UA 08/28/2024 4.0 (A)  0-1/lpf /lpf Final    Microscopic Comment 08/28/2024 SEE COMMENT   Final    Cholesterol 08/28/2024 177  120 - 199 mg/dL Final    Triglycerides 08/28/2024 77  30 - 150 mg/dL Final    HDL 08/28/2024 66  40 - 75 mg/dL Final    LDL Cholesterol 08/28/2024 95.6  63.0 - 159.0 mg/dL Final    HDL/Cholesterol Ratio 08/28/2024 37.3  20.0 - 50.0 % Final    Total Cholesterol/HDL Ratio 08/28/2024 2.7  2.0 - 5.0 Final    Non-HDL Cholesterol 08/28/2024 111  mg/dL Final    Hemoglobin A1C 08/28/2024 4.8  4.0 - 5.6 % Final    Estimated Avg Glucose 08/28/2024 91  68 - 131 mg/dL Final    Valproic Acid Level 09/02/2024 80  50 - 100 ug/mL Final         Discharged Condition: stable and improved; not currently a danger to self/others or gravely disabled    Disposition: Home or Self Care    Is patient being discharged on multiple neuroleptics? No    Follow Up/Patient Instructions:     Take all medications as prescribed.  Attend all psychiatric and medical follow up appointments.   Abstain from all drugs and alcohol.  Call the crisis line at: [phone] for help in a crisis and emergent situations or call 911 and Return to ED for any acute worsening of your condition including suicidal or homicidal ideations      No discharge procedures on file.        Follow up apt: rehab- [facility]      Medications:  Reconciled Home Medications:      Medication List        START taking these medications      risperiDONE 3 MG Tab  Commonly known as: RISPERDAL  Take 1 tablet (3 mg total) by mouth 2 (two) times daily.            CONTINUE taking these medications       divalproex  MG Tb24 24 hr tablet  Commonly known as: DEPAKOTE ER  Take 2 tablets (1,000 mg total) by mouth every evening.            STOP taking these medications      haloperidol decanoate 100 mg/mL injection  Commonly known as: HALDOL DECANOATE     mirtazapine 15 MG tablet  Commonly known as: REMERON                Diet: regular     Activity as tolerated    Total time spent discharging patient: 35 minutes    Frantz Cagle MD  Psychiatry

## 2024-09-06 NOTE — PROGRESS NOTES
Psychotherapy:  Target symptoms: substance abuse, mood disorder  Why chosen therapy is appropriate versus another modality: relevant to diagnosis, evidence based practice  Outcome monitoring methods: self-report, observation  Therapeutic intervention type: insight oriented psychotherapy, supportive psychotherapy  Topics discussed/themes: difficulty managing affect in interpersonal relationships, building skills sets for symptom management, substance abuse  Safety planning and wrap up session  The patient's response to the intervention is accepting. The patient's progress toward treatment goals is fair  Duration of intervention: 16 minutes.    Frantz Cagle MD  Psychiatry

## 2024-09-06 NOTE — NURSING
Discharge orders noted and given to pt including follow up appointment and med instruction.  Verbalized understanding.  Copy of AVS given to pt.  AVS faxed to [place] at [phone].  Report called to Pj ELIZONDO at [facility].  Pt denies si,hi or a v hallucinations on dc.  All personal belongings returned to pt .  No c/o voiced.  Pt dc'd to [place] in [place] with  from [place] at side.

## 2024-10-07 ENCOUNTER — LAB VISIT (OUTPATIENT)
Dept: LAB | Facility: HOSPITAL | Age: 24
End: 2024-10-07
Attending: NURSE PRACTITIONER
Payer: MEDICAID

## 2024-10-07 DIAGNOSIS — Z79.899 NEED FOR PROPHYLACTIC CHEMOTHERAPY: ICD-10-CM

## 2024-10-07 DIAGNOSIS — F25.0 SCHIZOAFFECTIVE DISORDER, BIPOLAR TYPE: Primary | ICD-10-CM

## 2024-10-07 LAB — VALPROATE SERPL-MCNC: 53 UG/ML (ref 50–100)

## 2024-10-07 PROCEDURE — 80164 ASSAY DIPROPYLACETIC ACD TOT: CPT | Performed by: NURSE PRACTITIONER

## 2024-10-07 PROCEDURE — 36415 COLL VENOUS BLD VENIPUNCTURE: CPT | Performed by: NURSE PRACTITIONER

## 2024-10-18 ENCOUNTER — HOSPITAL ENCOUNTER (EMERGENCY)
Facility: HOSPITAL | Age: 24
Discharge: HOME | End: 2024-10-18
Attending: STUDENT IN AN ORGANIZED HEALTH CARE EDUCATION/TRAINING PROGRAM
Payer: MEDICAID

## 2024-10-18 VITALS
RESPIRATION RATE: 16 BRPM | DIASTOLIC BLOOD PRESSURE: 55 MMHG | HEART RATE: 97 BPM | HEIGHT: 75 IN | SYSTOLIC BLOOD PRESSURE: 110 MMHG | TEMPERATURE: 99 F | BODY MASS INDEX: 20.54 KG/M2 | WEIGHT: 165.19 LBS | OXYGEN SATURATION: 97 %

## 2024-10-18 DIAGNOSIS — R51.9 NONINTRACTABLE HEADACHE, UNSPECIFIED CHRONICITY PATTERN, UNSPECIFIED HEADACHE TYPE: Primary | ICD-10-CM

## 2024-10-18 LAB
CTP QC/QA: YES
CTP QC/QA: YES
GROUP A STREP, MOLECULAR: NEGATIVE
POC MOLECULAR INFLUENZA A AGN: NEGATIVE
POC MOLECULAR INFLUENZA B AGN: NEGATIVE
SARS-COV-2 RDRP RESP QL NAA+PROBE: NEGATIVE

## 2024-10-18 PROCEDURE — 63600175 PHARM REV CODE 636 W HCPCS: Performed by: CLINICAL NURSE SPECIALIST

## 2024-10-18 PROCEDURE — 87502 INFLUENZA DNA AMP PROBE: CPT

## 2024-10-18 PROCEDURE — 87635 SARS-COV-2 COVID-19 AMP PRB: CPT | Performed by: CLINICAL NURSE SPECIALIST

## 2024-10-18 PROCEDURE — 96372 THER/PROPH/DIAG INJ SC/IM: CPT | Performed by: CLINICAL NURSE SPECIALIST

## 2024-10-18 PROCEDURE — 87651 STREP A DNA AMP PROBE: CPT | Performed by: CLINICAL NURSE SPECIALIST

## 2024-10-18 PROCEDURE — 99284 EMERGENCY DEPT VISIT MOD MDM: CPT | Mod: 25

## 2024-10-18 RX ORDER — KETOROLAC TROMETHAMINE 30 MG/ML
60 INJECTION, SOLUTION INTRAMUSCULAR; INTRAVENOUS ONCE
Status: COMPLETED | OUTPATIENT
Start: 2024-10-18 | End: 2024-10-18

## 2024-10-18 RX ORDER — IBUPROFEN 800 MG/1
800 TABLET ORAL EVERY 6 HOURS PRN
Qty: 20 TABLET | Refills: 0 | OUTPATIENT
Start: 2024-10-18 | End: 2024-10-20

## 2024-10-18 RX ORDER — DIPHENHYDRAMINE HYDROCHLORIDE 50 MG/ML
50 INJECTION INTRAMUSCULAR; INTRAVENOUS ONCE
Status: COMPLETED | OUTPATIENT
Start: 2024-10-18 | End: 2024-10-18

## 2024-10-18 RX ORDER — PROCHLORPERAZINE EDISYLATE 5 MG/ML
10 INJECTION INTRAMUSCULAR; INTRAVENOUS ONCE
Status: COMPLETED | OUTPATIENT
Start: 2024-10-18 | End: 2024-10-18

## 2024-10-18 RX ADMIN — KETOROLAC TROMETHAMINE 60 MG: 30 INJECTION, SOLUTION INTRAMUSCULAR; INTRAVENOUS at 07:10

## 2024-10-18 RX ADMIN — DIPHENHYDRAMINE HYDROCHLORIDE 50 MG: 50 INJECTION INTRAMUSCULAR; INTRAVENOUS at 07:10

## 2024-10-18 RX ADMIN — PROCHLORPERAZINE EDISYLATE 10 MG: 5 INJECTION INTRAMUSCULAR; INTRAVENOUS at 07:10

## 2024-10-19 NOTE — ED PROVIDER NOTES
Encounter Date: 10/18/2024       History     Chief Complaint   Patient presents with    Fever     Pt reports to ED from home after feeling feverish for 2 days with headache and difficulty sleeping. Pt took Tylenol prior without relief prior to coming into ed     24-year-old male presents emergency room with headache, feeling feverish, difficulty sleeping, sore throat for the last few days.  Tylenol taken prior to arrival with no relief.  Denies any history of headaches.  History of schizophrenia.         Review of patient's allergies indicates:  No Known Allergies  Past Medical History:   Diagnosis Date    History of psychiatric hospitalization 09/06/2023    [facility] of psychiatric care     Psychiatric problem     Schizophrenia     Suicide attempt      History reviewed. No pertinent surgical history.  Family History   Family history unknown: Yes     Social History     Tobacco Use    Smoking status: Some Days     Current packs/day: 1.00     Average packs/day: 1 pack/day for 5.4 years (5.4 ttl pk-yrs)     Types: Cigars, Cigarettes     Start date: 5/10/2019     Passive exposure: Never    Smokeless tobacco: Never   Substance Use Topics    Alcohol use: Yes     Comment: pt reports drinking wine, two-four times monthly or less    Drug use: Yes     Types: Marijuana     Review of Systems   Constitutional:  Positive for fever.   HENT:  Positive for sore throat.    Respiratory:  Negative for shortness of breath.    Cardiovascular:  Negative for chest pain.   Gastrointestinal:  Negative for nausea.   Genitourinary:  Negative for dysuria.   Musculoskeletal:  Negative for back pain.   Skin:  Negative for rash.   Neurological:  Positive for headaches. Negative for weakness.   Hematological:  Does not bruise/bleed easily.   Psychiatric/Behavioral:  Positive for sleep disturbance.    All other systems reviewed and are negative.      Physical Exam     Initial Vitals   BP Pulse Resp Temp SpO2   10/18/24 1923 10/18/24 1923  10/18/24 1924 10/18/24 1923 10/18/24 1923   (!) 110/55 97 16 99.7 °F (37.6 °C) 97 %      MAP       --                Physical Exam    Nursing note and vitals reviewed.  Constitutional: He appears well-developed and well-nourished.   HENT:   Head: Normocephalic and atraumatic.   Eyes: Pupils are equal, round, and reactive to light.   Neck:   Normal range of motion.  Cardiovascular:  Normal rate and regular rhythm.           Pulmonary/Chest: Breath sounds normal.   Abdominal: Abdomen is soft. Bowel sounds are normal.   Musculoskeletal:         General: Normal range of motion.      Cervical back: Normal range of motion.     Neurological: He is alert and oriented to person, place, and time.   Psychiatric: He has a normal mood and affect.         ED Course   Procedures  Labs Reviewed   GROUP A STREP, MOLECULAR       Result Value    Group A Strep, Molecular Negative     SARS-COV-2 RDRP GENE    POC Rapid COVID Negative       Acceptable Yes     POCT INFLUENZA A/B MOLECULAR    POC Molecular Influenza A Ag Negative      POC Molecular Influenza B Ag Negative       Acceptable Yes            Imaging Results    None          Medications   ketorolac injection 60 mg (60 mg Intramuscular Given 10/18/24 1950)   diphenhydrAMINE injection 50 mg (50 mg Intramuscular Given 10/18/24 1950)   prochlorperazine injection Soln 10 mg (10 mg Intramuscular Given 10/18/24 1950)     Medical Decision Making                                    Clinical Impression:  Final diagnoses:  [R51.9] Nonintractable headache, unspecified chronicity pattern, unspecified headache type (Primary)          ED Disposition Condition    Discharge Stable          ED Prescriptions       Medication Sig Dispense Start Date End Date Auth. Provider    ibuprofen (ADVIL,MOTRIN) 800 MG tablet Take 1 tablet (800 mg total) by mouth every 6 (six) hours as needed for Pain. 20 tablet 10/18/2024 -- Kiersten Yeboah NP          Follow-up Information        Follow up With Specialties Details Why Contact Info    Jose Martin Selby MD Family Medicine  As needed RETIRED  Kiersten Ho NP  10/18/24 2040

## 2024-10-20 ENCOUNTER — HOSPITAL ENCOUNTER (EMERGENCY)
Facility: HOSPITAL | Age: 24
Discharge: HOME | End: 2024-10-20
Attending: EMERGENCY MEDICINE
Payer: MEDICAID

## 2024-10-20 VITALS
BODY MASS INDEX: 19.72 KG/M2 | RESPIRATION RATE: 18 BRPM | TEMPERATURE: 98 F | DIASTOLIC BLOOD PRESSURE: 76 MMHG | HEIGHT: 75 IN | WEIGHT: 158.63 LBS | HEART RATE: 86 BPM | OXYGEN SATURATION: 99 % | SYSTOLIC BLOOD PRESSURE: 127 MMHG

## 2024-10-20 DIAGNOSIS — J02.9 PHARYNGITIS, UNSPECIFIED ETIOLOGY: Primary | ICD-10-CM

## 2024-10-20 LAB
GROUP A STREP, MOLECULAR: NEGATIVE
HETEROPH AB SERPL QL IA: NEGATIVE

## 2024-10-20 PROCEDURE — 36415 COLL VENOUS BLD VENIPUNCTURE: CPT | Performed by: CLINICAL NURSE SPECIALIST

## 2024-10-20 PROCEDURE — 86308 HETEROPHILE ANTIBODY SCREEN: CPT | Performed by: CLINICAL NURSE SPECIALIST

## 2024-10-20 PROCEDURE — 99284 EMERGENCY DEPT VISIT MOD MDM: CPT

## 2024-10-20 PROCEDURE — 87651 STREP A DNA AMP PROBE: CPT | Performed by: CLINICAL NURSE SPECIALIST

## 2024-10-20 RX ORDER — AMOXICILLIN 500 MG/1
500 CAPSULE ORAL 3 TIMES DAILY
Qty: 21 CAPSULE | Refills: 0 | Status: SHIPPED | OUTPATIENT
Start: 2024-10-20 | End: 2024-10-27

## 2024-10-20 RX ORDER — PREDNISONE 20 MG/1
20 TABLET ORAL DAILY
Qty: 5 TABLET | Refills: 0 | Status: SHIPPED | OUTPATIENT
Start: 2024-10-20 | End: 2024-10-25

## 2024-10-20 RX ORDER — IBUPROFEN 800 MG/1
800 TABLET ORAL EVERY 6 HOURS PRN
Qty: 20 TABLET | Refills: 0 | Status: SHIPPED | OUTPATIENT
Start: 2024-10-20

## 2024-10-20 NOTE — ED PROVIDER NOTES
Encounter Date: 10/20/2024       History     Chief Complaint   Patient presents with    Sore Throat     Pt stated that he has been experiencing sore throat / congestion / headache for the past couple days.      24-year-old male presents to the emergency room with sore throat,  congestion, headache for the last few days.  Patient was seen October 18th for similar symptoms in which swabs were performed which were negative.  Patient reports today worsening symptoms with painful swallowing.  History of schizophrenia, psychiatric issues.         Review of patient's allergies indicates:  No Known Allergies  Past Medical History:   Diagnosis Date    History of psychiatric hospitalization 09/06/2023    [facility] of psychiatric care     Psychiatric problem     Schizophrenia     Suicide attempt      History reviewed. No pertinent surgical history.  Family History   Family history unknown: Yes     Social History     Tobacco Use    Smoking status: Some Days     Current packs/day: 1.00     Average packs/day: 1 pack/day for 5.4 years (5.4 ttl pk-yrs)     Types: Cigars, Cigarettes     Start date: 5/10/2019     Passive exposure: Never    Smokeless tobacco: Never   Substance Use Topics    Alcohol use: Yes     Comment: pt reports drinking wine, two-four times monthly or less    Drug use: Yes     Types: Marijuana     Review of Systems   Constitutional:  Negative for fever.   HENT:  Positive for congestion and sore throat.    Respiratory:  Negative for shortness of breath.    Cardiovascular:  Negative for chest pain.   Gastrointestinal:  Negative for nausea.   Genitourinary:  Negative for dysuria.   Musculoskeletal:  Negative for back pain.   Skin:  Negative for rash.   Neurological:  Positive for headaches. Negative for weakness.   Hematological:  Does not bruise/bleed easily.   All other systems reviewed and are negative.      Physical Exam     Initial Vitals [10/20/24 1725]   BP Pulse Resp Temp SpO2   127/76 86 18 98 °F  (36.7 °C) 99 %      MAP       --         Physical Exam    Nursing note and vitals reviewed.  Constitutional: He appears well-developed and well-nourished.   HENT:   Head: Normocephalic and atraumatic. Mouth/Throat: Oropharyngeal exudate, posterior oropharyngeal edema and posterior oropharyngeal erythema present.   Eyes: Pupils are equal, round, and reactive to light.   Neck:   Normal range of motion.  Cardiovascular:  Normal rate and regular rhythm.           Pulmonary/Chest: Breath sounds normal.   Abdominal: Abdomen is soft. Bowel sounds are normal.   Musculoskeletal:         General: Normal range of motion.      Cervical back: Normal range of motion.     Neurological: He is alert and oriented to person, place, and time.   Psychiatric: He has a normal mood and affect.         ED Course   Procedures  Labs Reviewed   GROUP A STREP, MOLECULAR       Result Value    Group A Strep, Molecular Negative     HETEROPHILE AB SCREEN    Monospot Negative            Imaging Results    None          Medications - No data to display  Medical Decision Making                                    Clinical Impression:  Final diagnoses:  [J02.9] Pharyngitis, unspecified etiology (Primary)          ED Disposition Condition    Discharge Stable          ED Prescriptions       Medication Sig Dispense Start Date End Date Auth. Provider    amoxicillin (AMOXIL) 500 MG capsule Take 1 capsule (500 mg total) by mouth 3 (three) times daily. for 7 days 21 capsule 10/20/2024 10/27/2024 Kiersten Yeboah NP    predniSONE (DELTASONE) 20 MG tablet Take 1 tablet (20 mg total) by mouth once daily. for 5 days 5 tablet 10/20/2024 10/25/2024 Kiersten Yeboah NP    ibuprofen (ADVIL,MOTRIN) 800 MG tablet Take 1 tablet (800 mg total) by mouth every 6 (six) hours as needed for Pain. 20 tablet 10/20/2024 -- Kiersten Yeboah NP          Follow-up Information       Follow up With Specialties Details Why Contact Info    Jose Martin Selby MD Family Medicine  As  needed RETIRED  Kiersten Ho, ENRIQUE  10/20/24 9064

## 2024-11-18 ENCOUNTER — HOSPITAL ENCOUNTER (INPATIENT)
Facility: HOSPITAL | Age: 24
LOS: 15 days | Discharge: HOME | DRG: 885 | End: 2024-12-03
Attending: EMERGENCY MEDICINE | Admitting: PSYCHIATRY & NEUROLOGY
Payer: MEDICAID

## 2024-11-18 ENCOUNTER — HOSPITAL ENCOUNTER (EMERGENCY)
Facility: HOSPITAL | Age: 24
Discharge: HOME | End: 2024-11-18
Attending: EMERGENCY MEDICINE
Payer: MEDICAID

## 2024-11-18 VITALS
RESPIRATION RATE: 16 BRPM | SYSTOLIC BLOOD PRESSURE: 129 MMHG | HEART RATE: 80 BPM | BODY MASS INDEX: 19.82 KG/M2 | HEIGHT: 75 IN | OXYGEN SATURATION: 100 % | TEMPERATURE: 98 F | DIASTOLIC BLOOD PRESSURE: 66 MMHG

## 2024-11-18 DIAGNOSIS — F25.0 SCHIZOAFFECTIVE DISORDER, BIPOLAR TYPE: ICD-10-CM

## 2024-11-18 DIAGNOSIS — E87.6 HYPOKALEMIA: ICD-10-CM

## 2024-11-18 DIAGNOSIS — F29 PSYCHOSIS: Primary | ICD-10-CM

## 2024-11-18 DIAGNOSIS — F25.9 SCHIZOAFFECTIVE DISORDER, UNSPECIFIED TYPE: Primary | ICD-10-CM

## 2024-11-18 LAB
ALBUMIN SERPL BCP-MCNC: 4 G/DL (ref 3.5–5.2)
ALP SERPL-CCNC: 88 U/L (ref 55–135)
ALT SERPL W/O P-5'-P-CCNC: 26 U/L (ref 10–44)
AMPHET+METHAMPHET UR QL: NEGATIVE
ANION GAP SERPL CALC-SCNC: 10 MMOL/L (ref 3–11)
APAP SERPL-MCNC: <2 UG/ML (ref 10–20)
AST SERPL-CCNC: 23 U/L (ref 10–40)
BACTERIA #/AREA URNS HPF: NEGATIVE /HPF
BARBITURATES UR QL SCN>200 NG/ML: NEGATIVE
BASOPHILS # BLD AUTO: 0.01 K/UL (ref 0–0.2)
BASOPHILS NFR BLD: 0.1 % (ref 0–1.9)
BENZODIAZ UR QL SCN>200 NG/ML: NEGATIVE
BILIRUB SERPL-MCNC: 0.4 MG/DL (ref 0.1–1)
BILIRUB UR QL STRIP: NEGATIVE
BUN SERPL-MCNC: 9 MG/DL (ref 6–20)
BZE UR QL SCN: NEGATIVE
CALCIUM SERPL-MCNC: 8.7 MG/DL (ref 8.7–10.5)
CANNABINOIDS UR QL SCN: ABNORMAL
CHLORIDE SERPL-SCNC: 104 MMOL/L (ref 95–110)
CHOLEST SERPL-MCNC: 166 MG/DL (ref 120–199)
CHOLEST/HDLC SERPL: 3.3 {RATIO} (ref 2–5)
CLARITY UR: CLEAR
CO2 SERPL-SCNC: 25 MMOL/L (ref 23–29)
COLOR UR: YELLOW
CREAT SERPL-MCNC: 1.1 MG/DL (ref 0.5–1.4)
CREAT UR-MCNC: 539 MG/DL (ref 23–375)
DIFFERENTIAL METHOD BLD: ABNORMAL
EOSINOPHIL # BLD AUTO: 0.1 K/UL (ref 0–0.5)
EOSINOPHIL NFR BLD: 1.4 % (ref 0–8)
ERYTHROCYTE [DISTWIDTH] IN BLOOD BY AUTOMATED COUNT: 13.5 % (ref 11.5–14.5)
EST. GFR  (NO RACE VARIABLE): >60 ML/MIN/1.73 M^2
ESTIMATED AVG GLUCOSE: 100 MG/DL (ref 68–131)
ETHANOL SERPL-MCNC: <3 MG/DL
GLUCOSE SERPL-MCNC: 111 MG/DL (ref 70–110)
GLUCOSE UR QL STRIP: NEGATIVE
HBA1C MFR BLD: 5.1 % (ref 4–5.6)
HCT VFR BLD AUTO: 36.3 % (ref 40–54)
HDLC SERPL-MCNC: 50 MG/DL (ref 40–75)
HDLC SERPL: 30.1 % (ref 20–50)
HGB BLD-MCNC: 12.1 G/DL (ref 14–18)
HGB UR QL STRIP: NEGATIVE
HYALINE CASTS #/AREA URNS LPF: 10.6 /LPF
IMM GRANULOCYTES # BLD AUTO: 0.02 K/UL (ref 0–0.04)
IMM GRANULOCYTES NFR BLD AUTO: 0.2 % (ref 0–0.5)
KETONES UR QL STRIP: ABNORMAL
LDLC SERPL CALC-MCNC: 102.2 MG/DL (ref 63–159)
LEUKOCYTE ESTERASE UR QL STRIP: ABNORMAL
LYMPHOCYTES # BLD AUTO: 3.2 K/UL (ref 1–4.8)
LYMPHOCYTES NFR BLD: 38.1 % (ref 18–48)
MCH RBC QN AUTO: 31.8 PG (ref 27–31)
MCHC RBC AUTO-ENTMCNC: 33.3 G/DL (ref 32–36)
MCV RBC AUTO: 96 FL (ref 82–98)
METHADONE UR QL SCN>300 NG/ML: NEGATIVE
MICROSCOPIC COMMENT: ABNORMAL
MONOCYTES # BLD AUTO: 0.6 K/UL (ref 0.3–1)
MONOCYTES NFR BLD: 7.6 % (ref 4–15)
NEUTROPHILS # BLD AUTO: 4.4 K/UL (ref 1.8–7.7)
NEUTROPHILS NFR BLD: 52.6 % (ref 38–73)
NITRITE UR QL STRIP: NEGATIVE
NONHDLC SERPL-MCNC: 116 MG/DL
NRBC BLD-RTO: 0 /100 WBC
OPIATES UR QL SCN: NEGATIVE
PCP UR QL SCN>25 NG/ML: NEGATIVE
PH UR STRIP: 6 [PH] (ref 5–8)
PLATELET # BLD AUTO: 252 K/UL (ref 150–450)
PMV BLD AUTO: 9.8 FL (ref 9.2–12.9)
POTASSIUM SERPL-SCNC: 3 MMOL/L (ref 3.5–5.1)
PROT SERPL-MCNC: 8 G/DL (ref 6–8.4)
PROT UR QL STRIP: ABNORMAL
RBC # BLD AUTO: 3.8 M/UL (ref 4.6–6.2)
RBC #/AREA URNS HPF: 1 /HPF (ref 0–4)
SODIUM SERPL-SCNC: 139 MMOL/L (ref 136–145)
SP GR UR STRIP: 1.02 (ref 1–1.03)
SQUAMOUS #/AREA URNS HPF: 1 /HPF
TOXICOLOGY INFORMATION: ABNORMAL
TRIGL SERPL-MCNC: 69 MG/DL (ref 30–150)
TSH SERPL DL<=0.005 MIU/L-ACNC: 0.64 UIU/ML (ref 0.4–4)
URN SPEC COLLECT METH UR: ABNORMAL
UROBILINOGEN UR STRIP-ACNC: 1 EU/DL
WBC # BLD AUTO: 8.39 K/UL (ref 3.9–12.7)
WBC #/AREA URNS HPF: 4 /HPF (ref 0–5)

## 2024-11-18 PROCEDURE — 80053 COMPREHEN METABOLIC PANEL: CPT | Performed by: EMERGENCY MEDICINE

## 2024-11-18 PROCEDURE — 36415 COLL VENOUS BLD VENIPUNCTURE: CPT | Performed by: EMERGENCY MEDICINE

## 2024-11-18 PROCEDURE — 81000 URINALYSIS NONAUTO W/SCOPE: CPT | Mod: 59 | Performed by: EMERGENCY MEDICINE

## 2024-11-18 PROCEDURE — 80061 LIPID PANEL: CPT | Performed by: PSYCHIATRY & NEUROLOGY

## 2024-11-18 PROCEDURE — 99285 EMERGENCY DEPT VISIT HI MDM: CPT | Mod: 27

## 2024-11-18 PROCEDURE — 83036 HEMOGLOBIN GLYCOSYLATED A1C: CPT | Performed by: PSYCHIATRY & NEUROLOGY

## 2024-11-18 PROCEDURE — 80307 DRUG TEST PRSMV CHEM ANLYZR: CPT | Performed by: EMERGENCY MEDICINE

## 2024-11-18 PROCEDURE — 36415 COLL VENOUS BLD VENIPUNCTURE: CPT | Performed by: PSYCHIATRY & NEUROLOGY

## 2024-11-18 PROCEDURE — 99283 EMERGENCY DEPT VISIT LOW MDM: CPT

## 2024-11-18 PROCEDURE — 85025 COMPLETE CBC W/AUTO DIFF WBC: CPT | Performed by: EMERGENCY MEDICINE

## 2024-11-18 PROCEDURE — 11400000 HC PSYCH PRIVATE ROOM

## 2024-11-18 PROCEDURE — 80143 DRUG ASSAY ACETAMINOPHEN: CPT | Performed by: EMERGENCY MEDICINE

## 2024-11-18 PROCEDURE — 82077 ASSAY SPEC XCP UR&BREATH IA: CPT | Performed by: EMERGENCY MEDICINE

## 2024-11-18 PROCEDURE — 84443 ASSAY THYROID STIM HORMONE: CPT | Performed by: EMERGENCY MEDICINE

## 2024-11-18 RX ORDER — LOPERAMIDE HYDROCHLORIDE 2 MG/1
2 CAPSULE ORAL 4 TIMES DAILY PRN
Status: DISCONTINUED | OUTPATIENT
Start: 2024-11-18 | End: 2024-11-19

## 2024-11-18 RX ORDER — OLANZAPINE 10 MG/1
10 TABLET ORAL EVERY 8 HOURS PRN
Status: DISCONTINUED | OUTPATIENT
Start: 2024-11-18 | End: 2024-11-19

## 2024-11-18 RX ORDER — ACETAMINOPHEN 325 MG/1
650 TABLET ORAL EVERY 6 HOURS PRN
Status: DISCONTINUED | OUTPATIENT
Start: 2024-11-18 | End: 2024-11-19

## 2024-11-18 RX ORDER — ALUMINUM HYDROXIDE, MAGNESIUM HYDROXIDE, AND SIMETHICONE 1200; 120; 1200 MG/30ML; MG/30ML; MG/30ML
30 SUSPENSION ORAL EVERY 6 HOURS PRN
Status: DISCONTINUED | OUTPATIENT
Start: 2024-11-18 | End: 2024-11-19

## 2024-11-18 RX ORDER — OLANZAPINE 10 MG/2ML
10 INJECTION, POWDER, FOR SOLUTION INTRAMUSCULAR EVERY 8 HOURS PRN
Status: DISCONTINUED | OUTPATIENT
Start: 2024-11-18 | End: 2024-11-19

## 2024-11-18 RX ORDER — IBUPROFEN 200 MG
1 TABLET ORAL DAILY PRN
Status: DISCONTINUED | OUTPATIENT
Start: 2024-11-18 | End: 2024-11-19

## 2024-11-18 RX ORDER — SERTRALINE HYDROCHLORIDE 25 MG/1
25 TABLET, FILM COATED ORAL DAILY
Status: ON HOLD | COMMUNITY
Start: 2024-09-18 | End: 2024-12-03 | Stop reason: HOSPADM

## 2024-11-18 RX ORDER — HYDROXYZINE PAMOATE 50 MG/1
50 CAPSULE ORAL NIGHTLY PRN
Status: DISCONTINUED | OUTPATIENT
Start: 2024-11-18 | End: 2024-11-19

## 2024-11-18 RX ORDER — HALOPERIDOL DECANOATE 100 MG/ML
100 INJECTION INTRAMUSCULAR
Status: ON HOLD | COMMUNITY
Start: 2024-10-30 | End: 2024-12-03 | Stop reason: HOSPADM

## 2024-11-18 RX ORDER — DOCUSATE SODIUM 100 MG/1
100 CAPSULE, LIQUID FILLED ORAL DAILY
Status: DISCONTINUED | OUTPATIENT
Start: 2024-11-19 | End: 2024-11-18

## 2024-11-18 RX ORDER — DOCUSATE SODIUM 100 MG/1
100 CAPSULE, LIQUID FILLED ORAL DAILY PRN
Status: DISCONTINUED | OUTPATIENT
Start: 2024-11-18 | End: 2024-11-19

## 2024-11-18 NOTE — Clinical Note
Diagnosis: Psychosis [125167]   Future Attending Provider: TERRENCE CEDILLO [[id number]]   Reason for IP Medical Treatment  (Clinical interventions that can only be accomplished in the IP setting? ) :: psychosis   Plans for Post-Acute care--if anticipated (pick the single best option):: A. No post acute care anticipated at this time   Special Needs:: No Special Needs [1]

## 2024-11-18 NOTE — ED PROVIDER NOTES
Encounter Date: 11/18/2024       History     Chief Complaint   Patient presents with    Psychiatric Evaluation     Pt to ER states hearing voices since 2009     23 yo male with schizoaffective disorder here via EMS with complaint of AH of voices since 2009. States he is here for food and does not have an emergency. No HI/SI. No acute component.       Review of patient's allergies indicates:  No Known Allergies  Past Medical History:   Diagnosis Date    History of psychiatric hospitalization 09/06/2023    [facility] of psychiatric care     Psychiatric problem     Schizophrenia     Suicide attempt      History reviewed. No pertinent surgical history.  Family History   Family history unknown: Yes     Social History     Tobacco Use    Smoking status: Some Days     Current packs/day: 1.00     Average packs/day: 1 pack/day for 5.5 years (5.5 ttl pk-yrs)     Types: Cigars, Cigarettes     Start date: 5/10/2019     Passive exposure: Never    Smokeless tobacco: Never   Substance Use Topics    Alcohol use: Yes     Comment: pt reports drinking wine, two-four times monthly or less    Drug use: Yes     Types: Marijuana     Comment: unknown, patient just laughs     Review of Systems   Constitutional: Negative.    Respiratory: Negative.     Cardiovascular: Negative.    Gastrointestinal: Negative.    All other systems reviewed and are negative.      Physical Exam     Initial Vitals [11/18/24 1107]   BP Pulse Resp Temp SpO2   129/66 80 16 98.2 °F (36.8 °C) 100 %      MAP       --         Physical Exam    Nursing note and vitals reviewed.  Constitutional: He appears well-developed and well-nourished. He is not diaphoretic. No distress.   HENT:   Head: Normocephalic and atraumatic.   Eyes: EOM are normal. Pupils are equal, round, and reactive to light.   Neck: Neck supple.   Normal range of motion.  Cardiovascular:  Normal rate, regular rhythm and intact distal pulses.           Pulmonary/Chest: Breath sounds normal. No  respiratory distress. He has no wheezes. He has no rales.   Abdominal: Abdomen is soft. Bowel sounds are normal. He exhibits no distension. There is no abdominal tenderness. There is no rebound.   Musculoskeletal:         General: No tenderness or edema. Normal range of motion.      Cervical back: Normal range of motion and neck supple.     Neurological: He is alert and oriented to person, place, and time.   Skin: Skin is warm and dry. Capillary refill takes less than 2 seconds.   Psychiatric: He has a normal mood and affect. Thought content normal.         ED Course   Procedures  Labs Reviewed - No data to display       Imaging Results    None          Medications - No data to display  Medical Decision Making  No indication for PEC.    Problems Addressed:  Schizoaffective disorder, unspecified type: chronic illness or injury    Amount and/or Complexity of Data Reviewed  Independent Historian: EMS                                      Clinical Impression:  Final diagnoses:  [F25.9] Schizoaffective disorder, unspecified type (Primary)          ED Disposition Condition    Discharge Stable          ED Prescriptions    None       Follow-up Information       Follow up With Specialties Details Why Contact Info    Jose Martin Selby MD Family Medicine Schedule an appointment as soon as possible for a visit   RETIRED  Frantz Chu MD  11/18/24 0296

## 2024-11-19 PROBLEM — D64.9 ANEMIA: Status: ACTIVE | Noted: 2024-11-19

## 2024-11-19 PROCEDURE — 25000003 PHARM REV CODE 250: Performed by: STUDENT IN AN ORGANIZED HEALTH CARE EDUCATION/TRAINING PROGRAM

## 2024-11-19 PROCEDURE — 25000003 PHARM REV CODE 250: Performed by: NURSE PRACTITIONER

## 2024-11-19 PROCEDURE — 11400000 HC PSYCH PRIVATE ROOM

## 2024-11-19 PROCEDURE — 99223 1ST HOSP IP/OBS HIGH 75: CPT | Mod: AF,HB,, | Performed by: STUDENT IN AN ORGANIZED HEALTH CARE EDUCATION/TRAINING PROGRAM

## 2024-11-19 RX ORDER — OLANZAPINE 10 MG/1
10 TABLET ORAL EVERY 8 HOURS PRN
Status: DISCONTINUED | OUTPATIENT
Start: 2024-11-19 | End: 2024-12-03 | Stop reason: HOSPADM

## 2024-11-19 RX ORDER — PROMETHAZINE HYDROCHLORIDE 25 MG/1
25 TABLET ORAL EVERY 6 HOURS PRN
Status: DISCONTINUED | OUTPATIENT
Start: 2024-11-19 | End: 2024-12-03 | Stop reason: HOSPADM

## 2024-11-19 RX ORDER — MIRTAZAPINE 15 MG/1
15 TABLET, FILM COATED ORAL NIGHTLY
Status: DISCONTINUED | OUTPATIENT
Start: 2024-11-19 | End: 2024-12-03 | Stop reason: HOSPADM

## 2024-11-19 RX ORDER — IBUPROFEN 200 MG
1 TABLET ORAL DAILY PRN
Status: DISCONTINUED | OUTPATIENT
Start: 2024-11-19 | End: 2024-11-25

## 2024-11-19 RX ORDER — ACETAMINOPHEN 325 MG/1
650 TABLET ORAL EVERY 6 HOURS PRN
Status: DISCONTINUED | OUTPATIENT
Start: 2024-11-19 | End: 2024-12-03 | Stop reason: HOSPADM

## 2024-11-19 RX ORDER — LOPERAMIDE HYDROCHLORIDE 2 MG/1
2 CAPSULE ORAL
Status: DISCONTINUED | OUTPATIENT
Start: 2024-11-19 | End: 2024-12-03 | Stop reason: HOSPADM

## 2024-11-19 RX ORDER — HYDROXYZINE PAMOATE 50 MG/1
50 CAPSULE ORAL EVERY 6 HOURS PRN
Status: DISCONTINUED | OUTPATIENT
Start: 2024-11-19 | End: 2024-12-03 | Stop reason: HOSPADM

## 2024-11-19 RX ORDER — ALUMINUM HYDROXIDE, MAGNESIUM HYDROXIDE, AND SIMETHICONE 1200; 120; 1200 MG/30ML; MG/30ML; MG/30ML
30 SUSPENSION ORAL EVERY 6 HOURS PRN
Status: DISCONTINUED | OUTPATIENT
Start: 2024-11-19 | End: 2024-12-03 | Stop reason: HOSPADM

## 2024-11-19 RX ORDER — POTASSIUM CHLORIDE 20 MEQ/1
20 TABLET, EXTENDED RELEASE ORAL ONCE
Status: COMPLETED | OUTPATIENT
Start: 2024-11-19 | End: 2024-11-19

## 2024-11-19 RX ORDER — OLANZAPINE 10 MG/2ML
10 INJECTION, POWDER, FOR SOLUTION INTRAMUSCULAR EVERY 8 HOURS PRN
Status: DISCONTINUED | OUTPATIENT
Start: 2024-11-19 | End: 2024-12-03 | Stop reason: HOSPADM

## 2024-11-19 RX ORDER — BENZONATATE 100 MG/1
100 CAPSULE ORAL 3 TIMES DAILY PRN
Status: DISCONTINUED | OUTPATIENT
Start: 2024-11-19 | End: 2024-12-03 | Stop reason: HOSPADM

## 2024-11-19 RX ORDER — BENZTROPINE MESYLATE 1 MG/ML
2 INJECTION, SOLUTION INTRAMUSCULAR; INTRAVENOUS EVERY 8 HOURS PRN
Status: DISCONTINUED | OUTPATIENT
Start: 2024-11-19 | End: 2024-12-03 | Stop reason: HOSPADM

## 2024-11-19 RX ORDER — ONDANSETRON 4 MG/1
4 TABLET, ORALLY DISINTEGRATING ORAL EVERY 8 HOURS PRN
Status: DISCONTINUED | OUTPATIENT
Start: 2024-11-19 | End: 2024-12-03 | Stop reason: HOSPADM

## 2024-11-19 RX ORDER — DIVALPROEX SODIUM 500 MG/1
500 TABLET, FILM COATED, EXTENDED RELEASE ORAL 2 TIMES DAILY
Status: DISCONTINUED | OUTPATIENT
Start: 2024-11-19 | End: 2024-12-03 | Stop reason: HOSPADM

## 2024-11-19 RX ADMIN — POTASSIUM CHLORIDE 20 MEQ: 1500 TABLET, EXTENDED RELEASE ORAL at 11:11

## 2024-11-19 RX ADMIN — HYDROXYZINE PAMOATE 50 MG: 50 CAPSULE ORAL at 07:11

## 2024-11-19 RX ADMIN — DIVALPROEX SODIUM 500 MG: 500 TABLET, FILM COATED, EXTENDED RELEASE ORAL at 01:11

## 2024-11-19 RX ADMIN — DIVALPROEX SODIUM 500 MG: 500 TABLET, FILM COATED, EXTENDED RELEASE ORAL at 07:11

## 2024-11-19 RX ADMIN — MIRTAZAPINE 15 MG: 15 TABLET, FILM COATED ORAL at 07:11

## 2024-11-19 NOTE — PLAN OF CARE
POC reviewed this shift and is on going. Patient is withdrawn, depressed, quiet, anxious, irritable, showing pressured speech, and paranoid. Endorses Delusions. Denies Suicidal Ideation, Homicidal Ideation, Auditory Hallucinations, Visual Hallucinations, Tactile Hallucinations, and Gustatory Hallucinations. Patient was not forth coming with any answers. Patient did ask Dr Grijalva where and how he could get some deer blood. Patient is isolating to his room except for meals. Patient is acting bizarre.  Patient did not participate in any of the group sessions that was conducted today. Pt continues to be medication compliant and staff will continue to monitor pt closely while on the unit.

## 2024-11-19 NOTE — ASSESSMENT & PLAN NOTE
Patient's most recent potassium results are listed below.   Recent Labs     11/18/24 1914   K 3.0*     Plan  - Replete potassium per protocol  -Repeat K+ level in am.

## 2024-11-19 NOTE — NURSING
Bishop Soria is a 24 y.o. male with PMHX of schizophrenia, schizoaffective disorder who presents to the emergency department C/O delusions.  Patient arrives by 911 after family called because patient was acting bizarrely.  In the emergency department patient was cooperative, with bizarre thought process and poor insight.  Patient was discharged from the ED early this morning. Patient disheveled with dark lips.  Patient has flat affect and is slow to respond to questions.  Patient is moving in slow motion and responding to internal stimuli.  Patient had  full snack and went to bed after.   Patient sttes tht he uses [employer] and patiet given back test to prove it.

## 2024-11-19 NOTE — H&P
"PSYCHIATRY INPATIENT ADMISSION NOTE - H & P      11/19/2024 12:08 PM   Bishop Soria   2000   [id number]         DATE OF ADMISSION: 11/18/2024  6:46 PM    SITE: [facility]    CURRENT LEGAL STATUS: PEC and/or CEC      HISTORY    CHIEF COMPLAINT   Bishop Soria is a 24 y.o. male with a past psychiatric history of Substance Abuse and schizophrenia  currently admitted to the inpatient unit with the following chief complaint:  psychosis    HPI   The patient was seen and examined. The chart was reviewed.    The patient presented to the ER on 11/18/2024 .    The patient was medically cleared and admitted to the U.      Per ED MD:   Psychiatric Evaluation       Pt to ER states hearing voices since 2009      25 yo male with schizoaffective disorder here via EMS with complaint of AH of voices since 2009. States he is here for food and does not have an emergency. No HI/SI. No acute component.       Psychiatric interview:  Pt states he has been using MJ and not taking his medication. He states "is there a way to block people from reading your thoughts? I like when doctors do it, but not when other people do." Reports he finished rehab and relapsed on MJ soon after leaving, "I tried to stop but it doesn't work." He states, "I found out I'm Tyler ... how much money do I need to get blood? Is there any way I can get deer blood?" Pt unable to provide much HPI at this time due to severe psychosis.          Symptoms of Depression: diminished mood - No, loss of interest/anhedonia - No;  recurrent - No, >14 days - No, diminished energy - No, change in sleep - No, change in appetite - Yes, diminished concentration or cognition or indecisiveness - No, PMA/R -  No, excessive guilt or hopelessness or worthlessness - No, suicidal ideations - No    Changes in Sleep: trouble with initiation- No, maintenance, - No early morning awakening with inability to return to sleep - No, hypersomnolence - No    Suicidal- " "active/passive ideations - No, organized plans, future intentions - No    Homicidal ideations: active/passive ideations - No, organized plans, future intentions - No    Symptoms of psychosis: hallucinations - Yes, delusions - Yes, disorganized speech - Yes, disorganized behavior or abnormal motor behavior - No, or negative symptoms (diminshed emotional expression, avolition, anhedonia, alogia, asociality) - Yes, active phase symptoms >1 month - Yes, continuous signs of illness > 6 months - Yes, since onset of illness decreased level of functioning present - Yes    Symptoms of carley or hypomania: elevated, expansive, or irritable mood with increased energy or activity - No; > 4 days - No,  >7 days - No; with inflated self-esteem or grandiosity - No, decreased need for sleep - No, increased rate of speech - No, FOI or racing thoughts - No, distractibility - No, increased goal directed activity or PMA - No, risky/disinhibited behavior - No    Symptoms of KANU: excessive anxiety/worry/fear, more days than not, about numerous issues - No, ongoing for >6 months - No, difficult to control - No, with restlessness - No, fatigue - No, poor concentration - No, irritability - No, muscle tension - No, sleep disturbance - No; causes functionally impairing distress - No    Symptoms of Panic Disorder: recurrent panic attacks (palpitations/heart racing, sweating, shakiness, dyspnea, choking, chest pain/discomfort, Gi symptoms, dizzy/lightheadedness, hot/col flashes, paresthesias, derealization, fear of losing control or fear of dying or fear of "going crazy") - No, precipitated - No, un-precipitated - No, source of worry and/or behavioral changes secondary for 1 month or longer- No, agoraphobia - No    Symptoms of PTSD: h/o trauma exposure - No; re-experiencing/intrusive symptoms - No, avoidant behavior - No, 2 or more negative alterations in cognition or mood - No, 2 or more hyperarousal symptoms - No; with dissociative symptoms - " No, ongoing for 1 or more  months - No    Symptoms of OCD: obsessions (recurrent thoughts/urges/images; intrusive and/or unwanted; uses other thoughts/actions to suppress) - No; compulsions (repetitive behaviors used to lower distress/anxiety/obsessions) - No, time-consuming (over 1 hour per day) or cause significant distress/impairment - - No    Symptoms of Anorexia: restriction of caloric intake leading to significantly low body weight - No, intense fear of gaining weight or persistent behavior that interferes with weight gain even thought at a significantly low weight - No, disturbance in the way in which one's body weight or shape is experienced, undue influence of body weight or shape on self evaluation, or persistent lack of recognition of the seriousness of the current low body weight - No    Symptoms of Bulimia: recurrent episodes of binge eating (definitely larger amount  than what others would eat and lack of a sense of control over eating during episode) - No, recurrent inappropriate compensatory behaviors in order to prevent weight gain (fasting, medications, exercise, vomiting) - No, binges and compensatory behaviors both occur on average at least once a week for 3 months - No, self evaluations is unduly influenced by body shape/weight- - No    Symptoms of Binge eating: recurrent episodes of binge eating (definitely larger amount than what others would eat and lack of a sense of control over eating during episode) - No, 3 or more of following (eating much more rapidly, eating until uncomfortably full, large amounts when not hungry, eating alone because of embarrassed by how much,  feeling disgusted with oneself, depressed or very guilty afterward) - No, distress regarding binges - No, binges occur on average at least once a week for 3 months - No          PAST PSYCHIATRIC HISTORY  Previous Psychiatric Hospitalizations: Yes,extensive  Previous SI/HI: Yes,  Previous Suicide Attempts: Yes,   Previous  Medication Trials: Yes,  Psychiatric Care (current & past): Yes,  History of Psychotherapy: Yes,  History of Violence: Yes,  History of sexual/physical abuse: No,    PAST MEDICAL & SURGICAL HISTORY   Past Medical History:   Diagnosis Date    History of psychiatric hospitalization 09/06/2023    [facility]     Psychiatric problem     Schizophrenia     Suicide attempt      History reviewed. No pertinent surgical history.      Home Meds:   Prior to Admission medications    Medication Sig Start Date End Date Taking? Authorizing Provider   divalproex ER (DEPAKOTE ER) 500 MG Tb24 24 hr tablet Take 2 tablets (1,000 mg total) by mouth every evening. 9/6/24 9/6/25 Yes Frantz Cagle MD   haloperidol decanoate (HALDOL DECANOATE) 100 mg/mL injection Inject 100 mg into the muscle every 28 days. 10/30/24  Yes Provider, Historical   ibuprofen (ADVIL,MOTRIN) 800 MG tablet Take 1 tablet (800 mg total) by mouth every 6 (six) hours as needed for Pain. 10/20/24  Yes Kiersten Yeboah NP   risperiDONE (RISPERDAL) 3 MG Tab Take 1 tablet (3 mg total) by mouth 2 (two) times daily. 9/6/24 9/6/25 Yes Frantz Cagle MD   sertraline (ZOLOFT) 25 MG tablet Take 25 mg by mouth once daily. 9/18/24  Yes Provider, Historical   EScitalopram oxalate (LEXAPRO) 10 MG tablet TAKE 1 TABLET BY MOUTH EVERY DAY 7/27/21 5/23/22  Jl Aguilar NP   lithium (LITHOTAB) 300 mg tablet Take 300 mg by mouth 2 (two) times daily. 4/26/22 5/31/22  Provider, Historical       Scheduled Meds:    PRN Meds:   Current Facility-Administered Medications:     acetaminophen, 650 mg, Oral, Q6H PRN    aluminum-magnesium hydroxide-simethicone, 30 mL, Oral, Q6H PRN    benzonatate, 100 mg, Oral, TID PRN    benztropine mesylate, 2 mg, Intramuscular, Q8H PRN    hydrOXYzine pamoate, 50 mg, Oral, Q6H PRN    loperamide, 2 mg, Oral, PRN    nicotine, 1 patch, Transdermal, Daily PRN    OLANZapine, 10 mg, Oral, Q8H PRN **AND** OLANZapine, 10 mg,  "Intramuscular, Q8H PRN    ondansetron, 4 mg, Oral, Q8H PRN    promethazine, 25 mg, Oral, Q6H PRN   Psychotherapeutics (From admission, onward)      Start     Stop Route Frequency Ordered    11/19/24 1118  OLANZapine tablet 10 mg  (Olanzapine PRN (</= 64 yo))        Placed in "And" Linked Group    -- Oral Every 8 hours PRN 11/19/24 1019    11/19/24 1118  OLANZapine injection 10 mg  (Olanzapine PRN (</= 64 yo))        Placed in "And" Linked Group    -- IM Every 8 hours PRN 11/19/24 1019            ALLERGIES   Review of patient's allergies indicates:  No Known Allergies        NEUROLOGIC HISTORY  Seizures: No  Head trauma: No     SOCIAL HISTORY:  Developmental/Childhood:Achieved all developmental milestones timely  Education: some college  Employment Status/Finances:Employed   Relationship Status/Sexual Orientation: single  Children: 0  Housing Status: Home    history:  NO   Access to Firearms: NO ;  Locked up? n/a  Scientologist:Actively participates in organized Orthodox  Recreational activities:Exercise     SUBSTANCE ABUSE HISTORY   Recreational Drugs: marijuana   Use of Alcohol: occasional, social use  Rehab History:no   Tobacco Use:no     LEGAL HISTORY:   Past charges/incarcerations: NO  Pending charges:NO     FAMILY PSYCHIATRIC HISTORY   Family History   Family history unknown: Yes      Depression - father      ROS  Review of Systems   Constitutional:  Negative for chills and fever.   HENT:  Negative for hearing loss.    Eyes:  Negative for blurred vision and double vision.   Respiratory:  Negative for shortness of breath.    Cardiovascular:  Negative for chest pain and palpitations.   Gastrointestinal:  Negative for constipation, diarrhea, nausea and vomiting.   Genitourinary:  Negative for dysuria.   Musculoskeletal:  Negative for back pain and neck pain.   Skin:  Negative for rash.   Neurological:  Negative for dizziness and headaches.   Endo/Heme/Allergies:  Negative for environmental allergies. "         EXAMINATION    PHYSICAL EXAM  Reviewed note/exam by Dr. Romero, Frantz WHITE MD from 11/18/24 at 1108    VITALS   Vitals:    11/18/24 2031   BP: 118/78   Pulse: 98   Resp: 18   Temp: 98.1 °F (36.7 °C)        Body mass index is 20 kg/m².        PAIN  0/10  Subjective report of pain matches objective signs and symptoms: Yes    LABORATORY DATA   Recent Results (from the past 72 hours)   Urinalysis, Reflex to Urine Culture Urine, Clean Catch    Collection Time: 11/18/24  7:09 PM    Specimen: Urine   Result Value Ref Range    Specimen UA Urine, Clean Catch     Color, UA Yellow Yellow, Straw, Ingrid    Appearance, UA Clear Clear    pH, UA 6.0 5.0 - 8.0    Specific Gravity, UA 1.025 1.005 - 1.030    Protein, UA 1+ (A) Negative    Glucose, UA Negative Negative    Ketones, UA 1+ (A) Negative    Bilirubin (UA) Negative Negative    Occult Blood UA Negative Negative    Nitrite, UA Negative Negative    Urobilinogen, UA 1.0 <2.0 EU/dL    Leukocytes, UA Trace (A) Negative   Drug screen panel, emergency    Collection Time: 11/18/24  7:09 PM   Result Value Ref Range    Benzodiazepines Negative Negative    Methadone metabolites Negative Negative    Cocaine (Metab.) Negative Negative    Opiate Scrn, Ur Negative Negative    Barbiturate Screen, Ur Negative Negative    Amphetamine Screen, Ur Negative Negative    THC Presumptive Positive (A) Negative    Phencyclidine Negative Negative    Creatinine, Urine 539.0 (H) 23.0 - 375.0 mg/dL    Toxicology Information SEE COMMENT    Urinalysis Microscopic    Collection Time: 11/18/24  7:09 PM   Result Value Ref Range    RBC, UA 1 0 - 4 /hpf    WBC, UA 4 0 - 5 /hpf    Bacteria Negative None-Occ /hpf    Squam Epithel, UA 1 /hpf    Hyaline Casts, UA 10.6 (A) 0-1/lpf /lpf    Microscopic Comment SEE COMMENT    CBC auto differential    Collection Time: 11/18/24  7:14 PM   Result Value Ref Range    WBC 8.39 3.90 - 12.70 K/uL    RBC 3.80 (L) 4.60 - 6.20 M/uL    Hemoglobin 12.1 (L) 14.0 - 18.0 g/dL     Hematocrit 36.3 (L) 40.0 - 54.0 %    MCV 96 82 - 98 fL    MCH 31.8 (H) 27.0 - 31.0 pg    MCHC 33.3 32.0 - 36.0 g/dL    RDW 13.5 11.5 - 14.5 %    Platelets 252 150 - 450 K/uL    MPV 9.8 9.2 - 12.9 fL    Immature Granulocytes 0.2 0.0 - 0.5 %    Gran # (ANC) 4.4 1.8 - 7.7 K/uL    Immature Grans (Abs) 0.02 0.00 - 0.04 K/uL    Lymph # 3.2 1.0 - 4.8 K/uL    Mono # 0.6 0.3 - 1.0 K/uL    Eos # 0.1 0.0 - 0.5 K/uL    Baso # 0.01 0.00 - 0.20 K/uL    nRBC 0 0 /100 WBC    Gran % 52.6 38.0 - 73.0 %    Lymph % 38.1 18.0 - 48.0 %    Mono % 7.6 4.0 - 15.0 %    Eosinophil % 1.4 0.0 - 8.0 %    Basophil % 0.1 0.0 - 1.9 %    Differential Method Automated    Comprehensive metabolic panel    Collection Time: 11/18/24  7:14 PM   Result Value Ref Range    Sodium 139 136 - 145 mmol/L    Potassium 3.0 (L) 3.5 - 5.1 mmol/L    Chloride 104 95 - 110 mmol/L    CO2 25 23 - 29 mmol/L    Glucose 111 (H) 70 - 110 mg/dL    BUN 9 6 - 20 mg/dL    Creatinine 1.1 0.5 - 1.4 mg/dL    Calcium 8.7 8.7 - 10.5 mg/dL    Total Protein 8.0 6.0 - 8.4 g/dL    Albumin 4.0 3.5 - 5.2 g/dL    Total Bilirubin 0.4 0.1 - 1.0 mg/dL    Alkaline Phosphatase 88 55 - 135 U/L    AST 23 10 - 40 U/L    ALT 26 10 - 44 U/L    eGFR >60.0 >60 mL/min/1.73 m^2    Anion Gap 10 3 - 11 mmol/L   TSH    Collection Time: 11/18/24  7:14 PM   Result Value Ref Range    TSH 0.643 0.400 - 4.000 uIU/mL   Ethanol    Collection Time: 11/18/24  7:14 PM   Result Value Ref Range    Alcohol, Serum <3 <10 mg/dL   Acetaminophen level    Collection Time: 11/18/24  7:14 PM   Result Value Ref Range    Acetaminophen (Tylenol), Serum <2.0 (L) 10.0 - 20.0 ug/mL   Hemoglobin A1C    Collection Time: 11/18/24  7:14 PM   Result Value Ref Range    Hemoglobin A1C 5.1 4.0 - 5.6 %    Estimated Avg Glucose 100 68 - 131 mg/dL   Lipid Panel    Collection Time: 11/18/24  7:14 PM   Result Value Ref Range    Cholesterol 166 120 - 199 mg/dL    Triglycerides 69 30 - 150 mg/dL    HDL 50 40 - 75 mg/dL    LDL Cholesterol 102.2  63.0 - 159.0 mg/dL    HDL/Cholesterol Ratio 30.1 20.0 - 50.0 %    Total Cholesterol/HDL Ratio 3.3 2.0 - 5.0    Non-HDL Cholesterol 116 mg/dL      Lab Results   Component Value Date    VALPROATE 53 10/07/2024    CBMZ 11.8 05/07/2023           CONSTITUTIONAL  General Appearance: unremarkable, age appropriate    MUSCULOSKELETAL  Muscle Strength and Tone:no tremor, no tic  Abnormal Involuntary Movements: No  Gait and Station: non-ataxic    PSYCHIATRIC   Level of Consciousness: awake and alert   Orientation: person, place, and situation  Grooming: Casually dressed and Hospital garb  Psychomotor Behavior: psychomotor retardation  Speech: loud, non-spontaneous, monotone  Language: grossly intact  Mood: ok  Affect: Labile  Thought Process: tangential  Associations: loose   Thought Content: +delusions, denies SI, and denies HI  Perceptions: denies AH and denies  VH  Memory: Able to recall past events, Remote intact, and Recent intact  Attention:Impaired to some degree  Fund of Knowledge: Aware of current events and Vocabulary appropriate   Estimate if Intelligence:  Average based on work/education history, vocabulary and mental status exam  Insight: limited awareness of illness  Judgment: limited      PSYCHOSOCIAL    PSYCHOSOCIAL STRESSORS   Drug abuse, occupational    FUNCTIONING RELATIONSHIPS   good support system    STRENGTHS AND LIABILITIES   Strength: Patient is expressive/articulate., Strength: Patient is intelligent., Liability: Patient is dependent., Liability: Patient is impulsive., Liability: Patient has poor judgment, Liability: Patient lacks coping skills.    Is the patient aware of the biomedical complications associated with substance abuse and mental illness? yes    Does the patient have an Advance Directive for Mental Health treatment? no  (If yes, inform patient to bring copy.)        ASSESSMENT         Schizoaffective disorder, bipolar type, severe  Unspecified anxiety disorder  Cannabis abuse  Psychosocial  stressors  Nicotine dependence  Hypokalemia     Anemia        PROBLEM LIST AND MANAGEMENT PLANS           Schizoaffective disorder, bipolar type, severe  - pt reports received haldol CARRASCO 2 weeks ago with [facility], will attempt to verify  - resume depakote at 500 mg PO BID, will check level for toxicity  - resume remeron 15 mg PO qhs  - pt counseled  - follow up with outpatient mental health providers after discharge for medication management and psychotherapy        Unspecified anxiety disorder  - start hydroxyzine 50 mg PO q 6 hours PRN  - pt counseled  - follow up with outpatient mental health providers after discharge for medication management and psychotherapy        Cannabis abuse  - consider rehab  - consider gabapentin  - pt counseled  - follow up with outpatient mental health providers after discharge for medication management and psychotherapy        Psychosocial stressors  - pt counseled  - SW consulted for assistance with additional resources      Nicotine dependence  - start nicotine patch 14 mg PO qd PRN        Hypokalemia  - FM consulted       Anemia  - FM consulted          PRESCRIPTION DRUG MANAGEMENT  Compliance: unknown  Side Effects: unknown  Regimen Adjustments: see above    Discussed diagnosis, risks and benefits of proposed treatment vs alternative treatments vs no treatment, potential side effects of these treatments and the inherent unpredictability of treatment. The patient expresses understanding of the above and displays the capacity to agree with this treatment given said understanding. Patient also agrees that, currently, the benefits outweigh the risks and would like to pursue/continue treatment at this time.    Any medications being used off-label were discussed with the patient inclusive of the evidence base for the use of the medications and consent was obtained for the off-label use of the medication.         DIAGNOSTIC TESTING  Labs reviewed with patient; follow up pending  labs    Disposition:  -Will attempt to obtain outside psychiatric records if available  -SW to assist with aftercare planning and collateral  -Once stable discharge home with outpatient follow up care and/or rehab  -Continue inpatient treatment under a PEC and/or CEC for danger to self/ danger to others/grave disability as evident by gravely disabled    The patient location is: [facility]    Visit type: audiovisual    Face to Face time with patient: 20  30 minutes of total time spent on the encounter, which includes face to face time and non-face to face time preparing to see the patient (eg, review of tests), Obtaining and/or reviewing separately obtained history, Documenting clinical information in the electronic or other health record, Independently interpreting results (not separately reported) and communicating results to the patient/family/caregiver, or Care coordination (not separately reported).     Each patient to whom he or she provides medical services by telemedicine is:  (1) informed of the relationship between the physician and patient and the respective role of any other health care provider with respect to management of the patient; and (2) notified that he or she may decline to receive medical services by telemedicine and may withdraw from such care at any time.        Cristhian Grijalva III, MD  Psychiatry

## 2024-11-19 NOTE — CONSULTS
RD consulted for new admit. As per chart pt has no dietary issue at this time. Pt is tolerating a Regular diet and consuming an adequate amount of meals and snacks. Nutrition services are not warranted at this time. RD to sign off. Please re-consult if any nutrition/dietary issues arise.

## 2024-11-19 NOTE — ASSESSMENT & PLAN NOTE
11/19/24:  No indication for transfusion.  Continue to monitor.   Recent Labs     11/18/24  1914   HGB 12.1*   HCT 36.3*

## 2024-11-19 NOTE — ED PROVIDER NOTES
EMERGENCY DEPARTMENT HISTORY AND PHYSICAL EXAM     This note is dictated on M*Modal word recognition program.  There are word recognition mistakes and grammatical errors that are occasionally missed on review.     Date: 11/18/2024   Patient Name: Bishop Soria       History of Presenting Illness      Chief Complaint   Patient presents with    Psychiatric Evaluation     Pt returned from earlier with [facility]. Family called 911 stating patient was delusional. Pt not speaking to nurse.         1900   Bishop Soria is a 24 y.o. male with PMHX of schizophrenia, schizoaffective disorder who presents to the emergency department C/O delusions.    Patient arrives by 911 after family called because patient was acting bizarrely.  In the emergency department patient was cooperative, with bizarre thought process and poor insight         PCP: Jose Martin Selby MD (Inactive)        No current facility-administered medications for this encounter.     Current Outpatient Medications   Medication Sig Dispense Refill    haloperidol decanoate (HALDOL DECANOATE) 100 mg/mL injection Inject 100 mg into the muscle every 28 days.      sertraline (ZOLOFT) 25 MG tablet Take 25 mg by mouth once daily.      divalproex ER (DEPAKOTE ER) 500 MG Tb24 24 hr tablet Take 2 tablets (1,000 mg total) by mouth every evening. 60 tablet 2    ibuprofen (ADVIL,MOTRIN) 800 MG tablet Take 1 tablet (800 mg total) by mouth every 6 (six) hours as needed for Pain. 20 tablet 0    risperiDONE (RISPERDAL) 3 MG Tab Take 1 tablet (3 mg total) by mouth 2 (two) times daily. 60 tablet 1           Past History     Past Medical History:   Past Medical History:   Diagnosis Date    History of psychiatric hospitalization 09/06/2023    [facility] psychiatric care     Psychiatric problem     Schizophrenia     Suicide attempt         Past Surgical History:   History reviewed. No pertinent surgical history.     Family History:   Family History   Family history unknown: Yes  "       Social History:   Social History     Tobacco Use    Smoking status: Some Days     Current packs/day: 1.00     Average packs/day: 1 pack/day for 5.5 years (5.5 ttl pk-yrs)     Types: Cigars, Cigarettes     Start date: 5/10/2019     Passive exposure: Never    Smokeless tobacco: Never   Substance Use Topics    Alcohol use: Yes     Comment: pt reports drinking wine, two-four times monthly or less    Drug use: Yes     Types: Marijuana     Comment: unknown, patient just laughs        Allergies:   Review of patient's allergies indicates:  No Known Allergies       Review of Systems   Review of Systems   See HPI for pertinent positives and negatives       Physical Exam     Vitals:    11/18/24 1841 11/18/24 1856   BP: 118/66    BP Location: Right arm    Patient Position: Sitting    Pulse: 98    Resp: 18    Temp: 98.5 °F (36.9 °C)    TempSrc: Oral    SpO2: 96%    Weight:  72.6 kg (160 lb)   Height:  6' 3" (1.905 m)      Physical Exam  Vitals and nursing note reviewed.   Constitutional:       General: He is not in acute distress.     Appearance: Normal appearance. He is well-developed. He is not ill-appearing.   HENT:      Head: Normocephalic and atraumatic.   Eyes:      Extraocular Movements: Extraocular movements intact.      Conjunctiva/sclera: Conjunctivae normal.   Pulmonary:      Effort: Pulmonary effort is normal. No respiratory distress.   Musculoskeletal:         General: No deformity or signs of injury. Normal range of motion.      Cervical back: Normal range of motion. No rigidity.   Skin:     General: Skin is dry.      Coloration: Skin is not pale.      Findings: No rash.   Neurological:      General: No focal deficit present.      Mental Status: He is alert and oriented to person, place, and time.      Cranial Nerves: No cranial nerve deficit.      Motor: No weakness.      Coordination: Coordination normal.   Psychiatric:         Attention and Perception: Attention and perception normal.         Mood and " Affect: Affect is flat.         Behavior: Behavior is cooperative.         Cognition and Memory: Cognition is impaired. Memory is impaired. He exhibits impaired recent memory.              Diagnostic Study Results      Labs -   Recent Results (from the past 12 hours)   Urinalysis, Reflex to Urine Culture Urine, Clean Catch    Collection Time: 11/18/24  7:09 PM    Specimen: Urine   Result Value Ref Range    Specimen UA Urine, Clean Catch     Color, UA Yellow Yellow, Straw, Ingrid    Appearance, UA Clear Clear    pH, UA 6.0 5.0 - 8.0    Specific Gravity, UA 1.025 1.005 - 1.030    Protein, UA 1+ (A) Negative    Glucose, UA Negative Negative    Ketones, UA 1+ (A) Negative    Bilirubin (UA) Negative Negative    Occult Blood UA Negative Negative    Nitrite, UA Negative Negative    Urobilinogen, UA 1.0 <2.0 EU/dL    Leukocytes, UA Trace (A) Negative   Drug screen panel, emergency    Collection Time: 11/18/24  7:09 PM   Result Value Ref Range    Benzodiazepines Negative Negative    Methadone metabolites Negative Negative    Cocaine (Metab.) Negative Negative    Opiate Scrn, Ur Negative Negative    Barbiturate Screen, Ur Negative Negative    Amphetamine Screen, Ur Negative Negative    THC Presumptive Positive (A) Negative    Phencyclidine Negative Negative    Creatinine, Urine 539.0 (H) 23.0 - 375.0 mg/dL    Toxicology Information SEE COMMENT    Urinalysis Microscopic    Collection Time: 11/18/24  7:09 PM   Result Value Ref Range    RBC, UA 1 0 - 4 /hpf    WBC, UA 4 0 - 5 /hpf    Bacteria Negative None-Occ /hpf    Squam Epithel, UA 1 /hpf    Hyaline Casts, UA 10.6 (A) 0-1/lpf /lpf    Microscopic Comment SEE COMMENT    CBC auto differential    Collection Time: 11/18/24  7:14 PM   Result Value Ref Range    WBC 8.39 3.90 - 12.70 K/uL    RBC 3.80 (L) 4.60 - 6.20 M/uL    Hemoglobin 12.1 (L) 14.0 - 18.0 g/dL    Hematocrit 36.3 (L) 40.0 - 54.0 %    MCV 96 82 - 98 fL    MCH 31.8 (H) 27.0 - 31.0 pg    MCHC 33.3 32.0 - 36.0 g/dL    RDW  13.5 11.5 - 14.5 %    Platelets 252 150 - 450 K/uL    MPV 9.8 9.2 - 12.9 fL    Immature Granulocytes 0.2 0.0 - 0.5 %    Gran # (ANC) 4.4 1.8 - 7.7 K/uL    Immature Grans (Abs) 0.02 0.00 - 0.04 K/uL    Lymph # 3.2 1.0 - 4.8 K/uL    Mono # 0.6 0.3 - 1.0 K/uL    Eos # 0.1 0.0 - 0.5 K/uL    Baso # 0.01 0.00 - 0.20 K/uL    nRBC 0 0 /100 WBC    Gran % 52.6 38.0 - 73.0 %    Lymph % 38.1 18.0 - 48.0 %    Mono % 7.6 4.0 - 15.0 %    Eosinophil % 1.4 0.0 - 8.0 %    Basophil % 0.1 0.0 - 1.9 %    Differential Method Automated    Comprehensive metabolic panel    Collection Time: 11/18/24  7:14 PM   Result Value Ref Range    Sodium 139 136 - 145 mmol/L    Potassium 3.0 (L) 3.5 - 5.1 mmol/L    Chloride 104 95 - 110 mmol/L    CO2 25 23 - 29 mmol/L    Glucose 111 (H) 70 - 110 mg/dL    BUN 9 6 - 20 mg/dL    Creatinine 1.1 0.5 - 1.4 mg/dL    Calcium 8.7 8.7 - 10.5 mg/dL    Total Protein 8.0 6.0 - 8.4 g/dL    Albumin 4.0 3.5 - 5.2 g/dL    Total Bilirubin 0.4 0.1 - 1.0 mg/dL    Alkaline Phosphatase 88 55 - 135 U/L    AST 23 10 - 40 U/L    ALT 26 10 - 44 U/L    eGFR >60.0 >60 mL/min/1.73 m^2    Anion Gap 10 3 - 11 mmol/L   TSH    Collection Time: 11/18/24  7:14 PM   Result Value Ref Range    TSH 0.643 0.400 - 4.000 uIU/mL   Ethanol    Collection Time: 11/18/24  7:14 PM   Result Value Ref Range    Alcohol, Serum <3 <10 mg/dL   Acetaminophen level    Collection Time: 11/18/24  7:14 PM   Result Value Ref Range    Acetaminophen (Tylenol), Serum <2.0 (L) 10.0 - 20.0 ug/mL        Radiologic Studies -    No orders to display        Medications given in the ED-   Medications - No data to display        Medical Decision Making    I am the first provider for this patient.     I reviewed the vital signs, available nursing notes, past medical history, past surgical history, family history and social history.     Vital Signs:  Reviewed the patient's vital signs.     Pulse Oximetry Analysis and Interpretation:    96% on Room Air, normal      External  Test Results (Pertinent to encounter):    Records Reviewed: Nursing Notes    History Obtained By: Patient    Provider Notes: Bishop Soria is a 24 y.o. male with psychosis    Co-morbidities Considered:     Differential Diagnosis:       ED Course:      CONSULT NOTE:    8:20 PM      Dr. Peoples discussed care with?Dr Cagle, Psychiatry   It was a standard discussion,?including history of patients chief complaint, available diagnostic results, and treatment course.?Discussed case.     Patient placed under pec unable to meet basic needs, poor insight, bizarre affect           ED Course as of 11/18/24 2020 Mon Nov 18, 2024 1943 Potassium(!): 3.0 [MO]      ED Course User Index  [MO] Sharath Peoples MD       Problems Addressed:      Procedures:   Procedures       Diagnosis and Disposition     Critical Care:         CLINICAL IMPRESSION:         1. Psychosis    2. Hypokalemia              PLAN:   1. Discharge Home  2.      Medication List        ASK your doctor about these medications      divalproex  MG Tb24 24 hr tablet  Commonly known as: DEPAKOTE ER  Take 2 tablets (1,000 mg total) by mouth every evening.     haloperidol decanoate 100 mg/mL injection  Commonly known as: HALDOL DECANOATE     ibuprofen 800 MG tablet  Commonly known as: ADVIL,MOTRIN  Take 1 tablet (800 mg total) by mouth every 6 (six) hours as needed for Pain.     risperiDONE 3 MG Tab  Commonly known as: RISPERDAL  Take 1 tablet (3 mg total) by mouth 2 (two) times daily.     sertraline 25 MG tablet  Commonly known as: ZOLOFT             3. No follow-up provider specified.     _______________________________     Please note that this dictation was completed with [employer] the [employer] voice recognition software.  Quite often unanticipated grammatical, syntax, homophones, and other interpretive errors are inadvertently transcribed by the computer software.  Please disregard these errors.  Please excuse any errors that have escaped final  proofreading.             Sharath Peoples MD  11/18/24 2021       Sharath Peoples MD  11/18/24 2021

## 2024-11-19 NOTE — H&P
[facility] Hospital Medicine  History & Physical    Patient Name: Bishop Soria  MRN: [id number]  Patient Class: IP- Psych  Admission Date: 11/18/2024  Attending Physician: Frantz Cagle MD   Primary Care Provider: Jose Martin Selby MD (Inactive)         Patient information was obtained from patient, past medical records, and ER records.     Subjective:     Principal Problem:Schizoaffective disorder, bipolar type    Chief Complaint:   Chief Complaint   Patient presents with    Psychiatric Evaluation     Pt returned from earlier with [facility]. Family called 911 stating patient was delusional. Pt not speaking to nurse.         HPI:   History of Presenting Illness            Chief Complaint   Patient presents with    Psychiatric Evaluation       Pt returned from earlier with [facility]. Family called 911 stating patient was delusional. Pt not speaking to nurse.          1900   Bishop Soria is a 24 y.o. male with PMHX of schizophrenia, schizoaffective disorder who presents to the emergency department C/O delusions.     Patient arrives by 911 after family called because patient was acting bizarrely.  In the emergency department patient was cooperative, with bizarre thought process and poor insight          11/19/24: Patient brought to ER for bizarre behavior.  Insight impaired and thought processes abnormal.  He was seen earlier that day for auditory hallucinations and asking for food.  He was evaluated by psychiatric team and recommended inpatient evaluation and treatment.     Past Medical History:   Diagnosis Date    History of psychiatric hospitalization 09/06/2023    [facility] psychiatric care     Psychiatric problem     Schizophrenia     Suicide attempt        History reviewed. No pertinent surgical history.    Review of patient's allergies indicates:  No Known Allergies    No current facility-administered medications on file prior to encounter.     Current Outpatient  Medications on File Prior to Encounter   Medication Sig    haloperidol decanoate (HALDOL DECANOATE) 100 mg/mL injection Inject 100 mg into the muscle every 28 days.    sertraline (ZOLOFT) 25 MG tablet Take 25 mg by mouth once daily.    divalproex ER (DEPAKOTE ER) 500 MG Tb24 24 hr tablet Take 2 tablets (1,000 mg total) by mouth every evening.    ibuprofen (ADVIL,MOTRIN) 800 MG tablet Take 1 tablet (800 mg total) by mouth every 6 (six) hours as needed for Pain.    risperiDONE (RISPERDAL) 3 MG Tab Take 1 tablet (3 mg total) by mouth 2 (two) times daily.    [DISCONTINUED] EScitalopram oxalate (LEXAPRO) 10 MG tablet TAKE 1 TABLET BY MOUTH EVERY DAY    [DISCONTINUED] lithium (LITHOTAB) 300 mg tablet Take 300 mg by mouth 2 (two) times daily.     Family History    Family history is unknown by patient.       Tobacco Use    Smoking status: Some Days     Current packs/day: 1.00     Average packs/day: 1 pack/day for 5.5 years (5.5 ttl pk-yrs)     Types: Cigars, Cigarettes     Start date: 5/10/2019     Passive exposure: Never    Smokeless tobacco: Never   Substance and Sexual Activity    Alcohol use: Yes     Comment: pt reports drinking wine, two-four times monthly or less    Drug use: Yes     Types: Marijuana     Comment: unknown, patient just laughs    Sexual activity: Not Currently     Partners: Male     Review of Systems   Constitutional: Negative.    HENT: Negative.     Eyes: Negative.    Respiratory:  Negative for cough, chest tightness, shortness of breath and wheezing.    Cardiovascular:  Negative for chest pain, palpitations and leg swelling.   Gastrointestinal:  Negative for abdominal distention, abdominal pain, diarrhea, nausea and vomiting.   Endocrine: Negative.    Genitourinary: Negative.    Musculoskeletal: Negative.    Skin: Negative.    Allergic/Immunologic: Negative.    Neurological: Negative.    Hematological: Negative.    Psychiatric/Behavioral:  Positive for behavioral problems and dysphoric mood.       Objective:     Vital Signs (Most Recent):  Temp: 98.1 °F (36.7 °C) (11/18/24 2031)  Pulse: 98 (11/18/24 2031)  Resp: 18 (11/18/24 2031)  BP: 118/78 (11/18/24 2031)  SpO2: 99 % (11/18/24 2031) Vital Signs (24h Range):  Temp:  [98.1 °F (36.7 °C)-98.5 °F (36.9 °C)] 98.1 °F (36.7 °C)  Pulse:  [80-98] 98  Resp:  [16-18] 18  SpO2:  [96 %-100 %] 99 %  BP: (118-129)/(66-78) 118/78     Weight: 72.6 kg (160 lb 0.9 oz)  Body mass index is 20.01 kg/m².     Physical Exam  Vitals and nursing note reviewed.   Constitutional:       Appearance: Normal appearance.   HENT:      Head: Normocephalic and atraumatic.      Nose: Nose normal.      Mouth/Throat:      Mouth: Mucous membranes are moist.      Pharynx: Oropharynx is clear.   Eyes:      Extraocular Movements: Extraocular movements intact.      Conjunctiva/sclera: Conjunctivae normal.      Pupils: Pupils are equal, round, and reactive to light.   Cardiovascular:      Rate and Rhythm: Normal rate and regular rhythm.      Pulses: Normal pulses.      Heart sounds: Normal heart sounds.   Pulmonary:      Effort: Pulmonary effort is normal.      Breath sounds: Normal breath sounds.   Abdominal:      General: Abdomen is flat. Bowel sounds are normal.      Palpations: Abdomen is soft.   Musculoskeletal:         General: Normal range of motion.      Cervical back: Normal range of motion and neck supple.   Skin:     General: Skin is warm and dry.      Capillary Refill: Capillary refill takes less than 2 seconds.      Comments: No rashes on limited skin exam.   Neurological:      General: No focal deficit present.      Mental Status: He is alert and oriented to person, place, and time.      Cranial Nerves: No cranial nerve deficit.      Comments: I Olfactory:  Sense of smell intact    II Optic:  Pupils equal round react to light.  Vision intact.    III, IV, VI, Ocular motor, Trochlear, Abducens:  Extraocular movements intact    V Trigeminal:  Facial sensation intact facial sensation  intact,, muscles of mastication intact muscles of mastication intact, corneal reflex intact, corneal reflex intact    VII Facial:  Muscles of facial expression intact     VIII Vestibular cochlear: Hearing intact vestibular cochlear: Hearing intact    IX Glossopharyngeal:  Gag reflex intact.  Tasting intact.     X Vagus:  Gag reflex intact.    XI Spinal Accessory:  Shoulder shrug intact.  Head rotation intact.    XII Hypoglossal:  Tongue movements intact.     Psychiatric:      Comments: Patient appears depressed              CRANIAL NERVES     CN III, IV, VI   Pupils are equal, round, and reactive to light.       Significant Labs: All pertinent labs within the past 24 hours have been reviewed.    Significant Imaging: I have reviewed all pertinent imaging results/findings within the past 24 hours.  Assessment/Plan:     * Schizoaffective disorder, bipolar type  11/19/24:  Per inpatient psych recommendations.       Anemia  11/19/24:  No indication for transfusion.  Continue to monitor.   Recent Labs     11/18/24 1914   HGB 12.1*   HCT 36.3*         Hypokalemia  Patient's most recent potassium results are listed below.   Recent Labs     11/18/24 1914   K 3.0*     Plan  - Replete potassium per protocol  -Repeat K+ level in am.     Marijuana abuse  Recommend abstinence.         VTE Risk Mitigation (From admission, onward)      None                            FRANCESCO Bowen  Department of Hospital Medicine  [facility] ([facility])

## 2024-11-19 NOTE — HPI
History of Presenting Illness            Chief Complaint   Patient presents with    Psychiatric Evaluation       Pt returned from earlier with [facility]. Family called 911 stating patient was delusional. Pt not speaking to nurse.          1900   Bishop Soria is a 24 y.o. male with PMHX of schizophrenia, schizoaffective disorder who presents to the emergency department C/O delusions.     Patient arrives by 911 after family called because patient was acting bizarrely.  In the emergency department patient was cooperative, with bizarre thought process and poor insight          11/19/24: Patient brought to ER for bizarre behavior.  Insight impaired and thought processes abnormal.  He was seen earlier that day for auditory hallucinations and asking for food.  He was evaluated by psychiatric team and recommended inpatient evaluation and treatment.

## 2024-11-19 NOTE — SUBJECTIVE & OBJECTIVE
Past Medical History:   Diagnosis Date    History of psychiatric hospitalization 09/06/2023    [facility] psychiatric care     Psychiatric problem     Schizophrenia     Suicide attempt        History reviewed. No pertinent surgical history.    Review of patient's allergies indicates:  No Known Allergies    No current facility-administered medications on file prior to encounter.     Current Outpatient Medications on File Prior to Encounter   Medication Sig    haloperidol decanoate (HALDOL DECANOATE) 100 mg/mL injection Inject 100 mg into the muscle every 28 days.    sertraline (ZOLOFT) 25 MG tablet Take 25 mg by mouth once daily.    divalproex ER (DEPAKOTE ER) 500 MG Tb24 24 hr tablet Take 2 tablets (1,000 mg total) by mouth every evening.    ibuprofen (ADVIL,MOTRIN) 800 MG tablet Take 1 tablet (800 mg total) by mouth every 6 (six) hours as needed for Pain.    risperiDONE (RISPERDAL) 3 MG Tab Take 1 tablet (3 mg total) by mouth 2 (two) times daily.    [DISCONTINUED] EScitalopram oxalate (LEXAPRO) 10 MG tablet TAKE 1 TABLET BY MOUTH EVERY DAY    [DISCONTINUED] lithium (LITHOTAB) 300 mg tablet Take 300 mg by mouth 2 (two) times daily.     Family History    Family history is unknown by patient.       Tobacco Use    Smoking status: Some Days     Current packs/day: 1.00     Average packs/day: 1 pack/day for 5.5 years (5.5 ttl pk-yrs)     Types: Cigars, Cigarettes     Start date: 5/10/2019     Passive exposure: Never    Smokeless tobacco: Never   Substance and Sexual Activity    Alcohol use: Yes     Comment: pt reports drinking wine, two-four times monthly or less    Drug use: Yes     Types: Marijuana     Comment: unknown, patient just laughs    Sexual activity: Not Currently     Partners: Male     Review of Systems   Constitutional: Negative.    HENT: Negative.     Eyes: Negative.    Respiratory:  Negative for cough, chest tightness, shortness of breath and wheezing.    Cardiovascular:  Negative for chest pain,  palpitations and leg swelling.   Gastrointestinal:  Negative for abdominal distention, abdominal pain, diarrhea, nausea and vomiting.   Endocrine: Negative.    Genitourinary: Negative.    Musculoskeletal: Negative.    Skin: Negative.    Allergic/Immunologic: Negative.    Neurological: Negative.    Hematological: Negative.    Psychiatric/Behavioral:  Positive for behavioral problems and dysphoric mood.      Objective:     Vital Signs (Most Recent):  Temp: 98.1 °F (36.7 °C) (11/18/24 2031)  Pulse: 98 (11/18/24 2031)  Resp: 18 (11/18/24 2031)  BP: 118/78 (11/18/24 2031)  SpO2: 99 % (11/18/24 2031) Vital Signs (24h Range):  Temp:  [98.1 °F (36.7 °C)-98.5 °F (36.9 °C)] 98.1 °F (36.7 °C)  Pulse:  [80-98] 98  Resp:  [16-18] 18  SpO2:  [96 %-100 %] 99 %  BP: (118-129)/(66-78) 118/78     Weight: 72.6 kg (160 lb 0.9 oz)  Body mass index is 20.01 kg/m².     Physical Exam  Vitals and nursing note reviewed.   Constitutional:       Appearance: Normal appearance.   HENT:      Head: Normocephalic and atraumatic.      Nose: Nose normal.      Mouth/Throat:      Mouth: Mucous membranes are moist.      Pharynx: Oropharynx is clear.   Eyes:      Extraocular Movements: Extraocular movements intact.      Conjunctiva/sclera: Conjunctivae normal.      Pupils: Pupils are equal, round, and reactive to light.   Cardiovascular:      Rate and Rhythm: Normal rate and regular rhythm.      Pulses: Normal pulses.      Heart sounds: Normal heart sounds.   Pulmonary:      Effort: Pulmonary effort is normal.      Breath sounds: Normal breath sounds.   Abdominal:      General: Abdomen is flat. Bowel sounds are normal.      Palpations: Abdomen is soft.   Musculoskeletal:         General: Normal range of motion.      Cervical back: Normal range of motion and neck supple.   Skin:     General: Skin is warm and dry.      Capillary Refill: Capillary refill takes less than 2 seconds.      Comments: No rashes on limited skin exam.   Neurological:      General:  No focal deficit present.      Mental Status: He is alert and oriented to person, place, and time.      Cranial Nerves: No cranial nerve deficit.      Comments: I Olfactory:  Sense of smell intact    II Optic:  Pupils equal round react to light.  Vision intact.    III, IV, VI, Ocular motor, Trochlear, Abducens:  Extraocular movements intact    V Trigeminal:  Facial sensation intact facial sensation intact,, muscles of mastication intact muscles of mastication intact, corneal reflex intact, corneal reflex intact    VII Facial:  Muscles of facial expression intact     VIII Vestibular cochlear: Hearing intact vestibular cochlear: Hearing intact    IX Glossopharyngeal:  Gag reflex intact.  Tasting intact.     X Vagus:  Gag reflex intact.    XI Spinal Accessory:  Shoulder shrug intact.  Head rotation intact.    XII Hypoglossal:  Tongue movements intact.     Psychiatric:      Comments: Patient appears depressed              CRANIAL NERVES     CN III, IV, VI   Pupils are equal, round, and reactive to light.       Significant Labs: All pertinent labs within the past 24 hours have been reviewed.    Significant Imaging: I have reviewed all pertinent imaging results/findings within the past 24 hours.

## 2024-11-20 LAB — POTASSIUM SERPL-SCNC: 4.2 MMOL/L (ref 3.5–5.1)

## 2024-11-20 PROCEDURE — 84132 ASSAY OF SERUM POTASSIUM: CPT | Performed by: NURSE PRACTITIONER

## 2024-11-20 PROCEDURE — 36415 COLL VENOUS BLD VENIPUNCTURE: CPT | Performed by: NURSE PRACTITIONER

## 2024-11-20 PROCEDURE — 11400000 HC PSYCH PRIVATE ROOM

## 2024-11-20 PROCEDURE — 25000003 PHARM REV CODE 250: Performed by: STUDENT IN AN ORGANIZED HEALTH CARE EDUCATION/TRAINING PROGRAM

## 2024-11-20 PROCEDURE — 25000003 PHARM REV CODE 250: Performed by: NURSE PRACTITIONER

## 2024-11-20 PROCEDURE — 99232 SBSQ HOSP IP/OBS MODERATE 35: CPT | Mod: SA,HB,, | Performed by: PSYCHIATRY & NEUROLOGY

## 2024-11-20 PROCEDURE — 90833 PSYTX W PT W E/M 30 MIN: CPT | Mod: SA,HB,, | Performed by: PSYCHIATRY & NEUROLOGY

## 2024-11-20 RX ADMIN — DIVALPROEX SODIUM 500 MG: 500 TABLET, FILM COATED, EXTENDED RELEASE ORAL at 08:11

## 2024-11-20 RX ADMIN — HYDROXYZINE PAMOATE 50 MG: 50 CAPSULE ORAL at 08:11

## 2024-11-20 RX ADMIN — MIRTAZAPINE 15 MG: 15 TABLET, FILM COATED ORAL at 08:11
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
## 2024-11-20 NOTE — PROGRESS NOTES
"PSYCHIATRY DAILY INPATIENT PROGRESS NOTE  SUBSEQUENT HOSPITAL VISIT    ENCOUNTER DATE: 11/20/2024  SITE: 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 The chart was reviewed.     Reviewed notes from Rns and MD and labs from the last 24 hours.    The patient's case was discussed with the treatment team/care providers today including Rns and MD    Staff reports no behavioral or management issues.     The patient has been compliant with treatment.      Subjective 11/20/2024       Patient reports mood is "great", affect is bizarre. Patient is observed responding to internal stimuli, speaking/laughing to self throughout duration of assessment, at times speaking to "Jamel" (possibly family member) and "Mercedez" (fictional literary character), whom he also states he is "becoming." Eye contact intense and unblinking at multiple points throughout assessment.     He reports reason for hospitalization is due to "being hunted by the Soria's." When asked if he is referring to his family, patient answers affirmatively. However when asked for additional detail/explanation, patient does not verbally respond, instead simply smiles.     As was the case during last hospitalization, patient once more states that he is a vampire and is requesting "the deer's blood they brought for me in the lab." He reports sleeping well last night and adds "the darkness makes me sleep." He is unable to verbalize what this means.           Interim/overnight events per report/notes:          Psychiatric ROS (observed, reported, or endorsed/denied):  Depressed mood - No  Interest/pleasure/anhedonia: No  Guilt/hopelessness/worthlessness - No  Changes in Sleep - No  Changes in Appetite - No  Changes in " Concentration - No  Changes in Energy - No  PMA/R- No  Suicidal- active/passive ideations - No  Homicidal ideations: active/passive ideations - No    Hallucinations - Yes  Delusions - Yes  Disorganized behavior - Yes  Disorganized speech - Yes  Negative symptoms - No    Elevated mood - fluctuating  Decreased need for sleep - No  Grandiosity - No  Racing thoughts - No  Impulsivity - No  Irritability- No  Increased energy - Yes  Distractibility - Yes  Increase in goal-directed activity or PMA- No    Symptoms of KANU - No  Symptoms of Panic Disorder- No  Symptoms of PTSD - No        Overall progress: Patient is showing no improvement on the Unit to date        Psychotherapy:  Target symptoms: psychosis  Why chosen therapy is appropriate versus another modality: relevant to diagnosis, evidence based practice  Outcome monitoring methods: observation  Therapeutic intervention type: insight oriented psychotherapy, supportive psychotherapy  Topics discussed/themes: building skills sets for symptom management, symptom recognition, substance abuse  The patient's response to the intervention is guarded. The patient's progress toward treatment goals is limited.   Duration of intervention: 20 minutes.        Medical ROS  Review of Systems   Constitutional: Negative.    HENT: Negative.     Eyes: Negative.    Respiratory: Negative.     Cardiovascular: Negative.    Gastrointestinal: Negative.    Genitourinary: Negative.    Musculoskeletal: Negative.    Skin: Negative.    Neurological: Negative.    Endo/Heme/Allergies: Negative.    Psychiatric/Behavioral:  Positive for hallucinations and substance abuse.    All other systems reviewed and are negative.        PAST MEDICAL HISTORY   Past Medical History:   Diagnosis Date    History of psychiatric hospitalization 09/06/2023    Daniel MAYO     of psychiatric care     Psychiatric problem     Schizophrenia     Suicide attempt            PSYCHOTROPIC MEDICATIONS   Scheduled Meds:    divalproex ER  500 mg Oral BID    mirtazapine  15 mg Oral QHS     Continuous Infusions:  PRN Meds:.  Current Facility-Administered Medications:     acetaminophen, 650 mg, Oral, Q6H PRN    aluminum-magnesium hydroxide-simethicone, 30 mL, Oral, Q6H PRN    benzonatate, 100 mg, Oral, TID PRN    benztropine mesylate, 2 mg, Intramuscular, Q8H PRN    hydrOXYzine pamoate, 50 mg, Oral, Q6H PRN    loperamide, 2 mg, Oral, PRN    nicotine, 1 patch, Transdermal, Daily PRN    OLANZapine, 10 mg, Oral, Q8H PRN **AND** OLANZapine, 10 mg, Intramuscular, Q8H PRN    ondansetron, 4 mg, Oral, Q8H PRN    promethazine, 25 mg, Oral, Q6H PRN        EXAMINATION    VITALS   Vitals:    11/19/24 1019 11/19/24 2003 11/19/24 2048 11/20/24 0758   BP:  112/68 112/68 120/81   BP Location:   Left arm Right arm   Patient Position:  Sitting Sitting Sitting   Pulse:  65 65 (!) 57   Resp:  20 16 18   Temp:   98.3 °F (36.8 °C) 98 °F (36.7 °C)   TempSrc:  Oral Oral Oral   SpO2:  97% 97% 100%   Weight: 72.6 kg (160 lb)      Height:           Body mass index is 20 kg/m².        CONSTITUTIONAL  General Appearance: unremarkable, age appropriate    MUSCULOSKELETAL  Muscle Strength and Tone:no tremor, no tic  Abnormal Involuntary Movements: No  Gait and Station: non-ataxic    PSYCHIATRIC   Level of Consciousness: awake and alert   Orientation: place, situation, month of year, and year  Grooming: Disheveled and Hospital garb  Psychomotor Behavior:  eye contact intense  Speech: rapid at times punctuated by random pauses/thought blocking  Language: able to name  Mood: great  Affect:  bizarre  Thought Process: tangential and blocking  Associations: tangential   Thought Content: +delusions, denies SI, and denies HI  Perceptions: +AH and +observed RIS   Memory: Impaired to some degree  Attention:Easily distracted  Fund of Knowledge: Impaired  Estimate if Intelligence:  Average based on work/education history, vocabulary and mental status exam  Insight: poor awareness  of illness  Judgment: limited        DIAGNOSTIC TESTING   Laboratory Results  Recent Results (from the past 24 hours)   Potassium    Collection Time: 11/20/24  5:53 AM   Result Value Ref Range    Potassium 4.2 3.5 - 5.1 mmol/L             MEDICAL DECISION MAKING      Schizoaffective disorder, bipolar type, severe  Unspecified anxiety disorder  Cannabis abuse  Psychosocial stressors  Nicotine dependence  Hypokalemia     Anemia        PROBLEM LIST AND MANAGEMENT PLANS           Schizoaffective disorder, bipolar type, severe  - pt reports received haldol CARRASCO 2 weeks ago with Mesfin Ponce, will attempt to verify-Per ACT Team, patient received Haldol D 100 mg IM on Nov 1 2024. Hold additional antipsychotic for now current psychosis suspected to be substance-induced. Consider supplementation with additional PO haldol+increasing month CARRASCO dose vs switching to 2GA/CARRASCO.     - resume depakote at 500 mg PO BID, will check level for toxicity 11/23/24  - resume remeron 15 mg PO qhs  - pt counseled  - follow up with outpatient mental health providers after discharge for medication management and psychotherapy        Unspecified anxiety disorder  - start hydroxyzine 50 mg PO q 6 hours PRN  - pt counseled  - follow up with outpatient mental health providers after discharge for medication management and psychotherapy        Cannabis abuse  - consider rehab  - consider gabapentin  - pt counseled  - follow up with outpatient mental health providers after discharge for medication management and psychotherapy        Psychosocial stressors  - pt counseled  - SW consulted for assistance with additional resources      Nicotine dependence  - start nicotine patch 14 mg PO qd PRN        Hypokalemia  - FM consulted        Anemia  - FM consulted          Discussed diagnosis, risks and benefits of proposed treatment vs alternative treatments vs no treatment, potential side effects of these treatments and the inherent unpredictability of  treatment. The patient expresses understanding of the above and displays the capacity to agree with this treatment given said understanding. Patient also agrees that, currently, the benefits outweigh the risks and would like to pursue/continue treatment at this time.    Any medications being used off-label were discussed with the patient inclusive of the evidence base for the use of the medications and consent was obtained for the off-label use of the medication.       DISCHARGE PLANNING  Expected Disposition Plan: TBD    NEED FOR CONTINUED HOSPITALIZATION  Psychiatric illness continues to pose a potential threat to life or bodily function, of self or others, thereby requiring the need for continued inpatient psychiatric hospitalization: Yes, due to: significant psychotic thought disorder, as evidenced by:  Ongoing concerns with perceptual aberrancy and paranoid persecutory delusions leading to potential harm of self or others.    Protective inpatient pyschiatric hospitalization required while a safe disposition plan is enacted: Yes    Patient stabilized and ready for discharge from inpatient psychiatric unit: No        STAFF:   Jl Aguilar NP  Psychiatry

## 2024-11-20 NOTE — PLAN OF CARE
"   11/20/24 1535   Step 1: Warning Signs   Warning Sign 1 Insecurities that people put on me   Warning Sign 2 n/a   Warning Sign 3 n/a   Step 2: Internal coping strategies - Things I can do to take my mind off my problems without contacting another person:   Coping Strategy 1 Dance   Coping Strategy 2 Act   Coping Strategy 3 Watch TV   Step 3: People and social settings that provide distraction:   1. Name Nabil Soria (Mother)       Phone [phone]   2. Name Aydin Soria (Relative)       Phone n/a   3. Place [employer]   4. Place [employer]    Step 4: People whom I can ask for help:   1. Person Nabil Soria (Mother)       Phone [phone]   2. Person Aydin Soria (Relative)       Phone n/a   3. Person n/a   Step 5: Professionals or agencies I can contact during a crisis:   1. Clinician Name Daphnie. Address: [address] [place] [address].       Phone [phone]   2. Clinician Name Warm Line (Wednesday - Sunday 5pm-10pm)       Phone [phone]   3. Suicide Prevention Lifeline: 988 Suicide Prevention Lifeline: 988   4. Local Emergency Service       911   Step 6: Making the environment safer (plan for lethal means safety)   Safe Environment Plan "No"   Safe Environment Optional: What is most important to me and worth living for? My family   Safety Plan Tasks   Provided a Hard Copy to the Patient Y   Explained How to Follow the Steps Y   Discussed Facilitators and Barriers Y       "

## 2024-11-21 PROCEDURE — 25000003 PHARM REV CODE 250: Performed by: PSYCHIATRY & NEUROLOGY

## 2024-11-21 PROCEDURE — 90833 PSYTX W PT W E/M 30 MIN: CPT | Mod: SA,HB,, | Performed by: PSYCHIATRY & NEUROLOGY

## 2024-11-21 PROCEDURE — 99232 SBSQ HOSP IP/OBS MODERATE 35: CPT | Mod: SA,HB,, | Performed by: PSYCHIATRY & NEUROLOGY

## 2024-11-21 PROCEDURE — 11400000 HC PSYCH PRIVATE ROOM

## 2024-11-21 PROCEDURE — 25000003 PHARM REV CODE 250: Performed by: STUDENT IN AN ORGANIZED HEALTH CARE EDUCATION/TRAINING PROGRAM

## 2024-11-21 RX ORDER — RISPERIDONE 1 MG/1
1 TABLET ORAL 2 TIMES DAILY
Status: DISCONTINUED | OUTPATIENT
Start: 2024-11-21 | End: 2024-11-23

## 2024-11-21 RX ADMIN — RISPERIDONE 1 MG: 1 TABLET, FILM COATED ORAL at 11:11

## 2024-11-21 RX ADMIN — DIVALPROEX SODIUM 500 MG: 500 TABLET, FILM COATED, EXTENDED RELEASE ORAL at 08:11

## 2024-11-21 RX ADMIN — MIRTAZAPINE 15 MG: 15 TABLET, FILM COATED ORAL at 08:11

## 2024-11-21 RX ADMIN — RISPERIDONE 1 MG: 1 TABLET, FILM COATED ORAL at 08:11

## 2024-11-21 NOTE — PLAN OF CARE
Pt was present during group but left. Staff will continue to encourage pt to participate in process groups to verbalize feelings and develop healthy coping skills.

## 2024-11-21 NOTE — PROGRESS NOTES
"PSYCHIATRY DAILY INPATIENT PROGRESS NOTE  SUBSEQUENT HOSPITAL VISIT    ENCOUNTER DATE: 11/21/2024  SITE: 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 The chart was reviewed.     Reviewed notes from Rns and MD and labs from the last 24 hours.    The patient's case was discussed with the treatment team/care providers today including Rns and MD    Staff reports no behavioral or management issues.     The patient has been compliant with treatment.      Subjective 11/21/2024       Patient is more linear and overall better able to participate in assessment this morning. He continues to appear to be responding to internal stimuli, smiling and laughing inappropriately during assessment. 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     Discussed medication regimen. Pt states he is unsure when haldol was initiated following previous hospitalization, during which time he was prescribed PO risperdal. He verbalized desire to switch to invega sustenna, stating "I just like the way it works better."      Interim/overnight events per report/notes:          Psychiatric ROS (observed, reported, or endorsed/denied):  Depressed mood - No  Interest/pleasure/anhedonia: No  Guilt/hopelessness/worthlessness - No  Changes in Sleep - No  Changes in Appetite - No  Changes in Concentration - No  Changes in Energy - No  PMA/R- No  Suicidal- active/passive ideations - No  Homicidal ideations: active/passive ideations - No    Hallucinations - Yes  Delusions - Yes  Disorganized behavior - " Yes  Disorganized speech - Yes  Negative symptoms - No    Elevated mood - fluctuating  Decreased need for sleep - No  Grandiosity - No  Racing thoughts - No  Impulsivity - No  Irritability- No  Increased energy - Yes  Distractibility - Yes  Increase in goal-directed activity or PMA- No    Symptoms of KANU - No  Symptoms of Panic Disorder- No  Symptoms of PTSD - No        Overall progress: Patient is showing mild improvement         Psychotherapy:  Target symptoms: psychosis  Why chosen therapy is appropriate versus another modality: relevant to diagnosis, evidence based practice  Outcome monitoring methods: observation  Therapeutic intervention type: insight oriented psychotherapy, supportive psychotherapy  Topics discussed/themes: building skills sets for symptom management, symptom recognition, substance abuse  The patient's response to the intervention is guarded. The patient's progress toward treatment goals is limited.   Duration of intervention: 20 minutes.        Medical ROS  Review of Systems   Constitutional: Negative.    HENT: Negative.     Eyes: Negative.    Respiratory: Negative.     Cardiovascular: Negative.    Gastrointestinal: Negative.    Genitourinary: Negative.    Musculoskeletal: Negative.    Skin: Negative.    Neurological: Negative.    Endo/Heme/Allergies: Negative.    Psychiatric/Behavioral:  Positive for hallucinations and substance abuse.    All other systems reviewed and are negative.        PAST MEDICAL HISTORY   Past Medical History:   Diagnosis Date    History of psychiatric hospitalization 09/06/2023    Daniel MAYO     of psychiatric care     Psychiatric problem     Schizophrenia     Suicide attempt            PSYCHOTROPIC MEDICATIONS   Scheduled Meds:   divalproex ER  500 mg Oral BID    mirtazapine  15 mg Oral QHS     Continuous Infusions:  PRN Meds:.  Current Facility-Administered Medications:     acetaminophen, 650 mg, Oral, Q6H PRN    aluminum-magnesium hydroxide-simethicone, 30 mL,  Oral, Q6H PRN    benzonatate, 100 mg, Oral, TID PRN    benztropine mesylate, 2 mg, Intramuscular, Q8H PRN    hydrOXYzine pamoate, 50 mg, Oral, Q6H PRN    loperamide, 2 mg, Oral, PRN    nicotine, 1 patch, Transdermal, Daily PRN    OLANZapine, 10 mg, Oral, Q8H PRN **AND** OLANZapine, 10 mg, Intramuscular, Q8H PRN    ondansetron, 4 mg, Oral, Q8H PRN    promethazine, 25 mg, Oral, Q6H PRN        EXAMINATION    VITALS   Vitals:    11/19/24 2048 11/20/24 0758 11/20/24 1921 11/21/24 0710   BP: 112/68 120/81 127/71 114/72   BP Location: Left arm Right arm Left arm Left arm   Patient Position: Sitting Sitting Sitting Sitting   Pulse: 65 (!) 57 (!) 55 63   Resp: 16 18 16 18   Temp: 98.3 °F (36.8 °C) 98 °F (36.7 °C) 98.2 °F (36.8 °C) 97.9 °F (36.6 °C)   TempSrc: Oral Oral Oral Oral   SpO2: 97% 100% 99% 100%   Weight:       Height:           Body mass index is 20 kg/m².        CONSTITUTIONAL  General Appearance: unremarkable, age appropriate    MUSCULOSKELETAL  Muscle Strength and Tone:no tremor, no tic  Abnormal Involuntary Movements: No  Gait and Station: non-ataxic    PSYCHIATRIC   Level of Consciousness: awake and alert   Orientation: place, situation, month of year, and year  Grooming: Disheveled and Hospital garb  Psychomotor Behavior: cooperative, friendly and cooperative  Speech: rapid at times punctuated by random pauses/thought blocking- Still present but improving  Language: able to name  Mood: great  Affect: Appropriate  Thought Process: tangential and blocking  Associations: tangential and improving  Thought Content: +delusions, denies SI, and denies HI  Perceptions: +AH and +observed RIS   Memory: Impaired to some degree  Attention:Easily distracted  Fund of Knowledge: Impaired  Estimate if Intelligence:  Average based on work/education history, vocabulary and mental status exam  Insight: poor awareness of illness  Judgment: limited        DIAGNOSTIC TESTING   Laboratory Results  No results found for this or any  previous visit (from the past 24 hours).            MEDICAL DECISION MAKING      Schizoaffective disorder, bipolar type, severe  Unspecified anxiety disorder  Cannabis abuse  Psychosocial stressors  Nicotine dependence  Hypokalemia     Anemia        PROBLEM LIST AND MANAGEMENT PLANS           Schizoaffective disorder, bipolar type, severe  - pt reports received haldol BENDER 2 weeks ago with Mesfin Ponce, will attempt to verify-Per ACT Team, patient received Haldol D 100 mg IM on Nov 1 2024. Hold additional antipsychotic for now current psychosis suspected to be substance-induced. Consider supplementation with additional PO haldol+increasing month BENDER dose vs switching to 2GA/BENDER. -Initiate risperdal 1 mg BID PO with plan to transition to invega sustenna bender  -    - resume depakote at 500 mg PO BID, will check level for toxicity 11/23/24  - resume remeron 15 mg PO qhs  - pt counseled  - follow up with outpatient mental health providers after discharge for medication management and psychotherapy        Unspecified anxiety disorder  - start hydroxyzine 50 mg PO q 6 hours PRN  - pt counseled  - follow up with outpatient mental health providers after discharge for medication management and psychotherapy        Cannabis abuse  - consider rehab  - consider gabapentin  - pt counseled  - follow up with outpatient mental health providers after discharge for medication management and psychotherapy        Psychosocial stressors  - pt counseled  - SW consulted for assistance with additional resources      Nicotine dependence  - start nicotine patch 14 mg PO qd PRN        Hypokalemia  - FM consulted        Anemia  - FM consulted          Discussed diagnosis, risks and benefits of proposed treatment vs alternative treatments vs no treatment, potential side effects of these treatments and the inherent unpredictability of treatment. The patient expresses understanding of the above and displays the capacity to agree with this treatment  given said understanding. Patient also agrees that, currently, the benefits outweigh the risks and would like to pursue/continue treatment at this time.    Any medications being used off-label were discussed with the patient inclusive of the evidence base for the use of the medications and consent was obtained for the off-label use of the medication.       DISCHARGE PLANNING  Expected Disposition Plan: TBD    NEED FOR CONTINUED HOSPITALIZATION  Psychiatric illness continues to pose a potential threat to life or bodily function, of self or others, thereby requiring the need for continued inpatient psychiatric hospitalization: Yes, due to: significant psychotic thought disorder, as evidenced by:  Ongoing concerns with perceptual aberrancy and paranoid persecutory delusions leading to potential harm of self or others.    Protective inpatient pyschiatric hospitalization required while a safe disposition plan is enacted: Yes    Patient stabilized and ready for discharge from inpatient psychiatric unit: No        STAFF:   Jl Aguilar NP  Psychiatry

## 2024-11-21 NOTE — PLAN OF CARE
Patient responding to internal stimuli.  Patient cooperative.  Compliant with medication with no concerns.

## 2024-11-21 NOTE — PLAN OF CARE
Problem: Adult Behavioral Health Plan of Care  Goal: Plan of Care Review  Outcome: Progressing  Goal: Patient-Specific Goal (Individualization)  Outcome: Progressing  Goal: Adheres to Safety Considerations for Self and Others  Outcome: Progressing  Goal: Develops/Participates in Therapeutic [place] to Support Successful Transition  Outcome: Progressing     Problem: Excessive Substance Use  Goal: Optimized Energy Level (Excessive Substance Use)  Outcome: Progressing  Goal: Improved Behavioral Control (Excessive Substance Use)  Outcome: Progressing  Goal: Increased Participation and Engagement (Excessive Substance Use)  Outcome: Progressing  Goal: Improved Physiologic Symptoms (Excessive Substance Use)  Outcome: Progressing  Goal: Enhanced Social, Occupational or Functional Skills (Excessive Substance Use)  Outcome: Progressing     Problem: Suicide Risk  Goal: Absence of Self-Harm  Outcome: Progressing

## 2024-11-21 NOTE — PLAN OF CARE
Problem: Adult Behavioral Health Plan of Care  Goal: Develops/Participates in Therapeutic [place] to Support Successful Transition  Intervention: Mutually Develop Transition Plan  Flowsheets (Taken 11/21/2024 1017)  Current Discharge Risk: substance use/abuse  Readmission Within the Last 30 Days: no previous admission in last 30 days  Outpatient/Agency/Support Group Needs:   outpatient medication management   outpatient counseling   outpatient substance abuse treatment (specify)   outpatient psychiatric care (specify)   ACT (assertive community treatment) team  Anticipated Discharge Disposition: home with family  Transportation Concerns: none  Patient/Family Anticipated Services at Transition: mental health services  Patient/Family Anticipates Transition to: home with family  Transportation Anticipated: family or friend will provide  Concerns to be Addressed:   substance/tobacco abuse/use   mental health   medication   relationship

## 2024-11-21 NOTE — PLAN OF CARE
Patient awake and alert. Patient noted running up and down the hallway, dancing, pleasant mood. Patient having conversations with himself multiple times during this shift. Present for all meals, good appetite. Cooperative with staff. Compliant with medication. No signs of anxiety or depression noted. Denies any homicidal or suicidal thoughts. No distress noted. Plan of care ongoing.     Problem: Adult Behavioral Health Plan of Care  Goal: Plan of Care Review  Outcome: Progressing  Goal: Patient-Specific Goal (Individualization)  Outcome: Progressing  Goal: Adheres to Safety Considerations for Self and Others  Outcome: Progressing  Goal: Develops/Participates in Therapeutic [place] to Support Successful Transition  Outcome: Progressing     Problem: Excessive Substance Use  Goal: Optimized Energy Level (Excessive Substance Use)  Outcome: Progressing  Goal: Improved Behavioral Control (Excessive Substance Use)  Outcome: Progressing  Goal: Increased Participation and Engagement (Excessive Substance Use)  Outcome: Progressing  Goal: Improved Physiologic Symptoms (Excessive Substance Use)  Outcome: Progressing  Goal: Enhanced Social, Occupational or Functional Skills (Excessive Substance Use)  Outcome: Progressing

## 2024-11-21 NOTE — NURSING
Patient awake and alert. Patient walking up and down hallways, having conversations with himself, laughing. Cooperative with staff. Compliant with medication. Pleasant mood. Present for all meals, good appetite. No behavioral issues. Denies any homicidal or suicidal thoughts. No distress noted. Plan of care ongoing.

## 2024-11-22 PROCEDURE — 25000003 PHARM REV CODE 250: Performed by: STUDENT IN AN ORGANIZED HEALTH CARE EDUCATION/TRAINING PROGRAM

## 2024-11-22 PROCEDURE — 99232 SBSQ HOSP IP/OBS MODERATE 35: CPT | Mod: SA,HB,, | Performed by: PSYCHIATRY & NEUROLOGY

## 2024-11-22 PROCEDURE — 11400000 HC PSYCH PRIVATE ROOM

## 2024-11-22 PROCEDURE — 90833 PSYTX W PT W E/M 30 MIN: CPT | Mod: SA,HB,, | Performed by: PSYCHIATRY & NEUROLOGY

## 2024-11-22 PROCEDURE — 25000003 PHARM REV CODE 250: Performed by: PSYCHIATRY & NEUROLOGY

## 2024-11-22 RX ADMIN — MIRTAZAPINE 15 MG: 15 TABLET, FILM COATED ORAL at 08:11

## 2024-11-22 RX ADMIN — RISPERIDONE 1 MG: 1 TABLET, FILM COATED ORAL at 08:11

## 2024-11-22 RX ADMIN — DIVALPROEX SODIUM 500 MG: 500 TABLET, FILM COATED, EXTENDED RELEASE ORAL at 08:11

## 2024-11-22 NOTE — NURSING
Pt cooperative with care and complaint with medications after encouragement by staff.  Pt was visible in the milieu but remained isolated to self. No behavior concerns.

## 2024-11-22 NOTE — PROGRESS NOTES
"PSYCHIATRY DAILY INPATIENT PROGRESS NOTE  SUBSEQUENT HOSPITAL VISIT    ENCOUNTER DATE: 11/22/2024  SITE: 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 The chart was reviewed.     Reviewed notes from Rns and MD and labs from the last 24 hours.    The patient's case was discussed with the treatment team/care providers today including Rns and MD    Staff reports no behavioral or management issues.     The patient has been compliant with treatment.      Subjective 11/22/2024   Patient continues to demonstrate some bizarre behaviors including laughing/talking to self during assessment (while simultaneously denying presence of auditory hallucinations.)    Patient expressed interest in rehab, possibly [facility] or "a facility in [facility] maybe? (Per patient):. Continues to demonstrate   bizarre and tangential thought process. Unrelated to the topic being discussed, patient spontaneously asked "what's a brainiac," and requsted to "get an iq test so I can prove I'm a genius and become a neurologist."  He also asked "What is your Urban Dictionary name."    Pt started risperdal yesterday with no adverse effects.       Interim/overnight events per report/notes:          Psychiatric ROS (observed, reported, or endorsed/denied):  Depressed mood - No  Interest/pleasure/anhedonia: No  Guilt/hopelessness/worthlessness - No  Changes in Sleep - No  Changes in Appetite - No  Changes in Concentration - No  Changes in Energy - No  PMA/R- No  Suicidal- active/passive ideations - No  Homicidal ideations: active/passive ideations - No    Hallucinations - Yes  Delusions - Yes  Disorganized behavior - Yes  Disorganized speech - Yes  Negative symptoms - No    Elevated mood - " fluctuating  Decreased need for sleep - No  Grandiosity - No  Racing thoughts - No  Impulsivity - No  Irritability- No  Increased energy - Yes  Distractibility - Yes  Increase in goal-directed activity or PMA- No    Symptoms of KANU - No  Symptoms of Panic Disorder- No  Symptoms of PTSD - No        Overall progress: Patient is showing mild improvement         Psychotherapy:  Target symptoms: psychosis  Why chosen therapy is appropriate versus another modality: relevant to diagnosis, evidence based practice  Outcome monitoring methods: observation  Therapeutic intervention type: insight oriented psychotherapy, supportive psychotherapy  Topics discussed/themes: building skills sets for symptom management, symptom recognition, substance abuse  The patient's response to the intervention is guarded. The patient's progress toward treatment goals is limited.   Duration of intervention: 20 minutes.        Medical ROS  Review of Systems   Constitutional: Negative.    HENT: Negative.     Eyes: Negative.    Respiratory: Negative.     Cardiovascular: Negative.    Gastrointestinal: Negative.    Genitourinary: Negative.    Musculoskeletal: Negative.    Skin: Negative.    Neurological: Negative.    Endo/Heme/Allergies: Negative.    Psychiatric/Behavioral:  Positive for hallucinations and substance abuse.    All other systems reviewed and are negative.        PAST MEDICAL HISTORY   Past Medical History:   Diagnosis Date    History of psychiatric hospitalization 09/06/2023    [facility] psychiatric care     Psychiatric problem     Schizophrenia     Suicide attempt            PSYCHOTROPIC MEDICATIONS   Scheduled Meds:   divalproex ER  500 mg Oral BID    mirtazapine  15 mg Oral QHS    risperiDONE  1 mg Oral BID     Continuous Infusions:  PRN Meds:.  Current Facility-Administered Medications:     acetaminophen, 650 mg, Oral, Q6H PRN    aluminum-magnesium hydroxide-simethicone, 30 mL, Oral, Q6H PRN    benzonatate, 100 mg, Oral,  TID PRN    benztropine mesylate, 2 mg, Intramuscular, Q8H PRN    hydrOXYzine pamoate, 50 mg, Oral, Q6H PRN    loperamide, 2 mg, Oral, PRN    nicotine, 1 patch, Transdermal, Daily PRN    OLANZapine, 10 mg, Oral, Q8H PRN **AND** OLANZapine, 10 mg, Intramuscular, Q8H PRN    ondansetron, 4 mg, Oral, Q8H PRN    promethazine, 25 mg, Oral, Q6H PRN        EXAMINATION    VITALS   Vitals:    11/20/24 1921 11/21/24 0710 11/21/24 1917 11/22/24 0759   BP: 127/71 114/72 121/79 124/73   BP Location: Left arm Left arm Left arm Left arm   Patient Position: Sitting Sitting Sitting Sitting   Pulse: (!) 55 63 (!) 56 (!) 59   Resp: 16 18 16 20   Temp: 98.2 °F (36.8 °C) 97.9 °F (36.6 °C) 97.9 °F (36.6 °C) 98.3 °F (36.8 °C)   TempSrc: Oral Oral Oral Oral   SpO2: 99% 100% 100% 100%   Weight:       Height:           Body mass index is 20 kg/m².        CONSTITUTIONAL  General Appearance: unremarkable, age appropriate    MUSCULOSKELETAL  Muscle Strength and Tone:no tremor, no tic  Abnormal Involuntary Movements: No  Gait and Station: non-ataxic    PSYCHIATRIC   Level of Consciousness: awake and alert   Orientation: place, situation, month of year, and year  Grooming: Disheveled and Hospital garb  Psychomotor Behavior: cooperative, friendly and cooperative  Speech: rapid at times punctuated by random pauses/thought blocking- Still present but improving  Language: able to name  Mood: great  Affect: Appropriate  Thought Process: tangential and blocking  Associations: tangential    Thought Content: +delusions, denies SI, and denies HI  Perceptions: +AH and +observed RIS   Memory: Impaired to some degree  Attention:Easily distracted  Fund of Knowledge: Impaired  Estimate if Intelligence:  Average based on work/education history, vocabulary and mental status exam  Insight: poor awareness of illness  Judgment: limited        DIAGNOSTIC TESTING   Laboratory Results  No results found for this or any previous visit (from the past 24  hours).            MEDICAL DECISION MAKING      Schizoaffective disorder, bipolar type, severe  Unspecified anxiety disorder  Cannabis abuse  Psychosocial stressors  Nicotine dependence  Hypokalemia     Anemia        PROBLEM LIST AND MANAGEMENT PLANS           Schizoaffective disorder, bipolar type, severe  - pt reports received haldol BENDER 2 weeks ago with Mesfin Ponce, will attempt to verify-Per ACT Team, patient received Haldol D 100 mg IM on Nov 1 2024. Hold additional antipsychotic for now current psychosis suspected to be substance-induced. Consider supplementation with additional PO haldol+increasing month BENDER dose vs switching to 2GA/BENDER. -Initiate risperdal 1 mg BID PO with plan to transition to invega sustenna bender.-Continue PO risperdal. Patient remains agreeable to BENDER but expressed that he would feel more comfortable taking the injection is a few days as he received a Haldol D injection on the first of the month.   -    - resume depakote at 500 mg PO BID, will check level for toxicity 11/23/24  - resume remeron 15 mg PO qhs  - pt counseled  - follow up with outpatient mental health providers after discharge for medication management and psychotherapy        Unspecified anxiety disorder  - start hydroxyzine 50 mg PO q 6 hours PRN  - pt counseled  - follow up with outpatient mental health providers after discharge for medication management and psychotherapy        Cannabis abuse  - consider rehab  - consider gabapentin  - pt counseled  - follow up with outpatient mental health providers after discharge for medication management and psychotherapy        Psychosocial stressors  - pt counseled  - SW consulted for assistance with additional resources      Nicotine dependence  - start nicotine patch 14 mg PO qd PRN        Hypokalemia  - FM consulted        Anemia  - FM consulted          Discussed diagnosis, risks and benefits of proposed treatment vs alternative treatments vs no treatment, potential side effects  of these treatments and the inherent unpredictability of treatment. The patient expresses understanding of the above and displays the capacity to agree with this treatment given said understanding. Patient also agrees that, currently, the benefits outweigh the risks and would like to pursue/continue treatment at this time.    Any medications being used off-label were discussed with the patient inclusive of the evidence base for the use of the medications and consent was obtained for the off-label use of the medication.       DISCHARGE PLANNING  Expected Disposition Plan: TBD    NEED FOR CONTINUED HOSPITALIZATION  Psychiatric illness continues to pose a potential threat to life or bodily function, of self or others, thereby requiring the need for continued inpatient psychiatric hospitalization: Yes, due to: significant psychotic thought disorder, as evidenced by:  Ongoing concerns with perceptual aberrancy and paranoid persecutory delusions leading to potential harm of self or others.    Protective inpatient pyschiatric hospitalization required while a safe disposition plan is enacted: Yes    Patient stabilized and ready for discharge from inpatient psychiatric unit: No        STAFF:   Jl Aguilar NP  Psychiatry

## 2024-11-22 NOTE — PLAN OF CARE
Problem: Adult Behavioral Health Plan of Care  Goal: Plan of Care Review  Outcome: Progressing  Goal: Patient-Specific Goal (Individualization)  Outcome: Progressing  Goal: Adheres to Safety Considerations for Self and Others  Outcome: Progressing  Goal: Develops/Participates in Therapeutic [place] to Support Successful Transition  Outcome: Progressing     Problem: Excessive Substance Use  Goal: Optimized Energy Level (Excessive Substance Use)  Outcome: Progressing  Goal: Improved Behavioral Control (Excessive Substance Use)  Outcome: Progressing  Goal: Increased Participation and Engagement (Excessive Substance Use)  Outcome: Progressing  Goal: Improved Physiologic Symptoms (Excessive Substance Use)  Outcome: Progressing  Goal: Enhanced Social, Occupational or Functional Skills (Excessive Substance Use)  Outcome: Progressing     Problem: Suicide Risk  Goal: Absence of Self-Harm  Outcome: Progressing

## 2024-11-22 NOTE — PLAN OF CARE
"POC reviewed this shift and is on going. Patient is depressed, calm, cooperative, anxious, irritable, pacing, sleeping, showing pressured speech, and paranoid. Endorses Auditory Hallucinations. Denies Suicidal Ideation, Homicidal Ideation, Visual Hallucinations, Tactile Hallucinations, Gustatory Hallucinations, and Delusions. Patient reported he is good. Patient insinuates that he is a vampire, saying things like "before I turned and I know I can't die". Patient mentioned that he will go to rehab. Pt continues to be medication compliant and staff will continue to monitor pt closely while on the unit.    "

## 2024-11-22 NOTE — PLAN OF CARE
Problem: Adult Behavioral Health Plan of Care  Goal: Plan of Care Review  11/21/2024 2112 by Pamela Lemus RN  Outcome: Progressing  11/21/2024 2109 by Pamela Lemus RN  Outcome: Progressing  Goal: Patient-Specific Goal (Individualization)  11/21/2024 2112 by Pamela Lemus RN  Outcome: Progressing  11/21/2024 2109 by Pamela Lemus RN  Outcome: Progressing  Goal: Adheres to Safety Considerations for Self and Others  11/21/2024 2112 by Pamela Lemus RN  Outcome: Progressing  11/21/2024 2109 by Pamela Lemus RN  Outcome: Progressing  Goal: Develops/Participates in Therapeutic [place] to Support Successful Transition  11/21/2024 2112 by Pamela Lemus RN  Outcome: Progressing  11/21/2024 2109 by Pamela eLmus RN  Outcome: Progressing     Problem: Excessive Substance Use  Goal: Optimized Energy Level (Excessive Substance Use)  11/21/2024 2112 by Pamela Lemus RN  Outcome: Progressing  11/21/2024 2109 by Pamela Lemus RN  Outcome: Progressing  Goal: Improved Behavioral Control (Excessive Substance Use)  11/21/2024 2112 by Pamela Lemus RN  Outcome: Progressing  11/21/2024 2109 by Pamela Lemus RN  Outcome: Progressing  Goal: Increased Participation and Engagement (Excessive Substance Use)  11/21/2024 2112 by Pamela Lemus RN  Outcome: Progressing  11/21/2024 2109 by Pamela Lemus RN  Outcome: Progressing  Goal: Improved Physiologic Symptoms (Excessive Substance Use)  11/21/2024 2112 by Pamela Lemus RN  Outcome: Progressing  11/21/2024 2109 by Pamela Lemus RN  Outcome: Progressing  Goal: Enhanced Social, Occupational or Functional Skills (Excessive Substance Use)  11/21/2024 2112 by Pamela Lemus RN  Outcome: Progressing  11/21/2024 2109 by Pamela Lemus RN  Outcome: Progressing     Problem: Suicide Risk  Goal: Absence of Self-Harm  11/21/2024 2112 by Pamela Lemus RN  Outcome: Progressing  11/21/2024 2109 by Pamela Lemus, RN  Outcome:  Progressing
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
## 2024-11-23 LAB — VALPROATE SERPL-MCNC: 71 UG/ML (ref 50–100)

## 2024-11-23 PROCEDURE — 25000003 PHARM REV CODE 250: Performed by: STUDENT IN AN ORGANIZED HEALTH CARE EDUCATION/TRAINING PROGRAM

## 2024-11-23 PROCEDURE — 99232 SBSQ HOSP IP/OBS MODERATE 35: CPT | Mod: AF,HB,, | Performed by: PSYCHIATRY & NEUROLOGY

## 2024-11-23 PROCEDURE — 25000003 PHARM REV CODE 250: Performed by: PSYCHIATRY & NEUROLOGY

## 2024-11-23 PROCEDURE — 36415 COLL VENOUS BLD VENIPUNCTURE: CPT | Performed by: PSYCHIATRY & NEUROLOGY

## 2024-11-23 PROCEDURE — 11400000 HC PSYCH PRIVATE ROOM

## 2024-11-23 PROCEDURE — 80164 ASSAY DIPROPYLACETIC ACD TOT: CPT | Performed by: PSYCHIATRY & NEUROLOGY

## 2024-11-23 RX ORDER — RISPERIDONE 1 MG/1
2 TABLET ORAL 2 TIMES DAILY
Status: DISCONTINUED | OUTPATIENT
Start: 2024-11-23 | End: 2024-11-25

## 2024-11-23 RX ADMIN — RISPERIDONE 2 MG: 1 TABLET, FILM COATED ORAL at 08:11

## 2024-11-23 RX ADMIN — DIVALPROEX SODIUM 500 MG: 500 TABLET, FILM COATED, EXTENDED RELEASE ORAL at 08:11

## 2024-11-23 RX ADMIN — MIRTAZAPINE 15 MG: 15 TABLET, FILM COATED ORAL at 08:11

## 2024-11-23 RX ADMIN — RISPERIDONE 1 MG: 1 TABLET, FILM COATED ORAL at 08:11

## 2024-11-23 NOTE — PLAN OF CARE
Problem: Adult Behavioral Health Plan of Care  Goal: Plan of Care Review  Outcome: Progressing  Goal: Patient-Specific Goal (Individualization)  Outcome: Progressing  Goal: Adheres to Safety Considerations for Self and Others  Outcome: Progressing  Goal: Develops/Participates in Therapeutic [place] to Support Successful Transition  Outcome: Progressing     Problem: Excessive Substance Use  Goal: Optimized Energy Level (Excessive Substance Use)  Outcome: Progressing  Goal: Improved Behavioral Control (Excessive Substance Use)  Outcome: Progressing  Goal: Increased Participation and Engagement (Excessive Substance Use)  Outcome: Progressing  Goal: Improved Physiologic Symptoms (Excessive Substance Use)  Outcome: Progressing  Goal: Enhanced Social, Occupational or Functional Skills (Excessive Substance Use)  Outcome: Progressing     Problem: Suicide Risk  Goal: Absence of Self-Harm  Outcome: Progressing

## 2024-11-23 NOTE — PLAN OF CARE
POC reviewed this shift and is on going. Patient is cooperative, quiet, anxious, pacing, showing pressured speech, and paranoid. Endorses Auditory Hallucinations and Visual Hallucinations. Denies Suicidal Ideation, Homicidal Ideation, Tactile Hallucinations, Gustatory Hallucinations, and Delusions. Patient reported that he slept with his twin WESLEY last night. Patient continues to admit that he is having auditory and visual hallucinations. Pt continues to be medication compliant and staff will continue to monitor pt closely while on the unit.

## 2024-11-23 NOTE — NURSING
POC reviewed this shift and is ongoing.  Pt is cooperative with care and complaint with medications.  Pt remains delusional.  Pt was visible in the milieu with no peer interacts noted.  Pt is somewhat attention seeking from staff, engaging staff with delusional plans to star in [employer], the musical.  Pt flamboyantly wrapping his head with a blanket and walked up and down the marcial chatting with someone unseen by this author.

## 2024-11-23 NOTE — PROGRESS NOTES
"PSYCHIATRY DAILY INPATIENT PROGRESS NOTE  SUBSEQUENT HOSPITAL VISIT    ENCOUNTER DATE: 11/23/2024  SITE: 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 The chart was reviewed.     Reviewed notes from Rns and MD and labs from the last 24 hours.    The patient's case was discussed with the treatment team/care providers today including Rns and MD    Staff reports no behavioral or management issues.     The patient has been compliant with treatment.      Subjective 11/23/2024   Patient continues to demonstrate some bizarre behaviors including laughing/talking to self during assessment.    Pt today describes his mood as "stable." States that it has been fluctuating. Slept well last night. States that he slept with WESLEY last night, who is his twin. States his twin was snoring.     Pt endorsing AH during interview this morning. States he hears voices saying something about cults. Denies that voices tell him to harm himself.     Psychosis is ongoing without improvement as of yet. Tolerating risperdal with no reported side effects. Amenable to titration of dose today.     At this time symptoms remains severe, functionally and behaviorally impairing and pt remains in need of continued acute inpatient hospitalization for both safety and stabilization.          Interim/overnight events per report/notes:          Psychiatric ROS (observed, reported, or endorsed/denied):  Depressed mood - No  Interest/pleasure/anhedonia: No  Guilt/hopelessness/worthlessness - No  Changes in Sleep - No  Changes in Appetite - No  Changes in Concentration - No  Changes in Energy - No  PMA/R- No  Suicidal- active/passive ideations - No  Homicidal ideations: active/passive ideations - No    Hallucinations - " Yes  Delusions - Yes  Disorganized behavior - Yes  Disorganized speech - Yes  Negative symptoms - No    Elevated mood - fluctuating  Decreased need for sleep - No  Grandiosity - No  Racing thoughts - No  Impulsivity - No  Irritability- No  Increased energy - Yes  Distractibility - Yes  Increase in goal-directed activity or PMA- No    Symptoms of KANU - No  Symptoms of Panic Disorder- No  Symptoms of PTSD - No        Overall progress: Patient is showing mild improvement         Psychotherapy:  Target symptoms: psychosis  Why chosen therapy is appropriate versus another modality: relevant to diagnosis, evidence based practice  Outcome monitoring methods: observation  Therapeutic intervention type: insight oriented psychotherapy, supportive psychotherapy  Topics discussed/themes: building skills sets for symptom management, symptom recognition, substance abuse  The patient's response to the intervention is guarded. The patient's progress toward treatment goals is limited.   Duration of intervention: 20 minutes.        Medical ROS  Review of Systems   Constitutional: Negative.    HENT: Negative.     Eyes: Negative.    Respiratory: Negative.     Cardiovascular: Negative.    Gastrointestinal: Negative.    Genitourinary: Negative.    Musculoskeletal: Negative.    Skin: Negative.    Neurological: Negative.    Endo/Heme/Allergies: Negative.    Psychiatric/Behavioral:  Positive for hallucinations and substance abuse.    All other systems reviewed and are negative.        PAST MEDICAL HISTORY   Past Medical History:   Diagnosis Date    History of psychiatric hospitalization 09/06/2023    [facility]     of psychiatric care     Psychiatric problem     Schizophrenia     Suicide attempt            PSYCHOTROPIC MEDICATIONS   Scheduled Meds:   divalproex ER  500 mg Oral BID    mirtazapine  15 mg Oral QHS    risperiDONE  1 mg Oral BID     Continuous Infusions:  PRN Meds:.  Current Facility-Administered Medications:      acetaminophen, 650 mg, Oral, Q6H PRN    aluminum-magnesium hydroxide-simethicone, 30 mL, Oral, Q6H PRN    benzonatate, 100 mg, Oral, TID PRN    benztropine mesylate, 2 mg, Intramuscular, Q8H PRN    hydrOXYzine pamoate, 50 mg, Oral, Q6H PRN    loperamide, 2 mg, Oral, PRN    nicotine, 1 patch, Transdermal, Daily PRN    OLANZapine, 10 mg, Oral, Q8H PRN **AND** OLANZapine, 10 mg, Intramuscular, Q8H PRN    ondansetron, 4 mg, Oral, Q8H PRN    promethazine, 25 mg, Oral, Q6H PRN        EXAMINATION    VITALS   Vitals:    11/21/24 1917 11/22/24 0759 11/22/24 1904 11/23/24 0723   BP: 121/79 124/73 127/72 121/74   BP Location: Left arm Left arm Left arm Left arm   Patient Position: Sitting Sitting Sitting Sitting   Pulse: (!) 56 (!) 59 71 62   Resp: 16 20 20 20   Temp: 97.9 °F (36.6 °C) 98.3 °F (36.8 °C) 98.1 °F (36.7 °C) 97 °F (36.1 °C)   TempSrc: Oral Oral Oral Oral   SpO2: 100% 100% 100% 99%   Weight:       Height:           Body mass index is 20 kg/m².        CONSTITUTIONAL  General Appearance: unremarkable, age appropriate    MUSCULOSKELETAL  Muscle Strength and Tone:no tremor, no tic  Abnormal Involuntary Movements: No  Gait and Station: non-ataxic    PSYCHIATRIC   Level of Consciousness: awake and alert   Orientation: place, situation, month of year, and year  Grooming: Disheveled and Hospital garb  Psychomotor Behavior: cooperative, friendly and cooperative  Speech: rapid at times punctuated by random pauses/thought blocking- Still present but improving  Language: able to name  Mood: great  Affect: Appropriate  Thought Process: tangential and blocking  Associations: tangential    Thought Content: +delusions, denies SI, and denies HI  Perceptions: +AH and +observed RIS   Memory: Impaired to some degree  Attention:Easily distracted  Fund of Knowledge: Impaired  Estimate if Intelligence:  Average based on work/education history, vocabulary and mental status exam  Insight: poor awareness of illness  Judgment:  limited        DIAGNOSTIC TESTING   Laboratory Results  Recent Results (from the past 24 hours)   Valproic Acid    Collection Time: 11/23/24  5:16 AM   Result Value Ref Range    Valproic Acid Level 71 50 - 100 ug/mL               MEDICAL DECISION MAKING      Schizoaffective disorder, bipolar type, severe  Unspecified anxiety disorder  Cannabis abuse  Psychosocial stressors  Nicotine dependence  Hypokalemia     Anemia        PROBLEM LIST AND MANAGEMENT PLANS           Schizoaffective disorder, bipolar type, severe  - pt reports received haldol BENEDR 2 weeks ago with [facility]fin Ponce, will attempt to verify-Per ACT Team, patient received Haldol D 100 mg IM on Nov 1 2024. Hold additional antipsychotic for now current psychosis suspected to be substance-induced. Consider supplementation with additional PO haldol+increasing month BENDER dose vs switching to 2GA/BENDER. -Initiate risperdal 1 mg BID PO with plan to transition to invega sustenna bender.-Titrate risperdal to 2mg BID today  - Patient remains agreeable to BENDER but expressed that he would feel more comfortable taking the injection is a few days as he received a Haldol D injection on the first of the month.       - resume depakote at 500 mg PO BID, will check level for toxicity 11/23/24  - resume remeron 15 mg PO qhs  - pt counseled  - follow up with outpatient mental health providers after discharge for medication management and psychotherapy        Unspecified anxiety disorder  - start hydroxyzine 50 mg PO q 6 hours PRN  - pt counseled  - follow up with outpatient mental health providers after discharge for medication management and psychotherapy        Cannabis abuse  - consider rehab  - consider gabapentin  - pt counseled  - follow up with outpatient mental health providers after discharge for medication management and psychotherapy        Psychosocial stressors  - pt counseled  - SW consulted for assistance with additional resources      Nicotine dependence  - start  nicotine patch 14 mg PO qd PRN        Hypokalemia  - FM consulted        Anemia  - FM consulted          Discussed diagnosis, risks and benefits of proposed treatment vs alternative treatments vs no treatment, potential side effects of these treatments and the inherent unpredictability of treatment. The patient expresses understanding of the above and displays the capacity to agree with this treatment given said understanding. Patient also agrees that, currently, the benefits outweigh the risks and would like to pursue/continue treatment at this time.    Any medications being used off-label were discussed with the patient inclusive of the evidence base for the use of the medications and consent was obtained for the off-label use of the medication.       DISCHARGE PLANNING  Expected Disposition Plan: TBD    NEED FOR CONTINUED HOSPITALIZATION  Psychiatric illness continues to pose a potential threat to life or bodily function, of self or others, thereby requiring the need for continued inpatient psychiatric hospitalization: Yes, due to: significant psychotic thought disorder, as evidenced by:  Ongoing concerns with perceptual aberrancy and paranoid persecutory delusions leading to potential harm of self or others.    Protective inpatient pyschiatric hospitalization required while a safe disposition plan is enacted: Yes    Patient stabilized and ready for discharge from inpatient psychiatric unit: No        STAFF:   Blas León MD  Psychiatry

## 2024-11-24 PROCEDURE — 99232 SBSQ HOSP IP/OBS MODERATE 35: CPT | Mod: AF,HB,, | Performed by: PSYCHIATRY & NEUROLOGY

## 2024-11-24 PROCEDURE — 25000003 PHARM REV CODE 250: Performed by: PSYCHIATRY & NEUROLOGY

## 2024-11-24 PROCEDURE — 25000003 PHARM REV CODE 250: Performed by: STUDENT IN AN ORGANIZED HEALTH CARE EDUCATION/TRAINING PROGRAM

## 2024-11-24 PROCEDURE — 11400000 HC PSYCH PRIVATE ROOM

## 2024-11-24 PROCEDURE — 25000003 PHARM REV CODE 250: Performed by: NURSE PRACTITIONER

## 2024-11-24 RX ADMIN — DIVALPROEX SODIUM 500 MG: 500 TABLET, FILM COATED, EXTENDED RELEASE ORAL at 08:11

## 2024-11-24 RX ADMIN — MIRTAZAPINE 15 MG: 15 TABLET, FILM COATED ORAL at 08:11

## 2024-11-24 RX ADMIN — HYDROXYZINE PAMOATE 50 MG: 50 CAPSULE ORAL at 08:11

## 2024-11-24 RX ADMIN — RISPERIDONE 2 MG: 1 TABLET, FILM COATED ORAL at 08:11

## 2024-11-24 NOTE — PROGRESS NOTES
"PSYCHIATRY DAILY INPATIENT PROGRESS NOTE  SUBSEQUENT HOSPITAL VISIT    ENCOUNTER DATE: 11/24/2024  SITE: 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 The chart was reviewed.     Reviewed notes from Rns and MD and labs from the last 24 hours.    The patient's case was discussed with the treatment team/care providers today including Rns and MD    Staff reports no behavioral or management issues.     The patient has been compliant with treatment.      Subjective 11/24/2024  Patient continues to demonstrate some bizarre behaviors including laughing/talking to self during assessment.    Pt states he is doing well this morning, and is happy because he just picked a team for "dancing [employer]." States he has been a part of [employer] for 6 years and now he finally gets to pick a team to be on for next year. "I didn't finish yet though." He then goes on to list 15+ names of people that he dances with. There is no clear context for providing this list of names. Pt then begins RIS.     Pt states he slept pretty well last night.     Pt endorsing AH during interview this morning. "We talk about dolls, we talk about animals, we talk about (pt stops and begins RIS)." Denies that voices tell him to harm himself.     Psychosis is ongoing without improvement as of yet. Tolerating risperdal with no reported side effects. Tolerated titration of risperdal with no reported side effects.     At this time symptoms remains severe, functionally and behaviorally impairing and pt remains in need of continued acute inpatient hospitalization for both safety and stabilization.          Interim/overnight events per report/notes:          Psychiatric ROS (observed, reported, or " endorsed/denied):  Depressed mood - No  Interest/pleasure/anhedonia: No  Guilt/hopelessness/worthlessness - No  Changes in Sleep - No  Changes in Appetite - No  Changes in Concentration - No  Changes in Energy - No  PMA/R- No  Suicidal- active/passive ideations - No  Homicidal ideations: active/passive ideations - No    Hallucinations - Yes  Delusions - Yes  Disorganized behavior - Yes  Disorganized speech - Yes  Negative symptoms - No    Elevated mood - fluctuating  Decreased need for sleep - No  Grandiosity - No  Racing thoughts - No  Impulsivity - No  Irritability- No  Increased energy - Yes  Distractibility - Yes  Increase in goal-directed activity or PMA- No    Symptoms of KANU - No  Symptoms of Panic Disorder- No  Symptoms of PTSD - No        Overall progress: Patient is showing mild improvement         Psychotherapy:  Target symptoms: psychosis  Why chosen therapy is appropriate versus another modality: relevant to diagnosis, evidence based practice  Outcome monitoring methods: observation  Therapeutic intervention type: insight oriented psychotherapy, supportive psychotherapy  Topics discussed/themes: building skills sets for symptom management, symptom recognition, substance abuse  The patient's response to the intervention is guarded. The patient's progress toward treatment goals is limited.   Duration of intervention: 20 minutes.        Medical ROS  Review of Systems   Constitutional: Negative.    HENT: Negative.     Eyes: Negative.    Respiratory: Negative.     Cardiovascular: Negative.    Gastrointestinal: Negative.    Genitourinary: Negative.    Musculoskeletal: Negative.    Skin: Negative.    Neurological: Negative.    Endo/Heme/Allergies: Negative.    Psychiatric/Behavioral:  Positive for hallucinations and substance abuse.    All other systems reviewed and are negative.        PAST MEDICAL HISTORY   Past Medical History:   Diagnosis Date    History of psychiatric hospitalization 09/06/2023    Daniel  [facility] of psychiatric care     Psychiatric problem     Schizophrenia     Suicide attempt            PSYCHOTROPIC MEDICATIONS   Scheduled Meds:   divalproex ER  500 mg Oral BID    mirtazapine  15 mg Oral QHS    risperiDONE  2 mg Oral BID     Continuous Infusions:  PRN Meds:.  Current Facility-Administered Medications:     acetaminophen, 650 mg, Oral, Q6H PRN    aluminum-magnesium hydroxide-simethicone, 30 mL, Oral, Q6H PRN    benzonatate, 100 mg, Oral, TID PRN    benztropine mesylate, 2 mg, Intramuscular, Q8H PRN    hydrOXYzine pamoate, 50 mg, Oral, Q6H PRN    loperamide, 2 mg, Oral, PRN    nicotine, 1 patch, Transdermal, Daily PRN    OLANZapine, 10 mg, Oral, Q8H PRN **AND** OLANZapine, 10 mg, Intramuscular, Q8H PRN    ondansetron, 4 mg, Oral, Q8H PRN    promethazine, 25 mg, Oral, Q6H PRN        EXAMINATION    VITALS   Vitals:    11/22/24 1904 11/23/24 0723 11/23/24 1917 11/24/24 0738   BP: 127/72 121/74 115/63 137/76   BP Location: Left arm Left arm Left arm Left arm   Patient Position: Sitting Sitting Sitting Sitting   Pulse: 71 62 63 62   Resp: 20 20 20 20   Temp: 98.1 °F (36.7 °C) 97 °F (36.1 °C) 97.9 °F (36.6 °C) 98.4 °F (36.9 °C)   TempSrc: Oral Oral Oral Oral   SpO2: 100% 99% 100% 99%   Weight:       Height:           Body mass index is 20 kg/m².        CONSTITUTIONAL  General Appearance: unremarkable, age appropriate    MUSCULOSKELETAL  Muscle Strength and Tone:no tremor, no tic  Abnormal Involuntary Movements: No  Gait and Station: non-ataxic    PSYCHIATRIC   Level of Consciousness: awake and alert   Orientation: place, situation, month of year, and year  Grooming: Disheveled and Hospital garb  Psychomotor Behavior: cooperative, friendly and cooperative  Speech: rapid at times punctuated by random pauses/thought blocking- Still present but improving  Language: able to name  Mood: great  Affect: Appropriate  Thought Process: tangential and blocking  Associations: tangential    Thought Content: +delusions,  denies SI, and denies HI  Perceptions: +AH and +observed RIS   Memory: Impaired to some degree  Attention:Easily distracted  Fund of Knowledge: Impaired  Estimate if Intelligence:  Average based on work/education history, vocabulary and mental status exam  Insight: poor awareness of illness  Judgment: limited        DIAGNOSTIC TESTING   Laboratory Results  No results found for this or any previous visit (from the past 24 hours).              MEDICAL DECISION MAKING      Schizoaffective disorder, bipolar type, severe  Unspecified anxiety disorder  Cannabis abuse  Psychosocial stressors  Nicotine dependence  Hypokalemia     Anemia        PROBLEM LIST AND MANAGEMENT PLANS           Schizoaffective disorder, bipolar type, severe  - pt reports received haldol BENDER 2 weeks ago with [facility] Ponce, will attempt to verify-Per ACT Team, patient received Haldol D 100 mg IM on Nov 1 2024. Hold additional antipsychotic for now current psychosis suspected to be substance-induced. Consider supplementation with additional PO haldol+increasing month BENDER dose vs switching to 2GA/BENDER. -Initiate risperdal 1 mg BID PO with plan to transition to invega sustenna bender.-Titrate risperdal to 2mg BID today  - Patient remains agreeable to BENDER but expressed that he would feel more comfortable taking the injection is a few days as he received a Haldol D injection on the first of the month.       - resume depakote at 500 mg PO BID, will check level for toxicity 11/23/24  - resume remeron 15 mg PO qhs  - pt counseled  - follow up with outpatient mental health providers after discharge for medication management and psychotherapy        Unspecified anxiety disorder  - start hydroxyzine 50 mg PO q 6 hours PRN  - pt counseled  - follow up with outpatient mental health providers after discharge for medication management and psychotherapy        Cannabis abuse  - consider rehab  - consider gabapentin  - pt counseled  - follow up with outpatient mental  health providers after discharge for medication management and psychotherapy        Psychosocial stressors  - pt counseled  - SW consulted for assistance with additional resources      Nicotine dependence  - start nicotine patch 14 mg PO qd PRN        Hypokalemia  - FM consulted        Anemia  - FM consulted          Discussed diagnosis, risks and benefits of proposed treatment vs alternative treatments vs no treatment, potential side effects of these treatments and the inherent unpredictability of treatment. The patient expresses understanding of the above and displays the capacity to agree with this treatment given said understanding. Patient also agrees that, currently, the benefits outweigh the risks and would like to pursue/continue treatment at this time.    Any medications being used off-label were discussed with the patient inclusive of the evidence base for the use of the medications and consent was obtained for the off-label use of the medication.       DISCHARGE PLANNING  Expected Disposition Plan: TBD    NEED FOR CONTINUED HOSPITALIZATION  Psychiatric illness continues to pose a potential threat to life or bodily function, of self or others, thereby requiring the need for continued inpatient psychiatric hospitalization: Yes, due to: significant psychotic thought disorder, as evidenced by:  Ongoing concerns with perceptual aberrancy and paranoid persecutory delusions leading to potential harm of self or others.    Protective inpatient pyschiatric hospitalization required while a safe disposition plan is enacted: Yes    Patient stabilized and ready for discharge from inpatient psychiatric unit: No        STAFF:   Blas León MD  Psychiatry

## 2024-11-24 NOTE — NURSING
Patient took shower this am, paces halls, smiling, A/H with observed RIS conversations loudly in shower, halls, and his room. Patient continues with delusions, cooperative with no negative behaviors or agitation noted. Appetite is good for meals and is compliant with medication with no side effects noted. Denies depression and anxiety, denies S/HI, denies VH. Dr. León visited per telemed with no new orders noted. Patient is in his room at this time napping. Close observations continued and safe environment maintained.

## 2024-11-24 NOTE — NURSING
POC reviewed this shift and is ongoing.  Pt is cooperative with care and complaint with medications.  Pt is visible in the milieu, but remains isolated from peers.  Pt does interact with staff.  Interacts are appropriate at times and other times reflect his delusional and derailed thought process.

## 2024-11-24 NOTE — PLAN OF CARE
Problem: Adult Behavioral Health Plan of Care  Goal: Plan of Care Review  Outcome: Progressing     Problem: Adult Behavioral Health Plan of Care  Goal: Patient-Specific Goal (Individualization)  Outcome: Progressing     Problem: Adult Behavioral Health Plan of Care  Goal: Adheres to Safety Considerations for Self and Others  Outcome: Progressing     Problem: Excessive Substance Use  Goal: Improved Behavioral Control (Excessive Substance Use)  Outcome: Progressing     Problem: Excessive Substance Use  Goal: Improved Physiologic Symptoms (Excessive Substance Use)  Outcome: Progressing     Problem: Excessive Substance Use  Goal: Enhanced Social, Occupational or Functional Skills (Excessive Substance Use)  Outcome: Progressing     Problem: Suicide Risk  Goal: Absence of Self-Harm  Outcome: Progressing     Problem: Psychotic Signs/Symptoms  Goal: Improved Behavioral Control (Psychotic Signs/Symptoms)  Outcome: Progressing

## 2024-11-25 PROCEDURE — 25000003 PHARM REV CODE 250: Performed by: NURSE PRACTITIONER

## 2024-11-25 PROCEDURE — 63600175 PHARM REV CODE 636 W HCPCS: Mod: JZ | Performed by: PSYCHIATRY & NEUROLOGY

## 2024-11-25 PROCEDURE — 25000003 PHARM REV CODE 250: Performed by: PSYCHIATRY & NEUROLOGY

## 2024-11-25 PROCEDURE — 99232 SBSQ HOSP IP/OBS MODERATE 35: CPT | Mod: SA,HB,, | Performed by: PSYCHIATRY & NEUROLOGY

## 2024-11-25 PROCEDURE — 90833 PSYTX W PT W E/M 30 MIN: CPT | Mod: SA,HB,, | Performed by: PSYCHIATRY & NEUROLOGY

## 2024-11-25 PROCEDURE — 11400000 HC PSYCH PRIVATE ROOM

## 2024-11-25 PROCEDURE — 25000003 PHARM REV CODE 250: Performed by: STUDENT IN AN ORGANIZED HEALTH CARE EDUCATION/TRAINING PROGRAM

## 2024-11-25 RX ORDER — MICONAZOLE NITRATE 2 %
2 CREAM (GRAM) TOPICAL 3 TIMES DAILY PRN
Status: DISCONTINUED | OUTPATIENT
Start: 2024-11-25 | End: 2024-11-26

## 2024-11-25 RX ADMIN — RISPERIDONE 2 MG: 1 TABLET, FILM COATED ORAL at 08:11

## 2024-11-25 RX ADMIN — PALIPERIDONE PALMITATE 234 MG: 234 INJECTION INTRAMUSCULAR at 01:11

## 2024-11-25 RX ADMIN — NICOTINE POLACRILEX 2 MG: 2 GUM, CHEWING BUCCAL at 05:11

## 2024-11-25 RX ADMIN — HYDROXYZINE PAMOATE 50 MG: 50 CAPSULE ORAL at 08:11

## 2024-11-25 RX ADMIN — DIVALPROEX SODIUM 500 MG: 500 TABLET, FILM COATED, EXTENDED RELEASE ORAL at 08:11

## 2024-11-25 RX ADMIN — MIRTAZAPINE 15 MG: 15 TABLET, FILM COATED ORAL at 08:11

## 2024-11-25 NOTE — NURSING
"POC reviewed this shift and is ongoing.  Pt is cooperative with care and complaint with medications.  Pt continues to appear RIS and anxious.  Pt voiced his desire to move to [place] and perform in "[employer]" musical.  Pt states that he doesn't want to be a main character, but would like to be the gazelle that gets killed near the beginning of the play.  "

## 2024-11-25 NOTE — PLAN OF CARE
Problem: Adult Behavioral Health Plan of Care    Goal: Optimized Coping Skills in Response to Life Stressors    Intervention: Promote Effective Coping Strategies    Flowsheets (Taken 11/25/2024 [id number])    Supportive Measures:   active listening utilized   positive reinforcement provided   verbalization of feelings encouraged   self-reflection promoted           Behavior: delusional, responding to internal stimuli            Intervention: In this CBT-focused group SW facilitated discussion on triggers that contribute to decline in mental health or relapse. Each pt was asked to identify their triggers, plan to manage or avoid triggers, when at risk, when self can be trusted.           Response: Pt attended group and was respectful to staff members. Pt completed worksheet. SW observed pt talking to himself throughout group session. Pt identified his triggers as the [place] forest, snakes, and amy. Pt identified knowing when he is in a high-risk situation when his stomach starts churning. Pt identified knowing when he can trust himself when he can look in the mirror.           Plan: Continue to encourage pt to participate in process groups to verbalize feelings and develop healthy coping skills.
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
## 2024-11-25 NOTE — NURSING
Patient out on unit most of the day. No distress noted. Patient continues with delusional thoughts, talking to himself and laughing out loud. Patient cooperative with staff. Compliant with medication. Patient received invega injection, no adverse reaction noted. Patient denies any homicidal or suicidal thoughts. Reports no symptoms of anxiety or depression. No behavioral concerns. Plan of care ongoing.

## 2024-11-25 NOTE — PLAN OF CARE
Problem: Adult Behavioral Health Plan of Care  Goal: Plan of Care Review  Outcome: Progressing  Goal: Patient-Specific Goal (Individualization)  Outcome: Progressing  Goal: Adheres to Safety Considerations for Self and Others  Outcome: Progressing  Goal: Develops/Participates in Therapeutic [place] to Support Successful Transition  Outcome: Progressing     Problem: Excessive Substance Use  Goal: Optimized Energy Level (Excessive Substance Use)  Outcome: Progressing  Goal: Improved Behavioral Control (Excessive Substance Use)  Outcome: Progressing  Goal: Increased Participation and Engagement (Excessive Substance Use)  Outcome: Progressing  Goal: Improved Physiologic Symptoms (Excessive Substance Use)  Outcome: Progressing  Goal: Enhanced Social, Occupational or Functional Skills (Excessive Substance Use)  Outcome: Progressing     Problem: Suicide Risk  Goal: Absence of Self-Harm  Outcome: Progressing     Problem: Psychotic Signs/Symptoms  Goal: Improved Behavioral Control (Psychotic Signs/Symptoms)  Outcome: Progressing  Goal: Optimal Cognitive Function (Psychotic Signs/Symptoms)  Outcome: Progressing  Goal: Increased Participation and Engagement (Psychotic Signs/Symptoms)  Outcome: Progressing  Goal: Improved Mood Symptoms (Psychotic Signs/Symptoms)  Outcome: Progressing  Goal: Improved Psychomotor Symptoms (Psychotic Signs/Symptoms)  Outcome: Progressing  Goal: Decreased Sensory Symptoms (Psychotic Signs/Symptoms)  Outcome: Progressing  Goal: Improved Sleep (Psychotic Signs/Symptoms)  Outcome: Progressing  Goal: Enhanced Social, Occupational or Functional Skills (Psychotic Signs/Symptoms)  Outcome: Progressing

## 2024-11-26 PROCEDURE — 90833 PSYTX W PT W E/M 30 MIN: CPT | Mod: SA,HB,, | Performed by: PSYCHIATRY & NEUROLOGY

## 2024-11-26 PROCEDURE — 25000003 PHARM REV CODE 250: Performed by: NURSE PRACTITIONER

## 2024-11-26 PROCEDURE — 25000003 PHARM REV CODE 250: Performed by: STUDENT IN AN ORGANIZED HEALTH CARE EDUCATION/TRAINING PROGRAM

## 2024-11-26 PROCEDURE — 25000003 PHARM REV CODE 250: Performed by: PSYCHIATRY & NEUROLOGY

## 2024-11-26 PROCEDURE — 11400000 HC PSYCH PRIVATE ROOM

## 2024-11-26 PROCEDURE — 99232 SBSQ HOSP IP/OBS MODERATE 35: CPT | Mod: SA,HB,, | Performed by: PSYCHIATRY & NEUROLOGY

## 2024-11-26 RX ADMIN — MIRTAZAPINE 15 MG: 15 TABLET, FILM COATED ORAL at 08:11

## 2024-11-26 RX ADMIN — DIVALPROEX SODIUM 500 MG: 500 TABLET, FILM COATED, EXTENDED RELEASE ORAL at 08:11

## 2024-11-26 RX ADMIN — HYDROXYZINE PAMOATE 50 MG: 50 CAPSULE ORAL at 08:11

## 2024-11-26 RX ADMIN — NICOTINE POLACRILEX 2 MG: 2 GUM, CHEWING BUCCAL at 08:11

## 2024-11-26 NOTE — PLAN OF CARE
Problem: Adult Behavioral Health Plan of Care  Goal: Plan of Care Review  Outcome: Progressing  Goal: Patient-Specific Goal (Individualization)  Outcome: Progressing  Goal: Adheres to Safety Considerations for Self and Others  Outcome: Progressing  Goal: Develops/Participates in Therapeutic [place] to Support Successful Transition  Outcome: Progressing     Problem: Excessive Substance Use  Goal: Optimized Energy Level (Excessive Substance Use)  Outcome: Progressing  Goal: Improved Behavioral Control (Excessive Substance Use)  Outcome: Progressing  Goal: Increased Participation and Engagement (Excessive Substance Use)  Outcome: Progressing  Goal: Improved Physiologic Symptoms (Excessive Substance Use)  Outcome: Progressing  Goal: Enhanced Social, Occupational or Functional Skills (Excessive Substance Use)  Outcome: Progressing     Problem: Suicide Risk  Goal: Absence of Self-Harm  Outcome: Progressing     Problem: Psychotic Signs/Symptoms  Goal: Improved Behavioral Control (Psychotic Signs/Symptoms)  Outcome: Progressing  Goal: Optimal Cognitive Function (Psychotic Signs/Symptoms)  Outcome: Progressing  Goal: Increased Participation and Engagement (Psychotic Signs/Symptoms)  Outcome: Progressing  Goal: Improved Mood Symptoms (Psychotic Signs/Symptoms)  Outcome: Progressing  Goal: Improved Psychomotor Symptoms (Psychotic Signs/Symptoms)  Outcome: Progressing  Goal: Decreased Sensory Symptoms (Psychotic Signs/Symptoms)  Outcome: Progressing  Goal: Improved Sleep (Psychotic Signs/Symptoms)  Outcome: Progressing  Goal: Enhanced Social, Occupational or Functional Skills (Psychotic Signs/Symptoms)  Outcome: Progressing
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
## 2024-11-26 NOTE — NURSING
Patient awake and alert. Patient out in activity room most of this shift conversing with peers, pleasant mood. Cooperative with staff. Compliant with medication. Present for all meals, good appetite. Good hygiene. Patient denies any homicidal or suicidal thoughts at this time. Reports no symptoms of anxiety or depression. No behavioral concerns. No distress noted. Plan of care ongoing.

## 2024-11-26 NOTE — NURSING
POC reviewed this shift and is ongoing.  Pt is cooperative with care and complaint with medications.  Pt remains delusional, paranoid and RIS.

## 2024-11-26 NOTE — PLAN OF CARE
Problem: Adult Behavioral Health Plan of Care  Goal: Plan of Care Review  Outcome: Progressing  Goal: Patient-Specific Goal (Individualization)  Outcome: Progressing  Goal: Adheres to Safety Considerations for Self and Others  Outcome: Progressing  Goal: Develops/Participates in Therapeutic [place] to Support Successful Transition  Outcome: Progressing     Problem: Excessive Substance Use  Goal: Optimized Energy Level (Excessive Substance Use)  Outcome: Progressing  Goal: Improved Behavioral Control (Excessive Substance Use)  Outcome: Progressing  Goal: Increased Participation and Engagement (Excessive Substance Use)  Outcome: Progressing  Goal: Improved Physiologic Symptoms (Excessive Substance Use)  Outcome: Progressing  Goal: Enhanced Social, Occupational or Functional Skills (Excessive Substance Use)  Outcome: Progressing     Problem: Suicide Risk  Goal: Absence of Self-Harm  Outcome: Progressing     Problem: Psychotic Signs/Symptoms  Goal: Improved Behavioral Control (Psychotic Signs/Symptoms)  Outcome: Progressing  Goal: Optimal Cognitive Function (Psychotic Signs/Symptoms)  Outcome: Progressing  Goal: Increased Participation and Engagement (Psychotic Signs/Symptoms)  Outcome: Progressing  Goal: Improved Mood Symptoms (Psychotic Signs/Symptoms)  Outcome: Progressing  Goal: Improved Psychomotor Symptoms (Psychotic Signs/Symptoms)  Outcome: Progressing  Goal: Decreased Sensory Symptoms (Psychotic Signs/Symptoms)  Outcome: Progressing  Goal: Improved Sleep (Psychotic Signs/Symptoms)  Outcome: Progressing  Goal: Enhanced Social, Occupational or Functional Skills (Psychotic Signs/Symptoms)  Outcome: Progressing

## 2024-11-27 PROCEDURE — 90833 PSYTX W PT W E/M 30 MIN: CPT | Mod: AF,HB,, | Performed by: PSYCHIATRY & NEUROLOGY

## 2024-11-27 PROCEDURE — 25000003 PHARM REV CODE 250: Performed by: NURSE PRACTITIONER

## 2024-11-27 PROCEDURE — 25000003 PHARM REV CODE 250: Performed by: STUDENT IN AN ORGANIZED HEALTH CARE EDUCATION/TRAINING PROGRAM

## 2024-11-27 PROCEDURE — 11400000 HC PSYCH PRIVATE ROOM

## 2024-11-27 PROCEDURE — 99233 SBSQ HOSP IP/OBS HIGH 50: CPT | Mod: AF,HB,, | Performed by: PSYCHIATRY & NEUROLOGY

## 2024-11-27 RX ADMIN — HYDROXYZINE PAMOATE 50 MG: 50 CAPSULE ORAL at 08:11

## 2024-11-27 RX ADMIN — DIVALPROEX SODIUM 500 MG: 500 TABLET, FILM COATED, EXTENDED RELEASE ORAL at 08:11

## 2024-11-27 RX ADMIN — MIRTAZAPINE 15 MG: 15 TABLET, FILM COATED ORAL at 08:11

## 2024-11-27 NOTE — PLAN OF CARE
Problem: Adult Behavioral Health Plan of Care  Goal: Plan of Care Review  Outcome: Progressing  Goal: Patient-Specific Goal (Individualization)  Outcome: Progressing  Goal: Adheres to Safety Considerations for Self and Others  Outcome: Progressing  Goal: Develops/Participates in Therapeutic [place] to Support Successful Transition  Outcome: Progressing     Problem: Excessive Substance Use  Goal: Optimized Energy Level (Excessive Substance Use)  Outcome: Progressing  Goal: Improved Behavioral Control (Excessive Substance Use)  Outcome: Progressing  Goal: Increased Participation and Engagement (Excessive Substance Use)  Outcome: Progressing  Goal: Improved Physiologic Symptoms (Excessive Substance Use)  Outcome: Progressing  Goal: Enhanced Social, Occupational or Functional Skills (Excessive Substance Use)  Outcome: Progressing     Problem: Suicide Risk  Goal: Absence of Self-Harm  Outcome: Progressing     Problem: Psychotic Signs/Symptoms  Goal: Improved Behavioral Control (Psychotic Signs/Symptoms)  Outcome: Progressing  Goal: Optimal Cognitive Function (Psychotic Signs/Symptoms)  Outcome: Progressing  Goal: Increased Participation and Engagement (Psychotic Signs/Symptoms)  Outcome: Progressing  Goal: Improved Mood Symptoms (Psychotic Signs/Symptoms)  Outcome: Progressing  Goal: Improved Psychomotor Symptoms (Psychotic Signs/Symptoms)  Outcome: Progressing  Goal: Decreased Sensory Symptoms (Psychotic Signs/Symptoms)  Outcome: Progressing  Goal: Improved Sleep (Psychotic Signs/Symptoms)  Outcome: Progressing  Goal: Enhanced Social, Occupational or Functional Skills (Psychotic Signs/Symptoms)  Outcome: Progressing

## 2024-11-27 NOTE — NURSING
POC reviewed this shift and is ongoing.  Pt is cooperative with care and complaint with medications.  Pt was visible in the milieu, interacting with select peers.

## 2024-11-27 NOTE — PLAN OF CARE
Problem: Adult Behavioral Health Plan of Care     Goal: Optimized Coping Skills in Response to Life Stressors     Intervention: Promote Effective Coping Strategies     Flowsheets     Supportive Measures:   active listening utilized   positive reinforcement provided   verbalization of feelings encouraged   self-reflection promoted              Behavior: alert, responding to internal stimuli                 Intervention:  In this CBT-focused group LMSW facilitated discussion on patients learning about boundaries and how to hold healthy boundaries. The goal is that patients will gain greater awareness into their current boundary styles and feel empowered in their ability to set healthy boundaries with others.  Each patient was given handout 9.4 regarding boundaries.                Response: Pt attended group and was respectful to staff members. Pt completed worksheet. SW observed pt talking to himself throughout group session. Pt identified the need to establish healthy boundaries with friends, family, and drugs.            Plan: Continue to encourage pt to participate in process groups to verbalize feelings and develop healthy coping skills.

## 2024-11-27 NOTE — PROGRESS NOTES
RD triggered for Length of Stay. As per chart pt has no dietary issues at this time. Pt is tolerating a Regular diet  with double portions and consuming an adequate amount of meals and snacks. Nutrition services are not warranted at this time. RD to sign off. Please re-consult if any dietary/nutrition issues arise.

## 2024-11-27 NOTE — PROGRESS NOTES
"PSYCHIATRY DAILY INPATIENT PROGRESS NOTE  SUBSEQUENT HOSPITAL VISIT    ENCOUNTER DATE: 11/27/2024  SITE: 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 The chart was reviewed.     Reviewed notes from Rns, NP, and LPN and labs from the last 24 hours.    The patient's case was discussed with the treatment team/care providers today including Rns, CTRS, NP, and LPC    Staff reports no behavioral or management issues.     The patient has been compliant with treatment.      Subjective 11/27/2024    Patient reports mood is "ok.. going good," mood is more appropriate, and affect less blunted. He does not spontaneously verbalize any delusional thoiughts, however when prompted, continues to verbalize delusional content.     He poorly described his presentation and treatment goals    Received first invega sustenna injection on 11/25/24 with no adverse effects. Discussed plan to administer second dose this weekend and potential discharge to rehab early next week. He verbalized understanding.     Denies auditory/visual hallucinations. Denies paranoia, including concerns that other patient wants to harm him (per yesterday's note)    Patient remains delusional and paranoia.      He remains motivated to attend rehab            Psychiatric ROS (observed, reported, or endorsed/denied):  Depressed mood - No  Interest/pleasure/anhedonia: No  Guilt/hopelessness/worthlessness - No  Changes in Sleep - No  Changes in Appetite - No  Changes in Concentration - No  Changes in Energy - No  PMA/R- No  Suicidal- active/passive ideations - No  Homicidal ideations: active/passive ideations - No    Hallucinations - less  Delusions - Yes  Disorganized behavior - Yes  Disorganized speech - Yes  Negative " symptoms - No    Elevated mood - fluctuating  Decreased need for sleep - No  Grandiosity - No  Racing thoughts - No  Impulsivity - No  Irritability- No  Increased energy - Yes  Distractibility - Yes  Increase in goal-directed activity or PMA- No    Symptoms of KANU - No  Symptoms of Panic Disorder- No  Symptoms of PTSD - No        Overall progress: Patient is showing mild improvement         Psychotherapy:  Target symptoms: substance abuse, psychosis  Why chosen therapy is appropriate versus another modality: relevant to diagnosis, evidence based practice  Outcome monitoring methods: observation  Therapeutic intervention type: insight oriented psychotherapy, supportive psychotherapy  Topics discussed/themes: building skills sets for symptom management, symptom recognition, substance abuse  The patient's response to the intervention is guarded. The patient's progress toward treatment goals is limited.   Duration of intervention: 16 minutes.        Medical ROS  Review of Systems   Constitutional: Negative.    HENT: Negative.     Eyes: Negative.    Respiratory: Negative.     Cardiovascular: Negative.    Gastrointestinal: Negative.    Genitourinary: Negative.    Musculoskeletal: Negative.    Skin: Negative.    Neurological: Negative.    Endo/Heme/Allergies: Negative.    Psychiatric/Behavioral:  Positive for hallucinations and substance abuse.    All other systems reviewed and are negative.        PAST MEDICAL HISTORY   Past Medical History:   Diagnosis Date    History of psychiatric hospitalization 09/06/2023    [facility] psychiatric care     Psychiatric problem     Schizophrenia     Suicide attempt            PSYCHOTROPIC MEDICATIONS   Scheduled Meds:   divalproex ER  500 mg Oral BID    mirtazapine  15 mg Oral QHS    [START ON 11/30/2024] paliperidone palmitate  156 mg Intramuscular Once     Continuous Infusions:  PRN Meds:.  Current Facility-Administered Medications:     acetaminophen, 650 mg, Oral, Q6H  "PRN    aluminum-magnesium hydroxide-simethicone, 30 mL, Oral, Q6H PRN    benzonatate, 100 mg, Oral, TID PRN    benztropine mesylate, 2 mg, Intramuscular, Q8H PRN    hydrOXYzine pamoate, 50 mg, Oral, Q6H PRN    loperamide, 2 mg, Oral, PRN    OLANZapine, 10 mg, Oral, Q8H PRN **AND** OLANZapine, 10 mg, Intramuscular, Q8H PRN    ondansetron, 4 mg, Oral, Q8H PRN    promethazine, 25 mg, Oral, Q6H PRN        EXAMINATION    VITALS   Vitals:    11/25/24 1935 11/26/24 0737 11/26/24 1911 11/27/24 0727   BP: 112/60 109/77 127/73 (!) 113/59   BP Location: Left arm Left arm Left arm Left arm   Patient Position: Sitting Sitting Sitting Sitting   Pulse: 71 64 68 68   Resp: 16 18 16 20   Temp: 98.3 °F (36.8 °C) 98.1 °F (36.7 °C) 98.6 °F (37 °C) 97.8 °F (36.6 °C)   TempSrc: Oral Oral Oral Oral   SpO2: 100% 99% 100% 100%   Weight:       Height:           Body mass index is 20 kg/m².        CONSTITUTIONAL  General Appearance: unremarkable, age appropriate    MUSCULOSKELETAL  Muscle Strength and Tone:no tremor, no tic  Abnormal Involuntary Movements: No  Gait and Station: non-ataxic    PSYCHIATRIC   Level of Consciousness: awake and alert   Orientation: place, situation, month of year, and year  Grooming: Disheveled and Hospital garb  Psychomotor Behavior: cooperative, friendly and cooperative  Speech: Normal rate and tone, mild increase latency in response  Language: able to name  Mood: "Good"  Affect: Appropriate  Thought Process: tangential and blocking  Associations: tangential    Thought Content: +delusions, denies SI, and denies HI  Perceptions: less AH and less VH, RIS seems to be decreasing somewhat, not observed today  Memory: Impaired to some degree  Attention:Easily distracted  Fund of Knowledge: Impaired  Estimate if Intelligence:  Average based on work/education history, vocabulary and mental status exam  Insight: poor awareness of illness  Judgment: limited        DIAGNOSTIC TESTING   Laboratory Results  No results found " for this or any previous visit (from the past 24 hours).              MEDICAL DECISION MAKING      Schizoaffective disorder, bipolar type, severe  Unspecified anxiety disorder  Cannabis abuse  Psychosocial stressors  Nicotine dependence  Hypokalemia     Anemia        PROBLEM LIST AND MANAGEMENT PLANS           Schizoaffective disorder, bipolar type, severe  - pt reports received haldol BENDER 2 weeks ago with Mesfin Ponce, will attempt to verify-Per ACT Team, patient received Haldol D 100 mg IM on Nov 1 2024. Hold additional antipsychotic for now current psychosis suspected to be substance-induced. Consider supplementation with additional PO haldol+increasing month BENDER dose vs switching to 2GA/BENDER. -Initiate risperdal 1 mg BID PO with plan to transition to invega sustenna bender.-Titrate risperdal to 2mg BID today- Continue-DC PO risperdal. Pt received invega sustenna 234 mg on 11/25/24, second dose scheduled in 3 days  - Patient remains agreeable to BENDER but expressed that he would feel more comfortable taking the injection is a few days as he received a Haldol D injection on the first of the month.       - resume depakote at 500 mg PO BID, will check level for toxicity 11/23/24- Level 71, continue current dose  - resume remeron 15 mg PO qhs  - pt counseled  - follow up with outpatient mental health providers after discharge for medication management and psychotherapy        Unspecified anxiety disorder  - start hydroxyzine 50 mg PO q 6 hours PRN  - pt counseled  - follow up with outpatient mental health providers after discharge for medication management and psychotherapy        Cannabis abuse  - consider rehab  - consider gabapentin  - pt counseled  - follow up with outpatient mental health providers after discharge for medication management and psychotherapy        Psychosocial stressors  - pt counseled  - SW consulted for assistance with additional resources      Nicotine dependence  - start nicotine patch 14 mg PO qd  PRN        Hypokalemia  - FM consulted        Anemia  - FM consulted          Discussed diagnosis, risks and benefits of proposed treatment vs alternative treatments vs no treatment, potential side effects of these treatments and the inherent unpredictability of treatment. The patient expresses understanding of the above and displays the capacity to agree with this treatment given said understanding. Patient also agrees that, currently, the benefits outweigh the risks and would like to pursue/continue treatment at this time.    Any medications being used off-label were discussed with the patient inclusive of the evidence base for the use of the medications and consent was obtained for the off-label use of the medication.       DISCHARGE PLANNING  Expected Disposition Plan: TBD (rehab if willing)    NEED FOR CONTINUED HOSPITALIZATION  Psychiatric illness continues to pose a potential threat to life or bodily function, of self or others, thereby requiring the need for continued inpatient psychiatric hospitalization: Yes, due to: significant psychotic thought disorder, as evidenced by:  Ongoing concerns with perceptual aberrancy and paranoid persecutory delusions leading to potential harm of self or others.    Protective inpatient pyschiatric hospitalization required while a safe disposition plan is enacted: Yes    Patient stabilized and ready for discharge from inpatient psychiatric unit: No        STAFF:   Frantz Cagle MD  Psychiatry

## 2024-11-27 NOTE — NURSING
Patient out of room, paces halls at intervals, took shower this am. Patient is calm, pleasant, and cooperative with no negative behaviors noted. Patient is delusional at times, continues with A/H (which has improved) as evidenced by RIS with laughing and talking. Patient denies depression and anxiety, denies S/I. Appetite is good for meals and snacks and is compliant with medications without side effects noted. Patient is requesting next [place] today and plans to discuss with MD today. Dr. Cagle visited per telemed with no new orders noted. Patient is still requesting inpatient rehab at discharge. Close observations continued and safe environment maintained.

## 2024-11-28 PROCEDURE — 25000003 PHARM REV CODE 250: Performed by: STUDENT IN AN ORGANIZED HEALTH CARE EDUCATION/TRAINING PROGRAM

## 2024-11-28 PROCEDURE — 99232 SBSQ HOSP IP/OBS MODERATE 35: CPT | Mod: AF,HB,, | Performed by: PSYCHIATRY & NEUROLOGY

## 2024-11-28 PROCEDURE — 11400000 HC PSYCH PRIVATE ROOM

## 2024-11-28 RX ADMIN — DIVALPROEX SODIUM 500 MG: 500 TABLET, FILM COATED, EXTENDED RELEASE ORAL at 08:11

## 2024-11-28 RX ADMIN — MIRTAZAPINE 15 MG: 15 TABLET, FILM COATED ORAL at 08:11

## 2024-11-28 NOTE — PLAN OF CARE
Problem: Adult Behavioral Health Plan of Care  Goal: Plan of Care Review  11/28/2024 0148 by Pamela Lemus RN  Outcome: Progressing  11/28/2024 0148 by Pamela Lemus RN  Outcome: Progressing     Problem: Adult Behavioral Health Plan of Care  Goal: Patient-Specific Goal (Individualization)  11/28/2024 0148 by Pamela Lemus RN  Outcome: Progressing     Problem: Adult Behavioral Health Plan of Care  Goal: Adheres to Safety Considerations for Self and Others  11/28/2024 0148 by Pamela Lemus RN  Outcome: Progressing     Problem: Adult Behavioral Health Plan of Care  Goal: Develops/Participates in Therapeutic [place] to Support Successful Transition  11/28/2024 0148 by Pamela Lemus RN  Outcome: Progressing     Problem: Excessive Substance Use  Goal: Optimized Energy Level (Excessive Substance Use)  11/28/2024 0148 by Pamela Lemus RN  Outcome: Progressing     Problem: Excessive Substance Use  Goal: Improved Behavioral Control (Excessive Substance Use)  11/28/2024 0148 by Pamela Lemus RN  Outcome: Progressing     Problem: Excessive Substance Use  Goal: Improved Physiologic Symptoms (Excessive Substance Use)  11/28/2024 0148 by Pamela Lemus RN  Outcome: Progressing     Problem: Excessive Substance Use  Goal: Enhanced Social, Occupational or Functional Skills (Excessive Substance Use)  11/28/2024 0148 by Pamela Lemus RN  Outcome: Progressing       Problem: Suicide Risk  Goal: Absence of Self-Harm  11/28/2024 0148 by Pamela Lemus RN  Outcome: Progressing

## 2024-11-28 NOTE — PROGRESS NOTES
"PSYCHIATRY DAILY INPATIENT PROGRESS NOTE  SUBSEQUENT HOSPITAL VISIT    ENCOUNTER DATE: 11/28/2024  SITE: 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 The chart was reviewed.     Reviewed notes from Rns, RD, SW, and LPN and labs from the last 24 hours.    The patient's case was discussed with the treatment team/care providers today including Rns    Staff reports no behavioral or management issues.     The patient has been compliant with treatment.      Subjective 11/28/2024    Patient again reports that his mood is "ok." His mood is more appropriate, and his affect less blunted. He does not spontaneously verbalize any delusional thoughts, however when prompted, continues to verbalize delusional content.     He again poorly described his presentation and treatment goals    Received first invega sustenna injection on 11/25/24 with no adverse effects. Discussed plan to administer second dose this weekend and potential discharge to rehab early next week. He verbalized understanding.     Denies auditory/visual hallucinations. Denies paranoia, including concerns that other patient wants to harm him (per yesterday's note)    Patient remains delusional and paranoia.      He remains motivated to attend rehab, "I have trouble with mariajuana."         Psychiatric ROS (observed, reported, or endorsed/denied):  Depressed mood - No  Interest/pleasure/anhedonia: No  Guilt/hopelessness/worthlessness - No  Changes in Sleep - No  Changes in Appetite - No  Changes in Concentration - No  Changes in Energy - No  PMA/R- No  Suicidal- active/passive ideations - No  Homicidal ideations: active/passive ideations - No    Hallucinations - less  Delusions - Yes  Disorganized behavior - " Yes  Disorganized speech - Yes  Negative symptoms - No    Elevated mood - fluctuating  Decreased need for sleep - No  Grandiosity - No  Racing thoughts - No  Impulsivity - No  Irritability- No  Increased energy - Yes  Distractibility - Yes  Increase in goal-directed activity or PMA- No    Symptoms of KANU - No  Symptoms of Panic Disorder- No  Symptoms of PTSD - No        Overall progress: Patient is showing mild improvement         Psychotherapy:  Target symptoms: substance abuse, psychosis  Why chosen therapy is appropriate versus another modality: relevant to diagnosis, evidence based practice  Outcome monitoring methods: observation  Therapeutic intervention type: insight oriented psychotherapy, supportive psychotherapy  Topics discussed/themes: building skills sets for symptom management, symptom recognition, substance abuse  The patient's response to the intervention is guarded. The patient's progress toward treatment goals is limited.   Duration of intervention: 5 minutes.        Medical ROS  Review of Systems   Constitutional: Negative.    HENT: Negative.     Eyes: Negative.    Respiratory: Negative.     Cardiovascular: Negative.    Gastrointestinal: Negative.    Genitourinary: Negative.    Musculoskeletal: Negative.    Skin: Negative.    Neurological: Negative.    Endo/Heme/Allergies: Negative.    Psychiatric/Behavioral:  Positive for hallucinations and substance abuse.    All other systems reviewed and are negative.        PAST MEDICAL HISTORY   Past Medical History:   Diagnosis Date    History of psychiatric hospitalization 09/06/2023    Daniel MAYO     of psychiatric care     Psychiatric problem     Schizophrenia     Suicide attempt            PSYCHOTROPIC MEDICATIONS   Scheduled Meds:   divalproex ER  500 mg Oral BID    mirtazapine  15 mg Oral QHS    [START ON 11/30/2024] paliperidone palmitate  156 mg Intramuscular Once     Continuous Infusions:  PRN Meds:.  Current Facility-Administered Medications:  "    acetaminophen, 650 mg, Oral, Q6H PRN    aluminum-magnesium hydroxide-simethicone, 30 mL, Oral, Q6H PRN    benzonatate, 100 mg, Oral, TID PRN    benztropine mesylate, 2 mg, Intramuscular, Q8H PRN    hydrOXYzine pamoate, 50 mg, Oral, Q6H PRN    loperamide, 2 mg, Oral, PRN    OLANZapine, 10 mg, Oral, Q8H PRN **AND** OLANZapine, 10 mg, Intramuscular, Q8H PRN    ondansetron, 4 mg, Oral, Q8H PRN    promethazine, 25 mg, Oral, Q6H PRN        EXAMINATION    VITALS   Vitals:    11/26/24 1911 11/27/24 0727 11/27/24 1913 11/28/24 0729   BP: 127/73 (!) 113/59 120/71 (!) 140/60   BP Location: Left arm Left arm  Left arm   Patient Position: Sitting Sitting Sitting Sitting   Pulse: 68 68 80 89   Resp: 16 20 20 20   Temp: 98.6 °F (37 °C) 97.8 °F (36.6 °C) 98.6 °F (37 °C) 98.2 °F (36.8 °C)   TempSrc: Oral Oral Oral Oral   SpO2: 100% 100% 98% 99%   Weight:       Height:           Body mass index is 20 kg/m².        CONSTITUTIONAL  General Appearance: unremarkable, age appropriate    MUSCULOSKELETAL  Muscle Strength and Tone:no tremor, no tic  Abnormal Involuntary Movements: No  Gait and Station: non-ataxic    PSYCHIATRIC   Level of Consciousness: awake and alert   Orientation: place, situation, month of year, and year  Grooming: Disheveled and Hospital garb  Psychomotor Behavior: cooperative, friendly and cooperative  Speech: Normal rate and tone, mild increase latency in response  Language: able to name  Mood: "Good"  Affect: Appropriate  Thought Process: tangential and blocking  Associations: tangential    Thought Content: +delusions, denies SI, and denies HI  Perceptions: less AH and less VH, RIS seems to be decreasing somewhat, not observed today  Memory: Impaired to some degree  Attention:Easily distracted  Fund of Knowledge: Impaired  Estimate if Intelligence:  Average based on work/education history, vocabulary and mental status exam  Insight: poor awareness of illness  Judgment: limited        DIAGNOSTIC TESTING "   Laboratory Results  No results found for this or any previous visit (from the past 24 hours).              MEDICAL DECISION MAKING      Schizoaffective disorder, bipolar type, severe  Unspecified anxiety disorder  Cannabis abuse  Psychosocial stressors  Nicotine dependence  Hypokalemia     Anemia        PROBLEM LIST AND MANAGEMENT PLANS           Schizoaffective disorder, bipolar type, severe  - pt reports received haldol BENDER 2 weeks ago with Mesfin Ponce, will attempt to verify-Per ACT Team, patient received Haldol D 100 mg IM on Nov 1 2024. Hold additional antipsychotic for now current psychosis suspected to be substance-induced. Consider supplementation with additional PO haldol+increasing month BENDER dose vs switching to 2GA/BENDER. -Initiate risperdal 1 mg BID PO with plan to transition to invega sustenna bender.-Titrate risperdal to 2mg BID today- Continue-DC PO risperdal. Pt received invega sustenna 234 mg on 11/25/24, second dose scheduled in 2 days  - Patient remains agreeable to BENDER but expressed that he would feel more comfortable taking the injection is a few days as he received a Haldol D injection on the first of the month.       - resume depakote at 500 mg PO BID, will check level for toxicity 11/23/24- Level 71, continue current dose  - resume remeron 15 mg PO qhs  - pt counseled  - follow up with outpatient mental health providers after discharge for medication management and psychotherapy        Unspecified anxiety disorder  - start hydroxyzine 50 mg PO q 6 hours PRN  - pt counseled  - follow up with outpatient mental health providers after discharge for medication management and psychotherapy        Cannabis abuse  - consider rehab  - consider gabapentin  - pt counseled  - follow up with outpatient mental health providers after discharge for medication management and psychotherapy        Psychosocial stressors  - pt counseled  - SW consulted for assistance with additional resources      Nicotine  dependence  - start nicotine patch 14 mg PO qd PRN        Hypokalemia  - FM consulted        Anemia  - FM consulted          Discussed diagnosis, risks and benefits of proposed treatment vs alternative treatments vs no treatment, potential side effects of these treatments and the inherent unpredictability of treatment. The patient expresses understanding of the above and displays the capacity to agree with this treatment given said understanding. Patient also agrees that, currently, the benefits outweigh the risks and would like to pursue/continue treatment at this time.    Any medications being used off-label were discussed with the patient inclusive of the evidence base for the use of the medications and consent was obtained for the off-label use of the medication.       DISCHARGE PLANNING  Expected Disposition Plan: TBD (rehab if willing)    NEED FOR CONTINUED HOSPITALIZATION  Psychiatric illness continues to pose a potential threat to life or bodily function, of self or others, thereby requiring the need for continued inpatient psychiatric hospitalization: Yes, due to: significant psychotic thought disorder, as evidenced by:  Ongoing concerns with perceptual aberrancy and paranoid persecutory delusions leading to potential harm of self or others.    Protective inpatient pyschiatric hospitalization required while a safe disposition plan is enacted: Yes    Patient stabilized and ready for discharge from inpatient psychiatric unit: No        STAFF:   Frantz Cagle MD  Psychiatry

## 2024-11-28 NOTE — NURSING
POC reviewed this shift and is ongoing.  Pt is cooperative with care and compliant with medications.  Pt was visible in the milieu this evening, but remained isolated to self and RIS.  Pt is currently resting quietly in bed with eyes closed, apparently asleep.

## 2024-11-29 PROCEDURE — S4991 NICOTINE PATCH NONLEGEND: HCPCS | Performed by: PSYCHIATRY & NEUROLOGY

## 2024-11-29 PROCEDURE — 25000003 PHARM REV CODE 250: Performed by: PSYCHIATRY & NEUROLOGY

## 2024-11-29 PROCEDURE — 99232 SBSQ HOSP IP/OBS MODERATE 35: CPT | Mod: AF,HB,, | Performed by: PSYCHIATRY & NEUROLOGY

## 2024-11-29 PROCEDURE — 11400000 HC PSYCH PRIVATE ROOM

## 2024-11-29 PROCEDURE — 25000003 PHARM REV CODE 250: Performed by: STUDENT IN AN ORGANIZED HEALTH CARE EDUCATION/TRAINING PROGRAM

## 2024-11-29 RX ORDER — IBUPROFEN 200 MG
1 TABLET ORAL DAILY
Status: DISCONTINUED | OUTPATIENT
Start: 2024-11-29 | End: 2024-12-03 | Stop reason: HOSPADM

## 2024-11-29 RX ADMIN — NICOTINE 1 PATCH: 14 PATCH, EXTENDED RELEASE TRANSDERMAL at 08:11

## 2024-11-29 RX ADMIN — MIRTAZAPINE 15 MG: 15 TABLET, FILM COATED ORAL at 08:11

## 2024-11-29 RX ADMIN — DIVALPROEX SODIUM 500 MG: 500 TABLET, FILM COATED, EXTENDED RELEASE ORAL at 08:11

## 2024-11-29 NOTE — PLAN OF CARE
POC reviewed this shift and is on going. Patient is anxious, pacing, and labile. Endorses Delusions. Denies Suicidal Ideation, Homicidal Ideation, Auditory Hallucinations, Visual Hallucinations, Tactile Hallucinations, and Gustatory Hallucinations. Minimal interactions with peers. Self isolating. Pt continues to be medication compliant and staff will continue to monitor pt closely while on the unit.

## 2024-11-29 NOTE — PLAN OF CARE
Problem: Adult Behavioral Health Plan of Care  Goal: Plan of Care Review  Outcome: Progressing  Goal: Patient-Specific Goal (Individualization)  Outcome: Progressing  Goal: Adheres to Safety Considerations for Self and Others  Outcome: Progressing  Goal: Develops/Participates in Therapeutic [place] to Support Successful Transition  Outcome: Progressing     Problem: Adult Behavioral Health Plan of Care  Goal: Optimized Coping Skills in Response to Life Stressors  Outcome: Progressing     Problem: Excessive Substance Use  Goal: Optimized Energy Level (Excessive Substance Use)  Outcome: Progressing  Goal: Improved Behavioral Control (Excessive Substance Use)  Outcome: Progressing  Goal: Increased Participation and Engagement (Excessive Substance Use)  Outcome: Progressing  Goal: Improved Physiologic Symptoms (Excessive Substance Use)  Outcome: Progressing  Goal: Enhanced Social, Occupational or Functional Skills (Excessive Substance Use)  Outcome: Progressing     Problem: Suicide Risk  Goal: Absence of Self-Harm  Outcome: Progressing     Problem: Psychotic Signs/Symptoms  Goal: Improved Behavioral Control (Psychotic Signs/Symptoms)  Outcome: Progressing  Goal: Optimal Cognitive Function (Psychotic Signs/Symptoms)  Outcome: Progressing  Goal: Increased Participation and Engagement (Psychotic Signs/Symptoms)  Outcome: Progressing  Goal: Improved Mood Symptoms (Psychotic Signs/Symptoms)  Outcome: Progressing  Goal: Improved Psychomotor Symptoms (Psychotic Signs/Symptoms)  Outcome: Progressing  Goal: Decreased Sensory Symptoms (Psychotic Signs/Symptoms)  Outcome: Progressing  Goal: Improved Sleep (Psychotic Signs/Symptoms)  Outcome: Progressing  Goal: Enhanced Social, Occupational or Functional Skills (Psychotic Signs/Symptoms)  Outcome: Progressing

## 2024-11-29 NOTE — PLAN OF CARE
Problem: Adult Behavioral Health Plan of Care  Goal: Optimized Coping Skills in Response to Life Stressors  Intervention: Promote Effective Coping Strategies  Flowsheets (Taken 11/26/2024 1600)  Supportive Measures:   active listening utilized   self-reflection promoted   verbalization of feelings encouraged       Behavior:   Disorganized and responding to internal stimuli         Intervention:  In this CBT-focused group LMSW facilitated discussion on bringing awareness and attention to the cycles of triumph and difficulty in life. Each patient was asked to normalize their experiences in life overall and to view themselves as the hero of their story. The goal is that each patient will have awareness of where they believe they are in their journey and how they can empower themselves to keep moving forward. Each patient was given The Hero's Journey Steps handout.         Response:  Pt identified himself as being in the current step of revelation. Pt stated things can alter my situation. I've been coming across a lot of people with familiar faces.       Plan: Continue to encourage pt to participate in process groups to verbalize feelings and develop healthy coping skills.

## 2024-11-29 NOTE — NURSING
POC reviewed this shift and is ongoing.  Pt is cooperative with care and complaint with medications.  Pt less obviously RIS this shift, was singing and dancing.  Pt interacted well with staff and more interactions with peers than previous.

## 2024-11-29 NOTE — PROGRESS NOTES
"PSYCHIATRY DAILY INPATIENT PROGRESS NOTE  SUBSEQUENT HOSPITAL VISIT    ENCOUNTER DATE: 11/29/2024  SITE: 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 The chart was reviewed.     Reviewed notes from Rns and labs from the last 24 hours.    The patient's case was discussed with the treatment team/care providers today including Rns and NP    Staff reports no behavioral or management issues.     The patient has been compliant with treatment.      Subjective 11/29/2024    Patient again reports that his mood is "ok." His mood is more appropriate, and his affect less blunted- dialy improvement noted. He does not spontaneously verbalize any delusional thoughts, however when prompted, continues to verbalize delusional content.     He again poorly described his presentation and treatment goals. He noted that he has nicotine withdrawal.    Received first invega sustenna injection on 11/25/24 with no adverse effects. Discussed plan to administer second dose this weekend and potential discharge to rehab early next week. He verbalized understanding.     Denies auditory/visual hallucinations. Denies paranoia, including concerns that other patient wants to harm him (per yesterday's note)    Patient remains delusional and paranoia with some improvement noted.      He remains motivated to attend rehab, "I have trouble with mariajuana."         Psychiatric ROS (observed, reported, or endorsed/denied):  Depressed mood - No  Interest/pleasure/anhedonia: No  Guilt/hopelessness/worthlessness - No  Changes in Sleep - No  Changes in Appetite - No  Changes in Concentration - No  Changes in Energy - No  PMA/R- No  Suicidal- active/passive ideations - No  Homicidal ideations: active/passive " ideations - No    Hallucinations - less  Delusions - Yes  Disorganized behavior - Yes  Disorganized speech - Yes  Negative symptoms - No    Elevated mood - fluctuating  Decreased need for sleep - No  Grandiosity - No  Racing thoughts - No  Impulsivity - No  Irritability- No  Increased energy - Yes  Distractibility - Yes  Increase in goal-directed activity or PMA- No    Symptoms of KANU - No  Symptoms of Panic Disorder- No  Symptoms of PTSD - No        Overall progress: Patient is showing mild improvement         Psychotherapy:  Target symptoms: substance abuse, psychosis  Why chosen therapy is appropriate versus another modality: relevant to diagnosis, evidence based practice  Outcome monitoring methods: observation  Therapeutic intervention type: insight oriented psychotherapy, supportive psychotherapy  Topics discussed/themes: building skills sets for symptom management, symptom recognition, substance abuse  The patient's response to the intervention is guarded. The patient's progress toward treatment goals is limited.   Duration of intervention: 5 minutes.        Medical ROS  Review of Systems   Constitutional: Negative.    HENT: Negative.     Eyes: Negative.    Respiratory: Negative.     Cardiovascular: Negative.    Gastrointestinal: Negative.    Genitourinary: Negative.    Musculoskeletal: Negative.    Skin: Negative.    Neurological: Negative.    Endo/Heme/Allergies: Negative.    Psychiatric/Behavioral:  Positive for hallucinations and substance abuse.    All other systems reviewed and are negative.        PAST MEDICAL HISTORY   Past Medical History:   Diagnosis Date    History of psychiatric hospitalization 09/06/2023    Daniel MAYO     of psychiatric care     Psychiatric problem     Schizophrenia     Suicide attempt            PSYCHOTROPIC MEDICATIONS   Scheduled Meds:   divalproex ER  500 mg Oral BID    mirtazapine  15 mg Oral QHS    [START ON 11/30/2024] paliperidone palmitate  156 mg Intramuscular Once  "    Continuous Infusions:  PRN Meds:.  Current Facility-Administered Medications:     acetaminophen, 650 mg, Oral, Q6H PRN    aluminum-magnesium hydroxide-simethicone, 30 mL, Oral, Q6H PRN    benzonatate, 100 mg, Oral, TID PRN    benztropine mesylate, 2 mg, Intramuscular, Q8H PRN    hydrOXYzine pamoate, 50 mg, Oral, Q6H PRN    loperamide, 2 mg, Oral, PRN    OLANZapine, 10 mg, Oral, Q8H PRN **AND** OLANZapine, 10 mg, Intramuscular, Q8H PRN    ondansetron, 4 mg, Oral, Q8H PRN    promethazine, 25 mg, Oral, Q6H PRN        EXAMINATION    VITALS   Vitals:    11/27/24 1913 11/28/24 0729 11/28/24 1921 11/29/24 0746   BP: 120/71 (!) 140/60 (!) 140/62 (!) 121/58   BP Location:  Left arm Left arm Left arm   Patient Position: Sitting Sitting Sitting Sitting   Pulse: 80 89 72 65   Resp: 20 20 20 20   Temp: 98.6 °F (37 °C) 98.2 °F (36.8 °C) 98.1 °F (36.7 °C) 98 °F (36.7 °C)   TempSrc: Oral Oral Oral Oral   SpO2: 98% 99% 98% 100%   Weight:       Height:           Body mass index is 20 kg/m².        CONSTITUTIONAL  General Appearance: unremarkable, age appropriate    MUSCULOSKELETAL  Muscle Strength and Tone:no tremor, no tic  Abnormal Involuntary Movements: No  Gait and Station: non-ataxic    PSYCHIATRIC   Level of Consciousness: awake and alert   Orientation: place, situation, month of year, and year  Grooming: Disheveled and Hospital garb  Psychomotor Behavior: cooperative, friendly and cooperative  Speech: Normal rate and tone, mild increase latency in response  Language: able to name  Mood: "Good"  Affect: Appropriate  Thought Process: tangential and blocking  Associations: tangential    Thought Content: +delusions, denies SI, and denies HI  Perceptions: less AH and less VH, RIS seems to be decreasing somewhat, not observed today  Memory: Impaired to some degree  Attention:Easily distracted  Fund of Knowledge: Impaired  Estimate if Intelligence:  Average based on work/education history, vocabulary and mental status " exam  Insight: poor awareness of illness  Judgment: limited        DIAGNOSTIC TESTING   Laboratory Results  No results found for this or any previous visit (from the past 24 hours).              MEDICAL DECISION MAKING      Schizoaffective disorder, bipolar type, severe  Unspecified anxiety disorder  Cannabis abuse  Psychosocial stressors  Nicotine dependence  Hypokalemia     Anemia        PROBLEM LIST AND MANAGEMENT PLANS           Schizoaffective disorder, bipolar type, severe  - pt reports received haldol BENDER 2 weeks ago with Mesfin Ponce, will attempt to verify-Per ACT Team, patient received Haldol D 100 mg IM on Nov 1 2024. Hold additional antipsychotic for now current psychosis suspected to be substance-induced. Consider supplementation with additional PO haldol+increasing month BENDER dose vs switching to 2GA/BENDER. -Initiate risperdal 1 mg BID PO with plan to transition to invega sustenna bender.-Titrate risperdal to 2mg BID today- Continue-DC PO risperdal. Pt received invega sustenna 234 mg on 11/25/24, second dose scheduled in 2 days  - Patient remains agreeable to BENDER but expressed that he would feel more comfortable taking the injection is a few days as he received a Haldol D injection on the first of the month.       - resume depakote at 500 mg PO BID, will check level for toxicity 11/23/24- Level 71, continue current dose  - resume remeron 15 mg PO qhs  - pt counseled  - follow up with outpatient mental health providers after discharge for medication management and psychotherapy        Unspecified anxiety disorder  - start hydroxyzine 50 mg PO q 6 hours PRN  - pt counseled  - follow up with outpatient mental health providers after discharge for medication management and psychotherapy        Cannabis abuse  - consider rehab  - consider gabapentin  - pt counseled  - follow up with outpatient mental health providers after discharge for medication management and psychotherapy        Psychosocial stressors  - pt  counseled  - SW consulted for assistance with additional resources      Nicotine dependence  - start nicotine patch 14 mg PO qd         Hypokalemia  - FM consulted        Anemia  - FM consulted          Discussed diagnosis, risks and benefits of proposed treatment vs alternative treatments vs no treatment, potential side effects of these treatments and the inherent unpredictability of treatment. The patient expresses understanding of the above and displays the capacity to agree with this treatment given said understanding. Patient also agrees that, currently, the benefits outweigh the risks and would like to pursue/continue treatment at this time.    Any medications being used off-label were discussed with the patient inclusive of the evidence base for the use of the medications and consent was obtained for the off-label use of the medication.       DISCHARGE PLANNING  Expected Disposition Plan: TBD (rehab if willing)    NEED FOR CONTINUED HOSPITALIZATION  Psychiatric illness continues to pose a potential threat to life or bodily function, of self or others, thereby requiring the need for continued inpatient psychiatric hospitalization: Yes, due to: significant psychotic thought disorder, as evidenced by:  Ongoing concerns with perceptual aberrancy and paranoid persecutory delusions leading to potential harm of self or others.    Protective inpatient pyschiatric hospitalization required while a safe disposition plan is enacted: Yes    Patient stabilized and ready for discharge from inpatient psychiatric unit: No        STAFF:   Frantz Cagle MD  Psychiatry

## 2024-11-29 NOTE — NURSING
Pt is calm and cooperative, compliant with meds. Pt eats all meals in the dining area. He interacts well with peers. Will continue to monitor for safety.

## 2024-11-30 PROCEDURE — 99233 SBSQ HOSP IP/OBS HIGH 50: CPT | Mod: AF,HB,, | Performed by: STUDENT IN AN ORGANIZED HEALTH CARE EDUCATION/TRAINING PROGRAM

## 2024-11-30 PROCEDURE — 25000003 PHARM REV CODE 250: Performed by: PSYCHIATRY & NEUROLOGY

## 2024-11-30 PROCEDURE — 11400000 HC PSYCH PRIVATE ROOM

## 2024-11-30 PROCEDURE — 25000003 PHARM REV CODE 250: Performed by: STUDENT IN AN ORGANIZED HEALTH CARE EDUCATION/TRAINING PROGRAM

## 2024-11-30 PROCEDURE — S4991 NICOTINE PATCH NONLEGEND: HCPCS | Performed by: PSYCHIATRY & NEUROLOGY

## 2024-11-30 PROCEDURE — 63600175 PHARM REV CODE 636 W HCPCS: Mod: JZ | Performed by: PSYCHIATRY & NEUROLOGY

## 2024-11-30 RX ADMIN — MIRTAZAPINE 15 MG: 15 TABLET, FILM COATED ORAL at 08:11

## 2024-11-30 RX ADMIN — NICOTINE 1 PATCH: 14 PATCH, EXTENDED RELEASE TRANSDERMAL at 08:11

## 2024-11-30 RX ADMIN — DIVALPROEX SODIUM 500 MG: 500 TABLET, FILM COATED, EXTENDED RELEASE ORAL at 08:11

## 2024-11-30 RX ADMIN — PALIPERIDONE PALMITATE 156 MG: 156 INJECTION INTRAMUSCULAR at 09:11

## 2024-11-30 RX ADMIN — NICOTINE 1 PATCH: 14 PATCH, EXTENDED RELEASE TRANSDERMAL at 09:11

## 2024-11-30 NOTE — PLAN OF CARE
Problem: Adult Behavioral Health Plan of Care  Goal: Plan of Care Review  11/29/2024 2055 by Eliz Dorado LPN  Outcome: Progressing  11/29/2024 2054 by Eliz Dorado LPN  Outcome: Progressing     Problem: Adult Behavioral Health Plan of Care  Goal: Patient-Specific Goal (Individualization)  Outcome: Progressing     Problem: Adult Behavioral Health Plan of Care  Goal: Adheres to Safety Considerations for Self and Others  Outcome: Progressing     Problem: Adult Behavioral Health Plan of Care  Goal: Optimized Coping Skills in Response to Life Stressors  Outcome: Progressing     Problem: Excessive Substance Use  Goal: Optimized Energy Level (Excessive Substance Use)  Outcome: Progressing     Problem: Excessive Substance Use  Goal: Improved Behavioral Control (Excessive Substance Use)  Outcome: Progressing     Problem: Excessive Substance Use  Goal: Enhanced Social, Occupational or Functional Skills (Excessive Substance Use)  Outcome: Progressing     Problem: Suicide Risk  Goal: Absence of Self-Harm  Outcome: Progressing     Problem: Psychotic Signs/Symptoms  Goal: Improved Behavioral Control (Psychotic Signs/Symptoms)  Outcome: Progressing

## 2024-11-30 NOTE — PROGRESS NOTES
"PSYCHIATRY DAILY INPATIENT PROGRESS NOTE  SUBSEQUENT HOSPITAL VISIT    ENCOUNTER DATE: 11/30/2024  SITE: 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 The chart was reviewed.     Reviewed notes from Rns and labs from the last 24 hours.    The patient's case was discussed with the treatment team/care providers today including Rns    Staff reports no behavioral or management issues.     The patient has been compliant with treatment.      Subjective 11/30/2024    Pt today states, "I am singing to myself to soothe myself."    Manic behavior noted by staff at times, RIS observed by staff at times.    Pt would like to go to rehab for substance abuse.    He denies any ASE.      Psychiatric ROS (observed, reported, or endorsed/denied):  Depressed mood - No  Interest/pleasure/anhedonia: No  Guilt/hopelessness/worthlessness - No  Changes in Sleep - No  Changes in Appetite - No  Changes in Concentration - No  Changes in Energy - No  PMA/R- No  Suicidal- active/passive ideations - No  Homicidal ideations: active/passive ideations - No    Hallucinations - less  Delusions - less  Disorganized behavior - less  Disorganized speech - Yes  Negative symptoms - No    Elevated mood - fluctuating  Decreased need for sleep - No  Grandiosity - No  Racing thoughts - No  Impulsivity - No  Irritability- No  Increased energy - Yes  Distractibility - Yes  Increase in goal-directed activity or PMA- No    Symptoms of KANU - No  Symptoms of Panic Disorder- No  Symptoms of PTSD - No        Per RN:    Pt. Was noticed to be withdrawn while walking unit with constant laughing and talking to himself.          Overall progress: Patient is showing mild improvement                 Medical ROS  Review of " Systems   Constitutional: Negative.    HENT: Negative.     Eyes: Negative.    Respiratory: Negative.     Cardiovascular: Negative.    Gastrointestinal: Negative.    Genitourinary: Negative.    Musculoskeletal: Negative.    Skin: Negative.    Neurological: Negative.    Endo/Heme/Allergies: Negative.    Psychiatric/Behavioral:  Positive for hallucinations and substance abuse.    All other systems reviewed and are negative.        PAST MEDICAL HISTORY   Past Medical History:   Diagnosis Date    History of psychiatric hospitalization 09/06/2023    [facility] of psychiatric care     Psychiatric problem     Schizophrenia     Suicide attempt            PSYCHOTROPIC MEDICATIONS   Scheduled Meds:   divalproex ER  500 mg Oral BID    mirtazapine  15 mg Oral QHS    nicotine  1 patch Transdermal Daily     Continuous Infusions:  PRN Meds:.  Current Facility-Administered Medications:     acetaminophen, 650 mg, Oral, Q6H PRN    aluminum-magnesium hydroxide-simethicone, 30 mL, Oral, Q6H PRN    benzonatate, 100 mg, Oral, TID PRN    benztropine mesylate, 2 mg, Intramuscular, Q8H PRN    hydrOXYzine pamoate, 50 mg, Oral, Q6H PRN    loperamide, 2 mg, Oral, PRN    OLANZapine, 10 mg, Oral, Q8H PRN **AND** OLANZapine, 10 mg, Intramuscular, Q8H PRN    ondansetron, 4 mg, Oral, Q8H PRN    promethazine, 25 mg, Oral, Q6H PRN        EXAMINATION    VITALS   Vitals:    11/28/24 1921 11/29/24 0746 11/29/24 1919 11/30/24 0750   BP: (!) 140/62 (!) 121/58 121/70 125/64   BP Location: Left arm Left arm Left arm Left arm   Patient Position: Sitting Sitting Sitting Sitting   Pulse: 72 65 85 67   Resp: 20 20 20 18   Temp: 98.1 °F (36.7 °C) 98 °F (36.7 °C) 98.7 °F (37.1 °C) 98.1 °F (36.7 °C)   TempSrc: Oral Oral Oral Oral   SpO2: 98% 100% 99% 100%   Weight:       Height:           Body mass index is 20 kg/m².        CONSTITUTIONAL  General Appearance: unremarkable, age appropriate    MUSCULOSKELETAL  Muscle Strength and Tone:no tremor, no  "tic  Abnormal Involuntary Movements: No  Gait and Station: non-ataxic    PSYCHIATRIC   Level of Consciousness: awake and alert   Orientation: place, situation, month of year, and year  Grooming: Disheveled and Hospital garb  Psychomotor Behavior: cooperative, friendly and cooperative  Speech: normal r/v/t  Language: able to name  Mood: "wavy"  Affect: Appropriate  Thought Process: mostly linear  Associations: intact    Thought Content: less delusions, denies SI, and denies HI  Perceptions: less AH and less VH,   Memory: Improving  Attention:iimproving  Fund of Knowledge: improving  Estimate if Intelligence:  Average based on work/education history, vocabulary and mental status exam  Insight:  improving  Judgment: improving        DIAGNOSTIC TESTING   Laboratory Results  No results found for this or any previous visit (from the past 24 hours).              MEDICAL DECISION MAKING      Schizoaffective disorder, bipolar type, severe  Unspecified anxiety disorder  Cannabis abuse  Psychosocial stressors  Nicotine dependence  Hypokalemia     Anemia        PROBLEM LIST AND MANAGEMENT PLANS           Schizoaffective disorder, bipolar type, severe  - pt reports received haldol BENDER 2 weeks ago with Mesfin Ponce, will attempt to verify-Per ACT Team, patient received Haldol D 100 mg IM on Nov 1 2024. Hold additional antipsychotic for now current psychosis suspected to be substance-induced. Consider supplementation with additional PO haldol+increasing month BENDER dose vs switching to 2GA/BENDER. -Initiate risperdal 1 mg BID PO with plan to transition to invega sustenna bender.-Titrate risperdal to 2mg BID today- Continue-DC PO risperdal. Pt received invega sustenna 234 mg on 11/25/24,  -received invega sustenna 156 mg IM given 11/30/24 today      - resume depakote at 500 mg PO BID, will check level for toxicity 11/23/24- Level 71, continue current dose  - resume remeron 15 mg PO qhs  - pt counseled  - follow up with outpatient mental " health providers after discharge for medication management and psychotherapy        Unspecified anxiety disorder  - start hydroxyzine 50 mg PO q 6 hours PRN  - pt counseled  - follow up with outpatient mental health providers after discharge for medication management and psychotherapy        Cannabis abuse  - consider rehab  - consider gabapentin  - pt counseled  - follow up with outpatient mental health providers after discharge for medication management and psychotherapy        Psychosocial stressors  - pt counseled  - SW consulted for assistance with additional resources      Nicotine dependence  - start nicotine patch 14 mg PO qd         Hypokalemia  - FM consulted        Anemia  - FM consulted          Discussed diagnosis, risks and benefits of proposed treatment vs alternative treatments vs no treatment, potential side effects of these treatments and the inherent unpredictability of treatment. The patient expresses understanding of the above and displays the capacity to agree with this treatment given said understanding. Patient also agrees that, currently, the benefits outweigh the risks and would like to pursue/continue treatment at this time.    Any medications being used off-label were discussed with the patient inclusive of the evidence base for the use of the medications and consent was obtained for the off-label use of the medication.       DISCHARGE PLANNING  Expected Disposition Plan: TBD (rehab if willing)    NEED FOR CONTINUED HOSPITALIZATION  Psychiatric illness continues to pose a potential threat to life or bodily function, of self or others, thereby requiring the need for continued inpatient psychiatric hospitalization: Yes, due to: significant psychotic thought disorder, as evidenced by:  Ongoing concerns with perceptual aberrancy and paranoid persecutory delusions leading to potential harm of self or others.    Protective inpatient pyschiatric hospitalization required while a safe disposition  plan is enacted: Yes    Patient stabilized and ready for discharge from inpatient psychiatric unit: No      The patient location is: [facility]    Visit type: audiovisual    Face to Face time with patient: 10  15 minutes of total time spent on the encounter, which includes face to face time and non-face to face time preparing to see the patient (eg, review of tests), Obtaining and/or reviewing separately obtained history, Documenting clinical information in the electronic or other health record, Independently interpreting results (not separately reported) and communicating results to the patient/family/caregiver, or Care coordination (not separately reported).     Each patient to whom he or she provides medical services by telemedicine is:  (1) informed of the relationship between the physician and patient and the respective role of any other health care provider with respect to management of the patient; and (2) notified that he or she may decline to receive medical services by telemedicine and may withdraw from such care at any time.        STAFF:   Cristhian Grijalva III, MD  Psychiatry

## 2024-11-30 NOTE — PLAN OF CARE
Problem: Adult Behavioral Health Plan of Care  Goal: Plan of Care Review  Outcome: Progressing  Goal: Patient-Specific Goal (Individualization)  Outcome: Progressing  Goal: Adheres to Safety Considerations for Self and Others  Outcome: Progressing  Goal: Develops/Participates in Therapeutic Mabel to Support Successful Transition  Outcome: Progressing     Problem: Adult Behavioral Health Plan of Care  Goal: Optimized Coping Skills in Response to Life Stressors  Outcome: Progressing     Problem: Excessive Substance Use  Goal: Optimized Energy Level (Excessive Substance Use)  Outcome: Progressing  Goal: Improved Behavioral Control (Excessive Substance Use)  Outcome: Progressing  Goal: Increased Participation and Engagement (Excessive Substance Use)  Outcome: Progressing  Goal: Improved Physiologic Symptoms (Excessive Substance Use)  Outcome: Progressing  Goal: Enhanced Social, Occupational or Functional Skills (Excessive Substance Use)  Outcome: Progressing     Problem: Suicide Risk  Goal: Absence of Self-Harm  Outcome: Progressing     Problem: Psychotic Signs/Symptoms  Goal: Improved Behavioral Control (Psychotic Signs/Symptoms)  Outcome: Progressing  Goal: Optimal Cognitive Function (Psychotic Signs/Symptoms)  Outcome: Progressing  Goal: Increased Participation and Engagement (Psychotic Signs/Symptoms)  Outcome: Progressing  Goal: Improved Mood Symptoms (Psychotic Signs/Symptoms)  Outcome: Progressing  Goal: Improved Psychomotor Symptoms (Psychotic Signs/Symptoms)  Outcome: Progressing  Goal: Decreased Sensory Symptoms (Psychotic Signs/Symptoms)  Outcome: Progressing  Goal: Improved Sleep (Psychotic Signs/Symptoms)  Outcome: Progressing  Goal: Enhanced Social, Occupational or Functional Skills (Psychotic Signs/Symptoms)  Outcome: Progressing

## 2024-11-30 NOTE — NURSING
Pt. Was noticed to be withdrawn while walking unit with constant laughing and talking to himself. He denied any c/o suicidal or homicidal ideations. He did shower tonight.  Pt. Took his night medications without diff. He ate his snacks and went to bed. Encouraged Pt. To verbalize his feelings. Will cont. To monitor Pt.

## 2024-11-30 NOTE — NURSING
Pt is calm and cooperative on unit. He is med complaint. Pt interacts with peers and staff. Pt eats all meals and snacks in the dining area. Will continue to monitor for safety.

## 2024-12-01 PROCEDURE — 25000003 PHARM REV CODE 250: Performed by: STUDENT IN AN ORGANIZED HEALTH CARE EDUCATION/TRAINING PROGRAM

## 2024-12-01 PROCEDURE — 25000003 PHARM REV CODE 250: Performed by: NURSE PRACTITIONER

## 2024-12-01 PROCEDURE — 99232 SBSQ HOSP IP/OBS MODERATE 35: CPT | Mod: AF,HB,, | Performed by: STUDENT IN AN ORGANIZED HEALTH CARE EDUCATION/TRAINING PROGRAM

## 2024-12-01 PROCEDURE — 25000003 PHARM REV CODE 250: Performed by: PSYCHIATRY & NEUROLOGY

## 2024-12-01 PROCEDURE — 11400000 HC PSYCH PRIVATE ROOM

## 2024-12-01 PROCEDURE — S4991 NICOTINE PATCH NONLEGEND: HCPCS | Performed by: PSYCHIATRY & NEUROLOGY

## 2024-12-01 RX ADMIN — HYDROXYZINE PAMOATE 50 MG: 50 CAPSULE ORAL at 08:12

## 2024-12-01 RX ADMIN — NICOTINE 1 PATCH: 14 PATCH, EXTENDED RELEASE TRANSDERMAL at 08:12

## 2024-12-01 RX ADMIN — DIVALPROEX SODIUM 500 MG: 500 TABLET, FILM COATED, EXTENDED RELEASE ORAL at 08:12

## 2024-12-01 RX ADMIN — MIRTAZAPINE 15 MG: 15 TABLET, FILM COATED ORAL at 08:12

## 2024-12-01 NOTE — PLAN OF CARE
Problem: Adult Behavioral Health Plan of Care  Goal: Plan of Care Review  Outcome: Progressing     Problem: Adult Behavioral Health Plan of Care  Goal: Patient-Specific Goal (Individualization)  Outcome: Progressing     Problem: Adult Behavioral Health Plan of Care  Goal: Adheres to Safety Considerations for Self and Others  Outcome: Progressing     Problem: Adult Behavioral Health Plan of Care  Goal: Optimized Coping Skills in Response to Life Stressors  Outcome: Progressing     Problem: Excessive Substance Use  Goal: Optimized Energy Level (Excessive Substance Use)  Outcome: Progressing     Problem: Excessive Substance Use  Goal: Improved Behavioral Control (Excessive Substance Use)  Outcome: Progressing     Problem: Excessive Substance Use  Goal: Increased Participation and Engagement (Excessive Substance Use)  Outcome: Progressing     Problem: Suicide Risk  Goal: Absence of Self-Harm  Outcome: Progressing     Problem: Psychotic Signs/Symptoms  Goal: Improved Behavioral Control (Psychotic Signs/Symptoms)  Outcome: Progressing

## 2024-12-01 NOTE — NURSING
"Patient took shower this evening, interacting with staff and peers with no negative behaviors noted. Patient continues to be cooperative however patient was trying to approach altercation between two peers and required staff redirection for safety. Patient was easily redirected. When patient asked what he was doing patient stated " I was trying to save my son". Patient continued with RIS, denies S/HI. Patient is still positive with going to inpatient rehab at discharge. Patient accepted HS snack and compliant with HS medication. Patient voices no complaints. Close observations continued and safe environment maintained.  "

## 2024-12-01 NOTE — PROGRESS NOTES
"PSYCHIATRY DAILY INPATIENT PROGRESS NOTE  SUBSEQUENT HOSPITAL VISIT    ENCOUNTER DATE: 12/1/2024  SITE: 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 The chart was reviewed.     Reviewed notes from LPN and labs from the last 24 hours.    The patient's case was discussed with the treatment team/care providers today including Rns    Staff reports no behavioral or management issues.     The patient has been compliant with treatment.      Subjective 12/01/2024    Pt reports AH, "conversations of what people are talking about instead of spirits, not meta-physical, like I know not to call my mom to bring my clothes for rehab because she said she will bring it when it's time, it's spiritual, meaning pendulums, mediums."    Pt would like to go to rehab for substance abuse.    He denies any ASE.      Per LPN:  Patient took shower this evening, interacting with staff and peers with no negative behaviors noted. Patient continues to be cooperative however patient was trying to approach altercation between two peers and required staff redirection for safety. Patient was easily redirected. When patient asked what he was doing patient stated " I was trying to save my son". Patient continued with RIS,         Psychiatric ROS (observed, reported, or endorsed/denied):  Depressed mood - No  Interest/pleasure/anhedonia: No  Guilt/hopelessness/worthlessness - No  Changes in Sleep - No  Changes in Appetite - No  Changes in Concentration - No  Changes in Energy - No  PMA/R- No  Suicidal- active/passive ideations - No  Homicidal ideations: active/passive ideations - No    Hallucinations - less  Delusions - less  Disorganized behavior - less  Disorganized speech - Yes  Negative symptoms - " No    Elevated mood - fluctuating  Decreased need for sleep - No  Grandiosity - No  Racing thoughts - No  Impulsivity - No  Irritability- No  Increased energy - Yes  Distractibility - Yes  Increase in goal-directed activity or PMA- No    Symptoms of KANU - No  Symptoms of Panic Disorder- No  Symptoms of PTSD - No          Overall progress: Patient is showing mild improvement                 Medical ROS  Review of Systems   Constitutional: Negative.    HENT: Negative.     Eyes: Negative.    Respiratory: Negative.     Cardiovascular: Negative.    Gastrointestinal: Negative.    Genitourinary: Negative.    Musculoskeletal: Negative.    Skin: Negative.    Neurological: Negative.    Endo/Heme/Allergies: Negative.    Psychiatric/Behavioral:  Positive for hallucinations and substance abuse.    All other systems reviewed and are negative.        PAST MEDICAL HISTORY   Past Medical History:   Diagnosis Date    History of psychiatric hospitalization 09/06/2023    [facility]     of psychiatric care     Psychiatric problem     Schizophrenia     Suicide attempt            PSYCHOTROPIC MEDICATIONS   Scheduled Meds:   divalproex ER  500 mg Oral BID    mirtazapine  15 mg Oral QHS    nicotine  1 patch Transdermal Daily     Continuous Infusions:  PRN Meds:.  Current Facility-Administered Medications:     acetaminophen, 650 mg, Oral, Q6H PRN    aluminum-magnesium hydroxide-simethicone, 30 mL, Oral, Q6H PRN    benzonatate, 100 mg, Oral, TID PRN    benztropine mesylate, 2 mg, Intramuscular, Q8H PRN    hydrOXYzine pamoate, 50 mg, Oral, Q6H PRN    loperamide, 2 mg, Oral, PRN    OLANZapine, 10 mg, Oral, Q8H PRN **AND** OLANZapine, 10 mg, Intramuscular, Q8H PRN    ondansetron, 4 mg, Oral, Q8H PRN    promethazine, 25 mg, Oral, Q6H PRN        EXAMINATION    VITALS   Vitals:    11/29/24 1919 11/30/24 0750 11/30/24 1912 12/01/24 0806   BP: 121/70 125/64 115/66 122/72   BP Location: Left arm Left arm Left arm Left arm   Patient Position:  "Sitting Sitting Sitting Sitting   Pulse: 85 67 95 70   Resp: 20 18 16 18   Temp: 98.7 °F (37.1 °C) 98.1 °F (36.7 °C) 98.7 °F (37.1 °C) 98.5 °F (36.9 °C)   TempSrc: Oral Oral Oral Oral   SpO2: 99% 100% 98% 100%   Weight:       Height:           Body mass index is 20 kg/m².        CONSTITUTIONAL  General Appearance: unremarkable, age appropriate    MUSCULOSKELETAL  Muscle Strength and Tone:no tremor, no tic  Abnormal Involuntary Movements: No  Gait and Station: non-ataxic    PSYCHIATRIC   Level of Consciousness: awake and alert   Orientation: place, situation, month of year, and year  Grooming: Disheveled and Hospital garb  Psychomotor Behavior: cooperative, friendly and cooperative  Speech: normal r/v/t  Language: able to name  Mood: "good"  Affect: Appropriate  Thought Process: mostly linear  Associations: intact    Thought Content: less delusions, denies SI, and denies HI  Perceptions: less AH and less VH,   Memory: Improving  Attention:iimproving  Fund of Knowledge: improving  Estimate if Intelligence:  Average based on work/education history, vocabulary and mental status exam  Insight:  improving  Judgment: improving        DIAGNOSTIC TESTING   Laboratory Results  No results found for this or any previous visit (from the past 24 hours).              MEDICAL DECISION MAKING      Schizoaffective disorder, bipolar type, severe  Unspecified anxiety disorder  Cannabis abuse  Psychosocial stressors  Nicotine dependence  Hypokalemia     Anemia        PROBLEM LIST AND MANAGEMENT PLANS           Schizoaffective disorder, bipolar type, severe  - pt reports received haldol CARRASCO 2 weeks ago with Mesfin Ponce, will attempt to verify-Per ACT Team, patient received Haldol D 100 mg IM on Nov 1 2024. Hold additional antipsychotic for now current psychosis suspected to be substance-induced. Consider supplementation with additional PO haldol+increasing month CARRASCO dose vs switching to 2GA/CARRASCO. -Initiate risperdal 1 mg BID PO with plan " to transition to invega sustenna bender.-Titrate risperdal to 2mg BID today- Continue -DC PO risperdal. Pt received invega sustenna 234 mg on 11/25/24,  -received invega sustenna 156 mg IM given 11/30/24 today  - resume depakote at 500 mg PO BID, will check level for toxicity 11/23/24- Level 71, continue current dose  - resume remeron 15 mg PO qhs  - pt counseled  - follow up with outpatient mental health providers after discharge for medication management and psychotherapy        Unspecified anxiety disorder  - start hydroxyzine 50 mg PO q 6 hours PRN  - pt counseled  - follow up with outpatient mental health providers after discharge for medication management and psychotherapy        Cannabis abuse  - consider rehab  - consider gabapentin  - pt counseled  - follow up with outpatient mental health providers after discharge for medication management and psychotherapy        Psychosocial stressors  - pt counseled  - SW consulted for assistance with additional resources      Nicotine dependence  - start nicotine patch 14 mg PO qd         Hypokalemia  - FM consulted        Anemia  - FM consulted          Discussed diagnosis, risks and benefits of proposed treatment vs alternative treatments vs no treatment, potential side effects of these treatments and the inherent unpredictability of treatment. The patient expresses understanding of the above and displays the capacity to agree with this treatment given said understanding. Patient also agrees that, currently, the benefits outweigh the risks and would like to pursue/continue treatment at this time.    Any medications being used off-label were discussed with the patient inclusive of the evidence base for the use of the medications and consent was obtained for the off-label use of the medication.       DISCHARGE PLANNING  Expected Disposition Plan: TBD (rehab if willing)    NEED FOR CONTINUED HOSPITALIZATION  Psychiatric illness continues to pose a potential threat to life  or bodily function, of self or others, thereby requiring the need for continued inpatient psychiatric hospitalization: Yes, due to: significant psychotic thought disorder, as evidenced by:  Ongoing concerns with perceptual aberrancy and paranoid persecutory delusions leading to potential harm of self or others.    Protective inpatient pyschiatric hospitalization required while a safe disposition plan is enacted: Yes    Patient stabilized and ready for discharge from inpatient psychiatric unit: No      The patient location is: [facility]    Visit type: audiovisual    Face to Face time with patient: 5  10 minutes of total time spent on the encounter, which includes face to face time and non-face to face time preparing to see the patient (eg, review of tests), Obtaining and/or reviewing separately obtained history, Documenting clinical information in the electronic or other health record, Independently interpreting results (not separately reported) and communicating results to the patient/family/caregiver, or Care coordination (not separately reported).     Each patient to whom he or she provides medical services by telemedicine is:  (1) informed of the relationship between the physician and patient and the respective role of any other health care provider with respect to management of the patient; and (2) notified that he or she may decline to receive medical services by telemedicine and may withdraw from such care at any time.        STAFF:   Cristhian Grijalva III, MD  Psychiatry

## 2024-12-01 NOTE — NURSING
Pt resting in room this shift easily aroused, out of room for meals. Pt took shower this am. Pt is calm and cooperative, med compliant. Pt interacts with peers and staff. Pt is pleasant with no negative behaviors noted. Will continue to monitor for safety.

## 2024-12-01 NOTE — PLAN OF CARE
Problem: Adult Behavioral Health Plan of Care  Goal: Plan of Care Review  Outcome: Progressing  Goal: Patient-Specific Goal (Individualization)  Outcome: Progressing  Goal: Adheres to Safety Considerations for Self and Others  Outcome: Progressing  Goal: Develops/Participates in Therapeutic [place] to Support Successful Transition  Outcome: Progressing     Problem: Adult Behavioral Health Plan of Care  Goal: Optimized Coping Skills in Response to Life Stressors  Outcome: Progressing     Problem: Excessive Substance Use  Goal: Optimized Energy Level (Excessive Substance Use)  Outcome: Progressing  Goal: Improved Behavioral Control (Excessive Substance Use)  Outcome: Progressing  Goal: Increased Participation and Engagement (Excessive Substance Use)  Outcome: Progressing  Goal: Improved Physiologic Symptoms (Excessive Substance Use)  Outcome: Progressing  Goal: Enhanced Social, Occupational or Functional Skills (Excessive Substance Use)  Outcome: Progressing     Problem: Suicide Risk  Goal: Absence of Self-Harm  Outcome: Progressing     Problem: Psychotic Signs/Symptoms  Goal: Improved Behavioral Control (Psychotic Signs/Symptoms)  Outcome: Progressing  Goal: Optimal Cognitive Function (Psychotic Signs/Symptoms)  Outcome: Progressing  Goal: Increased Participation and Engagement (Psychotic Signs/Symptoms)  Outcome: Progressing  Goal: Improved Mood Symptoms (Psychotic Signs/Symptoms)  Outcome: Progressing  Goal: Improved Psychomotor Symptoms (Psychotic Signs/Symptoms)  Outcome: Progressing  Goal: Decreased Sensory Symptoms (Psychotic Signs/Symptoms)  Outcome: Progressing  Goal: Improved Sleep (Psychotic Signs/Symptoms)  Outcome: Progressing  Goal: Enhanced Social, Occupational or Functional Skills (Psychotic Signs/Symptoms)  Outcome: Progressing

## 2024-12-02 PROCEDURE — 11400000 HC PSYCH PRIVATE ROOM

## 2024-12-02 PROCEDURE — 99232 SBSQ HOSP IP/OBS MODERATE 35: CPT | Mod: SA,HB,, | Performed by: PSYCHIATRY & NEUROLOGY

## 2024-12-02 PROCEDURE — S4991 NICOTINE PATCH NONLEGEND: HCPCS | Performed by: PSYCHIATRY & NEUROLOGY

## 2024-12-02 PROCEDURE — 25000003 PHARM REV CODE 250: Performed by: PSYCHIATRY & NEUROLOGY

## 2024-12-02 PROCEDURE — 25000003 PHARM REV CODE 250: Performed by: NURSE PRACTITIONER

## 2024-12-02 PROCEDURE — 90833 PSYTX W PT W E/M 30 MIN: CPT | Mod: SA,HB,, | Performed by: PSYCHIATRY & NEUROLOGY

## 2024-12-02 PROCEDURE — 25000003 PHARM REV CODE 250: Performed by: STUDENT IN AN ORGANIZED HEALTH CARE EDUCATION/TRAINING PROGRAM

## 2024-12-02 RX ADMIN — DIVALPROEX SODIUM 500 MG: 500 TABLET, FILM COATED, EXTENDED RELEASE ORAL at 08:12

## 2024-12-02 RX ADMIN — HYDROXYZINE PAMOATE 50 MG: 50 CAPSULE ORAL at 08:12

## 2024-12-02 RX ADMIN — MIRTAZAPINE 15 MG: 15 TABLET, FILM COATED ORAL at 08:12

## 2024-12-02 RX ADMIN — NICOTINE 1 PATCH: 14 PATCH, EXTENDED RELEASE TRANSDERMAL at 08:12

## 2024-12-02 NOTE — PROGRESS NOTES
PSYCHIATRY DAILY INPATIENT PROGRESS NOTE  SUBSEQUENT HOSPITAL VISIT    ENCOUNTER DATE: 12/2/2024  SITE: 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 The chart was reviewed.     Reviewed notes from LPN and labs from the last 24 hours.    The patient's case was discussed with the treatment team/care providers today including Rns    Staff reports no behavioral or management issues.     The patient has been compliant with treatment.      Subjective 12/02/2024    Patient denies all psychiatric symptoms this morning, affect is calm, pleasant. Of note patient observed RIS as recently as yesterday. Patient received second invega sustenna over the weekend which was tolerated well, denies side effects. He is scheduled to participate in phone interview with [facility] this morning for possible placement. Expect pt will discharge to rehab tomorrow.                Psychiatric ROS (observed, reported, or endorsed/denied):  Depressed mood - No  Interest/pleasure/anhedonia: No  Guilt/hopelessness/worthlessness - No  Changes in Sleep - No  Changes in Appetite - No  Changes in Concentration - No  Changes in Energy - No  PMA/R- No  Suicidal- active/passive ideations - No  Homicidal ideations: active/passive ideations - No    Hallucinations - less  Delusions - less  Disorganized behavior - less  Disorganized speech - Yes  Negative symptoms - No    Elevated mood - fluctuating  Decreased need for sleep - No  Grandiosity - No  Racing thoughts - No  Impulsivity - No  Irritability- No  Increased energy - Yes  Distractibility - Yes  Increase in goal-directed activity or PMA- No    Symptoms of KANU - No  Symptoms of Panic Disorder- No  Symptoms of PTSD - No          Overall progress: Patient  is showing mild improvement           Psychotherapy:  Target symptoms: substance abuse, mood disorder, psychosis  Why chosen therapy is appropriate versus another modality: relevant to diagnosis, evidence based practice  Outcome monitoring methods: self-report, observation  Therapeutic intervention type: insight oriented psychotherapy, supportive psychotherapy  Topics discussed/themes: building skills sets for symptom management, substance abuse  The patient's response to the intervention is accepting. The patient's progress toward treatment goals is fair.   Duration of intervention: 16 minutes.        Medical ROS  Review of Systems   Constitutional: Negative.    HENT: Negative.     Eyes: Negative.    Respiratory: Negative.     Cardiovascular: Negative.    Gastrointestinal: Negative.    Genitourinary: Negative.    Musculoskeletal: Negative.    Skin: Negative.    Neurological: Negative.    Endo/Heme/Allergies: Negative.    Psychiatric/Behavioral:  Positive for hallucinations and substance abuse.    All other systems reviewed and are negative.        PAST MEDICAL HISTORY   Past Medical History:   Diagnosis Date    [facility] psychiatric [facility]     Psychiatric problem     Schizophrenia     Suicide attempt            PSYCHOTROPIC MEDICATIONS   Scheduled Meds:   divalproex ER  500 mg Oral BID    mirtazapine  15 mg Oral QHS    nicotine  1 patch Transdermal Daily     Continuous Infusions:  PRN Meds:.  Current Facility-Administered Medications:     acetaminophen, 650 mg, Oral, Q6H PRN    aluminum-magnesium hydroxide-simethicone, 30 mL, Oral, Q6H PRN    benzonatate, 100 mg, Oral, TID PRN    benztropine mesylate, 2 mg, Intramuscular, Q8H PRN    hydrOXYzine pamoate, 50 mg, Oral, Q6H PRN    loperamide, 2 mg, Oral, PRN    OLANZapine, 10 mg, Oral, Q8H PRN **AND** OLANZapine, 10 mg, Intramuscular, Q8H PRN    ondansetron, 4 mg, Oral, Q8H PRN    promethazine, 25 mg, Oral,  "Q6H PRN        EXAMINATION    VITALS   Vitals:    11/30/24 1912 12/01/24 0806 12/01/24 1916 12/02/24 0733   BP: 115/66 122/72 115/70 (!) 108/58   BP Location: Left arm Left arm Left arm Left arm   Patient Position: Sitting Sitting Sitting Sitting   Pulse: 95 70 108 67   Resp: 16 18 16 20   Temp: 98.7 °F (37.1 °C) 98.5 °F (36.9 °C) 98.7 °F (37.1 °C) 98.5 °F (36.9 °C)   TempSrc: Oral Oral Oral Oral   SpO2: 98% 100% 97% 100%   Weight:       Height:           Body mass index is 20 kg/m².        CONSTITUTIONAL  General Appearance: unremarkable, age appropriate    MUSCULOSKELETAL  Muscle Strength and Tone:no tremor, no tic  Abnormal Involuntary Movements: No  Gait and Station: non-ataxic    PSYCHIATRIC   Level of Consciousness: awake and alert   Orientation: place, situation, month of year, and year  Grooming: Disheveled and Hospital garb  Psychomotor Behavior: cooperative, friendly and cooperative  Speech: normal r/v/t  Language: able to name  Mood: "good"  Affect: Appropriate  Thought Process: mostly linear  Associations: intact    Thought Content: less delusions, denies SI, and denies HI  Perceptions: less AH and less VH,   Memory: Improving  Attention:iimproving  Fund of Knowledge: improving  Estimate if Intelligence:  Average based on work/education history, vocabulary and mental status exam  Insight:  improving  Judgment: improving        DIAGNOSTIC TESTING   Laboratory Results  No results found for this or any previous visit (from the past 24 hours).              MEDICAL DECISION MAKING      Schizoaffective disorder, bipolar type, severe  Unspecified anxiety disorder  Cannabis abuse  Psychosocial stressors  Nicotine dependence  Hypokalemia     Anemia        PROBLEM LIST AND MANAGEMENT PLANS           Schizoaffective disorder, bipolar type, severe  - pt reports received haldol CARRASCO 2 weeks ago with Mesfin Ponce, will attempt to verify-Per ACT Team, patient received Haldol D 100 mg IM on Nov 1 2024. Hold additional " antipsychotic for now current psychosis suspected to be substance-induced. Consider supplementation with additional PO haldol+increasing month BENDER dose vs switching to 2GA/BENDER. -Initiate risperdal 1 mg BID PO with plan to transition to invega sustenna bender.-Titrate risperdal to 2mg BID today- Continue -DC PO risperdal. Pt received invega sustenna 234 mg on 11/25/24,  -received invega sustenna 156 mg IM given 11/30/24 today  - resume depakote at 500 mg PO BID, will check level for toxicity 11/23/24- Level 71, continue current dose  - resume remeron 15 mg PO qhs  - pt counseled  - follow up with outpatient mental health providers after discharge for medication management and psychotherapy        Unspecified anxiety disorder  - start hydroxyzine 50 mg PO q 6 hours PRN  - pt counseled  - follow up with outpatient mental health providers after discharge for medication management and psychotherapy        Cannabis abuse  - consider rehab  - consider gabapentin  - pt counseled  - follow up with outpatient mental health providers after discharge for medication management and psychotherapy        Psychosocial stressors  - pt counseled  - SW consulted for assistance with additional resources      Nicotine dependence  - start nicotine patch 14 mg PO qd         Hypokalemia  - FM consulted        Anemia  - FM consulted          Discussed diagnosis, risks and benefits of proposed treatment vs alternative treatments vs no treatment, potential side effects of these treatments and the inherent unpredictability of treatment. The patient expresses understanding of the above and displays the capacity to agree with this treatment given said understanding. Patient also agrees that, currently, the benefits outweigh the risks and would like to pursue/continue treatment at this time.    Any medications being used off-label were discussed with the patient inclusive of the evidence base for the use of the medications and consent was obtained  for the off-label use of the medication.       DISCHARGE PLANNING  Expected Disposition Plan: TBD (rehab if willing)    NEED FOR CONTINUED HOSPITALIZATION  Psychiatric illness continues to pose a potential threat to life or bodily function, of self or others, thereby requiring the need for continued inpatient psychiatric hospitalization: Yes, due to: significant psychotic thought disorder, as evidenced by:  Ongoing concerns with perceptual aberrancy and paranoid persecutory delusions leading to potential harm of self or others.    Protective inpatient pyschiatric hospitalization required while a safe disposition plan is enacted: Yes    Patient stabilized and ready for discharge from inpatient psychiatric unit: No      T       STAFF:   Jl Aguilar NP  Psychiatry

## 2024-12-02 NOTE — PLAN OF CARE
Pt. Was noticed to be sitting in dining room watching TV. He denied any c/o auditory or visual hallucinations, but cont. To be smiling to himself. Pt. Also denied any c/o suicidal or homicidal ideations. He took his night medications without diff. Will cont. To monitor Pt.

## 2024-12-02 NOTE — NURSING
POC reviewed this shift and is ongoing.  Pt is cooperative with care and complaint with medications.  Pt denies SI/HI/AVH and voices readiness for discharge to rehab tomorrow.

## 2024-12-03 VITALS
HEART RATE: 87 BPM | RESPIRATION RATE: 20 BRPM | SYSTOLIC BLOOD PRESSURE: 126 MMHG | DIASTOLIC BLOOD PRESSURE: 74 MMHG | BODY MASS INDEX: 19.89 KG/M2 | WEIGHT: 160 LBS | HEIGHT: 75 IN | TEMPERATURE: 98 F | OXYGEN SATURATION: 99 %

## 2024-12-03 PROCEDURE — 25000003 PHARM REV CODE 250: Performed by: STUDENT IN AN ORGANIZED HEALTH CARE EDUCATION/TRAINING PROGRAM

## 2024-12-03 PROCEDURE — 99239 HOSP IP/OBS DSCHRG MGMT >30: CPT | Mod: AF,HB,, | Performed by: STUDENT IN AN ORGANIZED HEALTH CARE EDUCATION/TRAINING PROGRAM

## 2024-12-03 PROCEDURE — S4991 NICOTINE PATCH NONLEGEND: HCPCS | Performed by: PSYCHIATRY & NEUROLOGY

## 2024-12-03 PROCEDURE — 25000003 PHARM REV CODE 250: Performed by: PSYCHIATRY & NEUROLOGY

## 2024-12-03 RX ORDER — MIRTAZAPINE 15 MG/1
15 TABLET, FILM COATED ORAL NIGHTLY
Qty: 30 TABLET | Refills: 0 | Status: SHIPPED | OUTPATIENT
Start: 2024-12-03 | End: 2025-12-03

## 2024-12-03 RX ORDER — DIVALPROEX SODIUM 500 MG/1
500 TABLET, FILM COATED, EXTENDED RELEASE ORAL 2 TIMES DAILY
Qty: 60 TABLET | Refills: 0 | Status: SHIPPED | OUTPATIENT
Start: 2024-12-03 | End: 2025-12-03

## 2024-12-03 RX ORDER — IBUPROFEN 200 MG
1 TABLET ORAL DAILY
Qty: 30 PATCH | Refills: 0 | Status: SHIPPED | OUTPATIENT
Start: 2024-12-04

## 2024-12-03 RX ADMIN — NICOTINE 1 PATCH: 14 PATCH, EXTENDED RELEASE TRANSDERMAL at 08:12

## 2024-12-03 RX ADMIN — DIVALPROEX SODIUM 500 MG: 500 TABLET, FILM COATED, EXTENDED RELEASE ORAL at 08:12

## 2024-12-03 NOTE — NURSING
POC reviewed this shift and is ongoing.  Pt is cooperative with care and complaint with medications.  Pt is visible in the milieu but remains isolated to self.  Pt is in a good mood and voices readiness for discharge today.

## 2024-12-03 NOTE — PLAN OF CARE
Patient very happy and smiling on shift.  Positive interaction with his peers.  Cooperative with medication.  Slept through the night.

## 2024-12-03 NOTE — DISCHARGE SUMMARY
"Discharge Summary  Psychiatry    Admit Date: 11/18/2024    Discharge Date and Time:  12/03/2024 12:04 PM    Attending Physician: Frantz Cagle MD     Discharge Provider: Cristhian Grijalva III    Reason for Admission:  CHIEF COMPLAINT   Bishop Soria is a 24 y.o. male with a past psychiatric history of Substance Abuse and schizophrenia  currently admitted to the inpatient unit with the following chief complaint:  psychosis    HPI   The patient was seen and examined. The chart was reviewed.     The patient presented to the ER on 11/18/2024 .     The patient was medically cleared and admitted to the U.        Per ED MD:        Psychiatric Evaluation       Pt to ER states hearing voices since 2009      23 yo male with schizoaffective disorder here via EMS with complaint of AH of voices since 2009. States he is here for food and does not have an emergency. No HI/SI. No acute component.         Psychiatric interview:  Pt states he has been using MJ and not taking his medication. He states "is there a way to block people from reading your thoughts? I like when doctors do it, but not when other people do." Reports he finished rehab and relapsed on MJ soon after leaving, "I tried to stop but it doesn't work." He states, "I found out I'm [place] ... how much money do I need to get blood? Is there any way I can get deer blood?" Pt unable to provide much HPI at this time due to severe psychosis.          Procedures Performed: * No surgery found *    Hospital Course:    Patient was admitted to the inpatient psychiatry unit after being medically cleared in the ED. Chart and labs were reviewed. The patient was stabilized as follows:      Schizoaffective disorder, bipolar type, severe  - pt reports received haldol CARRASCO 2 weeks ago with Mesfin Ponce, will attempt to verify-Per ACT Team, patient received Haldol D 100 mg IM on Nov 1 2024. Hold additional antipsychotic for now current psychosis suspected to be " substance-induced. Consider supplementation with additional PO haldol+increasing month BENDER dose vs switching to 2GA/BENDER. -Initiate risperdal 1 mg BID PO with plan to transition to invega sustenna bender.-Titrate risperdal to 2mg BID today- Continue -DC PO risperdal. Pt received invega sustenna 234 mg on 11/25/24,  -received invega sustenna 156 mg IM given 11/30/24 -next dose due 1 month  - resume depakote at 500 mg PO BID, will check level for toxicity 11/23/24- Level 71, continue current dose  - resume remeron 15 mg PO qhs  - pt counseled  - follow up with outpatient mental health providers after discharge for medication management and psychotherapy        Unspecified anxiety disorder  - start hydroxyzine 50 mg PO q 6 hours PRN  - pt counseled  - follow up with outpatient mental health providers after discharge for medication management and psychotherapy        Cannabis abuse  - consider rehab  - consider gabapentin  - pt counseled  - follow up with outpatient mental health providers after discharge for medication management and psychotherapy        Psychosocial stressors  - pt counseled  - SW consulted for assistance with additional resources      Nicotine dependence  - start nicotine patch 14 mg PO qd         Hypokalemia  - FM consulted        Anemia  - FM consulted        The patient reports improved symptoms as documented. The patient is requesting discharge.The patient is hopeful, future oriented and goal directed. The patient readily discusses both short and long term goals. The patient can identify positive coping skills and social support. AIMS score was 0. During hospitalization, the patient was encouraged to go to both groups and individual counseling. Patient was monitored for any side effects. A meeting was held with multidisciplinary team prior to discharge and pt's diagnosis, current medications, and follow up were discussed. The patient has been compliant with treatment and can adequately attend to  activities of daily living in an independent manner. The patient denies any side effects. The patient denies SI, HI, plan or intent for self harm or harm to others. The patient is no longer a danger to self or others nor gravely disabled disabled. Patient discharged  in stable condition with scheduled outpatient follow up.      Discussed diagnosis, risks and benefits of proposed treatment vs alternative treatments vs no treatment, and potential side effects of these treatments.  The patient expresses understanding of the above and displays the capacity to agree with this treatment given said understanding.  Patient also agrees that, currently, the benefits outweigh the risks and would like to pursue treatment at this time.      Discharge MSE: stated age, casually dressed, well groomed.  No psychomotor agitation or retardation.  No abnormal involuntary movements.  Gait normal.  Speech normal, conversational.  Language fluent English. Mood fine.  Affect normal range, pleasant, euthymic.  Thought process linear.  Associations intact.  Denies suicidal or homicidal ideation.  Denies auditory hallucinations, paranoid ideation, ideas of reference.  Memory intact.  Attention intact.  Fund of knowledge intact.  Insight intact.  Judgment intact.  Alert and oriented to person, place, time.      Tobacco Usage:  Is patient a smoker? Yes  Does patient want prescription for Tobacco Cessation? Yes  Does patient want counseling for Tobacco Cessation? Yes    If patient would like to quit, then over the counter nicotine patch could be used. The patient could also follow up with his PCP or psychiatric provider for other alternatives.     Final Diagnoses:    Principal Problem: Schizoaffective disorder, bipolar type, severe   Secondary Diagnoses:     Unspecified anxiety disorder  Cannabis abuse  Psychosocial stressors  Nicotine dependence  Hypokalemia     Anemia    Labs:  Admission on 11/18/2024   Component Date Value Ref Range Status     WBC 11/18/2024 8.39  3.90 - 12.70 K/uL Final    RBC 11/18/2024 3.80 (L)  4.60 - 6.20 M/uL Final    Hemoglobin 11/18/2024 12.1 (L)  14.0 - 18.0 g/dL Final    Hematocrit 11/18/2024 36.3 (L)  40.0 - 54.0 % Final    MCV 11/18/2024 96  82 - 98 fL Final    MCH 11/18/2024 31.8 (H)  27.0 - 31.0 pg Final    MCHC 11/18/2024 33.3  32.0 - 36.0 g/dL Final    RDW 11/18/2024 13.5  11.5 - 14.5 % Final    Platelets 11/18/2024 252  150 - 450 K/uL Final    MPV 11/18/2024 9.8  9.2 - 12.9 fL Final    Immature Granulocytes 11/18/2024 0.2  0.0 - 0.5 % Final    Gran # (ANC) 11/18/2024 4.4  1.8 - 7.7 K/uL Final    Immature Grans (Abs) 11/18/2024 0.02  0.00 - 0.04 K/uL Final    Lymph # 11/18/2024 3.2  1.0 - 4.8 K/uL Final    Mono # 11/18/2024 0.6  0.3 - 1.0 K/uL Final    Eos # 11/18/2024 0.1  0.0 - 0.5 K/uL Final    Baso # 11/18/2024 0.01  0.00 - 0.20 K/uL Final    nRBC 11/18/2024 0  0 /100 WBC Final    Gran % 11/18/2024 52.6  38.0 - 73.0 % Final    Lymph % 11/18/2024 38.1  18.0 - 48.0 % Final    Mono % 11/18/2024 7.6  4.0 - 15.0 % Final    Eosinophil % 11/18/2024 1.4  0.0 - 8.0 % Final    Basophil % 11/18/2024 0.1  0.0 - 1.9 % Final    Differential Method 11/18/2024 Automated   Final    Sodium 11/18/2024 139  136 - 145 mmol/L Final    Potassium 11/18/2024 3.0 (L)  3.5 - 5.1 mmol/L Final    Chloride 11/18/2024 104  95 - 110 mmol/L Final    CO2 11/18/2024 25  23 - 29 mmol/L Final    Glucose 11/18/2024 111 (H)  70 - 110 mg/dL Final    BUN 11/18/2024 9  6 - 20 mg/dL Final    Creatinine 11/18/2024 1.1  0.5 - 1.4 mg/dL Final    Calcium 11/18/2024 8.7  8.7 - 10.5 mg/dL Final    Total Protein 11/18/2024 8.0  6.0 - 8.4 g/dL Final    Albumin 11/18/2024 4.0  3.5 - 5.2 g/dL Final    Total Bilirubin 11/18/2024 0.4  0.1 - 1.0 mg/dL Final    Alkaline Phosphatase 11/18/2024 88  55 - 135 U/L Final    AST 11/18/2024 23  10 - 40 U/L Final    ALT 11/18/2024 26  10 - 44 U/L Final    eGFR 11/18/2024 >60.0  >60 mL/min/1.73 m^2 Final    Anion Gap 11/18/2024 10  3  - 11 mmol/L Final    TSH 11/18/2024 0.643  0.400 - 4.000 uIU/mL Final    Specimen UA 11/18/2024 Urine, Clean Catch   Final    Color, UA 11/18/2024 Yellow  Yellow, Straw, Ingrid Final    Appearance, UA 11/18/2024 Clear  Clear Final    pH, UA 11/18/2024 6.0  5.0 - 8.0 Final    Specific Gravity, UA 11/18/2024 1.025  1.005 - 1.030 Final    Protein, UA 11/18/2024 1+ (A)  Negative Final    Glucose, UA 11/18/2024 Negative  Negative Final    Ketones, UA 11/18/2024 1+ (A)  Negative Final    Bilirubin (UA) 11/18/2024 Negative  Negative Final    Occult Blood UA 11/18/2024 Negative  Negative Final    Nitrite, UA 11/18/2024 Negative  Negative Final    Urobilinogen, UA 11/18/2024 1.0  <2.0 EU/dL Final    Leukocytes, UA 11/18/2024 Trace (A)  Negative Final    Benzodiazepines 11/18/2024 Negative  Negative Final    Methadone metabolites 11/18/2024 Negative  Negative Final    Cocaine (Metab.) 11/18/2024 Negative  Negative Final    Opiate Scrn, Ur 11/18/2024 Negative  Negative Final    Barbiturate Screen, Ur 11/18/2024 Negative  Negative Final    Amphetamine Screen, Ur 11/18/2024 Negative  Negative Final    THC 11/18/2024 Presumptive Positive (A)  Negative Final    Phencyclidine 11/18/2024 Negative  Negative Final    Creatinine, Urine 11/18/2024 539.0 (H)  23.0 - 375.0 mg/dL Final    Toxicology Information 11/18/2024 SEE COMMENT   Final    Alcohol, Serum 11/18/2024 <3  <10 mg/dL Final    Acetaminophen (Tylenol), Serum 11/18/2024 <2.0 (L)  10.0 - 20.0 ug/mL Final    RBC, UA 11/18/2024 1  0 - 4 /hpf Final    WBC, UA 11/18/2024 4  0 - 5 /hpf Final    Bacteria 11/18/2024 Negative  None-Occ /hpf Final    Squam Epithel, UA 11/18/2024 1  /hpf Final    Hyaline Casts, UA 11/18/2024 10.6 (A)  0-1/lpf /lpf Final    Microscopic Comment 11/18/2024 SEE COMMENT   Final    Hemoglobin A1C 11/18/2024 5.1  4.0 - 5.6 % Final    Estimated Avg Glucose 11/18/2024 100  68 - 131 mg/dL Final    Cholesterol 11/18/2024 166  120 - 199 mg/dL Final    Triglycerides  11/18/2024 69  30 - 150 mg/dL Final    HDL 11/18/2024 50  40 - 75 mg/dL Final    LDL Cholesterol 11/18/2024 102.2  63.0 - 159.0 mg/dL Final    HDL/Cholesterol Ratio 11/18/2024 30.1  20.0 - 50.0 % Final    Total Cholesterol/HDL Ratio 11/18/2024 3.3  2.0 - 5.0 Final    Non-HDL Cholesterol 11/18/2024 116  mg/dL Final    Potassium 11/20/2024 4.2  3.5 - 5.1 mmol/L Final    Valproic Acid Level 11/23/2024 71  50 - 100 ug/mL Final         Discharged Condition: stable and improved; not currently a danger to self/others or gravely disabled    Disposition: Home or Self Care    Is patient being discharged on multiple neuroleptics? No    Follow Up/Patient Instructions:     Take all medications as prescribed.  Attend all psychiatric and medical follow up appointments.   Abstain from all drugs and alcohol.  Call the crisis line at: [phone] for help in a crisis and emergent situations or call 911 and Return to ED for any acute worsening of your condition including suicidal or homicidal ideations      Discharge Procedure Orders   Diet Adult Regular     Notify your health care provider if you experience any of the following:  temperature >100.4     Notify your health care provider if you experience any of the following:  persistent nausea and vomiting or diarrhea     Notify your health care provider if you experience any of the following:   Order Comments: Suicidal thoughts, homicidal thoughts, or any other changes in mental status  If you would like immediate help/crisis counseling, please call [phone] (TALK). Through this toll-free phone number for a network of crisis centers across the country. These centers staff their lines with people who are trained to listen and offer support to people in emotional crisis. If you are in an emergency, please call 911.     Notify your health care provider if you experience any of the following:  increased confusion or weakness     Notify your health care provider if you experience  any of the following:  persistent dizziness, light-headedness, or visual disturbances     Activity as tolerated           Follow up apt: staff will schedule      Medications:  Reconciled Home Medications:      Medication List        START taking these medications      mirtazapine 15 MG tablet  Commonly known as: REMERON  Take 1 tablet (15 mg total) by mouth every evening.     nicotine 14 mg/24 hr  Commonly known as: NICODERM CQ  Place 1 patch onto the skin once daily.  Start taking on: December 4, 2024            CHANGE how you take these medications      divalproex  MG Tb24 24 hr tablet  Commonly known as: DEPAKOTE ER  Take 1 tablet (500 mg total) by mouth 2 (two) times a day.  What changed:   how much to take  when to take this            STOP taking these medications      haloperidol decanoate 100 mg/mL injection  Commonly known as: HALDOL DECANOATE     ibuprofen 800 MG tablet  Commonly known as: ADVIL,MOTRIN     risperiDONE 3 MG Tab  Commonly known as: RISPERDAL     sertraline 25 MG tablet  Commonly known as: ZOLOFT            Pt received invega sustenna 234 mg on 11/25/24,  -received invega sustenna 156 mg IM given 11/30/24 -next dose due 1 month        Diet: regular     Activity as tolerated    Total time spent discharging patient: 33 minutes    Cristhian Grijalva III, MD  Psychiatry

## 2024-12-03 NOTE — CARE UPDATE
Family session was completed. Mother states pt can not return home unless a rehab program is completed. Pt agreed to attend rehab and was accepted to [facility] in [place]. Mother agrees to transport pt to the facility today.

## 2024-12-04 NOTE — PLAN OF CARE
Problem: Adult Behavioral Health Plan of Care  Goal: Optimized Coping Skills in Response to Life Stressors  Intervention: Promote Effective Coping Strategies  Flowsheets (Taken 12/2/2024 1300)  Supportive Measures:   active listening utilized   verbalization of feelings encouraged   self-reflection promoted       Behavior:  Engaged and oriented           Intervention: In this CBT-focused process group SW facilitated a group discussion on strengths. Pt were asked to [place] strengths using strength exploration handout. Pt were asked to identify how those strengths will help with relationships or recovery.          Response: pt identified persistence (stay true to life goals), discipline(looking up to elders), and optimistic (being open minded and not allowing others to deter me) as strengths.           Plan: Continue to encourage pt to participate in process groups to verbalize feelings and develop healthy coping skills.

## 2024-12-07 ENCOUNTER — HOSPITAL ENCOUNTER (EMERGENCY)
Facility: HOSPITAL | Age: 24
Discharge: HOME | End: 2024-12-07
Attending: EMERGENCY MEDICINE
Payer: MEDICAID

## 2024-12-07 VITALS
DIASTOLIC BLOOD PRESSURE: 80 MMHG | BODY MASS INDEX: 19.89 KG/M2 | HEIGHT: 75 IN | TEMPERATURE: 98 F | HEART RATE: 88 BPM | RESPIRATION RATE: 18 BRPM | WEIGHT: 160 LBS | OXYGEN SATURATION: 99 % | SYSTOLIC BLOOD PRESSURE: 135 MMHG

## 2024-12-07 DIAGNOSIS — F20.9 SCHIZOPHRENIA, UNSPECIFIED TYPE: Primary | ICD-10-CM

## 2024-12-07 PROCEDURE — 99281 EMR DPT VST MAYX REQ PHY/QHP: CPT

## 2024-12-07 NOTE — ED PROVIDER NOTES
Encounter Date: 12/7/2024       History     Chief Complaint   Patient presents with    Psychiatric Evaluation     Patient to the ED via EMS from [facility] for auditory hallucinations.     23 yo male with schizophrenia here via EMS from [place] after he was discharged from [facility] 3 days ago to inpatient rehab at [place]. He reportedly expressed AH and was sent here for evaluation. No command hallucination. No SI or HI.       Review of patient's allergies indicates:  No Known Allergies  Past Medical History:   Diagnosis Date    History of psychiatric hospitalization 09/06/2023    Pending sale to [facility] of psychiatric care     Psychiatric problem     Schizophrenia     Suicide attempt      History reviewed. No pertinent surgical history.  Family History   Family history unknown: Yes     Social History     Tobacco Use    Smoking status: Some Days     Current packs/day: 1.00     Average packs/day: 1 pack/day for 5.6 years (5.6 ttl pk-yrs)     Types: Cigars, Cigarettes     Start date: 5/10/2019     Passive exposure: Never    Smokeless tobacco: Never   Substance Use Topics    Alcohol use: Yes     Comment: pt reports drinking wine, two-four times monthly or less    Drug use: Yes     Types: Marijuana     Comment: unknown, patient just laughs     Review of Systems   Constitutional: Negative.    Respiratory: Negative.     Cardiovascular: Negative.    Gastrointestinal: Negative.    Psychiatric/Behavioral:  Positive for hallucinations.    All other systems reviewed and are negative.      Physical Exam     Initial Vitals   BP Pulse Resp Temp SpO2   -- -- -- -- --      MAP       --         Physical Exam    Nursing note and vitals reviewed.  Constitutional: He appears well-developed and well-nourished. He is not diaphoretic. No distress.   HENT:   Head: Normocephalic and atraumatic.   Eyes: EOM are normal. Pupils are equal, round, and reactive to light.   Neck: Neck supple.   Normal range of motion.  Cardiovascular:   Normal rate, regular rhythm and intact distal pulses.           Pulmonary/Chest: Breath sounds normal. No respiratory distress. He has no wheezes. He has no rales.   Abdominal: Abdomen is soft. Bowel sounds are normal. He exhibits no distension. There is no abdominal tenderness. There is no rebound.   Musculoskeletal:         General: No tenderness or edema. Normal range of motion.      Cervical back: Normal range of motion and neck supple.     Neurological: He is alert and oriented to person, place, and time. GCS score is 15. GCS eye subscore is 4. GCS verbal subscore is 5. GCS motor subscore is 6.   Skin: Skin is warm and dry. Capillary refill takes less than 2 seconds.   Psychiatric: He has a normal mood and affect. His speech is normal and behavior is normal. Thought content normal.         ED Course   Procedures  Labs Reviewed - No data to display       Imaging Results    None          Medications - No data to display  Medical Decision Making  Patient is at his baseline. No indication for PEC.     Problems Addressed:  Schizophrenia, unspecified type: chronic illness or injury                                      Clinical Impression:  Final diagnoses:  [F20.9] Schizophrenia, unspecified type (Primary)          ED Disposition Condition    Discharge Stable          ED Prescriptions    None       Follow-up Information       Follow up With Specialties Details Why Contact Info    Jose Martin Selby MD Family Medicine Schedule an appointment as soon as possible for a visit   RETIRED  Frantz Chu MD  12/07/24 2025

## 2025-04-05 ENCOUNTER — HOSPITAL ENCOUNTER (INPATIENT)
Facility: HOSPITAL | Age: 25
LOS: 6 days | Discharge: HOME | DRG: 885 | End: 2025-04-11
Attending: EMERGENCY MEDICINE | Admitting: EMERGENCY MEDICINE
Payer: MEDICAID

## 2025-04-05 DIAGNOSIS — Z00.8 MEDICAL CLEARANCE FOR PSYCHIATRIC ADMISSION: ICD-10-CM

## 2025-04-05 DIAGNOSIS — R44.3 HALLUCINATIONS: ICD-10-CM

## 2025-04-05 DIAGNOSIS — F23 ACUTE PSYCHOSIS: Primary | ICD-10-CM

## 2025-04-05 LAB
ABSOLUTE EOSINOPHIL (OHS): 0.16 K/UL
ABSOLUTE MONOCYTE (OHS): 0.71 K/UL (ref 0.3–1)
ABSOLUTE NEUTROPHIL COUNT (OHS): 2.85 K/UL (ref 1.8–7.7)
ALBUMIN SERPL BCP-MCNC: 3.9 G/DL (ref 3.5–5.2)
ALP SERPL-CCNC: 62 UNIT/L (ref 40–150)
ALT SERPL W/O P-5'-P-CCNC: 15 UNIT/L (ref 10–44)
AMPHET UR QL SCN: NEGATIVE
ANION GAP (OHS): 9 MMOL/L (ref 8–16)
APAP SERPL-MCNC: <3 UG/ML (ref 10–20)
AST SERPL-CCNC: 22 UNIT/L (ref 11–45)
BARBITURATE SCN PRESENT UR: NEGATIVE
BASOPHILS # BLD AUTO: 0.02 K/UL
BASOPHILS NFR BLD AUTO: 0.3 %
BENZODIAZ UR QL SCN: NEGATIVE
BILIRUB SERPL-MCNC: 0.4 MG/DL (ref 0.1–1)
BILIRUB UR QL STRIP.AUTO: NEGATIVE
BUN SERPL-MCNC: 16 MG/DL (ref 6–20)
CALCIUM SERPL-MCNC: 8.4 MG/DL (ref 8.7–10.5)
CANNABINOIDS UR QL SCN: ABNORMAL
CHLORIDE SERPL-SCNC: 111 MMOL/L (ref 95–110)
CLARITY UR: CLEAR
CO2 SERPL-SCNC: 22 MMOL/L (ref 23–29)
COCAINE UR QL SCN: NEGATIVE
COLOR UR AUTO: YELLOW
CREAT SERPL-MCNC: 1.2 MG/DL (ref 0.5–1.4)
CREAT UR-MCNC: 316 MG/DL (ref 23–375)
ERYTHROCYTE [DISTWIDTH] IN BLOOD BY AUTOMATED COUNT: 12.2 % (ref 11.5–14.5)
ETHANOL SERPL-MCNC: <10 MG/DL
GFR SERPLBLD CREATININE-BSD FMLA CKD-EPI: >60 ML/MIN/1.73/M2
GLUCOSE SERPL-MCNC: 66 MG/DL (ref 70–110)
GLUCOSE UR QL STRIP: NEGATIVE
HCT VFR BLD AUTO: 35.8 % (ref 40–54)
HGB BLD-MCNC: 11.9 GM/DL (ref 14–18)
HGB UR QL STRIP: NEGATIVE
IMM GRANULOCYTES # BLD AUTO: 0.01 K/UL (ref 0–0.04)
IMM GRANULOCYTES NFR BLD AUTO: 0.1 % (ref 0–0.5)
KETONES UR QL STRIP: ABNORMAL
LEUKOCYTE ESTERASE UR QL STRIP: NEGATIVE
LYMPHOCYTES # BLD AUTO: 3.39 K/UL (ref 1–4.8)
MCH RBC QN AUTO: 31.2 PG (ref 27–31)
MCHC RBC AUTO-ENTMCNC: 33.2 G/DL (ref 32–36)
MCV RBC AUTO: 94 FL (ref 82–98)
METHADONE UR QL SCN: NEGATIVE
NITRITE UR QL STRIP: NEGATIVE
NUCLEATED RBC (/100WBC) (OHS): 0 /100 WBC
OPIATES UR QL SCN: NEGATIVE
PCP UR QL: NEGATIVE
PH UR STRIP: 6 [PH]
PLATELET # BLD AUTO: 218 K/UL (ref 150–450)
PMV BLD AUTO: 9.6 FL (ref 9.2–12.9)
POTASSIUM SERPL-SCNC: 3.8 MMOL/L (ref 3.5–5.1)
PROT SERPL-MCNC: 7 GM/DL (ref 6–8.4)
PROT UR QL STRIP: NEGATIVE
RBC # BLD AUTO: 3.82 M/UL (ref 4.6–6.2)
RELATIVE EOSINOPHIL (OHS): 2.2 %
RELATIVE LYMPHOCYTE (OHS): 47.5 % (ref 18–48)
RELATIVE MONOCYTE (OHS): 9.9 % (ref 4–15)
RELATIVE NEUTROPHIL (OHS): 40 % (ref 38–73)
SODIUM SERPL-SCNC: 142 MMOL/L (ref 136–145)
SP GR UR STRIP: >=1.03
UROBILINOGEN UR STRIP-ACNC: 1 EU/DL
WBC # BLD AUTO: 7.14 K/UL (ref 3.9–12.7)

## 2025-04-05 PROCEDURE — 63600175 PHARM REV CODE 636 W HCPCS: Performed by: EMERGENCY MEDICINE

## 2025-04-05 PROCEDURE — 80307 DRUG TEST PRSMV CHEM ANLYZR: CPT | Performed by: EMERGENCY MEDICINE

## 2025-04-05 PROCEDURE — 82077 ASSAY SPEC XCP UR&BREATH IA: CPT | Performed by: EMERGENCY MEDICINE

## 2025-04-05 PROCEDURE — 99285 EMERGENCY DEPT VISIT HI MDM: CPT | Mod: 25

## 2025-04-05 PROCEDURE — 85025 COMPLETE CBC W/AUTO DIFF WBC: CPT | Performed by: EMERGENCY MEDICINE

## 2025-04-05 PROCEDURE — 11400000 HC PSYCH PRIVATE ROOM

## 2025-04-05 PROCEDURE — 81003 URINALYSIS AUTO W/O SCOPE: CPT | Performed by: EMERGENCY MEDICINE

## 2025-04-05 PROCEDURE — 96372 THER/PROPH/DIAG INJ SC/IM: CPT | Performed by: EMERGENCY MEDICINE

## 2025-04-05 PROCEDURE — 36415 COLL VENOUS BLD VENIPUNCTURE: CPT | Performed by: EMERGENCY MEDICINE

## 2025-04-05 PROCEDURE — 80053 COMPREHEN METABOLIC PANEL: CPT | Performed by: EMERGENCY MEDICINE

## 2025-04-05 PROCEDURE — 80164 ASSAY DIPROPYLACETIC ACD TOT: CPT | Performed by: EMERGENCY MEDICINE

## 2025-04-05 PROCEDURE — 80143 DRUG ASSAY ACETAMINOPHEN: CPT | Performed by: EMERGENCY MEDICINE

## 2025-04-05 RX ORDER — ONDANSETRON 4 MG/1
8 TABLET, ORALLY DISINTEGRATING ORAL EVERY 8 HOURS PRN
Status: DISCONTINUED | OUTPATIENT
Start: 2025-04-05 | End: 2025-04-11 | Stop reason: HOSPADM

## 2025-04-05 RX ORDER — DIPHENHYDRAMINE HYDROCHLORIDE 50 MG/ML
50 INJECTION, SOLUTION INTRAMUSCULAR; INTRAVENOUS
Status: COMPLETED | OUTPATIENT
Start: 2025-04-05 | End: 2025-04-05

## 2025-04-05 RX ORDER — HALOPERIDOL LACTATE 5 MG/ML
10 INJECTION, SOLUTION INTRAMUSCULAR
Status: COMPLETED | OUTPATIENT
Start: 2025-04-05 | End: 2025-04-05

## 2025-04-05 RX ORDER — TALC
6 POWDER (GRAM) TOPICAL NIGHTLY PRN
Status: DISCONTINUED | OUTPATIENT
Start: 2025-04-05 | End: 2025-04-11 | Stop reason: HOSPADM

## 2025-04-05 RX ORDER — FAMOTIDINE 20 MG/1
20 TABLET, FILM COATED ORAL 2 TIMES DAILY
Status: DISCONTINUED | OUTPATIENT
Start: 2025-04-05 | End: 2025-04-07

## 2025-04-05 RX ORDER — ACETAMINOPHEN 325 MG/1
650 TABLET ORAL EVERY 8 HOURS PRN
Status: DISCONTINUED | OUTPATIENT
Start: 2025-04-05 | End: 2025-04-11 | Stop reason: HOSPADM

## 2025-04-05 RX ORDER — LORAZEPAM 2 MG/ML
2 INJECTION INTRAMUSCULAR
Status: COMPLETED | OUTPATIENT
Start: 2025-04-05 | End: 2025-04-05

## 2025-04-05 RX ADMIN — HALOPERIDOL LACTATE 10 MG: 5 INJECTION, SOLUTION INTRAMUSCULAR at 05:04

## 2025-04-05 RX ADMIN — LORAZEPAM 2 MG: 2 INJECTION INTRAMUSCULAR; INTRAVENOUS at 05:04

## 2025-04-05 RX ADMIN — DIPHENHYDRAMINE HYDROCHLORIDE 50 MG: 50 INJECTION INTRAMUSCULAR; INTRAVENOUS at 05:04

## 2025-04-05 NOTE — ED PROVIDER NOTES
Encounter Date: 4/5/2025       History     Chief Complaint   Patient presents with    Mental Health Problem     Family reports pt is not acting normal. EMS reports pt is having hallucinations.       24-year-old male history of schizophrenia, multiple psychiatric hospitalizations in the past presents the ER via EMS, EMS reports family states he is not acting right.  He does appear to be talking to himself, inappropriate laughter.  No answer certain questions.  He denies SI or HI.  Unable to be redirected      Review of patient's allergies indicates:  No Known Allergies  Past Medical History:   Diagnosis Date    History of psychiatric hospitalization 09/06/2023    [facility] of psychiatric care     Psychiatric problem     Schizophrenia     Suicide attempt      History reviewed. No pertinent surgical history.  Family History   Family history unknown: Yes     Social History[1]  Review of Systems   Unable to perform ROS: Psychiatric disorder   All other systems reviewed and are negative.      Physical Exam     Initial Vitals [04/05/25 1735]   BP Pulse Resp Temp SpO2   (!) 99/51 76 14 97.8 °F (36.6 °C) 98 %      MAP       --         Physical Exam    Constitutional: He appears well-developed and well-nourished. He is not diaphoretic. No distress.   HENT:   Head: Normocephalic and atraumatic.   Eyes: Conjunctivae and EOM are normal. Pupils are equal, round, and reactive to light.   Neck: Neck supple.   Normal range of motion.  Cardiovascular:  Normal rate, regular rhythm and normal heart sounds.           Pulmonary/Chest: Breath sounds normal. No respiratory distress. He has no wheezes. He has no rhonchi. He has no rales.   Abdominal: Abdomen is soft. Bowel sounds are normal.   Musculoskeletal:         General: Normal range of motion.      Cervical back: Normal range of motion and neck supple.     Neurological: He is alert. GCS score is 15. GCS eye subscore is 4. GCS verbal subscore is 5. GCS motor subscore is 6.    Skin: Skin is warm and dry. Capillary refill takes less than 2 seconds.         ED Course   Procedures  Labs Reviewed   CBC W/ AUTO DIFFERENTIAL    Narrative:     The following orders were created for panel order CBC auto differential.  Procedure                               Abnormality         Status                     ---------                               -----------         ------                     CBC with Differential[[id number]]                           In process                   Please view results for these tests on the individual orders.   COMPREHENSIVE METABOLIC PANEL   URINALYSIS, REFLEX TO URINE CULTURE   DRUG SCREEN PANEL, URINE EMERGENCY   ALCOHOL,MEDICAL (ETHANOL)   ACETAMINOPHEN LEVEL   CBC WITH DIFFERENTIAL   VALPROIC ACID          Imaging Results    None          Medications   haloperidol lactate injection 10 mg (10 mg Intramuscular Given 4/5/25 1748)   LORazepam injection 2 mg (2 mg Intramuscular Given 4/5/25 1748)   diphenhydrAMINE injection 50 mg (50 mg Intramuscular Given 4/5/25 1748)     Medical Decision Making  Amount and/or Complexity of Data Reviewed  Labs: ordered.    Risk  Prescription drug management.  Decision regarding hospitalization.               ED Course as of 04/05/25 1758   Sat Apr 05, 2025 1752 Accepted to the acute psychiatric unit by Dr. Hardwick [SD]      ED Course User Index  [SD] Fan Chao MD               Medical Decision Making:   Differential Diagnosis:   Schizophrenia, acute psychosis, substance abuse             Clinical Impression:  Final diagnoses:  [F23] Acute psychosis (Primary)  [Z00.8] Medical clearance for psychiatric admission  [R44.3] Hallucinations          ED Disposition Condition    Admit Stable                  [1]   Social History  Tobacco Use    Smoking status: Some Days     Current packs/day: 1.00     Average packs/day: 1 pack/day for 5.9 years (5.9 ttl pk-yrs)     Types: Cigars, Cigarettes     Start date: 5/10/2019     Passive  exposure: Never    Smokeless tobacco: Never   Substance Use Topics    Alcohol use: Yes     Comment: pt reports drinking wine, two-four times monthly or less    Drug use: Yes     Types: Marijuana     Comment: unknown, patient just laughs        Fan Chao MD  04/05/25 [id number]

## 2025-04-06 LAB — VALPROATE SERPL-MCNC: 30.4 UG/ML (ref 50–100)

## 2025-04-06 PROCEDURE — 25000003 PHARM REV CODE 250: Performed by: EMERGENCY MEDICINE

## 2025-04-06 PROCEDURE — 99223 1ST HOSP IP/OBS HIGH 75: CPT | Mod: AF,HB,, | Performed by: PSYCHIATRY & NEUROLOGY

## 2025-04-06 PROCEDURE — 25000003 PHARM REV CODE 250: Performed by: PSYCHIATRY & NEUROLOGY

## 2025-04-06 PROCEDURE — 11400000 HC PSYCH PRIVATE ROOM

## 2025-04-06 RX ORDER — DIVALPROEX SODIUM 500 MG/1
500 TABLET, FILM COATED, EXTENDED RELEASE ORAL EVERY 12 HOURS
Status: DISCONTINUED | OUTPATIENT
Start: 2025-04-06 | End: 2025-04-06

## 2025-04-06 RX ORDER — VALPROIC ACID 250 MG/5ML
500 SOLUTION ORAL EVERY 12 HOURS
Status: DISCONTINUED | OUTPATIENT
Start: 2025-04-06 | End: 2025-04-11 | Stop reason: HOSPADM

## 2025-04-06 RX ORDER — MIRTAZAPINE 15 MG/1
15 TABLET, FILM COATED ORAL NIGHTLY
Status: DISCONTINUED | OUTPATIENT
Start: 2025-04-06 | End: 2025-04-11 | Stop reason: HOSPADM

## 2025-04-06 RX ADMIN — FAMOTIDINE 20 MG: 20 TABLET, FILM COATED ORAL at 08:04

## 2025-04-06 RX ADMIN — VALPROIC ACID 500 MG: 250 SOLUTION ORAL at 08:04

## 2025-04-06 RX ADMIN — VALPROIC ACID 500 MG: 250 SOLUTION ORAL at 12:04

## 2025-04-06 RX ADMIN — MIRTAZAPINE 15 MG: 15 TABLET, FILM COATED ORAL at 08:04

## 2025-04-06 NOTE — NURSING
Patient in room most of the day, present for meals, fair appetite. Patient cooperative with staff. Compliant with medication. No distress noted. No behavioral concerns. Minimal interaction with peers or staff on unit. No signs of anxiety or depression noted. Plan of care reviewed and ongoing.

## 2025-04-06 NOTE — PLAN OF CARE
24-year-old male history of schizophrenia, multiple psychiatric hospitalizations in the past presents the ER via EMS, EMS reports family states he is not acting right.  He does appear to be talking to himself, inappropriate laughter.  No answer certain questions.  He denies SI or HI.  Unable to be redirected.  UDS positive for THC.         Upon arrival to [facility] floor, patient is very alert but sleepy due to PRN given in ER.  Answers minimal questions.   Denies pain or discomfort.  Refuses to contact family.  No further concerns.

## 2025-04-06 NOTE — H&P
"[facility]                                                                       Initial Psychiatric Evaluation      4/6/2025 10:21 AM   Bishop Soria Initial Psychiatric Evaluation      4/6/2025 10:21 AM   Bishop Soria   2000   [id number]         Date of Admission: 4/5/2025  5:30 PM    Current Legal Status:PEC    Chief Complaint: "Im ok"     SUBJECTIVE:     Per ER Note:  Chief Complaint   Patient presents with    Mental Health Problem       Family reports pt is not acting normal. EMS reports pt is having hallucinations.        24-year-old male history of schizophrenia, multiple psychiatric hospitalizations in the past presents the ER via EMS, EMS reports family states he is not acting right.  He does appear to be talking to himself, inappropriate laughter.  No answer certain questions.  He denies SI or HI.  Unable to be redirected     Per Chart Review:  Multiple prior admissions to [facility] with similar presentation of psychosis. Most recent hospitalization in the record is from 11/18-12/3/2024 at [facility].     DC Dx: Schizoaffective Disorder, Bipolar Type    Pt was discharged on the following medications:   - Invega sustenna   - Depakote 500mg BID (VPA level 72 at this dose)   - Remeron 15mg QHS    Per Initial Interview:  Bishop Soria is a 24 y.o. male with past psychiatric history of schizoaffective disorder who presents with psychosis.     Pt states he is here due to insomnia which has been going on for about one week. He states that this is due to increase in AH recently. States that the voices have been telling him that someone is stealing from him. Reports that the voices finally told him who it was and what they stole. States that when he is in the hospital his sleep patterns tend to change back to normal. So he brought himself to the hospital.     Pt reports he has been compliant with his monthly injection, but has not been taking his medication by mouth (depakote) due to " "forgetting. States he got an CARRASCO approximately 2 weeks ago (Invega Sustenna) from his [facility] ACT team. States that he has used marijuana once since receiving most recent CARRASCO, which he says helps the voices to "mellow out and fade." "The voices tend to catch my vibe. They tend to fall back and go to sleep. I've learned and noticed that the voices can be regular human beings talking to me, and it can be spiritual beings talking to me, and it can be beings from another dimension talking to me."     Pt denies depression. States he is tired but not depressed. Denies SI and HI. He is not able to clearly state whether he is experiencing VH. He eludes that he is seeing things but not able to describe.         Psych ROS:   Symptoms of Depression: diminished mood - No, loss of interest/anhedonia - No;  recurrent - No, >14 days - No, diminished energy - No, change in sleep - No, change in appetite - Yes, diminished concentration or cognition or indecisiveness - No, PMA/R -  No, excessive guilt or hopelessness or worthlessness - No, suicidal ideations - No     Changes in Sleep: trouble with initiation- No, maintenance, - No early morning awakening with inability to return to sleep - No, hypersomnolence - No     Suicidal- active/passive ideations - No, organized plans, future intentions - No     Homicidal ideations: active/passive ideations - No, organized plans, future intentions - No     Symptoms of psychosis: hallucinations - Yes, delusions - Yes, disorganized speech - Yes, disorganized behavior or abnormal motor behavior - No, or negative symptoms (diminshed emotional expression, avolition, anhedonia, alogia, asociality) - Yes, active phase symptoms >1 month - Yes, continuous signs of illness > 6 months - Yes, since onset of illness decreased level of functioning present - Yes     Symptoms of carley or hypomania: elevated, expansive, or irritable mood with increased energy or activity - No; > 4 days - No,  >7 days - No; with " "inflated self-esteem or grandiosity - No, decreased need for sleep - No, increased rate of speech - No, FOI or racing thoughts - No, distractibility - No, increased goal directed activity or PMA - No, risky/disinhibited behavior - No     Symptoms of KANU: excessive anxiety/worry/fear, more days than not, about numerous issues - No, ongoing for >6 months - No, difficult to control - No, with restlessness - No, fatigue - No, poor concentration - No, irritability - No, muscle tension - No, sleep disturbance - No; causes functionally impairing distress - No     Symptoms of Panic Disorder: recurrent panic attacks (palpitations/heart racing, sweating, shakiness, dyspnea, choking, chest pain/discomfort, Gi symptoms, dizzy/lightheadedness, hot/col flashes, paresthesias, derealization, fear of losing control or fear of dying or fear of "going crazy") - No, precipitated - No, un-precipitated - No, source of worry and/or behavioral changes secondary for 1 month or longer- No, agoraphobia - No     Symptoms of PTSD: h/o trauma exposure - No; re-experiencing/intrusive symptoms - No, avoidant behavior - No, 2 or more negative alterations in cognition or mood - No, 2 or more hyperarousal symptoms - No; with dissociative symptoms - No, ongoing for 1 or more  months - No     Symptoms of OCD: obsessions (recurrent thoughts/urges/images; intrusive and/or unwanted; uses other thoughts/actions to suppress) - No; compulsions (repetitive behaviors used to lower distress/anxiety/obsessions) - No, time-consuming (over 1 hour per day) or cause significant distress/impairment - - No     Symptoms of Anorexia: restriction of caloric intake leading to significantly low body weight - No, intense fear of gaining weight or persistent behavior that interferes with weight gain even thought at a significantly low weight - No, disturbance in the way in which one's body weight or shape is experienced, undue influence of body weight or shape on self " evaluation, or persistent lack of recognition of the seriousness of the current low body weight - No     Symptoms of Bulimia: recurrent episodes of binge eating (definitely larger amount  than what others would eat and lack of a sense of control over eating during episode) - No, recurrent inappropriate compensatory behaviors in order to prevent weight gain (fasting, medications, exercise, vomiting) - No, binges and compensatory behaviors both occur on average at least once a week for 3 months - No, self evaluations is unduly influenced by body shape/weight- - No     Symptoms of Binge eating: recurrent episodes of binge eating (definitely larger amount than what others would eat and lack of a sense of control over eating during episode) - No, 3 or more of following (eating much more rapidly, eating until uncomfortably full, large amounts when not hungry, eating alone because of embarrassed by how much,  feeling disgusted with oneself, depressed or very guilty afterward) - No, distress regarding binges - No, binges occur on average at least once a week for 3 months - No        Collateral: Pending    Review of Systems   Constitutional:  Negative for chills and fever.   HENT:  Negative for congestion, hearing loss, sore throat and tinnitus.    Eyes:  Negative for blurred vision, double vision, photophobia and pain.   Respiratory:  Negative for cough, hemoptysis, sputum production, shortness of breath and wheezing.    Cardiovascular:  Negative for chest pain, palpitations and leg swelling.   Gastrointestinal:  Negative for abdominal pain, blood in stool, constipation, diarrhea, nausea and vomiting.   Genitourinary:  Negative for dysuria, frequency, hematuria and urgency.   Musculoskeletal:  Negative for back pain, joint pain and myalgias.   Skin:  Negative for rash.   Neurological:  Negative for dizziness, tremors, seizures, weakness and headaches.       PAST PSYCHIATRIC HISTORY  Previous Psychiatric Hospitalizations:  Yes,extensive  Previous SI/HI: Yes,  Previous Suicide Attempts: Yes,   Previous Medication Trials: Yes,  Psychiatric Care (current & past): Yes,  History of Psychotherapy: Yes,  History of Violence: Yes,  History of sexual/physical abuse: No,    SNEUROLOGIC HISTORY  Seizures: No  Head trauma: No     SOCIAL HISTORY:  Developmental/Childhood:Achieved all developmental milestones timely  Education: some college  Employment Status/Finances:Employed   Relationship Status/Sexual Orientation: single  Children: 0  Housing Status: Home    history:  NO   Access to Firearms: NO ;  Locked up? n/a  Restorationism:Actively participates in organized Quaker  Recreational activities:Exercise     SUBSTANCE ABUSE HISTORY   Recreational Drugs: marijuana   Use of Alcohol: occasional, social use  Rehab History:no   Tobacco Use:no     LEGAL HISTORY:   Past charges/incarcerations: NO  Pending charges:NO     FAMILY PSYCHIATRIC HISTORY   Family History   Family history unknown: Yes      Depression - father      Past Medical/Surgical History:   Past Medical History:   Diagnosis Date    History of psychiatric hospitalization 09/06/2023    Daniel MAYO     of psychiatric care     Psychiatric problem     Schizophrenia     Suicide attempt      History reviewed. No pertinent surgical history.      Current Medications:   MEDICATIONS: See list below      Allergies:   Review of patient's allergies indicates:  No Known Allergies      OBJECTIVE:       Vitals   Vitals:    04/06/25 0831   BP: 119/69   Pulse: 82   Resp: 18   Temp: 97.7 °F (36.5 °C)        Labs/Imaging/Studies:   Recent Results (from the past 48 hours)   Urinalysis, Reflex to Urine Culture    Collection Time: 04/05/25  5:38 PM    Specimen: Urine   Result Value Ref Range    Color, UA Yellow Straw, Ingrid, Yellow, Light-Orange    Appearance, UA Clear Clear    pH, UA 6.0 5.0 - 8.0    Spec Grav UA >=1.030 (A) 1.005 - 1.030    Protein, UA Negative Negative    Glucose, UA Negative Negative     Ketones, UA Trace (A) Negative    Bilirubin, UA Negative Negative    Blood, UA Negative Negative    Nitrites, UA Negative Negative    Urobilinogen, UA 1.0 <2.0 EU/dL    Leukocyte Esterase, UA Negative Negative   Drug screen panel, emergency    Collection Time: 04/05/25  5:39 PM   Result Value Ref Range    Benzodiazepine, Urine Negative Negative    Methadone, Urine Negative Negative    Cocaine, Urine Negative Negative    Opiates, Urine Negative Negative    Barbiturates, Urine Negative Negative    Amphetamines, Urine Negative Negative    THC Presumptive Positive (A) Negative    Phencyclidine, Urine Negative Negative    Urine Creatinine 316.0 23.0 - 375.0 mg/dL   Comprehensive metabolic panel    Collection Time: 04/05/25  5:54 PM   Result Value Ref Range    Sodium 142 136 - 145 mmol/L    Potassium 3.8 3.5 - 5.1 mmol/L    Chloride 111 (H) 95 - 110 mmol/L    CO2 22 (L) 23 - 29 mmol/L    Glucose 66 (L) 70 - 110 mg/dL    BUN 16 6 - 20 mg/dL    Creatinine 1.2 0.5 - 1.4 mg/dL    Calcium 8.4 (L) 8.7 - 10.5 mg/dL    Protein Total 7.0 6.0 - 8.4 gm/dL    Albumin 3.9 3.5 - 5.2 g/dL    Bilirubin Total 0.4 0.1 - 1.0 mg/dL    ALP 62 40 - 150 unit/L    AST 22 11 - 45 unit/L    ALT 15 10 - 44 unit/L    Anion Gap 9 8 - 16 mmol/L    eGFR >60 >60 mL/min/1.73/m2   Ethanol    Collection Time: 04/05/25  5:54 PM   Result Value Ref Range    Alcohol, Serum <10 <10 mg/dL   Acetaminophen level    Collection Time: 04/05/25  5:54 PM   Result Value Ref Range    Acetaminophen Level <3.0 (L) 10.0 - 20.0 ug/ml   CBC with Differential    Collection Time: 04/05/25  5:54 PM   Result Value Ref Range    WBC 7.14 3.90 - 12.70 K/uL    RBC 3.82 (L) 4.60 - 6.20 M/uL    HGB 11.9 (L) 14.0 - 18.0 gm/dL    HCT 35.8 (L) 40.0 - 54.0 %    MCV 94 82 - 98 fL    MCH 31.2 (H) 27.0 - 31.0 pg    MCHC 33.2 32.0 - 36.0 g/dL    RDW 12.2 11.5 - 14.5 %    Platelet Count 218 150 - 450 K/uL    MPV 9.6 9.2 - 12.9 fL    Nucleated RBC 0 <=0 /100 WBC    Neut % 40.0 38 - 73 %     "Lymph % 47.5 18 - 48 %    Mono % 9.9 4 - 15 %    Eos % 2.2 <=8 %    Basophil % 0.3 <=1.9 %    Imm Grans % 0.1 0.0 - 0.5 %    Neut # 2.85 1.8 - 7.7 K/uL    Lymph # 3.39 1 - 4.8 K/uL    Mono # 0.71 0.3 - 1 K/uL    Eos # 0.16 <=0.5 K/uL    Baso # 0.02 <=0.2 K/uL    Imm Grans # 0.01 0.00 - 0.04 K/uL   Valproic Acid    Collection Time: 04/05/25  5:54 PM   Result Value Ref Range    Valproic Acid 30.4 (L) 50.0 - 100.0 ug/ml      Lab Results   Component Value Date    VALPROATE 30.4 (L) 04/05/2025    CBMZ 11.8 05/07/2023         Musculoskeletal Exam:  Abnormal Involuntary Movements: NO  Gait: normal  Strength: Greater than antigravity (3+/5) in all extremities   Muscle tone: No impairment   Station: Grossly normal       General/Constitutional Exam:  Appearance: disheveled, poor    Strengths AND Liabilities (At least 2 Strengths and 1 Liability):  Strength: Patient accepts guidance/feedback, Strength: Patient has positive support network., Liability: Patient is dependent.    Psychiatric Mental Status Exam:  Arousal: alert  Sensorium/Orientation: oriented to grossly intact, person, place, situation  Behavior/Cooperation: normal, cooperative   Speech: normal tone, normal rate, normal pitch, normal volume  Language: grossly intact  Mood: " ok "   Affect: flat  Thought Process: goal-directed  Thought Content:   Auditory hallucinations: YES: voices     Visual hallucinations: YES: poorly described     Paranoia: YES: being stolen from     Delusions:  NO  Suicidal ideation: NO  Homicidal ideation: NO  Attention/Concentration:  unable to spell "WORLD" backwards  Memory:    Recent: [facility] Recent Memory: WNL  2/3 at 3 minutes  Remote: [facility] Remote Memory: WNL , past events, as relates history  Fund of Knowledge: Aware of current events and Vocabulary appropriate    Abstract reasoning: proverbs were abstract, similarities were abstract  Intelligence: [facility] Intelligence: Average, based on history, based on vocabulary, syntax, grammar and " content  Insight: {[facility] insight: Poor, understanding severity of illness/history of present illness  Judgment: [facility] Judgement: Poor, per patient's behavior/history of present illness         ASSESSMENT/PLAN:   Diagnosis:  Schizoaffective disorder, bipolar type, severe  Unspecified anxiety disorder    Psychosocial stressors    Cannabis abuse  Nicotine dependence       Anemia          Patient Active Problem List    Diagnosis Date Noted    Anemia 11/19/2024    Substance abuse 06/18/2024    Hypokalemia 05/10/2024    Schizoaffective disorder, bipolar type 02/07/2021    Marijuana abuse 12/04/2020          ASSESSMENT AND TREATMENT PLAN:    Medical decision making/Formulation:  Schizoaffective disorder, bipolar type, severe  - Verify last CARRASCO, will hold off on starting additional risperdal until CARRASCO verified.   - Plan for CARRASCO  - resume depakote at 500 mg PO BID, will check level for toxicity  - resume remeron 15 mg PO qhs  - pt counseled  - follow up with outpatient mental health providers after discharge for medication management and psychotherapy        Unspecified anxiety disorder  - start hydroxyzine 50 mg PO q 6 hours PRN  - pt counseled  - follow up with outpatient mental health providers after discharge for medication management and psychotherapy        Cannabis abuse  - pt counseled        Psychosocial stressors  - pt counseled  - SW consulted for assistance with additional resources      Nicotine dependence  - start nicotine patch 14 mg PO qd PRN       Anemia  - FM consulted    Pt was informed of all the side effects, alternatives, risks and benefits of taking this medicine.  Pt made an informed decision to take these medications.  Pt was able to weigh the risks and benefits and stated that the benefits out weigh the risks at this time.     - Will have pt sign EVANGELISTA to obtain outside medical records, if possible    - Social work will obtain collateral and work towards discharge plan including follow up care.    LAB  ORDERS  A1c  Lipid PAnel     ENCOURAGE CURRAN MILIEU    CONTINUE INPATIENT HOSPITALIZATION FOR STABILIZATION ON MEDICATION     PROGNOSIS: GUARDED    ESTIMATED LENGTH OF STAY: 4-7 DAYS      TOTAL TIME SPENT: 75 minutes     More than 50% of that time spent on chart review, formulation of healthcare plan, examination and discussion with patient and healthcare team.     Blas León MD

## 2025-04-06 NOTE — PLAN OF CARE
Problem: Adult Behavioral Health Plan of Care  Goal: Plan of Care Review  Outcome: Progressing  Goal: Patient-Specific Goal (Individualization)  Outcome: Progressing  Goal: Adheres to Safety Considerations for Self and Others  Outcome: Progressing  Goal: Absence of New-Onset Illness or Injury  Outcome: Progressing  Goal: Optimized Coping Skills in Response to Life Stressors  Outcome: Progressing  Goal: Develops/Participates in Therapeutic [place] to Support Successful Transition  Outcome: Progressing  Goal: Rounds/Family Conference  Outcome: Progressing     Problem: Excessive Substance Use  Goal: Optimized Energy Level (Excessive Substance Use)  Outcome: Progressing  Goal: Improved Behavioral Control (Excessive Substance Use)  Outcome: Progressing  Goal: Increased Participation and Engagement (Excessive Substance Use)  Outcome: Progressing  Goal: Improved Physiologic Symptoms (Excessive Substance Use)  Outcome: Progressing  Goal: Enhanced Social, Occupational or Functional Skills (Excessive Substance Use)  Outcome: Progressing     Problem: Depressive Signs/Symptoms  Goal: Optimized Energy Level (Depressive Signs/Symptoms)  Outcome: Progressing  Goal: Optimized Cognitive Function (Depressive Signs/Symptoms)  Outcome: Progressing  Goal: Increased Participation and Engagement (Depressive Signs/Symptoms)  Outcome: Progressing  Goal: Enhanced Self-Esteem and Confidence (Depressive Signs/Symptoms)  Outcome: Progressing  Goal: Improved Mood Symptoms (Depressive Signs/Symptoms)  Outcome: Progressing  Goal: Optimized Nutrition Intake (Depressive Signs/Symptoms)  Outcome: Progressing  Goal: Improved Psychomotor Symptoms (Depressive Signs/Symptoms)  Outcome: Progressing  Goal: Improved Sleep (Depressive Signs/Symptoms)  Outcome: Progressing  Goal: Enhanced Social, Occupational or Functional Skills (Depressive Signs/Symptoms)  Outcome: Progressing     Problem: Psychotic Signs/Symptoms  Goal: Improved Behavioral Control  (Psychotic Signs/Symptoms)  Outcome: Progressing  Goal: Optimal Cognitive Function (Psychotic Signs/Symptoms)  Outcome: Progressing  Goal: Increased Participation and Engagement (Psychotic Signs/Symptoms)  Outcome: Progressing  Goal: Improved Mood Symptoms (Psychotic Signs/Symptoms)  Outcome: Progressing  Goal: Improved Psychomotor Symptoms (Psychotic Signs/Symptoms)  Outcome: Progressing  Goal: Decreased Sensory Symptoms (Psychotic Signs/Symptoms)  Outcome: Progressing  Goal: Improved Sleep (Psychotic Signs/Symptoms)  Outcome: Progressing  Goal: Enhanced Social, Occupational or Functional Skills (Psychotic Signs/Symptoms)  Outcome: Progressing

## 2025-04-07 PROCEDURE — 25000003 PHARM REV CODE 250: Performed by: EMERGENCY MEDICINE

## 2025-04-07 PROCEDURE — 11400000 HC PSYCH PRIVATE ROOM

## 2025-04-07 PROCEDURE — 90833 PSYTX W PT W E/M 30 MIN: CPT | Mod: SA,HB,, | Performed by: PSYCHIATRY & NEUROLOGY

## 2025-04-07 PROCEDURE — 99232 SBSQ HOSP IP/OBS MODERATE 35: CPT | Mod: SA,HB,, | Performed by: PSYCHIATRY & NEUROLOGY

## 2025-04-07 PROCEDURE — 25000003 PHARM REV CODE 250: Performed by: PSYCHIATRY & NEUROLOGY

## 2025-04-07 RX ADMIN — VALPROIC ACID 500 MG: 250 SOLUTION ORAL at 08:04

## 2025-04-07 RX ADMIN — FAMOTIDINE 20 MG: 20 TABLET, FILM COATED ORAL at 08:04

## 2025-04-07 RX ADMIN — Medication 6 MG: at 08:04

## 2025-04-07 RX ADMIN — MIRTAZAPINE 15 MG: 15 TABLET, FILM COATED ORAL at 08:04

## 2025-04-07 NOTE — NURSING
Pt is calm, cooperative, and pleasant with staff. Pt is noted to socialize with select peers at times. Pt spends most the shift in room out for meals and snacks with good appetite, requesting double proportions. Pt participated in group. Will continue to monitor.

## 2025-04-07 NOTE — ASSESSMENT & PLAN NOTE
Anemia is likely due to Iron deficiency. Most recent hemoglobin and hematocrit are listed below.  Recent Labs     04/05/25  1754   HGB 11.9*   HCT 35.8*     Plan  Stable

## 2025-04-07 NOTE — PLAN OF CARE
Problem: Adult Behavioral Health Plan of Care  Goal: Optimized Coping Skills in Response to Life Stressors  Intervention: Promote Effective Coping Strategies  Flowsheets (Taken 4/7/2025 8811)  Supportive Measures:   active listening utilized   counseling provided   guided imagery facilitated   problem-solving facilitated   relaxation techniques promoted   self-reflection promoted   self-responsibility promoted     Behavior:  Patient arrived on time and was actively engaged in group discussion and activity. He participated appropriately, asking relevant questions and sharing each time he was called upon. Patient appeared to be easily distracted and/or preoccupied with what was going on in the next room.    Intervention:  The therapist provided psychoeducation on radical acceptance, explaining its role in reducing suffering and increasing emotional resilience. A mindfulness exercise was introduced, encouraging participants to focus on the present moment and accept their feelings without judgment. Group discussion was facilitated to explore how radical acceptance could be applied to various life challenges, such as personal relationships, health issues, and past trauma. The therapist emphasized the importance of self-compassion and validating emotions as part of the acceptance process.    Response:  Patient showed initial resistance to the topic but gained a better understanding after conversation and several examples. He stated that he had heard of grounding but did not know any techniques. He shared that he was excited to try the skill introduced. Patient expressed gratitude for the topic.    Plan:  Patient will be encouraged to participate in MET/CBT- based groups 2-5 times per week and/or 1:1 as needed to address issues and coping strategies. Patient will participate in recreational groups 2-5 times per week and/or 1:1 as needed to build peer support and relaxation skills.

## 2025-04-07 NOTE — SUBJECTIVE & OBJECTIVE
Past Medical History:   Diagnosis Date    History of psychiatric hospitalization 09/06/2023    [facility] psychiatric care     Psychiatric problem     Schizophrenia     Suicide attempt        History reviewed. No pertinent surgical history.    Review of patient's allergies indicates:  No Known Allergies    No current facility-administered medications on file prior to encounter.     Current Outpatient Medications on File Prior to Encounter   Medication Sig    divalproex ER (DEPAKOTE ER) 500 MG Tb24 24 hr tablet Take 1 tablet (500 mg total) by mouth 2 (two) times a day.    mirtazapine (REMERON) 15 MG tablet Take 1 tablet (15 mg total) by mouth every evening.    nicotine (NICODERM CQ) 14 mg/24 hr Place 1 patch onto the skin once daily.    [DISCONTINUED] EScitalopram oxalate (LEXAPRO) 10 MG tablet TAKE 1 TABLET BY MOUTH EVERY DAY    [DISCONTINUED] lithium (LITHOTAB) 300 mg tablet Take 300 mg by mouth 2 (two) times daily.     Family History    Family history is unknown by patient.       Tobacco Use    Smoking status: Some Days     Current packs/day: 1.00     Average packs/day: 1 pack/day for 5.9 years (5.9 ttl pk-yrs)     Types: Cigars, Cigarettes     Start date: 5/10/2019     Passive exposure: Never    Smokeless tobacco: Never   Substance and Sexual Activity    Alcohol use: Yes     Comment: pt reports drinking wine, two-four times monthly or less    Drug use: Yes     Types: Marijuana     Comment: unknown, patient just laughs    Sexual activity: Not Currently     Partners: Male     Review of Systems   Constitutional: Negative.    HENT: Negative.     Eyes: Negative.    Respiratory:  Negative for cough, chest tightness, shortness of breath and wheezing.    Cardiovascular:  Negative for chest pain, palpitations and leg swelling.   Gastrointestinal:  Negative for abdominal distention, abdominal pain, diarrhea, nausea and vomiting.   Endocrine: Negative.    Genitourinary: Negative.    Musculoskeletal: Negative.     Skin: Negative.    Allergic/Immunologic: Negative.    Neurological: Negative.    Hematological: Negative.    Psychiatric/Behavioral:  Positive for behavioral problems and dysphoric mood.      Objective:     Vital Signs (Most Recent):  Temp: 98.2 °F (36.8 °C) (04/07/25 0739)  Pulse: 84 (04/07/25 0739)  Resp: 20 (04/07/25 0739)  BP: 108/74 (04/07/25 0739)  SpO2: 98 % (04/07/25 0739) Vital Signs (24h Range):  Temp:  [98.2 °F (36.8 °C)] 98.2 °F (36.8 °C)  Pulse:  [73-84] 84  Resp:  [18-20] 20  SpO2:  [98 %] 98 %  BP: (108-125)/(57-74) 108/74     Weight: 72.6 kg (160 lb 0.9 oz)  Body mass index is 20.01 kg/m².     Physical Exam  Vitals and nursing note reviewed.   Constitutional:       Appearance: Normal appearance.   HENT:      Head: Normocephalic and atraumatic.      Nose: Nose normal.      Mouth/Throat:      Mouth: Mucous membranes are moist.      Pharynx: Oropharynx is clear.   Eyes:      Extraocular Movements: Extraocular movements intact.      Conjunctiva/sclera: Conjunctivae normal.      Pupils: Pupils are equal, round, and reactive to light.   Cardiovascular:      Rate and Rhythm: Normal rate and regular rhythm.      Pulses: Normal pulses.      Heart sounds: Normal heart sounds.   Pulmonary:      Effort: Pulmonary effort is normal.      Breath sounds: Normal breath sounds.   Abdominal:      General: Abdomen is flat. Bowel sounds are normal.      Palpations: Abdomen is soft.   Musculoskeletal:         General: Normal range of motion.      Cervical back: Normal range of motion and neck supple.   Skin:     General: Skin is warm and dry.      Capillary Refill: Capillary refill takes less than 2 seconds.      Comments: No rashes on limited skin exam.   Neurological:      General: No focal deficit present.      Mental Status: He is alert and oriented to person, place, and time.      Cranial Nerves: No cranial nerve deficit.      Comments: I Olfactory:  Sense of smell intact    II Optic:  Pupils equal round react to  light.  Vision intact.    III, IV, VI, Ocular motor, Trochlear, Abducens:  Extraocular movements intact    V Trigeminal:  Facial sensation intact facial sensation intact,, muscles of mastication intact muscles of mastication intact, corneal reflex intact, corneal reflex intact    VII Facial:  Muscles of facial expression intact     VIII Vestibular cochlear: Hearing intact vestibular cochlear: Hearing intact    IX Glossopharyngeal:  Gag reflex intact.  Tasting intact.     X Vagus:  Gag reflex intact.    XI Spinal Accessory:  Shoulder shrug intact.  Head rotation intact.    XII Hypoglossal:  Tongue movements intact.     Psychiatric:         Mood and Affect: Affect is labile.      Comments: Patient appears depressed              CRANIAL NERVES     CN III, IV, VI   Pupils are equal, round, and reactive to light.       Significant Labs: All pertinent labs within the past 24 hours have been reviewed.    Significant Imaging: I have reviewed all pertinent imaging results/findings within the past 24 hours.

## 2025-04-07 NOTE — PLAN OF CARE
Patient remains calm and cooperative.  Out of room mostly for meals and medication.  Slept through the night with no concerns.

## 2025-04-07 NOTE — H&P
[facility] Hospital Medicine  History & Physical    Patient Name: Bishop Soria  MRN: [id number]  Patient Class: IP- Psych  Admission Date: 4/5/2025  Attending Physician: Vinh León MD   Primary Care Provider: Jose Martin Selby MD (Inactive)         Patient information was obtained from patient, past medical records, and ER records.     Subjective:     Principal Problem:Schizoaffective disorder, bipolar type    Chief Complaint:   Chief Complaint   Patient presents with    Mental Health Problem     Family reports pt is not acting normal. EMS reports pt is having hallucinations.          HPI:     Encounter Date: 4/5/2025        History           Chief Complaint   Patient presents with    Mental Health Problem       Family reports pt is not acting normal. EMS reports pt is having hallucinations.        24-year-old male history of schizophrenia, multiple psychiatric hospitalizations in the past presents the ER via EMS, EMS reports family states he is not acting right.  He does appear to be talking to himself, inappropriate laughter.  No answer certain questions.  He denies SI or HI.  Unable to be redirected        4/7/25:  Patient presented to hospital for psychiatric evaluation per family report of him not acting his usual self.  Unable to be re-directed in the ER.  Depakote level low upon arrival to ER.     Past Medical History:   Diagnosis Date    [facility] of psychiatric [facility]20[facility]     Psychiatric problem     Schizophrenia     Suicide attempt        History reviewed. No pertinent surgical history.    Review of patient's allergies indicates:  No Known Allergies    No current facility-administered medications on file prior to encounter.     Current Outpatient Medications on File Prior to Encounter   Medication Sig    divalproex ER (DEPAKOTE ER) 500 MG Tb24 24 hr tablet Take 1 tablet (500 mg total) by mouth 2 (two) times a  day.    mirtazapine (REMERON) 15 MG tablet Take 1 tablet (15 mg total) by mouth every evening.    nicotine (NICODERM CQ) 14 mg/24 hr Place 1 patch onto the skin once daily.    [DISCONTINUED] EScitalopram oxalate (LEXAPRO) 10 MG tablet TAKE 1 TABLET BY MOUTH EVERY DAY    [DISCONTINUED] lithium (LITHOTAB) 300 mg tablet Take 300 mg by mouth 2 (two) times daily.     Family History    Family history is unknown by patient.       Tobacco Use    Smoking status: Some Days     Current packs/day: 1.00     Average packs/day: 1 pack/day for 5.9 years (5.9 ttl pk-yrs)     Types: Cigars, Cigarettes     Start date: 5/10/2019     Passive exposure: Never    Smokeless tobacco: Never   Substance and Sexual Activity    Alcohol use: Yes     Comment: pt reports drinking wine, two-four times monthly or less    Drug use: Yes     Types: Marijuana     Comment: unknown, patient just laughs    Sexual activity: Not Currently     Partners: Male     Review of Systems   Constitutional: Negative.    HENT: Negative.     Eyes: Negative.    Respiratory:  Negative for cough, chest tightness, shortness of breath and wheezing.    Cardiovascular:  Negative for chest pain, palpitations and leg swelling.   Gastrointestinal:  Negative for abdominal distention, abdominal pain, diarrhea, nausea and vomiting.   Endocrine: Negative.    Genitourinary: Negative.    Musculoskeletal: Negative.    Skin: Negative.    Allergic/Immunologic: Negative.    Neurological: Negative.    Hematological: Negative.    Psychiatric/Behavioral:  Positive for behavioral problems and dysphoric mood.      Objective:     Vital Signs (Most Recent):  Temp: 98.2 °F (36.8 °C) (04/07/25 0739)  Pulse: 84 (04/07/25 0739)  Resp: 20 (04/07/25 0739)  BP: 108/74 (04/07/25 0739)  SpO2: 98 % (04/07/25 0739) Vital Signs (24h Range):  Temp:  [98.2 °F (36.8 °C)] 98.2 °F (36.8 °C)  Pulse:  [73-84] 84  Resp:  [18-20] 20  SpO2:  [98 %] 98 %  BP: (108-125)/(57-74) 108/74     Weight: 72.6 kg (160 lb 0.9  oz)  Body mass index is 20.01 kg/m².     Physical Exam  Vitals and nursing note reviewed.   Constitutional:       Appearance: Normal appearance.   HENT:      Head: Normocephalic and atraumatic.      Nose: Nose normal.      Mouth/Throat:      Mouth: Mucous membranes are moist.      Pharynx: Oropharynx is clear.   Eyes:      Extraocular Movements: Extraocular movements intact.      Conjunctiva/sclera: Conjunctivae normal.      Pupils: Pupils are equal, round, and reactive to light.   Cardiovascular:      Rate and Rhythm: Normal rate and regular rhythm.      Pulses: Normal pulses.      Heart sounds: Normal heart sounds.   Pulmonary:      Effort: Pulmonary effort is normal.      Breath sounds: Normal breath sounds.   Abdominal:      General: Abdomen is flat. Bowel sounds are normal.      Palpations: Abdomen is soft.   Musculoskeletal:         General: Normal range of motion.      Cervical back: Normal range of motion and neck supple.   Skin:     General: Skin is warm and dry.      Capillary Refill: Capillary refill takes less than 2 seconds.      Comments: No rashes on limited skin exam.   Neurological:      General: No focal deficit present.      Mental Status: He is alert and oriented to person, place, and time.      Cranial Nerves: No cranial nerve deficit.      Comments: I Olfactory:  Sense of smell intact    II Optic:  Pupils equal round react to light.  Vision intact.    III, IV, VI, Ocular motor, Trochlear, Abducens:  Extraocular movements intact    V Trigeminal:  Facial sensation intact facial sensation intact,, muscles of mastication intact muscles of mastication intact, corneal reflex intact, corneal reflex intact    VII Facial:  Muscles of facial expression intact     VIII Vestibular cochlear: Hearing intact vestibular cochlear: Hearing intact    IX Glossopharyngeal:  Gag reflex intact.  Tasting intact.     X Vagus:  Gag reflex intact.    XI Spinal Accessory:  Shoulder shrug intact.  Head rotation  intact.    XII Hypoglossal:  Tongue movements intact.     Psychiatric:         Mood and Affect: Affect is labile.      Comments: Patient appears depressed              CRANIAL NERVES     CN III, IV, VI   Pupils are equal, round, and reactive to light.       Significant Labs: All pertinent labs within the past 24 hours have been reviewed.    Significant Imaging: I have reviewed all pertinent imaging results/findings within the past 24 hours.  Assessment/Plan:     Assessment & Plan  Schizoaffective disorder, bipolar type  4/7/25:  Continue treatment per inpatient psych team recommendations.     Substance abuse  4/7/25: Recommend cessation     Anemia  Anemia is likely due to Iron deficiency. Most recent hemoglobin and hematocrit are listed below.  Recent Labs     04/05/25  1754   HGB 11.9*   HCT 35.8*     Plan  Stable   VTE Risk Mitigation (From admission, onward)           Ordered     IP VTE LOW RISK PATIENT  Once         04/05/25 1958                                   FRANCESCO Bowen  Department of Hospital Medicine  [facility] ([facility])

## 2025-04-07 NOTE — PROGRESS NOTES
"PSYCHIATRY DAILY INPATIENT PROGRESS NOTE  SUBSEQUENT HOSPITAL VISIT    ENCOUNTER DATE: 4/7/2025  SITE: [facility]    DATE OF ADMISSION: 4/5/2025  5:30 PM  LENGTH OF STAY: 2 days      CHIEF COMPLAINT   Bishop Soria is a 24 y.o. 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 The chart was reviewed.     Reviewed notes from Rns and MD and labs from the last 24 hours.    The patient's case was discussed with the treatment team/care providers today including Rns and MD    Staff reports no behavioral or management issues.     The patient has been compliant with treatment.      Subjective 04/07/2025     Patient presents this morning with bizarre thought content, states "I've been learning things at the [facility] apartments. I can take drugs to activate my pineal gland so that I can see and hear everything." Endorses recent "shroom" and cannabis use, stating "I know the weed wasn't synthetic, but it was a mixture of different strains." He is observed talking and smiling to self during assessment, apparently responding to internal stimuli.     Patient verbalizes interest in inpatient rehab for substance use after discharge from hospital.              The patient denies any side effects to medications.        Interim/overnight events per report/notes:          Psychiatric ROS (observed, reported, or endorsed/denied):  Depressed mood - None  Interest/pleasure/anhedonia: No  Guilt/hopelessness/worthlessness - No  Changes in Sleep - No  Changes in Appetite - No  Changes in Concentration - No  Changes in Energy - No  PMA/R- No  Suicidal- active/passive ideations - No  Homicidal ideations: active/passive ideations - No    Hallucinations - Yes  Delusions - Yes  Disorganized behavior - No  Disorganized speech - Yes  Negative symptoms - No    Elevated mood - fluctuating  Decreased need for sleep - No  Grandiosity - " No  Racing thoughts - No  Impulsivity - No  Irritability- No  Increased energy - No  Distractibility - Yes  Increase in goal-directed activity or PMA- No    Symptoms of KANU - No  Symptoms of Panic Disorder- No  Symptoms of PTSD - No        Overall progress: Patient is showing no improvement on the Unit to date        Psychotherapy:  Target symptoms: substance abuse, psychosis  Why chosen therapy is appropriate versus another modality: relevant to diagnosis, evidence based practice  Outcome monitoring methods: self-report, observation  Therapeutic intervention type: interactive psychotherapy  Topics discussed/themes: building skills sets for symptom management, symptom recognition  The patient's response to the intervention is guarded. The patient's progress toward treatment goals is limited.   Duration of intervention: 20 minutes.        Medical ROS  Review of Systems   Constitutional: Negative.    HENT: Negative.     Eyes: Negative.    Respiratory: Negative.     Cardiovascular: Negative.    Gastrointestinal: Negative.    Genitourinary: Negative.    Musculoskeletal: Negative.    Skin: Negative.    Neurological: Negative.    Endo/Heme/Allergies: Negative.          PAST MEDICAL HISTORY   Past Medical History:   Diagnosis Date    History of psychiatric hospitalization 09/06/2023    [facility]     of psychiatric care     Psychiatric problem     Schizophrenia     Suicide attempt            PSYCHOTROPIC MEDICATIONS   Scheduled Meds:   famotidine  20 mg Oral BID    mirtazapine  15 mg Oral QHS    valproic acid (as sodium salt)  500 mg Oral Q12H     Continuous Infusions:  PRN Meds:.  Current Facility-Administered Medications:     acetaminophen, 650 mg, Oral, Q8H PRN    melatonin, 6 mg, Oral, Nightly PRN    ondansetron, 8 mg, Oral, Q8H PRN        EXAMINATION    VITALS   Vitals:    04/05/25 1951 04/06/25 0831 04/06/25 1929 04/07/25 0739   BP: 107/66 119/69 (!) 125/57 108/74   BP Location: Left arm Left arm Left arm Left arm  "  Patient Position: Lying Sitting Sitting Sitting   Pulse: 66 82 73 84   Resp: 18 18 18 20   Temp: 97.9 °F (36.6 °C) 97.7 °F (36.5 °C) 98.2 °F (36.8 °C) 98.2 °F (36.8 °C)   TempSrc: Oral Oral Oral Oral   SpO2: 99% 100% 98% 98%   Weight: 72.6 kg (160 lb 0.9 oz)      Height: 6' 3" (1.905 m)          Body mass index is 20.01 kg/m².        CONSTITUTIONAL  General Appearance: unremarkable, age appropriate    MUSCULOSKELETAL  Muscle Strength and Tone:no tremor, no tic  Abnormal Involuntary Movements: No  Gait and Station: non-ataxic    PSYCHIATRIC   Level of Consciousness: awake and alert   Orientation: person, place, and situation  Grooming: Casually dressed and Well groomed  Psychomotor Behavior: normal, cooperative  Speech: normal tone, normal pitch, normal volume  Language: grossly intact  Mood: great  Affect: Intense  Thought Process: circumstantial  Associations: tangential   Thought Content: +delusions, denies SI, and denies HI  Perceptions: denies AH, denies  VH, and +observed RIS   Memory: Able to recall past events, Remote intact, and Recent intact  Attention:Impaired to some degree     Fund of Knowledge: Aware of current events and Vocabulary appropriate   Estimate if Intelligence:  Average based on work/education history, vocabulary and mental status exam  Insight: limited awareness of illness  Judgment: limited        DIAGNOSTIC TESTING   Laboratory Results  No results found for this or any previous visit (from the past 24 hours).          MEDICAL DECISION MAKING      ASSESSMENT:   Schizoaffective disorder  Anxiety disorder, unspecified  Polysubstance abuse  Nicotine Dependence  Anemia        Medical decision making/Formulation:  Schizoaffective disorder, bipolar type, severe  - Verify last CARRASCO, will hold off on starting additional risperdal until CARRASCO verified.   - Plan for CARRASCO  - resume depakote at 500 mg PO BID, will check level for toxicity  - resume remeron 15 mg PO qhs  - pt counseled  - follow up with " outpatient mental health providers after discharge for medication management and psychotherapy        Unspecified anxiety disorder  - start hydroxyzine 50 mg PO q 6 hours PRN  - pt counseled  - follow up with outpatient mental health providers after discharge for medication management and psychotherapy        Cannabis abuse  - pt counseled        Psychosocial stressors  - pt counseled  - SW consulted for assistance with additional resources      Nicotine dependence  - Continue nicotine patch 14 mg PO qd PRN        Anemia  - FM consulted          Discussed diagnosis, risks and benefits of proposed treatment vs alternative treatments vs no treatment, potential side effects of these treatments and the inherent unpredictability of treatment. The patient expresses understanding of the above and displays the capacity to agree with this treatment given said understanding. Patient also agrees that, currently, the benefits outweigh the risks and would like to pursue/continue treatment at this time.    Any medications being used off-label were discussed with the patient inclusive of the evidence base for the use of the medications and consent was obtained for the off-label use of the medication.       DISCHARGE PLANNING  Expected Disposition Plan: to be determined      NEED FOR CONTINUED HOSPITALIZATION  Psychiatric illness continues to pose a potential threat to life or bodily function, of self or others, thereby requiring the need for continued inpatient psychiatric hospitalization: Yes, due to: significant psychotic thought disorder, as evidenced by:  Ongoing concerns with grave disability with patient unable to perform basic feeding, hygiene and dressing activities without significant constant support.    Protective inpatient pyschiatric hospitalization required while a safe disposition plan is enacted: Yes    Patient stabilized and ready for discharge from inpatient psychiatric unit: No        STAFF:   Jl Aguilar  NP  Psychiatry

## 2025-04-07 NOTE — PLAN OF CARE
Problem: Adult Behavioral Health Plan of Care  Goal: Plan of Care Review  Outcome: Progressing  Goal: Patient-Specific Goal (Individualization)  Outcome: Progressing  Goal: Adheres to Safety Considerations for Self and Others  Outcome: Progressing  Goal: Absence of New-Onset Illness or Injury  Outcome: Progressing  Goal: Optimized Coping Skills in Response to Life Stressors  Outcome: Progressing  Goal: Develops/Participates in Therapeutic [place] to Support Successful Transition  Outcome: Progressing  Goal: Rounds/Family Conference  Outcome: Progressing     Problem: Excessive Substance Use  Goal: Optimized Energy Level (Excessive Substance Use)  Outcome: Progressing  Goal: Improved Behavioral Control (Excessive Substance Use)  Outcome: Progressing  Goal: Increased Participation and Engagement (Excessive Substance Use)  Outcome: Progressing  Goal: Improved Physiologic Symptoms (Excessive Substance Use)  Outcome: Progressing  Goal: Enhanced Social, Occupational or Functional Skills (Excessive Substance Use)  Outcome: Progressing     Problem: Depressive Signs/Symptoms  Goal: Optimized Energy Level (Depressive Signs/Symptoms)  Outcome: Progressing  Goal: Optimized Cognitive Function (Depressive Signs/Symptoms)  Outcome: Progressing  Goal: Increased Participation and Engagement (Depressive Signs/Symptoms)  Outcome: Progressing  Goal: Enhanced Self-Esteem and Confidence (Depressive Signs/Symptoms)  Outcome: Progressing  Goal: Improved Mood Symptoms (Depressive Signs/Symptoms)  Outcome: Progressing  Goal: Optimized Nutrition Intake (Depressive Signs/Symptoms)  Outcome: Progressing  Goal: Improved Psychomotor Symptoms (Depressive Signs/Symptoms)  Outcome: Progressing  Goal: Improved Sleep (Depressive Signs/Symptoms)  Outcome: Progressing  Goal: Enhanced Social, Occupational or Functional Skills (Depressive Signs/Symptoms)  Outcome: Progressing     Problem: Psychotic Signs/Symptoms  Goal: Improved Behavioral Control  (Psychotic Signs/Symptoms)  Outcome: Progressing  Goal: Optimal Cognitive Function (Psychotic Signs/Symptoms)  Outcome: Progressing  Goal: Increased Participation and Engagement (Psychotic Signs/Symptoms)  Outcome: Progressing  Goal: Improved Mood Symptoms (Psychotic Signs/Symptoms)  Outcome: Progressing  Goal: Improved Psychomotor Symptoms (Psychotic Signs/Symptoms)  Outcome: Progressing  Goal: Decreased Sensory Symptoms (Psychotic Signs/Symptoms)  Outcome: Progressing  Goal: Improved Sleep (Psychotic Signs/Symptoms)  Outcome: Progressing  Goal: Enhanced Social, Occupational or Functional Skills (Psychotic Signs/Symptoms)  Outcome: Progressing

## 2025-04-07 NOTE — HPI
Encounter Date: 4/5/2025        History           Chief Complaint   Patient presents with    Mental Health Problem       Family reports pt is not acting normal. EMS reports pt is having hallucinations.        24-year-old male history of schizophrenia, multiple psychiatric hospitalizations in the past presents the ER via EMS, EMS reports family states he is not acting right.  He does appear to be talking to himself, inappropriate laughter.  No answer certain questions.  He denies SI or HI.  Unable to be redirected        4/7/25:  Patient presented to hospital for psychiatric evaluation per family report of him not acting his usual self.  Unable to be re-directed in the ER.  Depakote level low upon arrival to ER.

## 2025-04-08 PROCEDURE — 11400000 HC PSYCH PRIVATE ROOM

## 2025-04-08 PROCEDURE — 90833 PSYTX W PT W E/M 30 MIN: CPT | Mod: SA,HB,, | Performed by: PSYCHIATRY & NEUROLOGY

## 2025-04-08 PROCEDURE — 99232 SBSQ HOSP IP/OBS MODERATE 35: CPT | Mod: SA,HB,, | Performed by: PSYCHIATRY & NEUROLOGY

## 2025-04-08 PROCEDURE — 25000003 PHARM REV CODE 250: Performed by: PSYCHIATRY & NEUROLOGY

## 2025-04-08 PROCEDURE — 25000003 PHARM REV CODE 250: Performed by: EMERGENCY MEDICINE

## 2025-04-08 RX ADMIN — Medication 6 MG: at 08:04

## 2025-04-08 RX ADMIN — VALPROIC ACID 500 MG: 250 SOLUTION ORAL at 09:04

## 2025-04-08 RX ADMIN — MIRTAZAPINE 15 MG: 15 TABLET, FILM COATED ORAL at 08:04

## 2025-04-08 RX ADMIN — VALPROIC ACID 500 MG: 250 SOLUTION ORAL at 08:04

## 2025-04-08 NOTE — PLAN OF CARE
POC reviewed this shift and is on going. Patient is withdrawn, depressed, calm, cooperative, quiet, anxious, and sleeping. Denies Suicidal Ideation, Homicidal Ideation, Auditory Hallucinations, Visual Hallucinations, Tactile Hallucinations, Gustatory Hallucinations, and Delusions. Patient has been isolating at times in his room today. Patient seems to be improving as the days go by. Patient is due a depakote level on Friday the 11th. Pt continues to be medication compliant and staff will continue to monitor pt closely while on the unit.

## 2025-04-08 NOTE — PLAN OF CARE
04/08/25 1553   Step 1: Warning Signs   Warning Sign 1 Poor hygiene   Warning Sign 2 Talking to myself   Warning Sign 3 Not being able to focus   Step 2: Internal coping strategies - Things I can do to take my mind off my problems without contacting another person:   Coping Strategy 1 music   Coping Strategy 2 swimming   Coping Strategy 3 dancing   Step 3: People and social settings that provide distraction:   1. Name Nabil Soria (mother)       Phone [phone]   2. Name Ryley (cousin)       Phone on cell phone   3. Place My room   4. Place [facility]    Step 4: People whom I can ask for help:   1. Person Amy (cousin)       Phone on cell phone   2. Person Ingrid (cousin)       Phone on cell phone   3. Person Nabil Soria (mother)       Phone [phone]   Step 5: Professionals or agencies I can contact during a crisis:   1. Clinician Name [facility]       Phone [phone]   2. Clinician Name Daphnie       Phone [phone]   3. Suicide Prevention Lifeline: 988 Suicide Prevention Lifeline: 988   4. [facility]       [facility]       Emergency Service Address [address] [place] [address]       Emergency Services Phone [phone]   Step 6: Making the environment safer (plan for lethal means safety)   Safe Environment Plan Contact professional assistance and take medications as prescribed.   Safe Environment Optional: What is most important to me and worth living for? Family   Safety Plan Tasks   Provided a Hard Copy to the Patient Y   Explained How to Follow the Steps Y   Discussed Facilitators and Barriers Y
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
## 2025-04-08 NOTE — PROGRESS NOTES
"PSYCHIATRY DAILY INPATIENT PROGRESS NOTE  SUBSEQUENT HOSPITAL VISIT    ENCOUNTER DATE: 4/8/2025  SITE: [facility]    DATE OF ADMISSION: 4/5/2025  5:30 PM  LENGTH OF STAY: 3 days      CHIEF COMPLAINT   Bishop Soria is a 24 y.o. 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 The chart was reviewed.     Reviewed notes from Rns and MD and labs from the last 24 hours.    The patient's case was discussed with the treatment team/care providers today including Rns and MD    Staff reports no behavioral or management issues.     The patient has been compliant with treatment.      Subjective 04/08/2025    Patient remains circumstantial but fairly easy to redirect. When discussing circumstances leading up to this hospitalization, patient states "Sometimes certain fisher of energy will come up in my head, and I'll end up in the hospital." He acknowledges that cannabis use likely contributes to symptoms, however this may be superficial as patient was praising the effects of THC as recently as yesterday. He does continue to express interest in inpatient rehab upon discharge from [facility].     Pt denies SE to current medication regimen.   Daphnie confirmed last Invega Sustenna administered May 21, 2025.     Patient continues to smile at inappropriate times, however does not appear to be talking to himself as he was yesterday.                     The patient denies any side effects to medications.        Interim/overnight events per report/notes:          Psychiatric ROS (observed, reported, or endorsed/denied):  Depressed mood - None  Interest/pleasure/anhedonia: No  Guilt/hopelessness/worthlessness - No  Changes in Sleep - No  Changes in Appetite - No  Changes in Concentration - No  Changes in Energy - No  PMA/R- No  Suicidal- active/passive ideations - No  Homicidal ideations: active/passive ideations - " No    Hallucinations - less  Delusions - fluctuating  Disorganized behavior - No  Disorganized speech - Yes  Negative symptoms - No    Elevated mood - fluctuating  Decreased need for sleep - No  Grandiosity - No  Racing thoughts - No  Impulsivity - No  Irritability- No  Increased energy - No  Distractibility - Yes  Increase in goal-directed activity or PMA- No    Symptoms of KANU - No  Symptoms of Panic Disorder- No  Symptoms of PTSD - No        Overall progress: Patient is showing mild improvement         Psychotherapy:  Target symptoms: substance abuse, psychosis  Why chosen therapy is appropriate versus another modality: relevant to diagnosis, evidence based practice  Outcome monitoring methods: self-report, observation  Therapeutic intervention type: interactive psychotherapy  Topics discussed/themes: building skills sets for symptom management, symptom recognition  The patient's response to the intervention is guarded. The patient's progress toward treatment goals is limited.   Duration of intervention: 20 minutes.        Medical ROS  Review of Systems   Constitutional: Negative.    HENT: Negative.     Eyes: Negative.    Respiratory: Negative.     Cardiovascular: Negative.    Gastrointestinal: Negative.    Genitourinary: Negative.    Musculoskeletal: Negative.    Skin: Negative.    Neurological: Negative.    Endo/Heme/Allergies: Negative.          PAST MEDICAL HISTORY   Past Medical History:   Diagnosis Date    History of psychiatric hospitalization 09/06/2023    [facility] care     Psychiatric problem     Schizophrenia     Suicide attempt            PSYCHOTROPIC MEDICATIONS   Scheduled Meds:   mirtazapine  15 mg Oral QHS    valproic acid (as sodium salt)  500 mg Oral Q12H     Continuous Infusions:  PRN Meds:.  Current Facility-Administered Medications:     acetaminophen, 650 mg, Oral, Q8H PRN    melatonin, 6 mg, Oral, Nightly PRN    ondansetron, 8 mg, Oral, Q8H  "PRN        EXAMINATION    VITALS   Vitals:    04/06/25 1929 04/07/25 0739 04/07/25 1930 04/08/25 0737   BP: (!) 125/57 108/74 104/62 101/64   BP Location: Left arm Left arm Left arm Left arm   Patient Position: Sitting Sitting Sitting Sitting   Pulse: 73 84 76 (!) 59   Resp: 18 20 16 20   Temp: 98.2 °F (36.8 °C) 98.2 °F (36.8 °C) (!) 94 °F (34.4 °C) 98 °F (36.7 °C)   TempSrc: Oral Oral Oral Oral   SpO2: 98% 98% 99% 99%   Weight:       Height:           Body mass index is 20.01 kg/m².        CONSTITUTIONAL  General Appearance: unremarkable, age appropriate    MUSCULOSKELETAL  Muscle Strength and Tone:no tremor, no tic  Abnormal Involuntary Movements: No  Gait and Station: non-ataxic    PSYCHIATRIC   Level of Consciousness: awake and alert   Orientation: person, place, and situation  Grooming: Casually dressed and Well groomed  Psychomotor Behavior: normal, cooperative  Speech: normal tone, normal pitch, normal volume  Language: grossly intact  Mood: "Good"  Affect: Grossly appropriate, occasional inappropriate smiling  Thought Process: circumstantial  Associations: circumstantial   Thought Content: +delusions, denies SI, and denies HI  Perceptions: denies AH and denies  VH  Memory: Able to recall past events, Remote intact, and Recent intact  Attention:Impaired to some degree     Fund of Knowledge: Aware of current events and Vocabulary appropriate   Estimate if Intelligence:  Average based on work/education history, vocabulary and mental status exam  Insight: limited awareness of illness  Judgment: limited        DIAGNOSTIC TESTING   Laboratory Results  No results found for this or any previous visit (from the past 24 hours).          MEDICAL DECISION MAKING      ASSESSMENT:   Schizoaffective disorder  Anxiety disorder, unspecified  Polysubstance abuse  Nicotine Dependence  Anemia        Medical decision making/Formulation:  Schizoaffective disorder, bipolar type, severe  - Verify last CARRASCO, will hold off on starting " additional risperdal until CARRASCO verified.   - Plan for CARRASCO  - resume depakote at 500 mg PO BID, will check level for toxicity  - resume remeron 15 mg PO qhs  - pt counseled  - follow up with outpatient mental health providers after discharge for medication management and psychotherapy        Unspecified anxiety disorder  - start hydroxyzine 50 mg PO q 6 hours PRN  - pt counseled  - follow up with outpatient mental health providers after discharge for medication management and psychotherapy        Cannabis abuse  - pt counseled        Psychosocial stressors  - pt counseled  - SW consulted for assistance with additional resources      Nicotine dependence  - Continue nicotine patch 14 mg PO qd PRN        Anemia  - FM consulted          Discussed diagnosis, risks and benefits of proposed treatment vs alternative treatments vs no treatment, potential side effects of these treatments and the inherent unpredictability of treatment. The patient expresses understanding of the above and displays the capacity to agree with this treatment given said understanding. Patient also agrees that, currently, the benefits outweigh the risks and would like to pursue/continue treatment at this time.    Any medications being used off-label were discussed with the patient inclusive of the evidence base for the use of the medications and consent was obtained for the off-label use of the medication.       DISCHARGE PLANNING  Expected Disposition Plan: to be determined      NEED FOR CONTINUED HOSPITALIZATION  Psychiatric illness continues to pose a potential threat to life or bodily function, of self or others, thereby requiring the need for continued inpatient psychiatric hospitalization: Yes, due to: significant psychotic thought disorder, as evidenced by:  Ongoing concerns with grave disability with patient unable to perform basic feeding, hygiene and dressing activities without significant constant support.    Protective inpatient  pyschiatric hospitalization required while a safe disposition plan is enacted: Yes    Patient stabilized and ready for discharge from inpatient psychiatric unit: No        STAFF:   Jl Aguilar NP  Psychiatry

## 2025-04-08 NOTE — PLAN OF CARE
Problem: Adult Behavioral Health Plan of Care  Goal: Develops/Participates in Therapeutic [place] to Support Successful Transition  Intervention: Mutually Develop Transition Plan  Flowsheets (Taken 4/8/2025 1609)  Current Discharge Risk: psychiatric illness  Readmission Within the Last 30 Days: no previous admission in last 30 days  Outpatient/Agency/Support Group Needs:   ACT (assertive community treatment) team   outpatient medication management   outpatient counseling  Transition Support:   follow-up care discussed   community resources reviewed   crisis management plan promoted   crisis management plan verbalized  Anticipated Discharge Disposition: home or self-care  Transportation Concerns: none  Patient/Family Anticipated Services at Transition:   mental health services   rehabilitation services  Patient/Family Anticipates Transition to: inpatient rehabilitation facility  Transportation Anticipated: health plan transportation  Concerns to be Addressed:   medication   mental health  Offered/Gave Vendor List: yes

## 2025-04-08 NOTE — PLAN OF CARE
POC reviewed this shift and is ongoing.  Pt cooperative with staff and withdrawn to peers at this time. Pt on unit for vs, and snacks. Pt sat in dinning room for a short period then went to his room. Pt sleep with no ss of distress. Staff continue to monitor for changes and safety.

## 2025-04-09 PROCEDURE — 25000003 PHARM REV CODE 250: Performed by: PSYCHIATRY & NEUROLOGY

## 2025-04-09 PROCEDURE — 25000003 PHARM REV CODE 250: Performed by: STUDENT IN AN ORGANIZED HEALTH CARE EDUCATION/TRAINING PROGRAM

## 2025-04-09 PROCEDURE — 11400000 HC PSYCH PRIVATE ROOM

## 2025-04-09 PROCEDURE — 90833 PSYTX W PT W E/M 30 MIN: CPT | Mod: SA,HB,, | Performed by: PSYCHIATRY & NEUROLOGY

## 2025-04-09 PROCEDURE — 99232 SBSQ HOSP IP/OBS MODERATE 35: CPT | Mod: SA,HB,, | Performed by: PSYCHIATRY & NEUROLOGY

## 2025-04-09 RX ORDER — GABAPENTIN 100 MG/1
100 CAPSULE ORAL 3 TIMES DAILY
Status: DISCONTINUED | OUTPATIENT
Start: 2025-04-09 | End: 2025-04-11 | Stop reason: HOSPADM

## 2025-04-09 RX ADMIN — GABAPENTIN 100 MG: 100 CAPSULE ORAL at 01:04

## 2025-04-09 RX ADMIN — VALPROIC ACID 500 MG: 250 SOLUTION ORAL at 08:04

## 2025-04-09 RX ADMIN — MIRTAZAPINE 15 MG: 15 TABLET, FILM COATED ORAL at 08:04

## 2025-04-09 RX ADMIN — GABAPENTIN 100 MG: 100 CAPSULE ORAL at 08:04

## 2025-04-09 NOTE — PLAN OF CARE
Care plan reviewed with patient. Will monitor progression of plan with patient.    Problem: Adult Behavioral Health Plan of Care  Goal: Plan of Care Review  Outcome: Progressing  Goal: Patient-Specific Goal (Individualization)  Outcome: Progressing  Goal: Adheres to Safety Considerations for Self and Others  Outcome: Progressing  Goal: Absence of New-Onset Illness or Injury  Outcome: Progressing  Goal: Optimized Coping Skills in Response to Life Stressors  Outcome: Progressing  Goal: Develops/Participates in Therapeutic [place] to Support Successful Transition  Outcome: Progressing  Goal: Rounds/Family Conference  Outcome: Progressing     Problem: Excessive Substance Use  Goal: Optimized Energy Level (Excessive Substance Use)  Outcome: Progressing  Goal: Improved Behavioral Control (Excessive Substance Use)  Outcome: Progressing  Goal: Increased Participation and Engagement (Excessive Substance Use)  Outcome: Progressing  Goal: Improved Physiologic Symptoms (Excessive Substance Use)  Outcome: Progressing  Goal: Enhanced Social, Occupational or Functional Skills (Excessive Substance Use)  Outcome: Progressing     Problem: Depressive Signs/Symptoms  Goal: Optimized Energy Level (Depressive Signs/Symptoms)  Outcome: Progressing  Goal: Optimized Cognitive Function (Depressive Signs/Symptoms)  Outcome: Progressing  Goal: Increased Participation and Engagement (Depressive Signs/Symptoms)  Outcome: Progressing  Goal: Enhanced Self-Esteem and Confidence (Depressive Signs/Symptoms)  Outcome: Progressing  Goal: Improved Mood Symptoms (Depressive Signs/Symptoms)  Outcome: Progressing  Goal: Optimized Nutrition Intake (Depressive Signs/Symptoms)  Outcome: Progressing  Goal: Improved Psychomotor Symptoms (Depressive Signs/Symptoms)  Outcome: Progressing  Goal: Improved Sleep (Depressive Signs/Symptoms)  Outcome: Progressing  Goal: Enhanced Social, Occupational or Functional Skills (Depressive Signs/Symptoms)  Outcome:  Progressing     Problem: Psychotic Signs/Symptoms  Goal: Improved Behavioral Control (Psychotic Signs/Symptoms)  Outcome: Progressing  Goal: Optimal Cognitive Function (Psychotic Signs/Symptoms)  Outcome: Progressing  Goal: Increased Participation and Engagement (Psychotic Signs/Symptoms)  Outcome: Progressing  Goal: Improved Mood Symptoms (Psychotic Signs/Symptoms)  Outcome: Progressing  Goal: Improved Psychomotor Symptoms (Psychotic Signs/Symptoms)  Outcome: Progressing  Goal: Decreased Sensory Symptoms (Psychotic Signs/Symptoms)  Outcome: Progressing  Goal: Improved Sleep (Psychotic Signs/Symptoms)  Outcome: Progressing  Goal: Enhanced Social, Occupational or Functional Skills (Psychotic Signs/Symptoms)  Outcome: Progressing

## 2025-04-09 NOTE — NURSING
"POC reviewed and continued on DOC. Patient reports he is feeling good today and says "I was hearing some stuff earlier but not anymore." Patient has been compliant with medication regimen as prescribed by the provider. Patient has not made any bizarre statements today and answers questions appropriately. Pt now denying SI/HI/AH/VH. Patient has ate 100% of breakfast and lunch. Patient has had about an even amount of time between being in the activity room and own room today. Will continue to monitor pt while on the unit.  "

## 2025-04-09 NOTE — PLAN OF CARE
Problem: Adult Behavioral Health Plan of Care  Goal: Plan of Care Review  Outcome: Progressing  Goal: Patient-Specific Goal (Individualization)  Outcome: Progressing  Goal: Adheres to Safety Considerations for Self and Others  Outcome: Progressing  Goal: Absence of New-Onset Illness or Injury  Outcome: Progressing  Goal: Optimized Coping Skills in Response to Life Stressors  Outcome: Progressing     Problem: Excessive Substance Use  Goal: Optimized Energy Level (Excessive Substance Use)  Outcome: Progressing  Goal: Improved Behavioral Control (Excessive Substance Use)  Outcome: Progressing  Goal: Increased Participation and Engagement (Excessive Substance Use)  Outcome: Progressing  Goal: Improved Physiologic Symptoms (Excessive Substance Use)  Outcome: Progressing  Goal: Enhanced Social, Occupational or Functional Skills (Excessive Substance Use)  Outcome: Progressing     Problem: Depressive Signs/Symptoms  Goal: Optimized Energy Level (Depressive Signs/Symptoms)  Outcome: Progressing  Goal: Optimized Cognitive Function (Depressive Signs/Symptoms)  Outcome: Progressing  Goal: Increased Participation and Engagement (Depressive Signs/Symptoms)  Outcome: Progressing  Goal: Enhanced Self-Esteem and Confidence (Depressive Signs/Symptoms)  Outcome: Progressing  Goal: Improved Mood Symptoms (Depressive Signs/Symptoms)  Outcome: Progressing  Goal: Optimized Nutrition Intake (Depressive Signs/Symptoms)  Outcome: Progressing  Goal: Improved Psychomotor Symptoms (Depressive Signs/Symptoms)  Outcome: Progressing  Goal: Improved Sleep (Depressive Signs/Symptoms)  Outcome: Progressing  Goal: Enhanced Social, Occupational or Functional Skills (Depressive Signs/Symptoms)  Outcome: Progressing     Problem: Psychotic Signs/Symptoms  Goal: Improved Behavioral Control (Psychotic Signs/Symptoms)  Outcome: Progressing  Goal: Optimal Cognitive Function (Psychotic Signs/Symptoms)  Outcome: Progressing  Goal: Increased Participation and  Engagement (Psychotic Signs/Symptoms)  Outcome: Progressing  Goal: Improved Mood Symptoms (Psychotic Signs/Symptoms)  Outcome: Progressing  Goal: Improved Psychomotor Symptoms (Psychotic Signs/Symptoms)  Outcome: Progressing  Goal: Decreased Sensory Symptoms (Psychotic Signs/Symptoms)  Outcome: Progressing  Goal: Improved Sleep (Psychotic Signs/Symptoms)  Outcome: Progressing  Goal: Enhanced Social, Occupational or Functional Skills (Psychotic Signs/Symptoms)  Outcome: Progressing

## 2025-04-09 NOTE — PROGRESS NOTES
PSYCHIATRY DAILY INPATIENT PROGRESS NOTE  SUBSEQUENT HOSPITAL VISIT    ENCOUNTER DATE: 4/9/2025  SITE: 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 The chart was reviewed.     Reviewed notes from Rns and MD and labs from the last 24 hours.    The patient's case was discussed with the treatment team/care providers today including Rns and MD    Staff reports no behavioral or management issues.     The patient has been compliant with treatment.      Subjective 04/09/2025    Patient demonstrating improvement- Does not appear to be actively responding to internal stimuli during assessment or while being observed in milieu. Pt reports he will not be going to rehab due to an upcoming court date (verified by patient's mother) and will be returning to his mother's home. Patient counseled at length about risk of substance abuse. He verbalized understanding and states he will try to go to rehab after his court appearance.              The patient denies any side effects to medications.        Interim/overnight events per report/notes:          Psychiatric ROS (observed, reported, or endorsed/denied):  Depressed mood - None  Interest/pleasure/anhedonia: No  Guilt/hopelessness/worthlessness - No  Changes in Sleep - No  Changes in Appetite - No  Changes in Concentration - No  Changes in Energy - No  PMA/R- No  Suicidal- active/passive ideations - No  Homicidal ideations: active/passive ideations - No    Hallucinations - less  Delusions - fluctuating  Disorganized behavior - No  Disorganized speech - Yes  Negative symptoms - No    Elevated mood - fluctuating  Decreased need for sleep - No  Grandiosity - No  Racing thoughts - No  Impulsivity - No  Irritability- No  Increased energy -  No  Distractibility - Yes  Increase in goal-directed activity or PMA- No    Symptoms of KANU - No  Symptoms of Panic Disorder- No  Symptoms of PTSD - No        Overall progress: Patient is showing mild improvement         Psychotherapy:  Target symptoms: substance abuse, psychosis  Why chosen therapy is appropriate versus another modality: relevant to diagnosis, evidence based practice  Outcome monitoring methods: self-report, observation  Therapeutic intervention type: interactive psychotherapy  Topics discussed/themes: building skills sets for symptom management, symptom recognition  The patient's response to the intervention is guarded. The patient's progress toward treatment goals is limited.   Duration of intervention: 20 minutes.        Medical ROS  Review of Systems   Constitutional: Negative.    HENT: Negative.     Eyes: Negative.    Respiratory: Negative.     Cardiovascular: Negative.    Gastrointestinal: Negative.    Genitourinary: Negative.    Musculoskeletal: Negative.    Skin: Negative.    Neurological: Negative.    Endo/Heme/Allergies: Negative.          PAST MEDICAL HISTORY   Past Medical History:   Diagnosis Date    History of psychiatric hospitalization 09/06/2023    Daniel MAYO     of psychiatric care     Psychiatric problem     Schizoaffective disorder     Schizophrenia     Substance abuse     Suicide attempt            PSYCHOTROPIC MEDICATIONS   Scheduled Meds:   mirtazapine  15 mg Oral QHS    valproic acid (as sodium salt)  500 mg Oral Q12H     Continuous Infusions:  PRN Meds:.  Current Facility-Administered Medications:     acetaminophen, 650 mg, Oral, Q8H PRN    melatonin, 6 mg, Oral, Nightly PRN    ondansetron, 8 mg, Oral, Q8H PRN        EXAMINATION    VITALS   Vitals:    04/07/25 1930 04/08/25 0737 04/08/25 1903 04/09/25 0719   BP: 104/62 101/64 111/64 108/67   BP Location: Left arm Left arm Left arm Left arm   Patient Position: Sitting Sitting Sitting Sitting   Pulse: 76 (!) 59 66 66  "  Resp: 16 20 20 18   Temp: (!) 94 °F (34.4 °C) 98 °F (36.7 °C) 98.9 °F (37.2 °C) 99.2 °F (37.3 °C)   TempSrc: Oral Oral Oral Oral   SpO2: 99% 99% 100% 99%   Weight:       Height:           Body mass index is 20.01 kg/m².        CONSTITUTIONAL  General Appearance: unremarkable, age appropriate    MUSCULOSKELETAL  Muscle Strength and Tone:no tremor, no tic  Abnormal Involuntary Movements: No  Gait and Station: non-ataxic    PSYCHIATRIC   Level of Consciousness: awake and alert   Orientation: person, place, and situation  Grooming: Casually dressed and Well groomed  Psychomotor Behavior: normal, cooperative  Speech: normal tone, normal pitch, normal volume  Language: grossly intact  Mood: "Good"  Affect: Grossly appropriate, occasional inappropriate smiling  Thought Process: circumstantial  Associations: circumstantial   Thought Content: less delusions, denies SI, and denies HI  Perceptions: denies AH and denies  VH  Memory: Able to recall past events, Remote intact, and Recent intact  Attention:Impaired to some degree     Fund of Knowledge: Aware of current events and Vocabulary appropriate   Estimate if Intelligence:  Average based on work/education history, vocabulary and mental status exam  Insight: limited awareness of illness  Judgment: limited        DIAGNOSTIC TESTING   Laboratory Results  No results found for this or any previous visit (from the past 24 hours).          MEDICAL DECISION MAKING      ASSESSMENT:   Schizoaffective disorder  Anxiety disorder, unspecified  Polysubstance abuse  Nicotine Dependence  Anemia        Medical decision making/Formulation:  Schizoaffective disorder, bipolar type, severe  - Verify last CARRASCO, will hold off on starting additional risperdal until CARRASCO verified.   - Plan for CARRASCO  - resume depakote at 500 mg PO BID, will check level for toxicity- Check level tomorrow  - resume remeron 15 mg PO qhs  - pt counseled  - follow up with outpatient mental health providers after discharge " for medication management and psychotherapy        Unspecified anxiety disorder  - start hydroxyzine 50 mg PO q 6 hours PRN  - pt counseled  - follow up with outpatient mental health providers after discharge for medication management and psychotherapy        Cannabis abuse  - pt counseled  -Initiate gabapentin 100 mg TID off-label for cannabis dependence        Psychosocial stressors  - pt counseled  - SW consulted for assistance with additional resources      Nicotine dependence  - Continue nicotine patch 14 mg PO qd PRN        Anemia  - FM consulted          Discussed diagnosis, risks and benefits of proposed treatment vs alternative treatments vs no treatment, potential side effects of these treatments and the inherent unpredictability of treatment. The patient expresses understanding of the above and displays the capacity to agree with this treatment given said understanding. Patient also agrees that, currently, the benefits outweigh the risks and would like to pursue/continue treatment at this time.    Any medications being used off-label were discussed with the patient inclusive of the evidence base for the use of the medications and consent was obtained for the off-label use of the medication.       DISCHARGE PLANNING  Expected Disposition Plan: to be determined      NEED FOR CONTINUED HOSPITALIZATION  Psychiatric illness continues to pose a potential threat to life or bodily function, of self or others, thereby requiring the need for continued inpatient psychiatric hospitalization: Yes, due to: significant psychotic thought disorder, as evidenced by:  Ongoing concerns with grave disability with patient unable to perform basic feeding, hygiene and dressing activities without significant constant support.    Protective inpatient pyschiatric hospitalization required while a safe disposition plan is enacted: Yes    Patient stabilized and ready for discharge from inpatient psychiatric unit: No        STAFF:    Jl Aguilar, NP  Psychiatry

## 2025-04-09 NOTE — NURSING
"POC reviewed this shift and is ongoing.  Pt is cooperative with care and compliant with medications.  Pt continues to display inappropriate affect, however no RIS observed this shift.  Pt was visible in the milieu and did have some brief interactions with peers.  Pt's comments and answers are appropriate at times.  Occasionally pt makes a bizarre statement.  When asked to clarify, pt just says, "Nevermind."    "

## 2025-04-10 LAB — VALPROATE SERPL-MCNC: 54.6 UG/ML (ref 50–100)

## 2025-04-10 PROCEDURE — 25000003 PHARM REV CODE 250: Performed by: STUDENT IN AN ORGANIZED HEALTH CARE EDUCATION/TRAINING PROGRAM

## 2025-04-10 PROCEDURE — 25000003 PHARM REV CODE 250: Performed by: PSYCHIATRY & NEUROLOGY

## 2025-04-10 PROCEDURE — 11400000 HC PSYCH PRIVATE ROOM

## 2025-04-10 PROCEDURE — 99232 SBSQ HOSP IP/OBS MODERATE 35: CPT | Mod: SA,HB,, | Performed by: PSYCHIATRY & NEUROLOGY

## 2025-04-10 PROCEDURE — 90833 PSYTX W PT W E/M 30 MIN: CPT | Mod: SA,HB,, | Performed by: PSYCHIATRY & NEUROLOGY

## 2025-04-10 PROCEDURE — 36415 COLL VENOUS BLD VENIPUNCTURE: CPT | Performed by: PSYCHIATRY & NEUROLOGY

## 2025-04-10 PROCEDURE — 80164 ASSAY DIPROPYLACETIC ACD TOT: CPT | Performed by: PSYCHIATRY & NEUROLOGY

## 2025-04-10 RX ADMIN — VALPROIC ACID 500 MG: 250 SOLUTION ORAL at 08:04

## 2025-04-10 RX ADMIN — GABAPENTIN 100 MG: 100 CAPSULE ORAL at 08:04

## 2025-04-10 RX ADMIN — GABAPENTIN 100 MG: 100 CAPSULE ORAL at 02:04

## 2025-04-10 RX ADMIN — MIRTAZAPINE 15 MG: 15 TABLET, FILM COATED ORAL at 08:04

## 2025-04-10 NOTE — NURSING
POC reviewed this shift and is ongoing.  Pt is cooperative with care and compliant with medications.  Pt denies SI/HI/AVH.  Pt visible in the milieu, remains isolated to self, mostly walking in the unit halls.  Pt voices readiness for discharge.

## 2025-04-10 NOTE — PROGRESS NOTES
"PSYCHIATRY DAILY INPATIENT PROGRESS NOTE  SUBSEQUENT HOSPITAL VISIT    ENCOUNTER DATE: 4/10/2025  SITE: 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 The chart was reviewed.     Reviewed notes from Rns and MD and labs from the last 24 hours.    The patient's case was discussed with the treatment team/care providers today including Rns and MD    Staff reports no behavioral or management issues.     The patient has been compliant with treatment.      Subjective 04/10/2025    Patient reports mood is "better", affect is appropriate. Nursing notes yesterday indicate patient reported responding to internal stimuli to RN. When asked about this, patient is somewhat defensive, stating "I never told anyone that. Why would they say I told them that?" Pt is focused on discharge, initially stating he has a meeting with his  this afternoon, later telling nursing staff that he has a dentist appointment at 3pm today.     Depakote level pending.              The patient denies any side effects to medications.        Interim/overnight events per report/notes:          Psychiatric ROS (observed, reported, or endorsed/denied):  Depressed mood - None  Interest/pleasure/anhedonia: No  Guilt/hopelessness/worthlessness - No  Changes in Sleep - No  Changes in Appetite - No  Changes in Concentration - No  Changes in Energy - No  PMA/R- No  Suicidal- active/passive ideations - No  Homicidal ideations: active/passive ideations - No    Hallucinations - recently observed by staff  Delusions - fluctuating  Disorganized behavior - No  Disorganized speech - Yes  Negative symptoms - No    Elevated mood - fluctuating  Decreased need for sleep - No  Grandiosity - No  Racing thoughts - No  Impulsivity " - No  Irritability- No  Increased energy - No  Distractibility - Yes  Increase in goal-directed activity or PMA- No    Symptoms of KANU - No  Symptoms of Panic Disorder- No  Symptoms of PTSD - No        Overall progress: Patient is showing mild improvement         Psychotherapy:  Target symptoms: substance abuse, psychosis  Why chosen therapy is appropriate versus another modality: relevant to diagnosis, evidence based practice  Outcome monitoring methods: self-report, observation  Therapeutic intervention type: interactive psychotherapy  Topics discussed/themes: building skills sets for symptom management, symptom recognition  The patient's response to the intervention is guarded. The patient's progress toward treatment goals is limited.   Duration of intervention: 20 minutes.        Medical ROS  Review of Systems   Constitutional: Negative.    HENT: Negative.     Eyes: Negative.    Respiratory: Negative.     Cardiovascular: Negative.    Gastrointestinal: Negative.    Genitourinary: Negative.    Musculoskeletal: Negative.    Skin: Negative.    Neurological: Negative.    Endo/Heme/Allergies: Negative.          PAST MEDICAL HISTORY   Past Medical History:   Diagnosis Date    History of psychiatric hospitalization 09/06/2023    Daniel MAYO     of psychiatric care     Psychiatric problem     Schizoaffective disorder     Schizophrenia     Substance abuse     Suicide attempt            PSYCHOTROPIC MEDICATIONS   Scheduled Meds:   gabapentin  100 mg Oral TID    mirtazapine  15 mg Oral QHS    valproic acid (as sodium salt)  500 mg Oral Q12H     Continuous Infusions:  PRN Meds:.  Current Facility-Administered Medications:     acetaminophen, 650 mg, Oral, Q8H PRN    melatonin, 6 mg, Oral, Nightly PRN    ondansetron, 8 mg, Oral, Q8H PRN        EXAMINATION    VITALS   Vitals:    04/08/25 1903 04/09/25 0719 04/09/25 1905 04/10/25 0749   BP: 111/64 108/67 (!) 110/58 119/63   BP Location: Left arm Left arm Left arm Left arm  "  Patient Position: Sitting Sitting Sitting Sitting   Pulse: 66 66 61 78   Resp: 20 18 20 20   Temp: 98.9 °F (37.2 °C) 99.2 °F (37.3 °C) 99.8 °F (37.7 °C) 98.2 °F (36.8 °C)   TempSrc: Oral Oral Oral Oral   SpO2: 100% 99% 99% 100%   Weight:       Height:           Body mass index is 20.01 kg/m².        CONSTITUTIONAL  General Appearance: unremarkable, age appropriate    MUSCULOSKELETAL  Muscle Strength and Tone:no tremor, no tic  Abnormal Involuntary Movements: No  Gait and Station: non-ataxic    PSYCHIATRIC   Level of Consciousness: awake and alert   Orientation: person, place, and situation  Grooming: Casually dressed and Well groomed  Psychomotor Behavior: normal, cooperative  Speech: normal tone, normal pitch, normal volume  Language: grossly intact  Mood: "Good"  Affect: Grossly appropriate, occasional inappropriate smiling  Thought Process: linear, logical  Associations: circumstantial   Thought Content: less delusions, denies SI, and denies HI  Perceptions: denies AH and denies  VH- Staff reports patient RIS as recently as yesterday (4/9/25)  Memory: Able to recall past events, Remote intact, and Recent intact  Attention:Impaired to some degree     Fund of Knowledge: Aware of current events and Vocabulary appropriate   Estimate if Intelligence:  Average based on work/education history, vocabulary and mental status exam  Insight: limited awareness of illness  Judgment: limited        DIAGNOSTIC TESTING   Laboratory Results  No results found for this or any previous visit (from the past 24 hours).          MEDICAL DECISION MAKING      ASSESSMENT:   Schizoaffective disorder  Anxiety disorder, unspecified  Polysubstance abuse  Nicotine Dependence  Anemia        Medical decision making/Formulation:  Schizoaffective disorder, bipolar type, severe  - Verify last CARRASCO, will hold off on starting additional risperdal until CARRASCO verified.   - Plan for CARRASCO  - resume depakote at 500 mg PO BID, will check level for toxicity- " Check level today  - resume remeron 15 mg PO qhs  - pt counseled  - follow up with outpatient mental health providers after discharge for medication management and psychotherapy        Unspecified anxiety disorder  - start hydroxyzine 50 mg PO q 6 hours PRN  - pt counseled  - follow up with outpatient mental health providers after discharge for medication management and psychotherapy        Cannabis abuse  - pt counseled  -Initiate gabapentin 100 mg TID off-label for cannabis dependence        Psychosocial stressors  - pt counseled  - SW consulted for assistance with additional resources      Nicotine dependence  - Continue nicotine patch 14 mg PO qd PRN        Anemia  - FM consulted          Discussed diagnosis, risks and benefits of proposed treatment vs alternative treatments vs no treatment, potential side effects of these treatments and the inherent unpredictability of treatment. The patient expresses understanding of the above and displays the capacity to agree with this treatment given said understanding. Patient also agrees that, currently, the benefits outweigh the risks and would like to pursue/continue treatment at this time.    Any medications being used off-label were discussed with the patient inclusive of the evidence base for the use of the medications and consent was obtained for the off-label use of the medication.       DISCHARGE PLANNING  Expected Disposition Plan: to be determined      NEED FOR CONTINUED HOSPITALIZATION  Psychiatric illness continues to pose a potential threat to life or bodily function, of self or others, thereby requiring the need for continued inpatient psychiatric hospitalization: Yes, due to: significant psychotic thought disorder, as evidenced by:  Ongoing concerns with grave disability with patient unable to perform basic feeding, hygiene and dressing activities without significant constant support.    Protective inpatient pyschiatric hospitalization required while a safe  disposition plan is enacted: Yes    Patient stabilized and ready for discharge from inpatient psychiatric unit: No        STAFF:   Jl Aguilar NP  Psychiatry

## 2025-04-10 NOTE — PLAN OF CARE
Patient out of the room for medication, meals, and snacks.  Calm and cooperative.  Showered on shift.  No concerns.

## 2025-04-10 NOTE — PLAN OF CARE
Problem: Adult Behavioral Health Plan of Care  Goal: Optimized Coping Skills in Response to Life Stressors  Intervention: Promote Effective Coping Strategies  Flowsheets (Taken 4/10/2025 1706)  Supportive Measures:   active listening utilized   counseling provided   decision-making supported   goal-setting facilitated   relaxation techniques promoted   self-care encouraged   self-reflection promoted   self-responsibility promoted   verbalization of feelings encouraged     Behavior:  Patient arrived on time and was actively engaged in group discussion and activity. He spent significant time recording his responses on the worksheet and verbalized most of them during the following discussion. Patient participated appropriately and engaged well with fellow participants.    Intervention:  The therapist facilitated a discussion on the different types of self care including physical, psychological/emotional, social, spiritual, and professional. Each participant was given a self-care assessment worksheet to gauge how frequently and/or how well they are performing different self-care activities. This tool was selected to help participants learn more about self-care needs by spotting patterns and recognizing areas of life that need more attention. Participants were then introduced to a habit-tracking tool to help create an awareness around activities that they want to prioritize or improve engagement with.     Response:  Patient stated that he felt he was doing well with physical, emotional, and spiritual self-care, but acknowledged room for improvement in the other categories. He shared that he had not realized how little he was engaging in social self-care and felt encouraged to include that in his habit tracker as a priority. He agreed that having a tool to visualize his daily activities would create a better awareness and sense of accountability.    Plan:  Patient will be encouraged to participate in MET/CBT- based  groups 2-5 times per week and/or 1:1 as needed to address issues and coping strategies. Patient will participate in recreational groups 2-5 times per week and/or 1:1 as needed to build peer support and relaxation skills.

## 2025-04-11 VITALS
HEIGHT: 75 IN | HEART RATE: 67 BPM | WEIGHT: 160.06 LBS | OXYGEN SATURATION: 100 % | RESPIRATION RATE: 20 BRPM | SYSTOLIC BLOOD PRESSURE: 113 MMHG | BODY MASS INDEX: 19.9 KG/M2 | DIASTOLIC BLOOD PRESSURE: 60 MMHG | TEMPERATURE: 98 F

## 2025-04-11 PROCEDURE — 25000003 PHARM REV CODE 250: Performed by: PSYCHIATRY & NEUROLOGY

## 2025-04-11 PROCEDURE — 99239 HOSP IP/OBS DSCHRG MGMT >30: CPT | Mod: AF,HB,, | Performed by: STUDENT IN AN ORGANIZED HEALTH CARE EDUCATION/TRAINING PROGRAM

## 2025-04-11 PROCEDURE — 90833 PSYTX W PT W E/M 30 MIN: CPT | Mod: AF,HB,, | Performed by: STUDENT IN AN ORGANIZED HEALTH CARE EDUCATION/TRAINING PROGRAM

## 2025-04-11 PROCEDURE — 25000003 PHARM REV CODE 250: Performed by: STUDENT IN AN ORGANIZED HEALTH CARE EDUCATION/TRAINING PROGRAM

## 2025-04-11 RX ORDER — MIRTAZAPINE 15 MG/1
15 TABLET, FILM COATED ORAL NIGHTLY
Qty: 30 TABLET | Refills: 0 | Status: SHIPPED | OUTPATIENT
Start: 2025-04-11 | End: 2026-04-11

## 2025-04-11 RX ORDER — DIVALPROEX SODIUM 500 MG/1
500 TABLET, FILM COATED, EXTENDED RELEASE ORAL 2 TIMES DAILY
Qty: 60 TABLET | Refills: 0 | Status: SHIPPED | OUTPATIENT
Start: 2025-04-11 | End: 2026-04-11

## 2025-04-11 RX ORDER — GABAPENTIN 100 MG/1
100 CAPSULE ORAL 3 TIMES DAILY
Qty: 90 CAPSULE | Refills: 0 | Status: SHIPPED | OUTPATIENT
Start: 2025-04-11 | End: 2026-04-11

## 2025-04-11 RX ADMIN — VALPROIC ACID 500 MG: 250 SOLUTION ORAL at 09:04

## 2025-04-11 RX ADMIN — GABAPENTIN 100 MG: 100 CAPSULE ORAL at 08:04

## 2025-04-11 NOTE — DISCHARGE SUMMARY
Discharge Summary  Psychiatry    Admit Date: 4/5/2025    Discharge Date and Time: 04/11/2025 9:30 AM    Attending Physician: Vinh León MD     Discharge Provider: Cristhian Grijalva III    Reason for Admission:  Chief Complaint: psychosis     SUBJECTIVE:      Per ER Note:       Chief Complaint   Patient presents with    Mental Health Problem       Family reports pt is not acting normal. EMS reports pt is having hallucinations.        24-year-old male history of schizophrenia, multiple psychiatric hospitalizations in the past presents the ER via EMS, EMS reports family states he is not acting right.  He does appear to be talking to himself, inappropriate laughter.  No answer certain questions.  He denies SI or HI.  Unable to be redirected      Per Chart Review:  Multiple prior admissions to [facility] with similar presentation of psychosis. Most recent hospitalization in the record is from 11/18-12/3/2024 at [facility]      DC Dx: Schizoaffective Disorder, Bipolar Type     Pt was discharged on the following medications:              - Invega sustenna              - Depakote 500mg BID (VPA level 72 at this dose)              - Remeron 15mg QHS     Per Initial Interview:  Bishop Soria is a 24 y.o. male with past psychiatric history of schizoaffective disorder who presents with psychosis.      Pt states he is here due to insomnia which has been going on for about one week. He states that this is due to increase in AH recently. States that the voices have been telling him that someone is stealing from him. Reports that the voices finally told him who it was and what they stole. States that when he is in the hospital his sleep patterns tend to change back to normal. So he brought himself to the hospital.      Pt reports he has been compliant with his monthly injection, but has not been taking his medication by mouth (depakote) due to forgetting. States he got an CARRASCO approximately 2 weeks ago (Invega Sustenna)  "from his [facility] ACT team. States that he has used marijuana once since receiving most recent CARRASCO, which he says helps the voices to "mellow out and fade." "The voices tend to catch my vibe. They tend to fall back and go to sleep. I've learned and noticed that the voices can be regular human beings talking to me, and it can be spiritual beings talking to me, and it can be beings from another dimension talking to me."      Pt denies depression. States he is tired but not depressed. Denies SI and HI. He is not able to clearly state whether he is experiencing VH. He eludes that he is seeing things but not able to describe.     Procedures Performed: * No surgery found *    Hospital Course:    Patient was admitted to the inpatient psychiatry unit after being medically cleared in the ED. Chart and labs were reviewed. The patient was stabilized as follows:      Schizoaffective disorder, bipolar type, severe  - Verify last CARRASCO, will hold off on starting additional risperdal until CARRASCO verified.   - Plan for CARRASCO  - resume depakote at 500 mg PO BID, will check level for toxicity- level 54.6  - resume remeron 15 mg PO qhs  - pt counseled  - follow up with outpatient mental health providers after discharge for medication management and psychotherapy        Unspecified anxiety disorder  - start hydroxyzine 50 mg PO q 6 hours PRN  - pt counseled  - follow up with outpatient mental health providers after discharge for medication management and psychotherapy        Cannabis abuse  - pt counseled  -Initiate gabapentin 100 mg TID off-label for cannabis dependence        Psychosocial stressors  - pt counseled  - SW consulted for assistance with additional resources      Nicotine dependence  - Continue nicotine patch 14 mg PO qd PRN        Anemia  - FM consulted              The patient reports improved symptoms as documented. The patient is requesting discharge.The patient is hopeful, future oriented and goal directed. The patient " readily discusses both short and long term goals. The patient can identify positive coping skills and social support. AIMS score was 0. During hospitalization, the patient was encouraged to go to both groups and individual counseling. Patient was monitored for any side effects. A meeting was held with multidisciplinary team prior to discharge and pt's diagnosis, current medications, and follow up were discussed. The patient has been compliant with treatment and can adequately attend to activities of daily living in an independent manner. The patient denies any side effects. The patient denies SI, HI, plan or intent for self harm or harm to others. The patient is no longer a danger to self or others nor gravely disabled disabled. Patient discharged  in stable condition with scheduled outpatient follow up.      Discussed diagnosis, risks and benefits of proposed treatment vs alternative treatments vs no treatment, and potential side effects of these treatments.  The patient expresses understanding of the above and displays the capacity to agree with this treatment given said understanding.  Patient also agrees that, currently, the benefits outweigh the risks and would like to pursue treatment at this time.      Discharge MSE: stated age, casually dressed, well groomed.  No psychomotor agitation or retardation.  No abnormal involuntary movements.  Gait normal.  Speech normal, conversational.  Language fluent English. Mood fine.  Affect normal range, pleasant, euthymic.  Thought process linear.  Associations intact.  Denies suicidal or homicidal ideation.  Denies auditory hallucinations, paranoid ideation, ideas of reference.  Memory intact.  Attention intact.  Fund of knowledge intact.  Insight intact.  Judgment intact.  Alert and oriented to person, place, time.      Tobacco Usage:  Is patient a smoker? Yes  Does patient want prescription for Tobacco Cessation? Yes  Does patient want counseling for Tobacco Cessation?  Yes    If patient would like to quit, then over the counter nicotine patch could be used. The patient could also follow up with his PCP or psychiatric provider for other alternatives.     Final Diagnoses:    Principal Problem: Schizoaffective disorder   Secondary Diagnoses:     Anxiety disorder, unspecified  Polysubstance abuse  Nicotine Dependence  Anemia          Labs:  Admission on 04/05/2025   Component Date Value Ref Range Status    Sodium 04/05/2025 142  136 - 145 mmol/L Final    Potassium 04/05/2025 3.8  3.5 - 5.1 mmol/L Final    Chloride 04/05/2025 111 (H)  95 - 110 mmol/L Final    CO2 04/05/2025 22 (L)  23 - 29 mmol/L Final    Glucose 04/05/2025 66 (L)  70 - 110 mg/dL Final    BUN 04/05/2025 16  6 - 20 mg/dL Final    Creatinine 04/05/2025 1.2  0.5 - 1.4 mg/dL Final    Calcium 04/05/2025 8.4 (L)  8.7 - 10.5 mg/dL Final    Protein Total 04/05/2025 7.0  6.0 - 8.4 gm/dL Final    Albumin 04/05/2025 3.9  3.5 - 5.2 g/dL Final    Bilirubin Total 04/05/2025 0.4  0.1 - 1.0 mg/dL Final    ALP 04/05/2025 62  40 - 150 unit/L Final    AST 04/05/2025 22  11 - 45 unit/L Final    ALT 04/05/2025 15  10 - 44 unit/L Final    Anion Gap 04/05/2025 9  8 - 16 mmol/L Final    eGFR 04/05/2025 >60  >60 mL/min/1.73/m2 Final    Color, UA 04/05/2025 Yellow  Straw, Ingrid, Yellow, Light-Orange Final    Appearance, UA 04/05/2025 Clear  Clear Final    pH, UA 04/05/2025 6.0  5.0 - 8.0 Final    Spec Grav UA 04/05/2025 >=1.030 (A)  1.005 - 1.030 Final    Protein, UA 04/05/2025 Negative  Negative Final    Glucose, UA 04/05/2025 Negative  Negative Final    Ketones, UA 04/05/2025 Trace (A)  Negative Final    Bilirubin, UA 04/05/2025 Negative  Negative Final    Blood, UA 04/05/2025 Negative  Negative Final    Nitrites, UA 04/05/2025 Negative  Negative Final    Urobilinogen, UA 04/05/2025 1.0  <2.0 EU/dL Final    Leukocyte Esterase, UA 04/05/2025 Negative  Negative Final    Benzodiazepine, Urine 04/05/2025 Negative  Negative Final    Methadone,  Urine 04/05/2025 Negative  Negative Final    Cocaine, Urine 04/05/2025 Negative  Negative Final    Opiates, Urine 04/05/2025 Negative  Negative Final    Barbiturates, Urine 04/05/2025 Negative  Negative Final    Amphetamines, Urine 04/05/2025 Negative  Negative Final    THC 04/05/2025 Presumptive Positive (A)  Negative Final    Phencyclidine, Urine 04/05/2025 Negative  Negative Final    Urine Creatinine 04/05/2025 316.0  23.0 - 375.0 mg/dL Final    Alcohol, Serum 04/05/2025 <10  <10 mg/dL Final    Acetaminophen Level 04/05/2025 <3.0 (L)  10.0 - 20.0 ug/ml Final    WBC 04/05/2025 7.14  3.90 - 12.70 K/uL Final    RBC 04/05/2025 3.82 (L)  4.60 - 6.20 M/uL Final    HGB 04/05/2025 11.9 (L)  14.0 - 18.0 gm/dL Final    HCT 04/05/2025 35.8 (L)  40.0 - 54.0 % Final    MCV 04/05/2025 94  82 - 98 fL Final    MCH 04/05/2025 31.2 (H)  27.0 - 31.0 pg Final    MCHC 04/05/2025 33.2  32.0 - 36.0 g/dL Final    RDW 04/05/2025 12.2  11.5 - 14.5 % Final    Platelet Count 04/05/2025 218  150 - 450 K/uL Final    MPV 04/05/2025 9.6  9.2 - 12.9 fL Final    Nucleated RBC 04/05/2025 0  <=0 /100 WBC Final    Neut % 04/05/2025 40.0  38 - 73 % Final    Lymph % 04/05/2025 47.5  18 - 48 % Final    Mono % 04/05/2025 9.9  4 - 15 % Final    Eos % 04/05/2025 2.2  <=8 % Final    Basophil % 04/05/2025 0.3  <=1.9 % Final    Imm Grans % 04/05/2025 0.1  0.0 - 0.5 % Final    Neut # 04/05/2025 2.85  1.8 - 7.7 K/uL Final    Lymph # 04/05/2025 3.39  1 - 4.8 K/uL Final    Mono # 04/05/2025 0.71  0.3 - 1 K/uL Final    Eos # 04/05/2025 0.16  <=0.5 K/uL Final    Baso # 04/05/2025 0.02  <=0.2 K/uL Final    Imm Grans # 04/05/2025 0.01  0.00 - 0.04 K/uL Final    Valproic Acid 04/05/2025 30.4 (L)  50.0 - 100.0 ug/ml Final    Valproic Acid 04/10/2025 54.6  50.0 - 100.0 ug/ml Final         Discharged Condition: stable and improved; not currently a danger to self/others or gravely disabled    Disposition: Home or Self Care    Is patient being discharged on multiple  neuroleptics? No    Follow Up/Patient Instructions:     Take all medications as prescribed.  Attend all psychiatric and medical follow up appointments.   Abstain from all drugs and alcohol.  Call the crisis line at: [phone] for help in a crisis and emergent situations or call 911 and Return to ED for any acute worsening of your condition including suicidal or homicidal ideations      Discharge Procedure Orders   Diet Adult Regular     Notify your health care provider if you experience any of the following:  temperature >100.4     Notify your health care provider if you experience any of the following:  persistent nausea and vomiting or diarrhea     Notify your health care provider if you experience any of the following:   Order Comments: Suicidal thoughts, homicidal thoughts, or any other changes in mental status  If you would like immediate help/crisis counseling, please call [phone] (TALK). Through this toll-free phone number for a network of crisis centers across the country. These centers staff their lines with people who are trained to listen and offer support to people in emotional crisis. If you are in an emergency, please call 911.     Notify your health care provider if you experience any of the following:  increased confusion or weakness     Notify your health care provider if you experience any of the following:  persistent dizziness, light-headedness, or visual disturbances     Activity as tolerated           Follow up apt: see above      Medications:  Reconciled Home Medications:      Medication List        START taking these medications      gabapentin 100 MG capsule  Commonly known as: NEURONTIN  Take 1 capsule (100 mg total) by mouth 3 (three) times daily.            CONTINUE taking these medications      divalproex  MG Tb24 24 hr tablet  Commonly known as: DEPAKOTE ER  Take 1 tablet (500 mg total) by mouth 2 (two) times a day.     mirtazapine 15 MG tablet  Commonly known as:  REMERON  Take 1 tablet (15 mg total) by mouth every evening.     nicotine 14 mg/24 hr  Commonly known as: NICODERM CQ  Place 1 patch onto the skin once daily.                Psychotherapy:  Target symptoms: substance abuse, mood disorder, psychosis  Why chosen therapy is appropriate versus another modality: relevant to diagnosis  Outcome monitoring methods: self-report  Therapeutic intervention type: supportive psychotherapy  Topics discussed/themes: building skills sets for symptom management, symptom recognition, substance abuse  The patient's response to the intervention is accepting. The patient's progress toward treatment goals is fair.   Duration of intervention: 16 minutes.      Diet: regular     Activity as tolerated    Total time spent discharging patient: 33 minutes    Cristhian Grijlava III, MD  Psychiatry

## 2025-04-11 NOTE — NURSING
Patient has met all criteria to be discharged today. Patient was explained all discharge instructions and the Patient verbalized an understanding of those instructions. Patient was returned all personal belongings upon departure. Patient was given a [facility] Patient Education form for Act 737. Patient was escorted off the unit and out of the hospital by a staff member.

## 2025-04-11 NOTE — PLAN OF CARE
Patient calm and cooperative.  Compliant with medication.  Slept through the night with no concerns.

## 2025-05-29 ENCOUNTER — HOSPITAL ENCOUNTER (EMERGENCY)
Facility: HOSPITAL | Age: 25
Discharge: HOME | End: 2025-05-29
Attending: EMERGENCY MEDICINE
Payer: MEDICARE

## 2025-05-29 VITALS
BODY MASS INDEX: 19.4 KG/M2 | OXYGEN SATURATION: 98 % | WEIGHT: 155.19 LBS | HEART RATE: 85 BPM | TEMPERATURE: 99 F | DIASTOLIC BLOOD PRESSURE: 61 MMHG | SYSTOLIC BLOOD PRESSURE: 117 MMHG | RESPIRATION RATE: 16 BRPM

## 2025-05-29 DIAGNOSIS — F19.10 SUBSTANCE ABUSE: ICD-10-CM

## 2025-05-29 DIAGNOSIS — F20.9 SCHIZOPHRENIA, UNSPECIFIED TYPE: Primary | ICD-10-CM

## 2025-05-29 LAB
AMPHET UR QL SCN: NEGATIVE
BACTERIA #/AREA URNS AUTO: NORMAL /HPF
BARBITURATE SCN PRESENT UR: NEGATIVE
BENZODIAZ UR QL SCN: NEGATIVE
BILIRUB UR QL STRIP.AUTO: NEGATIVE
CANNABINOIDS UR QL SCN: ABNORMAL
CLARITY UR: ABNORMAL
COCAINE UR QL SCN: NEGATIVE
COLOR UR AUTO: YELLOW
CREAT UR-MCNC: 294.5 MG/DL (ref 23–375)
GLUCOSE UR QL STRIP: NEGATIVE
HGB UR QL STRIP: NEGATIVE
HOLD SPECIMEN: NORMAL
HYALINE CASTS UR QL AUTO: 0 /LPF (ref 0–1)
KETONES UR QL STRIP: ABNORMAL
LEUKOCYTE ESTERASE UR QL STRIP: NEGATIVE
METHADONE UR QL SCN: NEGATIVE
MICROSCOPIC COMMENT: NORMAL
NITRITE UR QL STRIP: NEGATIVE
OPIATES UR QL SCN: NEGATIVE
PCP UR QL: NEGATIVE
PH UR STRIP: 6 [PH]
PROT UR QL STRIP: NEGATIVE
RBC #/AREA URNS AUTO: 1 /HPF (ref 0–4)
SP GR UR STRIP: >=1.03
SQUAMOUS #/AREA URNS AUTO: 1 /HPF
UROBILINOGEN UR STRIP-ACNC: 1 EU/DL
WBC #/AREA URNS AUTO: 0 /HPF (ref 0–5)

## 2025-05-29 PROCEDURE — 81001 URINALYSIS AUTO W/SCOPE: CPT | Mod: XB | Performed by: EMERGENCY MEDICINE

## 2025-05-29 PROCEDURE — 80307 DRUG TEST PRSMV CHEM ANLYZR: CPT | Performed by: EMERGENCY MEDICINE

## 2025-05-29 PROCEDURE — 25000003 PHARM REV CODE 250: Performed by: EMERGENCY MEDICINE

## 2025-05-29 PROCEDURE — 99283 EMERGENCY DEPT VISIT LOW MDM: CPT

## 2025-05-29 RX ORDER — OLANZAPINE 10 MG/1
10 TABLET, ORALLY DISINTEGRATING ORAL
Status: COMPLETED | OUTPATIENT
Start: 2025-05-29 | End: 2025-05-29

## 2025-05-29 RX ADMIN — OLANZAPINE 10 MG: 10 TABLET, ORALLY DISINTEGRATING ORAL at 09:05

## 2025-05-30 ENCOUNTER — HOSPITAL ENCOUNTER (INPATIENT)
Facility: HOSPITAL | Age: 25
LOS: 5 days | Discharge: HOME | DRG: 885 | End: 2025-06-04
Attending: STUDENT IN AN ORGANIZED HEALTH CARE EDUCATION/TRAINING PROGRAM | Admitting: STUDENT IN AN ORGANIZED HEALTH CARE EDUCATION/TRAINING PROGRAM
Payer: MEDICARE

## 2025-05-30 DIAGNOSIS — F25.0 SCHIZOAFFECTIVE DISORDER, BIPOLAR TYPE: Primary | ICD-10-CM

## 2025-05-30 DIAGNOSIS — F29 PSYCHOSIS: ICD-10-CM

## 2025-05-30 PROCEDURE — 99223 1ST HOSP IP/OBS HIGH 75: CPT | Mod: ,,, | Performed by: PSYCHIATRY & NEUROLOGY

## 2025-05-30 PROCEDURE — 90833 PSYTX W PT W E/M 30 MIN: CPT | Mod: ,,, | Performed by: PSYCHIATRY & NEUROLOGY

## 2025-05-30 PROCEDURE — 11400000 HC PSYCH PRIVATE ROOM

## 2025-05-30 PROCEDURE — 25000003 PHARM REV CODE 250: Performed by: PSYCHIATRY & NEUROLOGY

## 2025-05-30 RX ORDER — OLANZAPINE 10 MG/1
10 TABLET, FILM COATED ORAL EVERY 8 HOURS PRN
Status: DISCONTINUED | OUTPATIENT
Start: 2025-05-30 | End: 2025-06-04 | Stop reason: HOSPADM

## 2025-05-30 RX ORDER — LOPERAMIDE HYDROCHLORIDE 2 MG/1
2 CAPSULE ORAL
Status: DISCONTINUED | OUTPATIENT
Start: 2025-05-30 | End: 2025-06-04 | Stop reason: HOSPADM

## 2025-05-30 RX ORDER — OLANZAPINE 10 MG/2ML
10 INJECTION, POWDER, FOR SOLUTION INTRAMUSCULAR EVERY 8 HOURS PRN
Status: DISCONTINUED | OUTPATIENT
Start: 2025-05-30 | End: 2025-06-04 | Stop reason: HOSPADM

## 2025-05-30 RX ORDER — PROMETHAZINE HYDROCHLORIDE 25 MG/1
25 TABLET ORAL EVERY 6 HOURS PRN
Status: DISCONTINUED | OUTPATIENT
Start: 2025-05-30 | End: 2025-06-04 | Stop reason: HOSPADM

## 2025-05-30 RX ORDER — ALUMINUM HYDROXIDE, MAGNESIUM HYDROXIDE, AND SIMETHICONE 1200; 120; 1200 MG/30ML; MG/30ML; MG/30ML
30 SUSPENSION ORAL EVERY 6 HOURS PRN
Status: DISCONTINUED | OUTPATIENT
Start: 2025-05-30 | End: 2025-06-04 | Stop reason: HOSPADM

## 2025-05-30 RX ORDER — ONDANSETRON 4 MG/1
4 TABLET, ORALLY DISINTEGRATING ORAL EVERY 8 HOURS PRN
Status: DISCONTINUED | OUTPATIENT
Start: 2025-05-30 | End: 2025-06-04 | Stop reason: HOSPADM

## 2025-05-30 RX ORDER — MIRTAZAPINE 15 MG/1
15 TABLET, FILM COATED ORAL NIGHTLY
Status: DISCONTINUED | OUTPATIENT
Start: 2025-05-30 | End: 2025-06-04 | Stop reason: HOSPADM

## 2025-05-30 RX ORDER — HYDROXYZINE PAMOATE 50 MG/1
50 CAPSULE ORAL EVERY 6 HOURS PRN
Status: DISCONTINUED | OUTPATIENT
Start: 2025-05-30 | End: 2025-06-04 | Stop reason: HOSPADM

## 2025-05-30 RX ORDER — ACETAMINOPHEN 325 MG/1
650 TABLET ORAL EVERY 6 HOURS PRN
Status: DISCONTINUED | OUTPATIENT
Start: 2025-05-30 | End: 2025-06-04 | Stop reason: HOSPADM

## 2025-05-30 RX ORDER — DIVALPROEX SODIUM 500 MG/1
500 TABLET, FILM COATED, EXTENDED RELEASE ORAL 2 TIMES DAILY
Status: DISCONTINUED | OUTPATIENT
Start: 2025-05-30 | End: 2025-06-04 | Stop reason: HOSPADM

## 2025-05-30 RX ORDER — BENZTROPINE MESYLATE 1 MG/ML
2 INJECTION, SOLUTION INTRAMUSCULAR; INTRAVENOUS EVERY 8 HOURS PRN
Status: DISCONTINUED | OUTPATIENT
Start: 2025-05-30 | End: 2025-06-04 | Stop reason: HOSPADM

## 2025-05-30 RX ORDER — IBUPROFEN 200 MG
1 TABLET ORAL DAILY PRN
Status: DISCONTINUED | OUTPATIENT
Start: 2025-05-30 | End: 2025-06-04 | Stop reason: HOSPADM

## 2025-05-30 RX ORDER — BENZONATATE 100 MG/1
100 CAPSULE ORAL 3 TIMES DAILY PRN
Status: DISCONTINUED | OUTPATIENT
Start: 2025-05-30 | End: 2025-06-04 | Stop reason: HOSPADM

## 2025-05-30 RX ADMIN — DIVALPROEX SODIUM 500 MG: 500 TABLET, FILM COATED, EXTENDED RELEASE ORAL at 08:05

## 2025-05-30 RX ADMIN — DIVALPROEX SODIUM 500 MG: 500 TABLET, FILM COATED, EXTENDED RELEASE ORAL at 09:05

## 2025-05-30 RX ADMIN — MIRTAZAPINE 15 MG: 15 TABLET, FILM COATED ORAL at 08:05

## 2025-05-30 NOTE — CARE UPDATE
The patient missed the group psychotherapy session today because he was meeting with the counselor. This counselor reviewed the brochure given to him concerning the value of looking at life as a discovery or as a series of movements or passages. The patient agreed to read the brochures given to him as part of his homework assignment while here on the behavioral health unit. He thanked this counselor for the brochures given to him.
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
## 2025-05-30 NOTE — PROGRESS NOTES
"PSYCHIATRY INPATIENT ADMISSION NOTE - H & P      5/30/2025 12:08 PM   Bishop Soria   2000   [id number]         DATE OF ADMISSION: 5/30/2025  6:41 AM    SITE: [facility]    CURRENT LEGAL STATUS: PEC and/or CEC      HISTORY    CHIEF COMPLAINT   Bishop Soria is a 24 y.o. male with a past psychiatric history of Substance Abuse and schizophrenia currently admitted to the inpatient unit with the following chief complaint: psychosis- pt wopuld not answer    HPI   The patient was seen and examined. The chart was reviewed.    The patient presented to the ER on 5/30/2025 . Per staff notes:  -After giving patient sandwich and chips and juice, now patient speaking. States "I came to ER because I am thirsty"   -Patient arrived POV. Smiling, not answering questions   -25 yo male with history of schizophrenia here via POV with no specific complaint. Reports thirsty. No HI/SI. Denies command hallucinations. Admits substance abuse. Multiple psych admits and rehab admits without significant improvement. Reports compliance with medication   -Pt presents to the ED for psychiatric evaluation. Mom states pt has hx of bipolar schizophrenia and has been responding to internal stimuli since Monday upon returning home from a trip. Pt is non-compliant with meds. No HI/SI verbalized. Pt seen at [facility] this AM for same complaint and d/c. Pt slow to answer questions in triage and responding to internal stimuli. Pt will no answer if he has any HI/SI or AH/VH  -Mr Soria has hx of bipolar schizophrenia and has been responding to internal stimuli   since Monday upon returning home from a trip. Pt is non-compliant with meds. When asked why he is here he replies "Im Trans". When asked if he has thoughts of harmimg himself, he nods yes.       The patient was medically cleared and admitted to the U.      The patient is well known to the U for numerous stays secondary to severe schizoaffective disorder (bipolar type) " "complicated by tx non adherence and substance use.  He was last treated here from 4/5-4/11/25 with the following HPI:   Family reports pt is not acting normal. EMS reports pt is having hallucinations.     24-year-old male history of schizophrenia, multiple psychiatric hospitalizations in the past presents the ER via EMS, EMS reports family states he is not acting right.  He does appear to be talking to himself, inappropriate laughter.  No answer certain questions.  He denies SI or HI.  Unable to be redirected    Per Chart Review:  Multiple prior admissions to [facility] with similar presentation of psychosis. Most recent hospitalization in the record is from 11/18-12/3/2024 at [facility].    DC Dx: Schizoaffective Disorder, Bipolar Type   Pt was discharged on the following medications:              - Invega sustenna              - Depakote 500mg BID (VPA level 72 at this dose)              - Remeron 15mg QHS   Per Initial Interview:  Bishop Soria is a 24 y.o. male with past psychiatric history of schizoaffective disorder who presents with psychosis.    Pt states he is here due to insomnia which has been going on for about one week. He states that this is due to increase in AH recently. States that the voices have been telling him that someone is stealing from him. Reports that the voices finally told him who it was and what they stole. States that when he is in the hospital his sleep patterns tend to change back to normal. So he brought himself to the hospital.    Pt reports he has been compliant with his monthly injection, but has not been taking his medication by mouth (depakote) due to forgetting. States he got an CARRASCO approximately 2 weeks ago (Invega Sustenna) from his [facility] ACT team. States that he has used marijuana once since receiving most recent CARRASCO, which he says helps the voices to "mellow out and fade." "The voices tend to catch my vibe. They tend to fall back and go to sleep. I've learned and " "noticed that the voices can be regular human beings talking to me, and it can be spiritual beings talking to me, and it can be beings from another dimension talking to me."    Pt denies depression. States he is tired but not depressed. Denies SI and HI. He is not able to clearly state whether he is experiencing VH. He eludes that he is seeing things but not able to describe.     He was stabilized and discharged on Invega CARRASCO, Depakote 500 mg po BID and remeron 15 mg po q HS.    Today, he reports that he is "ok.. having an identity crisis; I can understand women but not men." The patient was oddly related with NEO and poor participation secondary to derailed thoughts. He did not answer many questions. Psychosis and carley were prominent.    -his presentation was consistent with previous admissions       Symptoms of Depression: diminished mood - No, loss of interest/anhedonia - No;  recurrent - No, >14 days - No, diminished energy - No, change in sleep - No, change in appetite - Yes, diminished concentration or cognition or indecisiveness - No, PMA/R -  No, excessive guilt or hopelessness or worthlessness - No, suicidal ideations - No    Changes in Sleep: trouble with initiation- No, maintenance, - No early morning awakening with inability to return to sleep - No, hypersomnolence - No    Suicidal- active/passive ideations - No, organized plans, future intentions - No    Homicidal ideations: active/passive ideations - No, organized plans, future intentions - No    Symptoms of psychosis: hallucinations - Yes, delusions - Yes, disorganized speech - Yes, disorganized behavior or abnormal motor behavior - No, or negative symptoms (diminshed emotional expression, avolition, anhedonia, alogia, asociality) - Yes, active phase symptoms >1 month - Yes, continuous signs of illness > 6 months - Yes, since onset of illness decreased level of functioning present - Yes    Symptoms of carley or hypomania: elevated, expansive, or " "irritable mood with increased energy or activity - Yes; > 4 days - Yes,  >7 days - Yes; with inflated self-esteem or grandiosity - Yes, decreased need for sleep - Yes, increased rate of speech - Yes, FOI or racing thoughts - Yes, distractibility - Yes, increased goal directed activity or PMA - Yes, risky/disinhibited behavior - Yes    Symptoms of KANU: excessive anxiety/worry/fear, more days than not, about numerous issues - Yes, ongoing for >6 months - No, difficult to control - Yes, with restlessness - No, fatigue - No, poor concentration - Yes, irritability - Yes, muscle tension - Yes, sleep disturbance - Yes; causes functionally impairing distress - Yes    Symptoms of Panic Disorder: recurrent panic attacks (palpitations/heart racing, sweating, shakiness, dyspnea, choking, chest pain/discomfort, Gi symptoms, dizzy/lightheadedness, hot/col flashes, paresthesias, derealization, fear of losing control or fear of dying or fear of "going crazy") - No, precipitated - No, un-precipitated - No, source of worry and/or behavioral changes secondary for 1 month or longer- No, agoraphobia - No    Symptoms of PTSD: h/o trauma exposure - No; re-experiencing/intrusive symptoms - No, avoidant behavior - No, 2 or more negative alterations in cognition or mood - No, 2 or more hyperarousal symptoms - No; with dissociative symptoms - No, ongoing for 1 or more  months - No    Symptoms of OCD: obsessions (recurrent thoughts/urges/images; intrusive and/or unwanted; uses other thoughts/actions to suppress) - No; compulsions (repetitive behaviors used to lower distress/anxiety/obsessions) - No, time-consuming (over 1 hour per day) or cause significant distress/impairment - - No    Symptoms of Anorexia: restriction of caloric intake leading to significantly low body weight - No, intense fear of gaining weight or persistent behavior that interferes with weight gain even thought at a significantly low weight - No, disturbance in the way in " which one's body weight or shape is experienced, undue influence of body weight or shape on self evaluation, or persistent lack of recognition of the seriousness of the current low body weight - No    Symptoms of Bulimia: recurrent episodes of binge eating (definitely larger amount  than what others would eat and lack of a sense of control over eating during episode) - No, recurrent inappropriate compensatory behaviors in order to prevent weight gain (fasting, medications, exercise, vomiting) - No, binges and compensatory behaviors both occur on average at least once a week for 3 months - No, self evaluations is unduly influenced by body shape/weight- - No    Symptoms of Binge eating: recurrent episodes of binge eating (definitely larger amount than what others would eat and lack of a sense of control over eating during episode) - No, 3 or more of following (eating much more rapidly, eating until uncomfortably full, large amounts when not hungry, eating alone because of embarrassed by how much,  feeling disgusted with oneself, depressed or very guilty afterward) - No, distress regarding binges - No, binges occur on average at least once a week for 3 months - No      PSYCHOTHERAPY ADD-ON +[id number]   16-37 minutes   Time: 16 minutes  Participants: Met with patient   Therapeutic Intervention Type: insight oriented psychotherapy, behavior modifying psychotherapy, supportive psychotherapy, interactive psychotherapy  Why chosen therapy is appropriate versus another modality: relevant to diagnosis, patient responds to this modality, evidence based practice   Target symptoms: mood disorder, psychosis  Primary focus: mood, psychosis, family stressors, cannabis use  Psychotherapeutic techniques: supportive and psycho-educational techniques; setting tx goals and needs   Outcome monitoring methods: self-report, observation   Patient's response to intervention:  The patient's response to intervention is reluctant.   Progress toward  goals:  The patient's progress toward goals is limited.      PAST PSYCHIATRIC HISTORY  Previous Psychiatric Hospitalizations: Yes, extensive  Previous SI/HI: Yes,  Previous Suicide Attempts: Yes,   Previous Medication Trials: Yes,  Psychiatric Care (current & past): Yes,  History of Psychotherapy: Yes,  History of Violence: Yes,  History of sexual/physical abuse: No,    PAST MEDICAL & SURGICAL HISTORY   Past Medical History:   Diagnosis Date    History of psychiatric hospitalization 09/06/2023    [facility]     Psychiatric problem     Schizoaffective disorder     Schizophrenia     Substance abuse     Suicide attempt      No past surgical history on file.      Home Meds:   Prior to Admission medications    Medication Sig Start Date End Date Taking? Authorizing Provider   divalproex ER (DEPAKOTE ER) 500 MG Tb24 24 hr tablet Take 1 tablet (500 mg total) by mouth 2 (two) times a day. 4/11/25 4/11/26  Cristhian Grijalva III, MD   gabapentin (NEURONTIN) 100 MG capsule Take 1 capsule (100 mg total) by mouth 3 (three) times daily. 4/11/25 4/11/26  Cristhian Grijalva III, MD   mirtazapine (REMERON) 15 MG tablet Take 1 tablet (15 mg total) by mouth every evening. 4/11/25 4/11/26  Cristhian Grijalva III, MD   nicotine (NICODERM CQ) 14 mg/24 hr Place 1 patch onto the skin once daily. 12/4/24   Cristhian Grijalva III, MD   EScitalopram oxalate (LEXAPRO) 10 MG tablet TAKE 1 TABLET BY MOUTH EVERY DAY 7/27/21 5/23/22  Jl Aguilar NP   lithium (LITHOTAB) 300 mg tablet Take 300 mg by mouth 2 (two) times daily. 4/26/22 5/31/22  Provider, Historical         Scheduled Meds:    PRN Meds:    Psychotherapeutics (From admission, onward)      None            ALLERGIES   Review of patient's allergies indicates:  No Known Allergies        NEUROLOGIC HISTORY  Seizures: No  Head trauma: No     SOCIAL HISTORY:  Developmental/Childhood:Achieved all developmental milestones timely  Education: some college  Employment  Status/Finances:Employed   Relationship Status/Sexual Orientation: single  Children: 0  Housing Status: Home    history:  NO   Access to Firearms: NO ;  Locked up? n/a  B[place]ai:Actively participates in organized Christianity  Recreational activities:Exercise     SUBSTANCE ABUSE HISTORY   Recreational Drugs: marijuana   Use of Alcohol: occasional, social use  Rehab History:no   Tobacco Use:no     LEGAL HISTORY:   Past charges/incarcerations: NO  Pending charges:NO     FAMILY PSYCHIATRIC HISTORY   Family History   Family history unknown: Yes      Depression - father      ROS  Review of Systems   Constitutional:  Negative for chills and fever.   HENT:  Negative for hearing loss.    Eyes:  Negative for blurred vision and double vision.   Respiratory:  Negative for shortness of breath.    Cardiovascular:  Negative for chest pain and palpitations.   Gastrointestinal:  Negative for constipation, diarrhea, nausea and vomiting.   Genitourinary:  Negative for dysuria.   Musculoskeletal:  Negative for back pain and neck pain.   Skin:  Negative for rash.   Neurological:  Negative for dizziness and headaches.   Endo/Heme/Allergies:  Negative for environmental allergies.         EXAMINATION    PHYSICAL EXAM  Reviewed note/exam by Dr. Dimitrios MD from 5/29/25 at 9:33 PM; Med consulted for initial physical exam- pending    VITALS   Vitals:    05/30/25 0725   BP: 110/68   Pulse: 61   Resp: 18   Temp: 97.7 °F (36.5 °C)        There is no height or weight on file to calculate BMI.        PAIN  0/10  Subjective report of pain matches objective signs and symptoms: Yes    LABORATORY DATA   Recent Results (from the past 72 hours)   Drug screen panel, emergency    Collection Time: 05/29/25  9:21 AM   Result Value Ref Range    Benzodiazepine, Urine Negative Negative    Methadone, Urine Negative Negative    Cocaine, Urine Negative Negative    Opiates, Urine Negative Negative    Barbiturates, Urine Negative Negative    Amphetamines, Urine  Negative Negative    THC Presumptive Positive (A) Negative    Phencyclidine, Urine Negative Negative    Urine Creatinine 294.5 23.0 - 375.0 mg/dL   Urinalysis, Reflex to Urine Culture Urine, Clean Catch    Collection Time: 05/29/25  9:21 AM    Specimen: Urine, Clean Catch   Result Value Ref Range    Color, UA Yellow Straw, Ingrid, Yellow, Light-Orange    Appearance, UA Cloudy (A) Clear    pH, UA 6.0 5.0 - 8.0    Spec Grav UA >=1.030 (A) 1.005 - 1.030    Protein, UA Negative Negative    Glucose, UA Negative Negative    Ketones, UA Trace (A) Negative    Bilirubin, UA Negative Negative    Blood, UA Negative Negative    Nitrites, UA Negative Negative    Urobilinogen, UA 1.0 <2.0 EU/dL    Leukocyte Esterase, UA Negative Negative   GREY TOP URINE HOLD    Collection Time: 05/29/25  9:21 AM   Result Value Ref Range    Extra Tube Hold    Urinalysis Microscopic    Collection Time: 05/29/25  9:21 AM   Result Value Ref Range    RBC, UA 1 0 - 4 /HPF    WBC, UA 0 0 - 5 /HPF    Bacteria, UA None None, Rare, Occasional /HPF    Squamous Epithelial Cells, UA 1 /HPF    Hyaline Casts, UA 0 0 - 1 /LPF    Microscopic Comment     Comprehensive metabolic panel    Collection Time: 05/29/25  8:54 PM   Result Value Ref Range    Sodium 142 136 - 145 mmol/L    Potassium 3.8 3.5 - 5.1 mmol/L    Chloride 111 (H) 95 - 110 mmol/L    CO2 22 (L) 23 - 29 mmol/L    Glucose 80 70 - 110 mg/dL    BUN 15 6 - 20 mg/dL    Creatinine 1.0 0.5 - 1.4 mg/dL    Calcium 8.3 (L) 8.7 - 10.5 mg/dL    Protein Total 6.8 6.0 - 8.4 gm/dL    Albumin 3.5 3.5 - 5.2 g/dL    Bilirubin Total 0.3 0.1 - 1.0 mg/dL    ALP 64 40 - 150 unit/L    AST 16 11 - 45 unit/L    ALT 11 10 - 44 unit/L    Anion Gap 9 8 - 16 mmol/L    eGFR >60 >60 mL/min/1.73/m2   Ethanol    Collection Time: 05/29/25  8:54 PM   Result Value Ref Range    Alcohol, Serum <10 <10 mg/dL   Acetaminophen level    Collection Time: 05/29/25  8:54 PM   Result Value Ref Range    Acetaminophen Level <3.0 (L) 10.0 - 20.0  ug/ml   CBC with Differential    Collection Time: 05/29/25  9:19 PM   Result Value Ref Range    WBC 6.80 3.90 - 12.70 K/uL    RBC 3.74 (L) 4.60 - 6.20 M/uL    HGB 11.7 (L) 14.0 - 18.0 gm/dL    HCT 35.8 (L) 40.0 - 54.0 %    MCV 96 82 - 98 fL    MCH 31.3 (H) 27.0 - 31.0 pg    MCHC 32.7 32.0 - 36.0 g/dL    RDW 12.9 11.5 - 14.5 %    Platelet Count 239 150 - 450 K/uL    MPV 9.0 (L) 9.2 - 12.9 fL    Nucleated RBC 0 <=0 /100 WBC    Neut % 46.5 38 - 73 %    Lymph % 44.4 18 - 48 %    Mono % 6.5 4 - 15 %    Eos % 2.2 <=8 %    Basophil % 0.1 <=1.9 %    Imm Grans % 0.3 0.0 - 0.5 %    Neut # 3.16 1.8 - 7.7 K/uL    Lymph # 3.02 1 - 4.8 K/uL    Mono # 0.44 0.3 - 1 K/uL    Eos # 0.15 <=0.5 K/uL    Baso # 0.01 <=0.2 K/uL    Imm Grans # 0.02 0.00 - 0.04 K/uL   Urinalysis, Reflex to Urine Culture Urine, Clean Catch    Collection Time: 05/29/25 10:10 PM    Specimen: Urine   Result Value Ref Range    Color, UA Yellow Straw, Ingrid, Yellow, Light-Orange    Appearance, UA Clear Clear    pH, UA 7.0 5.0 - 8.0    Spec Grav UA 1.030 1.005 - 1.030    Protein, UA Negative Negative    Glucose, UA Negative Negative    Ketones, UA Negative Negative    Bilirubin, UA Negative Negative    Blood, UA Negative Negative    Nitrites, UA Negative Negative    Leukocyte Esterase, UA Negative Negative   Drug screen panel, emergency    Collection Time: 05/29/25 10:10 PM   Result Value Ref Range    Benzodiazepine, Urine Negative Negative    Methadone, Urine Negative Negative    Cocaine, Urine Negative Negative    Opiates, Urine Negative Negative    Barbiturates, Urine Negative Negative    Amphetamines, Urine Negative Negative    THC Presumptive Positive (A) Negative    Phencyclidine, Urine Negative Negative    Urine Creatinine 325.2 23.0 - 375.0 mg/dL   GREY TOP URINE HOLD    Collection Time: 05/29/25 10:10 PM   Result Value Ref Range    Extra Tube Hold for add-ons.       Lab Results   Component Value Date    VALPROATE 54.6 04/10/2025    CBMZ 11.8 05/07/2023            CONSTITUTIONAL  General Appearance: unremarkable, age appropriate    MUSCULOSKELETAL  Muscle Strength and Tone:no tremor, no tic  Abnormal Involuntary Movements: No  Gait and Station: non-ataxic    PSYCHIATRIC   Level of Consciousness: awake and alert   Orientation: person, place, and situation  Grooming: Casually dressed and Hospital garb  Psychomotor Behavior: reluctant to participate, psychomotor agitation  Speech: loud, spontaneous, monotone, rapid  Language: grossly intact, able to name, able to repeat  Mood: ok  Affect: Labile  Thought Process: tangential  Associations: loose   Thought Content: +delusions, denies SI, and denies HI  Perceptions: denies AH and denies  VH  Memory: Able to recall past events, Remote intact, and Recent intact  Attention:Impaired to some degree  Fund of Knowledge: Aware of current events and Vocabulary appropriate   Estimate if Intelligence:  Average based on work/education history, vocabulary and mental status exam  Insight: limited awareness of illness  Judgment: limited      PSYCHOSOCIAL    PSYCHOSOCIAL STRESSORS   Drug abuse, occupational    FUNCTIONING RELATIONSHIPS   good support system    STRENGTHS AND LIABILITIES   Strength: Patient is expressive/articulate., Strength: Patient is intelligent., Liability: Patient is dependent., Liability: Patient is impulsive., Liability: Patient has poor judgment, Liability: Patient lacks coping skills.    Is the patient aware of the biomedical complications associated with substance abuse and mental illness? yes    Does the patient have an Advance Directive for Mental Health treatment? no  (If yes, inform patient to bring copy.)        ASSESSMENT         Schizoaffective disorder, bipolar type, severe  Unspecified anxiety disorder  Insomnia secondary to a mental illness     Cannabis abuse  Nicotine dependence    Psychosocial stressors         PROBLEM LIST AND MANAGEMENT PLANS           Schizoaffective disorder, bipolar type,  severe  - seeking records to verify last dose of CARRASCO- pt reports that he last recieved 156 Inevga IM within the last week  - resume depakote at 500 mg PO BID, will check level for toxicity in 4 days  - resume remeron 15 mg PO qhs  - pt counseled  - follow up with outpatient mental health providers after discharge for medication management and psychotherapy      Unspecified anxiety disorder  - start hydroxyzine 50 mg PO q 6 hours PRN  - pt counseled  - follow up with outpatient mental health providers after discharge for medication management and psychotherapy     Insomnia: pt counseled  -remeron off-label as above  -vistaril prn     Cannabis abuse  - consider rehab  - consider gabapentin  - pt counseled  - follow up with outpatient mental health providers after discharge for medication management and psychotherapy     Psychosocial stressors  - pt counseled  - SW consulted for assistance with additional resources      Nicotine dependence  - start nicotine patch 14 mg PO qd PRN            PRESCRIPTION DRUG MANAGEMENT  Compliance: no  Side Effects: unknown  Regimen Adjustments: see above    Discussed diagnosis, risks and benefits of proposed treatment vs alternative treatments vs no treatment, potential side effects of these treatments and the inherent unpredictability of treatment. The patient expresses understanding of the above and displays the capacity to agree with this treatment given said understanding. Patient also agrees that, currently, the benefits outweigh the risks and would like to pursue/continue treatment at this time.    Any medications being used off-label were discussed with the patient inclusive of the evidence base for the use of the medications and consent was obtained for the off-label use of the medication.         DIAGNOSTIC TESTING  Labs reviewed with patient; follow up pending labs    Disposition:  -Will attempt to obtain outside psychiatric records if available  -SW to assist with aftercare  planning and collateral  -Once stable discharge home with outpatient follow up care and/or rehab  -Continue inpatient treatment under a PEC and/or CEC for danger to self/ danger to others/grave disability as evident by significant psychotic thought disorder, aggressive neuroleptic titration, and gravely disabled        Frantz Cagle MD  Psychiatry

## 2025-05-30 NOTE — PLAN OF CARE
05/30/25 1635   Step 1: Warning Signs   Warning Sign 1 Poor Hygiene   Warning Sign 2 Talking to myself   Warning Sign 3 Not being able to focus   Step 2: Internal coping strategies - Things I can do to take my mind off my problems without contacting another person:   Coping Strategy 1 Music   Coping Strategy 2 Swimming   Coping Strategy 3 Dancing   Step 3: People and social settings that provide distraction:   1. Name Nabil Soria (mother)       Phone [phone]   2. Name Ryley (cousin)       Phone on cell phone   3. Place My room   4. Place [facility]    Step 4: People whom I can ask for help:   1. Person Amy (cousin)       Phone on cell phone   2. Person Ingrid (cousin)       Phone on cell phone   3. Person Nabil Soria (mother)       Phone [phone]   Step 5: Professionals or agencies I can contact during a crisis:   1. Clinician Name [facility]       Phone [phone]   2. Clinician Name Daphnie       Phone [phone]   3. Suicide Prevention Lifeline: 988 Suicide Prevention Lifeline: 988   4. [facility]       [facility]       Emergency Service Address [address] [place] [address]       Emergency Services Phone [phone]   Step 6: Making the environment safer (plan for lethal means safety)   Safe Environment Plan Contact professional assistance and take medications as prescribed.   Safe Environment Optional: What is most important to me and worth living for? Family.   Safety Plan Tasks   Provided a Hard Copy to the Patient Y   Explained How to Follow the Steps Y
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
## 2025-05-30 NOTE — H&P
"PSYCHIATRY INPATIENT ADMISSION NOTE - H & P      5/30/2025 12:08 PM   Bishop Soria   2000   [id number]         DATE OF ADMISSION: 5/30/2025  6:41 AM    SITE: [facility]    CURRENT LEGAL STATUS: PEC and/or CEC      HISTORY    CHIEF COMPLAINT   Bishop Soria is a 24 y.o. male with a past psychiatric history of Substance Abuse and schizophrenia currently admitted to the inpatient unit with the following chief complaint: psychosis- pt wopuld not answer    HPI   The patient was seen and examined. The chart was reviewed.    The patient presented to the ER on 5/30/2025 . Per staff notes:  -After giving patient sandwich and chips and juice, now patient speaking. States "I came to ER because I am thirsty"   -Patient arrived POV. Smiling, not answering questions   -25 yo male with history of schizophrenia here via POV with no specific complaint. Reports thirsty. No HI/SI. Denies command hallucinations. Admits substance abuse. Multiple psych admits and rehab admits without significant improvement. Reports compliance with medication   -Pt presents to the ED for psychiatric evaluation. Mom states pt has hx of bipolar schizophrenia and has been responding to internal stimuli since Monday upon returning home from a trip. Pt is non-compliant with meds. No HI/SI verbalized. Pt seen at [facility] this AM for same complaint and d/c. Pt slow to answer questions in triage and responding to internal stimuli. Pt will no answer if he has any HI/SI or AH/VH  -Mr Soria has hx of bipolar schizophrenia and has been responding to internal stimuli   since Monday upon returning home from a trip. Pt is non-compliant with meds. When asked why he is here he replies "Im Trans". When asked if he has thoughts of harmimg himself, he nods yes.       The patient was medically cleared and admitted to the U.      The patient is well known to the U for numerous stays secondary to severe schizoaffective disorder (bipolar type) " "complicated by tx non adherence and substance use.  He was last treated here from 4/5-4/11/25 with the following HPI:   Family reports pt is not acting normal. EMS reports pt is having hallucinations.     24-year-old male history of schizophrenia, multiple psychiatric hospitalizations in the past presents the ER via EMS, EMS reports family states he is not acting right.  He does appear to be talking to himself, inappropriate laughter.  No answer certain questions.  He denies SI or HI.  Unable to be redirected    Per Chart Review:  Multiple prior admissions to [facility] with similar presentation of psychosis. Most recent hospitalization in the record is from 11/18-12/3/2024 at [facility].    DC Dx: Schizoaffective Disorder, Bipolar Type   Pt was discharged on the following medications:              - Invega sustenna              - Depakote 500mg BID (VPA level 72 at this dose)              - Remeron 15mg QHS   Per Initial Interview:  Bishop Soria is a 24 y.o. male with past psychiatric history of schizoaffective disorder who presents with psychosis.    Pt states he is here due to insomnia which has been going on for about one week. He states that this is due to increase in AH recently. States that the voices have been telling him that someone is stealing from him. Reports that the voices finally told him who it was and what they stole. States that when he is in the hospital his sleep patterns tend to change back to normal. So he brought himself to the hospital.    Pt reports he has been compliant with his monthly injection, but has not been taking his medication by mouth (depakote) due to forgetting. States he got an CARRASCO approximately 2 weeks ago (Invega Sustenna) from his [facility] ACT team. States that he has used marijuana once since receiving most recent CARRASCO, which he says helps the voices to "mellow out and fade." "The voices tend to catch my vibe. They tend to fall back and go to sleep. I've learned and " "noticed that the voices can be regular human beings talking to me, and it can be spiritual beings talking to me, and it can be beings from another dimension talking to me."    Pt denies depression. States he is tired but not depressed. Denies SI and HI. He is not able to clearly state whether he is experiencing VH. He eludes that he is seeing things but not able to describe.     He was stabilized and discharged on Invega CARRASCO, Depakote 500 mg po BID and remeron 15 mg po q HS.    Today, he reports that he is "ok.. having an identity crisis; I can understand women but not men." The patient was oddly related with NEO and poor participation secondary to derailed thoughts. He did not answer many questions. Psychosis and carley were prominent.    -his presentation was consistent with previous admissions       Symptoms of Depression: diminished mood - No, loss of interest/anhedonia - No;  recurrent - No, >14 days - No, diminished energy - No, change in sleep - No, change in appetite - Yes, diminished concentration or cognition or indecisiveness - No, PMA/R -  No, excessive guilt or hopelessness or worthlessness - No, suicidal ideations - No    Changes in Sleep: trouble with initiation- No, maintenance, - No early morning awakening with inability to return to sleep - No, hypersomnolence - No    Suicidal- active/passive ideations - No, organized plans, future intentions - No    Homicidal ideations: active/passive ideations - No, organized plans, future intentions - No    Symptoms of psychosis: hallucinations - Yes, delusions - Yes, disorganized speech - Yes, disorganized behavior or abnormal motor behavior - No, or negative symptoms (diminshed emotional expression, avolition, anhedonia, alogia, asociality) - Yes, active phase symptoms >1 month - Yes, continuous signs of illness > 6 months - Yes, since onset of illness decreased level of functioning present - Yes    Symptoms of carley or hypomania: elevated, expansive, or " "irritable mood with increased energy or activity - Yes; > 4 days - Yes,  >7 days - Yes; with inflated self-esteem or grandiosity - Yes, decreased need for sleep - Yes, increased rate of speech - Yes, FOI or racing thoughts - Yes, distractibility - Yes, increased goal directed activity or PMA - Yes, risky/disinhibited behavior - Yes    Symptoms of KANU: excessive anxiety/worry/fear, more days than not, about numerous issues - Yes, ongoing for >6 months - No, difficult to control - Yes, with restlessness - No, fatigue - No, poor concentration - Yes, irritability - Yes, muscle tension - Yes, sleep disturbance - Yes; causes functionally impairing distress - Yes    Symptoms of Panic Disorder: recurrent panic attacks (palpitations/heart racing, sweating, shakiness, dyspnea, choking, chest pain/discomfort, Gi symptoms, dizzy/lightheadedness, hot/col flashes, paresthesias, derealization, fear of losing control or fear of dying or fear of "going crazy") - No, precipitated - No, un-precipitated - No, source of worry and/or behavioral changes secondary for 1 month or longer- No, agoraphobia - No    Symptoms of PTSD: h/o trauma exposure - No; re-experiencing/intrusive symptoms - No, avoidant behavior - No, 2 or more negative alterations in cognition or mood - No, 2 or more hyperarousal symptoms - No; with dissociative symptoms - No, ongoing for 1 or more  months - No    Symptoms of OCD: obsessions (recurrent thoughts/urges/images; intrusive and/or unwanted; uses other thoughts/actions to suppress) - No; compulsions (repetitive behaviors used to lower distress/anxiety/obsessions) - No, time-consuming (over 1 hour per day) or cause significant distress/impairment - - No    Symptoms of Anorexia: restriction of caloric intake leading to significantly low body weight - No, intense fear of gaining weight or persistent behavior that interferes with weight gain even thought at a significantly low weight - No, disturbance in the way in " which one's body weight or shape is experienced, undue influence of body weight or shape on self evaluation, or persistent lack of recognition of the seriousness of the current low body weight - No    Symptoms of Bulimia: recurrent episodes of binge eating (definitely larger amount  than what others would eat and lack of a sense of control over eating during episode) - No, recurrent inappropriate compensatory behaviors in order to prevent weight gain (fasting, medications, exercise, vomiting) - No, binges and compensatory behaviors both occur on average at least once a week for 3 months - No, self evaluations is unduly influenced by body shape/weight- - No    Symptoms of Binge eating: recurrent episodes of binge eating (definitely larger amount than what others would eat and lack of a sense of control over eating during episode) - No, 3 or more of following (eating much more rapidly, eating until uncomfortably full, large amounts when not hungry, eating alone because of embarrassed by how much,  feeling disgusted with oneself, depressed or very guilty afterward) - No, distress regarding binges - No, binges occur on average at least once a week for 3 months - No      PSYCHOTHERAPY ADD-ON +[id number]   16-37 minutes   Time: 16 minutes  Participants: Met with patient   Therapeutic Intervention Type: insight oriented psychotherapy, behavior modifying psychotherapy, supportive psychotherapy, interactive psychotherapy  Why chosen therapy is appropriate versus another modality: relevant to diagnosis, patient responds to this modality, evidence based practice   Target symptoms: mood disorder, psychosis  Primary focus: mood, psychosis, family stressors, cannabis use  Psychotherapeutic techniques: supportive and psycho-educational techniques; setting tx goals and needs   Outcome monitoring methods: self-report, observation   Patient's response to intervention:  The patient's response to intervention is reluctant.   Progress toward  goals:  The patient's progress toward goals is limited.      PAST PSYCHIATRIC HISTORY  Previous Psychiatric Hospitalizations: Yes, extensive  Previous SI/HI: Yes,  Previous Suicide Attempts: Yes,   Previous Medication Trials: Yes,  Psychiatric Care (current & past): Yes,  History of Psychotherapy: Yes,  History of Violence: Yes,  History of sexual/physical abuse: No,    PAST MEDICAL & SURGICAL HISTORY   Past Medical History:   Diagnosis Date    History of psychiatric hospitalization 09/06/2023    [facility] of psychiatric care     Psychiatric problem     Schizoaffective disorder     Schizophrenia     Substance abuse     Suicide attempt      No past surgical history on file.      Home Meds:   Prior to Admission medications    Medication Sig Start Date End Date Taking? Authorizing Provider   divalproex ER (DEPAKOTE ER) 500 MG Tb24 24 hr tablet Take 1 tablet (500 mg total) by mouth 2 (two) times a day. 4/11/25 4/11/26  Cristhian Grijalva III, MD   gabapentin (NEURONTIN) 100 MG capsule Take 1 capsule (100 mg total) by mouth 3 (three) times daily. 4/11/25 4/11/26  Cristhian Grijalva III, MD   mirtazapine (REMERON) 15 MG tablet Take 1 tablet (15 mg total) by mouth every evening. 4/11/25 4/11/26  Cristhian Grijalva III, MD   nicotine (NICODERM CQ) 14 mg/24 hr Place 1 patch onto the skin once daily. 12/4/24   Cristhian Grijalva III, MD   EScitalopram oxalate (LEXAPRO) 10 MG tablet TAKE 1 TABLET BY MOUTH EVERY DAY 7/27/21 5/23/22  Jl Aguilar NP   lithium (LITHOTAB) 300 mg tablet Take 300 mg by mouth 2 (two) times daily. 4/26/22 5/31/22  Provider, Historical         Scheduled Meds:    divalproex ER  500 mg Oral BID    mirtazapine  15 mg Oral QHS      PRN Meds:    Psychotherapeutics (From admission, onward)      Start     Stop Route Frequency Ordered    05/30/25 2100  mirtazapine tablet 15 mg         -- Oral Nightly 05/30/25 0854            ALLERGIES   Review of patient's allergies indicates:  No Known  Allergies        NEUROLOGIC HISTORY  Seizures: No  Head trauma: No     SOCIAL HISTORY:  Developmental/Childhood:Achieved all developmental milestones timely  Education: some college  Employment Status/Finances:Employed   Relationship Status/Sexual Orientation: single  Children: 0  Housing Status: Home    history:  NO   Access to Firearms: NO ;  Locked up? n/a  Tenriism:Actively participates in organized Adventism  Recreational activities:Exercise     SUBSTANCE ABUSE HISTORY   Recreational Drugs: marijuana   Use of Alcohol: occasional, social use  Rehab History:no   Tobacco Use:no     LEGAL HISTORY:   Past charges/incarcerations: NO  Pending charges:NO     FAMILY PSYCHIATRIC HISTORY   Family History   Family history unknown: Yes      Depression - father      ROS  Review of Systems   Constitutional:  Negative for chills and fever.   HENT:  Negative for hearing loss.    Eyes:  Negative for blurred vision and double vision.   Respiratory:  Negative for shortness of breath.    Cardiovascular:  Negative for chest pain and palpitations.   Gastrointestinal:  Negative for constipation, diarrhea, nausea and vomiting.   Genitourinary:  Negative for dysuria.   Musculoskeletal:  Negative for back pain and neck pain.   Skin:  Negative for rash.   Neurological:  Negative for dizziness and headaches.   Endo/Heme/Allergies:  Negative for environmental allergies.         EXAMINATION    PHYSICAL EXAM  Reviewed note/exam by Dr. Dimitrios MD from 5/29/25 at 9:33 PM; Med consulted for initial physical exam- pending    VITALS   Vitals:    05/30/25 0725   BP: 110/68   Pulse: 61   Resp: 18   Temp: 97.7 °F (36.5 °C)        There is no height or weight on file to calculate BMI.        PAIN  0/10  Subjective report of pain matches objective signs and symptoms: Yes    LABORATORY DATA   Recent Results (from the past 72 hours)   Drug screen panel, emergency    Collection Time: 05/29/25  9:21 AM   Result Value Ref Range    Benzodiazepine,  Urine Negative Negative    Methadone, Urine Negative Negative    Cocaine, Urine Negative Negative    Opiates, Urine Negative Negative    Barbiturates, Urine Negative Negative    Amphetamines, Urine Negative Negative    THC Presumptive Positive (A) Negative    Phencyclidine, Urine Negative Negative    Urine Creatinine 294.5 23.0 - 375.0 mg/dL   Urinalysis, Reflex to Urine Culture Urine, Clean Catch    Collection Time: 05/29/25  9:21 AM    Specimen: Urine, Clean Catch   Result Value Ref Range    Color, UA Yellow Straw, Ingrid, Yellow, Light-Orange    Appearance, UA Cloudy (A) Clear    pH, UA 6.0 5.0 - 8.0    Spec Grav UA >=1.030 (A) 1.005 - 1.030    Protein, UA Negative Negative    Glucose, UA Negative Negative    Ketones, UA Trace (A) Negative    Bilirubin, UA Negative Negative    Blood, UA Negative Negative    Nitrites, UA Negative Negative    Urobilinogen, UA 1.0 <2.0 EU/dL    Leukocyte Esterase, UA Negative Negative   GREY TOP URINE HOLD    Collection Time: 05/29/25  9:21 AM   Result Value Ref Range    Extra Tube Hold    Urinalysis Microscopic    Collection Time: 05/29/25  9:21 AM   Result Value Ref Range    RBC, UA 1 0 - 4 /HPF    WBC, UA 0 0 - 5 /HPF    Bacteria, UA None None, Rare, Occasional /HPF    Squamous Epithelial Cells, UA 1 /HPF    Hyaline Casts, UA 0 0 - 1 /LPF    Microscopic Comment     Comprehensive metabolic panel    Collection Time: 05/29/25  8:54 PM   Result Value Ref Range    Sodium 142 136 - 145 mmol/L    Potassium 3.8 3.5 - 5.1 mmol/L    Chloride 111 (H) 95 - 110 mmol/L    CO2 22 (L) 23 - 29 mmol/L    Glucose 80 70 - 110 mg/dL    BUN 15 6 - 20 mg/dL    Creatinine 1.0 0.5 - 1.4 mg/dL    Calcium 8.3 (L) 8.7 - 10.5 mg/dL    Protein Total 6.8 6.0 - 8.4 gm/dL    Albumin 3.5 3.5 - 5.2 g/dL    Bilirubin Total 0.3 0.1 - 1.0 mg/dL    ALP 64 40 - 150 unit/L    AST 16 11 - 45 unit/L    ALT 11 10 - 44 unit/L    Anion Gap 9 8 - 16 mmol/L    eGFR >60 >60 mL/min/1.73/m2   Ethanol    Collection Time: 05/29/25   8:54 PM   Result Value Ref Range    Alcohol, Serum <10 <10 mg/dL   Acetaminophen level    Collection Time: 05/29/25  8:54 PM   Result Value Ref Range    Acetaminophen Level <3.0 (L) 10.0 - 20.0 ug/ml   CBC with Differential    Collection Time: 05/29/25  9:19 PM   Result Value Ref Range    WBC 6.80 3.90 - 12.70 K/uL    RBC 3.74 (L) 4.60 - 6.20 M/uL    HGB 11.7 (L) 14.0 - 18.0 gm/dL    HCT 35.8 (L) 40.0 - 54.0 %    MCV 96 82 - 98 fL    MCH 31.3 (H) 27.0 - 31.0 pg    MCHC 32.7 32.0 - 36.0 g/dL    RDW 12.9 11.5 - 14.5 %    Platelet Count 239 150 - 450 K/uL    MPV 9.0 (L) 9.2 - 12.9 fL    Nucleated RBC 0 <=0 /100 WBC    Neut % 46.5 38 - 73 %    Lymph % 44.4 18 - 48 %    Mono % 6.5 4 - 15 %    Eos % 2.2 <=8 %    Basophil % 0.1 <=1.9 %    Imm Grans % 0.3 0.0 - 0.5 %    Neut # 3.16 1.8 - 7.7 K/uL    Lymph # 3.02 1 - 4.8 K/uL    Mono # 0.44 0.3 - 1 K/uL    Eos # 0.15 <=0.5 K/uL    Baso # 0.01 <=0.2 K/uL    Imm Grans # 0.02 0.00 - 0.04 K/uL   Urinalysis, Reflex to Urine Culture Urine, Clean Catch    Collection Time: 05/29/25 10:10 PM    Specimen: Urine   Result Value Ref Range    Color, UA Yellow Straw, Ingrid, Yellow, Light-Orange    Appearance, UA Clear Clear    pH, UA 7.0 5.0 - 8.0    Spec Grav UA 1.030 1.005 - 1.030    Protein, UA Negative Negative    Glucose, UA Negative Negative    Ketones, UA Negative Negative    Bilirubin, UA Negative Negative    Blood, UA Negative Negative    Nitrites, UA Negative Negative    Leukocyte Esterase, UA Negative Negative   Drug screen panel, emergency    Collection Time: 05/29/25 10:10 PM   Result Value Ref Range    Benzodiazepine, Urine Negative Negative    Methadone, Urine Negative Negative    Cocaine, Urine Negative Negative    Opiates, Urine Negative Negative    Barbiturates, Urine Negative Negative    Amphetamines, Urine Negative Negative    THC Presumptive Positive (A) Negative    Phencyclidine, Urine Negative Negative    Urine Creatinine 325.2 23.0 - 375.0 mg/dL   GREY TOP URINE  HOLD    Collection Time: 05/29/25 10:10 PM   Result Value Ref Range    Extra Tube Hold for add-ons.       Lab Results   Component Value Date    VALPROATE 54.6 04/10/2025    CBMZ 11.8 05/07/2023           CONSTITUTIONAL  General Appearance: unremarkable, age appropriate    MUSCULOSKELETAL  Muscle Strength and Tone:no tremor, no tic  Abnormal Involuntary Movements: No  Gait and Station: non-ataxic    PSYCHIATRIC   Level of Consciousness: awake and alert   Orientation: person, place, and situation  Grooming: Casually dressed and Hospital garb  Psychomotor Behavior: reluctant to participate, psychomotor agitation  Speech: loud, spontaneous, monotone, rapid  Language: grossly intact, able to name, able to repeat  Mood: ok  Affect: Labile  Thought Process: tangential  Associations: loose   Thought Content: +delusions, denies SI, and denies HI  Perceptions: denies AH and denies  VH  Memory: Able to recall past events, Remote intact, and Recent intact  Attention:Impaired to some degree  Fund of Knowledge: Aware of current events and Vocabulary appropriate   Estimate if Intelligence:  Average based on work/education history, vocabulary and mental status exam  Insight: limited awareness of illness  Judgment: limited      PSYCHOSOCIAL    PSYCHOSOCIAL STRESSORS   Drug abuse, occupational    FUNCTIONING RELATIONSHIPS   good support system    STRENGTHS AND LIABILITIES   Strength: Patient is expressive/articulate., Strength: Patient is intelligent., Liability: Patient is dependent., Liability: Patient is impulsive., Liability: Patient has poor judgment, Liability: Patient lacks coping skills.    Is the patient aware of the biomedical complications associated with substance abuse and mental illness? yes    Does the patient have an Advance Directive for Mental Health treatment? no  (If yes, inform patient to bring copy.)        ASSESSMENT         Schizoaffective disorder, bipolar type, severe  Unspecified anxiety disorder  Insomnia  secondary to a mental illness     Cannabis abuse  Nicotine dependence    Psychosocial stressors         PROBLEM LIST AND MANAGEMENT PLANS           Schizoaffective disorder, bipolar type, severe  - seeking records to verify last dose of CARRASCO- pt reports that he last recieved 156 Inevga IM within the last week  - resume depakote at 500 mg PO BID, will check level for toxicity in 4 days  - resume remeron 15 mg PO qhs  - pt counseled  - follow up with outpatient mental health providers after discharge for medication management and psychotherapy      Unspecified anxiety disorder  - start hydroxyzine 50 mg PO q 6 hours PRN  - pt counseled  - follow up with outpatient mental health providers after discharge for medication management and psychotherapy     Insomnia: pt counseled  -remeron off-label as above  -vistaril prn     Cannabis abuse  - consider rehab  - consider gabapentin  - pt counseled  - follow up with outpatient mental health providers after discharge for medication management and psychotherapy     Psychosocial stressors  - pt counseled  - SW consulted for assistance with additional resources      Nicotine dependence  - start nicotine patch 14 mg PO qd PRN            PRESCRIPTION DRUG MANAGEMENT  Compliance: no  Side Effects: unknown  Regimen Adjustments: see above    Discussed diagnosis, risks and benefits of proposed treatment vs alternative treatments vs no treatment, potential side effects of these treatments and the inherent unpredictability of treatment. The patient expresses understanding of the above and displays the capacity to agree with this treatment given said understanding. Patient also agrees that, currently, the benefits outweigh the risks and would like to pursue/continue treatment at this time.    Any medications being used off-label were discussed with the patient inclusive of the evidence base for the use of the medications and consent was obtained for the off-label use of the medication.          DIAGNOSTIC TESTING  Labs reviewed with patient; follow up pending labs    Disposition:  -Will attempt to obtain outside psychiatric records if available  -SW to assist with aftercare planning and collateral  -Once stable discharge home with outpatient follow up care and/or rehab  -Continue inpatient treatment under a PEC and/or CEC for danger to self/ danger to others/grave disability as evident by significant psychotic thought disorder, aggressive neuroleptic titration, and gravely disabled        Frantz Cagle MD  Psychiatry

## 2025-05-30 NOTE — NURSING
As per ER chart:  Pt presents to the ED for psychiatric evaluation. Mom states pt has hx of bipolar schizophrenia and has been responding to internal stimuli since Monday upon returning home from a trip. Pt is non-compliant with meds. No HI/SI verbalized. Pt seen at [facility] this AM for same complaint and d/c. Pt slow to answer questions in triage and responding to internal stimuli. Pt will no answer if he has any HI/SI or AH/VH.  Pt admitted from [facility] er per aasi.  [facility] x 2  at  pt side.  Pt checked per proscan machine prior to walking on to unit.  Negative findings noted.  Pt was seen in er here at [facility] last night and discharged.  Pt's family member then drove him to [facility] where he was pec'd and sent back here.  Pt staring off into space and smiling when being asked questions by this writer.  When pt did start answering questions by this writer during admit assessment he mostly gave one word answers or did not answer at all. Pt denies si, hi.  Pt can be seen responding to internal stimuli. Pt gravely disabled. States he has been compliant with his meds at home.   See full assessment per admission profile.  Admission paperwork explained to pt and signed. See in chart.  Pt oriented to unit and unit routine.  Instructed to call for any needs or concerns at all.  Verbalized understanding.  Will cont to monitor for safety. Patient care ongoing.  Q15 min close obs maintained as per psychiatrist's orders.

## 2025-05-31 LAB
CHOLEST SERPL-MCNC: 143 MG/DL (ref 120–199)
CHOLEST/HDLC SERPL: 4.3 {RATIO} (ref 2–5)
EAG (OHS): 97 MG/DL (ref 68–131)
HBA1C MFR BLD: 5 % (ref 4–5.6)
HDLC SERPL-MCNC: 33 MG/DL (ref 40–75)
HDLC SERPL: 23.1 % (ref 20–50)
LDLC SERPL CALC-MCNC: 97.6 MG/DL (ref 63–159)
NONHDLC SERPL-MCNC: 110 MG/DL
TRIGL SERPL-MCNC: 62 MG/DL (ref 30–150)

## 2025-05-31 PROCEDURE — 11400000 HC PSYCH PRIVATE ROOM

## 2025-05-31 PROCEDURE — 83036 HEMOGLOBIN GLYCOSYLATED A1C: CPT | Performed by: PSYCHIATRY & NEUROLOGY

## 2025-05-31 PROCEDURE — 82465 ASSAY BLD/SERUM CHOLESTEROL: CPT | Performed by: PSYCHIATRY & NEUROLOGY

## 2025-05-31 PROCEDURE — 25000003 PHARM REV CODE 250: Performed by: PSYCHIATRY & NEUROLOGY

## 2025-05-31 PROCEDURE — 99233 SBSQ HOSP IP/OBS HIGH 50: CPT | Mod: ,,, | Performed by: PSYCHIATRY & NEUROLOGY

## 2025-05-31 PROCEDURE — 36415 COLL VENOUS BLD VENIPUNCTURE: CPT | Performed by: PSYCHIATRY & NEUROLOGY

## 2025-05-31 RX ADMIN — MIRTAZAPINE 15 MG: 15 TABLET, FILM COATED ORAL at 08:05

## 2025-05-31 RX ADMIN — DIVALPROEX SODIUM 500 MG: 500 TABLET, FILM COATED, EXTENDED RELEASE ORAL at 08:05

## 2025-05-31 RX ADMIN — HYDROXYZINE PAMOATE 50 MG: 50 CAPSULE ORAL at 08:05

## 2025-05-31 NOTE — PLAN OF CARE
Nutrition Recommendations/Interventions 5/31/25:   1. Recommend pt continues on a Regular diet   2. If warranted, recommend Boost plus once a day as a snack   3. Re-weigh pt for accuracy, weigh x 1 weekly  4. Collaboration by nutrition professional with other providers    Goals:   1. Pt will continue to tolerate and consume >75% EEN and EPN prior to RD follow up   2. Pt's weight will be updated prior to RD follow up    JOEY Dey, RDN, LDN

## 2025-05-31 NOTE — NURSING
Patient out on unit, pacing hallways, dancing most of the day. Cooperative with staff. Compliant with medication. Pleasant mood. Smiling and laughing to himself. Patient denies any homicidal or suicidal thoughts. No signs or symptoms of anxiety or depression noted. No interaction with peers on unit noted. No distress noted. Plan of care reviewed and ongoing.

## 2025-05-31 NOTE — CONSULTS
"  [facility]  Adult Nutrition  Consult Note    SUMMARY     Recommendations    Recommendation/Intervention:   1. Recommend pt continues on a Regular diet   2. If warranted, recommend Boost plus once a day as a snack   3. Re-weigh pt for accuracy, weigh x 1 weekly    Goals:   1. Pt will continue to tolerate and consume >75% EEN and EPN prior to RD follow up   2. Pt's weight will be updated prior to RD follow up  Nutrition Goal Status: new  Communication of RD Recs: other (comment) (RD notes, POC, sticky note)    Nutrition Discharge Planning     Nutrition Discharge Planning: General healthy diet, Oral supplement regimen (comments)  Oral supplement regimen (comments): Boost as warranted       Malnutrition Assessment     Skin (Micronutrient): none (Lior score = 22 (no risk)                                 Reason for Assessment    Reason For Assessment: consult (admit)  Diagnosis: psychological disorder (Schizoaffective disorder, bipolar type)  General Information Comments:   5/31/25: 24 y.o. Male admitted for Schizoaffective disorder, bipolar type. PMH: Marijuana/ substance abuse, Hypokaelmia, Anemia; Hx: Sucide attempt, Psychiatric care/hospitalization. Pt is currently on a Regular diet. RD consulted d/t admit, note RD remote for coverage. Spoke to RN via secure chat, inquired about pt's % PO intake and if experiencing any N/V/D, RN reported "eating 100 percent. no issues". Per chart GI and skin WDL. Labs, meds, weights reviewed. Weight charted 4/5/25 160 lbs, 5/30/25 140 lbs (BMI 17.50, Underweight), -20 lb wt loss (12% wt change) x 1+ month, significant wt loss, note weight charted 5/29/25 155 lbs, -5 lb wt loss (3% wt change) x 1+ month, re weigh for accuracy warranted. Unable to perform NFPE at this time d/t RD remote status, on-site RD will perform if warranted. RD will continue to follow and monitor pt's nutritional status during admit.    Nutrition/Diet History    Spiritual, " "Cultural Beliefs, Baptist Practices, Values that Affect Care: no  Food Allergies: NKFA  Factors Affecting Nutritional Intake: None identified at this time    Nutrition Related Social Determinants of Health: SDOH: Unable to assess at this time.     Anthropometrics    Height: 6' 3" (190.5 cm)  Height (inches): 75 in  Weight: 63.5 kg (139 lb 15.9 oz)  Weight (lb): 139.99 lb  Weight Method: Standard Scale  Ideal Body Weight (IBW), Male: 196 lb  % Ideal Body Weight, Male (lb): 71.42 %  % Ideal Body Weight Malnutrition: 70-79%: moderate deficit  BMI (Calculated): 17.5  BMI Grade: less than 18.5 - underweight       Wt Readings from Last 15 Encounters:   05/31/25 63.5 kg (139 lb 15.9 oz)   05/29/25 63.5 kg (140 lb)   05/29/25 70.4 kg (155 lb 3.3 oz)   04/05/25 72.6 kg (160 lb 0.9 oz)   12/07/24 72.6 kg (160 lb)   11/19/24 72.6 kg (160 lb)   10/20/24 71.9 kg (158 lb 9.6 oz)   10/18/24 74.9 kg (165 lb 3.2 oz)   08/28/24 75.9 kg (167 lb 5.3 oz)   06/17/24 68.5 kg (151 lb)   05/09/24 68.5 kg (151 lb)   10/07/23 68.5 kg (151 lb)   09/06/23 65.3 kg (143 lb 14.4 oz)   09/06/23 63 kg (139 lb)   08/13/23 67.6 kg (149 lb)     Lab/Procedures/Meds    Pertinent Labs Reviewed: reviewed  Pertinent Medications Reviewed: reviewed    BMP  Lab Results   Component Value Date     05/29/2025    K 3.8 05/29/2025     (H) 05/29/2025    CO2 22 (L) 05/29/2025    BUN 15 05/29/2025    CREATININE 1.0 05/29/2025    CALCIUM 8.3 (L) 05/29/2025    ANIONGAP 9 05/29/2025    EGFRNORACEVR >60 05/29/2025     Lab Results   Component Value Date    CALCIUM 8.3 (L) 05/29/2025     Lab Results   Component Value Date    ALBUMIN 3.5 05/29/2025     Lab Results   Component Value Date    ALT 11 05/29/2025    AST 16 05/29/2025    ALKPHOS 64 05/29/2025    BILITOT 0.3 05/29/2025     No results for input(s): "POCTGLUCOSE" in the last 24 hours.    Lab Results   Component Value Date    HGBA1C 5.0 05/31/2025     Lab Results   Component Value Date    WBC 6.80 " 05/29/2025    HGB 11.7 (L) 05/29/2025    HCT 35.8 (L) 05/29/2025    MCV 96 05/29/2025     05/29/2025       Scheduled Meds:   divalproex ER  500 mg Oral BID    mirtazapine  15 mg Oral QHS     Continuous Infusions:  PRN Meds:.  Current Facility-Administered Medications:     acetaminophen, 650 mg, Oral, Q6H PRN    aluminum-magnesium hydroxide-simethicone, 30 mL, Oral, Q6H PRN    benzonatate, 100 mg, Oral, TID PRN    benztropine mesylate, 2 mg, Intramuscular, Q8H PRN    hydrOXYzine pamoate, 50 mg, Oral, Q6H PRN    loperamide, 2 mg, Oral, PRN    nicotine, 1 patch, Transdermal, Daily PRN    OLANZapine, 10 mg, Oral, Q8H PRN **AND** OLANZapine, 10 mg, Intramuscular, Q8H PRN    ondansetron, 4 mg, Oral, Q8H PRN    promethazine, 25 mg, Oral, Q6H PRN      Estimated/Assessed Needs    Weight Used For Calorie Calculations: 63.5 kg (139 lb 15.9 oz)  Energy Calorie Requirements (kcal): 8650-9123 kcals (MSJ x 1.2-1.4 AF (Standard, adult)  Energy Need Method: [place] Dexter  Protein Requirements: 50-64 g (0.8-1.0 g/kg ABW (Standard, adult)  Weight Used For Protein Calculations: 63.5 kg (139 lb 15.9 oz)  Fluid Requirements (mL): 4085-2083 mL (1 mL/kcal)  Estimated Fluid Requirement Method: RDA Method  RDA Method (mL): 2040  CHO Requirement: 255-298 g (4002-0834 kcals/8)      Nutrition Prescription Ordered    Current Diet Order: Regular diet    Evaluation of Received Nutrient/Fluid Intake  I/O: (Net since admit):  5/31/25: none    Energy Calories Required: meeting needs  Protein Required: meeting needs  Fluid Required: other (see comments) (unsure, no intake charted)  Total Fluid Intake (mL):  (none charted)  Comments: LBM: none charted  Tolerance: tolerating  % Intake of Estimated Energy Needs: 75 - 100 %  % Meal Intake: 75 - 100 %    Assessment and Plan  PES Statement:  No nutrition diagnosis at this time  Status: New      Nutrition Risk    Level of Risk/Frequency of Follow-up: low (F/u x 1 weekly)       Monitor and  Evaluation    Monitor and Evaluation: Energy intake, Food and beverage intake, Protein intake, Diet order, Weight, Electrolyte and renal panel, Gastrointestinal profile, Glucose/endocrine profile, Nutrition focused physical findings       Nutrition Follow-Up    RD Follow-up?: Yes  JOEY Dey, RDN, LDN

## 2025-05-31 NOTE — PLAN OF CARE
Problem: Adult Behavioral Health Plan of Care  Goal: Plan of Care Review  Outcome: Progressing  Goal: Patient-Specific Goal (Individualization)  Outcome: Progressing  Goal: Adheres to Safety Considerations for Self and Others  Outcome: Progressing  Goal: Absence of New-Onset Illness or Injury  Outcome: Progressing  Goal: Optimized Coping Skills in Response to Life Stressors  Outcome: Progressing  Goal: Develops/Participates in Therapeutic [place] to Support Successful Transition  Outcome: Progressing  Goal: Rounds/Family Conference  Outcome: Progressing     Problem: Anxiety Signs/Symptoms  Goal: Optimized Energy Level (Anxiety Signs/Symptoms)  Outcome: Progressing  Goal: Optimized Cognitive Function (Anxiety Signs/Symptoms)  Outcome: Progressing  Goal: Improved Mood Symptoms (Anxiety Signs/Symptoms)  Outcome: Progressing  Goal: Improved Sleep (Anxiety Signs/Symptoms)  Outcome: Progressing  Goal: Enhanced Social, Occupational or Functional Skills (Anxiety Signs/Symptoms)  Outcome: Progressing  Goal: Improved Somatic Symptoms (Anxiety Signs/Symptoms)  Outcome: Progressing     Problem: Psychotic Signs/Symptoms  Goal: Improved Behavioral Control (Psychotic Signs/Symptoms)  Outcome: Progressing  Goal: Optimal Cognitive Function (Psychotic Signs/Symptoms)  Outcome: Progressing  Goal: Increased Participation and Engagement (Psychotic Signs/Symptoms)  Outcome: Progressing  Goal: Improved Mood Symptoms (Psychotic Signs/Symptoms)  Outcome: Progressing  Goal: Improved Psychomotor Symptoms (Psychotic Signs/Symptoms)  Outcome: Progressing  Goal: Decreased Sensory Symptoms (Psychotic Signs/Symptoms)  Outcome: Progressing  Goal: Improved Sleep (Psychotic Signs/Symptoms)  Outcome: Progressing  Goal: Enhanced Social, Occupational or Functional Skills (Psychotic Signs/Symptoms)  Outcome: Progressing

## 2025-05-31 NOTE — PLAN OF CARE
Problem: Adult Behavioral Health Plan of Care  Goal: Plan of Care Review  Outcome: Progressing  Goal: Patient-Specific Goal (Individualization)  Outcome: Progressing  Goal: Adheres to Safety Considerations for Self and Others  Outcome: Progressing  Goal: Absence of New-Onset Illness or Injury  Outcome: Progressing  Goal: Optimized Coping Skills in Response to Life Stressors  Outcome: Progressing     Problem: Anxiety Signs/Symptoms  Goal: Optimized Energy Level (Anxiety Signs/Symptoms)  Outcome: Progressing  Goal: Optimized Cognitive Function (Anxiety Signs/Symptoms)  Outcome: Progressing  Goal: Improved Mood Symptoms (Anxiety Signs/Symptoms)  Outcome: Progressing  Goal: Improved Sleep (Anxiety Signs/Symptoms)  Outcome: Progressing  Goal: Enhanced Social, Occupational or Functional Skills (Anxiety Signs/Symptoms)  Outcome: Progressing  Goal: Improved Somatic Symptoms (Anxiety Signs/Symptoms)  Outcome: Progressing     Problem: Psychotic Signs/Symptoms  Goal: Improved Behavioral Control (Psychotic Signs/Symptoms)  Outcome: Progressing  Goal: Optimal Cognitive Function (Psychotic Signs/Symptoms)  Outcome: Progressing  Goal: Increased Participation and Engagement (Psychotic Signs/Symptoms)  Outcome: Progressing  Goal: Improved Mood Symptoms (Psychotic Signs/Symptoms)  Outcome: Progressing  Goal: Improved Psychomotor Symptoms (Psychotic Signs/Symptoms)  Outcome: Progressing  Goal: Decreased Sensory Symptoms (Psychotic Signs/Symptoms)  Outcome: Progressing  Goal: Improved Sleep (Psychotic Signs/Symptoms)  Outcome: Progressing  Goal: Enhanced Social, Occupational or Functional Skills (Psychotic Signs/Symptoms)  Outcome: Progressing

## 2025-05-31 NOTE — PROGRESS NOTES
"PSYCHIATRY DAILY INPATIENT PROGRESS NOTE  SUBSEQUENT HOSPITAL VISIT    ENCOUNTER DATE: 5/31/2025  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 The chart was reviewed.     Reviewed notes from Rns and LPC and labs from the last 24 hours.    The patient's case was discussed with the treatment team/care providers today including Rns    Staff reports no behavioral or management issues.     The patient has been compliant with treatment.      Subjective 05/31/2025       Today the patient reports that he is"doing well." He was euphoric during the assessment. He was inappropriately focused on discharge.    -he continues to deny al symptoms as documented below; However, symptoms of carley and psychosis persist unchanged . He remains an unreliable historian- he is likely minimizing symptoms to secure discharge.    He believes this interview to be the [place]. "I love to love.. but I'm not allowed to talk about this.          The patient denies any side effects to medications.          Psychiatric ROS (observed, reported, or endorsed/denied):  Depressed mood - denies  Interest/pleasure/anhedonia: denies  Guilt/hopelessness/worthlessness - denies  Changes in Sleep - denies  Changes in Appetite - denies  Changes in Concentration - denies  Changes in Energy - denies  PMA/R- denies  Suicidal- active/passive ideations - denies  Homicidal ideations: active/passive ideations - denies    Hallucinations - denies  Delusions - denies  Disorganized behavior - denies  Disorganized speech - denies  Negative symptoms - denies    Elevated mood - denies  Decreased need for sleep - denies  Grandiosity - denies  Racing thoughts - " denies  Impulsivity - denies  Irritability- denies  Increased energy - denies  Distractibility - denies  Increase in goal-directed activity or PMA- denies    Symptoms of KANU - denies  Symptoms of Panic Disorder- denies  Symptoms of PTSD - denies        Overall progress: Patient is showing no improvement on the Unit to date        Psychotherapy:  Target symptoms: mood disorder, psychosis  Why chosen therapy is appropriate versus another modality: relevant to diagnosis, patient responds to this modality, evidence based practice  Outcome monitoring methods: self-report, observation  Therapeutic intervention type: insight oriented psychotherapy, behavior modifying psychotherapy, supportive psychotherapy, interactive psychotherapy  Topics discussed/themes: building skills sets for symptom management, symptom recognition; substance use  The patient's response to the intervention is accepting. The patient's progress toward treatment goals is limited.   Duration of intervention: 5 minutes.        Medical ROS  Constitutional: Negative.  Negative for chills and fever.   HENT: Negative.  Negative for hearing loss.    Eyes: Negative.  Negative for blurred vision and double vision.   Respiratory: Negative.  Negative for shortness of breath.    Cardiovascular: Negative.  Negative for chest pain and palpitations.   Gastrointestinal: Negative.  Negative for constipation, diarrhea, nausea and vomiting.   Genitourinary: Negative.  Negative for dysuria.   Musculoskeletal: Negative.  Negative for back pain and neck pain.   Skin: Negative.  Negative for rash.   Neurological: Negative.  Negative for dizziness and headaches.   Endo/Heme/Allergies: Negative.  Negative for environmental allergies.   Psychiatric/Behavioral:          See HPI       PAST MEDICAL HISTORY   Past Medical History:   Diagnosis Date    History of psychiatric hospitalization 09/06/2023    Daniel MAYO     of psychiatric care     Psychiatric problem      "Schizoaffective disorder     Schizophrenia     Substance abuse     Suicide attempt            PSYCHOTROPIC MEDICATIONS   Scheduled Meds:   divalproex ER  500 mg Oral BID    mirtazapine  15 mg Oral QHS     Continuous Infusions:  PRN Meds:.  Current Facility-Administered Medications:     acetaminophen, 650 mg, Oral, Q6H PRN    aluminum-magnesium hydroxide-simethicone, 30 mL, Oral, Q6H PRN    benzonatate, 100 mg, Oral, TID PRN    benztropine mesylate, 2 mg, Intramuscular, Q8H PRN    hydrOXYzine pamoate, 50 mg, Oral, Q6H PRN    loperamide, 2 mg, Oral, PRN    nicotine, 1 patch, Transdermal, Daily PRN    OLANZapine, 10 mg, Oral, Q8H PRN **AND** OLANZapine, 10 mg, Intramuscular, Q8H PRN    ondansetron, 4 mg, Oral, Q8H PRN    promethazine, 25 mg, Oral, Q6H PRN        EXAMINATION    VITALS   Vitals:    05/30/25 0647 05/30/25 0725 05/30/25 1925 05/31/25 0805   BP: 110/68 110/68 109/72 113/64   BP Location: Left arm Left arm Left arm Left arm   Patient Position: Sitting Sitting Sitting Sitting   Pulse: 61 61 68 (!) 57   Resp: 18 18 16 18   Temp: 97.7 °F (36.5 °C) 97.7 °F (36.5 °C) 98 °F (36.7 °C) 97.4 °F (36.3 °C)   TempSrc: Oral Oral Oral Oral   SpO2: 100%  98% 99%   Weight:  63.5 kg (140 lb)     Height:  6' 3" (1.905 m)         Body mass index is 17.5 kg/m².    CONSTITUTIONAL  General Appearance: unremarkable, age appropriate, normal weight, disheveled     MUSCULOSKELETAL  Muscle Strength and Tone:no tremor, no tic  Abnormal Involuntary Movements: None  Gait and Station: non-ataxic     PSYCHIATRIC   Level of Consciousness: awake and alert   Orientation: person, place, time and situation  Grooming: Hospital garb, disheveled  Psychomotor Behavior: normal, cooperative; no PMA/R  Speech: normal tone, normal rate, normal pitch, normal volume  Language: grossly intact, able to name, able to repeat  Mood: "fine"  Affect: less Intense, euphoric   Thought Process: less circumstantial, +FOI  Associations: intact   Thought Content: " +delusions, denies SI, and denies HI  Perceptions: less observed RIS   Memory: Able to recall past events, Remote intact, and Recent intact  Attention:Attends to interview without distraction  Fund of Knowledge: Aware of current events and Vocabulary appropriate   Estimate if Intelligence:  Average based on work/education history, vocabulary and mental status exam  Insight:limited awareness of illness- poor  Judgment: impaired due to psychosis/carley - poor        DIAGNOSTIC TESTING   Laboratory Results  Recent Results (from the past 24 hours)   Hemoglobin A1c    Collection Time: 05/31/25  6:03 AM   Result Value Ref Range    Hemoglobin A1c 5.0 4.0 - 5.6 %    Estimated Average Glucose 97 68 - 131 mg/dL   Lipid Panel    Collection Time: 05/31/25  6:03 AM   Result Value Ref Range    Cholesterol Total 143 120 - 199 mg/dL    Triglyceride 62 30 - 150 mg/dL    HDL Cholesterol 33 (L) 40 - 75 mg/dL    LDL Cholesterol 97.6 63.0 - 159.0 mg/dL    HDL/Cholesterol Ratio 23.1 20.0 - 50.0 %    Cholesterol/HDL Ratio 4.3 2.0 - 5.0    Non HDL Cholesterol 110 mg/dL             MEDICAL DECISION MAKING      ASSESSMENT:   Schizoaffective disorder, bipolar type, severe  Unspecified anxiety disorder  Insomnia secondary to a mental illness      Cannabis abuse  Nicotine dependence     Psychosocial stressors           PROBLEM LIST AND MANAGEMENT PLANS           Schizoaffective disorder, bipolar type, severe  - seeking records to verify last dose of CARRASCO- pt reports that he last recieved [address] IM within the last week- messages left with [facility]- response pending   - resumed/continue depakote at 500 mg PO BID, will check level for toxicity in 3 days  - resumed/continue remeron 15 mg PO qhs  - pt counseled  - follow up with outpatient mental health providers after discharge for medication management and psychotherapy      Unspecified anxiety disorder  - started/continue hydroxyzine 50 mg PO q 6 hours PRN  - pt counseled  - follow up with  outpatient mental health providers after discharge for medication management and psychotherapy     Insomnia: pt counseled  -remeron off-label as above  -vistaril prn      Cannabis abuse  - consider rehab  - consider gabapentin  - pt counseled  - follow up with outpatient mental health providers after discharge for medication management and psychotherapy     Psychosocial stressors  - pt counseled  - SW consulted for assistance with additional resources      Nicotine dependence  - start nicotine patch 14 mg PO qd PRN    Discussed diagnosis, risks and benefits of proposed treatment vs alternative treatments vs no treatment, potential side effects of these treatments and the inherent unpredictability of treatment. The patient expresses understanding of the above and displays the capacity to agree with this treatment given said understanding. Patient also agrees that, currently, the benefits outweigh the risks and would like to pursue/continue treatment at this time.    Any medications being used off-label were discussed with the patient inclusive of the evidence base for the use of the medications and consent was obtained for the off-label use of the medication.       DISCHARGE PLANNING  Expected Disposition Plan: Home or Self Care- home with ACT vs rehab      NEED FOR CONTINUED HOSPITALIZATION  Psychiatric illness continues to pose a potential threat to life or bodily function, of self or others, thereby requiring the need for continued inpatient psychiatric hospitalization: Yes, due to: significant psychotic thought disorder and gravely disabled, as evidenced by:  Ongoing concerns with grave disability with patient unable to perform basic feeding, hygiene and dressing activities without significant constant support. and Ongoing concerns with perceptual aberrancy and paranoid persecutory delusions leading to potential harm of self or others.    Protective inpatient pyschiatric hospitalization required while a safe  disposition plan is enacted: Yes    Patient stabilized and ready for discharge from inpatient psychiatric unit: No        STAFF:   Frantz Cagle MD  Psychiatry

## 2025-06-01 PROCEDURE — 11400000 HC PSYCH PRIVATE ROOM

## 2025-06-01 PROCEDURE — 25000003 PHARM REV CODE 250: Performed by: PSYCHIATRY & NEUROLOGY

## 2025-06-01 PROCEDURE — 99232 SBSQ HOSP IP/OBS MODERATE 35: CPT | Mod: ,,, | Performed by: PSYCHIATRY & NEUROLOGY

## 2025-06-01 RX ADMIN — DIVALPROEX SODIUM 500 MG: 500 TABLET, FILM COATED, EXTENDED RELEASE ORAL at 08:06

## 2025-06-01 RX ADMIN — MIRTAZAPINE 15 MG: 15 TABLET, FILM COATED ORAL at 08:06

## 2025-06-01 NOTE — PROGRESS NOTES
"PSYCHIATRY DAILY INPATIENT PROGRESS NOTE  SUBSEQUENT HOSPITAL VISIT    ENCOUNTER DATE: 6/1/2025  SITE: 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 The chart was reviewed.     Reviewed notes from Rns, RD, and LPN and labs from the last 24 hours.    The patient's case was discussed with the treatment team/care providers today including Rns    Staff reports no behavioral or management issues.     The patient has been compliant with treatment.      Subjective 06/01/2025       Today the patient again reports that he is"doing well." He was less euphoric during the assessment.   -he discussed reading Twilight, having a new puppy and eating well with fantastic dreams last night    -he continues to deny al symptoms as documented below; However, symptoms of carley and psychosis persist unchanged . He remains an unreliable historian- he is likely minimizing symptoms    Voodoo delusions persist.       The patient denies any side effects to medications.          Psychiatric ROS (observed, reported, or endorsed/denied):  Depressed mood - denies  Interest/pleasure/anhedonia: denies  Guilt/hopelessness/worthlessness - denies  Changes in Sleep - denies  Changes in Appetite - denies  Changes in Concentration - denies  Changes in Energy - denies  PMA/R- denies  Suicidal- active/passive ideations - denies  Homicidal ideations: active/passive ideations - denies    Hallucinations - denies  Delusions - denies  Disorganized behavior - denies  Disorganized speech - denies  Negative symptoms - denies    Elevated mood - denies  Decreased need for sleep - denies  Grandiosity - denies  Racing thoughts - denies  Impulsivity - denies  Irritability- " denies  Increased energy - denies  Distractibility - denies  Increase in goal-directed activity or PMA- denies    Symptoms of KANU - denies  Symptoms of Panic Disorder- denies  Symptoms of PTSD - denies        Overall progress: Patient is showing no improvement on the Unit to date        Psychotherapy:  Target symptoms: mood disorder, psychosis  Why chosen therapy is appropriate versus another modality: relevant to diagnosis, patient responds to this modality, evidence based practice  Outcome monitoring methods: self-report, observation  Therapeutic intervention type: insight oriented psychotherapy, behavior modifying psychotherapy, supportive psychotherapy, interactive psychotherapy  Topics discussed/themes: building skills sets for symptom management, symptom recognition; substance use  The patient's response to the intervention is accepting. The patient's progress toward treatment goals is limited.   Duration of intervention: 5 minutes.        Medical ROS  Constitutional: Negative.  Negative for chills and fever.   HENT: Negative.  Negative for hearing loss.    Eyes: Negative.  Negative for blurred vision and double vision.   Respiratory: Negative.  Negative for shortness of breath.    Cardiovascular: Negative.  Negative for chest pain and palpitations.   Gastrointestinal: Negative.  Negative for constipation, diarrhea, nausea and vomiting.   Genitourinary: Negative.  Negative for dysuria.   Musculoskeletal: Negative.  Negative for back pain and neck pain.   Skin: Negative.  Negative for rash.   Neurological: Negative.  Negative for dizziness and headaches.   Endo/Heme/Allergies: Negative.  Negative for environmental allergies.   Psychiatric/Behavioral:          See HPI       PAST MEDICAL HISTORY   Past Medical History:   Diagnosis Date    History of psychiatric hospitalization 09/06/2023    [facility] of psychiatric care     Psychiatric problem     Schizoaffective disorder     Schizophrenia     Substance  "abuse     Suicide attempt            PSYCHOTROPIC MEDICATIONS   Scheduled Meds:   divalproex ER  500 mg Oral BID    mirtazapine  15 mg Oral QHS     Continuous Infusions:  PRN Meds:.  Current Facility-Administered Medications:     acetaminophen, 650 mg, Oral, Q6H PRN    aluminum-magnesium hydroxide-simethicone, 30 mL, Oral, Q6H PRN    benzonatate, 100 mg, Oral, TID PRN    benztropine mesylate, 2 mg, Intramuscular, Q8H PRN    hydrOXYzine pamoate, 50 mg, Oral, Q6H PRN    loperamide, 2 mg, Oral, PRN    nicotine, 1 patch, Transdermal, Daily PRN    OLANZapine, 10 mg, Oral, Q8H PRN **AND** OLANZapine, 10 mg, Intramuscular, Q8H PRN    ondansetron, 4 mg, Oral, Q8H PRN    promethazine, 25 mg, Oral, Q6H PRN        EXAMINATION    VITALS   Vitals:    05/31/25 1059 05/31/25 1107 05/31/25 1928 06/01/25 0741   BP:   (!) 95/59 (!) 103/57   BP Location:   Left arm Left arm   Patient Position:   Sitting Sitting   Pulse:   63 62   Resp:   20 18   Temp:   98.4 °F (36.9 °C) 98 °F (36.7 °C)   TempSrc:   Oral Oral   SpO2:   99% 98%   Weight: 63.5 kg (139 lb 15.9 oz)      Height: 6' 3" (1.905 m) 6' 3" (1.905 m)         Body mass index is 17.5 kg/m².    CONSTITUTIONAL  General Appearance: unremarkable, age appropriate, normal weight, disheveled     MUSCULOSKELETAL  Muscle Strength and Tone:no tremor, no tic  Abnormal Involuntary Movements: None  Gait and Station: non-ataxic     PSYCHIATRIC   Level of Consciousness: awake and alert   Orientation: person, place, time and situation  Grooming: Hospital garb, disheveled  Psychomotor Behavior: normal, cooperative; no PMA/R  Speech: normal tone, normal rate, normal pitch, normal volume  Language: grossly intact, able to name, able to repeat  Mood: "fine"  Affect: less Intense,less  euphoric   Thought Process: less circumstantial, less FOI  Associations: intact   Thought Content: +delusions, denies SI, and denies HI  Perceptions: less observed RIS   Memory: Able to recall past events, Remote intact, " and Recent intact  Attention:Attends to interview without distraction  Fund of Knowledge: Aware of current events and Vocabulary appropriate   Estimate if Intelligence:  Average based on work/education history, vocabulary and mental status exam  Insight:limited awareness of illness- poor  Judgment: impaired due to psychosis/carley - poor        DIAGNOSTIC TESTING   Laboratory Results  No results found for this or any previous visit (from the past 24 hours).            MEDICAL DECISION MAKING      ASSESSMENT:   Schizoaffective disorder, bipolar type, severe  Unspecified anxiety disorder  Insomnia secondary to a mental illness      Cannabis abuse  Nicotine dependence     Psychosocial stressors           PROBLEM LIST AND MANAGEMENT PLANS           Schizoaffective disorder, bipolar type, severe  - seeking records to verify last dose of CARRASCO- pt reports that he last recieved [facility] within the last week- messages left with [facility]- response pending   - resumed/continue depakote at 500 mg PO BID, will check level for toxicity in 2 days  - resumed/continue remeron 15 mg PO qhs  - pt counseled  - follow up with outpatient mental health providers after discharge for medication management and psychotherapy      Unspecified anxiety disorder  - started/continue hydroxyzine 50 mg PO q 6 hours PRN  - pt counseled  - follow up with outpatient mental health providers after discharge for medication management and psychotherapy     Insomnia: pt counseled  -remeron off-label as above  -vistaril prn      Cannabis abuse  - consider rehab  - consider gabapentin  - pt counseled  - follow up with outpatient mental health providers after discharge for medication management and psychotherapy     Psychosocial stressors  - pt counseled  - SW consulted for assistance with additional resources      Nicotine dependence  - start nicotine patch 14 mg PO qd PRN    Discussed diagnosis, risks and benefits of proposed treatment vs alternative  treatments vs no treatment, potential side effects of these treatments and the inherent unpredictability of treatment. The patient expresses understanding of the above and displays the capacity to agree with this treatment given said understanding. Patient also agrees that, currently, the benefits outweigh the risks and would like to pursue/continue treatment at this time.    Any medications being used off-label were discussed with the patient inclusive of the evidence base for the use of the medications and consent was obtained for the off-label use of the medication.       DISCHARGE PLANNING  Expected Disposition Plan: Home or Self Care- home with ACT vs rehab      NEED FOR CONTINUED HOSPITALIZATION  Psychiatric illness continues to pose a potential threat to life or bodily function, of self or others, thereby requiring the need for continued inpatient psychiatric hospitalization: Yes, due to: significant psychotic thought disorder and gravely disabled, as evidenced by:  Ongoing concerns with grave disability with patient unable to perform basic feeding, hygiene and dressing activities without significant constant support. and Ongoing concerns with perceptual aberrancy and paranoid persecutory delusions leading to potential harm of self or others.    Protective inpatient pyschiatric hospitalization required while a safe disposition plan is enacted: Yes    Patient stabilized and ready for discharge from inpatient psychiatric unit: No        STAFF:   Frantz Cagle MD  Psychiatry

## 2025-06-01 NOTE — NURSING
Pt. Was noticed to be sitting in dining room watching TV, laughing and talking to other Pts. And staff members. He denied hearing voices or seeing things that are not there at this time. Pt. Also denied suicidal or homicidal ideations. His mother called stating that she will be bringing the books that Pt. Asked for during his last phone call to her, He took his shower before taking his night medications without diff. Will cont. To monitor Pt.

## 2025-06-01 NOTE — PLAN OF CARE
Problem: Adult Behavioral Health Plan of Care  Goal: Plan of Care Review  Outcome: Progressing  Goal: Patient-Specific Goal (Individualization)  Outcome: Progressing  Goal: Adheres to Safety Considerations for Self and Others  Outcome: Progressing  Goal: Absence of New-Onset Illness or Injury  Outcome: Progressing  Goal: Optimized Coping Skills in Response to Life Stressors  Outcome: Progressing  Goal: Develops/Participates in Therapeutic [place] to Support Successful Transition  Outcome: Progressing  Goal: Rounds/Family Conference  Outcome: Progressing     Problem: Anxiety Signs/Symptoms  Goal: Optimized Energy Level (Anxiety Signs/Symptoms)  Outcome: Progressing  Goal: Optimized Cognitive Function (Anxiety Signs/Symptoms)  Outcome: Progressing  Goal: Improved Mood Symptoms (Anxiety Signs/Symptoms)  Outcome: Progressing  Goal: Improved Sleep (Anxiety Signs/Symptoms)  Outcome: Progressing  Goal: Enhanced Social, Occupational or Functional Skills (Anxiety Signs/Symptoms)  Outcome: Progressing  Goal: Improved Somatic Symptoms (Anxiety Signs/Symptoms)  Outcome: Progressing     Problem: Psychotic Signs/Symptoms  Goal: Improved Behavioral Control (Psychotic Signs/Symptoms)  Outcome: Progressing  Goal: Optimal Cognitive Function (Psychotic Signs/Symptoms)  Outcome: Progressing  Goal: Increased Participation and Engagement (Psychotic Signs/Symptoms)  Outcome: Progressing  Goal: Improved Mood Symptoms (Psychotic Signs/Symptoms)  Outcome: Progressing  Goal: Improved Psychomotor Symptoms (Psychotic Signs/Symptoms)  Outcome: Progressing  Goal: Decreased Sensory Symptoms (Psychotic Signs/Symptoms)  Outcome: Progressing  Goal: Improved Sleep (Psychotic Signs/Symptoms)  Outcome: Progressing  Goal: Enhanced Social, Occupational or Functional Skills (Psychotic Signs/Symptoms)  Outcome: Progressing

## 2025-06-01 NOTE — PLAN OF CARE
Problem: Adult Behavioral Health Plan of Care  Goal: Plan of Care Review  Outcome: Progressing     Problem: Adult Behavioral Health Plan of Care  Goal: Patient-Specific Goal (Individualization)  Outcome: Progressing     Problem: Adult Behavioral Health Plan of Care  Goal: Adheres to Safety Considerations for Self and Others  Outcome: Progressing     Problem: Adult Behavioral Health Plan of Care  Goal: Optimized Coping Skills in Response to Life Stressors  Outcome: Progressing     Problem: Adult Behavioral Health Plan of Care  Goal: Rounds/Family Conference  Outcome: Progressing     Problem: Anxiety Signs/Symptoms  Goal: Optimized Energy Level (Anxiety Signs/Symptoms)  Outcome: Progressing     Problem: Anxiety Signs/Symptoms  Goal: Optimized Cognitive Function (Anxiety Signs/Symptoms)  Outcome: Progressing     Problem: Anxiety Signs/Symptoms  Goal: Improved Mood Symptoms (Anxiety Signs/Symptoms)  Outcome: Progressing     Problem: Anxiety Signs/Symptoms  Goal: Enhanced Social, Occupational or Functional Skills (Anxiety Signs/Symptoms)  Outcome: Progressing     Problem: Anxiety Signs/Symptoms  Goal: Improved Somatic Symptoms (Anxiety Signs/Symptoms)  Outcome: Progressing     Problem: Psychotic Signs/Symptoms  Goal: Improved Behavioral Control (Psychotic Signs/Symptoms)  Outcome: Progressing     Problem: Psychotic Signs/Symptoms  Goal: Increased Participation and Engagement (Psychotic Signs/Symptoms)  Outcome: Progressing     Problem: Psychotic Signs/Symptoms  Goal: Improved Mood Symptoms (Psychotic Signs/Symptoms)  Outcome: Progressing     Problem: Psychotic Signs/Symptoms  Goal: Improved Sleep (Psychotic Signs/Symptoms)  Outcome: Progressing     Problem: Psychotic Signs/Symptoms  Goal: Enhanced Social, Occupational or Functional Skills (Psychotic Signs/Symptoms)  Outcome: Progressing

## 2025-06-01 NOTE — NURSING
Patient in room most of the day, present for meals, good appetite.  Cooperative with staff. Compliant with medication. Pleasant mood.  Patient denies any homicidal or suicidal thoughts. No signs or symptoms of anxiety or depression noted. No interaction with peers on unit noted. No behavioral issues. No distress noted. Plan of care reviewed and ongoing.

## 2025-06-02 PROCEDURE — 99233 SBSQ HOSP IP/OBS HIGH 50: CPT | Mod: ,,, | Performed by: STUDENT IN AN ORGANIZED HEALTH CARE EDUCATION/TRAINING PROGRAM

## 2025-06-02 PROCEDURE — 11400000 HC PSYCH PRIVATE ROOM

## 2025-06-02 PROCEDURE — 25000003 PHARM REV CODE 250: Performed by: PSYCHIATRY & NEUROLOGY

## 2025-06-02 RX ADMIN — DIVALPROEX SODIUM 500 MG: 500 TABLET, FILM COATED, EXTENDED RELEASE ORAL at 08:06

## 2025-06-02 RX ADMIN — MIRTAZAPINE 15 MG: 15 TABLET, FILM COATED ORAL at 08:06

## 2025-06-02 NOTE — NURSING
Patient out in common area smiling, laughing, engaging with peers, voicing no complaints, and no negative behaviors noted. Appetite is good for meals and is compliant with medication. Patient with flight of ideas on plans after discharge, delusions improving, denies S/HI, denies A/VH. Patient in dining room eating supper meal at this time. Close observations continued and safe environment maintained.

## 2025-06-02 NOTE — PLAN OF CARE
Patient calm and cooperative on shift.  Out of room socializing with peers with no concerns.  Patient slept through the night.

## 2025-06-02 NOTE — PLAN OF CARE
Problem: Adult Behavioral Health Plan of Care  Goal: Plan of Care Review  Outcome: Progressing     Problem: Adult Behavioral Health Plan of Care  Goal: Patient-Specific Goal (Individualization)  Outcome: Progressing     Problem: Adult Behavioral Health Plan of Care  Goal: Adheres to Safety Considerations for Self and Others  Outcome: Progressing     Problem: Adult Behavioral Health Plan of Care  Goal: Absence of New-Onset Illness or Injury  Outcome: Progressing     Problem: Adult Behavioral Health Plan of Care  Goal: Optimized Coping Skills in Response to Life Stressors  Outcome: Progressing     Problem: Adult Behavioral Health Plan of Care  Goal: Develops/Participates in Therapeutic [place] to Support Successful Transition  Outcome: Progressing     Problem: Adult Behavioral Health Plan of Care  Goal: Rounds/Family Conference  Outcome: Progressing     Problem: Anxiety Signs/Symptoms  Goal: Optimized Energy Level (Anxiety Signs/Symptoms)  Outcome: Progressing     Problem: Anxiety Signs/Symptoms  Goal: Optimized Cognitive Function (Anxiety Signs/Symptoms)  Outcome: Progressing     Problem: Anxiety Signs/Symptoms  Goal: Improved Mood Symptoms (Anxiety Signs/Symptoms)  Outcome: Progressing     Problem: Anxiety Signs/Symptoms  Goal: Improved Sleep (Anxiety Signs/Symptoms)  Outcome: Progressing     Problem: Anxiety Signs/Symptoms  Goal: Enhanced Social, Occupational or Functional Skills (Anxiety Signs/Symptoms)  Outcome: Progressing     Problem: Anxiety Signs/Symptoms  Goal: Improved Somatic Symptoms (Anxiety Signs/Symptoms)  Outcome: Progressing     Problem: Psychotic Signs/Symptoms  Goal: Optimal Cognitive Function (Psychotic Signs/Symptoms)  Outcome: Progressing     Problem: Psychotic Signs/Symptoms  Goal: Increased Participation and Engagement (Psychotic Signs/Symptoms)  Outcome: Progressing     Problem: Psychotic Signs/Symptoms  Goal: Improved Mood Symptoms (Psychotic Signs/Symptoms)  Outcome: Progressing

## 2025-06-02 NOTE — PLAN OF CARE
Problem: Adult Behavioral Health Plan of Care  Goal: Develops/Participates in Therapeutic [place] to Support Successful Transition  Intervention: Mutually Develop Transition Plan  Flowsheets (Taken 6/2/2025 1536)  Current Discharge Risk:   psychiatric illness   substance use/abuse  Readmission Within the Last 30 Days: previous discharge plan unsuccessful  Outpatient/Agency/Support Group Needs:   outpatient medication management   outpatient counseling   [place]  Transition Support:   community resources reviewed   crisis management plan promoted   crisis management plan verbalized   follow-up care discussed  Anticipated Discharge Disposition: home with family  Transportation Concerns: none  Patient/Family Anticipated Services at Transition: mental health services  Patient/Family Anticipates Transition to: home with family  Transportation Anticipated: family or friend will provide  Concerns to be Addressed:   discharge planning   medication   mental health   coping/stress  Offered/Gave Vendor List: yes

## 2025-06-02 NOTE — PROGRESS NOTES
"PSYCHIATRY DAILY INPATIENT PROGRESS NOTE  SUBSEQUENT HOSPITAL VISIT    ENCOUNTER DATE: 6/2/2025  SITE: 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 The chart was reviewed.     Reviewed notes from Rns and LPN and labs from the last 24 hours.    The patient's case was discussed with the treatment team/care providers today including Rns    Staff reports no behavioral or management issues.     The patient has been compliant with treatment.      Subjective 06/02/2025       Today the patient again reports "I am getting better every day, I got a little too excited so I had to take a break... I'm sho happy, I'm really happy."    Pt rambling, "I'm doing research, I bought lion's elsa powder from a [employer] shop." Pt admits to continued MJ abuse.    -he continues to deny al symptoms as documented below; However, symptoms of carley and psychosis persist unchanged . He remains an unreliable historian- he is likely minimizing symptoms    Shinto delusions persist.     The patient denies any side effects to medications.      Psychiatric ROS (observed, reported, or endorsed/denied):  Depressed mood - denies  Interest/pleasure/anhedonia: denies  Guilt/hopelessness/worthlessness - denies  Changes in Sleep - denies  Changes in Appetite - denies  Changes in Concentration - denies  Changes in Energy - denies  PMA/R- denies  Suicidal- active/passive ideations - denies  Homicidal ideations: active/passive ideations - denies    Hallucinations - denies  Delusions - denies  Disorganized behavior - denies  Disorganized speech - denies  Negative symptoms - denies    Elevated mood - denies  Decreased need for sleep - denies  Grandiosity - denies  Racing " thoughts - denies  Impulsivity - denies  Irritability- denies  Increased energy - denies  Distractibility - denies  Increase in goal-directed activity or PMA- denies    Symptoms of KANU - denies  Symptoms of Panic Disorder- denies  Symptoms of PTSD - denies        Overall progress: Patient is showing mild improvement               Medical ROS  Constitutional: Negative.  Negative for chills and fever.   HENT: Negative.  Negative for hearing loss.    Eyes: Negative.  Negative for blurred vision and double vision.   Respiratory: Negative.  Negative for shortness of breath.    Cardiovascular: Negative.  Negative for chest pain and palpitations.   Gastrointestinal: Negative.  Negative for constipation, diarrhea, nausea and vomiting.   Genitourinary: Negative.  Negative for dysuria.   Musculoskeletal: Negative.  Negative for back pain and neck pain.   Skin: Negative.  Negative for rash.   Neurological: Negative.  Negative for dizziness and headaches.   Endo/Heme/Allergies: Negative.  Negative for environmental allergies.   Psychiatric/Behavioral:          See HPI       PAST MEDICAL HISTORY   Past Medical History:   Diagnosis Date    History of psychiatric hospitalization 09/06/2023    Daniel MAYO     of psychiatric care     Psychiatric problem     Schizoaffective disorder     Schizophrenia     Substance abuse     Suicide attempt            PSYCHOTROPIC MEDICATIONS   Scheduled Meds:   divalproex ER  500 mg Oral BID    mirtazapine  15 mg Oral QHS     Continuous Infusions:  PRN Meds:.  Current Facility-Administered Medications:     acetaminophen, 650 mg, Oral, Q6H PRN    aluminum-magnesium hydroxide-simethicone, 30 mL, Oral, Q6H PRN    benzonatate, 100 mg, Oral, TID PRN    benztropine mesylate, 2 mg, Intramuscular, Q8H PRN    hydrOXYzine pamoate, 50 mg, Oral, Q6H PRN    loperamide, 2 mg, Oral, PRN    nicotine, 1 patch, Transdermal, Daily PRN    OLANZapine, 10 mg, Oral, Q8H PRN **AND** OLANZapine, 10 mg, Intramuscular, Q8H  "PRN    ondansetron, 4 mg, Oral, Q8H PRN    promethazine, 25 mg, Oral, Q6H PRN        EXAMINATION    VITALS   Vitals:    05/31/25 1928 06/01/25 0741 06/01/25 1911 06/02/25 0740   BP: (!) 95/59 (!) 103/57 (!) 107/58 108/65   BP Location: Left arm Left arm Left arm Left arm   Patient Position: Sitting Sitting Sitting Sitting   Pulse: 63 62 60 72   Resp: 20 18 16 20   Temp: 98.4 °F (36.9 °C) 98 °F (36.7 °C) 98.6 °F (37 °C) 97.8 °F (36.6 °C)   TempSrc: Oral Oral Oral Oral   SpO2: 99% 98% 98% 99%   Weight:       Height:           Body mass index is 17.5 kg/m².        CONSTITUTIONAL  General Appearance: unremarkable, age appropriate, normal weight, disheveled     MUSCULOSKELETAL  Muscle Strength and Tone: no tremor, no tic  Abnormal Involuntary Movements: None  Gait and Station: non-ataxic     PSYCHIATRIC   Level of Consciousness: awake and alert   Orientation: person, place, time and situation  Grooming: improving  Psychomotor Behavior: normal, cooperative; no PMA/R  Speech: normal tone, normal rate, normal pitch, normal volume  Language: grossly intact, able to name, able to repeat  Mood: "really happy"  Affect: less Intense,less  euphoric   Thought Process: less circumstantial, less FOI  Associations: intact   Thought Content: less delusions, denies SI, and denies HI  Perceptions: no AVH  Memory: Able to recall past events, Remote intact, and Recent intact  Attention:Attends to interview without distraction  Fund of Knowledge: Aware of current events and Vocabulary appropriate   Estimate if Intelligence:  Average based on work/education history, vocabulary and mental status exam  Insight:limited awareness of illness-  Judgment: improving        DIAGNOSTIC TESTING   Laboratory Results  No results found for this or any previous visit (from the past 24 hours).            MEDICAL DECISION MAKING      ASSESSMENT:   Schizoaffective disorder, bipolar type, severe  Unspecified anxiety disorder  Insomnia secondary to a mental " illness      Cannabis abuse  Nicotine dependence     Psychosocial stressors           PROBLEM LIST AND MANAGEMENT PLANS           Schizoaffective disorder, bipolar type, severe  - seeking records to verify last dose of CARRASCO- pt reports that he last recieved 156 Inevga IM within the last week- messages left with ACT- response pending   - resumed/continue depakote at 500 mg PO BID, will check level for toxicity in 2 days  - resumed/continue remeron 15 mg PO qhs  - pt counseled  - follow up with outpatient mental health providers after discharge for medication management and psychotherapy      Unspecified anxiety disorder  - started/continue hydroxyzine 50 mg PO q 6 hours PRN  - pt counseled  - follow up with outpatient mental health providers after discharge for medication management and psychotherapy     Insomnia: pt counseled  -remeron off-label as above  -vistaril prn      Cannabis abuse  - consider rehab  - consider gabapentin  - pt counseled  - follow up with outpatient mental health providers after discharge for medication management and psychotherapy     Psychosocial stressors  - pt counseled  - SW consulted for assistance with additional resources      Nicotine dependence  - start nicotine patch 14 mg PO qd PRN          Discussed diagnosis, risks and benefits of proposed treatment vs alternative treatments vs no treatment, potential side effects of these treatments and the inherent unpredictability of treatment. The patient expresses understanding of the above and displays the capacity to agree with this treatment given said understanding. Patient also agrees that, currently, the benefits outweigh the risks and would like to pursue/continue treatment at this time.    Any medications being used off-label were discussed with the patient inclusive of the evidence base for the use of the medications and consent was obtained for the off-label use of the medication.       DISCHARGE PLANNING  Expected Disposition  Plan: Home or Self Care- home with ACT vs rehab      NEED FOR CONTINUED HOSPITALIZATION  Psychiatric illness continues to pose a potential threat to life or bodily function, of self or others, thereby requiring the need for continued inpatient psychiatric hospitalization: Yes, due to: significant psychotic thought disorder and gravely disabled, as evidenced by:  Ongoing concerns with grave disability with patient unable to perform basic feeding, hygiene and dressing activities without significant constant support. and Ongoing concerns with perceptual aberrancy and paranoid persecutory delusions leading to potential harm of self or others.    Protective inpatient pyschiatric hospitalization required while a safe disposition plan is enacted: Yes    Patient stabilized and ready for discharge from inpatient psychiatric unit: No      The patient location is: [facility]    Visit type: audiovisual    Face to Face time with patient: 15  20 minutes of total time spent on the encounter, which includes face to face time and non-face to face time preparing to see the patient (eg, review of tests), Obtaining and/or reviewing separately obtained history, Documenting clinical information in the electronic or other health record, Independently interpreting results (not separately reported) and communicating results to the patient/family/caregiver, or Care coordination (not separately reported).     Each patient to whom he or she provides medical services by telemedicine is:  (1) informed of the relationship between the physician and patient and the respective role of any other health care provider with respect to management of the patient; and (2) notified that he or she may decline to receive medical services by telemedicine and may withdraw from such care at any time.          STAFF:   Cristhian Grijalva III, MD  Psychiatry

## 2025-06-03 LAB — VALPROATE SERPL-MCNC: 64.5 UG/ML (ref 50–100)

## 2025-06-03 PROCEDURE — 99232 SBSQ HOSP IP/OBS MODERATE 35: CPT | Mod: ,,, | Performed by: STUDENT IN AN ORGANIZED HEALTH CARE EDUCATION/TRAINING PROGRAM

## 2025-06-03 PROCEDURE — 25000003 PHARM REV CODE 250: Performed by: PSYCHIATRY & NEUROLOGY

## 2025-06-03 PROCEDURE — 80164 ASSAY DIPROPYLACETIC ACD TOT: CPT | Performed by: PSYCHIATRY & NEUROLOGY

## 2025-06-03 PROCEDURE — 36415 COLL VENOUS BLD VENIPUNCTURE: CPT | Performed by: PSYCHIATRY & NEUROLOGY

## 2025-06-03 PROCEDURE — 11400000 HC PSYCH PRIVATE ROOM

## 2025-06-03 RX ADMIN — DIVALPROEX SODIUM 500 MG: 500 TABLET, FILM COATED, EXTENDED RELEASE ORAL at 08:06

## 2025-06-03 RX ADMIN — MIRTAZAPINE 15 MG: 15 TABLET, FILM COATED ORAL at 08:06

## 2025-06-03 NOTE — PLAN OF CARE
POC reviewed this shift and is on going. Patient is calm, cooperative, quiet, anxious, and sleeping. Denies Suicidal Ideation, Homicidal Ideation, Auditory Hallucinations, Visual Hallucinations, Tactile Hallucinations, Gustatory Hallucinations, and Delusions. Patient reports he is doing better and feeling good. Patient was seen by this RN reacting to internal stimuli. Pt continues to be medication compliant and staff will continue to monitor pt closely while on the unit.

## 2025-06-03 NOTE — PLAN OF CARE
Problem: Adult Behavioral Health Plan of Care  Goal: Optimized Coping Skills in Response to Life Stressors  Intervention: Promote Effective Coping Strategies  Flowsheets (Taken 6/3/2025 1716)  Supportive Measures:   active listening utilized   counseling provided   decision-making supported   goal-setting facilitated   guided imagery facilitated   journaling promoted   positive reinforcement provided   problem-solving facilitated   relaxation techniques promoted   self-care encouraged   self-reflection promoted   self-responsibility promoted   verbalization of feelings encouraged     Behavior:  Patient arrived on time and was actively engaged in group conversation and activity. Patient asked relevant questions, shared at most opportunities and engaged appropriately with peers. Patient presented as alert and oriented.    Intervention:  Counselor facilitated psychoeducation on the physiological and psychological aspects of anger and introduced cognitive-behavioral coping strategies, including deep breathing, time-outs, and cognitive reframing. The group practiced box-breathing as an example of mindful activity. The counselor encouraged group discussion about the effectiveness of each strategy and modeled assertive communication techniques.    Response:  The patient responded well to the intervention. He was able to identify several anger triggers, primarily feeling as though people are judging him or think that he is crazy. Patient identified with several of the physiological responses to anger listen. He was encouraged by the concept of pausing and using diversions to deescalate the intensity of his emotions in stressful situations. Patient had not been familiar with HALT symptoms and expressed a desire to be more attentive for their presence.     Plan:  Patient will be encouraged to participate in MET/CBT- based groups 2-5 times per week and/or 1:1 as needed to address issues and coping strategies. Patient will  participate in recreational groups 2-5 times per week and/or 1:1 as needed to build peer support and relaxation skills.

## 2025-06-03 NOTE — PLAN OF CARE
POC reviewed this shift and is ongoing.  Pt cooperative with staff and interacts with peers limitedly. No ss of distress. Pt on unit for a while then walks back to his room. Pt is still responding to internal stimuli. Med compliant with no adverse reaction.

## 2025-06-03 NOTE — PROGRESS NOTES
"PSYCHIATRY DAILY INPATIENT PROGRESS NOTE  SUBSEQUENT HOSPITAL VISIT    ENCOUNTER DATE: 6/3/2025  SITE: 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 The chart was reviewed.     Reviewed notes from Rns and LPN and labs from the last 24 hours.    The patient's case was discussed with the treatment team/care providers today including Rns    Staff reports no behavioral or management issues.     The patient has been compliant with treatment.      Subjective 06/03/2025       Today the patient again reports "I feel much better, my energy was just high because I wasn't on my depakote, I just forget to take it, when I take it, it helps."    Pt agrees to cessation of MJ.    The patient denies any side effects to medications.      Psychiatric ROS (observed, reported, or endorsed/denied):  Depressed mood - denies  Interest/pleasure/anhedonia: denies  Guilt/hopelessness/worthlessness - denies  Changes in Sleep - denies  Changes in Appetite - denies  Changes in Concentration - denies  Changes in Energy - denies  PMA/R- denies  Suicidal- active/passive ideations - denies  Homicidal ideations: active/passive ideations - denies    Hallucinations - denies  Delusions - denies  Disorganized behavior - denies  Disorganized speech - denies  Negative symptoms - denies    Elevated mood - denies  Decreased need for sleep - denies  Grandiosity - denies  Racing thoughts - denies  Impulsivity - denies  Irritability- denies  Increased energy - denies  Distractibility - denies  Increase in goal-directed activity or PMA- denies    Symptoms of KANU - denies  Symptoms of Panic Disorder- denies  Symptoms of PTSD - denies        Overall progress: Patient is showing " mild improvement               Medical ROS  Constitutional: Negative.  Negative for chills and fever.   HENT: Negative.  Negative for hearing loss.    Eyes: Negative.  Negative for blurred vision and double vision.   Respiratory: Negative.  Negative for shortness of breath.    Cardiovascular: Negative.  Negative for chest pain and palpitations.   Gastrointestinal: Negative.  Negative for constipation, diarrhea, nausea and vomiting.   Genitourinary: Negative.  Negative for dysuria.   Musculoskeletal: Negative.  Negative for back pain and neck pain.   Skin: Negative.  Negative for rash.   Neurological: Negative.  Negative for dizziness and headaches.   Endo/Heme/Allergies: Negative.  Negative for environmental allergies.   Psychiatric/Behavioral:          See HPI       PAST MEDICAL HISTORY   Past Medical History:   Diagnosis Date    History of psychiatric hospitalization 09/06/2023    Daniel MAYO     of psychiatric care     Psychiatric problem     Schizoaffective disorder     Schizophrenia     Substance abuse     Suicide attempt            PSYCHOTROPIC MEDICATIONS   Scheduled Meds:   divalproex ER  500 mg Oral BID    mirtazapine  15 mg Oral QHS     Continuous Infusions:  PRN Meds:.  Current Facility-Administered Medications:     acetaminophen, 650 mg, Oral, Q6H PRN    aluminum-magnesium hydroxide-simethicone, 30 mL, Oral, Q6H PRN    benzonatate, 100 mg, Oral, TID PRN    benztropine mesylate, 2 mg, Intramuscular, Q8H PRN    hydrOXYzine pamoate, 50 mg, Oral, Q6H PRN    loperamide, 2 mg, Oral, PRN    nicotine, 1 patch, Transdermal, Daily PRN    OLANZapine, 10 mg, Oral, Q8H PRN **AND** OLANZapine, 10 mg, Intramuscular, Q8H PRN    ondansetron, 4 mg, Oral, Q8H PRN    promethazine, 25 mg, Oral, Q6H PRN        EXAMINATION    VITALS   Vitals:    06/01/25 1911 06/02/25 0740 06/02/25 1926 06/03/25 0731   BP: (!) 107/58 108/65 106/63 108/65   BP Location: Left arm Left arm Left arm Left arm   Patient Position: Sitting Sitting  "Sitting Sitting   Pulse: 60 72 67 65   Resp: 16 20 20 20   Temp: 98.6 °F (37 °C) 97.8 °F (36.6 °C) 98.5 °F (36.9 °C) 98 °F (36.7 °C)   TempSrc: Oral Oral Oral Oral   SpO2: 98% 99% 98% 99%   Weight:       Height:           Body mass index is 17.5 kg/m².        CONSTITUTIONAL  General Appearance: unremarkable, age appropriate, normal weight, disheveled     MUSCULOSKELETAL  Muscle Strength and Tone: no tremor, no tic  Abnormal Involuntary Movements: None  Gait and Station: non-ataxic     PSYCHIATRIC   Level of Consciousness: awake and alert   Orientation: person, place, time and situation  Grooming: improving  Psychomotor Behavior: normal, cooperative; no PMA/R  Speech: normal tone, normal rate, normal pitch, normal volume  Language: grossly intact, able to name, able to repeat  Mood: "good"  Affect: even  Thought Process: mostly linear  Associations: intact   Thought Content: no delusions, denies SI, and denies HI  Perceptions: no AVH  Memory: Able to recall past events, Remote intact, and Recent intact  Attention:Attends to interview without distraction  Fund of Knowledge: Aware of current events and Vocabulary appropriate   Estimate if Intelligence:  Average based on work/education history, vocabulary and mental status exam  Insight: improving  Judgment: improving        DIAGNOSTIC TESTING   Laboratory Results  No results found for this or any previous visit (from the past 24 hours).            MEDICAL DECISION MAKING      ASSESSMENT:   Schizoaffective disorder, bipolar type, severe  Unspecified anxiety disorder  Insomnia secondary to a mental illness      Cannabis abuse  Nicotine dependence     Psychosocial stressors           PROBLEM LIST AND MANAGEMENT PLANS          Schizoaffective disorder, bipolar type, severe  - seeking records to verify last dose of CARRASCO- pt reports that he last recieved 156 Inevga IM within the last week- messages left with ACT- response pending   - resumed/continue depakote at 500 mg PO " BID, level pending today  - resumed/continue remeron 15 mg PO qhs  - pt counseled  - follow up with outpatient mental health providers after discharge for medication management and psychotherapy      Unspecified anxiety disorder  - started/continue hydroxyzine 50 mg PO q 6 hours PRN  - pt counseled  - follow up with outpatient mental health providers after discharge for medication management and psychotherapy     Insomnia: pt counseled  -remeron off-label as above  -vistaril prn      Cannabis abuse  - consider rehab  - consider gabapentin  - pt counseled  - follow up with outpatient mental health providers after discharge for medication management and psychotherapy     Psychosocial stressors  - pt counseled  - SW consulted for assistance with additional resources      Nicotine dependence  - start nicotine patch 14 mg PO qd PRN          Discussed diagnosis, risks and benefits of proposed treatment vs alternative treatments vs no treatment, potential side effects of these treatments and the inherent unpredictability of treatment. The patient expresses understanding of the above and displays the capacity to agree with this treatment given said understanding. Patient also agrees that, currently, the benefits outweigh the risks and would like to pursue/continue treatment at this time.    Any medications being used off-label were discussed with the patient inclusive of the evidence base for the use of the medications and consent was obtained for the off-label use of the medication.       DISCHARGE PLANNING  Expected Disposition Plan: Home or Self Care- home with ACT vs rehab      NEED FOR CONTINUED HOSPITALIZATION  Psychiatric illness continues to pose a potential threat to life or bodily function, of self or others, thereby requiring the need for continued inpatient psychiatric hospitalization: Yes, due to: significant psychotic thought disorder and gravely disabled, as evidenced by:  Ongoing concerns with grave  disability with patient unable to perform basic feeding, hygiene and dressing activities without significant constant support. and Ongoing concerns with perceptual aberrancy and paranoid persecutory delusions leading to potential harm of self or others.    Protective inpatient pyschiatric hospitalization required while a safe disposition plan is enacted: Yes    Patient stabilized and ready for discharge from inpatient psychiatric unit: No      The patient location is: [facility]    Visit type: audiovisual    Face to Face time with patient: 5  10 minutes of total time spent on the encounter, which includes face to face time and non-face to face time preparing to see the patient (eg, review of tests), Obtaining and/or reviewing separately obtained history, Documenting clinical information in the electronic or other health record, Independently interpreting results (not separately reported) and communicating results to the patient/family/caregiver, or Care coordination (not separately reported).     Each patient to whom he or she provides medical services by telemedicine is:  (1) informed of the relationship between the physician and patient and the respective role of any other health care provider with respect to management of the patient; and (2) notified that he or she may decline to receive medical services by telemedicine and may withdraw from such care at any time.          STAFF:   Cristhian Grijalva III, MD  Psychiatry

## 2025-06-04 VITALS
WEIGHT: 140 LBS | TEMPERATURE: 98 F | RESPIRATION RATE: 18 BRPM | HEART RATE: 62 BPM | OXYGEN SATURATION: 99 % | BODY MASS INDEX: 17.41 KG/M2 | SYSTOLIC BLOOD PRESSURE: 109 MMHG | DIASTOLIC BLOOD PRESSURE: 68 MMHG | HEIGHT: 75 IN

## 2025-06-04 PROCEDURE — 25000003 PHARM REV CODE 250: Performed by: PSYCHIATRY & NEUROLOGY

## 2025-06-04 PROCEDURE — 99239 HOSP IP/OBS DSCHRG MGMT >30: CPT | Mod: ,,, | Performed by: STUDENT IN AN ORGANIZED HEALTH CARE EDUCATION/TRAINING PROGRAM

## 2025-06-04 RX ORDER — DIVALPROEX SODIUM 500 MG/1
500 TABLET, FILM COATED, EXTENDED RELEASE ORAL 2 TIMES DAILY
Qty: 60 TABLET | Refills: 0 | Status: SHIPPED | OUTPATIENT
Start: 2025-06-04 | End: 2026-06-04

## 2025-06-04 RX ORDER — MIRTAZAPINE 15 MG/1
15 TABLET, FILM COATED ORAL NIGHTLY
Qty: 30 TABLET | Refills: 0 | Status: SHIPPED | OUTPATIENT
Start: 2025-06-04 | End: 2026-06-04

## 2025-06-04 RX ORDER — IBUPROFEN 200 MG
1 TABLET ORAL DAILY
Qty: 30 PATCH | Refills: 0 | Status: SHIPPED | OUTPATIENT
Start: 2025-06-04

## 2025-06-04 RX ADMIN — DIVALPROEX SODIUM 500 MG: 500 TABLET, FILM COATED, EXTENDED RELEASE ORAL at 08:06

## 2025-06-04 NOTE — NURSING
Patient discharged from unit, received discharge instructions, voiced understanding. Patient belongings returned, valuables sheet signed as confirmation. Patient escorted from unit by staff in stable condition without incident.

## 2025-06-04 NOTE — DISCHARGE SUMMARY
"Discharge Summary  Psychiatry    Admit Date: 5/30/2025    Discharge Date and Time: 06/04/2025 11:12 AM    Attending Physician: Cristhian Grijalva III, MD     Discharge Provider: Cristhian Grijalva III    Reason for Admission:  CHIEF COMPLAINT   Bishop Soria is a 24 y.o. male with a past psychiatric history of Substance Abuse and schizophrenia currently admitted to the inpatient unit with the following chief complaint: psychosis- pt wopuld not answer    HPI   The patient was seen and examined. The chart was reviewed.     The patient presented to the ER on 5/30/2025 . Per staff notes:  -After giving patient sandwich and chips and juice, now patient speaking. States "I came to ER because I am thirsty"   -Patient arrived POV. Smiling, not answering questions   -23 yo male with history of schizophrenia here via POV with no specific complaint. Reports thirsty. No HI/SI. Denies command hallucinations. Admits substance abuse. Multiple psych admits and rehab admits without significant improvement. Reports compliance with medication   -Pt presents to the ED for psychiatric evaluation. Mom states pt has hx of bipolar schizophrenia and has been responding to internal stimuli since Monday upon returning home from a trip. Pt is non-compliant with meds. No HI/SI verbalized. Pt seen at [facility] this AM for same complaint and d/c. Pt slow to answer questions in triage and responding to internal stimuli. Pt will no answer if he has any HI/SI or AH/VH  -Mr Soria has hx of bipolar schizophrenia and has been responding to internal stimuli   since Monday upon returning home from a trip. Pt is non-compliant with meds. When asked why he is here he replies "Im Trans". When asked if he has thoughts of harmimg himself, he nods yes.         The patient was medically cleared and admitted to the U.        The patient is well known to the U for numerous stays secondary to severe schizoaffective disorder (bipolar type) complicated by tx " "non adherence and substance use.  He was last treated here from 4/5-4/11/25 with the following HPI:   Family reports pt is not acting normal. EMS reports pt is having hallucinations.     24-year-old male history of schizophrenia, multiple psychiatric hospitalizations in the past presents the ER via EMS, EMS reports family states he is not acting right.  He does appear to be talking to himself, inappropriate laughter.  No answer certain questions.  He denies SI or HI.  Unable to be redirected    Per Chart Review:  Multiple prior admissions to [facility] with similar presentation of psychosis. Most recent hospitalization in the record is from 11/18-12/3/2024 at [facility].    DC Dx: Schizoaffective Disorder, Bipolar Type   Pt was discharged on the following medications:              - Invega sustenna              - Depakote 500mg BID (VPA level 72 at this dose)              - Remeron 15mg QHS   Per Initial Interview:  Bishop Soria is a 24 y.o. male with past psychiatric history of schizoaffective disorder who presents with psychosis.    Pt states he is here due to insomnia which has been going on for about one week. He states that this is due to increase in AH recently. States that the voices have been telling him that someone is stealing from him. Reports that the voices finally told him who it was and what they stole. States that when he is in the hospital his sleep patterns tend to change back to normal. So he brought himself to the hospital.    Pt reports he has been compliant with his monthly injection, but has not been taking his medication by mouth (depakote) due to forgetting. States he got an CARRASCO approximately 2 weeks ago (Invega Sustenna) from his [facility] ACT team. States that he has used marijuana once since receiving most recent CARRASCO, which he says helps the voices to "mellow out and fade." "The voices tend to catch my vibe. They tend to fall back and go to sleep. I've learned and noticed that the " "voices can be regular human beings talking to me, and it can be spiritual beings talking to me, and it can be beings from another dimension talking to me."    Pt denies depression. States he is tired but not depressed. Denies SI and HI. He is not able to clearly state whether he is experiencing VH. He eludes that he is seeing things but not able to describe.      He was stabilized and discharged on Invega CARRASCO, Depakote 500 mg po BID and remeron 15 mg po q HS.     Today, he reports that he is "ok.. having an identity crisis; I can understand women but not men." The patient was oddly related with NEO and poor participation secondary to derailed thoughts. He did not answer many questions. Psychosis and carley were prominent.    -his presentation was consistent with previous admissions           Procedures Performed: * No surgery found *    Hospital Course:    Patient was admitted to the inpatient psychiatry unit after being medically cleared in the ED. Chart and labs were reviewed. The patient was stabilized as follows:      Schizoaffective disorder, bipolar type, severe  - staff confirmed pt received CARRASCO last week, continue monthly  - resumed/continue depakote at 500 mg PO BID, level WNL  - resumed/continue remeron 15 mg PO qhs  - pt counseled  - follow up with outpatient mental health providers after discharge for medication management and psychotherapy      Unspecified anxiety disorder  - started/continue hydroxyzine 50 mg PO q 6 hours PRN  - pt counseled  - follow up with outpatient mental health providers after discharge for medication management and psychotherapy     Insomnia: pt counseled  -remeron off-label as above  -vistaril prn      Cannabis abuse  - consider rehab  - consider gabapentin  - pt counseled  - follow up with outpatient mental health providers after discharge for medication management and psychotherapy     Psychosocial stressors  - pt counseled  - SW consulted for assistance with additional " resources      Nicotine dependence  - start nicotine patch 14 mg PO qd PRN        The patient reports improved symptoms as documented. The patient is requesting discharge.The patient is hopeful, future oriented and goal directed. The patient readily discusses both short and long term goals. The patient can identify positive coping skills and social support. AIMS score was 0. During hospitalization, the patient was encouraged to go to both groups and individual counseling. Patient was monitored for any side effects. A meeting was held with multidisciplinary team prior to discharge and pt's diagnosis, current medications, and follow up were discussed. The patient has been compliant with treatment and can adequately attend to activities of daily living in an independent manner. The patient denies any side effects. The patient denies SI, HI, plan or intent for self harm or harm to others. The patient is no longer a danger to self or others nor gravely disabled disabled. Patient discharged  in stable condition with scheduled outpatient follow up.      Discussed diagnosis, risks and benefits of proposed treatment vs alternative treatments vs no treatment, and potential side effects of these treatments.  The patient expresses understanding of the above and displays the capacity to agree with this treatment given said understanding.  Patient also agrees that, currently, the benefits outweigh the risks and would like to pursue treatment at this time.      Discharge MSE: stated age, casually dressed, well groomed.  No psychomotor agitation or retardation.  No abnormal involuntary movements.  Gait normal.  Speech normal, conversational.  Language fluent English. Mood fine.  Affect normal range, pleasant, euthymic.  Thought process linear.  Associations intact.  Denies suicidal or homicidal ideation.  Denies auditory hallucinations, paranoid ideation, ideas of reference.  Memory intact.  Attention intact.  Fund of knowledge  intact.  Insight intact.  Judgment intact.  Alert and oriented to person, place, time.      Tobacco Usage:  Is patient a smoker? Yes  Does patient want prescription for Tobacco Cessation? Yes  Does patient want counseling for Tobacco Cessation? Yes    If patient would like to quit, then over the counter nicotine patch could be used. The patient could also follow up with his PCP or psychiatric provider for other alternatives.     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 illness      Cannabis abuse  Nicotine dependence     Psychosocial stressors    Labs:  Admission on 05/30/2025   Component Date Value Ref Range Status    Hemoglobin A1c 05/31/2025 5.0  4.0 - 5.6 % Final    Estimated Average Glucose 05/31/2025 97  68 - 131 mg/dL Final    Cholesterol Total 05/31/2025 143  120 - 199 mg/dL Final    Triglyceride 05/31/2025 62  30 - 150 mg/dL Final    HDL Cholesterol 05/31/2025 33 (L)  40 - 75 mg/dL Final    LDL Cholesterol 05/31/2025 97.6  63.0 - 159.0 mg/dL Final    HDL/Cholesterol Ratio 05/31/2025 23.1  20.0 - 50.0 % Final    Cholesterol/HDL Ratio 05/31/2025 4.3  2.0 - 5.0 Final    Non HDL Cholesterol 05/31/2025 110  mg/dL Final    Valproic Acid 06/03/2025 64.5  50.0 - 100.0 ug/ml Final   Admission on 05/29/2025, Discharged on 05/30/2025   Component Date Value Ref Range Status    Sodium 05/29/2025 142  136 - 145 mmol/L Final    Potassium 05/29/2025 3.8  3.5 - 5.1 mmol/L Final    Chloride 05/29/2025 111 (H)  95 - 110 mmol/L Final    CO2 05/29/2025 22 (L)  23 - 29 mmol/L Final    Glucose 05/29/2025 80  70 - 110 mg/dL Final    BUN 05/29/2025 15  6 - 20 mg/dL Final    Creatinine 05/29/2025 1.0  0.5 - 1.4 mg/dL Final    Calcium 05/29/2025 8.3 (L)  8.7 - 10.5 mg/dL Final    Protein Total 05/29/2025 6.8  6.0 - 8.4 gm/dL Final    Albumin 05/29/2025 3.5  3.5 - 5.2 g/dL Final    Bilirubin Total  05/29/2025 0.3  0.1 - 1.0 mg/dL Final    ALP 05/29/2025 64  40 - 150 unit/L Final    AST 05/29/2025 16  11 - 45 unit/L Final    ALT 05/29/2025 11  10 - 44 unit/L Final    Anion Gap 05/29/2025 9  8 - 16 mmol/L Final    eGFR 05/29/2025 >60  >60 mL/min/1.73/m2 Final    Color, UA 05/29/2025 Yellow  Straw, Ingrid, Yellow, Light-Orange Final    Appearance, UA 05/29/2025 Clear  Clear Final    pH, UA 05/29/2025 7.0  5.0 - 8.0 Final    Spec Grav UA 05/29/2025 1.030  1.005 - 1.030 Final    Protein, UA 05/29/2025 Negative  Negative Final    Glucose, UA 05/29/2025 Negative  Negative Final    Ketones, UA 05/29/2025 Negative  Negative Final    Bilirubin, UA 05/29/2025 Negative  Negative Final    Blood, UA 05/29/2025 Negative  Negative Final    Nitrites, UA 05/29/2025 Negative  Negative Final    Leukocyte Esterase, UA 05/29/2025 Negative  Negative Final    Benzodiazepine, Urine 05/29/2025 Negative  Negative Final    Methadone, Urine 05/29/2025 Negative  Negative Final    Cocaine, Urine 05/29/2025 Negative  Negative Final    Opiates, Urine 05/29/2025 Negative  Negative Final    Barbiturates, Urine 05/29/2025 Negative  Negative Final    Amphetamines, Urine 05/29/2025 Negative  Negative Final    THC 05/29/2025 Presumptive Positive (A)  Negative Final    Phencyclidine, Urine 05/29/2025 Negative  Negative Final    Urine Creatinine 05/29/2025 325.2  23.0 - 375.0 mg/dL Final    Alcohol, Serum 05/29/2025 <10  <10 mg/dL Final    Acetaminophen Level 05/29/2025 <3.0 (L)  10.0 - 20.0 ug/ml Final    WBC 05/29/2025 6.80  3.90 - 12.70 K/uL Final    RBC 05/29/2025 3.74 (L)  4.60 - 6.20 M/uL Final    HGB 05/29/2025 11.7 (L)  14.0 - 18.0 gm/dL Final    HCT 05/29/2025 35.8 (L)  40.0 - 54.0 % Final    MCV 05/29/2025 96  82 - 98 fL Final    MCH 05/29/2025 31.3 (H)  27.0 - 31.0 pg Final    MCHC 05/29/2025 32.7  32.0 - 36.0 g/dL Final    RDW 05/29/2025 12.9  11.5 - 14.5 % Final    Platelet Count 05/29/2025 239  150 - 450 K/uL Final    MPV 05/29/2025 9.0  (L)  9.2 - 12.9 fL Final    Nucleated RBC 05/29/2025 0  <=0 /100 WBC Final    Neut % 05/29/2025 46.5  38 - 73 % Final    Lymph % 05/29/2025 44.4  18 - 48 % Final    Mono % 05/29/2025 6.5  4 - 15 % Final    Eos % 05/29/2025 2.2  <=8 % Final    Basophil % 05/29/2025 0.1  <=1.9 % Final    Imm Grans % 05/29/2025 0.3  0.0 - 0.5 % Final    Neut # 05/29/2025 3.16  1.8 - 7.7 K/uL Final    Lymph # 05/29/2025 3.02  1 - 4.8 K/uL Final    Mono # 05/29/2025 0.44  0.3 - 1 K/uL Final    Eos # 05/29/2025 0.15  <=0.5 K/uL Final    Baso # 05/29/2025 0.01  <=0.2 K/uL Final    Imm Grans # 05/29/2025 0.02  0.00 - 0.04 K/uL Final    Extra Tube 05/29/2025 Hold for add-ons.   Final   Admission on 05/29/2025, Discharged on 05/29/2025   Component Date Value Ref Range Status    Benzodiazepine, Urine 05/29/2025 Negative  Negative Final    Methadone, Urine 05/29/2025 Negative  Negative Final    Cocaine, Urine 05/29/2025 Negative  Negative Final    Opiates, Urine 05/29/2025 Negative  Negative Final    Barbiturates, Urine 05/29/2025 Negative  Negative Final    Amphetamines, Urine 05/29/2025 Negative  Negative Final    THC 05/29/2025 Presumptive Positive (A)  Negative Final    Phencyclidine, Urine 05/29/2025 Negative  Negative Final    Urine Creatinine 05/29/2025 294.5  23.0 - 375.0 mg/dL Final    Color, UA 05/29/2025 Yellow  Straw, Ingrid, Yellow, Light-Orange Final    Appearance, UA 05/29/2025 Cloudy (A)  Clear Final    pH, UA 05/29/2025 6.0  5.0 - 8.0 Final    Spec Grav UA 05/29/2025 >=1.030 (A)  1.005 - 1.030 Final    Protein, UA 05/29/2025 Negative  Negative Final    Glucose, UA 05/29/2025 Negative  Negative Final    Ketones, UA 05/29/2025 Trace (A)  Negative Final    Bilirubin, UA 05/29/2025 Negative  Negative Final    Blood, UA 05/29/2025 Negative  Negative Final    Nitrites, UA 05/29/2025 Negative  Negative Final    Urobilinogen, UA 05/29/2025 1.0  <2.0 EU/dL Final    Leukocyte Esterase, UA 05/29/2025 Negative  Negative Final    Extra Tube  05/29/2025 Hold   Final    RBC,  05/29/2025 1  0 - 4 /HPF Final    WBC,  05/29/2025 0  0 - 5 /HPF Final    Bacteria,  05/29/2025 None  None, Rare, Occasional /HPF Final    Squamous Epithelial Cells,  05/29/2025 1  /HPF Final    Hyaline Casts,  05/29/2025 0  0 - 1 /LPF Final    Microscopic Comment 05/29/2025    Final         Discharged Condition: stable and improved; not currently a danger to self/others or gravely disabled    Disposition: [place] [facility] [place]    Is patient being discharged on multiple neuroleptics? No    Follow Up/Patient Instructions:     Take all medications as prescribed.  Attend all psychiatric and medical follow up appointments.   Abstain from all drugs and alcohol.  Call the crisis line at: [phone] for help in a crisis and emergent situations or call 911 and Return to ED for any acute worsening of your condition including suicidal or homicidal ideations      Discharge Procedure Orders   Diet Adult Regular     Notify your health care provider if you experience any of the following:  temperature >100.4     Notify your health care provider if you experience any of the following:  persistent nausea and vomiting or diarrhea     Notify your health care provider if you experience any of the following:   Order Comments: Suicidal thoughts, homicidal thoughts, or any other changes in mental status  If you would like immediate help/crisis counseling, please call [phone] (TALK). Through this toll-free phone number for a network of crisis centers across the country. These centers staff their lines with people who are trained to listen and offer support to people in emotional crisis. If you are in an emergency, please call 911.     Notify your health care provider if you experience any of the following:  increased confusion or weakness     Notify your health care provider if you experience any of the following:  persistent dizziness, light-headedness, or visual disturbances      Activity as tolerated           Follow up apt: staff will schedule      Medications:  Reconciled Home Medications:      Medication List        CONTINUE taking these medications      divalproex  MG Tb24 24 hr tablet  Commonly known as: DEPAKOTE ER  Take 1 tablet (500 mg total) by mouth 2 (two) times a day.     mirtazapine 15 MG tablet  Commonly known as: REMERON  Take 1 tablet (15 mg total) by mouth every evening.     nicotine 14 mg/24 hr  Commonly known as: NICODERM CQ  Place 1 patch onto the skin once daily.            STOP taking these medications      gabapentin 100 MG capsule  Commonly known as: NEURONTIN                Diet: regular     Activity as tolerated    Total time spent discharging patient: 33 minutes    Cristhian Grijalva III, MD  Psychiatry

## 2025-06-04 NOTE — NURSING
POC reviewed this shift and is ongoing.  Pt is cooperative with care, complaint with medications.  Pt is visible in the milieu and interacts well with staff and peers.  Pt denies SI/HI/AVH, no paranoid or delusional statements.  Pt voices future plans to attend college, majoring in Biology and is excited about getting a lab puppy soon after discharge.  Pt voices readiness for discharge tomorrow.

## 2025-08-30 ENCOUNTER — HOSPITAL ENCOUNTER (EMERGENCY)
Facility: HOSPITAL | Age: 25
Discharge: HOME | End: 2025-08-30
Attending: EMERGENCY MEDICINE
Payer: MEDICARE

## 2025-08-30 VITALS
TEMPERATURE: 100 F | OXYGEN SATURATION: 100 % | WEIGHT: 158 LBS | HEIGHT: 75 IN | DIASTOLIC BLOOD PRESSURE: 63 MMHG | SYSTOLIC BLOOD PRESSURE: 114 MMHG | BODY MASS INDEX: 19.65 KG/M2 | RESPIRATION RATE: 18 BRPM | HEART RATE: 76 BPM

## 2025-08-30 DIAGNOSIS — T14.8XXA ABRASION: Primary | ICD-10-CM

## 2025-08-30 PROCEDURE — 99283 EMERGENCY DEPT VISIT LOW MDM: CPT | Mod: 25

## 2025-09-02 PROBLEM — F20.9 SCHIZOPHRENIA: Status: ACTIVE | Noted: 2025-09-02
